# Patient Record
Sex: FEMALE | Race: WHITE | Employment: OTHER | URBAN - METROPOLITAN AREA
[De-identification: names, ages, dates, MRNs, and addresses within clinical notes are randomized per-mention and may not be internally consistent; named-entity substitution may affect disease eponyms.]

---

## 2017-06-06 PROBLEM — M81.0 OSTEOPOROSIS: Status: ACTIVE | Noted: 2017-06-06

## 2017-12-29 PROBLEM — J11.1 INFLUENZA WITH RESPIRATORY MANIFESTATION OTHER THAN PNEUMONIA: Status: ACTIVE | Noted: 2017-12-29

## 2017-12-29 PROBLEM — J18.9 PNEUMONIA DUE TO ORGANISM: Status: ACTIVE | Noted: 2017-12-29

## 2018-01-03 PROBLEM — R05.9 COUGH: Status: ACTIVE | Noted: 2018-01-03

## 2018-02-13 PROBLEM — Z95.810 S/P ICD (INTERNAL CARDIAC DEFIBRILLATOR) PROCEDURE: Status: ACTIVE | Noted: 2018-02-13

## 2018-04-11 PROBLEM — R05.9 COUGH: Status: RESOLVED | Noted: 2018-01-03 | Resolved: 2018-04-11

## 2018-05-24 ENCOUNTER — OFFICE VISIT (OUTPATIENT)
Dept: CARDIOLOGY CLINIC | Age: 83
End: 2018-05-24
Payer: MEDICARE

## 2018-05-24 VITALS
DIASTOLIC BLOOD PRESSURE: 60 MMHG | HEIGHT: 59 IN | HEART RATE: 64 BPM | WEIGHT: 120 LBS | BODY MASS INDEX: 24.19 KG/M2 | SYSTOLIC BLOOD PRESSURE: 124 MMHG | RESPIRATION RATE: 16 BRPM

## 2018-05-24 DIAGNOSIS — I10 ESSENTIAL HYPERTENSION: ICD-10-CM

## 2018-05-24 DIAGNOSIS — I50.42 CHRONIC COMBINED SYSTOLIC AND DIASTOLIC CONGESTIVE HEART FAILURE (HCC): ICD-10-CM

## 2018-05-24 DIAGNOSIS — I38 VALVULAR HEART DISEASE: ICD-10-CM

## 2018-05-24 DIAGNOSIS — I25.5 ISCHEMIC CARDIOMYOPATHY: ICD-10-CM

## 2018-05-24 DIAGNOSIS — I25.10 CORONARY ARTERY DISEASE INVOLVING NATIVE CORONARY ARTERY OF NATIVE HEART WITHOUT ANGINA PECTORIS: Primary | ICD-10-CM

## 2018-05-24 PROCEDURE — 1123F ACP DISCUSS/DSCN MKR DOCD: CPT | Performed by: INTERNAL MEDICINE

## 2018-05-24 PROCEDURE — 99214 OFFICE O/P EST MOD 30 MIN: CPT | Performed by: INTERNAL MEDICINE

## 2018-05-24 PROCEDURE — 1090F PRES/ABSN URINE INCON ASSESS: CPT | Performed by: INTERNAL MEDICINE

## 2018-05-24 PROCEDURE — 1036F TOBACCO NON-USER: CPT | Performed by: INTERNAL MEDICINE

## 2018-05-24 PROCEDURE — G8420 CALC BMI NORM PARAMETERS: HCPCS | Performed by: INTERNAL MEDICINE

## 2018-05-24 PROCEDURE — G8400 PT W/DXA NO RESULTS DOC: HCPCS | Performed by: INTERNAL MEDICINE

## 2018-05-24 PROCEDURE — G8598 ASA/ANTIPLAT THER USED: HCPCS | Performed by: INTERNAL MEDICINE

## 2018-05-24 PROCEDURE — 4040F PNEUMOC VAC/ADMIN/RCVD: CPT | Performed by: INTERNAL MEDICINE

## 2018-05-24 PROCEDURE — 93000 ELECTROCARDIOGRAM COMPLETE: CPT | Performed by: INTERNAL MEDICINE

## 2018-05-24 PROCEDURE — G8427 DOCREV CUR MEDS BY ELIG CLIN: HCPCS | Performed by: INTERNAL MEDICINE

## 2018-06-07 ENCOUNTER — HOSPITAL ENCOUNTER (OUTPATIENT)
Dept: INFUSION THERAPY | Age: 83
Setting detail: INFUSION SERIES
Discharge: HOME | End: 2018-06-07
Payer: MEDICARE

## 2018-06-07 VITALS
DIASTOLIC BLOOD PRESSURE: 57 MMHG | OXYGEN SATURATION: 94 % | SYSTOLIC BLOOD PRESSURE: 117 MMHG | WEIGHT: 120 LBS | TEMPERATURE: 97.9 F | HEART RATE: 59 BPM | BODY MASS INDEX: 24.19 KG/M2 | HEIGHT: 59 IN | RESPIRATION RATE: 16 BRPM

## 2018-06-07 DIAGNOSIS — M81.0 OSTEOPOROSIS, UNSPECIFIED OSTEOPOROSIS TYPE, UNSPECIFIED PATHOLOGICAL FRACTURE PRESENCE: ICD-10-CM

## 2018-06-07 PROCEDURE — 6360000002 HC RX W HCPCS: Performed by: OBSTETRICS & GYNECOLOGY

## 2018-06-07 PROCEDURE — 96372 THER/PROPH/DIAG INJ SC/IM: CPT

## 2018-06-07 RX ADMIN — DENOSUMAB 60 MG: 60 INJECTION SUBCUTANEOUS at 14:14

## 2018-06-07 NOTE — PROGRESS NOTES
Patient tolerated injection well. Therapy plan reviewed with patient. Verbalizes understanding. Reviewed AVS with patient, reviewed medication information, verbalizes good knowledge of current plan, and has no signs or symptoms to report at this time.  Declines copy of AVS.

## 2018-11-13 ENCOUNTER — HOSPITAL ENCOUNTER (OUTPATIENT)
Dept: ULTRASOUND IMAGING | Age: 83
Discharge: HOME | End: 2018-11-15
Payer: MEDICARE

## 2018-11-13 ENCOUNTER — HOSPITAL ENCOUNTER (OUTPATIENT)
Dept: GENERAL RADIOLOGY | Age: 83
Discharge: HOME | End: 2018-11-15
Payer: MEDICARE

## 2018-11-13 DIAGNOSIS — R10.84 ABDOMINAL PAIN, GENERALIZED: ICD-10-CM

## 2018-11-13 DIAGNOSIS — R10.84 GENERALIZED ABDOMINAL PAIN: ICD-10-CM

## 2018-11-13 PROCEDURE — 2500000003 HC RX 250 WO HCPCS: Performed by: INTERNAL MEDICINE

## 2018-11-13 PROCEDURE — 76705 ECHO EXAM OF ABDOMEN: CPT

## 2018-11-13 PROCEDURE — 6370000000 HC RX 637 (ALT 250 FOR IP): Performed by: INTERNAL MEDICINE

## 2018-11-13 PROCEDURE — 74241 FL UGI W KUB: CPT

## 2018-11-13 RX ADMIN — ANTACID/ANTIFLATULENT 1 EACH: 380; 550; 10; 10 GRANULE, EFFERVESCENT ORAL at 10:16

## 2018-11-13 RX ADMIN — BARIUM SULFATE 175 ML: 960 POWDER, FOR SUSPENSION ORAL at 10:14

## 2018-11-13 RX ADMIN — BARIUM SULFATE 140 ML: 980 POWDER, FOR SUSPENSION ORAL at 10:14

## 2018-11-19 ENCOUNTER — HOSPITAL ENCOUNTER (EMERGENCY)
Age: 83
Discharge: HOME | End: 2018-11-19
Attending: EMERGENCY MEDICINE
Payer: MEDICARE

## 2018-11-19 VITALS
BODY MASS INDEX: 22.58 KG/M2 | TEMPERATURE: 98.1 F | HEIGHT: 60 IN | OXYGEN SATURATION: 96 % | DIASTOLIC BLOOD PRESSURE: 86 MMHG | SYSTOLIC BLOOD PRESSURE: 148 MMHG | HEART RATE: 68 BPM | RESPIRATION RATE: 20 BRPM | WEIGHT: 115 LBS

## 2018-11-19 DIAGNOSIS — K52.9 GASTROENTERITIS: Primary | ICD-10-CM

## 2018-11-19 DIAGNOSIS — E86.0 DEHYDRATION: ICD-10-CM

## 2018-11-19 LAB
ALBUMIN SERPL-MCNC: 4.5 G/DL (ref 3.5–5.2)
ALP BLD-CCNC: 30 U/L (ref 35–104)
ALT SERPL-CCNC: 27 U/L (ref 0–32)
ANION GAP SERPL CALCULATED.3IONS-SCNC: 8 MMOL/L (ref 7–16)
AST SERPL-CCNC: 23 U/L (ref 0–31)
BASOPHILS ABSOLUTE: 0.04 E9/L (ref 0–0.2)
BASOPHILS RELATIVE PERCENT: 0.7 % (ref 0–2)
BILIRUB SERPL-MCNC: 0.3 MG/DL (ref 0–1.2)
BUN BLDV-MCNC: 22 MG/DL (ref 8–23)
CALCIUM SERPL-MCNC: 9.6 MG/DL (ref 8.6–10.2)
CHLORIDE BLD-SCNC: 102 MMOL/L (ref 98–107)
CO2: 31 MMOL/L (ref 22–29)
CREAT SERPL-MCNC: 0.9 MG/DL (ref 0.5–1)
EOSINOPHILS ABSOLUTE: 0.23 E9/L (ref 0.05–0.5)
EOSINOPHILS RELATIVE PERCENT: 3.9 % (ref 0–6)
GFR AFRICAN AMERICAN: >60
GFR NON-AFRICAN AMERICAN: 60 ML/MIN/1.73
GLUCOSE BLD-MCNC: 94 MG/DL (ref 74–99)
HCT VFR BLD CALC: 39.1 % (ref 34–48)
HEMOGLOBIN: 12.7 G/DL (ref 11.5–15.5)
IMMATURE GRANULOCYTES #: 0.02 E9/L
IMMATURE GRANULOCYTES %: 0.3 % (ref 0–5)
INR BLD: 1.1
LIPASE: 34 U/L (ref 13–60)
LYMPHOCYTES ABSOLUTE: 1.01 E9/L (ref 1.5–4)
LYMPHOCYTES RELATIVE PERCENT: 16.9 % (ref 20–42)
MAGNESIUM: 2.1 MG/DL (ref 1.6–2.6)
MCH RBC QN AUTO: 32.4 PG (ref 26–35)
MCHC RBC AUTO-ENTMCNC: 32.5 % (ref 32–34.5)
MCV RBC AUTO: 99.7 FL (ref 80–99.9)
MONOCYTES ABSOLUTE: 0.7 E9/L (ref 0.1–0.95)
MONOCYTES RELATIVE PERCENT: 11.7 % (ref 2–12)
NEUTROPHILS ABSOLUTE: 3.97 E9/L (ref 1.8–7.3)
NEUTROPHILS RELATIVE PERCENT: 66.5 % (ref 43–80)
PDW BLD-RTO: 12.5 FL (ref 11.5–15)
PLATELET # BLD: 254 E9/L (ref 130–450)
PMV BLD AUTO: 10.4 FL (ref 7–12)
POTASSIUM SERPL-SCNC: 4.9 MMOL/L (ref 3.5–5)
PROTHROMBIN TIME: 13.3 SEC (ref 9.3–12.4)
RBC # BLD: 3.92 E12/L (ref 3.5–5.5)
SODIUM BLD-SCNC: 141 MMOL/L (ref 132–146)
TOTAL PROTEIN: 6.5 G/DL (ref 6.4–8.3)
WBC # BLD: 6 E9/L (ref 4.5–11.5)

## 2018-11-19 PROCEDURE — 6370000000 HC RX 637 (ALT 250 FOR IP): Performed by: EMERGENCY MEDICINE

## 2018-11-19 PROCEDURE — 83735 ASSAY OF MAGNESIUM: CPT

## 2018-11-19 PROCEDURE — 96361 HYDRATE IV INFUSION ADD-ON: CPT

## 2018-11-19 PROCEDURE — 99284 EMERGENCY DEPT VISIT MOD MDM: CPT

## 2018-11-19 PROCEDURE — 85025 COMPLETE CBC W/AUTO DIFF WBC: CPT

## 2018-11-19 PROCEDURE — 83690 ASSAY OF LIPASE: CPT

## 2018-11-19 PROCEDURE — 2580000003 HC RX 258: Performed by: EMERGENCY MEDICINE

## 2018-11-19 PROCEDURE — 80053 COMPREHEN METABOLIC PANEL: CPT

## 2018-11-19 PROCEDURE — 85610 PROTHROMBIN TIME: CPT

## 2018-11-19 PROCEDURE — 36415 COLL VENOUS BLD VENIPUNCTURE: CPT

## 2018-11-19 PROCEDURE — 6360000002 HC RX W HCPCS: Performed by: EMERGENCY MEDICINE

## 2018-11-19 PROCEDURE — 96374 THER/PROPH/DIAG INJ IV PUSH: CPT

## 2018-11-19 RX ORDER — ONDANSETRON 2 MG/ML
4 INJECTION INTRAMUSCULAR; INTRAVENOUS ONCE
Status: COMPLETED | OUTPATIENT
Start: 2018-11-19 | End: 2018-11-19

## 2018-11-19 RX ORDER — 0.9 % SODIUM CHLORIDE 0.9 %
1000 INTRAVENOUS SOLUTION INTRAVENOUS ONCE
Status: COMPLETED | OUTPATIENT
Start: 2018-11-19 | End: 2018-11-19

## 2018-11-19 RX ORDER — ONDANSETRON 2 MG/ML
4 INJECTION INTRAMUSCULAR; INTRAVENOUS ONCE
Status: DISCONTINUED | OUTPATIENT
Start: 2018-11-19 | End: 2018-11-19 | Stop reason: HOSPADM

## 2018-11-19 RX ORDER — ONDANSETRON 4 MG/1
4 TABLET, ORALLY DISINTEGRATING ORAL EVERY 8 HOURS PRN
Qty: 15 TABLET | Refills: 0 | Status: ON HOLD | OUTPATIENT
Start: 2018-11-19 | End: 2020-06-12 | Stop reason: HOSPADM

## 2018-11-19 RX ORDER — DIPHENOXYLATE HYDROCHLORIDE AND ATROPINE SULFATE 2.5; .025 MG/1; MG/1
1 TABLET ORAL ONCE
Status: COMPLETED | OUTPATIENT
Start: 2018-11-19 | End: 2018-11-19

## 2018-11-19 RX ORDER — DIPHENOXYLATE HYDROCHLORIDE AND ATROPINE SULFATE 2.5; .025 MG/1; MG/1
1 TABLET ORAL 4 TIMES DAILY PRN
Qty: 12 TABLET | Refills: 0 | Status: SHIPPED | OUTPATIENT
Start: 2018-11-19 | End: 2018-11-29

## 2018-11-19 RX ADMIN — SODIUM CHLORIDE 1000 ML: 9 INJECTION, SOLUTION INTRAVENOUS at 11:13

## 2018-11-19 RX ADMIN — ONDANSETRON 4 MG: 2 INJECTION INTRAMUSCULAR; INTRAVENOUS at 11:28

## 2018-11-19 RX ADMIN — DIPHENOXYLATE HYDROCHLORIDE AND ATROPINE SULFATE 1 TABLET: 2.5; .025 TABLET ORAL at 11:28

## 2018-11-19 NOTE — ED PROVIDER NOTES
Lymphocytes # 1.01 (L) 1.50 - 4.00 E9/L    Monocytes # 0.70 0.10 - 0.95 E9/L    Eosinophils # 0.23 0.05 - 0.50 E9/L    Basophils # 0.04 0.00 - 0.20 E9/L   Comprehensive Metabolic Panel   Result Value Ref Range    Sodium 141 132 - 146 mmol/L    Potassium 4.9 3.5 - 5.0 mmol/L    Chloride 102 98 - 107 mmol/L    CO2 31 (H) 22 - 29 mmol/L    Anion Gap 8 7 - 16 mmol/L    Glucose 94 74 - 99 mg/dL    BUN 22 8 - 23 mg/dL    CREATININE 0.9 0.5 - 1.0 mg/dL    GFR Non-African American 60 >=60 mL/min/1.73    GFR African American >60     Calcium 9.6 8.6 - 10.2 mg/dL    Total Protein 6.5 6.4 - 8.3 g/dL    Alb 4.5 3.5 - 5.2 g/dL    Total Bilirubin 0.3 0.0 - 1.2 mg/dL    Alkaline Phosphatase 30 (L) 35 - 104 U/L    ALT 27 0 - 32 U/L    AST 23 0 - 31 U/L   Protime-INR   Result Value Ref Range    Protime 13.3 (H) 9.3 - 12.4 sec    INR 1.1    Magnesium   Result Value Ref Range    Magnesium 2.1 1.6 - 2.6 mg/dL   Lipase   Result Value Ref Range    Lipase 34 13 - 60 U/L     No orders to display           All above test results were reviewed in details. Medical Decision Making Rationale: This patient presented emergency room with Nausea, vomiting and diarrhea with sick contacts. Labs showed no acute abnormalities. Abdominal exam was benign. Symptoms improved after IV fluids. The patient will be discharged on symptomatic treatment of gastroenteritis.        ------------------------- NURSING NOTES AND VITALS REVIEWED ---------------------------   The nursing notes within the ED encounter and vital signs as below have been reviewed.    BP (!) 148/86   Pulse 68   Temp 98.1 °F (36.7 °C) (Oral)   Resp 20   Ht 5' (1.524 m)   Wt 115 lb (52.2 kg)   SpO2 96%   BMI 22.46 kg/m²   Oxygen Saturation Interpretation: Normal      ------------------------------------------ PROGRESS NOTES ------------------------------------------       I have spoken with the patient and discussed todays results, in addition to providing specific details

## 2018-11-26 ENCOUNTER — OFFICE VISIT (OUTPATIENT)
Dept: CARDIOLOGY CLINIC | Age: 83
End: 2018-11-26
Payer: MEDICARE

## 2018-11-26 VITALS
BODY MASS INDEX: 23.59 KG/M2 | HEART RATE: 63 BPM | HEIGHT: 59 IN | SYSTOLIC BLOOD PRESSURE: 110 MMHG | DIASTOLIC BLOOD PRESSURE: 68 MMHG | WEIGHT: 117 LBS | RESPIRATION RATE: 16 BRPM

## 2018-11-26 DIAGNOSIS — Z98.61 HISTORY OF PTCA: ICD-10-CM

## 2018-11-26 DIAGNOSIS — I25.2 H/O ACUTE MYOCARDIAL INFARCTION OF ANTEROLATERAL WALL: ICD-10-CM

## 2018-11-26 DIAGNOSIS — I25.10 CORONARY ARTERY DISEASE INVOLVING NATIVE CORONARY ARTERY OF NATIVE HEART WITHOUT ANGINA PECTORIS: Primary | ICD-10-CM

## 2018-11-26 DIAGNOSIS — I25.5 ISCHEMIC CARDIOMYOPATHY: ICD-10-CM

## 2018-11-26 DIAGNOSIS — I10 ESSENTIAL HYPERTENSION: ICD-10-CM

## 2018-11-26 PROCEDURE — 1036F TOBACCO NON-USER: CPT | Performed by: INTERNAL MEDICINE

## 2018-11-26 PROCEDURE — G8400 PT W/DXA NO RESULTS DOC: HCPCS | Performed by: INTERNAL MEDICINE

## 2018-11-26 PROCEDURE — 99214 OFFICE O/P EST MOD 30 MIN: CPT | Performed by: INTERNAL MEDICINE

## 2018-11-26 PROCEDURE — G8484 FLU IMMUNIZE NO ADMIN: HCPCS | Performed by: INTERNAL MEDICINE

## 2018-11-26 PROCEDURE — 1123F ACP DISCUSS/DSCN MKR DOCD: CPT | Performed by: INTERNAL MEDICINE

## 2018-11-26 PROCEDURE — G8420 CALC BMI NORM PARAMETERS: HCPCS | Performed by: INTERNAL MEDICINE

## 2018-11-26 PROCEDURE — 1101F PT FALLS ASSESS-DOCD LE1/YR: CPT | Performed by: INTERNAL MEDICINE

## 2018-11-26 PROCEDURE — G8427 DOCREV CUR MEDS BY ELIG CLIN: HCPCS | Performed by: INTERNAL MEDICINE

## 2018-11-26 PROCEDURE — 4040F PNEUMOC VAC/ADMIN/RCVD: CPT | Performed by: INTERNAL MEDICINE

## 2018-11-26 PROCEDURE — G8598 ASA/ANTIPLAT THER USED: HCPCS | Performed by: INTERNAL MEDICINE

## 2018-11-26 PROCEDURE — 93000 ELECTROCARDIOGRAM COMPLETE: CPT | Performed by: INTERNAL MEDICINE

## 2018-11-26 PROCEDURE — 1090F PRES/ABSN URINE INCON ASSESS: CPT | Performed by: INTERNAL MEDICINE

## 2018-11-26 NOTE — PROGRESS NOTES
OFFICE FOLLOW-UP        PRIMARY CARE PHYSICIAN:      Ana Rueda MD    Date of Service: 11/26/2018     ALLERGIES / SENSITIVITIES:        No Known Allergies     REVIEWED MEDICATIONS:        Current Outpatient Prescriptions:     carvedilol (COREG) 25 MG tablet, Take 25 mg by mouth 2 times daily (with meals), Disp: , Rfl:     Lactobacillus (PROBIOTIC ACIDOPHILUS) CAPS, Take 1 capsule by mouth 2 times daily (with meals), Disp: , Rfl:     Coenzyme Q10 (CO Q-10) 100 MG CAPS, Take by mouth, Disp: , Rfl:     Nutritional Supplements (JUICE PLUS FIBRE PO), Take by mouth, Disp: , Rfl:     losartan (COZAAR) 25 MG tablet, Take 1 tablet by mouth daily, Disp: 90 tablet, Rfl: 3    simvastatin (ZOCOR) 20 MG tablet, Take 1 tablet by mouth daily, Disp: 90 tablet, Rfl: 3    aspirin 81 MG tablet, Take 81 mg by mouth daily To check with Dr. Aminata Thorne, Disp: , Rfl:     diphenoxylate-atropine (LOMOTIL) 2.5-0.025 MG per tablet, Take 1 tablet by mouth 4 times daily as needed for Diarrhea for up to 10 days. ., Disp: 12 tablet, Rfl: 0    ondansetron (ZOFRAN ODT) 4 MG disintegrating tablet, Take 1 tablet by mouth every 8 hours as needed for Nausea or Vomiting, Disp: 15 tablet, Rfl: 0    Dextromethorphan-Guaifenesin (MUCINEX DM MAXIMUM STRENGTH)  MG TB12, Take 1 tablet by mouth every 12 hours as needed (cough and congestion) (Patient taking differently: Take 1 tablet by mouth every 12 hours as needed (cough and congestion) Patient states she only takes them as needed), Disp: 28 tablet, Rfl: 0    furosemide (LASIX) 20 MG tablet, Take 1 tablet by mouth 2 times daily (Patient taking differently: Take 20 mg by mouth 2 times daily Patient states only takes as needed), Disp: 20 tablet, Rfl: 0      S: REASON FOR VISIT:      Previously followed by Dr. Angie Dave -- she established with me in 4/2016. She denies recent chest pain, worsening SOB, palpitations, PND, orthopnea, or syncope.  She follows regularly with Dr. Umu Clemons for ICD interrogations (s/p generator change in 2/2018). +recent dysphagia -- GI work-up ongoing. She is currently with no active cardiac complaints at rest.        REVIEW OF SYSTEMS:    Cardiac: As per HPI  General: No fever, chills  Pulmonary: As per HPI  HEENT: No visual disturbances, difficult swallowing  GI: No nausea, vomiting, abdominal pain  Musculoskeletal: MACKEY x 4  Skin: Intact, no rashes  Neuro/Psych: No headache or seizures       CARDIOVASCULAR HISTORY:    1. Coronary artery disease/ischemic cardiomyopathy/congestive heart failure. a. 10/30/2009: Acute ST elevation anterolateral wall myocardial infarction. b. 10/30/2009: Cardiac catheterization/primary angioplasty: Left main short vessel with no significant disease. LAD occluded proximally. LCX: 90% stenosis of the 3rd obtuse marginal branch. RCA, a small nondominant vessel, which was non-selectively visualized. Successful balloon angioplasty and stenting to the proximal LAD with restoration of DORI 3 flow in the vessel. c. 06/18/2014 Lexiscan nuclear stress test was a markedly abnormal study showing a transmural myocardial infarction involving a large wrap around left anterior descending artery distribution with no evidence of residual stress-induced ischemia with the gated views showing akinesis of the left ventricular apex, the apical anterior wall and the apical septum with severe hypokinesis of the inferior wall and moderate hypokinesis of the rest of the interventricular septum with a calculated ejection fraction of 34 %. d. 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 Normal right ventricular size and function, with a pacemaker noted in the right ventricle. Pacemaker also noted in the right atrium.  Mild to moderate

## 2019-01-08 ENCOUNTER — HOSPITAL ENCOUNTER (OUTPATIENT)
Dept: CT IMAGING | Age: 84
Discharge: HOME | End: 2019-01-10
Payer: MEDICARE

## 2019-01-08 DIAGNOSIS — R10.84 ABDOMINAL PAIN, GENERALIZED: ICD-10-CM

## 2019-01-08 DIAGNOSIS — K83.8 BILE DUCT PROLIFERATION: ICD-10-CM

## 2019-01-08 PROCEDURE — 2580000003 HC RX 258: Performed by: RADIOLOGY

## 2019-01-08 PROCEDURE — 74177 CT ABD & PELVIS W/CONTRAST: CPT

## 2019-01-08 PROCEDURE — 6360000004 HC RX CONTRAST MEDICATION: Performed by: RADIOLOGY

## 2019-01-08 RX ORDER — SODIUM CHLORIDE 0.9 % (FLUSH) 0.9 %
10 SYRINGE (ML) INJECTION ONCE
Status: COMPLETED | OUTPATIENT
Start: 2019-01-08 | End: 2019-01-08

## 2019-01-08 RX ADMIN — IOPAMIDOL 110 ML: 755 INJECTION, SOLUTION INTRAVENOUS at 09:32

## 2019-01-08 RX ADMIN — IOHEXOL 50 ML: 240 INJECTION, SOLUTION INTRATHECAL; INTRAVASCULAR; INTRAVENOUS; ORAL at 10:19

## 2019-01-08 RX ADMIN — Medication 10 ML: at 09:32

## 2019-01-26 ENCOUNTER — HOSPITAL ENCOUNTER (EMERGENCY)
Age: 84
Discharge: HOME | End: 2019-01-26
Attending: FAMILY MEDICINE
Payer: MEDICARE

## 2019-01-26 ENCOUNTER — APPOINTMENT (OUTPATIENT)
Dept: GENERAL RADIOLOGY | Age: 84
End: 2019-01-26
Payer: MEDICARE

## 2019-01-26 VITALS
OXYGEN SATURATION: 96 % | DIASTOLIC BLOOD PRESSURE: 72 MMHG | HEART RATE: 96 BPM | WEIGHT: 110 LBS | SYSTOLIC BLOOD PRESSURE: 128 MMHG | BODY MASS INDEX: 22.18 KG/M2 | RESPIRATION RATE: 20 BRPM | TEMPERATURE: 97.7 F | HEIGHT: 59 IN

## 2019-01-26 DIAGNOSIS — J44.1 COPD EXACERBATION (HCC): Primary | ICD-10-CM

## 2019-01-26 PROCEDURE — 71046 X-RAY EXAM CHEST 2 VIEWS: CPT

## 2019-01-26 PROCEDURE — 6370000000 HC RX 637 (ALT 250 FOR IP): Performed by: FAMILY MEDICINE

## 2019-01-26 PROCEDURE — 99283 EMERGENCY DEPT VISIT LOW MDM: CPT

## 2019-01-26 RX ORDER — IPRATROPIUM BROMIDE AND ALBUTEROL SULFATE 2.5; .5 MG/3ML; MG/3ML
1 SOLUTION RESPIRATORY (INHALATION) ONCE
Status: COMPLETED | OUTPATIENT
Start: 2019-01-26 | End: 2019-01-26

## 2019-01-26 RX ORDER — AZITHROMYCIN 250 MG/1
TABLET, FILM COATED ORAL
Qty: 1 PACKET | Refills: 0 | Status: SHIPPED | OUTPATIENT
Start: 2019-01-26 | End: 2019-06-25 | Stop reason: ALTCHOICE

## 2019-01-26 RX ORDER — PREDNISONE 20 MG/1
40 TABLET ORAL ONCE
Status: COMPLETED | OUTPATIENT
Start: 2019-01-26 | End: 2019-01-26

## 2019-01-26 RX ORDER — CEFDINIR 300 MG/1
300 CAPSULE ORAL 2 TIMES DAILY
Qty: 20 CAPSULE | Refills: 0 | Status: SHIPPED | OUTPATIENT
Start: 2019-01-26 | End: 2019-02-05

## 2019-01-26 RX ADMIN — IPRATROPIUM BROMIDE AND ALBUTEROL SULFATE 1 AMPULE: .5; 3 SOLUTION RESPIRATORY (INHALATION) at 09:36

## 2019-01-26 RX ADMIN — PREDNISONE 40 MG: 20 TABLET ORAL at 09:36

## 2019-02-21 ENCOUNTER — HOSPITAL ENCOUNTER (OUTPATIENT)
Age: 84
Discharge: HOME | End: 2019-02-23
Payer: MEDICARE

## 2019-02-21 PROCEDURE — 87186 SC STD MICRODIL/AGAR DIL: CPT

## 2019-02-21 PROCEDURE — 87070 CULTURE OTHR SPECIMN AEROBIC: CPT

## 2019-02-24 LAB
CULTURE NOSE: ABNORMAL
CULTURE NOSE: ABNORMAL
ORGANISM: ABNORMAL

## 2019-02-26 ENCOUNTER — HOSPITAL ENCOUNTER (OUTPATIENT)
Dept: CT IMAGING | Age: 84
Discharge: HOME | End: 2019-02-28
Payer: MEDICARE

## 2019-02-26 DIAGNOSIS — J32.4 CHRONIC PANSINUSITIS: ICD-10-CM

## 2019-02-26 PROCEDURE — 70486 CT MAXILLOFACIAL W/O DYE: CPT

## 2019-03-01 ENCOUNTER — HOSPITAL ENCOUNTER (OUTPATIENT)
Age: 84
Discharge: HOME | End: 2019-03-01
Payer: MEDICARE

## 2019-03-01 PROCEDURE — 86335 IMMUNFIX E-PHORSIS/URINE/CSF: CPT

## 2019-03-05 LAB
Lab: NORMAL
REPORT: NORMAL
THIS TEST SENT TO: NORMAL

## 2019-04-12 ENCOUNTER — HOSPITAL ENCOUNTER (OUTPATIENT)
Dept: CT IMAGING | Age: 84
Discharge: HOME | End: 2019-04-14
Payer: MEDICARE

## 2019-04-12 DIAGNOSIS — K83.8 BILE DUCT PROLIFERATION: ICD-10-CM

## 2019-04-12 PROCEDURE — 2580000003 HC RX 258: Performed by: INTERNAL MEDICINE

## 2019-04-12 PROCEDURE — 74170 CT ABD WO CNTRST FLWD CNTRST: CPT

## 2019-04-12 PROCEDURE — 6360000004 HC RX CONTRAST MEDICATION: Performed by: RADIOLOGY

## 2019-04-12 RX ORDER — SODIUM CHLORIDE 0.9 % (FLUSH) 0.9 %
10 SYRINGE (ML) INJECTION PRN
Status: DISCONTINUED | OUTPATIENT
Start: 2019-04-12 | End: 2019-04-15 | Stop reason: HOSPADM

## 2019-04-12 RX ADMIN — IOPAMIDOL 100 ML: 755 INJECTION, SOLUTION INTRAVENOUS at 11:19

## 2019-04-12 RX ADMIN — Medication 10 ML: at 11:20

## 2019-05-24 ENCOUNTER — HOSPITAL ENCOUNTER (OUTPATIENT)
Age: 84
Discharge: HOME | End: 2019-05-24
Payer: MEDICARE

## 2019-05-24 ENCOUNTER — HOSPITAL ENCOUNTER (OUTPATIENT)
Dept: GENERAL RADIOLOGY | Age: 84
Discharge: HOME | End: 2019-05-26
Payer: MEDICARE

## 2019-05-24 ENCOUNTER — HOSPITAL ENCOUNTER (OUTPATIENT)
Dept: CT IMAGING | Age: 84
Discharge: HOME | End: 2019-05-26
Payer: MEDICARE

## 2019-05-24 ENCOUNTER — HOSPITAL ENCOUNTER (OUTPATIENT)
Age: 84
Discharge: HOME | End: 2019-05-26
Payer: MEDICARE

## 2019-05-24 DIAGNOSIS — J38.3 OTHER DISEASES OF VOCAL CORDS: ICD-10-CM

## 2019-05-24 DIAGNOSIS — R13.10 DYSPHAGIA, UNSPECIFIED TYPE: ICD-10-CM

## 2019-05-24 LAB
BUN BLDV-MCNC: 14 MG/DL (ref 8–23)
CREAT SERPL-MCNC: 0.9 MG/DL (ref 0.5–1)
GFR AFRICAN AMERICAN: >60
GFR NON-AFRICAN AMERICAN: 60 ML/MIN/1.73

## 2019-05-24 PROCEDURE — 2580000003 HC RX 258: Performed by: OTOLARYNGOLOGY

## 2019-05-24 PROCEDURE — 36415 COLL VENOUS BLD VENIPUNCTURE: CPT

## 2019-05-24 PROCEDURE — 6360000004 HC RX CONTRAST MEDICATION: Performed by: RADIOLOGY

## 2019-05-24 PROCEDURE — 74230 X-RAY XM SWLNG FUNCJ C+: CPT

## 2019-05-24 PROCEDURE — 84520 ASSAY OF UREA NITROGEN: CPT

## 2019-05-24 PROCEDURE — 2500000003 HC RX 250 WO HCPCS: Performed by: INTERNAL MEDICINE

## 2019-05-24 PROCEDURE — 71260 CT THORAX DX C+: CPT

## 2019-05-24 PROCEDURE — 82565 ASSAY OF CREATININE: CPT

## 2019-05-24 RX ORDER — SODIUM CHLORIDE 0.9 % (FLUSH) 0.9 %
10 SYRINGE (ML) INJECTION PRN
Status: DISCONTINUED | OUTPATIENT
Start: 2019-05-24 | End: 2019-05-27 | Stop reason: HOSPADM

## 2019-05-24 RX ADMIN — BARIUM SULFATE 58 ML: 960 POWDER, FOR SUSPENSION ORAL at 09:42

## 2019-05-24 RX ADMIN — Medication 10 ML: at 14:48

## 2019-05-24 RX ADMIN — IOPAMIDOL 90 ML: 755 INJECTION, SOLUTION INTRAVENOUS at 14:47

## 2019-05-24 RX ADMIN — BARIUM SULFATE 45 G: 0.6 CREAM ORAL at 09:42

## 2019-05-24 NOTE — PROGRESS NOTES
SPEECH/LANGUAGE PATHOLOGY  VIDEOFLUOROSCOPIC STUDY OF SWALLOWING (MBS)      PATIENT NAME:  Conor Lerner      :  1935      TODAY'S DATE:  2019    SUMMARY OF EVALUATION     DYSPHAGIA DIAGNOSIS:  Swallowing function was within normal limits      DIET RECOMMENDATIONS: Regular consistency solids with regular consistency liquids       FEEDING RECOMMENDATIONS:     Assistance level:  No assistance required      Compensatory strategies recommended: compensatory strategies were not needed    THERAPY RECOMMENDATIONS:      Dysphagia therapy is not recommended                 PROCEDURE     Consistencies Administered During the Evaluation   Liquids: Thin, honey   Solids:  Pureed, solids     Method of Intake:   Cup, spoon, straw  Standing, lateral position                  RESULTS     ORAL STAGE     The oral stage of swallowing was within functional limits       PHARYNGEAL STAGE           ONSET TIME     Onset time of the pharyngeal swallow was adequate       PHARYNGEAL RESIDUALS          No significant residuals were noted in the vallecula and pyriform sinuses    LARYNGEAL PENETRATION     Laryngeal penetration was absent during the evaluation                ASPIRATION    Aspiration was absent during the evaluation        STRUCTURAL/FUNCTIONAL ANOMALIES      No structural anomalies were noted      CERVICAL ESOPHAGEAL STAGE :        The cervical esophagus appeared normal                             [x]The admitting diagnosis and active problem list, as listed below have been reviewed prior to initiation of this evaluation.      ADMITTING DIAGNOSIS: Dysphagia, unspecified type [R13.10]     ACTIVE PROBLEM LIST:   Patient Active Problem List   Diagnosis    CAD (coronary artery disease)    H/O acute myocardial infarction of anterolateral wall    History of PTCA    Ischemic cardiomyopathy    Automatic implantable cardioverter-defibrillator in situ    Essential hypertension    COPD exacerbation ([facility] [address]    DM II

## 2019-05-29 ENCOUNTER — HOSPITAL ENCOUNTER (OUTPATIENT)
Age: 84
Discharge: HOME | End: 2019-05-31
Payer: MEDICARE

## 2019-05-29 ENCOUNTER — HOSPITAL ENCOUNTER (OUTPATIENT)
Dept: GENERAL RADIOLOGY | Age: 84
Discharge: HOME | End: 2019-05-31
Payer: MEDICARE

## 2019-05-29 ENCOUNTER — OFFICE VISIT (OUTPATIENT)
Dept: CARDIOLOGY CLINIC | Age: 84
End: 2019-05-29
Payer: MEDICARE

## 2019-05-29 VITALS
WEIGHT: 109 LBS | DIASTOLIC BLOOD PRESSURE: 70 MMHG | HEIGHT: 59 IN | SYSTOLIC BLOOD PRESSURE: 120 MMHG | RESPIRATION RATE: 16 BRPM | HEART RATE: 78 BPM | BODY MASS INDEX: 21.97 KG/M2

## 2019-05-29 DIAGNOSIS — I25.5 ISCHEMIC CARDIOMYOPATHY: ICD-10-CM

## 2019-05-29 DIAGNOSIS — Z95.810 AUTOMATIC IMPLANTABLE CARDIOVERTER-DEFIBRILLATOR IN SITU: ICD-10-CM

## 2019-05-29 DIAGNOSIS — I25.10 CORONARY ARTERY DISEASE INVOLVING NATIVE CORONARY ARTERY OF NATIVE HEART WITHOUT ANGINA PECTORIS: Primary | ICD-10-CM

## 2019-05-29 DIAGNOSIS — Z98.61 HISTORY OF PTCA: ICD-10-CM

## 2019-05-29 DIAGNOSIS — J38.3 OTHER DISEASES OF VOCAL CORDS: ICD-10-CM

## 2019-05-29 DIAGNOSIS — I50.31 ACUTE DIASTOLIC CONGESTIVE HEART FAILURE (HCC): ICD-10-CM

## 2019-05-29 PROCEDURE — 4040F PNEUMOC VAC/ADMIN/RCVD: CPT | Performed by: INTERNAL MEDICINE

## 2019-05-29 PROCEDURE — G8427 DOCREV CUR MEDS BY ELIG CLIN: HCPCS | Performed by: INTERNAL MEDICINE

## 2019-05-29 PROCEDURE — G8598 ASA/ANTIPLAT THER USED: HCPCS | Performed by: INTERNAL MEDICINE

## 2019-05-29 PROCEDURE — 70220 X-RAY EXAM OF SINUSES: CPT

## 2019-05-29 PROCEDURE — 93000 ELECTROCARDIOGRAM COMPLETE: CPT | Performed by: INTERNAL MEDICINE

## 2019-05-29 PROCEDURE — G8420 CALC BMI NORM PARAMETERS: HCPCS | Performed by: INTERNAL MEDICINE

## 2019-05-29 PROCEDURE — 1090F PRES/ABSN URINE INCON ASSESS: CPT | Performed by: INTERNAL MEDICINE

## 2019-05-29 PROCEDURE — 99214 OFFICE O/P EST MOD 30 MIN: CPT | Performed by: INTERNAL MEDICINE

## 2019-05-29 PROCEDURE — 1123F ACP DISCUSS/DSCN MKR DOCD: CPT | Performed by: INTERNAL MEDICINE

## 2019-05-29 PROCEDURE — G8400 PT W/DXA NO RESULTS DOC: HCPCS | Performed by: INTERNAL MEDICINE

## 2019-05-29 PROCEDURE — 1036F TOBACCO NON-USER: CPT | Performed by: INTERNAL MEDICINE

## 2019-05-29 RX ORDER — LOSARTAN POTASSIUM 25 MG/1
25 TABLET ORAL DAILY
Qty: 90 TABLET | Refills: 3 | Status: SHIPPED | OUTPATIENT
Start: 2019-05-29 | End: 2019-06-03 | Stop reason: SDUPTHER

## 2019-05-29 RX ORDER — ALBUTEROL SULFATE 90 UG/1
2 AEROSOL, METERED RESPIRATORY (INHALATION) EVERY 6 HOURS PRN
COMMUNITY
End: 2020-02-06 | Stop reason: SINTOL

## 2019-05-29 RX ORDER — CARVEDILOL 12.5 MG/1
12.5 TABLET ORAL 2 TIMES DAILY
Qty: 180 TABLET | Refills: 3 | Status: SHIPPED | OUTPATIENT
Start: 2019-05-29 | End: 2019-06-03 | Stop reason: SDUPTHER

## 2019-05-29 RX ORDER — SIMVASTATIN 20 MG
20 TABLET ORAL DAILY
Qty: 90 TABLET | Refills: 3 | Status: SHIPPED | OUTPATIENT
Start: 2019-05-29 | End: 2019-06-03 | Stop reason: SDUPTHER

## 2019-05-29 NOTE — PROGRESS NOTES
OFFICE FOLLOW-UP        PRIMARY CARE PHYSICIAN:      Terrance Robert MD    Date of Service: 5/29/2019     ALLERGIES / SENSITIVITIES:        No Known Allergies     REVIEWED MEDICATIONS:        Current Outpatient Medications:     albuterol sulfate  (90 Base) MCG/ACT inhaler, Inhale 2 puffs into the lungs every 6 hours as needed for Wheezing, Disp: , Rfl:     losartan (COZAAR) 25 MG tablet, Take 1 tablet by mouth daily, Disp: 90 tablet, Rfl: 3    carvedilol (COREG) 12.5 MG tablet, Take 1 tablet by mouth 2 times daily, Disp: 180 tablet, Rfl: 3    simvastatin (ZOCOR) 20 MG tablet, Take 1 tablet by mouth daily, Disp: 90 tablet, Rfl: 3    azithromycin (ZITHROMAX Z-RG) 250 MG tablet, TAKE 500MG PO DAY ONE. ..  250MG PO DAY TWO THROUGH FIVE  DISPENSE 6 TABS NO REFILLS, Disp: 1 packet, Rfl: 0    ondansetron (ZOFRAN ODT) 4 MG disintegrating tablet, Take 1 tablet by mouth every 8 hours as needed for Nausea or Vomiting, Disp: 15 tablet, Rfl: 0    Lactobacillus (PROBIOTIC ACIDOPHILUS) CAPS, Take 1 capsule by mouth 2 times daily (with meals), Disp: , Rfl:     Dextromethorphan-Guaifenesin (MUCINEX DM MAXIMUM STRENGTH)  MG TB12, Take 1 tablet by mouth every 12 hours as needed (cough and congestion) (Patient taking differently: Take 1 tablet by mouth every 12 hours as needed (cough and congestion) Patient states she only takes them as needed), Disp: 28 tablet, Rfl: 0    furosemide (LASIX) 20 MG tablet, Take 1 tablet by mouth 2 times daily (Patient taking differently: Take 20 mg by mouth 2 times daily Patient states only takes as needed), Disp: 20 tablet, Rfl: 0    Coenzyme Q10 (CO Q-10) 100 MG CAPS, Take by mouth, Disp: , Rfl:     Nutritional Supplements (JUICE PLUS FIBRE PO), Take by mouth, Disp: , Rfl:     aspirin 81 MG tablet, Take 81 mg by mouth daily To check with Dr. Toñito Milligan, Disp: , Rfl:     Tiotropium Bromide-Olodaterol 2.5-2.5 MCG/ACT AERS, Inhale 2 Inhalers into the lungs daily, Disp: 1 Inhaler, Rfl: 0      S: REASON FOR VISIT:      Previously followed by Dr. Ewelina Freire -- she established with me in 4/2016. She denies recent chest pain, worsening SOB, palpitations, PND, orthopnea, or syncope. She follows regularly with Dr. Kimo Troy for ICD interrogations (s/p generator change in 2/2018; recent interrogation normal per patient). +cough/sinus problems since 11/2018 -- work-up ongoing. She is currently with no active cardiac complaints at rest.        REVIEW OF SYSTEMS:    Cardiac: As per HPI  General: No fever, chills  Pulmonary: As per HPI  HEENT: No visual disturbances, difficult swallowing  GI: No nausea, vomiting, abdominal pain  Musculoskeletal: MACKEY x 4  Skin: Intact, no rashes  Neuro/Psych: No headache or seizures       CARDIOVASCULAR HISTORY:    1. Coronary artery disease/ischemic cardiomyopathy/congestive heart failure. a. 10/30/2009: Acute ST elevation anterolateral wall myocardial infarction. b. 10/30/2009: Cardiac catheterization/primary angioplasty: Left main short vessel with no significant disease. LAD occluded proximally. LCX: 90% stenosis of the 3rd obtuse marginal branch. RCA, a small nondominant vessel, which was non-selectively visualized. Successful balloon angioplasty and stenting to the proximal LAD with restoration of DORI 3 flow in the vessel. c. 06/18/2014 Lexiscan nuclear stress test was a markedly abnormal study showing a transmural myocardial infarction involving a large wrap around left anterior descending artery distribution with no evidence of residual stress-induced ischemia with the gated views showing akinesis of the left ventricular apex, the apical anterior wall and the apical septum with severe hypokinesis of the inferior wall and moderate hypokinesis of the rest of the interventricular septum with a calculated ejection fraction of 34 %.    d. 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 Normal right ventricular size and function, with a pacemaker noted in the right ventricle. Pacemaker also noted in the right atrium. Mild to moderate mitral regurgitation. Mild to moderate tricuspid regurgitation. Mild aortic sclerosis with trace aortic regurgitation. Aortic root sclerosis/calcification. Small pericardial effusion. e. Congestive heart failure acute decompensation, first diagnosed in 4/2014.   2. Insertion of dual chamber pacer ICD on 5/10/2010.   3. History of hypertension. 4. Diabetes mellitus, diagnosed in 4/2014.   5. Hyponatremia in 4/2014 and again on a blood test on 5/8/2014. Echocardiogram: 12/26/16 (Ronald Wilson)   Mildly dilated left ventricle.   Large apical aneurysm with dyskinetic apex.   Severe hypokinesis of the apical septum and lateral wall.   Overall LVEF is visually estimated at 20-25%   The left atrium is moderate-severely dilated.   Structurally normal mitral valve.   Increased E point septal separation noted,suggesting decreased LV cardiac output   Mild mitral regurgitation is present.   The aortic valve is trileaflet.   The aortic valve appears mildly sclerotic.   Mild aortic regurgitation is noted.   Normal tricuspid valve structure and function.   Mild tricuspid regurgitation.  RVSP is 25-30 mmHg.  Blanca Leventhal is a trivial localized near right ventricle pericardial effusion noted. PAST MEDICAL HISTORY:   1. As under cardiovascular history. 2. Asthma. 3. GERD. 4. Depression. 5. Osteoporosis. 6. Overactive bladder. 7. S/P Hysterectomy, 1972.  8. S/P Tonsillectomy. 9. S/P Appendectomy. 10. S/P Right elbow fracture repair. 11. UTI in 06/11 treated with oral antibiotics.   12. Status post right and left capsulectomy and removal of extravasated implant material on the right on 03/07/12, status post bilateral breast implants in the remote past.  13. Bilateral hearing loss: Wears bilateral hearing aids. 14. Right wrist fracture s/p fall, 2015. FAMILY HISTORY:   Mother , age 79, Alzheimers. Father , age 72, MI. One brother, history of colon surgery. One son, age 52, MI/ACB; two sons with bipolar disorder. SOCIAL HISTORY:   Denies smoking. O:  COMPLETE PHYSICAL EXAM:         /70   Pulse 78   Resp 16   Ht 4' 11\" (1.499 m)   Wt 109 lb (49.4 kg)   BMI 22.02 kg/m²      General:  Elderly lady in no acute distress. Head & Neck:  Supple. No carotid bruits. Lungs:  Clear to auscultation bilaterally. No wheezing. Heart:   Normal S1 and S2. Grade 2/6 systolic murmur heard at the apex. Grade II/VI systolic murmur heard at the right upper sternal border. Abdomen:  Soft. Bowel sounds present. Extremities:  No edema. Posterior tibialis pulses intact bilaterally. Skin:  Normal turgor. No ulcers or rashes noted. Neuro:  Oriented x3. No motor or sensory deficit detected. ASSESSMENT / DIAGNOSIS:   1. Ischemic cardiomyopathy with severe LV dysfunction / chronic systolic and diastolic congestive heart failure: Compensated currently. 2. Status post ICD implantation (s/p generator change in 2018)  3. Valvular heart disease. 4. History of hypertension. Controlled. BP today 120/70 (-126 at prior office visits). Prior history of intermittent hypotension. 5. Asthma. 6. GERD. 7. Depression. 8. Osteoporosis. 9. Overreactive bladder. 10. Bilateral hearing loss: Wears bilateral hearing aids. 11. History of bilateral breast implants with history of removal of extravasated implant material on the right. 12. Diabetes mellitus: Diet controlled. 13. Chronic kidney disease/prerenal azotemia. 14. S/p right thumb surgery on 5/10/16, Dr. Bill Morales  15. Dysphagia -- GI work-up ongoing    TREATMENT PLAN:  1. Continue current medications (including ASA, statin, coreg, ARB).  Aldactone and entresto previously stopped in the setting of electrolyte abnormalities and hypotension (ARB eventually resumed). Coreg dose recently increased to 12.5 mg BID. BB and ARB refilled today. 2. Monitor renal function and electrolytes closely (K 4.9, Cr 0.9 on 11/19/18; Cr 0.9 on 5/24/19)  3. Follow-up with EP re: ICD interrogation (follows with Dr. Jean Carlos Rose)  4. Results of 12/2016 echocardiogram outlined above  5. Questions answered today re: GDMT options  6.  The above was discussed with the patient and her  today    Marylen Lawman, MD  [facility]) Cardiology

## 2019-06-03 ENCOUNTER — OFFICE VISIT (OUTPATIENT)
Dept: PULMONOLOGY | Age: 84
End: 2019-06-03
Payer: MEDICARE

## 2019-06-03 ENCOUNTER — HOSPITAL ENCOUNTER (OUTPATIENT)
Age: 84
Discharge: HOME | End: 2019-06-03
Payer: MEDICARE

## 2019-06-03 VITALS
SYSTOLIC BLOOD PRESSURE: 122 MMHG | WEIGHT: 109 LBS | HEART RATE: 68 BPM | OXYGEN SATURATION: 94 % | HEIGHT: 59 IN | DIASTOLIC BLOOD PRESSURE: 58 MMHG | RESPIRATION RATE: 18 BRPM | BODY MASS INDEX: 21.97 KG/M2

## 2019-06-03 DIAGNOSIS — I50.31 ACUTE DIASTOLIC CONGESTIVE HEART FAILURE (HCC): ICD-10-CM

## 2019-06-03 DIAGNOSIS — R91.1 PULMONARY NODULE: Primary | ICD-10-CM

## 2019-06-03 DIAGNOSIS — I25.10 CORONARY ARTERY DISEASE INVOLVING NATIVE CORONARY ARTERY OF NATIVE HEART WITHOUT ANGINA PECTORIS: ICD-10-CM

## 2019-06-03 DIAGNOSIS — I25.5 ISCHEMIC CARDIOMYOPATHY: ICD-10-CM

## 2019-06-03 DIAGNOSIS — R91.1 PULMONARY NODULE: ICD-10-CM

## 2019-06-03 DIAGNOSIS — J44.1 COPD EXACERBATION (HCC): ICD-10-CM

## 2019-06-03 DIAGNOSIS — Z98.61 HISTORY OF PTCA: ICD-10-CM

## 2019-06-03 DIAGNOSIS — Z95.810 AUTOMATIC IMPLANTABLE CARDIOVERTER-DEFIBRILLATOR IN SITU: ICD-10-CM

## 2019-06-03 LAB
EOSINOPHILS ABSOLUTE COUNT: 280 /UL (ref 50–250)
EXPIRATORY TIME-POST: NORMAL SEC
EXPIRATORY TIME: NORMAL SEC
FEF 25-75% %CHNG: NORMAL
FEF 25-75% %PRED-POST: NORMAL %
FEF 25-75% %PRED-PRE: NORMAL L/SEC
FEF 25-75% PRED: NORMAL L/SEC
FEF 25-75%-POST: NORMAL L/SEC
FEF 25-75%-PRE: NORMAL L/SEC
FEV1 %PRED-POST: 45 %
FEV1 %PRED-PRE: 43 %
FEV1 PRED: 1.56 L
FEV1-POST: 0.71 L
FEV1-PRE: 0.67 L
FEV1/FVC %PRED-POST: 107 %
FEV1/FVC %PRED-PRE: 97 %
FEV1/FVC PRED: 77 %
FEV1/FVC-POST: 82 %
FEV1/FVC-PRE: 75 %
FVC %PRED-POST: 42 L
FVC %PRED-PRE: 43 %
FVC PRED: 2.04 L
FVC-POST: 0.86 L
FVC-PRE: 0.9 L
PEF %PRED-POST: NORMAL %
PEF %PRED-PRE: NORMAL L/SEC
PEF PRED: NORMAL L/SEC
PEF%CHNG: NORMAL
PEF-POST: NORMAL L/SEC
PEF-PRE: NORMAL L/SEC
SEDIMENTATION RATE, ERYTHROCYTE: 15 MM/HR (ref 0–20)

## 2019-06-03 PROCEDURE — G8925 SPIR FEV1/FVC>=60% & NO COPD: HCPCS | Performed by: INTERNAL MEDICINE

## 2019-06-03 PROCEDURE — 4040F PNEUMOC VAC/ADMIN/RCVD: CPT | Performed by: INTERNAL MEDICINE

## 2019-06-03 PROCEDURE — 1090F PRES/ABSN URINE INCON ASSESS: CPT | Performed by: INTERNAL MEDICINE

## 2019-06-03 PROCEDURE — 85048 AUTOMATED LEUKOCYTE COUNT: CPT

## 2019-06-03 PROCEDURE — 1123F ACP DISCUSS/DSCN MKR DOCD: CPT | Performed by: INTERNAL MEDICINE

## 2019-06-03 PROCEDURE — 94060 EVALUATION OF WHEEZING: CPT | Performed by: INTERNAL MEDICINE

## 2019-06-03 PROCEDURE — 82785 ASSAY OF IGE: CPT

## 2019-06-03 PROCEDURE — 1036F TOBACCO NON-USER: CPT | Performed by: INTERNAL MEDICINE

## 2019-06-03 PROCEDURE — 85651 RBC SED RATE NONAUTOMATED: CPT

## 2019-06-03 PROCEDURE — G8420 CALC BMI NORM PARAMETERS: HCPCS | Performed by: INTERNAL MEDICINE

## 2019-06-03 PROCEDURE — 99203 OFFICE O/P NEW LOW 30 MIN: CPT | Performed by: INTERNAL MEDICINE

## 2019-06-03 PROCEDURE — G8598 ASA/ANTIPLAT THER USED: HCPCS | Performed by: INTERNAL MEDICINE

## 2019-06-03 PROCEDURE — 99204 OFFICE O/P NEW MOD 45 MIN: CPT | Performed by: INTERNAL MEDICINE

## 2019-06-03 PROCEDURE — 86003 ALLG SPEC IGE CRUDE XTRC EA: CPT

## 2019-06-03 PROCEDURE — 3023F SPIROM DOC REV: CPT | Performed by: INTERNAL MEDICINE

## 2019-06-03 PROCEDURE — G8427 DOCREV CUR MEDS BY ELIG CLIN: HCPCS | Performed by: INTERNAL MEDICINE

## 2019-06-03 PROCEDURE — G8400 PT W/DXA NO RESULTS DOC: HCPCS | Performed by: INTERNAL MEDICINE

## 2019-06-03 PROCEDURE — 36415 COLL VENOUS BLD VENIPUNCTURE: CPT

## 2019-06-03 RX ORDER — SIMVASTATIN 20 MG
20 TABLET ORAL DAILY
Qty: 90 TABLET | Refills: 3 | Status: SHIPPED | OUTPATIENT
Start: 2019-06-03

## 2019-06-03 RX ORDER — LOSARTAN POTASSIUM 25 MG/1
25 TABLET ORAL DAILY
Qty: 90 TABLET | Refills: 3 | Status: ON HOLD
Start: 2019-06-03 | End: 2020-06-11 | Stop reason: HOSPADM

## 2019-06-03 RX ORDER — BUDESONIDE AND FORMOTEROL FUMARATE DIHYDRATE 160; 4.5 UG/1; UG/1
2 AEROSOL RESPIRATORY (INHALATION) 2 TIMES DAILY
Qty: 1 INHALER | Refills: 5 | Status: SHIPPED
Start: 2019-06-03 | End: 2020-02-06

## 2019-06-03 RX ORDER — AMOXICILLIN 500 MG/1
500 CAPSULE ORAL 2 TIMES DAILY
Qty: 12 CAPSULE | Refills: 0 | Status: SHIPPED | OUTPATIENT
Start: 2019-06-03 | End: 2019-06-09

## 2019-06-03 RX ORDER — CARVEDILOL 12.5 MG/1
12.5 TABLET ORAL 2 TIMES DAILY
Qty: 180 TABLET | Refills: 3 | Status: SHIPPED
Start: 2019-06-03 | End: 2020-07-16

## 2019-06-03 RX ORDER — PREDNISONE 10 MG/1
TABLET ORAL
Qty: 20 TABLET | Refills: 0 | Status: SHIPPED | OUTPATIENT
Start: 2019-06-03 | End: 2019-06-13

## 2019-06-03 ASSESSMENT — PULMONARY FUNCTION TESTS
FVC_PERCENT_PREDICTED_POST: 42
FVC_POST: 0.86
FEV1/FVC_PREDICTED: 77
FEV1_PERCENT_PREDICTED_POST: 45
FEV1_POST: 0.71
FVC_PERCENT_PREDICTED_PRE: 43
FVC_PRE: 0.90
FEV1/FVC_POST: 82
FVC_PREDICTED: 2.04
FEV1_PRE: 0.67
FEV1/FVC_PRE: 75
FEV1_PERCENT_PREDICTED_PRE: 43
FEV1/FVC_PERCENT_PREDICTED_PRE: 97
FEV1_PREDICTED: 1.56
FEV1/FVC_PERCENT_PREDICTED_POST: 107

## 2019-06-03 NOTE — PROGRESS NOTES
Department of Internal Medicine  Division of Pulmonary, Critical Care & Sleep Medicine  Pulmonary [address] [facility]                                             Pulmonary Clinic Consult     I had the pleasure of seeing  Alexandra Meza in the [facility] regarding their cough     Chief Complaint   Patient presents with    Establish Care    Cough       HISTORY OF PRESENT ILLNESS:    Alexandra Meza is a 81y.o. year old  Never smoke but she has second hand smokers from her Son     The Patient comes in with SOB that has been going on for the last 7 months  Associated with Cough ,She  states that it get worse with exercise or walking long distance 1 block ,very slowly and he can walk And go 1 flight of stairs before get short winded    She  has cough ,productive for yellow Sputum and it is more in the morning and thick     Has history of lung disease include  No     She has 14 pounds weight loss and she is not eating as much and no appetite    No hemoptysis ,she has some chocking and had sallow study and was fine     She has no wheezing         ALLERGIES:  No Known Allergies    PAST MEDICAL HISTORY:       Diagnosis Date    Arthritis     CAD (coronary artery disease)     Cardiomyopathy ([address]     CHF (congestive heart failure) ([address]     Chronic bronchitis ([address]     COPD (chronic obstructive pulmonary disease) ([address]     Depression     GERD (gastroesophageal reflux disease)     HFrEF (heart failure with reduced ejection fraction) ([address] 12/29/2016 12/26/16- LVEF 20-25%, large apical aneurysm, LA moderate-severely dilated, mildly enlarged RA, mild MR, mild TR, mild AR    Hyperlipidemia     Hypertension     MI (myocardial infarction) ([address] 10/30/2009    Osteopenia     Osteoporosis     Pre-operative clearance for surgery 5/10/16    cardiac clearance in [facility] from        MEDICATIONS:   Current Outpatient Medications   Medication Sig Dispense Refill    albuterol sulfate  (90 Base) MCG/ACT inhaler Inhale 2 puffs into the lungs every 6 hours as needed for Wheezing      losartan (COZAAR) 25 MG tablet Take 1 tablet by mouth daily 90 tablet 3    carvedilol (COREG) 12.5 MG tablet Take 1 tablet by mouth 2 times daily 180 tablet 3    simvastatin (ZOCOR) 20 MG tablet Take 1 tablet by mouth daily 90 tablet 3    Tiotropium Bromide-Olodaterol 2.5-2.5 MCG/ACT AERS Inhale 2 Inhalers into the lungs daily 1 Inhaler 0    ondansetron (ZOFRAN ODT) 4 MG disintegrating tablet Take 1 tablet by mouth every 8 hours as needed for Nausea or Vomiting 15 tablet 0    Lactobacillus (PROBIOTIC ACIDOPHILUS) CAPS Take 1 capsule by mouth 2 times daily (with meals)      furosemide (LASIX) 20 MG tablet Take 1 tablet by mouth 2 times daily (Patient taking differently: Take 20 mg by mouth 2 times daily Patient states only takes as needed) 20 tablet 0    Coenzyme Q10 (CO Q-10) 100 MG CAPS Take by mouth      Nutritional Supplements (JUICE PLUS FIBRE PO) Take by mouth      aspirin 81 MG tablet Take 81 mg by mouth daily To check with Dr. Jeremias Santana azithromycin (ZITHROMAX Z-RG) 250 MG tablet TAKE 500MG PO DAY ONE. .. 250MG PO DAY TWO THROUGH FIVE   DISPENSE 6 TABS  NO REFILLS 1 packet 0    Dextromethorphan-Guaifenesin (MUCINEX DM MAXIMUM STRENGTH)  MG TB12 Take 1 tablet by mouth every 12 hours as needed (cough and congestion) (Patient taking differently: Take 1 tablet by mouth every 12 hours as needed (cough and congestion) Patient states she only takes them as needed) 28 tablet 0     No current facility-administered medications for this visit.         SOCIAL AND OCCUPATIONAL HEALTH:  Social History     Tobacco Use   Smoking Status Never Smoker   Smokeless Tobacco Never Used     TB :no   Pets :2 Cats   Industrial exposure:[employer] [facility] ,no dust exposure     SURGICAL HISTORY:   Past Surgical History:   Procedure Laterality Date    APPENDECTOMY      BREAST CAPSULECTOMY  03/07/12 cooperative and in no apparent distress. Head was normocephalic and atraumatic. EENT: Mallampati class :  Extraocular muscles intact. External canals are patent and no discharge was appreciated. Septum was midline,   mucosa was without erythema, exudates or cobblestoning. No thrush was noted. Neck: Supple without thyromegaly. No elevated JVP. Trachea was midline. No carotid bruits were auscultated. Respiratory: rhonchi scattered     Cardiovascular: Regular without murmur, clicks, gallops or rubs. There is no left or right ventricular heave. Pulses:  Carotid, radial and femoral pulses were equally bilaterally. Abdomen: Slightly rounded and soft without organomegaly. No rebound, rigidity or guarding was appreciated. Lymphatic: No lymphadenopathy. Musculoskeletal: no edema  ,no swelling    Extremities:no edema   Skin:  Warm and dry. Good color, turgor and pigmentation. No lesions or scars. Neurological/Psychiatric: The patient's general behavior, level of consciousness, thought content and emotional status is normal.  Cranial nerves II-XII are intact. DATA: Spirometry done in the office today demonstrates an FVC of 0.90  liters which is 43 % of predicted with an FEV1 of  0.67  liters which is 43 % of predicted. FEV1/FVC ratio is75 %. IMPRESSION:    1-Chronic cough   2-Non specific spirometry   3-?  Chronic bronchitis   4-weight loss            5- back lump   6- Lung nodule     PLAN:      -Eosinophilic count   IgE   Full PFT  Respiratory allergy profile   ESR    CT scan chest without contrast   Will Symbicort 160/4.5 ,  Amoxil 500 mg PO bid X 7 days   Prednisone 30 mg Po daily 3 days and 20 mg X 3 days and 10 mg X 3days   -Ct scan reviewed and has stable nodule   -IF above work up not showing etiology and she is not better then will do bronch  Screen for malignancy with weight loss as oer primary   -for her Lump ,she does now  Ant to see surgery and she understand it still could be type of cancer ,though low chance     Flu and Pneumovax     Thank you for allowing me to participate in Ninfa Bernheim care. I will keep following with you ,should you have any concerns ,please contact me at [phone]     Sincerely,        Natalee Cordero MD  Pulmonary & Critical Care Medicine     NOTE: This report was transcribed using voice recognition software. Every effort was made to ensure accuracy; however, inadvertent computerized transcription errors may be present.

## 2019-06-03 NOTE — PROGRESS NOTES
New pt to see Dr. Lor Tavarez today in office. Previously seen by Dr. Bri Ayala. meds ordered for pt to have antibiotic/steroid. Sample of Symbicort 160 inhaler given per Dr. Irma Kern's orders, pt states she is familiar with it's use. Pt to have lab work completed and script given to take to lab draw site. Pt to see Dr. Lor Tavarez in 3 mos; appt given.

## 2019-06-10 LAB
Lab: NORMAL
REPORT: NORMAL
THIS TEST SENT TO: NORMAL

## 2019-06-25 ENCOUNTER — HOSPITAL ENCOUNTER (EMERGENCY)
Age: 84
Discharge: HOME | End: 2019-06-25
Attending: EMERGENCY MEDICINE
Payer: MEDICARE

## 2019-06-25 ENCOUNTER — APPOINTMENT (OUTPATIENT)
Dept: GENERAL RADIOLOGY | Age: 84
End: 2019-06-25
Payer: MEDICARE

## 2019-06-25 VITALS
RESPIRATION RATE: 16 BRPM | SYSTOLIC BLOOD PRESSURE: 132 MMHG | TEMPERATURE: 98 F | DIASTOLIC BLOOD PRESSURE: 76 MMHG | HEART RATE: 70 BPM | OXYGEN SATURATION: 98 % | BODY MASS INDEX: 22.19 KG/M2 | WEIGHT: 113 LBS | HEIGHT: 60 IN

## 2019-06-25 DIAGNOSIS — I50.9 CONGESTIVE HEART FAILURE, UNSPECIFIED HF CHRONICITY, UNSPECIFIED HEART FAILURE TYPE (HCC): Primary | ICD-10-CM

## 2019-06-25 DIAGNOSIS — R06.00 DYSPNEA, UNSPECIFIED TYPE: ICD-10-CM

## 2019-06-25 LAB
ALBUMIN SERPL-MCNC: 4 G/DL (ref 3.5–5.2)
ALP BLD-CCNC: 57 U/L (ref 35–104)
ALT SERPL-CCNC: 18 U/L (ref 0–32)
ANION GAP SERPL CALCULATED.3IONS-SCNC: 13 MMOL/L (ref 7–16)
AST SERPL-CCNC: 26 U/L (ref 0–31)
BASOPHILS ABSOLUTE: 0.04 E9/L (ref 0–0.2)
BASOPHILS RELATIVE PERCENT: 0.7 % (ref 0–2)
BILIRUB SERPL-MCNC: 0.5 MG/DL (ref 0–1.2)
BUN BLDV-MCNC: 17 MG/DL (ref 8–23)
CALCIUM SERPL-MCNC: 9 MG/DL (ref 8.6–10.2)
CHLORIDE BLD-SCNC: 98 MMOL/L (ref 98–107)
CO2: 27 MMOL/L (ref 22–29)
CREAT SERPL-MCNC: 1 MG/DL (ref 0.5–1)
EOSINOPHILS ABSOLUTE: 0.2 E9/L (ref 0.05–0.5)
EOSINOPHILS RELATIVE PERCENT: 3.4 % (ref 0–6)
GFR AFRICAN AMERICAN: >60
GFR NON-AFRICAN AMERICAN: 53 ML/MIN/1.73
GLUCOSE BLD-MCNC: 122 MG/DL (ref 74–99)
HCT VFR BLD CALC: 34.4 % (ref 34–48)
HEMOGLOBIN: 11.3 G/DL (ref 11.5–15.5)
IMMATURE GRANULOCYTES #: 0.01 E9/L
IMMATURE GRANULOCYTES %: 0.2 % (ref 0–5)
LYMPHOCYTES ABSOLUTE: 0.99 E9/L (ref 1.5–4)
LYMPHOCYTES RELATIVE PERCENT: 16.8 % (ref 20–42)
MCH RBC QN AUTO: 32.6 PG (ref 26–35)
MCHC RBC AUTO-ENTMCNC: 32.8 % (ref 32–34.5)
MCV RBC AUTO: 99.1 FL (ref 80–99.9)
MONOCYTES ABSOLUTE: 0.68 E9/L (ref 0.1–0.95)
MONOCYTES RELATIVE PERCENT: 11.5 % (ref 2–12)
NEUTROPHILS ABSOLUTE: 3.99 E9/L (ref 1.8–7.3)
NEUTROPHILS RELATIVE PERCENT: 67.4 % (ref 43–80)
PDW BLD-RTO: 13.2 FL (ref 11.5–15)
PLATELET # BLD: 200 E9/L (ref 130–450)
PMV BLD AUTO: 10.3 FL (ref 7–12)
POTASSIUM SERPL-SCNC: 4.9 MMOL/L (ref 3.5–5)
PRO-BNP: 5274 PG/ML (ref 0–450)
RBC # BLD: 3.47 E12/L (ref 3.5–5.5)
REASON FOR REJECTION: NORMAL
REJECTED TEST: NORMAL
SODIUM BLD-SCNC: 138 MMOL/L (ref 132–146)
TOTAL PROTEIN: 6.3 G/DL (ref 6.4–8.3)
TROPONIN: <0.01 NG/ML (ref 0–0.03)
WBC # BLD: 5.9 E9/L (ref 4.5–11.5)

## 2019-06-25 PROCEDURE — 36415 COLL VENOUS BLD VENIPUNCTURE: CPT

## 2019-06-25 PROCEDURE — 99285 EMERGENCY DEPT VISIT HI MDM: CPT

## 2019-06-25 PROCEDURE — 96374 THER/PROPH/DIAG INJ IV PUSH: CPT

## 2019-06-25 PROCEDURE — 80053 COMPREHEN METABOLIC PANEL: CPT

## 2019-06-25 PROCEDURE — 93005 ELECTROCARDIOGRAM TRACING: CPT | Performed by: EMERGENCY MEDICINE

## 2019-06-25 PROCEDURE — 71046 X-RAY EXAM CHEST 2 VIEWS: CPT

## 2019-06-25 PROCEDURE — 6360000002 HC RX W HCPCS: Performed by: EMERGENCY MEDICINE

## 2019-06-25 PROCEDURE — 83880 ASSAY OF NATRIURETIC PEPTIDE: CPT

## 2019-06-25 PROCEDURE — 85025 COMPLETE CBC W/AUTO DIFF WBC: CPT

## 2019-06-25 PROCEDURE — 84484 ASSAY OF TROPONIN QUANT: CPT

## 2019-06-25 RX ORDER — FUROSEMIDE 10 MG/ML
60 INJECTION INTRAMUSCULAR; INTRAVENOUS ONCE
Status: COMPLETED | OUTPATIENT
Start: 2019-06-25 | End: 2019-06-25

## 2019-06-25 RX ADMIN — FUROSEMIDE 60 MG: 10 INJECTION, SOLUTION INTRAMUSCULAR; INTRAVENOUS at 20:05

## 2019-06-26 LAB
EKG ATRIAL RATE: 67 BPM
EKG P AXIS: -94 DEGREES
EKG P-R INTERVAL: 160 MS
EKG Q-T INTERVAL: 426 MS
EKG QRS DURATION: 136 MS
EKG QTC CALCULATION (BAZETT): 485 MS
EKG R AXIS: -9 DEGREES
EKG T AXIS: -159 DEGREES
EKG VENTRICULAR RATE: 78 BPM

## 2019-06-26 PROCEDURE — 93010 ELECTROCARDIOGRAM REPORT: CPT | Performed by: INTERNAL MEDICINE

## 2019-06-26 NOTE — ED PROVIDER NOTES
HPI:   Rito Chapman is a 81 y.o. female presenting to the ED for 2 days of slowly increasing shortness of breath associated with 7 pound weight gain. Patient has a history of Heart Failure She's Had No Chest Pain. ROS:   Unless otherwise stated in this report or unable to obtain because of the patient's clinical or mental status as evidenced by the medical record, this patients's positive and negative responses for Review of Systems, constitutional, psych, eyes, ENT, cardiovascular, respiratory, gastrointestinal, neurological, genitourinary, musculoskeletal, integument systems and systems related to the presenting problem are either stated in the preceding or were not pertinent or were negative for the symptoms and/or complaints related to the medical problem. --------------------------------------------- PAST HISTORY ---------------------------------------------  Past Medical History:  has a past medical history of Arthritis, CAD (coronary artery disease), Cardiomyopathy ([address] CHF (congestive heart failure) ([address] Chronic bronchitis ([address] COPD (chronic obstructive pulmonary disease) ([address] Depression, GERD (gastroesophageal reflux disease), HFrEF (heart failure with reduced ejection fraction) ([address] Hyperlipidemia, Hypertension, MI (myocardial infarction) ([address] Osteopenia, Osteoporosis, and Pre-operative clearance. Past Surgical History:  has a past surgical history that includes Hysterectomy; Appendectomy; Tonsillectomy; Breast surgery (1962); Cosmetic surgery; Breast Capsulectomy (03/07/12); Diagnostic Cardiac Cath Lab Procedure (10/30/2009); cardiovascular stress test (3/4/2010); transthoracic echocardiogram (3/30/11ize and function, ); Cardiac surgery (2009); Cardiac pacemaker placement (5/10/2010); Cardiac defibrillator placement; Colonoscopy; other surgical history (Right, 12/17/15);  Cardiac defibrillator placement (5/2010); other surgical history (Right, 4/10/2016); and Cardiac defibrillator placement (Left, 02/13/2018). Social History:  reports that she has never smoked. She has never used smokeless tobacco. She reports that she drinks alcohol. She reports that she does not use drugs. Family History: family history includes Bipolar Disorder in her son and son; Heart Disease in her brother. The patients home medications have been reviewed. Allergies: Patient has no known allergies.     -------------------------------------------------- RESULTS -------------------------------------------------  All laboratory and radiology results have been personally reviewed by myself   LABS:  Results for orders placed or performed during the hospital encounter of 06/25/19   SPECIMEN REJECTION   Result Value Ref Range    Rejected Test cmp trop bnp     Reason for Rejection see below    CBC auto differential   Result Value Ref Range    WBC 5.9 4.5 - 11.5 E9/L    RBC 3.47 (L) 3.50 - 5.50 E12/L    Hemoglobin 11.3 (L) 11.5 - 15.5 g/dL    Hematocrit 34.4 34.0 - 48.0 %    MCV 99.1 80.0 - 99.9 fL    MCH 32.6 26.0 - 35.0 pg    MCHC 32.8 32.0 - 34.5 %    RDW 13.2 11.5 - 15.0 fL    Platelets 692 944 - 176 E9/L    MPV 10.3 7.0 - 12.0 fL    Neutrophils % 67.4 43.0 - 80.0 %    Immature Granulocytes % 0.2 0.0 - 5.0 %    Lymphocytes % 16.8 (L) 20.0 - 42.0 %    Monocytes % 11.5 2.0 - 12.0 %    Eosinophils % 3.4 0.0 - 6.0 %    Basophils % 0.7 0.0 - 2.0 %    Neutrophils # 3.99 1.80 - 7.30 E9/L    Immature Granulocytes # 0.01 E9/L    Lymphocytes # 0.99 (L) 1.50 - 4.00 E9/L    Monocytes # 0.68 0.10 - 0.95 E9/L    Eosinophils # 0.20 0.05 - 0.50 E9/L    Basophils # 0.04 0.00 - 0.20 E9/L   Comprehensive metabolic panel   Result Value Ref Range    Sodium 138 132 - 146 mmol/L    Potassium 4.9 3.5 - 5.0 mmol/L    Chloride 98 98 - 107 mmol/L    CO2 27 22 - 29 mmol/L    Anion Gap 13 7 - 16 mmol/L    Glucose 122 (H) 74 - 99 mg/dL    BUN 17 8 - 23 mg/dL    CREATININE 1.0 0.5 - 1.0 mg/dL    GFR Non-African American 53 >=60 mL/min/1.73    GFR African American >60     Calcium 9.0 8.6 - 10.2 mg/dL    Total Protein 6.3 (L) 6.4 - 8.3 g/dL    Alb 4.0 3.5 - 5.2 g/dL    Total Bilirubin 0.5 0.0 - 1.2 mg/dL    Alkaline Phosphatase 57 35 - 104 U/L    ALT 18 0 - 32 U/L    AST 26 0 - 31 U/L   Brain Natriuretic Peptide   Result Value Ref Range    Pro-BNP 5,274 (H) 0 - 450 pg/mL   Troponin   Result Value Ref Range    Troponin <0.01 0.00 - 0.03 ng/mL       RADIOLOGY:  Interpreted by Radiologist.  XR CHEST STANDARD (2 VW)   Final Result   Cardiomegaly. No signs for volume overload presently.          ------------------------- NURSING NOTES AND VITALS REVIEWED ---------------------------   The nursing notes within the ED encounter and vital signs as below have been reviewed. BP (!) 148/76   Pulse 74   Temp 98.1 °F (36.7 °C) (Oral)   Resp 18   Ht 4' 11.5\" (1.511 m)   Wt 113 lb (51.3 kg)   SpO2 98%   BMI 22.44 kg/m²   Oxygen Saturation Interpretation: Normal      ---------------------------------------------------PHYSICAL EXAM--------------------------------------      Constitutional/General: Alert and oriented x3, well appearing, non toxic in NAD  Head: NC/AT  Eyes: PERRL, EOMI  Mouth: Oropharynx clear, handling secretions, no trismus  Neck: Supple, full ROM,   Pulmonary: Lungs clear to auscultation bilaterally, no wheezes, rales, or rhonchi. Not in respiratory distress  Cardiovascular:  Regular rate and rhythm, no murmurs, gallops, or rubs. 2+ distal pulses  Abdomen: Soft, non tender, non distended,   Extremities: Moves all extremities x 4.  Warm and well perfused  Skin: warm and dry without rash  Neurologic: GCS 15,  Psych: Normal Affect      ------------------------------ ED COURSE/MEDICAL DECISION MAKING----------------------  Medications   furosemide (LASIX) injection 60 mg (60 mg Intravenous Given 6/25/19 2005)         Medical Decision Making:    History with unremarkable chest x-ray in terms of pulmonary edema, no rales on exam.  I suspect she is in the fluid retention stay in very early CHF without pulmonary edema. We have diuresed her in the emergency department she feels much better and wishes to go home. Counseling: The emergency provider has spoken with the patient and spouse/SO and discussed todays results, in addition to providing specific details for the plan of care and counseling regarding the diagnosis and prognosis. Questions are answered at this time and they are agreeable with the plan.      --------------------------------- IMPRESSION AND DISPOSITION ---------------------------------    IMPRESSION  1. Congestive heart failure, unspecified HF chronicity, unspecified heart failure type ([address]    2.  Dyspnea, unspecified type        DISPOSITION  Disposition: Discharge to home  Patient condition is stable              Brynn Richards MD  [phone]

## 2019-07-18 ENCOUNTER — HOSPITAL ENCOUNTER (OUTPATIENT)
Dept: INFUSION THERAPY | Age: 84
Setting detail: INFUSION SERIES
Discharge: HOME | End: 2019-07-18
Payer: MEDICARE

## 2019-07-18 VITALS
HEIGHT: 59 IN | RESPIRATION RATE: 16 BRPM | BODY MASS INDEX: 21.77 KG/M2 | OXYGEN SATURATION: 98 % | TEMPERATURE: 97.4 F | DIASTOLIC BLOOD PRESSURE: 77 MMHG | HEART RATE: 52 BPM | WEIGHT: 108 LBS | SYSTOLIC BLOOD PRESSURE: 116 MMHG

## 2019-07-18 DIAGNOSIS — M81.0 OSTEOPOROSIS, UNSPECIFIED OSTEOPOROSIS TYPE, UNSPECIFIED PATHOLOGICAL FRACTURE PRESENCE: Primary | ICD-10-CM

## 2019-07-18 PROCEDURE — 96372 THER/PROPH/DIAG INJ SC/IM: CPT

## 2019-07-18 PROCEDURE — 6360000002 HC RX W HCPCS: Performed by: OBSTETRICS & GYNECOLOGY

## 2019-07-18 RX ORDER — PANTOPRAZOLE SODIUM 40 MG/1
40 TABLET, DELAYED RELEASE ORAL DAILY
COMMUNITY
End: 2021-06-16

## 2019-07-18 RX ADMIN — DENOSUMAB 60 MG: 60 INJECTION SUBCUTANEOUS at 09:25

## 2019-08-06 ENCOUNTER — HOSPITAL ENCOUNTER (OUTPATIENT)
Age: 84
Discharge: HOME | End: 2019-08-06
Payer: MEDICARE

## 2019-08-06 LAB
ALBUMIN SERPL-MCNC: 4.5 G/DL (ref 3.5–5.2)
ALP BLD-CCNC: 61 U/L (ref 35–104)
ALT SERPL-CCNC: 18 U/L (ref 0–32)
ANION GAP SERPL CALCULATED.3IONS-SCNC: 10 MMOL/L (ref 7–16)
AST SERPL-CCNC: 27 U/L (ref 0–31)
BILIRUB SERPL-MCNC: 0.7 MG/DL (ref 0–1.2)
BUN BLDV-MCNC: 18 MG/DL (ref 8–23)
CALCIUM SERPL-MCNC: 9.1 MG/DL (ref 8.6–10.2)
CHLORIDE BLD-SCNC: 100 MMOL/L (ref 98–107)
CO2: 28 MMOL/L (ref 22–29)
CREAT SERPL-MCNC: 1 MG/DL (ref 0.5–1)
GFR AFRICAN AMERICAN: >60
GFR NON-AFRICAN AMERICAN: 53 ML/MIN/1.73
GLUCOSE BLD-MCNC: 119 MG/DL (ref 74–99)
POTASSIUM SERPL-SCNC: 5 MMOL/L (ref 3.5–5)
SODIUM BLD-SCNC: 138 MMOL/L (ref 132–146)
TOTAL PROTEIN: 6.8 G/DL (ref 6.4–8.3)

## 2019-08-06 PROCEDURE — 80053 COMPREHEN METABOLIC PANEL: CPT

## 2019-08-06 PROCEDURE — 36415 COLL VENOUS BLD VENIPUNCTURE: CPT

## 2019-08-07 ENCOUNTER — HOSPITAL ENCOUNTER (OUTPATIENT)
Dept: CT IMAGING | Age: 84
Discharge: HOME | End: 2019-08-09
Payer: MEDICARE

## 2019-08-07 DIAGNOSIS — K76.9 LIVER LESION: ICD-10-CM

## 2019-08-07 PROCEDURE — 74178 CT ABD&PLV WO CNTR FLWD CNTR: CPT

## 2019-08-07 PROCEDURE — 2580000003 HC RX 258: Performed by: RADIOLOGY

## 2019-08-07 PROCEDURE — 6360000004 HC RX CONTRAST MEDICATION: Performed by: RADIOLOGY

## 2019-08-07 RX ORDER — SODIUM CHLORIDE 0.9 % (FLUSH) 0.9 %
10 SYRINGE (ML) INJECTION
Status: COMPLETED | OUTPATIENT
Start: 2019-08-07 | End: 2019-08-07

## 2019-08-07 RX ADMIN — IOPAMIDOL 90 ML: 755 INJECTION, SOLUTION INTRAVENOUS at 09:37

## 2019-08-07 RX ADMIN — Medication 10 ML: at 09:37

## 2019-08-19 ENCOUNTER — HOSPITAL ENCOUNTER (OUTPATIENT)
Age: 84
Discharge: HOME | End: 2019-08-19
Payer: MEDICARE

## 2019-08-19 LAB
ALBUMIN SERPL-MCNC: 4.4 G/DL (ref 3.5–5.2)
ALP BLD-CCNC: 55 U/L (ref 35–104)
ALT SERPL-CCNC: 18 U/L (ref 0–32)
ANION GAP SERPL CALCULATED.3IONS-SCNC: 11 MMOL/L (ref 7–16)
AST SERPL-CCNC: 26 U/L (ref 0–31)
BASOPHILS ABSOLUTE: 0.05 E9/L (ref 0–0.2)
BASOPHILS RELATIVE PERCENT: 0.7 % (ref 0–2)
BILIRUB SERPL-MCNC: 0.6 MG/DL (ref 0–1.2)
BUN BLDV-MCNC: 23 MG/DL (ref 8–23)
CALCIUM SERPL-MCNC: 9.5 MG/DL (ref 8.6–10.2)
CEA: 1.7 NG/ML (ref 0–5.2)
CHLORIDE BLD-SCNC: 101 MMOL/L (ref 98–107)
CO2: 29 MMOL/L (ref 22–29)
CREAT SERPL-MCNC: 1 MG/DL (ref 0.5–1)
EOSINOPHILS ABSOLUTE: 0.2 E9/L (ref 0.05–0.5)
EOSINOPHILS RELATIVE PERCENT: 2.8 % (ref 0–6)
GFR AFRICAN AMERICAN: >60
GFR NON-AFRICAN AMERICAN: 53 ML/MIN/1.73
GLUCOSE BLD-MCNC: 118 MG/DL (ref 74–99)
HCT VFR BLD CALC: 36.7 % (ref 34–48)
HEMOGLOBIN: 11.7 G/DL (ref 11.5–15.5)
IMMATURE GRANULOCYTES #: 0.04 E9/L
IMMATURE GRANULOCYTES %: 0.6 % (ref 0–5)
LYMPHOCYTES ABSOLUTE: 0.71 E9/L (ref 1.5–4)
LYMPHOCYTES RELATIVE PERCENT: 9.9 % (ref 20–42)
MCH RBC QN AUTO: 32 PG (ref 26–35)
MCHC RBC AUTO-ENTMCNC: 31.9 % (ref 32–34.5)
MCV RBC AUTO: 100.3 FL (ref 80–99.9)
MONOCYTES ABSOLUTE: 0.81 E9/L (ref 0.1–0.95)
MONOCYTES RELATIVE PERCENT: 11.3 % (ref 2–12)
NEUTROPHILS ABSOLUTE: 5.36 E9/L (ref 1.8–7.3)
NEUTROPHILS RELATIVE PERCENT: 74.7 % (ref 43–80)
PDW BLD-RTO: 13.5 FL (ref 11.5–15)
PLATELET # BLD: 240 E9/L (ref 130–450)
PMV BLD AUTO: 9.7 FL (ref 7–12)
POTASSIUM SERPL-SCNC: 4.5 MMOL/L (ref 3.5–5)
RBC # BLD: 3.66 E12/L (ref 3.5–5.5)
SODIUM BLD-SCNC: 141 MMOL/L (ref 132–146)
TOTAL PROTEIN: 6.7 G/DL (ref 6.4–8.3)
WBC # BLD: 7.2 E9/L (ref 4.5–11.5)

## 2019-08-19 PROCEDURE — 86301 IMMUNOASSAY TUMOR CA 19-9: CPT

## 2019-08-19 PROCEDURE — 86682 HELMINTH ANTIBODY: CPT

## 2019-08-19 PROCEDURE — 82378 CARCINOEMBRYONIC ANTIGEN: CPT

## 2019-08-19 PROCEDURE — 80053 COMPREHEN METABOLIC PANEL: CPT

## 2019-08-19 PROCEDURE — 36415 COLL VENOUS BLD VENIPUNCTURE: CPT

## 2019-08-19 PROCEDURE — 85025 COMPLETE CBC W/AUTO DIFF WBC: CPT

## 2019-08-21 LAB — CA 19-9: 18 U/ML (ref 0–37)

## 2019-08-26 LAB
Lab: NORMAL
REPORT: NORMAL
THIS TEST SENT TO: NORMAL

## 2019-09-23 ENCOUNTER — OFFICE VISIT (OUTPATIENT)
Dept: PULMONOLOGY | Age: 84
End: 2019-09-23
Payer: MEDICARE

## 2019-09-23 VITALS
WEIGHT: 112 LBS | DIASTOLIC BLOOD PRESSURE: 66 MMHG | OXYGEN SATURATION: 98 % | HEIGHT: 59 IN | HEART RATE: 58 BPM | RESPIRATION RATE: 18 BRPM | BODY MASS INDEX: 22.58 KG/M2 | SYSTOLIC BLOOD PRESSURE: 122 MMHG

## 2019-09-23 DIAGNOSIS — R91.1 PULMONARY NODULE: ICD-10-CM

## 2019-09-23 DIAGNOSIS — J44.1 COPD EXACERBATION (HCC): ICD-10-CM

## 2019-09-23 PROCEDURE — 99213 OFFICE O/P EST LOW 20 MIN: CPT | Performed by: INTERNAL MEDICINE

## 2019-09-23 PROCEDURE — G8598 ASA/ANTIPLAT THER USED: HCPCS | Performed by: INTERNAL MEDICINE

## 2019-09-23 PROCEDURE — 1036F TOBACCO NON-USER: CPT | Performed by: INTERNAL MEDICINE

## 2019-09-23 PROCEDURE — G8427 DOCREV CUR MEDS BY ELIG CLIN: HCPCS | Performed by: INTERNAL MEDICINE

## 2019-09-23 PROCEDURE — G8420 CALC BMI NORM PARAMETERS: HCPCS | Performed by: INTERNAL MEDICINE

## 2019-09-23 PROCEDURE — 1090F PRES/ABSN URINE INCON ASSESS: CPT | Performed by: INTERNAL MEDICINE

## 2019-09-23 PROCEDURE — G8926 SPIRO NO PERF OR DOC: HCPCS | Performed by: INTERNAL MEDICINE

## 2019-09-23 PROCEDURE — 3023F SPIROM DOC REV: CPT | Performed by: INTERNAL MEDICINE

## 2019-09-23 PROCEDURE — G8400 PT W/DXA NO RESULTS DOC: HCPCS | Performed by: INTERNAL MEDICINE

## 2019-09-23 PROCEDURE — 4040F PNEUMOC VAC/ADMIN/RCVD: CPT | Performed by: INTERNAL MEDICINE

## 2019-09-23 PROCEDURE — 1123F ACP DISCUSS/DSCN MKR DOCD: CPT | Performed by: INTERNAL MEDICINE

## 2019-09-23 RX ORDER — MONTELUKAST SODIUM 10 MG/1
10 TABLET ORAL DAILY
Qty: 30 TABLET | Refills: 3 | Status: SHIPPED | OUTPATIENT
Start: 2019-09-23 | End: 2020-01-20

## 2019-09-23 RX ORDER — CETIRIZINE HYDROCHLORIDE 10 MG/1
10 TABLET ORAL DAILY
Qty: 30 TABLET | Refills: 3 | Status: SHIPPED | OUTPATIENT
Start: 2019-09-23 | End: 2019-10-23

## 2019-09-23 RX ORDER — FAMOTIDINE 40 MG/1
TABLET, FILM COATED ORAL
COMMUNITY
Start: 2019-09-19 | End: 2020-02-12

## 2019-09-23 NOTE — PROGRESS NOTES
MEDICATIONS:   Current Outpatient Medications   Medication Sig Dispense Refill    famotidine (PEPCID) 40 MG tablet       pantoprazole (PROTONIX) 40 MG tablet Take 40 mg by mouth daily      losartan (COZAAR) 25 MG tablet Take 1 tablet by mouth daily 90 tablet 3    carvedilol (COREG) 12.5 MG tablet Take 1 tablet by mouth 2 times daily 180 tablet 3    simvastatin (ZOCOR) 20 MG tablet Take 1 tablet by mouth daily 90 tablet 3    budesonide-formoterol (SYMBICORT) 160-4.5 MCG/ACT AERO Inhale 2 puffs into the lungs 2 times daily Rinse mouth after use. 1 Inhaler 5    ondansetron (ZOFRAN ODT) 4 MG disintegrating tablet Take 1 tablet by mouth every 8 hours as needed for Nausea or Vomiting 15 tablet 0    Lactobacillus (PROBIOTIC ACIDOPHILUS) CAPS Take 1 capsule by mouth 2 times daily (with meals)      furosemide (LASIX) 20 MG tablet Take 1 tablet by mouth 2 times daily (Patient taking differently: Take 20 mg by mouth 2 times daily Patient states only takes as needed) 20 tablet 0    Coenzyme Q10 (CO Q-10) 100 MG CAPS Take by mouth      Nutritional Supplements (JUICE PLUS FIBRE PO) Take by mouth      aspirin 81 MG tablet Take 81 mg by mouth daily To check with Dr. Shon Smart albuterol sulfate  (90 Base) MCG/ACT inhaler Inhale 2 puffs into the lungs every 6 hours as needed for Wheezing      Dextromethorphan-Guaifenesin (MUCINEX DM MAXIMUM STRENGTH)  MG TB12 Take 1 tablet by mouth every 12 hours as needed (cough and congestion) (Patient not taking: Reported on 9/23/2019) 28 tablet 0     No current facility-administered medications for this visit.         SOCIAL AND OCCUPATIONAL HEALTH:  Social History     Tobacco Use   Smoking Status Never Smoker   Smokeless Tobacco Never Used     TB :no   Pets :2 Cats   Industrial exposure:[facility] ,no dust exposure     SURGICAL HISTORY:   Past Surgical History:   Procedure Laterality Date    APPENDECTOMY      BREAST CAPSULECTOMY  03/07/12 BILATERAL BREAST CAPSULECTOMIES    BREAST SURGERY  1962     cosmetic implants    CARDIAC DEFIBRILLATOR PLACEMENT      [facility]    CARDIAC DEFIBRILLATOR PLACEMENT  5/2010    CARDIAC DEFIBRILLATOR PLACEMENT Left 02/13/2018    [employer] ICD change out,     CARDIAC PACEMAKER PLACEMENT  5/10/2010    Dual chamber pacer ICD. [address]  2009    stent    CARDIOVASCULAR STRESS TEST  3/4/2010    COLONOSCOPY      COSMETIC SURGERY      eyelids breast    DIAGNOSTIC CARDIAC CATH LAB PROCEDURE  10/30/2009    HYSTERECTOMY      OTHER SURGICAL HISTORY Right 12/17/15    ORIF RIGHT DISTAL RADIUS    OTHER SURGICAL HISTORY Right 4/10/2016    IP joint release of thumb    TONSILLECTOMY      TRANSTHORACIC ECHOCARDIOGRAM  3/30/11ize and function,        FAMILY HISTORY:   Lung cancer:no   DVT or PE No    REVIEW OF SYSTEMS:  Constitutional: General health is good . There has been no weight changes. No fevers, fatigue or weakness. Head: Patient denies any history of trauma, convulsive disorder or syncope. Skin:  Patient denies history of changes in pigmentation, eruptions or pruritus. No easy bruising or bleeding. EENT: no nasal congestion   Cardiovascular ,No chest pain ,No edema ,  Respiration:SOB: + ,LYON :+ cough   Gastrointestinal:No GI bleed ,no melena  ,no hematemesis    Musculoskeletal: no joint pain ,no swelling  Neurological:no , syncope. Denies twitching, convulsions, loss of consciousness or memory. Endocrine:  . No history of goiter, exophthalmos or dryness of skin. The patient has no history of diabetes. Hematopoietic:  No history of bleeding disorders or easy bruising. Rheumatic:  No connective tissue disease history or polyarthritis/inflammatory joint disease.       PHYSICAL EXAMINATION:  Vitals:    09/23/19 1346   BP: 122/66   Pulse: 58   Resp: 18   SpO2: 98%     Constitutional: This is a well developed, well nourished 81y.o. year old female who is alert, oriented, cooperative and in no apparent distress. Head was normocephalic and atraumatic. EENT: Mallampati class :  Extraocular muscles intact. External canals are patent and no discharge was appreciated. Septum was midline,   mucosa was without erythema, exudates or cobblestoning. No thrush was noted. Neck: Supple without thyromegaly. No elevated JVP. Trachea was midline. No carotid bruits were auscultated. Respiratory: rhonchi scattered     Cardiovascular: Regular without murmur, clicks, gallops or rubs. There is no left or right ventricular heave. Pulses:  Carotid, radial and femoral pulses were equally bilaterally. Abdomen: Slightly rounded and soft without organomegaly. No rebound, rigidity or guarding was appreciated. Lymphatic: No lymphadenopathy. Musculoskeletal: no edema  ,no swelling    Extremities:no edema   Skin:  Warm and dry. Good color, turgor and pigmentation. No lesions or scars. Neurological/Psychiatric: The patient's general behavior, level of consciousness, thought content and emotional status is normal.  Cranial nerves II-XII are intact. DATA: Spirometry done in the office today demonstrates an FVC of 0.90  liters which is 43 % of predicted with an FEV1 of  0.67  liters which is 43 % of predicted. FEV1/FVC ratio is75 %. IMPRESSION:    1-Chronic cough   2-Non specific spirometry   3-? Chronic bronchitis   4-weight loss            5- back lump   6- Lung nodule   7- sinusitis   PLAN:      -Eosinophilic count 609   IgE 6   Full PFT no obstruction   Respiratory allergy profile normal   ESR  15  CT scan chest without contrast no lesions     -Ct scan reviewed and has stable nodule   ,will start Singulair and Zyertc andnasal saline   She is to see ENT  I will see how she how does about Esophageal dilation and treating her sinusitis,if not better will do broncscopy   Thank you for allowing me to participate in [facility].  I will keep following with you ,should you have any concerns ,please contact me at [phone]     Sincerely,        Maryuri Templeton MD  Pulmonary & Critical Care Medicine     NOTE: This report was transcribed using voice recognition software. Every effort was made to ensure accuracy; however, inadvertent computerized transcription errors may be present.

## 2019-09-23 NOTE — PROGRESS NOTES
3 mos follow up in office; no complaints. To follow up in office in 4 mos; appt given. Scripts sent to pharmacy for pt per Dr. Neeta Kern's orders.

## 2019-10-31 ENCOUNTER — HOSPITAL ENCOUNTER (OUTPATIENT)
Age: 84
Discharge: HOME | End: 2019-10-31
Payer: MEDICARE

## 2019-10-31 ENCOUNTER — HOSPITAL ENCOUNTER (OUTPATIENT)
Dept: CT IMAGING | Age: 84
Discharge: HOME | End: 2019-11-02
Payer: MEDICARE

## 2019-10-31 DIAGNOSIS — K76.9 LIVER LESION: ICD-10-CM

## 2019-10-31 LAB
BUN BLDV-MCNC: 15 MG/DL (ref 8–23)
CREAT SERPL-MCNC: 1 MG/DL (ref 0.5–1)
GFR AFRICAN AMERICAN: >60
GFR NON-AFRICAN AMERICAN: 53 ML/MIN/1.73

## 2019-10-31 PROCEDURE — 74177 CT ABD & PELVIS W/CONTRAST: CPT

## 2019-10-31 PROCEDURE — 82565 ASSAY OF CREATININE: CPT

## 2019-10-31 PROCEDURE — 6360000004 HC RX CONTRAST MEDICATION: Performed by: RADIOLOGY

## 2019-10-31 PROCEDURE — 84520 ASSAY OF UREA NITROGEN: CPT

## 2019-10-31 PROCEDURE — 36415 COLL VENOUS BLD VENIPUNCTURE: CPT

## 2019-10-31 RX ORDER — SODIUM CHLORIDE 0.9 % (FLUSH) 0.9 %
10 SYRINGE (ML) INJECTION PRN
Status: DISCONTINUED | OUTPATIENT
Start: 2019-10-31 | End: 2019-11-03 | Stop reason: HOSPADM

## 2019-10-31 RX ADMIN — IOPAMIDOL 100 ML: 755 INJECTION, SOLUTION INTRAVENOUS at 12:55

## 2019-12-12 ENCOUNTER — OFFICE VISIT (OUTPATIENT)
Dept: CARDIOLOGY CLINIC | Age: 84
End: 2019-12-12
Payer: MEDICARE

## 2019-12-12 VITALS
SYSTOLIC BLOOD PRESSURE: 108 MMHG | BODY MASS INDEX: 21.79 KG/M2 | RESPIRATION RATE: 16 BRPM | DIASTOLIC BLOOD PRESSURE: 64 MMHG | HEIGHT: 60 IN | WEIGHT: 111 LBS | HEART RATE: 64 BPM

## 2019-12-12 DIAGNOSIS — I10 ESSENTIAL HYPERTENSION: ICD-10-CM

## 2019-12-12 DIAGNOSIS — I25.10 CORONARY ARTERY DISEASE INVOLVING NATIVE CORONARY ARTERY OF NATIVE HEART WITHOUT ANGINA PECTORIS: Primary | ICD-10-CM

## 2019-12-12 DIAGNOSIS — I25.5 ISCHEMIC CARDIOMYOPATHY: ICD-10-CM

## 2019-12-12 DIAGNOSIS — I50.22 CHRONIC SYSTOLIC (CONGESTIVE) HEART FAILURE (HCC): ICD-10-CM

## 2019-12-12 PROCEDURE — 1036F TOBACCO NON-USER: CPT | Performed by: INTERNAL MEDICINE

## 2019-12-12 PROCEDURE — 1090F PRES/ABSN URINE INCON ASSESS: CPT | Performed by: INTERNAL MEDICINE

## 2019-12-12 PROCEDURE — G8420 CALC BMI NORM PARAMETERS: HCPCS | Performed by: INTERNAL MEDICINE

## 2019-12-12 PROCEDURE — G8484 FLU IMMUNIZE NO ADMIN: HCPCS | Performed by: INTERNAL MEDICINE

## 2019-12-12 PROCEDURE — G8598 ASA/ANTIPLAT THER USED: HCPCS | Performed by: INTERNAL MEDICINE

## 2019-12-12 PROCEDURE — G8427 DOCREV CUR MEDS BY ELIG CLIN: HCPCS | Performed by: INTERNAL MEDICINE

## 2019-12-12 PROCEDURE — 93000 ELECTROCARDIOGRAM COMPLETE: CPT | Performed by: INTERNAL MEDICINE

## 2019-12-12 PROCEDURE — 1123F ACP DISCUSS/DSCN MKR DOCD: CPT | Performed by: INTERNAL MEDICINE

## 2019-12-12 PROCEDURE — G8400 PT W/DXA NO RESULTS DOC: HCPCS | Performed by: INTERNAL MEDICINE

## 2019-12-12 PROCEDURE — 4040F PNEUMOC VAC/ADMIN/RCVD: CPT | Performed by: INTERNAL MEDICINE

## 2019-12-12 PROCEDURE — 99214 OFFICE O/P EST MOD 30 MIN: CPT | Performed by: INTERNAL MEDICINE

## 2019-12-12 RX ORDER — MOMETASONE FUROATE 50 UG/1
2 SPRAY, METERED NASAL DAILY
Refills: 3 | COMMUNITY
Start: 2019-11-20

## 2019-12-12 RX ORDER — AMLODIPINE BESYLATE 5 MG/1
TABLET ORAL
Refills: 0 | COMMUNITY
Start: 2019-11-15 | End: 2020-02-12

## 2019-12-23 ENCOUNTER — TELEPHONE (OUTPATIENT)
Dept: ENDOSCOPY | Age: 84
End: 2019-12-23

## 2020-01-02 ENCOUNTER — HOSPITAL ENCOUNTER (OUTPATIENT)
Age: 85
Setting detail: OUTPATIENT SURGERY
Discharge: HOME | End: 2020-01-02
Attending: INTERNAL MEDICINE | Admitting: INTERNAL MEDICINE
Payer: MEDICARE

## 2020-01-02 PROCEDURE — 6370000000 HC RX 637 (ALT 250 FOR IP): Performed by: INTERNAL MEDICINE

## 2020-01-02 PROCEDURE — 3609015500 HC GASTRIC/DUODENAL MOTILITY &/OR MANOMETRY STUDY: Performed by: INTERNAL MEDICINE

## 2020-01-02 RX ORDER — LIDOCAINE HYDROCHLORIDE 20 MG/ML
JELLY TOPICAL PRN
Status: DISCONTINUED | OUTPATIENT
Start: 2020-01-02 | End: 2020-01-02 | Stop reason: ALTCHOICE

## 2020-01-16 ENCOUNTER — HOSPITAL ENCOUNTER (OUTPATIENT)
Dept: INFUSION THERAPY | Age: 85
Setting detail: INFUSION SERIES
Discharge: HOME | End: 2020-01-16
Payer: MEDICARE

## 2020-01-16 VITALS
RESPIRATION RATE: 16 BRPM | DIASTOLIC BLOOD PRESSURE: 55 MMHG | BODY MASS INDEX: 21.77 KG/M2 | HEART RATE: 72 BPM | OXYGEN SATURATION: 96 % | SYSTOLIC BLOOD PRESSURE: 116 MMHG | HEIGHT: 59 IN | WEIGHT: 108 LBS | TEMPERATURE: 97.7 F

## 2020-01-16 DIAGNOSIS — M81.0 OSTEOPOROSIS, UNSPECIFIED OSTEOPOROSIS TYPE, UNSPECIFIED PATHOLOGICAL FRACTURE PRESENCE: Primary | ICD-10-CM

## 2020-01-16 PROCEDURE — 96372 THER/PROPH/DIAG INJ SC/IM: CPT

## 2020-01-16 PROCEDURE — 6360000002 HC RX W HCPCS: Performed by: OBSTETRICS & GYNECOLOGY

## 2020-01-16 RX ADMIN — DENOSUMAB 60 MG: 60 INJECTION SUBCUTANEOUS at 09:11

## 2020-01-20 RX ORDER — MONTELUKAST SODIUM 10 MG/1
TABLET ORAL
Qty: 30 TABLET | Refills: 3 | Status: SHIPPED
Start: 2020-01-20 | End: 2020-07-16

## 2020-02-06 ENCOUNTER — OFFICE VISIT (OUTPATIENT)
Dept: PULMONOLOGY | Age: 85
End: 2020-02-06
Payer: MEDICARE

## 2020-02-06 PROCEDURE — 3023F SPIROM DOC REV: CPT | Performed by: INTERNAL MEDICINE

## 2020-02-06 PROCEDURE — G8926 SPIRO NO PERF OR DOC: HCPCS | Performed by: INTERNAL MEDICINE

## 2020-02-06 PROCEDURE — 1036F TOBACCO NON-USER: CPT | Performed by: INTERNAL MEDICINE

## 2020-02-06 PROCEDURE — G8420 CALC BMI NORM PARAMETERS: HCPCS | Performed by: INTERNAL MEDICINE

## 2020-02-06 PROCEDURE — G8427 DOCREV CUR MEDS BY ELIG CLIN: HCPCS | Performed by: INTERNAL MEDICINE

## 2020-02-06 PROCEDURE — 99213 OFFICE O/P EST LOW 20 MIN: CPT | Performed by: INTERNAL MEDICINE

## 2020-02-06 PROCEDURE — G8400 PT W/DXA NO RESULTS DOC: HCPCS | Performed by: INTERNAL MEDICINE

## 2020-02-06 PROCEDURE — 1090F PRES/ABSN URINE INCON ASSESS: CPT | Performed by: INTERNAL MEDICINE

## 2020-02-06 PROCEDURE — 4040F PNEUMOC VAC/ADMIN/RCVD: CPT | Performed by: INTERNAL MEDICINE

## 2020-02-06 PROCEDURE — G8484 FLU IMMUNIZE NO ADMIN: HCPCS | Performed by: INTERNAL MEDICINE

## 2020-02-06 PROCEDURE — 1123F ACP DISCUSS/DSCN MKR DOCD: CPT | Performed by: INTERNAL MEDICINE

## 2020-02-06 RX ORDER — SUCRALFATE 1 G/1
TABLET ORAL
COMMUNITY
Start: 2020-01-23 | End: 2021-03-16

## 2020-02-06 RX ORDER — PANTOPRAZOLE SODIUM 40 MG/1
40 GRANULE, DELAYED RELEASE ORAL
COMMUNITY
End: 2020-02-12

## 2020-02-06 RX ORDER — SENNOSIDES 8.6 MG
TABLET ORAL
COMMUNITY
Start: 2020-01-23 | End: 2021-07-03

## 2020-02-06 NOTE — PROGRESS NOTES
Status Never Smoker   Smokeless Tobacco Never Used     TB :no   Pets :2 Cats   Industrial exposure:[employer] [facility] ,no dust exposure     SURGICAL HISTORY:   Past Surgical History:   Procedure Laterality Date    APPENDECTOMY      BREAST CAPSULECTOMY  03/07/12    BILATERAL BREAST CAPSULECTOMIES    BREAST SURGERY  1962     cosmetic implants    CARDIAC DEFIBRILLATOR PLACEMENT      [facility]    CARDIAC DEFIBRILLATOR PLACEMENT  5/2010    CARDIAC DEFIBRILLATOR PLACEMENT Left 02/13/2018    [employer] ICD change out,     CARDIAC PACEMAKER PLACEMENT  5/10/2010    Dual chamber pacer ICD. [address] 43  2009    stent    CARDIOVASCULAR STRESS TEST  3/4/2010    COLONOSCOPY      COSMETIC SURGERY      eyelids breast    DIAGNOSTIC CARDIAC CATH LAB PROCEDURE  10/30/2009    ESOPHAGEAL MOTILITY STUDY N/A 1/2/2020    ESOPHAGEAL MOTILITY/MANOMETRY STUDY performed by Efraín Valdez DO at [facility] Right 12/17/15    ORIF RIGHT DISTAL RADIUS    OTHER SURGICAL HISTORY Right 4/10/2016    IP joint release of thumb    TONSILLECTOMY      TRANSTHORACIC ECHOCARDIOGRAM  3/30/11ize and function,        FAMILY HISTORY:   Lung cancer:no   DVT or PE No    REVIEW OF SYSTEMS:  Constitutional: General health is good . There has been no weight changes. No fevers, fatigue or weakness. Head: Patient denies any history of trauma, convulsive disorder or syncope. Skin:  Patient denies history of changes in pigmentation, eruptions or pruritus. No easy bruising or bleeding. EENT: no nasal congestion   Cardiovascular ,No chest pain ,No edema ,  Respiration:SOB: + ,LYON :+ cough   Gastrointestinal:No GI bleed ,no melena  ,no hematemesis    Musculoskeletal: no joint pain ,no swelling  Neurological:no , syncope. Denies twitching, convulsions, loss of consciousness or memory. Endocrine:  . No history of goiter, exophthalmos or dryness of skin.   The patient has no history of diabetes. Hematopoietic:  No history of bleeding disorders or easy bruising. Rheumatic:  No connective tissue disease history or polyarthritis/inflammatory joint disease. PHYSICAL EXAMINATION:  There were no vitals filed for this visit. Constitutional: This is a well developed, well nourished 81y.o. year old female who is alert, oriented, cooperative and in no apparent distress. Head was normocephalic and atraumatic. EENT: Mallampati class :  Extraocular muscles intact. External canals are patent and no discharge was appreciated. Septum was midline,   mucosa was without erythema, exudates or cobblestoning. No thrush was noted. Neck: Supple without thyromegaly. No elevated JVP. Trachea was midline. No carotid bruits were auscultated. Respiratory: rhonchi scattered     Cardiovascular: Regular without murmur, clicks, gallops or rubs. There is no left or right ventricular heave. Pulses:  Carotid, radial and femoral pulses were equally bilaterally. Abdomen: Slightly rounded and soft without organomegaly. No rebound, rigidity or guarding was appreciated. Lymphatic: No lymphadenopathy. Musculoskeletal: no edema  ,no swelling    Extremities:no edema   Skin:  Warm and dry. Good color, turgor and pigmentation. No lesions or scars. Neurological/Psychiatric: The patient's general behavior, level of consciousness, thought content and emotional status is normal.  Cranial nerves II-XII are intact. DATA: Spirometry done in the office today demonstrates an FVC of 0.90  liters which is 43 % of predicted with an FEV1 of  0.67  liters which is 43 % of predicted. FEV1/FVC ratio is75 %. IMPRESSION:    1-Chronic cough   2-Non specific spirometry   3-?  Chronic bronchitis   4-weight loss            5- back lump   6- Lung nodule   7- sinusitis       PLAN:      -Eosinophilic count 555   IgE 6   Full PFT no obstruction   Respiratory allergy profile normal   ESR  15  CT scan chest without contrast no lesions   -Ct scan reviewed and has stable nodule   Continue  Singulair and Zyertc andnasal saline   Follow  ENT,got Flonase  Will try saline nasal  spray with Bactroban local apply for week and see if helps drainge     I will see how she how does about Esophageal dilation and treating her sinusitis,if not better will do broncscopy   Thank you for allowing me to participate in [facility]. I will keep following with you ,should you have any concerns ,please contact me at [phone]     Sincerely,        Dara Iniguez MD  Pulmonary & Critical Care Medicine     NOTE: This report was transcribed using voice recognition software. Every effort was made to ensure accuracy; however, inadvertent computerized transcription errors may be present.

## 2020-02-12 ENCOUNTER — ANESTHESIA EVENT (OUTPATIENT)
Dept: ENDOSCOPY | Age: 85
End: 2020-02-12
Payer: MEDICARE

## 2020-02-13 ENCOUNTER — HOSPITAL ENCOUNTER (OUTPATIENT)
Age: 85
Setting detail: OUTPATIENT SURGERY
Discharge: HOME | End: 2020-02-13
Attending: INTERNAL MEDICINE | Admitting: INTERNAL MEDICINE
Payer: MEDICARE

## 2020-02-13 ENCOUNTER — ANESTHESIA (OUTPATIENT)
Dept: ENDOSCOPY | Age: 85
End: 2020-02-13
Payer: MEDICARE

## 2020-02-13 VITALS — DIASTOLIC BLOOD PRESSURE: 59 MMHG | OXYGEN SATURATION: 97 % | SYSTOLIC BLOOD PRESSURE: 108 MMHG

## 2020-02-13 VITALS
SYSTOLIC BLOOD PRESSURE: 108 MMHG | OXYGEN SATURATION: 98 % | TEMPERATURE: 98.3 F | DIASTOLIC BLOOD PRESSURE: 60 MMHG | HEIGHT: 59 IN | WEIGHT: 109 LBS | BODY MASS INDEX: 21.97 KG/M2 | RESPIRATION RATE: 20 BRPM | HEART RATE: 68 BPM

## 2020-02-13 PROCEDURE — 2709999900 HC NON-CHARGEABLE SUPPLY: Performed by: INTERNAL MEDICINE

## 2020-02-13 PROCEDURE — 7100000010 HC PHASE II RECOVERY - FIRST 15 MIN: Performed by: INTERNAL MEDICINE

## 2020-02-13 PROCEDURE — 3700000001 HC ADD 15 MINUTES (ANESTHESIA): Performed by: INTERNAL MEDICINE

## 2020-02-13 PROCEDURE — 3700000000 HC ANESTHESIA ATTENDED CARE: Performed by: INTERNAL MEDICINE

## 2020-02-13 PROCEDURE — 3609013800 HC EGD SUBMUCOSAL/BOTOX INJECTION: Performed by: INTERNAL MEDICINE

## 2020-02-13 PROCEDURE — 7100000011 HC PHASE II RECOVERY - ADDTL 15 MIN: Performed by: INTERNAL MEDICINE

## 2020-02-13 PROCEDURE — 6360000002 HC RX W HCPCS: Performed by: NURSE ANESTHETIST, CERTIFIED REGISTERED

## 2020-02-13 PROCEDURE — 2580000003 HC RX 258: Performed by: NURSE ANESTHETIST, CERTIFIED REGISTERED

## 2020-02-13 RX ORDER — PROPOFOL 10 MG/ML
INJECTION, EMULSION INTRAVENOUS PRN
Status: DISCONTINUED | OUTPATIENT
Start: 2020-02-13 | End: 2020-02-13 | Stop reason: SDUPTHER

## 2020-02-13 RX ORDER — SODIUM CHLORIDE 0.9 % (FLUSH) 0.9 %
10 SYRINGE (ML) INJECTION EVERY 12 HOURS SCHEDULED
Status: DISCONTINUED | OUTPATIENT
Start: 2020-02-13 | End: 2020-02-13 | Stop reason: HOSPADM

## 2020-02-13 RX ORDER — SODIUM CHLORIDE 9 MG/ML
INJECTION, SOLUTION INTRAVENOUS CONTINUOUS PRN
Status: DISCONTINUED | OUTPATIENT
Start: 2020-02-13 | End: 2020-02-13 | Stop reason: SDUPTHER

## 2020-02-13 RX ORDER — SODIUM CHLORIDE 0.9 % (FLUSH) 0.9 %
10 SYRINGE (ML) INJECTION PRN
Status: DISCONTINUED | OUTPATIENT
Start: 2020-02-13 | End: 2020-02-13 | Stop reason: HOSPADM

## 2020-02-13 RX ADMIN — PROPOFOL 100 MG: 10 INJECTION, EMULSION INTRAVENOUS at 15:10

## 2020-02-13 RX ADMIN — SODIUM CHLORIDE: 9 INJECTION, SOLUTION INTRAVENOUS at 14:30

## 2020-02-13 ASSESSMENT — PAIN - FUNCTIONAL ASSESSMENT: PAIN_FUNCTIONAL_ASSESSMENT: 0-10

## 2020-02-13 ASSESSMENT — PAIN SCALES - GENERAL
PAINLEVEL_OUTOF10: 0
PAINLEVEL_OUTOF10: 0

## 2020-02-13 NOTE — ANESTHESIA PRE PROCEDURE
Department of Anesthesiology  Preprocedure Note       Name:  Kym Lay   Age:  81 y.o.  :  1935                                          MRN:  [id number]         Date:  2020      Surgeon: Jenifer Sommer):  Tere Shanks DO    Procedure: EGD ESOPHAGOGASTRODUODENOSCOPY (N/A )    Medications prior to admission:   Prior to Admission medications    Medication Sig Start Date End Date Taking?  Authorizing Provider   [employer] SENNA 8.6 MG tablet TAKE 1 TO 2 TABLETS BY MOUTH NIGHTLY AS NEEDED FOR CONSTIPATION 20  Yes Historical Provider, MD   sucralfate (CARAFATE) 1 GM tablet  20  Yes Historical Provider, MD   montelukast (SINGULAIR) 10 MG tablet TAKE 1 TABLET BY MOUTH EVERY DAY 20  Yes Emperatriz Funes MD   mometasone (NASONEX) 50 MCG/ACT nasal spray USE 2 SPRAYS IN EACH NOSTIL ONCE A DAY 19  Yes Historical Provider, MD   pantoprazole (PROTONIX) 40 MG tablet Take 40 mg by mouth daily Instructed to take morning of surgery with a sip of water   Yes Historical Provider, MD   losartan (COZAAR) 25 MG tablet Take 1 tablet by mouth daily 6/3/19  Yes Isha Santamaria MD   carvedilol (COREG) 12.5 MG tablet Take 1 tablet by mouth 2 times daily 6/3/19  Yes Isha Santamaria MD   simvastatin (ZOCOR) 20 MG tablet Take 1 tablet by mouth daily 6/3/19  Yes Isha Santamaria MD   Lactobacillus (PROBIOTIC ACIDOPHILUS) CAPS Take 1 capsule by mouth 2 times daily (with meals) Last dose 20   Yes Historical Provider, MD   furosemide (LASIX) 20 MG tablet Take 1 tablet by mouth 2 times daily  Patient taking differently: Take 20 mg by mouth daily  17  Yes Amando Sal MD   Coenzyme Q10 (CO Q-10) 100 MG CAPS Take by mouth Last dose 20   Yes Historical Provider, MD   Nutritional Supplements (JUICE PLUS FIBRE PO) Take by mouth Last dose 20   Yes Historical Provider, MD   aspirin 81 MG tablet Take 81 mg by mouth daily To check hold  with Dr. Isai Leung Provider, MD   mupirocin (BACTROBAN) 2 % ointment

## 2020-02-13 NOTE — H&P
tonsils without erythema or exudates  NECK:  Supple with no carotid bruits, JVD or thyromegaly. No cervical adenopathy  LUNGS:  Clear to auscultation bilaterally with no wheezes, rales or rhonchi. No increased work of breathing, good air exchange. CARDIOVASCULAR: Regular rate and rhythm, no murmur  ABDOMEN:  normal bowel sounds in all 4 quadrants, soft, non-distended, non-tender, no masses palpated, no hepatosplenomegally  MUSCULOSKELETAL:  There is no redness, warmth, or swelling of the joints. Full range of motion noted. Motor strength is 5 out of 5 all extremities bilaterally. Tone is normal.  EXTREMITIES: No edema, 2+ pulses bilaterally (radial and dorsalis pedis)  NEUROLOGIC:  Awake, alert, oriented to name, place and time. Cranial nerves II-XII are grossly intact. Motor is 5 out of 5 bilaterally. SKIN: Normal skin color, texture, and turgor. There is no redness, warmth, or swelling. No bruising or bleeding, no mottling. PSYCH: Affect, behavior and insight are all within normal limits. DATA:  No results found for this visit on 02/13/20. IMAGING:  No results found. ASSESSMENT/PLAN    1. 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 Please see orders for further plan of care. Reviewed above with Dr. Magdalena Gardner D.O.   GI fellow  2/13/2020 at 11:13 AM    I was present at bedside and performed my own exam.  I agree with the above findings and plan. Pt had EGD last year with esophageal candidiasis. Motility study showed achalasia. She is not a good surgical candidate and presents today for EGD with botox.     DO Tomy  2/13/2020  3:06 PM

## 2020-02-13 NOTE — PROCEDURES
outpatient in office, call [phone] to schedule for appointment. Pt was seen and procedure was performed with Dr. Pavel Be present for the entire procedure    Yu Nava DO   GI Fellow   2/13/2020    I was present at bedside and performed my own exam.  I agree with the above findings and plan. Botox injected into lower esophageal sphincter per above. No recurrent fungal infection seen. No ulcers or other significant pathology noted. Pt ok to D/C home today per hospital protocol and no driving x 24 hours. Pt to F/U in office in 3-4 weeks. Continue PPI daily.     DO Tomy  2/13/2020  3:29 PM

## 2020-05-09 ENCOUNTER — HOSPITAL ENCOUNTER (OUTPATIENT)
Dept: ULTRASOUND IMAGING | Age: 85
Discharge: HOME | End: 2020-05-11
Payer: MEDICARE

## 2020-05-09 PROCEDURE — 76536 US EXAM OF HEAD AND NECK: CPT

## 2020-05-27 ENCOUNTER — TELEPHONE (OUTPATIENT)
Dept: ADMINISTRATIVE | Age: 85
End: 2020-05-27

## 2020-06-02 ENCOUNTER — HOSPITAL ENCOUNTER (OUTPATIENT)
Dept: NUCLEAR MEDICINE | Age: 85
Discharge: HOME | End: 2020-06-04
Payer: MEDICARE

## 2020-06-02 PROCEDURE — A9552 F18 FDG: HCPCS | Performed by: RADIOLOGY

## 2020-06-02 PROCEDURE — 3430000000 HC RX DIAGNOSTIC RADIOPHARMACEUTICAL: Performed by: RADIOLOGY

## 2020-06-02 PROCEDURE — 78815 PET IMAGE W/CT SKULL-THIGH: CPT

## 2020-06-02 RX ORDER — FLUDEOXYGLUCOSE F 18 200 MCI/ML
14 INJECTION, SOLUTION INTRAVENOUS
Status: COMPLETED | OUTPATIENT
Start: 2020-06-02 | End: 2020-06-02

## 2020-06-02 RX ADMIN — FLUDEOXYGLUCOSE F 18 15.1 MILLICURIE: 200 INJECTION, SOLUTION INTRAVENOUS at 09:28

## 2020-06-03 ENCOUNTER — PRE-PROCEDURE TELEPHONE (OUTPATIENT)
Dept: INTERVENTIONAL RADIOLOGY/VASCULAR | Age: 85
End: 2020-06-03

## 2020-06-03 NOTE — PROGRESS NOTES
Called and left message for patient. Instructed patient needs to get Covid test prior to procedure. Instructed they can go to either of the 2 hospitals ([address] [place] [address] and [facility]) Monday thru Friday between 6:30am - 1 pm or go to the clinic at [address] [place] [address] ([phone]) between 7:30am - 4:30pm. Test must be at least 4 days prior to procedure and suggest they remain quarantined to their household. Instructed if they do not get test done then we must reschedule procedure. Left call back number with any questions. [phone] pt called back . Will go to [facility] location for covid test this Thursday or Friday.

## 2020-06-07 ENCOUNTER — APPOINTMENT (OUTPATIENT)
Dept: GENERAL RADIOLOGY | Age: 85
DRG: 308 | End: 2020-06-07
Payer: MEDICARE

## 2020-06-07 ENCOUNTER — HOSPITAL ENCOUNTER (INPATIENT)
Age: 85
LOS: 5 days | Discharge: HOME | DRG: 308 | End: 2020-06-12
Attending: EMERGENCY MEDICINE | Admitting: INTERNAL MEDICINE
Payer: MEDICARE

## 2020-06-07 PROBLEM — I48.91 ATRIAL FIBRILLATION (HCC): Status: ACTIVE | Noted: 2020-06-07

## 2020-06-07 LAB
ALBUMIN SERPL-MCNC: 4.3 G/DL (ref 3.5–5.2)
ALP BLD-CCNC: 58 U/L (ref 35–104)
ALT SERPL-CCNC: 15 U/L (ref 0–32)
ANION GAP SERPL CALCULATED.3IONS-SCNC: 13 MMOL/L (ref 7–16)
ANION GAP SERPL CALCULATED.3IONS-SCNC: 13 MMOL/L (ref 7–16)
AST SERPL-CCNC: 25 U/L (ref 0–31)
BACTERIA: ABNORMAL /HPF
BASOPHILS ABSOLUTE: 0.02 E9/L (ref 0–0.2)
BASOPHILS RELATIVE PERCENT: 0.3 % (ref 0–2)
BILIRUB SERPL-MCNC: 0.6 MG/DL (ref 0–1.2)
BILIRUBIN URINE: NEGATIVE
BLOOD, URINE: ABNORMAL
BUN BLDV-MCNC: 14 MG/DL (ref 8–23)
BUN BLDV-MCNC: 16 MG/DL (ref 8–23)
CALCIUM SERPL-MCNC: 9.2 MG/DL (ref 8.6–10.2)
CALCIUM SERPL-MCNC: 9.4 MG/DL (ref 8.6–10.2)
CHLORIDE BLD-SCNC: 94 MMOL/L (ref 98–107)
CHLORIDE BLD-SCNC: 94 MMOL/L (ref 98–107)
CLARITY: ABNORMAL
CO2: 23 MMOL/L (ref 22–29)
CO2: 27 MMOL/L (ref 22–29)
COLOR: YELLOW
CREAT SERPL-MCNC: 0.8 MG/DL (ref 0.5–1)
CREAT SERPL-MCNC: 0.8 MG/DL (ref 0.5–1)
EOSINOPHILS ABSOLUTE: 0.09 E9/L (ref 0.05–0.5)
EOSINOPHILS RELATIVE PERCENT: 1.3 % (ref 0–6)
GFR AFRICAN AMERICAN: >60
GFR AFRICAN AMERICAN: >60
GFR NON-AFRICAN AMERICAN: >60 ML/MIN/1.73
GFR NON-AFRICAN AMERICAN: >60 ML/MIN/1.73
GLUCOSE BLD-MCNC: 119 MG/DL (ref 74–99)
GLUCOSE BLD-MCNC: 127 MG/DL (ref 74–99)
GLUCOSE URINE: NEGATIVE MG/DL
HCT VFR BLD CALC: 37.2 % (ref 34–48)
HEMOGLOBIN: 12.3 G/DL (ref 11.5–15.5)
IMMATURE GRANULOCYTES #: 0.02 E9/L
IMMATURE GRANULOCYTES %: 0.3 % (ref 0–5)
KETONES, URINE: NEGATIVE MG/DL
LEUKOCYTE ESTERASE, URINE: ABNORMAL
LYMPHOCYTES ABSOLUTE: 0.67 E9/L (ref 1.5–4)
LYMPHOCYTES RELATIVE PERCENT: 9.7 % (ref 20–42)
MAGNESIUM: 2.1 MG/DL (ref 1.6–2.6)
MAGNESIUM: 2.3 MG/DL (ref 1.6–2.6)
MCH RBC QN AUTO: 32.2 PG (ref 26–35)
MCHC RBC AUTO-ENTMCNC: 33.1 % (ref 32–34.5)
MCV RBC AUTO: 97.4 FL (ref 80–99.9)
MONOCYTES ABSOLUTE: 1.04 E9/L (ref 0.1–0.95)
MONOCYTES RELATIVE PERCENT: 15 % (ref 2–12)
NEUTROPHILS ABSOLUTE: 5.09 E9/L (ref 1.8–7.3)
NEUTROPHILS RELATIVE PERCENT: 73.4 % (ref 43–80)
NITRITE, URINE: NEGATIVE
PDW BLD-RTO: 13.3 FL (ref 11.5–15)
PH UA: 6 (ref 5–9)
PLATELET # BLD: 258 E9/L (ref 130–450)
PMV BLD AUTO: 10.1 FL (ref 7–12)
POTASSIUM REFLEX MAGNESIUM: 5.4 MMOL/L (ref 3.5–5)
POTASSIUM SERPL-SCNC: 4.3 MMOL/L (ref 3.5–5)
POTASSIUM SERPL-SCNC: 4.6 MMOL/L (ref 3.5–5)
PRO-BNP: 8319 PG/ML (ref 0–450)
PROTEIN UA: ABNORMAL MG/DL
RBC # BLD: 3.82 E12/L (ref 3.5–5.5)
RBC UA: ABNORMAL /HPF (ref 0–2)
SODIUM BLD-SCNC: 130 MMOL/L (ref 132–146)
SODIUM BLD-SCNC: 134 MMOL/L (ref 132–146)
SPECIFIC GRAVITY UA: 1.01 (ref 1–1.03)
TOTAL PROTEIN: 6.8 G/DL (ref 6.4–8.3)
TROPONIN: <0.01 NG/ML (ref 0–0.03)
TSH SERPL DL<=0.05 MIU/L-ACNC: 1.27 UIU/ML (ref 0.27–4.2)
UROBILINOGEN, URINE: 0.2 E.U./DL
WBC # BLD: 6.9 E9/L (ref 4.5–11.5)
WBC UA: >20 /HPF (ref 0–5)

## 2020-06-07 PROCEDURE — 6370000000 HC RX 637 (ALT 250 FOR IP): Performed by: INTERNAL MEDICINE

## 2020-06-07 PROCEDURE — 2140000000 HC CCU INTERMEDIATE R&B

## 2020-06-07 PROCEDURE — 2580000003 HC RX 258: Performed by: INTERNAL MEDICINE

## 2020-06-07 PROCEDURE — 84443 ASSAY THYROID STIM HORMONE: CPT

## 2020-06-07 PROCEDURE — 80048 BASIC METABOLIC PNL TOTAL CA: CPT

## 2020-06-07 PROCEDURE — 93005 ELECTROCARDIOGRAM TRACING: CPT | Performed by: GENERAL PRACTICE

## 2020-06-07 PROCEDURE — 83880 ASSAY OF NATRIURETIC PEPTIDE: CPT

## 2020-06-07 PROCEDURE — 81001 URINALYSIS AUTO W/SCOPE: CPT

## 2020-06-07 PROCEDURE — 6360000002 HC RX W HCPCS: Performed by: GENERAL PRACTICE

## 2020-06-07 PROCEDURE — 80053 COMPREHEN METABOLIC PANEL: CPT

## 2020-06-07 PROCEDURE — 71045 X-RAY EXAM CHEST 1 VIEW: CPT

## 2020-06-07 PROCEDURE — 2580000003 HC RX 258: Performed by: GENERAL PRACTICE

## 2020-06-07 PROCEDURE — 94760 N-INVAS EAR/PLS OXIMETRY 1: CPT

## 2020-06-07 PROCEDURE — 36415 COLL VENOUS BLD VENIPUNCTURE: CPT

## 2020-06-07 PROCEDURE — 99285 EMERGENCY DEPT VISIT HI MDM: CPT

## 2020-06-07 PROCEDURE — 87088 URINE BACTERIA CULTURE: CPT

## 2020-06-07 PROCEDURE — 83735 ASSAY OF MAGNESIUM: CPT

## 2020-06-07 PROCEDURE — 84132 ASSAY OF SERUM POTASSIUM: CPT

## 2020-06-07 PROCEDURE — 85025 COMPLETE CBC W/AUTO DIFF WBC: CPT

## 2020-06-07 PROCEDURE — 84484 ASSAY OF TROPONIN QUANT: CPT

## 2020-06-07 RX ORDER — SUCRALFATE 1 G/1
1 TABLET ORAL EVERY 6 HOURS SCHEDULED
Status: DISCONTINUED | OUTPATIENT
Start: 2020-06-07 | End: 2020-06-12 | Stop reason: HOSPADM

## 2020-06-07 RX ORDER — FLUTICASONE PROPIONATE 50 MCG
2 SPRAY, SUSPENSION (ML) NASAL DAILY
Status: DISCONTINUED | OUTPATIENT
Start: 2020-06-07 | End: 2020-06-12 | Stop reason: HOSPADM

## 2020-06-07 RX ORDER — SODIUM CHLORIDE 0.9 % (FLUSH) 0.9 %
10 SYRINGE (ML) INJECTION PRN
Status: DISCONTINUED | OUTPATIENT
Start: 2020-06-07 | End: 2020-06-12 | Stop reason: HOSPADM

## 2020-06-07 RX ORDER — FUROSEMIDE 20 MG/1
20 TABLET ORAL 2 TIMES DAILY
Status: DISCONTINUED | OUTPATIENT
Start: 2020-06-07 | End: 2020-06-08

## 2020-06-07 RX ORDER — ATORVASTATIN CALCIUM 10 MG/1
10 TABLET, FILM COATED ORAL DAILY
Status: DISCONTINUED | OUTPATIENT
Start: 2020-06-07 | End: 2020-06-12 | Stop reason: HOSPADM

## 2020-06-07 RX ORDER — LOSARTAN POTASSIUM 25 MG/1
25 TABLET ORAL DAILY
Status: DISCONTINUED | OUTPATIENT
Start: 2020-06-07 | End: 2020-06-08

## 2020-06-07 RX ORDER — CARVEDILOL 6.25 MG/1
12.5 TABLET ORAL 2 TIMES DAILY
Status: DISCONTINUED | OUTPATIENT
Start: 2020-06-07 | End: 2020-06-12 | Stop reason: HOSPADM

## 2020-06-07 RX ORDER — SENNA PLUS 8.6 MG/1
1 TABLET ORAL NIGHTLY
Status: DISCONTINUED | OUTPATIENT
Start: 2020-06-07 | End: 2020-06-12 | Stop reason: HOSPADM

## 2020-06-07 RX ORDER — PANTOPRAZOLE SODIUM 40 MG/1
40 TABLET, DELAYED RELEASE ORAL DAILY
Status: DISCONTINUED | OUTPATIENT
Start: 2020-06-07 | End: 2020-06-12 | Stop reason: HOSPADM

## 2020-06-07 RX ORDER — ASPIRIN 81 MG/1
81 TABLET ORAL DAILY
Status: DISCONTINUED | OUTPATIENT
Start: 2020-06-07 | End: 2020-06-12 | Stop reason: HOSPADM

## 2020-06-07 RX ORDER — SODIUM CHLORIDE 0.9 % (FLUSH) 0.9 %
10 SYRINGE (ML) INJECTION 2 TIMES DAILY
Status: DISCONTINUED | OUTPATIENT
Start: 2020-06-07 | End: 2020-06-12 | Stop reason: HOSPADM

## 2020-06-07 RX ADMIN — FLUTICASONE PROPIONATE 2 SPRAY: 50 SPRAY, METERED NASAL at 20:59

## 2020-06-07 RX ADMIN — CARVEDILOL 12.5 MG: 6.25 TABLET, FILM COATED ORAL at 20:56

## 2020-06-07 RX ADMIN — APIXABAN 2.5 MG: 2.5 TABLET, FILM COATED ORAL at 20:56

## 2020-06-07 RX ADMIN — SODIUM CHLORIDE, PRESERVATIVE FREE 10 ML: 5 INJECTION INTRAVENOUS at 21:00

## 2020-06-07 RX ADMIN — CEFTRIAXONE SODIUM 1 G: 1 INJECTION, POWDER, FOR SOLUTION INTRAMUSCULAR; INTRAVENOUS at 16:17

## 2020-06-07 RX ADMIN — FUROSEMIDE 20 MG: 20 TABLET ORAL at 20:56

## 2020-06-07 RX ADMIN — SENNOSIDES 8.6 MG: 8.6 TABLET, FILM COATED ORAL at 20:57

## 2020-06-07 ASSESSMENT — ENCOUNTER SYMPTOMS
NAUSEA: 0
SHORTNESS OF BREATH: 0
SINUS PAIN: 0
SORE THROAT: 0
DIARRHEA: 0
EYE PAIN: 0
COUGH: 0
WHEEZING: 0
VOMITING: 0
CHOKING: 0
ABDOMINAL PAIN: 0
CHEST TIGHTNESS: 0

## 2020-06-07 ASSESSMENT — PAIN SCALES - GENERAL
PAINLEVEL_OUTOF10: 0
PAINLEVEL_OUTOF10: 0

## 2020-06-07 NOTE — LETTER
Beneficiary Notification Letter  BPCI Advanced     Your Doctor or Omar,    We wanted to let you know that your health care provider, [address] [place] [address] Bk, has volunteered to take part in our [facility] for [facility] & Medicaid Services (CMS) Bundled Payments for [facility] (BPCI Advanced). This doesnt change your Medicare rights or benefits and you dont need to do anything. What are bundled payments? A bundled payment combines, or bundles together, payments that Medicare makes to your health care providers for the many different kinds of medical services you might get in a specific time period. In BPCI Advanced, this time period could include a hospital inpatient stay or outpatient procedure, plus 90 days. Why would Medicare bundle payments? Bundled payments are thought of as a value-based way to pay because health care providers are responsible for both the quality and cost of medical care they give. This is a relatively new way of paying health care providers compared to thefee-for-service way Medicare has traditionally paid, where providers are paid separately for each service they provide. Bundled payments encourage these providers to work together to provide better, more coordinated care during your hospital stay, or outpatient procedure, and through your recovery. What does BPCI Advance mean for me? Youre more likely to get even better care when hospitals, doctors, and other health care providers work together. In BPCI Advanced, hospitals, doctors, and other health care providers may be rewarded for providing better, more coordinated health care. Medicare will watch BPCI Advanced participants closely to make sure that you and other patients keep getting efficient, high quality care. What do I need to know about BPCI Advanced?

## 2020-06-07 NOTE — ED PROVIDER NOTES
Normal    ------------------------------------------ PROGRESS NOTES ------------------------------------------  Re-evaluation(s):  Time: 4:03 PM EDT  Patients symptoms show no change  Repeat physical examination is not changed    Counseling:  I have spoken with the patient and discussed todays results, in addition to providing specific details for the plan of care and counseling regarding the diagnosis and prognosis. Their questions are answered at this time and they are agreeable with the plan of admission.    --------------------------------- ADDITIONAL PROVIDER NOTES ---------------------------------  Consultations:  Time: 4:03 PM EDT. Spoke with Dr. Rafael Milton.  Discussed case. They will admit the patient. This patient's ED course included: a personal history and physicial examination, re-evaluation prior to disposition, multiple bedside re-evaluations and cardiac monitoring    This patient has remained hemodynamically stable during their ED course. Diagnosis:  1. Atrial fibrillation, unspecified type ([facility]    2. Exertional dyspnea    3. Acute cystitis without hematuria        Disposition:  Patient's disposition: Admit to telemetry  Patient's condition is fair. Norm Patel  PGY-1  4:04 PM EDT         Lupe  43., DO  Resident  06/07/20 [address] [place] [address], DO  06/12/20 [address] [place] [address], DO  06/16/20 9960

## 2020-06-08 LAB
ANION GAP SERPL CALCULATED.3IONS-SCNC: 14 MMOL/L (ref 7–16)
BUN BLDV-MCNC: 15 MG/DL (ref 8–23)
CALCIUM SERPL-MCNC: 9.5 MG/DL (ref 8.6–10.2)
CHLORIDE BLD-SCNC: 91 MMOL/L (ref 98–107)
CO2: 27 MMOL/L (ref 22–29)
CREAT SERPL-MCNC: 0.9 MG/DL (ref 0.5–1)
EKG ATRIAL RATE: 67 BPM
EKG Q-T INTERVAL: 426 MS
EKG QRS DURATION: 120 MS
EKG QTC CALCULATION (BAZETT): 469 MS
EKG R AXIS: -62 DEGREES
EKG T AXIS: 91 DEGREES
EKG VENTRICULAR RATE: 73 BPM
GFR AFRICAN AMERICAN: >60
GFR NON-AFRICAN AMERICAN: 60 ML/MIN/1.73
GLUCOSE BLD-MCNC: 106 MG/DL (ref 74–99)
POTASSIUM REFLEX MAGNESIUM: 4 MMOL/L (ref 3.5–5)
SODIUM BLD-SCNC: 132 MMOL/L (ref 132–146)

## 2020-06-08 PROCEDURE — 6370000000 HC RX 637 (ALT 250 FOR IP): Performed by: INTERNAL MEDICINE

## 2020-06-08 PROCEDURE — 6360000002 HC RX W HCPCS: Performed by: INTERNAL MEDICINE

## 2020-06-08 PROCEDURE — 80048 BASIC METABOLIC PNL TOTAL CA: CPT

## 2020-06-08 PROCEDURE — 2580000003 HC RX 258: Performed by: INTERNAL MEDICINE

## 2020-06-08 PROCEDURE — 36415 COLL VENOUS BLD VENIPUNCTURE: CPT

## 2020-06-08 PROCEDURE — 2140000000 HC CCU INTERMEDIATE R&B

## 2020-06-08 RX ORDER — MAGNESIUM SULFATE IN WATER 40 MG/ML
2 INJECTION, SOLUTION INTRAVENOUS ONCE
Status: COMPLETED | OUTPATIENT
Start: 2020-06-08 | End: 2020-06-08

## 2020-06-08 RX ORDER — FUROSEMIDE 10 MG/ML
20 INJECTION INTRAMUSCULAR; INTRAVENOUS DAILY
Status: DISCONTINUED | OUTPATIENT
Start: 2020-06-09 | End: 2020-06-09

## 2020-06-08 RX ORDER — FUROSEMIDE 10 MG/ML
20 INJECTION INTRAMUSCULAR; INTRAVENOUS 2 TIMES DAILY
Status: DISCONTINUED | OUTPATIENT
Start: 2020-06-08 | End: 2020-06-08

## 2020-06-08 RX ORDER — ONDANSETRON 2 MG/ML
4 INJECTION INTRAMUSCULAR; INTRAVENOUS EVERY 6 HOURS PRN
Status: DISCONTINUED | OUTPATIENT
Start: 2020-06-08 | End: 2020-06-09

## 2020-06-08 RX ORDER — POTASSIUM CHLORIDE 20 MEQ/1
20 TABLET, EXTENDED RELEASE ORAL 2 TIMES DAILY WITH MEALS
Status: DISCONTINUED | OUTPATIENT
Start: 2020-06-08 | End: 2020-06-09

## 2020-06-08 RX ORDER — LOSARTAN POTASSIUM 50 MG/1
50 TABLET ORAL NIGHTLY
Status: DISCONTINUED | OUTPATIENT
Start: 2020-06-09 | End: 2020-06-12 | Stop reason: HOSPADM

## 2020-06-08 RX ADMIN — APIXABAN 2.5 MG: 2.5 TABLET, FILM COATED ORAL at 21:28

## 2020-06-08 RX ADMIN — CARVEDILOL 12.5 MG: 6.25 TABLET, FILM COATED ORAL at 21:28

## 2020-06-08 RX ADMIN — SUCRALFATE 1 G: 1 TABLET ORAL at 18:26

## 2020-06-08 RX ADMIN — PANTOPRAZOLE SODIUM 40 MG: 40 TABLET, DELAYED RELEASE ORAL at 08:23

## 2020-06-08 RX ADMIN — LOSARTAN POTASSIUM 25 MG: 25 TABLET, FILM COATED ORAL at 08:22

## 2020-06-08 RX ADMIN — MAGNESIUM SULFATE HEPTAHYDRATE 2 G: 40 INJECTION, SOLUTION INTRAVENOUS at 10:50

## 2020-06-08 RX ADMIN — SODIUM CHLORIDE, PRESERVATIVE FREE 10 ML: 5 INJECTION INTRAVENOUS at 10:50

## 2020-06-08 RX ADMIN — SUCRALFATE 1 G: 1 TABLET ORAL at 11:02

## 2020-06-08 RX ADMIN — CARVEDILOL 12.5 MG: 6.25 TABLET, FILM COATED ORAL at 08:23

## 2020-06-08 RX ADMIN — SODIUM CHLORIDE, PRESERVATIVE FREE 10 ML: 5 INJECTION INTRAVENOUS at 08:23

## 2020-06-08 RX ADMIN — SENNOSIDES 8.6 MG: 8.6 TABLET, FILM COATED ORAL at 21:28

## 2020-06-08 RX ADMIN — SODIUM CHLORIDE, PRESERVATIVE FREE 10 ML: 5 INJECTION INTRAVENOUS at 21:29

## 2020-06-08 RX ADMIN — MAGNESIUM GLUCONATE 500 MG ORAL TABLET 400 MG: 500 TABLET ORAL at 11:02

## 2020-06-08 RX ADMIN — POTASSIUM CHLORIDE 20 MEQ: 1500 TABLET, EXTENDED RELEASE ORAL at 18:27

## 2020-06-08 RX ADMIN — ATORVASTATIN CALCIUM 10 MG: 10 TABLET, FILM COATED ORAL at 08:22

## 2020-06-08 RX ADMIN — APIXABAN 2.5 MG: 2.5 TABLET, FILM COATED ORAL at 08:23

## 2020-06-08 RX ADMIN — ONDANSETRON 4 MG: 2 INJECTION INTRAMUSCULAR; INTRAVENOUS at 10:54

## 2020-06-08 RX ADMIN — POTASSIUM CHLORIDE 20 MEQ: 1500 TABLET, EXTENDED RELEASE ORAL at 10:49

## 2020-06-08 RX ADMIN — FUROSEMIDE 20 MG: 10 INJECTION, SOLUTION INTRAMUSCULAR; INTRAVENOUS at 10:49

## 2020-06-08 RX ADMIN — ASPIRIN 81 MG: 81 TABLET, COATED ORAL at 08:23

## 2020-06-08 RX ADMIN — FUROSEMIDE 20 MG: 20 TABLET ORAL at 08:22

## 2020-06-08 ASSESSMENT — PAIN SCALES - GENERAL
PAINLEVEL_OUTOF10: 0

## 2020-06-08 NOTE — H&P
 CARDIAC DEFIBRILLATOR PLACEMENT Left 02/13/2018    [employer] ICD change out,     CARDIAC PACEMAKER PLACEMENT  5/10/2010    Dual chamber pacer ICD. Aasa 43  2009    stent    CARDIOVASCULAR STRESS TEST  3/4/2010    COLONOSCOPY      COSMETIC SURGERY      eyelids breast    DIAGNOSTIC CARDIAC CATH LAB PROCEDURE  10/30/2009    ESOPHAGEAL MOTILITY STUDY N/A 1/2/2020    ESOPHAGEAL MOTILITY/MANOMETRY STUDY performed by Jerald Saucedo DO at [address] [place] [address] Right 12/17/15    ORIF RIGHT DISTAL RADIUS    OTHER SURGICAL HISTORY Right 4/10/2016    IP joint release of thumb    TONSILLECTOMY      TRANSTHORACIC ECHOCARDIOGRAM  3/30/11ize and function,     UPPER GASTROINTESTINAL ENDOSCOPY N/A 2/13/2020    EGD SUBMUCOSAL/BOTOX INJECTION performed by Aj Alvarez DO at [facility] ENDOSCOPY       Family History   Problem Relation Age of Onset    Bipolar Disorder Son     Bipolar Disorder Son     Heart Disease Brother         cabg       Prior to Admission medications    Medication Sig Start Date End Date Taking?  Authorizing Provider   apixaban (ELIQUIS) 2.5 MG TABS tablet Take 2.5 mg by mouth 2 times daily   Yes Historical Provider, MD   [facility] SENNA 8.6 MG tablet TAKE 1 TO 2 TABLETS BY MOUTH NIGHTLY AS NEEDED FOR CONSTIPATION 1/23/20  Yes Historical Provider, MD   sucralfate (CARAFATE) 1 GM tablet  1/23/20  Yes Historical Provider, MD   montelukast (SINGULAIR) 10 MG tablet TAKE 1 TABLET BY MOUTH EVERY DAY 1/20/20  Yes Román Elias MD   mometasone (NASONEX) 50 MCG/ACT nasal spray USE 2 SPRAYS IN EACH NOSTIL ONCE A DAY 11/20/19  Yes Historical Provider, MD   pantoprazole (PROTONIX) 40 MG tablet Take 40 mg by mouth daily Instructed to take morning of surgery with a sip of water   Yes Historical Provider, MD   losartan (COZAAR) 25 MG tablet Take 1 tablet by mouth daily 6/3/19  Yes Gissel Santana MD   carvedilol (COREG) 12.5 MG tablet Take 1 tablet by mouth 2 times daily 6/3/19  Yes Herbert Bustamante MD   simvastatin (ZOCOR) 20 MG tablet Take 1 tablet by mouth daily 6/3/19  Yes Herbert Bustamante MD   furosemide (LASIX) 20 MG tablet Take 1 tablet by mouth 2 times daily  Patient taking differently: Take 20 mg by mouth daily  12/4/17  Yes Jessenia Phipps MD   aspirin 81 MG tablet Take 81 mg by mouth daily To check hold  with Dr. Wanda Edgar Provider, MD   ondansetron (ZOFRAN ODT) 4 MG disintegrating tablet Take 1 tablet by mouth every 8 hours as needed for Nausea or Vomiting  Patient not taking: Reported on 12/12/2019 11/19/18   Linnea Jalloh MD   Lactobacillus (PROBIOTIC ACIDOPHILUS) CAPS Take 1 capsule by mouth 2 times daily (with meals) Last dose 2-11-20    Historical Provider, MD   Dextromethorphan-Guaifenesin ([place] [address] DM MAXIMUM STRENGTH)  MG TB12 Take 1 tablet by mouth every 12 hours as needed (cough and congestion)  Patient not taking: Reported on 9/23/2019 12/27/17   Gorge Velasco MD   Coenzyme Q10 (CO Q-10) 100 MG CAPS Take by mouth Last dose 2-11-20    Historical Provider, MD   Nutritional Supplements (JUICE PLUS FIBRE PO) Take by mouth Last dose 2-11-20    Historical Provider, MD        Allergies: Patient has no known allergies. Social History     Tobacco Use    Smoking status: Never Smoker    Smokeless tobacco: Never Used   Substance Use Topics    Alcohol use: Yes     Comment: ocasional wine        Review of Systems:  Respiratory: pos. For SOB  Cardiovascular: negative for chest pain pos. For palpitation   Gastrointestinal: negative for abdominal pain, diarrhea, nausea and vomiting  Genitourinary:negative for dysuria and hematuria  Derm: negative for rash and skin lesion(s)  Neurological: negative for seizures and tremors  Endocrine: negative for diabetic symptoms including polydipsia and polyuria    Physical Exam:  Vitals:    06/08/20 0435   BP: (!) 102/58   Pulse: 61   Resp: 18   Temp: 97 °F (36.1 °C)   SpO2: 95%      Skin:  Warm and dry.   No

## 2020-06-08 NOTE — CONSULTS
venous pulsation Normal  · The carotid upstroke is normal in amplitude and contour without delay or bruit  · Peripheral pulses are symmetrical and full  Abdomen:  · No masses or tenderness  · Bowel sounds present  Extremities:  ·  No Cyanosis or Clubbing  ·  Lower extremity edema: No  · Skin: Warm and dry  Neurological:  · Alert and oriented. · Moves all extremities well  · No abnormalities of mood, affect, memory, mentation, or behavior are noted    DATA:    Diagnostics:    EKG: atrial fibrillation, rate of 70-80/min. ECHO: reviewed. 2016  Summary   Mildly dilated left ventricle. Large apical aneurysm with dyskinetic apex. Severe hypokinesis of the apical septum and lateral wall. Overall LVEF is visually estimated at 20-25%   The left atrium is moderate-severely dilated. Structurally normal mitral valve. Increased E point septal separation noted,suggesting decreased LV cardiac   output   Mild mitral regurgitation is present. The aortic valve is trileaflet. The aortic valve appears mildly sclerotic. Mild aortic regurgitation is noted. Normal tricuspid valve structure and function. Mild tricuspid regurgitation. RVSP is 25-30 mmHg. There is a trivial localized near right ventricle pericardial effusion   noted. Ejection fraction: 25%  Stress Test: not obtained. Cardiac Angiography: not obtained. Labs:   CBC:   Recent Labs     06/07/20  1311   WBC 6.9   HGB 12.3   HCT 37.2        BMP:   Recent Labs     06/07/20  1826 06/08/20  0929    132   K 4.6 4.0   CO2 27 27   BUN 14 15   CREATININE 0.8 0.9   LABGLOM >60 60   GLUCOSE 127* 106*     BNP: No results for input(s): BNP in the last 72 hours. PT/INR: No results for input(s): PROTIME, INR in the last 72 hours. APTT:No results for input(s): APTT in the last 72 hours.   CARDIAC ENZYMES:  Recent Labs     06/07/20  1312   TROPONINI <0.01     FASTING LIPID PANEL:  Lab Results   Component Value Date    HDL 66 07/12/2017    [address] [place], [place] [address]za [facility] 50 07/12/2017    TRIG 49 07/12/2017     LIVER PROFILE:  Recent Labs     06/07/20  1312   AST 25   ALT 15   LABALBU 4.3       IMPRESSION:    Patient Active Problem List   Diagnosis    H/O acute myocardial infarction of anterolateral wall    Ischemic cardiomyopathy--LVEF of 25%    Automatic implantable cardioverter-defibrillator in situ    Essential hypertension    COPD exacerbation ([facility]    DM II (diabetes mellitus, type II), controlled ([facility]    Acute on chronic /diastolic/ congestive heart failure ([facility]    Pulmonary nodule    Atrial fibrillation --approximately 7 to 10 days in duration  Patient on Eliquis since June 1, 2020. Rate well controlled with the patient is symptomatic with symptoms of lightheadedness and frequent falls       RECOMMENDATIONS:    1. ALMA guided cardioversion tomorrow. Discussed with patient. She wishes to proceed. Cardiology consultation for ALMA before cardioversion  2. Antiarrhythmic drug therapy thereafter. All of this discussed with the patient at length. She is agreeable to proceed. 3. Treatment of heart failure with IV diuretics and increasing the dose of losartan    Further recommendations will depend on her progress in the hospital    Discussed with patient and Nurse.     Shara Whitfield MD,FACC

## 2020-06-09 ENCOUNTER — ANESTHESIA (OUTPATIENT)
Dept: CARDIAC CATH/INVASIVE PROCEDURES | Age: 85
DRG: 308 | End: 2020-06-09
Payer: MEDICARE

## 2020-06-09 ENCOUNTER — ANESTHESIA EVENT (OUTPATIENT)
Dept: CARDIAC CATH/INVASIVE PROCEDURES | Age: 85
DRG: 308 | End: 2020-06-09
Payer: MEDICARE

## 2020-06-09 VITALS
SYSTOLIC BLOOD PRESSURE: 96 MMHG | DIASTOLIC BLOOD PRESSURE: 56 MMHG | OXYGEN SATURATION: 98 % | RESPIRATION RATE: 25 BRPM

## 2020-06-09 LAB
ANION GAP SERPL CALCULATED.3IONS-SCNC: 13 MMOL/L (ref 7–16)
BUN BLDV-MCNC: 24 MG/DL (ref 8–23)
CALCIUM SERPL-MCNC: 9.1 MG/DL (ref 8.6–10.2)
CHLORIDE BLD-SCNC: 91 MMOL/L (ref 98–107)
CO2: 26 MMOL/L (ref 22–29)
CREAT SERPL-MCNC: 1.1 MG/DL (ref 0.5–1)
EKG ATRIAL RATE: 60 BPM
EKG Q-T INTERVAL: 256 MS
EKG QRS DURATION: 140 MS
EKG QTC CALCULATION (BAZETT): 360 MS
EKG R AXIS: -49 DEGREES
EKG T AXIS: 32 DEGREES
EKG VENTRICULAR RATE: 119 BPM
GFR AFRICAN AMERICAN: 57
GFR NON-AFRICAN AMERICAN: 47 ML/MIN/1.73
GLUCOSE BLD-MCNC: 104 MG/DL (ref 74–99)
LV EF: 28 %
LVEF MODALITY: NORMAL
POTASSIUM SERPL-SCNC: 5.2 MMOL/L (ref 3.5–5)
PRO-BNP: 4819 PG/ML (ref 0–450)
SODIUM BLD-SCNC: 130 MMOL/L (ref 132–146)
URINE CULTURE, ROUTINE: NORMAL

## 2020-06-09 PROCEDURE — 93325 DOPPLER ECHO COLOR FLOW MAPG: CPT

## 2020-06-09 PROCEDURE — 2140000000 HC CCU INTERMEDIATE R&B

## 2020-06-09 PROCEDURE — 2709999900 HC NON-CHARGEABLE SUPPLY

## 2020-06-09 PROCEDURE — 93312 ECHO TRANSESOPHAGEAL: CPT

## 2020-06-09 PROCEDURE — 93005 ELECTROCARDIOGRAM TRACING: CPT | Performed by: INTERNAL MEDICINE

## 2020-06-09 PROCEDURE — B24BZZ4 ULTRASONOGRAPHY OF HEART WITH AORTA, TRANSESOPHAGEAL: ICD-10-PCS | Performed by: INTERNAL MEDICINE

## 2020-06-09 PROCEDURE — 92960 CARDIOVERSION ELECTRIC EXT: CPT | Performed by: INTERNAL MEDICINE

## 2020-06-09 PROCEDURE — 6370000000 HC RX 637 (ALT 250 FOR IP): Performed by: INTERNAL MEDICINE

## 2020-06-09 PROCEDURE — 2580000003 HC RX 258: Performed by: INTERNAL MEDICINE

## 2020-06-09 PROCEDURE — 6360000002 HC RX W HCPCS: Performed by: NURSE ANESTHETIST, CERTIFIED REGISTERED

## 2020-06-09 PROCEDURE — 93325 DOPPLER ECHO COLOR FLOW MAPG: CPT | Performed by: INTERNAL MEDICINE

## 2020-06-09 PROCEDURE — 93321 DOPPLER ECHO F-UP/LMTD STD: CPT

## 2020-06-09 PROCEDURE — 5A2204Z RESTORATION OF CARDIAC RHYTHM, SINGLE: ICD-10-PCS | Performed by: INTERNAL MEDICINE

## 2020-06-09 PROCEDURE — 93320 DOPPLER ECHO COMPLETE: CPT | Performed by: INTERNAL MEDICINE

## 2020-06-09 PROCEDURE — 83880 ASSAY OF NATRIURETIC PEPTIDE: CPT

## 2020-06-09 PROCEDURE — 93312 ECHO TRANSESOPHAGEAL: CPT | Performed by: INTERNAL MEDICINE

## 2020-06-09 PROCEDURE — 36415 COLL VENOUS BLD VENIPUNCTURE: CPT

## 2020-06-09 PROCEDURE — 2580000003 HC RX 258: Performed by: NURSE ANESTHETIST, CERTIFIED REGISTERED

## 2020-06-09 PROCEDURE — 80048 BASIC METABOLIC PNL TOTAL CA: CPT

## 2020-06-09 RX ORDER — AMIODARONE HYDROCHLORIDE 200 MG/1
200 TABLET ORAL 3 TIMES DAILY
Status: DISCONTINUED | OUTPATIENT
Start: 2020-06-09 | End: 2020-06-11

## 2020-06-09 RX ORDER — FUROSEMIDE 20 MG/1
20 TABLET ORAL DAILY
Status: DISCONTINUED | OUTPATIENT
Start: 2020-06-09 | End: 2020-06-12 | Stop reason: HOSPADM

## 2020-06-09 RX ORDER — SODIUM CHLORIDE 9 MG/ML
INJECTION, SOLUTION INTRAVENOUS CONTINUOUS PRN
Status: DISCONTINUED | OUTPATIENT
Start: 2020-06-09 | End: 2020-06-09 | Stop reason: SDUPTHER

## 2020-06-09 RX ORDER — PROPOFOL 10 MG/ML
INJECTION, EMULSION INTRAVENOUS PRN
Status: DISCONTINUED | OUTPATIENT
Start: 2020-06-09 | End: 2020-06-09 | Stop reason: SDUPTHER

## 2020-06-09 RX ADMIN — APIXABAN 2.5 MG: 2.5 TABLET, FILM COATED ORAL at 22:15

## 2020-06-09 RX ADMIN — SODIUM CHLORIDE: 9 INJECTION, SOLUTION INTRAVENOUS at 12:54

## 2020-06-09 RX ADMIN — PROPOFOL 150 MG: 10 INJECTION, EMULSION INTRAVENOUS at 13:11

## 2020-06-09 RX ADMIN — PANTOPRAZOLE SODIUM 40 MG: 40 TABLET, DELAYED RELEASE ORAL at 08:32

## 2020-06-09 RX ADMIN — MAGNESIUM GLUCONATE 500 MG ORAL TABLET 400 MG: 500 TABLET ORAL at 08:32

## 2020-06-09 RX ADMIN — ASPIRIN 81 MG: 81 TABLET, COATED ORAL at 08:32

## 2020-06-09 RX ADMIN — FUROSEMIDE 20 MG: 20 TABLET ORAL at 08:32

## 2020-06-09 RX ADMIN — AMIODARONE HYDROCHLORIDE 200 MG: 200 TABLET ORAL at 15:21

## 2020-06-09 RX ADMIN — CARVEDILOL 12.5 MG: 6.25 TABLET, FILM COATED ORAL at 22:14

## 2020-06-09 RX ADMIN — SUCRALFATE 1 G: 1 TABLET ORAL at 17:01

## 2020-06-09 RX ADMIN — CARVEDILOL 12.5 MG: 6.25 TABLET, FILM COATED ORAL at 08:32

## 2020-06-09 RX ADMIN — SODIUM CHLORIDE, PRESERVATIVE FREE 10 ML: 5 INJECTION INTRAVENOUS at 22:16

## 2020-06-09 RX ADMIN — ATORVASTATIN CALCIUM 10 MG: 10 TABLET, FILM COATED ORAL at 08:32

## 2020-06-09 RX ADMIN — SENNOSIDES 8.6 MG: 8.6 TABLET, FILM COATED ORAL at 22:14

## 2020-06-09 RX ADMIN — SODIUM CHLORIDE, PRESERVATIVE FREE 10 ML: 5 INJECTION INTRAVENOUS at 08:33

## 2020-06-09 RX ADMIN — APIXABAN 2.5 MG: 2.5 TABLET, FILM COATED ORAL at 08:32

## 2020-06-09 RX ADMIN — AMIODARONE HYDROCHLORIDE 200 MG: 200 TABLET ORAL at 22:15

## 2020-06-09 ASSESSMENT — PAIN SCALES - GENERAL
PAINLEVEL_OUTOF10: 0

## 2020-06-09 ASSESSMENT — COPD QUESTIONNAIRES: CAT_SEVERITY: MODERATE

## 2020-06-09 NOTE — PROGRESS NOTES
Dr. Ceci Goss ([address]) to open at 0900. [phone]. Will call back to have Dr. Ceci Goss call Dr. Lola Sidhu on his cell phone to discuss patient's history. [id number]: Spoke to Nadja Olivera from Dr. Oshea  office in [place] and gave her Dr. Manjula Annos phone number to have doctor call him regarding patient updates prior to procedure to be done today.

## 2020-06-09 NOTE — PROGRESS NOTES
Admit Date: 6/7/2020    Subjective:     Feels fine today still with dizziness no more nausea     Objective:     Patient Vitals for the past 8 hrs:   BP Temp Temp src Pulse Resp SpO2 Weight   06/09/20 0645 -- -- -- -- -- -- 108 lb 12.8 oz (49.4 kg)   06/09/20 0407 135/65 97.3 °F (36.3 °C) Temporal 66 19 99 % --   06/08/20 2345 117/76 97.8 °F (36.6 °C) Temporal 60 20 97 % --     I/O last 3 completed shifts: In: 80 [P.O.:660; IV Piggyback:50]  Out: 3200 [Urine:3200]  No intake/output data recorded. HEENT: Normal  NECK: Thyroid normal. No carotid bruit. No lymphphadenopathy. CVS: IRR  RS: Clear. No wheeze. No rhonchi. Good airflow bilaterally. ABD: Soft. Non tender. No mass. Normal BS. EXT: No edema. Non tender. Pulses present.    NEURO: Intact      Scheduled Meds:   losartan  50 mg Oral Nightly    potassium chloride  20 mEq Oral BID WC    magnesium oxide  400 mg Oral Daily    furosemide  20 mg Intravenous Daily    apixaban  2.5 mg Oral BID    aspirin  81 mg Oral Daily    carvedilol  12.5 mg Oral BID    senna  1 tablet Oral Nightly    fluticasone  2 spray Each Nostril Daily    pantoprazole  40 mg Oral Daily    atorvastatin  10 mg Oral Daily    sucralfate  1 g Oral 4 times per day    sodium chloride flush  10 mL Intravenous BID     Continuous Infusions:    CBC with Differential:    Lab Results   Component Value Date    WBC 6.9 06/07/2020    RBC 3.82 06/07/2020    HGB 12.3 06/07/2020    HCT 37.2 06/07/2020     06/07/2020    MCV 97.4 06/07/2020    MCH 32.2 06/07/2020    MCHC 33.1 06/07/2020    RDW 13.3 06/07/2020    LYMPHOPCT 9.7 06/07/2020    MONOPCT 15.0 06/07/2020    BASOPCT 0.3 06/07/2020    MONOSABS 1.04 06/07/2020    LYMPHSABS 0.67 06/07/2020    EOSABS 0.09 06/07/2020    BASOSABS 0.02 06/07/2020     CMP:    Lab Results   Component Value Date     06/08/2020    K 4.0 06/08/2020    CL 91 06/08/2020    CO2 27 06/08/2020    BUN 15 06/08/2020    CREATININE 0.9 06/08/2020    GFRAA >60 06/08/2020    LABGLOM 60 06/08/2020    PROT 6.8 06/07/2020    LABALBU 4.3 06/07/2020    CALCIUM 9.5 06/08/2020    BILITOT 0.6 06/07/2020    ALKPHOS 58 06/07/2020    AST 25 06/07/2020    ALT 15 06/07/2020     PT/INR:    Lab Results   Component Value Date    PROTIME 13.3 11/19/2018    INR 1.1 11/19/2018       Assessment:     Active Problems:    Atrial fibrillation (HCC) symptomatic   Cardiomyopathy   COPD   DM      Plan:    For ALMA cardioversion

## 2020-06-09 NOTE — ANESTHESIA PRE PROCEDURE
Department of Anesthesiology  Preprocedure Note       Name:  Rain Gallardo   Age:  81 y.o.  :  1935                                          MRN:  [id number]         Date:  2020      Surgeon: * No surgeons listed *    Procedure: SEY ALMA CARDIOVERSION W/ ANES    Medications prior to admission:   Prior to Admission medications    Medication Sig Start Date End Date Taking?  Authorizing Provider   apixaban (ELIQUIS) 2.5 MG TABS tablet Take 2.5 mg by mouth 2 times daily   Yes Historical Provider, MD   [facility] SENNA 8.6 MG tablet TAKE 1 TO 2 TABLETS BY MOUTH NIGHTLY AS NEEDED FOR CONSTIPATION 20  Yes Historical Provider, MD   sucralfate (CARAFATE) 1 GM tablet  20  Yes Historical Provider, MD   montelukast (SINGULAIR) 10 MG tablet TAKE 1 TABLET BY MOUTH EVERY DAY 20  Yes Kanchan Mosqueda MD   mometasone (NASONEX) 50 MCG/ACT nasal spray USE 2 SPRAYS IN EACH NOSTIL ONCE A DAY 19  Yes Historical Provider, MD   pantoprazole (PROTONIX) 40 MG tablet Take 40 mg by mouth daily Instructed to take morning of surgery with a sip of water   Yes Historical Provider, MD   losartan (COZAAR) 25 MG tablet Take 1 tablet by mouth daily 6/3/19  Yes Jacquie Hanson MD   carvedilol (COREG) 12.5 MG tablet Take 1 tablet by mouth 2 times daily 6/3/19  Yes Jacquie Hanson MD   simvastatin (ZOCOR) 20 MG tablet Take 1 tablet by mouth daily 6/3/19  Yes Jacquie Hanson MD   furosemide (LASIX) 20 MG tablet Take 1 tablet by mouth 2 times daily  Patient taking differently: Take 20 mg by mouth daily  17  Yes Julian Salter MD   aspirin 81 MG tablet Take 81 mg by mouth daily To check hold  with Dr. Rex Salazar MD   ondansetron (ZOFRAN ODT) 4 MG disintegrating tablet Take 1 tablet by mouth every 8 hours as needed for Nausea or Vomiting  Patient not taking: Reported on 2019   Kat Roque MD   Lactobacillus (PROBIOTIC ACIDOPHILUS) CAPS Take 1 capsule by mouth 2 times daily (with meals) Last

## 2020-06-09 NOTE — PROGRESS NOTES
Nutrition Assessment    Type and Reason for Visit: Initial, Positive Nutrition Screen    Nutrition Recommendations: Recommend and start Boost Pudding supplement BID for additional calories, protein, and nutrients. Nutrition Assessment: Patient not in room at this time x 3 attempts  ; hx of swallowing difficulty ; COPD also noted ; s/p cardioversion/echo ; will start ONS    Malnutrition Assessment:  · Malnutrition Status: Insufficient data  · Context: Acute illness or injury  · Findings of the 6 clinical characteristics of malnutrition (Minimum of 2 out of 6 clinical characteristics is required to make the diagnosis of moderate or severe Protein Calorie Malnutrition based on AND/ASPEN Guidelines):  1. Energy Intake-Greater than 75% of estimated energy requirement, (x 3 days since admission )    2. Weight Loss-No significant weight loss,    3. Fat Loss-Unable to assess(not available to assess at this time),    4. Muscle Loss-Unable to assess(not available to assess at this time),    5. Fluid Accumulation-No significant fluid accumulation,    6.   Strength-Not measured    Nutrition Risk Level: Low    Nutrient Needs:  · Estimated Daily Total Kcal: 0984-5656 ( x 1.2 SF)  · Estimated Daily Protein (g): 60-70 (1.2-1.4g/kg CBW)  · Estimated Daily Total Fluid (ml/day): 8192-9089    Nutrition Diagnosis:   · Problem: Swallowing difficulty  · Etiology: related to Difficulty swallowing     Signs and symptoms:  as evidenced by GI abnormality    Objective Information:  · Nutrition-Focused Physical Findings: -I&Os (-3.1 L), no edema, active BS, hx of swallowing difficulty, A&O x 4, [place]     · Wound Type: None     · Current Nutrition Therapies:  · Oral Diet Orders: 2gm Sodium   · Oral Diet intake: Unable to assess, 51-75%, %(per flowsheets )  · Oral Nutrition Supplement (ONS) Orders: None     · Anthropometric Measures:  · Ht: 4' 11\" (149.9 cm)   · Current Body Wt: 108 lb (49 kg)(6/9/20, standing

## 2020-06-10 LAB
ALBUMIN SERPL-MCNC: 4 G/DL (ref 3.5–5.2)
ALP BLD-CCNC: 49 U/L (ref 35–104)
ALT SERPL-CCNC: 14 U/L (ref 0–32)
ANION GAP SERPL CALCULATED.3IONS-SCNC: 13 MMOL/L (ref 7–16)
AST SERPL-CCNC: 19 U/L (ref 0–31)
BILIRUB SERPL-MCNC: 0.4 MG/DL (ref 0–1.2)
BUN BLDV-MCNC: 21 MG/DL (ref 8–23)
CALCIUM SERPL-MCNC: 9.5 MG/DL (ref 8.6–10.2)
CHLORIDE BLD-SCNC: 92 MMOL/L (ref 98–107)
CO2: 26 MMOL/L (ref 22–29)
CREAT SERPL-MCNC: 0.9 MG/DL (ref 0.5–1)
GFR AFRICAN AMERICAN: >60
GFR NON-AFRICAN AMERICAN: 60 ML/MIN/1.73
GLUCOSE BLD-MCNC: 98 MG/DL (ref 74–99)
POTASSIUM SERPL-SCNC: 5.1 MMOL/L (ref 3.5–5)
SODIUM BLD-SCNC: 131 MMOL/L (ref 132–146)
TOTAL PROTEIN: 6.4 G/DL (ref 6.4–8.3)
TSH SERPL DL<=0.05 MIU/L-ACNC: 0.82 UIU/ML (ref 0.27–4.2)

## 2020-06-10 PROCEDURE — 84443 ASSAY THYROID STIM HORMONE: CPT

## 2020-06-10 PROCEDURE — 6370000000 HC RX 637 (ALT 250 FOR IP): Performed by: INTERNAL MEDICINE

## 2020-06-10 PROCEDURE — 80053 COMPREHEN METABOLIC PANEL: CPT

## 2020-06-10 PROCEDURE — 2140000000 HC CCU INTERMEDIATE R&B

## 2020-06-10 PROCEDURE — 2580000003 HC RX 258: Performed by: INTERNAL MEDICINE

## 2020-06-10 PROCEDURE — 36415 COLL VENOUS BLD VENIPUNCTURE: CPT

## 2020-06-10 PROCEDURE — 6360000002 HC RX W HCPCS: Performed by: INTERNAL MEDICINE

## 2020-06-10 RX ORDER — METOCLOPRAMIDE HYDROCHLORIDE 5 MG/ML
10 INJECTION INTRAMUSCULAR; INTRAVENOUS ONCE
Status: COMPLETED | OUTPATIENT
Start: 2020-06-10 | End: 2020-06-10

## 2020-06-10 RX ORDER — MECLIZINE HCL 12.5 MG/1
12.5 TABLET ORAL 3 TIMES DAILY PRN
Status: DISCONTINUED | OUTPATIENT
Start: 2020-06-10 | End: 2020-06-11

## 2020-06-10 RX ORDER — ONDANSETRON 2 MG/ML
4 INJECTION INTRAMUSCULAR; INTRAVENOUS EVERY 6 HOURS PRN
Status: DISCONTINUED | OUTPATIENT
Start: 2020-06-10 | End: 2020-06-12 | Stop reason: HOSPADM

## 2020-06-10 RX ADMIN — FUROSEMIDE 20 MG: 20 TABLET ORAL at 08:08

## 2020-06-10 RX ADMIN — AMIODARONE HYDROCHLORIDE 200 MG: 200 TABLET ORAL at 14:18

## 2020-06-10 RX ADMIN — SENNOSIDES 8.6 MG: 8.6 TABLET, FILM COATED ORAL at 21:50

## 2020-06-10 RX ADMIN — ATORVASTATIN CALCIUM 10 MG: 10 TABLET, FILM COATED ORAL at 08:09

## 2020-06-10 RX ADMIN — CARVEDILOL 12.5 MG: 6.25 TABLET, FILM COATED ORAL at 08:08

## 2020-06-10 RX ADMIN — CARVEDILOL 12.5 MG: 6.25 TABLET, FILM COATED ORAL at 21:41

## 2020-06-10 RX ADMIN — MAGNESIUM GLUCONATE 500 MG ORAL TABLET 400 MG: 500 TABLET ORAL at 08:08

## 2020-06-10 RX ADMIN — ASPIRIN 81 MG: 81 TABLET, COATED ORAL at 08:08

## 2020-06-10 RX ADMIN — APIXABAN 2.5 MG: 2.5 TABLET, FILM COATED ORAL at 21:41

## 2020-06-10 RX ADMIN — MECLIZINE 12.5 MG: 12.5 TABLET ORAL at 10:13

## 2020-06-10 RX ADMIN — SODIUM CHLORIDE, PRESERVATIVE FREE 10 ML: 5 INJECTION INTRAVENOUS at 08:07

## 2020-06-10 RX ADMIN — SUCRALFATE 1 G: 1 TABLET ORAL at 06:29

## 2020-06-10 RX ADMIN — SUCRALFATE 1 G: 1 TABLET ORAL at 17:30

## 2020-06-10 RX ADMIN — APIXABAN 2.5 MG: 2.5 TABLET, FILM COATED ORAL at 08:08

## 2020-06-10 RX ADMIN — LOSARTAN POTASSIUM 50 MG: 50 TABLET, FILM COATED ORAL at 21:41

## 2020-06-10 RX ADMIN — SUCRALFATE 1 G: 1 TABLET ORAL at 11:26

## 2020-06-10 RX ADMIN — METOCLOPRAMIDE HYDROCHLORIDE 10 MG: 5 INJECTION INTRAMUSCULAR; INTRAVENOUS at 16:38

## 2020-06-10 RX ADMIN — SODIUM CHLORIDE, PRESERVATIVE FREE 10 ML: 5 INJECTION INTRAVENOUS at 21:42

## 2020-06-10 RX ADMIN — PANTOPRAZOLE SODIUM 40 MG: 40 TABLET, DELAYED RELEASE ORAL at 08:08

## 2020-06-10 RX ADMIN — AMIODARONE HYDROCHLORIDE 200 MG: 200 TABLET ORAL at 21:41

## 2020-06-10 RX ADMIN — ONDANSETRON 4 MG: 2 INJECTION INTRAMUSCULAR; INTRAVENOUS at 12:18

## 2020-06-10 RX ADMIN — AMIODARONE HYDROCHLORIDE 200 MG: 200 TABLET ORAL at 08:07

## 2020-06-10 RX ADMIN — FLUTICASONE PROPIONATE 2 SPRAY: 50 SPRAY, METERED NASAL at 08:09

## 2020-06-10 ASSESSMENT — PAIN SCALES - GENERAL
PAINLEVEL_OUTOF10: 0
PAINLEVEL_OUTOF10: 0
PAINLEVEL_OUTOF10: 5

## 2020-06-10 ASSESSMENT — PAIN DESCRIPTION - LOCATION: LOCATION: NECK

## 2020-06-10 ASSESSMENT — PAIN DESCRIPTION - FREQUENCY: FREQUENCY: INTERMITTENT

## 2020-06-10 ASSESSMENT — PAIN DESCRIPTION - ONSET: ONSET: ON-GOING

## 2020-06-10 ASSESSMENT — PAIN DESCRIPTION - PAIN TYPE: TYPE: ACUTE PAIN

## 2020-06-10 ASSESSMENT — PAIN DESCRIPTION - DESCRIPTORS: DESCRIPTORS: ACHING

## 2020-06-10 NOTE — PLAN OF CARE
Problem: Falls - Risk of:  Goal: Will remain free from falls  Description: Will remain free from falls  Outcome: Ongoing  Goal: Absence of physical injury  Description: Absence of physical injury  Outcome: Ongoing     Problem: Pain:  Goal: Pain level will decrease  Description: Pain level will decrease  Outcome: Ongoing  Goal: Control of acute pain  Description: Control of acute pain  Outcome: Ongoing  Goal: Control of chronic pain  Description: Control of chronic pain  Outcome: Ongoing     Problem: Cardiac:  Goal: Ability to maintain an adequate cardiac output will improve  Description: Ability to maintain an adequate cardiac output will improve  Outcome: Ongoing  Goal: Hemodynamic stability will improve  Description: Hemodynamic stability will improve  Outcome: Ongoing     Problem: Fluid Volume:  Goal: Ability to achieve and maintain adequate urine output will improve  Description: Ability to achieve and maintain adequate urine output will improve  Outcome: Ongoing

## 2020-06-10 NOTE — CARE COORDINATION
SOCIAL WORK/CASEMANAGEMENT TRANSITION OF CARE [id number] Oneyda Luevano, [address], [facility], [phone]): met with pt this a.m. she is throwing up today. Pt started on antivert. The plan remains home on discharge. Pt said she is not allowed to get up on her own , but , has a rn to help her to and from the bathroom due to dizziness.  Emre Gonzales  6/10/2020

## 2020-06-11 LAB
ANION GAP SERPL CALCULATED.3IONS-SCNC: 14 MMOL/L (ref 7–16)
BUN BLDV-MCNC: 16 MG/DL (ref 8–23)
CALCIUM SERPL-MCNC: 9.2 MG/DL (ref 8.6–10.2)
CHLORIDE BLD-SCNC: 91 MMOL/L (ref 98–107)
CO2: 25 MMOL/L (ref 22–29)
CREAT SERPL-MCNC: 0.9 MG/DL (ref 0.5–1)
GFR AFRICAN AMERICAN: >60
GFR NON-AFRICAN AMERICAN: 60 ML/MIN/1.73
GLUCOSE BLD-MCNC: 120 MG/DL (ref 74–99)
POTASSIUM SERPL-SCNC: 4.4 MMOL/L (ref 3.5–5)
SODIUM BLD-SCNC: 130 MMOL/L (ref 132–146)

## 2020-06-11 PROCEDURE — 2580000003 HC RX 258: Performed by: INTERNAL MEDICINE

## 2020-06-11 PROCEDURE — 6370000000 HC RX 637 (ALT 250 FOR IP): Performed by: INTERNAL MEDICINE

## 2020-06-11 PROCEDURE — 80048 BASIC METABOLIC PNL TOTAL CA: CPT

## 2020-06-11 PROCEDURE — 2140000000 HC CCU INTERMEDIATE R&B

## 2020-06-11 PROCEDURE — 6360000002 HC RX W HCPCS: Performed by: INTERNAL MEDICINE

## 2020-06-11 PROCEDURE — 36415 COLL VENOUS BLD VENIPUNCTURE: CPT

## 2020-06-11 RX ORDER — AMIODARONE HYDROCHLORIDE 200 MG/1
200 TABLET ORAL DAILY
Status: DISCONTINUED | OUTPATIENT
Start: 2020-06-12 | End: 2020-06-12 | Stop reason: HOSPADM

## 2020-06-11 RX ORDER — LOSARTAN POTASSIUM 50 MG/1
50 TABLET ORAL NIGHTLY
Qty: 30 TABLET | Refills: 3 | Status: SHIPPED | OUTPATIENT
Start: 2020-06-11 | End: 2021-08-24

## 2020-06-11 RX ORDER — AMIODARONE HYDROCHLORIDE 200 MG/1
200 TABLET ORAL DAILY
Qty: 90 TABLET | Refills: 3 | Status: ON HOLD | OUTPATIENT
Start: 2020-06-11 | End: 2022-08-19

## 2020-06-11 RX ORDER — FUROSEMIDE 20 MG/1
20 TABLET ORAL DAILY
Qty: 90 TABLET | Refills: 3 | Status: SHIPPED | OUTPATIENT
Start: 2020-06-11

## 2020-06-11 RX ORDER — MECLIZINE HCL 12.5 MG/1
25 TABLET ORAL 3 TIMES DAILY
Status: DISCONTINUED | OUTPATIENT
Start: 2020-06-11 | End: 2020-06-12 | Stop reason: HOSPADM

## 2020-06-11 RX ADMIN — CARVEDILOL 12.5 MG: 6.25 TABLET, FILM COATED ORAL at 20:27

## 2020-06-11 RX ADMIN — APIXABAN 2.5 MG: 2.5 TABLET, FILM COATED ORAL at 20:28

## 2020-06-11 RX ADMIN — MECLIZINE 25 MG: 12.5 TABLET ORAL at 20:28

## 2020-06-11 RX ADMIN — CARVEDILOL 12.5 MG: 6.25 TABLET, FILM COATED ORAL at 09:28

## 2020-06-11 RX ADMIN — SODIUM CHLORIDE, PRESERVATIVE FREE 10 ML: 5 INJECTION INTRAVENOUS at 08:25

## 2020-06-11 RX ADMIN — AMIODARONE HYDROCHLORIDE 200 MG: 200 TABLET ORAL at 09:29

## 2020-06-11 RX ADMIN — AMIODARONE HYDROCHLORIDE 200 MG: 200 TABLET ORAL at 14:58

## 2020-06-11 RX ADMIN — ONDANSETRON 4 MG: 2 INJECTION INTRAMUSCULAR; INTRAVENOUS at 21:24

## 2020-06-11 RX ADMIN — MECLIZINE 25 MG: 12.5 TABLET ORAL at 14:58

## 2020-06-11 RX ADMIN — SUCRALFATE 1 G: 1 TABLET ORAL at 06:30

## 2020-06-11 RX ADMIN — SUCRALFATE 1 G: 1 TABLET ORAL at 00:51

## 2020-06-11 RX ADMIN — ONDANSETRON 4 MG: 2 INJECTION INTRAMUSCULAR; INTRAVENOUS at 08:25

## 2020-06-11 RX ADMIN — SENNOSIDES 8.6 MG: 8.6 TABLET, FILM COATED ORAL at 20:28

## 2020-06-11 RX ADMIN — SUCRALFATE 1 G: 1 TABLET ORAL at 17:23

## 2020-06-11 RX ADMIN — FUROSEMIDE 20 MG: 20 TABLET ORAL at 09:28

## 2020-06-11 RX ADMIN — LOSARTAN POTASSIUM 50 MG: 50 TABLET, FILM COATED ORAL at 20:27

## 2020-06-11 RX ADMIN — APIXABAN 2.5 MG: 2.5 TABLET, FILM COATED ORAL at 09:29

## 2020-06-11 RX ADMIN — SODIUM CHLORIDE, PRESERVATIVE FREE 10 ML: 5 INJECTION INTRAVENOUS at 20:28

## 2020-06-11 ASSESSMENT — PAIN SCALES - GENERAL
PAINLEVEL_OUTOF10: 0

## 2020-06-11 NOTE — PROGRESS NOTES
06/10/2020    ALKPHOS 49 06/10/2020    AST 19 06/10/2020    ALT 14 06/10/2020     PT/INR:    Lab Results   Component Value Date    PROTIME 13.3 11/19/2018    INR 1.1 11/19/2018       Assessment:     Active Problems:    Atrial fibrillation (HCC)    Vertigo    Cardiomyopathy    DM    COPD       Plan:   Continue same

## 2020-06-12 VITALS
OXYGEN SATURATION: 96 % | WEIGHT: 109 LBS | HEIGHT: 59 IN | BODY MASS INDEX: 21.97 KG/M2 | HEART RATE: 60 BPM | RESPIRATION RATE: 16 BRPM | TEMPERATURE: 97.8 F | DIASTOLIC BLOOD PRESSURE: 67 MMHG | SYSTOLIC BLOOD PRESSURE: 149 MMHG

## 2020-06-12 LAB
ANION GAP SERPL CALCULATED.3IONS-SCNC: 12 MMOL/L (ref 7–16)
BUN BLDV-MCNC: 13 MG/DL (ref 8–23)
CALCIUM SERPL-MCNC: 9.2 MG/DL (ref 8.6–10.2)
CHLORIDE BLD-SCNC: 90 MMOL/L (ref 98–107)
CO2: 29 MMOL/L (ref 22–29)
CREAT SERPL-MCNC: 0.8 MG/DL (ref 0.5–1)
GFR AFRICAN AMERICAN: >60
GFR NON-AFRICAN AMERICAN: >60 ML/MIN/1.73
GLUCOSE BLD-MCNC: 103 MG/DL (ref 74–99)
POTASSIUM SERPL-SCNC: 4.3 MMOL/L (ref 3.5–5)
SODIUM BLD-SCNC: 131 MMOL/L (ref 132–146)

## 2020-06-12 PROCEDURE — 36415 COLL VENOUS BLD VENIPUNCTURE: CPT

## 2020-06-12 PROCEDURE — 6370000000 HC RX 637 (ALT 250 FOR IP): Performed by: INTERNAL MEDICINE

## 2020-06-12 PROCEDURE — 6360000002 HC RX W HCPCS: Performed by: INTERNAL MEDICINE

## 2020-06-12 PROCEDURE — 80048 BASIC METABOLIC PNL TOTAL CA: CPT

## 2020-06-12 PROCEDURE — 2580000003 HC RX 258: Performed by: INTERNAL MEDICINE

## 2020-06-12 RX ORDER — MECLIZINE HYDROCHLORIDE 25 MG/1
25 TABLET ORAL 3 TIMES DAILY
Qty: 30 TABLET | Refills: 0 | Status: SHIPPED | OUTPATIENT
Start: 2020-06-12 | End: 2020-06-22

## 2020-06-12 RX ADMIN — ATORVASTATIN CALCIUM 10 MG: 10 TABLET, FILM COATED ORAL at 08:21

## 2020-06-12 RX ADMIN — MECLIZINE 25 MG: 12.5 TABLET ORAL at 08:20

## 2020-06-12 RX ADMIN — SODIUM CHLORIDE, PRESERVATIVE FREE 10 ML: 5 INJECTION INTRAVENOUS at 08:20

## 2020-06-12 RX ADMIN — ASPIRIN 81 MG: 81 TABLET, COATED ORAL at 08:21

## 2020-06-12 RX ADMIN — APIXABAN 2.5 MG: 2.5 TABLET, FILM COATED ORAL at 08:21

## 2020-06-12 RX ADMIN — ONDANSETRON 4 MG: 2 INJECTION INTRAMUSCULAR; INTRAVENOUS at 08:23

## 2020-06-12 RX ADMIN — SUCRALFATE 1 G: 1 TABLET ORAL at 06:24

## 2020-06-12 RX ADMIN — CARVEDILOL 12.5 MG: 6.25 TABLET, FILM COATED ORAL at 08:21

## 2020-06-12 RX ADMIN — MAGNESIUM GLUCONATE 500 MG ORAL TABLET 400 MG: 500 TABLET ORAL at 08:21

## 2020-06-12 RX ADMIN — PANTOPRAZOLE SODIUM 40 MG: 40 TABLET, DELAYED RELEASE ORAL at 08:21

## 2020-06-12 RX ADMIN — SUCRALFATE 1 G: 1 TABLET ORAL at 00:14

## 2020-06-12 RX ADMIN — FUROSEMIDE 20 MG: 20 TABLET ORAL at 08:21

## 2020-06-12 RX ADMIN — AMIODARONE HYDROCHLORIDE 200 MG: 200 TABLET ORAL at 08:20

## 2020-06-12 ASSESSMENT — PAIN SCALES - GENERAL
PAINLEVEL_OUTOF10: 0

## 2020-06-12 NOTE — PROGRESS NOTES
LABALBU 4.0 06/10/2020    CALCIUM 9.2 06/11/2020    BILITOT 0.4 06/10/2020    ALKPHOS 49 06/10/2020    AST 19 06/10/2020    ALT 14 06/10/2020     PT/INR:    Lab Results   Component Value Date    PROTIME 13.3 11/19/2018    INR 1.1 11/19/2018       Assessment:     Active Problems:    Atrial fibrillation (HCC)    Cardiomyopathy    DM    COPD    Vertigo       Plan:   Stable for discharge

## 2020-06-12 NOTE — PLAN OF CARE
Problem: Falls - Risk of:  Goal: Will remain free from falls  Description: Will remain free from falls  Outcome: Met This Shift  Goal: Absence of physical injury  Description: Absence of physical injury  Outcome: Met This Shift     Problem: Pain:  Goal: Pain level will decrease  Description: Pain level will decrease  Outcome: Met This Shift  Goal: Control of acute pain  Description: Control of acute pain  Outcome: Met This Shift  Goal: Control of chronic pain  Description: Control of chronic pain  Outcome: Met This Shift     Problem: Cardiac:  Goal: Ability to maintain an adequate cardiac output will improve  Description: Ability to maintain an adequate cardiac output will improve  Outcome: Met This Shift  Goal: Hemodynamic stability will improve  Description: Hemodynamic stability will improve  Outcome: Met This Shift     Problem: Fluid Volume:  Goal: Ability to achieve and maintain adequate urine output will improve  Description: Ability to achieve and maintain adequate urine output will improve  Outcome: Met This Shift

## 2020-06-13 ENCOUNTER — CARE COORDINATION (OUTPATIENT)
Dept: CASE MANAGEMENT | Age: 85
End: 2020-06-13

## 2020-06-19 ENCOUNTER — CARE COORDINATION (OUTPATIENT)
Dept: CASE MANAGEMENT | Age: 85
End: 2020-06-19

## 2020-06-23 ENCOUNTER — TELEPHONE (OUTPATIENT)
Dept: CARDIOLOGY CLINIC | Age: 85
End: 2020-06-23

## 2020-06-29 ENCOUNTER — CARE COORDINATION (OUTPATIENT)
Dept: CASE MANAGEMENT | Age: 85
End: 2020-06-29

## 2020-07-08 ENCOUNTER — CARE COORDINATION (OUTPATIENT)
Dept: CASE MANAGEMENT | Age: 85
End: 2020-07-08

## 2020-07-08 NOTE — CARE COORDINATION
[facility]in [facility] Transitions Follow Up Call    2020    Patient: Mariola Luu  Patient : 1935   MRN: [id number]  Reason for Admission:   Discharge Date: 20 RARS: Readmission Risk Score: 12         Spoke with: Sudeep Nieves, patient    Care Transitions Subsequent and Final Call    Subsequent and Final Calls  Have your medications changed?:  No  Do you have any questions related to your medications?:  No  Do you currently have any active services?:  Yes  Are you currently active with any services?:  Home Health  Do you have any needs or concerns that I can assist you with?:  No  Identified Barriers:  None  Care Transitions Interventions  Other Interventions: Follow Up:  Contacted patient for BPCI-A follow up. Salena Almonte stated she is doing fine. Report PT is going well. She stated that she is getting more confident w/o using her walker when walking. Breathing is at baseline. She continue to become short of breath when over exerting herself. She denies having any chest pain/discomfort, palpitations. She stated she continues to become dizzy with certain movements like bending or twisting. She will continue to monitor and if dizziness worsens she will contact her doctor. She does not have any questions or concerns at this time. Will continue to follow.       Future Appointments   Date Time Provider Mohit Latham   2020  8:30 AM [facility] RM 1 SE[facility]   2020  9:00 AM [facility]   2021  1:00 PM [facility]       Ava Brooke RN

## 2020-07-13 ENCOUNTER — HOSPITAL ENCOUNTER (OUTPATIENT)
Dept: ULTRASOUND IMAGING | Age: 85
Discharge: HOME | End: 2020-07-15
Payer: MEDICARE

## 2020-07-13 PROCEDURE — 76705 ECHO EXAM OF ABDOMEN: CPT

## 2020-07-13 NOTE — PROGRESS NOTES
Spoke with Dr Malou Hickey office and they will fax script and stated ok to use labs in Epic from June.

## 2020-07-16 ENCOUNTER — HOSPITAL ENCOUNTER (OUTPATIENT)
Dept: INFUSION THERAPY | Age: 85
Setting detail: INFUSION SERIES
Discharge: HOME | End: 2020-07-16
Payer: MEDICARE

## 2020-07-16 VITALS
WEIGHT: 109 LBS | HEART RATE: 65 BPM | RESPIRATION RATE: 16 BRPM | BODY MASS INDEX: 21.4 KG/M2 | TEMPERATURE: 98.7 F | SYSTOLIC BLOOD PRESSURE: 118 MMHG | HEIGHT: 60 IN | DIASTOLIC BLOOD PRESSURE: 66 MMHG | OXYGEN SATURATION: 97 %

## 2020-07-16 DIAGNOSIS — M81.0 OSTEOPOROSIS, UNSPECIFIED OSTEOPOROSIS TYPE, UNSPECIFIED PATHOLOGICAL FRACTURE PRESENCE: Primary | ICD-10-CM

## 2020-07-16 PROCEDURE — 96372 THER/PROPH/DIAG INJ SC/IM: CPT

## 2020-07-16 PROCEDURE — 6360000002 HC RX W HCPCS: Performed by: OBSTETRICS & GYNECOLOGY

## 2020-07-16 RX ADMIN — DENOSUMAB 60 MG: 60 INJECTION SUBCUTANEOUS at 09:21

## 2020-07-22 ENCOUNTER — CARE COORDINATION (OUTPATIENT)
Dept: CASE MANAGEMENT | Age: 85
End: 2020-07-22

## 2020-07-22 NOTE — CARE COORDINATION
[facility] Transitions Follow Up Call    2020    Patient: Hoa Garcia  Patient : 1935   MRN: [id number]  Reason for Admission:   Discharge Date: 20 RARS: Readmission Risk Score: 12         Spoke with: Brandi Harrison, patient    Care Transitions Subsequent and Final Call    Subsequent and Final Calls  Have your medications changed?:  No  Do you have any questions related to your medications?:  No  Do you currently have any active services?:  Yes  Are you currently active with any services?:  Home Health  Do you have any needs or concerns that I can assist you with?:  No  Identified Barriers:  None  Care Transitions Interventions  Other Interventions: Follow Up:  Contacted patient for BPCI-A follow up. Scott Barba stated she is doing much better. She stated it took 6 weeks and she is now walking w/o a walker. She reports she still becomes short of breath with exertion. She takes rest periods as needed and stated she recovers pretty quickly once she sits down. She denies having any chest pain/discomfort or palpitations. She reports she has motion sickness and becomes dizzy when she is in the car. She denies becoming dizzy when bending or twisting. She is aware of when to contact MD with any new or worsening symptoms. She has all of her medications and taking them as prescribed. She does not have any questions or concerns at this time. Will continue to follow.       Future Appointments   Date Time Provider Mohit Latham   2021  9:00 AM [facility] INF CLINIC RM 8 [facility]   2021  1:00 PM Román Harrison MD [address] [facility]       Chano Daniel RN

## 2020-08-04 ENCOUNTER — CARE COORDINATION (OUTPATIENT)
Dept: CASE MANAGEMENT | Age: 85
End: 2020-08-04

## 2020-08-04 NOTE — CARE COORDINATION
[facility] Transitions Follow Up Call    2020    Patient: Aurora Zuluaga  Patient : 1935   MRN: <[id number]>  Reason for Admission:   Discharge Date: 20 RARS: Readmission Risk Score: 12         Spoke with: Attempted to contact patient for follow up BPCI-A transition call. Left message on Voice mail to call office (number given) with any questions and an update on your condition since discharge. Will Follow up at later time. Nicolasa Wolf LPN     [facility] / [address] [place] [address] Transitions Subsequent and Final Call    Subsequent and Final Calls  Care Transitions Interventions  Other Interventions:             Follow Up  Future Appointments   Date Time Provider Mohit Latham   2021  9:00 AM SE[facility] INF CLINIC  8 [facility] Inf [facility]   2021  1:00 PM Macrina Ramirez MD [facility] Pulm [facility]       Nicolasa Wolf LPN

## 2020-08-19 ENCOUNTER — CARE COORDINATION (OUTPATIENT)
Dept: CASE MANAGEMENT | Age: 85
End: 2020-08-19

## 2020-08-19 NOTE — CARE COORDINATION
[facility]in [facility] Transitions Follow Up Call    2020    Patient: Rosio Cunningham  Patient : 1935   MRN: <[id number]>  Reason for Admission: A-fib  Discharge Date: 20 RARS: Readmission Risk Score: 12         Spoke with: Patient    Patient states she is still having dizziness since discharge. It got a little better but now same as before. She states that the Amiodarone 200 mg 1 Daily is the medication that is causing this. She saw PCP yesterday and they want cardiology to deal with the dizziness. Next appointment 2020. Instructed patient to call cardiology office to tell them she is dizzy. I will sent message to cardiologist via [employer]. No episodes of rapid heart rate or palpitations, fatigue, SOB or chest pain at this time. Patient is taking medications as directed. Patient states she has all her medications and is taking them as directed. Explained the BPCI-A program to patient and that they are followed for 90 days after discharge. Patient expresses understanding. Patient knows when to contact physician or report to ED with worsening or severe symptoms, changes, or concerns. Will Follow up at later time. Goran Schwartz LPN     [facility] / [address] [place] [address] Transitions Subsequent and Final Call    Subsequent and Final Calls  Do you have any ongoing symptoms?:  Yes  Onset of Patient-reported symptoms:  Other  Patient-reported symptoms:  Other  Interventions for patient-reported symptoms:  Notified PCP/Physician  Have your medications changed?:  No  Do you have any questions related to your medications?:  No  Do you currently have any active services?:  No  Are you currently active with any services?:  Home Health  Do you have any needs or concerns that I can assist you with?:  No  Identified Barriers:  None  Care Transitions Interventions  Other Interventions:             Follow Up  Future Appointments   Date Time Provider Mohit Latham 1/14/2021  9:00 AM [facility] INF CLINIC RM 8 [facility]   4/13/2021  1:00 PM Johan Castano MD [facility] Pulm [facility]       Lyndon Jimenez LPN

## 2020-08-20 LAB — MAMMOGRAPHY, EXTERNAL: NORMAL

## 2020-08-21 ENCOUNTER — CARE COORDINATION (OUTPATIENT)
Dept: CASE MANAGEMENT | Age: 85
End: 2020-08-21

## 2020-08-21 NOTE — CARE COORDINATION
[facility] Transitions Follow Up Call    2020    Patient: Hemal Rouse  Patient : 1935   MRN: [id number]  Reason for Admission:   Discharge Date: 20 RARS: Readmission Risk Score: 12    Follow Up: Attempted to contact patient for BPCI-A follow up. Unable to reach patient. Left message with contact information and request for call back.       Future Appointments   Date Time Provider Mohit Latham   2021  9:00 AM SE[facility] INF CLINIC  8 [facility] Inf [facility]   2021  1:00 PM Román Harrington Chi, MD [address] [facility]       Virginia Molina RN

## 2020-09-01 ENCOUNTER — CARE COORDINATION (OUTPATIENT)
Dept: CASE MANAGEMENT | Age: 85
End: 2020-09-01

## 2020-09-10 ENCOUNTER — CARE COORDINATION (OUTPATIENT)
Dept: CASE MANAGEMENT | Age: 85
End: 2020-09-10

## 2020-09-10 NOTE — CARE COORDINATION
[facility] Transitions Follow Up Call    9/10/2020    Patient: Aida Abebe  Patient : 1935   MRN: [id number]  Reason for Admission: Afib  Discharge Date: 20 RARS: Readmission Risk Score: 12         Spoke with: Irby Bloch called Reny Tapia for final BPCI-A call. Pt stated she has been dizzy and no longer drives because of it. Pt thinks dizziness is r/t one of her new medications. Stated she has appt @  with Cardiologist Dr Jordon Jeronimo and will discuss dizziness. Stated she did decrease amiodarone to 100 MG daily as directed and no longer takes meclizine. Will route message to Dr Jordon Jeronimo. No other concerns. Infoemd her of final BPCI-A call, advised to sit down as soon as dizziness starts to avoid falls. Care Transitions Subsequent and Final Call    Subsequent and Final Calls  Do you have any ongoing symptoms?:  Yes  Onset of Patient-reported symptoms:  Other  Patient-reported symptoms:  Other  Interventions for patient-reported symptoms:  Notified PCP/Physician  Have your medications changed?:  No  Do you have any questions related to your medications?:  No  Do you currently have any active services?:  No  Are you currently active with any services?:  Home Health  Do you have any needs or concerns that I can assist you with?:  No  Identified Barriers:  None  Care Transitions Interventions  Other Interventions:             Follow Up  Future Appointments   Date Time Provider Mohit Latham   2021  9:00 AM SEB INF CLINIC RM 8 [facility] Inf [facility]   2021  1:00 PM Román Charles MD [facility]       Antonella Harrison RN

## 2020-10-01 ENCOUNTER — HOSPITAL ENCOUNTER (OUTPATIENT)
Dept: CT IMAGING | Age: 85
Discharge: HOME | End: 2020-10-03
Payer: MEDICARE

## 2020-10-01 ENCOUNTER — HOSPITAL ENCOUNTER (OUTPATIENT)
Age: 85
Discharge: HOME | End: 2020-10-01
Payer: MEDICARE

## 2020-10-01 LAB
BUN BLDV-MCNC: 18 MG/DL (ref 8–23)
CREAT SERPL-MCNC: 1.1 MG/DL (ref 0.5–1)
GFR AFRICAN AMERICAN: 57
GFR NON-AFRICAN AMERICAN: 47 ML/MIN/1.73

## 2020-10-01 PROCEDURE — 74175 CTA ABDOMEN W/CONTRAST: CPT

## 2020-10-01 PROCEDURE — 2580000003 HC RX 258: Performed by: STUDENT IN AN ORGANIZED HEALTH CARE EDUCATION/TRAINING PROGRAM

## 2020-10-01 PROCEDURE — 6360000004 HC RX CONTRAST MEDICATION: Performed by: STUDENT IN AN ORGANIZED HEALTH CARE EDUCATION/TRAINING PROGRAM

## 2020-10-01 PROCEDURE — 84520 ASSAY OF UREA NITROGEN: CPT

## 2020-10-01 PROCEDURE — 36415 COLL VENOUS BLD VENIPUNCTURE: CPT

## 2020-10-01 PROCEDURE — 82565 ASSAY OF CREATININE: CPT

## 2020-10-01 RX ORDER — SODIUM CHLORIDE 0.9 % (FLUSH) 0.9 %
10 SYRINGE (ML) INJECTION ONCE
Status: COMPLETED | OUTPATIENT
Start: 2020-10-01 | End: 2020-10-01

## 2020-10-01 RX ADMIN — IOPAMIDOL 90 ML: 755 INJECTION, SOLUTION INTRAVENOUS at 08:43

## 2020-10-01 RX ADMIN — Medication 10 ML: at 08:43

## 2020-10-08 ENCOUNTER — HOSPITAL ENCOUNTER (OUTPATIENT)
Dept: ULTRASOUND IMAGING | Age: 85
Discharge: HOME | End: 2020-10-10
Payer: MEDICARE

## 2020-10-08 PROCEDURE — 76536 US EXAM OF HEAD AND NECK: CPT

## 2020-10-30 ENCOUNTER — OFFICE VISIT (OUTPATIENT)
Dept: CARDIOLOGY CLINIC | Age: 85
End: 2020-10-30
Payer: MEDICARE

## 2020-10-30 VITALS
HEART RATE: 67 BPM | BODY MASS INDEX: 23.97 KG/M2 | SYSTOLIC BLOOD PRESSURE: 108 MMHG | RESPIRATION RATE: 16 BRPM | WEIGHT: 118.9 LBS | HEIGHT: 59 IN | DIASTOLIC BLOOD PRESSURE: 78 MMHG

## 2020-10-30 PROCEDURE — G8427 DOCREV CUR MEDS BY ELIG CLIN: HCPCS | Performed by: INTERNAL MEDICINE

## 2020-10-30 PROCEDURE — 1090F PRES/ABSN URINE INCON ASSESS: CPT | Performed by: INTERNAL MEDICINE

## 2020-10-30 PROCEDURE — G8420 CALC BMI NORM PARAMETERS: HCPCS | Performed by: INTERNAL MEDICINE

## 2020-10-30 PROCEDURE — 1123F ACP DISCUSS/DSCN MKR DOCD: CPT | Performed by: INTERNAL MEDICINE

## 2020-10-30 PROCEDURE — 99214 OFFICE O/P EST MOD 30 MIN: CPT | Performed by: INTERNAL MEDICINE

## 2020-10-30 PROCEDURE — 1036F TOBACCO NON-USER: CPT | Performed by: INTERNAL MEDICINE

## 2020-10-30 PROCEDURE — G8484 FLU IMMUNIZE NO ADMIN: HCPCS | Performed by: INTERNAL MEDICINE

## 2020-10-30 PROCEDURE — 4040F PNEUMOC VAC/ADMIN/RCVD: CPT | Performed by: INTERNAL MEDICINE

## 2020-10-30 PROCEDURE — G8400 PT W/DXA NO RESULTS DOC: HCPCS | Performed by: INTERNAL MEDICINE

## 2020-10-30 PROCEDURE — 93000 ELECTROCARDIOGRAM COMPLETE: CPT | Performed by: INTERNAL MEDICINE

## 2020-10-30 RX ORDER — METOPROLOL SUCCINATE 25 MG/1
25 TABLET, EXTENDED RELEASE ORAL
COMMUNITY
End: 2021-06-16 | Stop reason: SDUPTHER

## 2020-10-30 NOTE — PROGRESS NOTES
OFFICE FOLLOW-UP        PRIMARY CARE PHYSICIAN:      Ramiro Forrester MD    Date of Service: 10/30/2020     ALLERGIES / SENSITIVITIES:        No Known Allergies     REVIEWED MEDICATIONS:        Current Outpatient Medications:     metoprolol succinate (TOPROL XL) 25 MG extended release tablet, Take 25 mg by mouth daily, Disp: , Rfl:     furosemide (LASIX) 20 MG tablet, Take 1 tablet by mouth daily (Patient taking differently: Take 20 mg by mouth as needed ), Disp: 90 tablet, Rfl: 3    losartan (COZAAR) 50 MG tablet, Take 1 tablet by mouth nightly (Patient taking differently: Take 25 mg by mouth nightly ), Disp: 30 tablet, Rfl: 3    amiodarone (CORDARONE) 200 MG tablet, Take 1 tablet by mouth daily (Patient taking differently: Take 100 mg by mouth daily ), Disp: 90 tablet, Rfl: 3    apixaban (ELIQUIS) 2.5 MG TABS tablet, Take 2.5 mg by mouth 2 times daily, Disp: , Rfl:     [employer] SENNA 8.6 MG tablet, TAKE 1 TO 2 TABLETS BY MOUTH NIGHTLY AS NEEDED FOR CONSTIPATION, Disp: , Rfl:     mometasone (NASONEX) 50 MCG/ACT nasal spray, USE 2 SPRAYS IN EACH NOSTIL ONCE A DAY, Disp: , Rfl: 3    pantoprazole (PROTONIX) 40 MG tablet, Take 40 mg by mouth daily Instructed to take morning of surgery with a sip of water, Disp: , Rfl:     simvastatin (ZOCOR) 20 MG tablet, Take 1 tablet by mouth daily, Disp: 90 tablet, Rfl: 3    Coenzyme Q10 (CO Q-10) 100 MG CAPS, Take by mouth Last dose 2-11-20, Disp: , Rfl:     aspirin 81 MG tablet, Take 81 mg by mouth daily To check hold  with , Disp: , Rfl:     sucralfate (CARAFATE) 1 GM tablet, , Disp: , Rfl:     Nutritional Supplements (JUICE PLUS FIBRE PO), Take by mouth Last dose 2-11-20, Disp: , Rfl:       S: REASON FOR VISIT:      Previously followed by Dr. Miroslava Aparicio -- she established with me in 4/2016. She denies recent chest pain, palpitations, PND, orthopnea, or syncope (she recently stopped bowling and going to water aerobics).  She follows regularly with Dr. Laura Goldberg for ICD interrogations and PAF (s/p generator change in 2/2018). +cough/sinus problems since 11/2018 --> she follows with ENT (Dr. Anita Lakhani) --> clinically improved on nasonex. She is currently with no active cardiac complaints at rest.     REVIEW OF SYSTEMS:    Cardiac: As per HPI  General: No fever, chills  Pulmonary: As per HPI  HEENT: No visual disturbances, difficult swallowing  GI: No nausea, vomiting, abdominal pain  Musculoskeletal: MACKEY x 4  Skin: Intact, no rashes  Neuro/Psych: No headache or seizures       CARDIOVASCULAR HISTORY:    1. Coronary artery disease/ischemic cardiomyopathy/congestive heart failure. a. 10/30/2009: Acute ST elevation anterolateral wall myocardial infarction. b. 10/30/2009: Cardiac catheterization/primary angioplasty: Left main short vessel with no significant disease. LAD occluded proximally. LCX: 90% stenosis of the 3rd obtuse marginal branch. RCA, a small nondominant vessel, which was non-selectively visualized. Successful balloon angioplasty and stenting to the proximal LAD with restoration of DORI 3 flow in the vessel. c. 06/18/2014 Lexiscan nuclear stress test was a markedly abnormal study showing a transmural myocardial infarction involving a large wrap around left anterior descending artery distribution with no evidence of residual stress-induced ischemia with the gated views showing akinesis of the left ventricular apex, the apical anterior wall and the apical septum with severe hypokinesis of the inferior wall and moderate hypokinesis of the rest of the interventricular septum with a calculated ejection fraction of 34 %. d. 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  Normal right ventricular size and function, with a pacemaker noted in the right cardiovascular history. 2. Asthma. 3. GERD. 4. Depression. 5. Osteoporosis. 6. Overactive bladder. 7. S/P Hysterectomy, 1972.  8. S/P Tonsillectomy. 9. S/P Appendectomy. 10. S/P Right elbow fracture repair. 11. UTI in  treated with oral antibiotics. 12. Status post right and left capsulectomy and removal of extravasated implant material on the right on 12, status post bilateral breast implants in the remote past.  13. Bilateral hearing loss: Wears bilateral hearing aids. 14. Right wrist fracture s/p fall, 2015. FAMILY HISTORY:   Mother , age 79, Alzheimers. Father , age 72, MI. One brother, history of colon surgery. One son, age 52, MI/ACB; two sons with bipolar disorder. SOCIAL HISTORY:   Denies smoking. O:  COMPLETE PHYSICAL EXAM:         /78   Pulse 67   Resp 16   Ht 4' 11\" (1.499 m)   Wt 118 lb 14.4 oz (53.9 kg)   BMI 24.01 kg/m²      General:  Elderly lady in no acute distress. Head & Neck:  Supple. No carotid bruits. Lungs:  Clear to auscultation bilaterally. No wheezing. Heart:   Normal S1 and S2. Grade 2/6 systolic murmur heard at the apex. Grade II/VI systolic murmur heard at the right upper sternal border. Abdomen:  Soft. Bowel sounds present. Extremities:  No edema. Posterior tibialis pulses intact bilaterally. Skin:  Normal turgor. No ulcers or rashes noted. Neuro:  Oriented x3. No motor or sensory deficit detected. Lab Results   Component Value Date    CREATININE 1.1 (H) 10/01/2020    BUN 18 10/01/2020     (L) 2020    K 4.3 2020    CL 90 (L) 2020    CO2 29 2020        ASSESSMENT / DIAGNOSIS:   1. Ischemic cardiomyopathy with severe LV dysfunction / chronic systolic and diastolic congestive heart failure: Compensated currently. 2. Status post ICD implantation (s/p generator change in 2018)  3. Valvular heart disease. 4. History of hypertension. Controlled.  BP today 108/78 (SBP 108-126 at prior office visits). Prior history of intermittent hypotension. 5. Asthma. 6. GERD. 7. Depression. 8. Osteoporosis. 9. Overreactive bladder. 10. Bilateral hearing loss: Wears bilateral hearing aids. 11. History of bilateral breast implants with history of removal of extravasated implant material on the right. 12. Diabetes mellitus: Diet controlled. 13. Chronic kidney disease/prerenal azotemia. 14. S/p right thumb surgery on 5/10/16, Dr. Dinh Sox  15. Dysphagia -- s/p esophageal dilatation in 11/2019 per patient  16. New onset atrial fibrillation s/p ALMA/CVN on 6/9/2020 (started on amiodarone at that time)    TREATMENT PLAN:  1. Continue current medications (including Toprol XL, ARB, and eliquis). Aldactone and entresto previously stopped in the setting of electrolyte abnormalities and hypotension. 2. Monitor renal function and electrolytes closely  3. Follow-up with EP re: ICD interrogation (follows with Dr. Adalberto Escobar)  4. Results of 6/2020 ALMA reviewed with the patient today  5. Questions answered today re: GDMT options  6.  The above was discussed with the patient and her  today    Sandee Peguero MD  [facility]) Cardiology

## 2020-11-18 LAB
ALBUMIN SERPL-MCNC: NORMAL G/DL
ALP BLD-CCNC: NORMAL U/L
ALT SERPL-CCNC: NORMAL U/L
ANION GAP SERPL CALCULATED.3IONS-SCNC: NORMAL MMOL/L
AST SERPL-CCNC: NORMAL U/L
BILIRUB SERPL-MCNC: NORMAL MG/DL
BUN BLDV-MCNC: NORMAL MG/DL
CALCIUM SERPL-MCNC: NORMAL MG/DL
CHLORIDE BLD-SCNC: NORMAL MMOL/L
CHOLESTEROL, TOTAL: NORMAL
CHOLESTEROL/HDL RATIO: NORMAL
CO2: NORMAL
CREAT SERPL-MCNC: NORMAL MG/DL
GFR CALCULATED: NORMAL
GLUCOSE BLD-MCNC: NORMAL MG/DL
HDLC SERPL-MCNC: NORMAL MG/DL
LDL CHOLESTEROL CALCULATED: NORMAL
NONHDLC SERPL-MCNC: NORMAL MG/DL
POTASSIUM SERPL-SCNC: NORMAL MMOL/L
SODIUM BLD-SCNC: NORMAL MMOL/L
TOTAL PROTEIN: NORMAL
TRIGL SERPL-MCNC: NORMAL MG/DL
TSH SERPL DL<=0.05 MIU/L-ACNC: NORMAL M[IU]/L
VITAMIN D 25-HYDROXY: NORMAL
VITAMIN D2, 25 HYDROXY: NORMAL
VITAMIN D3,25 HYDROXY: NORMAL
VLDLC SERPL CALC-MCNC: NORMAL MG/DL

## 2021-01-05 DIAGNOSIS — Z01.818 PRE-PROCEDURAL EXAMINATION: Primary | ICD-10-CM

## 2021-01-06 ENCOUNTER — HOSPITAL ENCOUNTER (OUTPATIENT)
Age: 86
Discharge: HOME | End: 2021-01-08
Payer: MEDICARE

## 2021-01-06 DIAGNOSIS — Z01.818 PRE-PROCEDURAL EXAMINATION: ICD-10-CM

## 2021-01-06 PROCEDURE — U0003 INFECTIOUS AGENT DETECTION BY NUCLEIC ACID (DNA OR RNA); SEVERE ACUTE RESPIRATORY SYNDROME CORONAVIRUS 2 (SARS-COV-2) (CORONAVIRUS DISEASE [COVID-19]), AMPLIFIED PROBE TECHNIQUE, MAKING USE OF HIGH THROUGHPUT TECHNOLOGIES AS DESCRIBED BY CMS-2020-01-R: HCPCS

## 2021-01-07 LAB
SARS-COV-2: NOT DETECTED
SOURCE: NORMAL

## 2021-01-13 ENCOUNTER — HOSPITAL ENCOUNTER (OUTPATIENT)
Dept: ULTRASOUND IMAGING | Age: 86
Discharge: HOME | End: 2021-01-15
Payer: MEDICARE

## 2021-01-13 DIAGNOSIS — E04.1 THYROID NODULE: ICD-10-CM

## 2021-01-13 PROCEDURE — 88173 CYTOPATH EVAL FNA REPORT: CPT

## 2021-01-13 PROCEDURE — 10005 FNA BX W/US GDN 1ST LES: CPT | Performed by: RADIOLOGY

## 2021-01-13 PROCEDURE — 76942 ECHO GUIDE FOR BIOPSY: CPT

## 2021-01-13 PROCEDURE — 88305 TISSUE EXAM BY PATHOLOGIST: CPT

## 2021-01-13 NOTE — BRIEF OP NOTE
Brief Postoperative Note    Anabella Merrill  YOB: 1935  [id number]    Pre-operative Diagnosis: mass, right thyroid  Post-operative Diagnosis: Same    Procedure: biopsy    Estimated Blood Loss: < 10 cc    Surgeon: Wild HAIDER     Complications: none    Specimens obtained: Yes, biopsy of mass    Findings: biopsy of a mass      Wild Barrera II   1/13/2021 1:45 PM

## 2021-01-13 NOTE — H&P
Interventional Radiology  Attending Pre-operative History and Physical    DIAGNOSIS:    Patient Active Problem List   Diagnosis    CAD (coronary artery disease)    H/O acute myocardial infarction of anterolateral wall    History of PTCA    Ischemic cardiomyopathy    Automatic implantable cardioverter-defibrillator in situ    Essential hypertension    COPD exacerbation ([facility]    DM II (diabetes mellitus, type II), controlled ([facility]    Acute on chronic /diastolic/ congestive heart failure ([facility]    Pulmonary nodule    Osteoporosis    Influenza with respiratory manifestation other than pneumonia    Pneumonia due to organism    S/P ICD (internal cardiac defibrillator) procedure    Atrial fibrillation ([facility]       CHIEF COMPLAINT: <principal problem not specified>          Current Outpatient Medications:     metoprolol succinate (TOPROL XL) 25 MG extended release tablet, Take 25 mg by mouth daily, Disp: , Rfl:     furosemide (LASIX) 20 MG tablet, Take 1 tablet by mouth daily (Patient taking differently: Take 20 mg by mouth as needed ), Disp: 90 tablet, Rfl: 3    losartan (COZAAR) 50 MG tablet, Take 1 tablet by mouth nightly (Patient taking differently: Take 25 mg by mouth nightly ), Disp: 30 tablet, Rfl: 3    amiodarone (CORDARONE) 200 MG tablet, Take 1 tablet by mouth daily (Patient taking differently: Take 100 mg by mouth daily ), Disp: 90 tablet, Rfl: 3    apixaban (ELIQUIS) 2.5 MG TABS tablet, Take 2.5 mg by mouth 2 times daily, Disp: , Rfl:     [employer]S SENNA 8.6 MG tablet, TAKE 1 TO 2 TABLETS BY MOUTH NIGHTLY AS NEEDED FOR CONSTIPATION, Disp: , Rfl:     sucralfate (CARAFATE) 1 GM tablet, , Disp: , Rfl:     mometasone (NASONEX) 50 MCG/ACT nasal spray, USE 2 SPRAYS IN EACH NOSTIL ONCE A DAY, Disp: , Rfl: 3    pantoprazole (PROTONIX) 40 MG tablet, Take 40 mg by mouth daily Instructed to take morning of surgery with a sip of water, Disp: , Rfl:     simvastatin (ZOCOR) 20 MG tablet, Take 1 tablet by mouth daily, Disp: 90 tablet, Rfl: 3    Coenzyme Q10 (CO Q-10) 100 MG CAPS, Take by mouth Last dose 2-11-20, Disp: , Rfl:     Nutritional Supplements (JUICE PLUS FIBRE PO), Take by mouth Last dose 2-11-20, Disp: , Rfl:     aspirin 81 MG tablet, Take 81 mg by mouth daily To check hold  with , Disp: , Rfl:     No Known Allergies    Past Medical History:   Diagnosis Date    Arthritis     CAD (coronary artery disease)     Cardiomyopathy ([facility]     CHF (congestive heart failure) ([facility] 2010    history of    Chronic bronchitis ([facility]     none recent    COPD (chronic obstructive pulmonary disease) ([facility]     Depression     GERD (gastroesophageal reflux disease)     HFrEF (heart failure with reduced ejection fraction) ([facility] 12/29/2016 12/26/16- LVEF 20-25%, large apical aneurysm, LA moderate-severely dilated, mildly enlarged RA, mild MR, mild TR, mild AR    Hyperlipidemia     Hypertension     MI (myocardial infarction) ([facility] 10/30/2009    Osteopenia     Osteoporosis     Swallowing difficulty        Past Surgical History:   Procedure Laterality Date    APPENDECTOMY      BREAST CAPSULECTOMY  03/07/12    BILATERAL BREAST CAPSULECTOMIES    BREAST SURGERY  1962     cosmetic implants    CARDIAC DEFIBRILLATOR PLACEMENT      [facility]    CARDIAC DEFIBRILLATOR PLACEMENT  5/2010    CARDIAC DEFIBRILLATOR PLACEMENT Left 02/13/2018    [employer] ICD change out,     CARDIAC PACEMAKER PLACEMENT  5/10/2010    Dual chamber pacer ICD.    [address]  2009    stent    CARDIOVASCULAR STRESS TEST  3/4/2010    COLONOSCOPY      COSMETIC SURGERY      eyelids breast    DIAGNOSTIC CARDIAC CATH LAB PROCEDURE  10/30/2009    ESOPHAGEAL MOTILITY STUDY N/A 1/2/2020    ESOPHAGEAL MOTILITY/MANOMETRY STUDY performed by Meseret Larkin DO at [facility] HISTORY Right 12/17/15    ORIF RIGHT DISTAL RADIUS    OTHER SURGICAL HISTORY Right 4/10/2016    IP joint release of thumb  TONSILLECTOMY      TRANSTHORACIC ECHOCARDIOGRAM  3/30/11ize and function,     UPPER GASTROINTESTINAL ENDOSCOPY N/A 2/13/2020    EGD SUBMUCOSAL/BOTOX INJECTION performed by Amarilis Otto DO at [facility] ENDOSCOPY       Family History   Problem Relation Age of Onset    Bipolar Disorder Son     Bipolar Disorder Son     Heart Disease Brother         cabg       Social History     Socioeconomic History    Marital status:      Spouse name: Not on file    Number of children: Not on file    Years of education: Not on file    Highest education level: Not on file   Occupational History    Not on file   Social Needs    Financial resource strain: Not on file    Food insecurity     Worry: Not on file     Inability: Not on file    Transportation needs     Medical: Not on file     Non-medical: Not on file   Tobacco Use    Smoking status: Never Smoker    Smokeless tobacco: Never Used   Substance and Sexual Activity    Alcohol use: Yes     Comment: ocasional wine    Drug use: No    Sexual activity: Not on file   Lifestyle    Physical activity     Days per week: Not on file     Minutes per session: Not on file    Stress: Not on file   Relationships    Social connections     Talks on phone: Not on file     Gets together: Not on file     Attends Congregation service: Not on file     Active member of club or organization: Not on file     Attends meetings of clubs or organizations: Not on file     Relationship status: Not on file    Intimate partner violence     Fear of current or ex partner: Not on file     Emotionally abused: Not on file     Physically abused: Not on file     Forced sexual activity: Not on file   Other Topics Concern    Not on file   Social History Narrative    Not on file       ROS: Non-contributory other than as noted above    PHYSICAL EXAM:      Heart and Lungs:  demonstrate no contraindications to proceed    DATA:  CBC:   Lab Results   Component Value Date    WBC 6.9 06/07/2020    RBC 3.82 06/07/2020    HGB 12.3 06/07/2020    HCT 37.2 06/07/2020    MCV 97.4 06/07/2020    MCH 32.2 06/07/2020    MCHC 33.1 06/07/2020    RDW 13.3 06/07/2020     06/07/2020    MPV 10.1 06/07/2020     CBC with Differential:    Lab Results   Component Value Date    WBC 6.9 06/07/2020    RBC 3.82 06/07/2020    HGB 12.3 06/07/2020    HCT 37.2 06/07/2020     06/07/2020    MCV 97.4 06/07/2020    MCH 32.2 06/07/2020    MCHC 33.1 06/07/2020    RDW 13.3 06/07/2020    LYMPHOPCT 9.7 06/07/2020    MONOPCT 15.0 06/07/2020    BASOPCT 0.3 06/07/2020    MONOSABS 1.04 06/07/2020    LYMPHSABS 0.67 06/07/2020    EOSABS 0.09 06/07/2020    BASOSABS 0.02 06/07/2020     Platelets:    Lab Results   Component Value Date     06/07/2020     BUN/Creatinine:    Lab Results   Component Value Date    BUN 18 10/01/2020    CREATININE 1.1 10/01/2020       ASSESSMENT AND PLAN:  1. Right thyroid mass bx  2. Procedure options, risks and benefits reviewed with patient. Patient expresses understanding.     Electronically signed by Christen Nixon II, MD on 1/13/2021 at 1:44 PM

## 2021-01-14 ENCOUNTER — HOSPITAL ENCOUNTER (OUTPATIENT)
Dept: INFUSION THERAPY | Age: 86
Setting detail: INFUSION SERIES
Discharge: HOME | End: 2021-01-14
Payer: MEDICARE

## 2021-01-14 VITALS
OXYGEN SATURATION: 97 % | SYSTOLIC BLOOD PRESSURE: 132 MMHG | DIASTOLIC BLOOD PRESSURE: 63 MMHG | BODY MASS INDEX: 23.16 KG/M2 | RESPIRATION RATE: 16 BRPM | HEART RATE: 60 BPM | TEMPERATURE: 97.8 F | HEIGHT: 60 IN | WEIGHT: 118 LBS

## 2021-01-14 DIAGNOSIS — M81.0 OSTEOPOROSIS, UNSPECIFIED OSTEOPOROSIS TYPE, UNSPECIFIED PATHOLOGICAL FRACTURE PRESENCE: Primary | ICD-10-CM

## 2021-01-14 PROCEDURE — 6360000002 HC RX W HCPCS: Performed by: OBSTETRICS & GYNECOLOGY

## 2021-01-14 PROCEDURE — 96372 THER/PROPH/DIAG INJ SC/IM: CPT

## 2021-01-14 RX ADMIN — DENOSUMAB 60 MG: 60 INJECTION SUBCUTANEOUS at 09:07

## 2021-01-23 ENCOUNTER — IMMUNIZATION (OUTPATIENT)
Dept: PRIMARY CARE CLINIC | Age: 86
End: 2021-01-23
Payer: MEDICARE

## 2021-01-23 DIAGNOSIS — Z23 NEED FOR VACCINATION: Primary | ICD-10-CM

## 2021-01-23 PROCEDURE — 91300 COVID-19, PFIZER VACCINE 30MCG/0.3ML DOSE: CPT | Performed by: INTERNAL MEDICINE

## 2021-01-23 PROCEDURE — 0001A COVID-19, PFIZER VACCINE 30MCG/0.3ML DOSE: CPT | Performed by: INTERNAL MEDICINE

## 2021-02-13 ENCOUNTER — IMMUNIZATION (OUTPATIENT)
Dept: PRIMARY CARE CLINIC | Age: 86
End: 2021-02-13
Payer: MEDICARE

## 2021-02-13 PROCEDURE — 91300 COVID-19, PFIZER VACCINE 30MCG/0.3ML DOSE: CPT | Performed by: NURSE PRACTITIONER

## 2021-02-13 PROCEDURE — 0001A PR IMM ADMN SARSCOV2 30MCG/0.3ML DIL RECON 1ST DOSE: CPT | Performed by: NURSE PRACTITIONER

## 2021-03-03 ENCOUNTER — TELEPHONE (OUTPATIENT)
Dept: ADMINISTRATIVE | Age: 86
End: 2021-03-03

## 2021-03-03 NOTE — TELEPHONE ENCOUNTER
Pt states she has called several times to schedule FU w/ Dr Nat Hernandez, his schedule is full, please advise pt at [phone]

## 2021-03-16 NOTE — PROGRESS NOTES
[facility] PRE-ADMISSION TESTING INSTRUCTIONS    The Preadmission Testing patient is instructed accordingly using the following criteria (check applicable):    ARRIVAL INSTRUCTIONS:  [x] Parking the day of Surgery is located in the Main Entrance lot. Upon entering the door, make an immediate right to the surgery reception desk    [x] Bring photo ID and insurance card    [] Bring in a copy of Living will or Durable Power of  papers. [x] Please be sure to arrange transportation to and from the hospital    [x] Please arrange for someone to be with you the remainder of the day due to having anesthesia      GENERAL INSTRUCTIONS:    [x] Nothing by mouth after midnight, including gum, candy, mints or water    [x] You may brush your teeth, but do not swallow any water    [x] Take medications as instructed with 1-2 oz of water    [x] Stop herbal supplements and vitamins 5 days prior to procedure    [x] Follow preop dosing of blood thinners per physician instructions    [] Do not take insulin or oral diabetic medications    [] If diabetic and have low blood sugar or feel symptomatic, take 1-2oz apple juice or glucose tablets    [] Bring inhalers day of surgery    [] Bring C-PAP/ Bi-Pap day of surgery    [] Bring urine specimen day of surgery    [x] Antibacterial Soap shower or bath AM of Surgery, no lotion, powders or creams to surgical site    [] Follow bowel prep as instructed per surgeon    [x] No tobacco products within 24 hours of surgery     [x] No alcohol or illegal drug use within 24 hours of surgery.     [x] Jewelry, body piercing's, eyeglasses, contact lenses and dentures are not permitted into surgery (bring cases)      [x] Please do not wear any nail polish or make up on the day of surgery    [x] If not already done, you can expect a call from registration    [x] If surgeon requests a time change you will be notified the day prior to surgery    [] If you receive a survey after surgery we would greatly appreciate your comments    [] Parent/guardian of a minor must accompany their child and remain on the premises  the entire time they are under our care     [] Pediatric patients may bring favorite toy, blanket or comfort item with them    [] A caregiver or family member must remain with the patient during their stay if they are mentally handicapped, have dementia, disoriented or unable to use a call light or would be a safety concern if left unattended    [x] Please notify surgeon if you develop any illness between now and time of surgery (cold, cough, sore throat, fever, nausea, vomiting) or any signs of infections  including skin, wounds, and dental.    [] Other instructions    EDUCATIONAL MATERIALS PROVIDED:    [] PAT Preoperative Education Packet/Booklet     [] Medication List    [] Fluoroscopy Information Pamphlet    [] Transfusion bracelet applied with instructions    [] Joint replacement video reviewed    [] Shower with antibacterial soap and use CHG wipes provided the evening before surgery as instructed

## 2021-03-19 ENCOUNTER — HOSPITAL ENCOUNTER (OUTPATIENT)
Age: 86
Discharge: HOME | End: 2021-03-21
Payer: MEDICARE

## 2021-03-19 DIAGNOSIS — Z01.818 PREOP TESTING: ICD-10-CM

## 2021-03-19 PROCEDURE — U0003 INFECTIOUS AGENT DETECTION BY NUCLEIC ACID (DNA OR RNA); SEVERE ACUTE RESPIRATORY SYNDROME CORONAVIRUS 2 (SARS-COV-2) (CORONAVIRUS DISEASE [COVID-19]), AMPLIFIED PROBE TECHNIQUE, MAKING USE OF HIGH THROUGHPUT TECHNOLOGIES AS DESCRIBED BY CMS-2020-01-R: HCPCS

## 2021-03-20 LAB
SARS-COV-2: NOT DETECTED
SOURCE: NORMAL

## 2021-03-25 ENCOUNTER — ANESTHESIA EVENT (OUTPATIENT)
Dept: ENDOSCOPY | Age: 86
End: 2021-03-25
Payer: MEDICARE

## 2021-03-25 ENCOUNTER — ANESTHESIA (OUTPATIENT)
Dept: ENDOSCOPY | Age: 86
End: 2021-03-25
Payer: MEDICARE

## 2021-03-25 ENCOUNTER — HOSPITAL ENCOUNTER (OUTPATIENT)
Age: 86
Setting detail: OUTPATIENT SURGERY
Discharge: HOME | End: 2021-03-25
Attending: INTERNAL MEDICINE | Admitting: INTERNAL MEDICINE
Payer: MEDICARE

## 2021-03-25 VITALS — OXYGEN SATURATION: 100 % | SYSTOLIC BLOOD PRESSURE: 127 MMHG | DIASTOLIC BLOOD PRESSURE: 60 MMHG

## 2021-03-25 VITALS
OXYGEN SATURATION: 97 % | WEIGHT: 122 LBS | BODY MASS INDEX: 24.6 KG/M2 | DIASTOLIC BLOOD PRESSURE: 58 MMHG | RESPIRATION RATE: 18 BRPM | HEIGHT: 59 IN | TEMPERATURE: 97.5 F | SYSTOLIC BLOOD PRESSURE: 119 MMHG | HEART RATE: 65 BPM

## 2021-03-25 DIAGNOSIS — Z01.818 PREOP TESTING: Primary | ICD-10-CM

## 2021-03-25 PROCEDURE — 3700000000 HC ANESTHESIA ATTENDED CARE: Performed by: INTERNAL MEDICINE

## 2021-03-25 PROCEDURE — 3609013800 HC EGD SUBMUCOSAL/BOTOX INJECTION: Performed by: INTERNAL MEDICINE

## 2021-03-25 PROCEDURE — 6360000002 HC RX W HCPCS: Performed by: NURSE ANESTHETIST, CERTIFIED REGISTERED

## 2021-03-25 PROCEDURE — 7100000011 HC PHASE II RECOVERY - ADDTL 15 MIN: Performed by: INTERNAL MEDICINE

## 2021-03-25 PROCEDURE — 6360000002 HC RX W HCPCS: Performed by: INTERNAL MEDICINE

## 2021-03-25 PROCEDURE — 2580000003 HC RX 258: Performed by: NURSE ANESTHETIST, CERTIFIED REGISTERED

## 2021-03-25 PROCEDURE — 7100000010 HC PHASE II RECOVERY - FIRST 15 MIN: Performed by: INTERNAL MEDICINE

## 2021-03-25 PROCEDURE — 3700000001 HC ADD 15 MINUTES (ANESTHESIA): Performed by: INTERNAL MEDICINE

## 2021-03-25 PROCEDURE — 2709999900 HC NON-CHARGEABLE SUPPLY: Performed by: INTERNAL MEDICINE

## 2021-03-25 PROCEDURE — 2500000003 HC RX 250 WO HCPCS: Performed by: NURSE ANESTHETIST, CERTIFIED REGISTERED

## 2021-03-25 RX ORDER — PROPOFOL 10 MG/ML
INJECTION, EMULSION INTRAVENOUS PRN
Status: DISCONTINUED | OUTPATIENT
Start: 2021-03-25 | End: 2021-03-25 | Stop reason: SDUPTHER

## 2021-03-25 RX ORDER — SODIUM CHLORIDE 9 MG/ML
INJECTION, SOLUTION INTRAVENOUS CONTINUOUS PRN
Status: DISCONTINUED | OUTPATIENT
Start: 2021-03-25 | End: 2021-03-25 | Stop reason: SDUPTHER

## 2021-03-25 RX ORDER — SODIUM CHLORIDE 0.9 % (FLUSH) 0.9 %
10 SYRINGE (ML) INJECTION PRN
Status: DISCONTINUED | OUTPATIENT
Start: 2021-03-25 | End: 2021-03-25 | Stop reason: HOSPADM

## 2021-03-25 RX ORDER — LIDOCAINE HYDROCHLORIDE 10 MG/ML
INJECTION, SOLUTION EPIDURAL; INFILTRATION; INTRACAUDAL; PERINEURAL PRN
Status: DISCONTINUED | OUTPATIENT
Start: 2021-03-25 | End: 2021-03-25 | Stop reason: SDUPTHER

## 2021-03-25 RX ORDER — SODIUM CHLORIDE 0.9 % (FLUSH) 0.9 %
10 SYRINGE (ML) INJECTION EVERY 12 HOURS SCHEDULED
Status: DISCONTINUED | OUTPATIENT
Start: 2021-03-25 | End: 2021-03-25 | Stop reason: HOSPADM

## 2021-03-25 RX ADMIN — LIDOCAINE HYDROCHLORIDE 20 MG: 10 INJECTION, SOLUTION EPIDURAL; INFILTRATION; INTRACAUDAL; PERINEURAL at 14:47

## 2021-03-25 RX ADMIN — PROPOFOL 100 MG: 10 INJECTION, EMULSION INTRAVENOUS at 14:47

## 2021-03-25 RX ADMIN — SODIUM CHLORIDE: 9 INJECTION, SOLUTION INTRAVENOUS at 14:30

## 2021-03-25 ASSESSMENT — PAIN - FUNCTIONAL ASSESSMENT: PAIN_FUNCTIONAL_ASSESSMENT: 0-10

## 2021-03-25 ASSESSMENT — COPD QUESTIONNAIRES: CAT_SEVERITY: MODERATE

## 2021-03-25 NOTE — ANESTHESIA PRE PROCEDURE
SURGERY  2009    stent    CARDIOVASCULAR STRESS TEST  3/4/2010    COLONOSCOPY      COSMETIC SURGERY      eyelids breast    DIAGNOSTIC CARDIAC CATH LAB PROCEDURE  10/30/2009    ESOPHAGEAL MOTILITY STUDY N/A 1/2/2020    ESOPHAGEAL MOTILITY/MANOMETRY STUDY performed by Hong Gabriel DO at [address] THYROID BIOPSY      OTHER SURGICAL HISTORY Right 12/17/15    ORIF RIGHT DISTAL RADIUS    OTHER SURGICAL HISTORY Right 4/10/2016    IP joint release of thumb    TONSILLECTOMY      TRANSTHORACIC ECHOCARDIOGRAM  3/30/11ize and function,     UPPER GASTROINTESTINAL ENDOSCOPY N/A 2/13/2020    EGD SUBMUCOSAL/BOTOX INJECTION performed by Bernett Cowden, DO at [facility] ENDOSCOPY       Social History:    Social History     Tobacco Use    Smoking status: Never Smoker    Smokeless tobacco: Never Used   Substance Use Topics    Alcohol use: Yes     Comment: ocasional wine                                Counseling given: Not Answered      Vital Signs (Current): There were no vitals filed for this visit.                                            BP Readings from Last 3 Encounters:   03/25/21 (!) 192/81   01/14/21 132/63   10/30/20 108/78       NPO Status:  npo today                                                                               BMI:   Wt Readings from Last 3 Encounters:   03/25/21 122 lb (55.3 kg)   01/14/21 118 lb (53.5 kg)   10/30/20 118 lb 14.4 oz (53.9 kg)     There is no height or weight on file to calculate BMI.    CBC:   Lab Results   Component Value Date    WBC 6.9 06/07/2020    RBC 3.82 06/07/2020    HGB 12.3 06/07/2020    HCT 37.2 06/07/2020    MCV 97.4 06/07/2020    RDW 13.3 06/07/2020     06/07/2020       CMP:   Lab Results   Component Value Date     06/12/2020    K 4.3 06/12/2020    K 4.0 06/08/2020    CL 90 06/12/2020    CO2 29 06/12/2020    BUN 18 10/01/2020    CREATININE 1.1 10/01/2020    GFRAA 57 10/01/2020    LABGLOM 47 10/01/2020    GLUCOSE 103 06/12/2020    PROT 6.4 06/10/2020    CALCIUM 9.2 06/12/2020    BILITOT 0.4 06/10/2020    ALKPHOS 49 06/10/2020    AST 19 06/10/2020    ALT 14 06/10/2020       POC Tests: No results for input(s): POCGLU, POCNA, POCK, POCCL, POCBUN, POCHEMO, POCHCT in the last 72 hours. Coags:   Lab Results   Component Value Date    PROTIME 13.3 11/19/2018    INR 1.1 11/19/2018    APTT 26.6 08/01/2017       HCG (If Applicable): No results found for: PREGTESTUR, PREGSERUM, HCG, HCGQUANT     ABGs: No results found for: PHART, PO2ART, JYK4LDW, FMB1UVE, BEART, L1UJQYUW     Type & Screen (If Applicable):  No results found for: LABABO, [address]    Anesthesia Evaluation  Patient summary reviewed no history of anesthetic complications:   Airway: Mallampati: III  TM distance: >3 FB     Mouth opening: < 3 FB Dental:          Pulmonary: breath sounds clear to auscultation  (+) pneumonia: resolved,  COPD: moderate,                            ROS comment: Seen by Pulmonary service on 2/6/20 --> plan to bronch if EGD does not find etiology for her chronic cough (~7 months duration)   Cardiovascular:  Exercise tolerance: poor (<4 METS),   (+) hypertension: moderate, pacemaker (placed 10 yrs ago): AICD and pacemaker, past MI: > 6 months, CAD:, dysrhythmias: atrial fibrillation, CHF: systolic,         Rhythm: regular  Rate: normal           Beta Blocker:  Dose within 24 Hrs      ROS comment: Per Cardiology note in Dec 2019 -->  1. Ischemic cardiomyopathy with severe LV dysfunction / chronic systolic and diastolic congestive heart failure: Compensated currently. 2. Status post ICD implantation (s/p generator change in 2/2018)  3. Valvular heart disease. 4. History of hypertension. Controlled. Neuro/Psych:   (+) psychiatric history:depression/anxiety              ROS comment: Bilateral hearing aids  Poor historian GI/Hepatic/Renal:   (+) GERD:,          ROS comment: Dysphagia     Overactive bladder.    Endo/Other:    (+) Diabetes (\"diet controlled\";  not on meds)Type II DM, , : arthritis: OA., .                 Abdominal:         (-) obese     Vascular: negative vascular ROS. Anesthesia Plan      MAC     ASA 4     (Backup GA if needed)  Induction: intravenous. MIPS: Prophylactic antiemetics administered. Anesthetic plan and risks discussed with patient. Plan discussed with surgical team and CRNA.                   Piper Garcia MD   3/25/2021

## 2021-03-25 NOTE — PROCEDURES
Franklyn Carey is a 81 y.o. female patient. 1. Preop testing      Past Medical History:   Diagnosis Date    Arthritis     CAD (coronary artery disease)     Cardiomy[facility] ([facility]     CHF (congestive heart failure) ([facility] 2010    history of    Chronic bronchitis (HCC)     none recent    [facility]ch[facility] obstructive [facility] disease) ([facility])     Depression     GERD (gastroesophageal reflux disease)     HFrEF (heart failure with reduced ejection fraction) ([facility] 12/29/2016 12/26/16- LVEF 20-25%, large apical aneurysm, LA moderate-severely dilated, mildly enlarged RA, mild MR, mild TR, mild AR    Hyperlipidemia     Hypertension     MI (myocardial infarction) ([facility] 10/30/2009    Osteopenia     Osteoporosis     Swallowing difficulty      Blood pressure (!) 192/81, pulse 61, temperature 97.5 °F (36.4 °C), temperature source Temporal, resp. rate 20, height 4' 11\" (1.499 m), weight 122 lb (55.3 kg), SpO2 98 %, not currently breastfeeding. Procedures    Procedure:  Esophagogastroduodenoscopy     Indication:  Dysphagia, Achalasia       Sedation:  MAC     Estimated Blood Loss: 0cc      Endoscope was advanced easily through mouth to second portion of duodenum                             Oropharynx views are limited but grossly normal.     Esophagus:     Mucosa is normal. GEJ at 36 cm. 200 units of Botox mixed into 4cc was injected in a 4 quadrant fashion in the lower esophageal sphincter with no complications or bleeding issues. There was no signs of recurrent candidiasis in the proximal esophagus.    Mild LA A Esophagitis     Stomach:         Antrum is normal                          Gastric body is normal.                          Retroflexed views show normal fundus and cardia.     Duodenum:     Bulb is normal.                          Second portion of duodenum is normal.     IMPRESSION AND PLAN:      1. Achalasia Type II - 200 units of Botox mixed into 4cc was injected in a 4 quadrant fashion in the lower esophageal sphincter with no complications or bleeding issues. Follow up in office to evaluate the results of injection.       Pt ok to D/C home today per hospital protocol and no driving x 24 hours. Pt to F/U in office in 3-4 weeks. Continue PPI daily. Ok to restart Eliquis on Saturday 3/27/21 in the evening. Ok to take a baby 81mg aspirin tomorrow, 3/26/21. Follow up as outpatient in office, call [phone] to schedule for appointment.     DO Tomy  3/15/2021  [id number]

## 2021-03-25 NOTE — PROGRESS NOTES
Immediately prior to the procedure the patient's History and Physical was reviewed- there are no changes with the current vitals. BP (!) 192/81   Pulse 61   Temp 97.5 °F (36.4 °C) (Temporal)   Resp 20   Ht 4' 11\" (1.499 m)   Wt 122 lb (55.3 kg)   SpO2 98%   BMI 24.64 kg/m²     No CP/SOB. Risks/benefits d/w pt. All questions answered. Proceed with EGD with Botox injection.       DO Tomy  3/25/2021  2:44 PM

## 2021-03-25 NOTE — PROGRESS NOTES
DR Armida Moncada CALLED AND LEFT MESSAGE REGARDING NEED OF ORDERS AND H &P ON PT AT THIS TIME  [phone] CALLED DR Marry Pavon PHONE NO ANSWER AND MAILBOX FULL COULD NOT LEAVE MESSAGE  AT [phone] [facility]

## 2021-03-25 NOTE — ANESTHESIA POSTPROCEDURE EVALUATION
Department of Anesthesiology  Postprocedure Note    Patient: Marlene Raymond  MRN: [id number]  YOB: 1935  Date of evaluation: 3/25/2021  Time:  5:36 PM     Procedure Summary     Date: 03/25/21 Room / Location: [place] [address] / [address] [place] [address]    Anesthesia Start: 4786 Anesthesia Stop: 0628    Procedure: EGD SUBMUCOSAL/BOTOX INJECTION (N/A ) Diagnosis: (DYSPHAGIA)    Surgeons: Manish Smart DO Responsible Provider: Eddie Quintero MD    Anesthesia Type: MAC ASA Status: 4          Anesthesia Type: MAC    Olga Phase I: Olga Score: 10    Olga Phase II: Olga Score: 10    Last vitals: Reviewed and per EMR flowsheets.        Anesthesia Post Evaluation    Patient location during evaluation: PACU  Patient participation: complete - patient participated  Level of consciousness: awake and alert  Airway patency: patent  Nausea & Vomiting: no nausea and no vomiting  Complications: no  Cardiovascular status: hemodynamically stable  Respiratory status: acceptable  Hydration status: stable

## 2021-03-25 NOTE — H&P
[facility]  Dr. Kristen Ybarra M.D., Dr. Debby Trejo M.D., AMENA Samuels.O., Dr. Ana De Los Santos M.D., VITALY Reed.            HISTORY OF PRESENT ILLNESS:    The patient is a 81 y.o. female with a past medical history significant for ischemic cardiomyopathy, LV dysfunction s/p ICD placement and dysphagia. Pt underwent outpatient  manometry and was found to have Achalasia subtype II.      Pt continues to have dysphagia to solids and liquids. Pt wished to proceed with EGD with Botox injection for treatment of Achalasia. Had good response on EGD with Botox 2/13/2020.           REVIEW OF SYSTEMS:   Constitutional: Denies fever, chills, or unintentional weight loss. HEENT: Denies double or blurry vision, headaches, ear pain or ringing in the ears. No drainage from the ears, nose or throat. Cardiovascular: Denies any chest pain, irregular heartbeats, or palpitations. Respiratory: Denies shortness of breath, coughing, sputum production, hemoptysis, or wheezing. Gastrointestinal: Denies nausea, vomiting, diarrhea, or constipation. Denies any abdominal pain, black or bloody stools. Genitourinary: Denies any urinary urgency, frequency, hematuria. Voiding without difficulty. Endocrine: Denies sensitivity to heat or cold. Denies changes in hair, skin or nails. Denies excessive thirst, hunger or going to the bathroom more frequently than usual.  Extremities: Denies swelling or calf pain. Musculoskeletal: Denies muscle or joint pain, stiffness, arthritis, gout, or instability. Dermatology / Skin: Denies any rashes, ulcers, or excoriations. Denies bruising. Neurology: Denies any bowel or bladder incontinence, headache or focal neurological deficits. No weakness or paresthesia. Denies numbness or tingling in the hands or feet.   Psychiatric: Denies nervousness/anxiety, depression or memory loss.        Past Medical History:  Past Medical History[]Expand by Default        Past Medical History:   Diagnosis Date    Arthritis      CAD (coronary artery disease)      Cardiomyopathy ([facility]      CHF (congestive heart failure) ([facility] 2010     history of    Chronic bronchitis (HCC)       none recent    [facility]) ([facility])      Depression      GERD (gastroesophageal reflux disease)      HFrEF (heart failure with reduced ejection fraction) ([facility] 12/29/2016 12/26/16- LVEF 20-25%, large apical aneurysm, LA moderate-severely dilated, mildly enlarged RA, mild MR, mild TR, mild AR    Hyperlipidemia      Hypertension      MI (myocardial infarction) ([facility] 10/30/2009    Osteopenia      Osteoporosis      Swallowing difficulty              Past Surgical History:  Past Surgical History[]Expand by Default         Past Surgical History:   Procedure Laterality Date    APPENDECTOMY        BREAST CAPSULECTOMY   03/07/12     BILATERAL BREAST CAPSULECTOMIES    BREAST SURGERY   1962      cosmetic implants    CARDIAC DEFIBRILLATOR PLACEMENT         [facility]    CARDIAC DEFIBRILLATOR PLACEMENT   5/2010    CARDIAC DEFIBRILLATOR PLACEMENT Left 02/13/2018     [employer] ICD change out,     CARDIAC PACEMAKER PLACEMENT   5/10/2010     Dual chamber pacer ICD.    [address]   2009     stent    CARDIOVASCULAR STRESS TEST   3/4/2010    COLONOSCOPY        COSMETIC SURGERY         eyelids breast    DIAGNOSTIC CARDIAC CATH LAB PROCEDURE   10/30/2009    ESOPHAGEAL MOTILITY STUDY N/A 1/2/2020     ESOPHAGEAL MOTILITY/MANOMETRY STUDY performed by Andressa Rosales DO at [address] Right 12/17/15     ORIF RIGHT DISTAL RADIUS    OTHER SURGICAL HISTORY Right 4/10/2016     IP joint release of thumb    TONSILLECTOMY        TRANSTHORACIC ECHOCARDIOGRAM   3/30/11ize and function,             Home Medications:  Home Medications[]Expand by Default           Prior to Admission medications    Medication Sig Start Date End Date Taking? Authorizing Provider   [facility] SENNA 8.6 MG tablet TAKE 1 TO 2 TABLETS BY MOUTH NIGHTLY AS NEEDED FOR CONSTIPATION 1/23/20   Yes Historical Provider, MD   sucralfate (CARAFATE) 1 GM tablet   1/23/20   Yes Historical Provider, MD   mupirocin (BACTROBAN) 2 % ointment Apply 3 times daily.  2/6/20 2/13/20 Yes Román Elias MD   montelukast (SINGULAIR) 10 MG tablet TAKE 1 TABLET BY MOUTH EVERY DAY 1/20/20   Yes Román Elias MD   mometasone (NASONEX) 50 MCG/ACT nasal spray USE 2 SPRAYS IN EACH NOSTIL ONCE A DAY 11/20/19   Yes Historical Provider, MD   pantoprazole (PROTONIX) 40 MG tablet Take 40 mg by mouth daily Instructed to take morning of surgery with a sip of water     Yes Historical Provider, MD   losartan (COZAAR) 25 MG tablet Take 1 tablet by mouth daily 6/3/19   Yes Madhuri Benavidez MD   carvedilol (COREG) 12.5 MG tablet Take 1 tablet by mouth 2 times daily 6/3/19   Yes Madhuri Benavidez MD   simvastatin (ZOCOR) 20 MG tablet Take 1 tablet by mouth daily 6/3/19   Yes Madhuri Benavidez MD   Lactobacillus (PROBIOTIC ACIDOPHILUS) CAPS Take 1 capsule by mouth 2 times daily (with meals) Last dose 2-11-20     Yes Historical Provider, MD   furosemide (LASIX) 20 MG tablet Take 1 tablet by mouth 2 times daily  Patient taking differently: Take 20 mg by mouth daily  12/4/17   Yes Deepthi Tolentino MD   Coenzyme Q10 (CO Q-10) 100 MG CAPS Take by mouth Last dose 2-11-20     Yes Historical Provider, MD   Nutritional Supplements (JUICE PLUS FIBRE PO) Take by mouth Last dose 2-11-20     Yes Historical Provider, MD   aspirin 81 MG tablet Take 81 mg by mouth daily To check hold  with Dr. Bogdan Phelps Provider, MD   ondansetron (ZOFRAN ODT) 4 MG disintegrating tablet Take 1 tablet by mouth every 8 hours as needed for Nausea or Vomiting  Patient not taking: Reported on 12/12/2019 11/19/18     Tin Moran MD   Dextromethorphan-Guaifenesin (MUCINEX DM MAXIMUM STRENGTH)  MG TB12 Take 1 tablet by mouth every 12 hours as needed (cough and congestion)  Patient not taking: Reported on 9/23/2019 12/27/17     Ximena Neville MD            Allergies: Patient has no known allergies.     Social History:  Social History   []Expand by Default            Socioeconomic History    Marital status:        Spouse name: Not on file    Number of children: Not on file    Years of education: Not on file    Highest education level: Not on file   Occupational History    Not on file   Social Needs    Financial resource strain: Not on file    Food insecurity:       Worry: Not on file       Inability: Not on file    Transportation needs:       Medical: Not on file       Non-medical: Not on file   Tobacco Use    Smoking status: Never Smoker    Smokeless tobacco: Never Used   Substance and Sexual Activity    Alcohol use:  Yes       Comment: ocasional wine    Drug use: No    Sexual activity: Not on file   Lifestyle    Physical activity:       Days per week: Not on file       Minutes per session: Not on file    Stress: Not on file   Relationships    Social connections:       Talks on phone: Not on file       Gets together: Not on file       Attends [employer] service: Not on file       Active member of club or organization: Not on file       Attends meetings of clubs or organizations: Not on file       Relationship status: Not on file    Intimate partner violence:       Fear of current or ex partner: Not on file       Emotionally abused: Not on file       Physically abused: Not on file       Forced sexual activity: Not on file   Other Topics Concern    Not on file   Social History Narrative    Not on file            Family History:  Family History[]Expand by Default         Family History   Problem Relation Age of Onset    Bipolar Disorder Son      Bipolar Disorder Son      Heart Disease Brother           cabg               PHYSICAL EXAM:  Vital Signs: Ht 4' 11\" (1.499 m)   Wt 109 lb (49.4 kg)   BMI 22.02 kg/m²   GENERAL

## 2021-06-16 ENCOUNTER — OFFICE VISIT (OUTPATIENT)
Dept: CARDIOLOGY CLINIC | Age: 86
End: 2021-06-16
Payer: MEDICARE

## 2021-06-16 VITALS
RESPIRATION RATE: 14 BRPM | OXYGEN SATURATION: 96 % | DIASTOLIC BLOOD PRESSURE: 70 MMHG | HEART RATE: 81 BPM | SYSTOLIC BLOOD PRESSURE: 116 MMHG | WEIGHT: 119.8 LBS | HEIGHT: 60 IN | BODY MASS INDEX: 23.52 KG/M2

## 2021-06-16 DIAGNOSIS — I34.0 NONRHEUMATIC MITRAL VALVE REGURGITATION: ICD-10-CM

## 2021-06-16 DIAGNOSIS — I10 ESSENTIAL HYPERTENSION: ICD-10-CM

## 2021-06-16 DIAGNOSIS — I25.10 CORONARY ARTERY DISEASE INVOLVING NATIVE CORONARY ARTERY OF NATIVE HEART WITHOUT ANGINA PECTORIS: Primary | ICD-10-CM

## 2021-06-16 DIAGNOSIS — I48.0 PAROXYSMAL ATRIAL FIBRILLATION (HCC): ICD-10-CM

## 2021-06-16 DIAGNOSIS — I25.5 ISCHEMIC CARDIOMYOPATHY: ICD-10-CM

## 2021-06-16 PROCEDURE — G8427 DOCREV CUR MEDS BY ELIG CLIN: HCPCS | Performed by: INTERNAL MEDICINE

## 2021-06-16 PROCEDURE — G8400 PT W/DXA NO RESULTS DOC: HCPCS | Performed by: INTERNAL MEDICINE

## 2021-06-16 PROCEDURE — 99214 OFFICE O/P EST MOD 30 MIN: CPT | Performed by: INTERNAL MEDICINE

## 2021-06-16 PROCEDURE — 93000 ELECTROCARDIOGRAM COMPLETE: CPT | Performed by: INTERNAL MEDICINE

## 2021-06-16 PROCEDURE — 4040F PNEUMOC VAC/ADMIN/RCVD: CPT | Performed by: INTERNAL MEDICINE

## 2021-06-16 PROCEDURE — 1090F PRES/ABSN URINE INCON ASSESS: CPT | Performed by: INTERNAL MEDICINE

## 2021-06-16 PROCEDURE — 1036F TOBACCO NON-USER: CPT | Performed by: INTERNAL MEDICINE

## 2021-06-16 PROCEDURE — G8420 CALC BMI NORM PARAMETERS: HCPCS | Performed by: INTERNAL MEDICINE

## 2021-06-16 PROCEDURE — 1123F ACP DISCUSS/DSCN MKR DOCD: CPT | Performed by: INTERNAL MEDICINE

## 2021-06-16 RX ORDER — METOPROLOL SUCCINATE 25 MG/1
25 TABLET, EXTENDED RELEASE ORAL DAILY
Qty: 90 TABLET | Refills: 3 | Status: SHIPPED | OUTPATIENT
Start: 2021-06-16

## 2021-06-16 RX ORDER — OMEPRAZOLE 40 MG/1
40 CAPSULE, DELAYED RELEASE ORAL DAILY
COMMUNITY
Start: 2021-05-22

## 2021-06-16 NOTE — PROGRESS NOTES
OFFICE FOLLOW-UP        PRIMARY CARE PHYSICIAN:      Belia Morillo MD    Date of Service: 6/16/2021     ALLERGIES / SENSITIVITIES:        No Known Allergies     REVIEWED MEDICATIONS:        Current Outpatient Medications:     omeprazole (PRILOSEC) 40 MG delayed release capsule, , Disp: , Rfl:     Ausjjxttu-Pozwidzcz-Hiitkgwykn (CEREFOLIN NAC PO), Take by mouth, Disp: , Rfl:     metoprolol succinate (TOPROL XL) 25 MG extended release tablet, Take 25 mg by mouth Daily with supper , Disp: , Rfl:     furosemide (LASIX) 20 MG tablet, Take 1 tablet by mouth daily, Disp: 90 tablet, Rfl: 3    losartan (COZAAR) 50 MG tablet, Take 1 tablet by mouth nightly (Patient taking differently: Take 25 mg by mouth nightly ), Disp: 30 tablet, Rfl: 3    amiodarone (CORDARONE) 200 MG tablet, Take 1 tablet by mouth daily (Patient taking differently: Take 100 mg by mouth Daily with supper ), Disp: 90 tablet, Rfl: 3    apixaban (ELIQUIS) 2.5 MG TABS tablet, Take 2.5 mg by mouth 2 times daily, Disp: , Rfl:     [employer] SENNA 8.6 MG tablet, TAKE 1 TO 2 TABLETS BY MOUTH NIGHTLY AS NEEDED FOR CONSTIPATION, Disp: , Rfl:     mometasone (NASONEX) 50 MCG/ACT nasal spray, USE 2 SPRAYS IN EACH NOSTIL ONCE A DAY, Disp: , Rfl: 3    simvastatin (ZOCOR) 20 MG tablet, Take 1 tablet by mouth daily (Patient taking differently: Take 20 mg by mouth nightly ), Disp: 90 tablet, Rfl: 3    Coenzyme Q10 (CO Q-10) 100 MG CAPS, Take by mouth Last dose 2-11-20, Disp: , Rfl:     aspirin 81 MG tablet, Take 81 mg by mouth nightly To check hold  with , Disp: , Rfl:       S: REASON FOR VISIT:      Previously followed by Dr. Renee Greer -- she established with me in 4/2016. She denies recent chest pain, palpitations, PND, orthopnea, or syncope (she recently stopped bowling and going to water aerobics). +recent worsening LYON. She follows regularly with Dr. Clover Hassan for ICD interrogations and PAF (s/p generator change in 2/2018).  +cough/sinus problems since 11/2018 --> she follows with ENT (Dr. Nancy Alvarado) --> clinically improved on nasonex. She is currently with no active cardiac complaints at rest.     REVIEW OF SYSTEMS:    Cardiac: As per HPI  General: No fever, chills  Pulmonary: As per HPI  HEENT: No visual disturbances, difficult swallowing  GI: No nausea, vomiting  : No dysuria, hematuria  Endocrine: No thyroid disease or DM  Musculoskeletal: MACKEY x 4, no focal motor deficits  Skin: Intact, no rashes  Neuro: No headache, seizures  Psych: Currently with no depression, anxiety     CARDIOVASCULAR HISTORY:    1. Coronary artery disease/ischemic cardiomyopathy/congestive heart failure. a. 10/30/2009: Acute ST elevation anterolateral wall myocardial infarction. b. 10/30/2009: Cardiac catheterization/primary angioplasty: Left main short vessel with no significant disease. LAD occluded proximally. LCX: 90% stenosis of the 3rd obtuse marginal branch. RCA, a small nondominant vessel, which was non-selectively visualized. Successful balloon angioplasty and stenting to the proximal LAD with restoration of DORI 3 flow in the vessel. c. 06/18/2014 Lexiscan nuclear stress test was a markedly abnormal study showing a transmural myocardial infarction involving a large wrap around left anterior descending artery distribution with no evidence of residual stress-induced ischemia with the gated views showing akinesis of the left ventricular apex, the apical anterior wall and the apical septum with severe hypokinesis of the inferior wall and moderate hypokinesis of the rest of the interventricular septum with a calculated ejection fraction of 34 %. d. 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  Normal right ventricular size and function, with a pacemaker noted in the right ventricle. Pacemaker also noted in the right atrium. Mild to moderate mitral regurgitation. Mild to moderate tricuspid regurgitation. Mild aortic sclerosis with trace aortic regurgitation. Aortic root sclerosis/calcification. Small pericardial effusion. e. Congestive heart failure acute decompensation, first diagnosed in 4/2014.   2. Insertion of dual chamber pacer ICD on 5/10/2010.   3. History of hypertension. 4. Diabetes mellitus, diagnosed in 4/2014.   5. Hyponatremia in 4/2014 and again on a blood test on 5/8/2014. Echocardiogram: 12/26/16 (Niesha Deras)   Mildly dilated left ventricle.   Large apical aneurysm with dyskinetic apex.   Severe hypokinesis of the apical septum and lateral wall.   Overall LVEF is visually estimated at 20-25%   The left atrium is moderate-severely dilated.   Structurally normal mitral valve.   Increased E point septal separation noted,suggesting decreased LV cardiac output   Mild mitral regurgitation is present.   The aortic valve is trileaflet.   The aortic valve appears mildly sclerotic.   Mild aortic regurgitation is noted.   Normal tricuspid valve structure and function.   Mild tricuspid regurgitation.  RVSP is 25-30 mmHg.  Louise Callander is a trivial localized near right ventricle pericardial effusion noted. ALMA: 6/9/2020 (Dr. Ashley Granado)   Gastric images not performed due to history of achalasia and recent   botulinum toxin injection - case discussed with Dr. Tiffanie Sommer before   procedure and felt no contraindication to ALMA with upper esophageal images   only. Ejection fraction is visually estimated at 25-30%. There was evidence of spontaneous echo contrast in the left atrium. No evidence of thrombus within left atrium or appendage. Small mobile echodensity likely fibrin stranding noted on RA lead. Mild-moderate mitral regurgitation directed centrally. Mild aortic valve regurgitation. Moderate to severe tricuspid regurgitation.       PAST MEDICAL HISTORY: 1. As under cardiovascular history. 2. Asthma. 3. GERD. 4. Depression. 5. Osteoporosis. 6. Overactive bladder. 7. S/P Hysterectomy, 1972.  8. S/P Tonsillectomy. 9. S/P Appendectomy. 10. S/P Right elbow fracture repair. 11. UTI in  treated with oral antibiotics. 12. Status post right and left capsulectomy and removal of extravasated implant material on the right on 12, status post bilateral breast implants in the remote past.  13. Bilateral hearing loss: Wears bilateral hearing aids. 14. Right wrist fracture s/p fall, 2015. FAMILY HISTORY:   Mother , age 79, Alzheimers. Father , age 72, MI. One brother, history of colon surgery. One son, age 52, MI/ACB; two sons with bipolar disorder. SOCIAL HISTORY:   Denies smoking. O:  COMPLETE PHYSICAL EXAM:         /70   Pulse 81   Resp 14   Ht 4' 11.5\" (1.511 m)   Wt 119 lb 12.8 oz (54.3 kg)   SpO2 96%   BMI 23.79 kg/m²      Appearance: Awake, alert and oriented x 3, no acute respiratory distress  Skin: Intact, no rash  Head: Normocephalic, atraumatic  Eyes: EOMI, no conjunctival erythema  ENMT: No pharyngeal erythema, MMM, no rhinorrhea, no dentures  Neck: Supple, no elevated JVP, no carotid bruits  Lungs: Clear to auscultation bilaterally. No wheezes, rales, or rhonchi. Cardiac: Grade 2/6 systolic murmur heard at the apex. Grade II/VI systolic murmur heard at the right upper sternal border. Abdomen: Soft, nontender, +bowel sounds  Extremities: Moves all extremities x 4, no lower extremity edema  Neurologic: No focal motor deficits apparent, normal mood and affect, alert and oriented x 3    Lab Results   Component Value Date    CREATININE 1.1 (H) 10/01/2020    BUN 18 10/01/2020     (L) 2020    K 4.3 2020    CL 90 (L) 2020    CO2 29 2020        ASSESSMENT / DIAGNOSIS:   1.  Ischemic cardiomyopathy with severe LV dysfunction / chronic systolic and diastolic congestive heart failure: Compensated currently. 2. Status post ICD implantation (s/p generator change in 2/2018)  3. Valvular heart disease. 4. History of hypertension. Controlled. BP today 116/70 (-126 at prior office visits). Prior history of intermittent hypotension. 5. Asthma. 6. GERD. 7. Depression. 8. Osteoporosis. 9. Overreactive bladder. 10. Bilateral hearing loss: Wears bilateral hearing aids. 11. History of bilateral breast implants with history of removal of extravasated implant material on the right. 12. Diabetes mellitus: Diet controlled. 13. Chronic kidney disease/prerenal azotemia. 14. S/p right thumb surgery on 5/10/16, Dr. Levi Headings  15. Dysphagia -- s/p esophageal dilatation in 11/2019 per patient  16. New onset atrial fibrillation s/p ALMA/CVN on 6/9/2020 (started on amiodarone at that time)    TREATMENT PLAN:  1. Repeat echocardiogram ordered today  2. Continue current medications (including Toprol XL, ARB, and eliquis). Aldactone and entresto previously stopped in the setting of electrolyte abnormalities and hypotension. 3. Monitor renal function and electrolytes closely  4. Follow-up with EP re: ICD interrogation (follows with Dr. Brooklynn Talley)  5. Questions answered today re: GDMT options  6. The above was discussed with the patient and her  today    Greater than 30 minutes was spent counseling the patient, reviewing the rationale for the above recommendations and reviewing the patient's current medication list, problem list and results of all previously ordered testing.     Elaina Plaza MD, MD  [facility]) Cardiology

## 2021-07-03 ENCOUNTER — HOSPITAL ENCOUNTER (EMERGENCY)
Age: 86
Discharge: HOME | End: 2021-07-03
Attending: FAMILY MEDICINE
Payer: MEDICARE

## 2021-07-03 ENCOUNTER — APPOINTMENT (OUTPATIENT)
Dept: GENERAL RADIOLOGY | Age: 86
End: 2021-07-03
Payer: MEDICARE

## 2021-07-03 VITALS
HEIGHT: 59 IN | DIASTOLIC BLOOD PRESSURE: 74 MMHG | TEMPERATURE: 97.9 F | SYSTOLIC BLOOD PRESSURE: 142 MMHG | RESPIRATION RATE: 16 BRPM | HEART RATE: 62 BPM | BODY MASS INDEX: 23.59 KG/M2 | OXYGEN SATURATION: 92 % | WEIGHT: 117 LBS

## 2021-07-03 DIAGNOSIS — T14.8XXA HEMATOMA: ICD-10-CM

## 2021-07-03 DIAGNOSIS — S80.12XA CONTUSION OF LEFT LOWER LEG, INITIAL ENCOUNTER: Primary | ICD-10-CM

## 2021-07-03 PROCEDURE — 90715 TDAP VACCINE 7 YRS/> IM: CPT | Performed by: FAMILY MEDICINE

## 2021-07-03 PROCEDURE — 90471 IMMUNIZATION ADMIN: CPT | Performed by: FAMILY MEDICINE

## 2021-07-03 PROCEDURE — 6360000002 HC RX W HCPCS: Performed by: FAMILY MEDICINE

## 2021-07-03 PROCEDURE — 73590 X-RAY EXAM OF LOWER LEG: CPT

## 2021-07-03 PROCEDURE — 6370000000 HC RX 637 (ALT 250 FOR IP): Performed by: FAMILY MEDICINE

## 2021-07-03 PROCEDURE — 99282 EMERGENCY DEPT VISIT SF MDM: CPT

## 2021-07-03 RX ORDER — ACETAMINOPHEN 500 MG
1000 TABLET ORAL EVERY 8 HOURS PRN
Qty: 50 TABLET | Refills: 0 | Status: ON HOLD | OUTPATIENT
Start: 2021-07-03 | End: 2022-08-19

## 2021-07-03 RX ORDER — FAMOTIDINE 20 MG/1
20 TABLET, FILM COATED ORAL NIGHTLY
Status: ON HOLD | COMMUNITY
End: 2022-08-19

## 2021-07-03 RX ORDER — CEPHALEXIN 500 MG/1
500 CAPSULE ORAL 2 TIMES DAILY
COMMUNITY
End: 2021-08-24 | Stop reason: ALTCHOICE

## 2021-07-03 RX ORDER — ACETAMINOPHEN 500 MG
1000 TABLET ORAL ONCE
Status: COMPLETED | OUTPATIENT
Start: 2021-07-03 | End: 2021-07-03

## 2021-07-03 RX ADMIN — TETANUS TOXOID, REDUCED DIPHTHERIA TOXOID AND ACELLULAR PERTUSSIS VACCINE, ADSORBED 0.5 ML: 5; 2.5; 8; 8; 2.5 SUSPENSION INTRAMUSCULAR at 08:12

## 2021-07-03 RX ADMIN — ACETAMINOPHEN 1000 MG: 500 TABLET ORAL at 08:11

## 2021-07-03 ASSESSMENT — PAIN DESCRIPTION - DESCRIPTORS: DESCRIPTORS: ACHING

## 2021-07-03 ASSESSMENT — PAIN DESCRIPTION - ORIENTATION: ORIENTATION: LEFT;LOWER

## 2021-07-03 ASSESSMENT — PAIN DESCRIPTION - ONSET: ONSET: SUDDEN

## 2021-07-03 ASSESSMENT — PAIN DESCRIPTION - PAIN TYPE: TYPE: ACUTE PAIN

## 2021-07-03 ASSESSMENT — PAIN DESCRIPTION - PROGRESSION: CLINICAL_PROGRESSION: GRADUALLY WORSENING

## 2021-07-03 ASSESSMENT — PAIN SCALES - GENERAL
PAINLEVEL_OUTOF10: 8
PAINLEVEL_OUTOF10: 8

## 2021-07-03 ASSESSMENT — PAIN DESCRIPTION - LOCATION: LOCATION: LEG

## 2021-07-03 ASSESSMENT — PAIN DESCRIPTION - FREQUENCY: FREQUENCY: CONTINUOUS

## 2021-07-03 NOTE — ED PROVIDER NOTES
[address] [facility]  Department of Emergency Medicine   ED  Encounter Note  Admit Date/RoomTime: 7/3/2021  7:42 AM  ED Room:     NAME: Marija Ware  : 1935  MRN: [id number]     Chief Complaint:  Leg Pain (fell last Saturday injuring the left lower leg. Left lower leg and foot ecchymotic with pain only in the leg.)    History of Present Illness       Marija Ware is a 81 y.o. old female who presents to the emergency department by private vehicle, for traumatic Left lower leg pain which occured 1 week(s) prior to arrival.   The complaint is due to a fall from a standing height while at home. Her weight bearing status is normal.  Patient has no prior history of pain/injury with regards to today's visit. Since onset the symptoms have been persistent. Her pain is aggraveated by pressure on or palpation of painful area and relieved by nothing. She denies any head injury, neck pain, back pain, numbness or weakness. The patients tetanus status is more than 10 years ago. ROS   Pertinent positives and negatives are stated within HPI, all other systems reviewed and are negative. Past Medical History:  has a past medical history of Arthritis, CAD (coronary artery disease), Cardiomyopathy ([address] CHF (congestive heart failure) ([address] Chronic bronchitis ([address] COPD (chronic obstructive pulmonary disease) ([address] Depression, GERD (gastroesophageal reflux disease), HFrEF (heart failure with reduced ejection fraction) ([address] Hyperlipidemia, Hypertension, MI (myocardial infarction) ([address] Osteopenia, Osteoporosis, and Swallowing difficulty. Surgical History:  has a past surgical history that includes Hysterectomy; Appendectomy; Tonsillectomy; Breast surgery (); Cosmetic surgery; Breast Capsulectomy (12);  Diagnostic Cardiac Cath Lab Procedure (10/30/2009); cardiovascular stress test (3/4/2010); transthoracic echocardiogram (3/30/11ize and function, ); Cardiac surgery (); Cardiac pacemaker placement (5/10/2010); Cardiac defibrillator placement; Colonoscopy; other surgical history (Right, 12/17/15); Cardiac defibrillator placement (5/2010); other surgical history (Right, 4/10/2016); Cardiac defibrillator placement (Left, 02/13/2018); esophageal motility study (N/A, 1/2/2020); Upper gastrointestinal endoscopy (N/A, 2/13/2020); IR US THYROID BIOPSY; and Upper gastrointestinal endoscopy (N/A, 3/25/2021). Social History:  reports that she has never smoked. She has never used smokeless tobacco. She reports current alcohol use. She reports that she does not use drugs. Family History: family history includes Bipolar Disorder in her son and son; Heart Disease in her brother. Allergies: Patient has no known allergies. Physical Exam   Oxygen Saturation Interpretation: Normal.        ED Triage Vitals   BP Temp Temp Source Pulse Resp SpO2 Height Weight   07/03/21 0748 07/03/21 0748 07/03/21 0748 07/03/21 0748 07/03/21 0748 07/03/21 0748 07/03/21 0749 07/03/21 0749   (!) 142/74 97.9 °F (36.6 °C) Infrared 62 16 92 % 4' 11\" (1.499 m) 117 lb (53.1 kg)         Constitutional:  Alert, development consistent with age. HEENT:  NC/NT. Airway patent. Neck:  Normal ROM. Supple. Left Lower Extremity(s): lower medial              Tenderness:  moderate. Swelling: Moderate. Calf:  No evidence of DVT seen on physical exam.. Deformity: no deformity observed/palpated. ROM: full range with pain. Skin: There is ecchymosis and swelling area 10 x 4 cm with dependent nonswollen nontender ecchymosis down to the medial aspect foot laterally. Neurovascular: Motor deficit: none. Sensory deficit: none. Pulse deficit: none. Capillary refill: normal.  Gait:  normal.  Lymphatics: No lymphangitis or adenopathy noted. Neurological:  Oriented x3. Motor functions intact.     Joe Bhakta / Sunitha Curtis Results   (All laboratory and radiology results have been personally reviewed by myself)  Labs:  No results found for this visit on 07/03/21. Imaging: All Radiology results interpreted by Radiologist unless otherwise noted. XR TIBIA FIBULA LEFT (2 VIEWS)    (Results Pending)     ED Course / Medical Decision Making     Medications   acetaminophen (TYLENOL) tablet 1,000 mg (1,000 mg Oral Given 7/3/21 0811)   Tetanus-Diphth-Acell Pertussis (BOOSTRIX) injection 0.5 mL (0.5 mLs Intramuscular Given 7/3/21 1906)        Consults:   None    Procedure(s):  None    MDM:      Imaging was obtained based on low suspicion for fracture / bony abnormality as per history/physical findings. Plan is subsequently for symptom control, limited use and  immobilization with appropriate outpatient follow-up. Plan of Care/Counseling:  Buzz Alcantara MD reviewed today's visit with the patient and spouse / life partner in addition to providing specific details for the plan of care and counseling regarding the diagnosis and prognosis. Questions are answered at this time and are agreeable with the plan. Assessment      1. Contusion of left lower leg, initial encounter    2. Hematoma      Plan   Discharged home. Patient condition is good    New Medications     New Prescriptions    ACETAMINOPHEN (TYLENOL) 500 MG TABLET    Take 2 tablets by mouth every 8 hours as needed for Pain     Electronically signed by Buzz Alcantara MD   DD: 7/3/21  **This report was transcribed using voice recognition software. Every effort was made to ensure accuracy; however, inadvertent computerized transcription errors may be present.   END OF ED PROVIDER NOTE        Buzz Alcantara MD  07/03/21 5960

## 2021-07-14 ENCOUNTER — HOSPITAL ENCOUNTER (OUTPATIENT)
Dept: INFUSION THERAPY | Age: 86
Setting detail: INFUSION SERIES
Discharge: HOME | End: 2021-07-14
Payer: MEDICARE

## 2021-07-14 VITALS
SYSTOLIC BLOOD PRESSURE: 120 MMHG | DIASTOLIC BLOOD PRESSURE: 56 MMHG | RESPIRATION RATE: 16 BRPM | TEMPERATURE: 97.5 F | HEIGHT: 59 IN | HEART RATE: 60 BPM | OXYGEN SATURATION: 96 % | BODY MASS INDEX: 23.59 KG/M2 | WEIGHT: 117 LBS

## 2021-07-14 DIAGNOSIS — M81.0 OSTEOPOROSIS, UNSPECIFIED OSTEOPOROSIS TYPE, UNSPECIFIED PATHOLOGICAL FRACTURE PRESENCE: Primary | ICD-10-CM

## 2021-07-14 PROCEDURE — 96372 THER/PROPH/DIAG INJ SC/IM: CPT

## 2021-07-14 PROCEDURE — 6360000002 HC RX W HCPCS: Performed by: OBSTETRICS & GYNECOLOGY

## 2021-07-14 RX ADMIN — DENOSUMAB 60 MG: 60 INJECTION SUBCUTANEOUS at 09:12

## 2021-07-14 ASSESSMENT — PAIN DESCRIPTION - ORIENTATION: ORIENTATION: LEFT;LOWER

## 2021-07-14 ASSESSMENT — PAIN DESCRIPTION - DESCRIPTORS: DESCRIPTORS: ACHING

## 2021-07-14 ASSESSMENT — PAIN SCALES - GENERAL: PAINLEVEL_OUTOF10: 5

## 2021-07-14 ASSESSMENT — PAIN DESCRIPTION - LOCATION: LOCATION: LEG

## 2021-08-24 ENCOUNTER — OFFICE VISIT (OUTPATIENT)
Dept: PULMONOLOGY | Age: 86
End: 2021-08-24
Payer: MEDICARE

## 2021-08-24 VITALS
TEMPERATURE: 95 F | OXYGEN SATURATION: 96 % | RESPIRATION RATE: 16 BRPM | SYSTOLIC BLOOD PRESSURE: 137 MMHG | HEIGHT: 59 IN | HEART RATE: 60 BPM | BODY MASS INDEX: 23.59 KG/M2 | DIASTOLIC BLOOD PRESSURE: 63 MMHG | WEIGHT: 117 LBS

## 2021-08-24 DIAGNOSIS — R05.9 COUGH: Primary | ICD-10-CM

## 2021-08-24 DIAGNOSIS — J44.1 COPD EXACERBATION (HCC): ICD-10-CM

## 2021-08-24 PROCEDURE — G8926 SPIRO NO PERF OR DOC: HCPCS | Performed by: INTERNAL MEDICINE

## 2021-08-24 PROCEDURE — 99213 OFFICE O/P EST LOW 20 MIN: CPT | Performed by: INTERNAL MEDICINE

## 2021-08-24 PROCEDURE — 4040F PNEUMOC VAC/ADMIN/RCVD: CPT | Performed by: INTERNAL MEDICINE

## 2021-08-24 PROCEDURE — G8400 PT W/DXA NO RESULTS DOC: HCPCS | Performed by: INTERNAL MEDICINE

## 2021-08-24 PROCEDURE — G8420 CALC BMI NORM PARAMETERS: HCPCS | Performed by: INTERNAL MEDICINE

## 2021-08-24 PROCEDURE — 1123F ACP DISCUSS/DSCN MKR DOCD: CPT | Performed by: INTERNAL MEDICINE

## 2021-08-24 PROCEDURE — 3023F SPIROM DOC REV: CPT | Performed by: INTERNAL MEDICINE

## 2021-08-24 PROCEDURE — G8428 CUR MEDS NOT DOCUMENT: HCPCS | Performed by: INTERNAL MEDICINE

## 2021-08-24 PROCEDURE — 1036F TOBACCO NON-USER: CPT | Performed by: INTERNAL MEDICINE

## 2021-08-24 PROCEDURE — 1090F PRES/ABSN URINE INCON ASSESS: CPT | Performed by: INTERNAL MEDICINE

## 2021-08-24 RX ORDER — LOSARTAN POTASSIUM 25 MG/1
25 TABLET ORAL DAILY
COMMUNITY
Start: 2021-08-03

## 2021-08-24 RX ORDER — ERGOCALCIFEROL 1.25 MG/1
50000 CAPSULE ORAL WEEKLY
COMMUNITY
Start: 2021-08-10

## 2021-08-24 NOTE — PROGRESS NOTES
Department of Internal Medicine  Division of Pulmonary, Critical Care & Sleep Medicine  Pulmonary [address] [facility]                                             Pulmonary Clinic Consult     I had the pleasure of seeing  Devon Sicard in the [facility] regarding their cough     Chief Complaint   Patient presents with    Follow-up     lung nodule       HISTORY OF PRESENT ILLNESS:    Devon Sicard is a 81y.o. year old  Never smoke but she has second hand smokers from her Son     The Patient comes in with SOB that has been going on for the last 7 months  Associated with Cough ,She  states that it get worse with exercise or walking long distance 1 block ,very slowly and he can walk And go 1 flight of stairs before get short winded    She  has cough ,productive for yellow Sputum and it is more in the morning and thick     Has history of lung disease include  No     She has 14 pounds weight loss and she is not eating as much and no appetite    No hemoptysis ,she has some chocking and had sallow study and was fine     She has no wheezing     Today visit  Still with some cough ,  Still with nasal drainge   No fever  No hemoptysis   Still with sinus issues       ALLERGIES:  No Known Allergies    PAST MEDICAL HISTORY:       Diagnosis Date    Arthritis     CAD (coronary artery disease)     Cardiomyopathy ([address]     CHF (congestive heart failure) ([address] 2010    history of    Chronic bronchitis ([address]     none recent    COPD (chronic obstructive pulmonary disease) ([address]     Depression     GERD (gastroesophageal reflux disease)     HFrEF (heart failure with reduced ejection fraction) ([address] 12/29/2016 12/26/16- LVEF 20-25%, large apical aneurysm, LA moderate-severely dilated, mildly enlarged RA, mild MR, mild TR, mild AR    Hyperlipidemia     Hypertension     MI (myocardial infarction) ([address] 10/30/2009    Osteopenia     Osteoporosis     Swallowing difficulty MEDICATIONS:   Current Outpatient Medications   Medication Sig Dispense Refill    famotidine (PEPCID) 20 MG tablet Take 20 mg by mouth nightly      acetaminophen (TYLENOL) 500 MG tablet Take 2 tablets by mouth every 8 hours as needed for Pain 50 tablet 0    omeprazole (PRILOSEC) 40 MG delayed release capsule       Djzgzgrme-Aurnkdqdq-Omyiijebfk (CEREFOLIN NAC PO) Take by mouth      metoprolol succinate (TOPROL XL) 25 MG extended release tablet Take 1 tablet by mouth daily 90 tablet 3    furosemide (LASIX) 20 MG tablet Take 1 tablet by mouth daily 90 tablet 3    amiodarone (CORDARONE) 200 MG tablet Take 1 tablet by mouth daily (Patient taking differently: Take 100 mg by mouth Daily with supper ) 90 tablet 3    apixaban (ELIQUIS) 2.5 MG TABS tablet Take 2.5 mg by mouth 2 times daily      mometasone (NASONEX) 50 MCG/ACT nasal spray USE 2 SPRAYS IN EACH NOSTIL ONCE A DAY  3    simvastatin (ZOCOR) 20 MG tablet Take 1 tablet by mouth daily (Patient taking differently: Take 20 mg by mouth nightly ) 90 tablet 3    Coenzyme Q10 (CO Q-10) 100 MG CAPS Take by mouth Last dose 2-11-20      aspirin 81 MG tablet Take 81 mg by mouth nightly To check hold  with Dr. Fidel Lomax vitamin D (ERGOCALCIFEROL) 1.25 MG (75167 UT) CAPS capsule TAKE 1 CAPSULE BY MOUTH ONCE WEEKLY      losartan (COZAAR) 25 MG tablet TAKE 1 TABLET BY MOUTH EVERY DAY       No current facility-administered medications for this visit.        SOCIAL AND OCCUPATIONAL HEALTH:  Social History     Tobacco Use   Smoking Status Never Smoker   Smokeless Tobacco Never Used     TB :no   Pets :2 Cats   Industrial exposure:[facility] ,no dust exposure     SURGICAL HISTORY:   Past Surgical History:   Procedure Laterality Date    APPENDECTOMY      BREAST CAPSULECTOMY  03/07/12    BILATERAL BREAST CAPSULECTOMIES    BREAST SURGERY  1962     cosmetic implants    CARDIAC DEFIBRILLATOR PLACEMENT      [facility]    CARDIAC DEFIBRILLATOR PLACEMENT  5/2010   Fidel Lomax CARDIAC DEFIBRILLATOR PLACEMENT Left 02/13/2018    [employer] ICD change out,     CARDIAC PACEMAKER PLACEMENT  5/10/2010    Dual chamber pacer ICD. [address]  2009    stent    CARDIOVASCULAR STRESS TEST  3/4/2010    COLONOSCOPY      COSMETIC SURGERY      eyelids breast    DIAGNOSTIC CARDIAC CATH LAB PROCEDURE  10/30/2009    ESOPHAGEAL MOTILITY STUDY N/A 1/2/2020    ESOPHAGEAL MOTILITY/MANOMETRY STUDY performed by Ashley Peres DO at [address] IR US THYROID BIOPSY      OTHER SURGICAL HISTORY Right 12/17/15    ORIF RIGHT DISTAL RADIUS    OTHER SURGICAL HISTORY Right 4/10/2016    IP joint release of thumb    TONSILLECTOMY      TRANSTHORACIC ECHOCARDIOGRAM  3/30/11ize and function,     UPPER GASTROINTESTINAL ENDOSCOPY N/A 2/13/2020    EGD SUBMUCOSAL/BOTOX INJECTION performed by Zach Valerio DO at [address] N/A 3/25/2021    EGD SUBMUCOSAL/BOTOX INJECTION performed by Zach Valerio DO at [address]:   Lung cancer:no   DVT or PE No    REVIEW OF SYSTEMS:  Constitutional: General health is good . There has been no weight changes. No fevers, fatigue or weakness. Head: Patient denies any history of trauma, convulsive disorder or syncope. Skin:  Patient denies history of changes in pigmentation, eruptions or pruritus. No easy bruising or bleeding. EENT: no nasal congestion   Cardiovascular ,No chest pain ,No edema ,  Respiration:SOB: + ,LYON :+ cough   Gastrointestinal:No GI bleed ,no melena  ,no hematemesis    Musculoskeletal: no joint pain ,no swelling  Neurological:no , syncope. Denies twitching, convulsions, loss of consciousness or memory. Endocrine:  . No history of goiter, exophthalmos or dryness of skin. The patient has no history of diabetes. Hematopoietic:  No history of bleeding disorders or easy bruising.   Rheumatic:  No connective tissue disease history or polyarthritis/inflammatory joint disease. PHYSICAL EXAMINATION:  Vitals:    08/24/21 1124   BP: 137/63   Pulse: 60   Resp: 16   Temp: 95 °F (35 °C)   SpO2: 96%     Constitutional: This is a well developed, well nourished 81y.o. year old female who is alert, oriented, cooperative and in no apparent distress. Head was normocephalic and atraumatic. EENT: Mallampati class :  Extraocular muscles intact. External canals are patent and no discharge was appreciated. Septum was midline,   mucosa was without erythema, exudates or cobblestoning. No thrush was noted. Neck: Supple without thyromegaly. No elevated JVP. Trachea was midline. No carotid bruits were auscultated. Respiratory: rhonchi scattered     Cardiovascular: Regular without murmur, clicks, gallops or rubs. There is no left or right ventricular heave. Pulses:  Carotid, radial and femoral pulses were equally bilaterally. Abdomen: Slightly rounded and soft without organomegaly. No rebound, rigidity or guarding was appreciated. Lymphatic: No lymphadenopathy. Musculoskeletal: no edema  ,no swelling    Extremities:no edema   Skin:  Warm and dry. Good color, turgor and pigmentation. No lesions or scars. Neurological/Psychiatric: The patient's general behavior, level of consciousness, thought content and emotional status is normal.  Cranial nerves II-XII are intact. DATA: Spirometry done in the office today demonstrates an FVC of 0.90  liters which is 43 % of predicted with an FEV1 of  0.67  liters which is 43 % of predicted. FEV1/FVC ratio is75 %. IMPRESSION:    1-Chronic cough   2-Non specific spirometry   3-?  Chronic bronchitis   4-weight loss            5- back lump   6- Lung nodule   7- sinusitis       PLAN:     Still with cough and post nasal drainage   Will try antihistamine before bed ,if any issues to stop   -Eosinophilic count 492   IgE 6   Full PFT no obstruction   Respiratory allergy profile normal   ESR  15  CT scan chest without contrast no lesions   -Ct scan reviewed and has stable nodule   Continue  Singulair and Zyertc and  nasal saline   Follow  ENT,got Flonase       Sincerely,        Román Gaytan MD  Pulmonary & Critical Care Medicine     NOTE: This report was transcribed using voice recognition software. Every effort was made to ensure accuracy; however, inadvertent computerized transcription errors may be present.

## 2021-10-11 ENCOUNTER — TELEPHONE (OUTPATIENT)
Dept: CARDIOLOGY | Age: 86
End: 2021-10-11

## 2021-10-18 ENCOUNTER — HOSPITAL ENCOUNTER (OUTPATIENT)
Dept: ULTRASOUND IMAGING | Age: 86
Discharge: HOME | End: 2021-10-20
Payer: MEDICARE

## 2021-10-18 DIAGNOSIS — E04.1 NONTOXIC UNINODULAR GOITER: ICD-10-CM

## 2021-10-18 PROCEDURE — 76536 US EXAM OF HEAD AND NECK: CPT

## 2022-01-20 ENCOUNTER — HOSPITAL ENCOUNTER (OUTPATIENT)
Dept: INFUSION THERAPY | Age: 87
Setting detail: INFUSION SERIES
Discharge: HOME | End: 2022-01-20
Payer: MEDICARE

## 2022-01-20 VITALS
RESPIRATION RATE: 18 BRPM | SYSTOLIC BLOOD PRESSURE: 123 MMHG | HEART RATE: 57 BPM | TEMPERATURE: 97.8 F | DIASTOLIC BLOOD PRESSURE: 58 MMHG | OXYGEN SATURATION: 96 %

## 2022-01-20 DIAGNOSIS — M81.0 OSTEOPOROSIS, UNSPECIFIED OSTEOPOROSIS TYPE, UNSPECIFIED PATHOLOGICAL FRACTURE PRESENCE: Primary | ICD-10-CM

## 2022-01-20 PROCEDURE — 6360000002 HC RX W HCPCS: Performed by: OBSTETRICS & GYNECOLOGY

## 2022-01-20 PROCEDURE — 96372 THER/PROPH/DIAG INJ SC/IM: CPT

## 2022-01-20 RX ADMIN — DENOSUMAB 60 MG: 60 INJECTION SUBCUTANEOUS at 11:36

## 2022-01-20 NOTE — PROGRESS NOTES
Patient tolerated  PROLIA  injection well. Therapy plan reviewed with patient. Verbalizes understanding. Reviewed AVS with patient, reviewed medication information, verbalizes good knowledge of current plan, and has no signs or symptoms to report at this time. Declines copy of AVS.  Next appointment made and patient instructed on lab draw and procedure for next injection.

## 2022-02-16 ENCOUNTER — OFFICE VISIT (OUTPATIENT)
Dept: PULMONOLOGY | Age: 87
End: 2022-02-16
Payer: MEDICARE

## 2022-02-16 VITALS — BODY MASS INDEX: 24.99 KG/M2 | TEMPERATURE: 97.4 F | HEIGHT: 55 IN | RESPIRATION RATE: 20 BRPM | WEIGHT: 108 LBS

## 2022-02-16 DIAGNOSIS — R91.1 PULMONARY NODULE: ICD-10-CM

## 2022-02-16 DIAGNOSIS — J44.1 COPD EXACERBATION (HCC): ICD-10-CM

## 2022-02-16 PROCEDURE — G8417 CALC BMI ABV UP PARAM F/U: HCPCS | Performed by: INTERNAL MEDICINE

## 2022-02-16 PROCEDURE — 1090F PRES/ABSN URINE INCON ASSESS: CPT | Performed by: INTERNAL MEDICINE

## 2022-02-16 PROCEDURE — 1036F TOBACCO NON-USER: CPT | Performed by: INTERNAL MEDICINE

## 2022-02-16 PROCEDURE — 3023F SPIROM DOC REV: CPT | Performed by: INTERNAL MEDICINE

## 2022-02-16 PROCEDURE — 99213 OFFICE O/P EST LOW 20 MIN: CPT | Performed by: INTERNAL MEDICINE

## 2022-02-16 PROCEDURE — 99214 OFFICE O/P EST MOD 30 MIN: CPT | Performed by: INTERNAL MEDICINE

## 2022-02-16 PROCEDURE — 1123F ACP DISCUSS/DSCN MKR DOCD: CPT | Performed by: INTERNAL MEDICINE

## 2022-02-16 PROCEDURE — G8400 PT W/DXA NO RESULTS DOC: HCPCS | Performed by: INTERNAL MEDICINE

## 2022-02-16 PROCEDURE — G8484 FLU IMMUNIZE NO ADMIN: HCPCS | Performed by: INTERNAL MEDICINE

## 2022-02-16 PROCEDURE — G8427 DOCREV CUR MEDS BY ELIG CLIN: HCPCS | Performed by: INTERNAL MEDICINE

## 2022-02-16 PROCEDURE — 4040F PNEUMOC VAC/ADMIN/RCVD: CPT | Performed by: INTERNAL MEDICINE

## 2022-02-16 RX ORDER — ECHINACEA PURPUREA EXTRACT 125 MG
TABLET ORAL
Qty: 1 EACH | Refills: 2 | Status: SHIPPED | OUTPATIENT
Start: 2022-02-16

## 2022-02-16 NOTE — PROGRESS NOTES
6 mos follow up visit today in office; pt to be ordered new inhaler Q Nury and Bactroban/Saline nasal spray to be used as discussed with Dr. Sol Lindsey. Follow up in further with PCP for any issues. No further appts.

## 2022-02-16 NOTE — PROGRESS NOTES
Department of Internal Medicine  Division of Pulmonary, Critical Care & Sleep Medicine  Pulmonary [address]                                             Pulmonary Clinic Consult     I had the pleasure of seeing  Shasta Bliss in the [facility] regarding their cough     No chief complaint on file.       HISTORY OF PRESENT ILLNESS:    Shasta Bliss is a 81y.o. year old  Never smoke but she has second hand smokers from her Son     The Patient comes in with SOB that has been going on for the last 7 months  Associated with Cough ,She  states that it get worse with exercise or walking long distance 1 block ,very slowly and he can walk And go 1 flight of stairs before get short winded    She  has cough ,productive for yellow Sputum and it is more in the morning and thick     Has history of lung disease include  No     She has 14 pounds weight loss and she is not eating as much and no appetite    No hemoptysis ,she has some chocking and had sallow study and was fine     She has no wheezing     Today visit  Still with some cough ,dry cough  Still with nasal drainge   No fever  No hemoptysis   Still with sinus issues       ALLERGIES:  No Known Allergies    PAST MEDICAL HISTORY:       Diagnosis Date    Arthritis     CAD (coronary artery disease)     Cardiomyopathy ([address]     CHF (congestive heart failure) ([address] 2010    history of    Chronic bronchitis ([address]     none recent    COPD (chronic obstructive pulmonary disease) ([address]     Depression     GERD (gastroesophageal reflux disease)     HFrEF (heart failure with reduced ejection fraction) ([address] 12/29/2016 12/26/16- LVEF 20-25%, large apical aneurysm, LA moderate-severely dilated, mildly enlarged RA, mild MR, mild TR, mild AR    Hyperlipidemia     Hypertension     MI (myocardial infarction) ([address] 10/30/2009    Osteopenia     Osteoporosis     Swallowing difficulty        MEDICATIONS:   Current Outpatient [employer] ICD change out,     CARDIAC PACEMAKER PLACEMENT  5/10/2010    Dual chamber pacer ICD. [address] 43  2009    stent    CARDIOVASCULAR STRESS TEST  3/4/2010    COLONOSCOPY      COSMETIC SURGERY      eyelids breast    DIAGNOSTIC CARDIAC CATH LAB PROCEDURE  10/30/2009    ESOPHAGEAL MOTILITY STUDY N/A 1/2/2020    ESOPHAGEAL MOTILITY/MANOMETRY STUDY performed by Maritza Maloney DO at [address] IR US THYROID BIOPSY      OTHER SURGICAL HISTORY Right 12/17/15    ORIF RIGHT DISTAL RADIUS    OTHER SURGICAL HISTORY Right 4/10/2016    IP joint release of thumb    TONSILLECTOMY      TRANSTHORACIC ECHOCARDIOGRAM  3/30/11ize and function,     UPPER GASTROINTESTINAL ENDOSCOPY N/A 2/13/2020    EGD SUBMUCOSAL/BOTOX INJECTION performed by Kanwal Bolaños DO at [facility] N/A 3/25/2021    EGD SUBMUCOSAL/BOTOX INJECTION performed by Kanwal Bolaños DO at [address]:   Lung cancer:no   DVT or PE No    REVIEW OF SYSTEMS:  Constitutional: General health is good . There has been no weight changes. No fevers, fatigue or weakness. Head: Patient denies any history of trauma, convulsive disorder or syncope. Skin:  Patient denies history of changes in pigmentation, eruptions or pruritus. No easy bruising or bleeding. EENT: no nasal congestion   Cardiovascular ,No chest pain ,No edema ,  Respiration:SOB: + ,LYON :+ cough   Gastrointestinal:No GI bleed ,no melena  ,no hematemesis    Musculoskeletal: no joint pain ,no swelling  Neurological:no , syncope. Denies twitching, convulsions, loss of consciousness or memory. Endocrine:  . No history of goiter, exophthalmos or dryness of skin. The patient has no history of diabetes. Hematopoietic:  No history of bleeding disorders or easy bruising. Rheumatic:  No connective tissue disease history or polyarthritis/inflammatory joint disease.       PHYSICAL EXAMINATION:  There were no vitals filed for this visit. Constitutional: This is a well developed, well nourished 81y.o. year old female who is alert, oriented, cooperative and in no apparent distress. Head was normocephalic and atraumatic. EENT: Mallampati class :  Extraocular muscles intact. External canals are patent and no discharge was appreciated. Septum was midline,   mucosa was without erythema, exudates or cobblestoning. No thrush was noted. Neck: Supple without thyromegaly. No elevated JVP. Trachea was midline. No carotid bruits were auscultated. Respiratory: rhonchi scattered     Cardiovascular: Regular without murmur, clicks, gallops or rubs. There is no left or right ventricular heave. Pulses:  Carotid, radial and femoral pulses were equally bilaterally. Abdomen: Slightly rounded and soft without organomegaly. No rebound, rigidity or guarding was appreciated. Lymphatic: No lymphadenopathy. Musculoskeletal: no edema  ,no swelling    Extremities:no edema   Skin:  Warm and dry. Good color, turgor and pigmentation. No lesions or scars. Neurological/Psychiatric: The patient's general behavior, level of consciousness, thought content and emotional status is normal.  Cranial nerves II-XII are intact. DATA: Spirometry done in the office today demonstrates an FVC of 0.90  liters which is 43 % of predicted with an FEV1 of  0.67  liters which is 43 % of predicted. FEV1/FVC ratio is75 %. IMPRESSION:    1-Chronic cough   2-Non specific spirometry   3-?  Chronic bronchitis   4-weight loss            5- back lump   6- Lung nodule   7- sinusitis       PLAN:     Still with cough and post nasal drainage  And most likely etiology ,seen by ENT   Will try antihistamine before bed ,if any issues to stop   -Eosinophilic count 947   IgE 6   Lung nodules all calcified   Full PFT no obstruction   Respiratory allergy profile normal   ESR  15  CT scan chest without contrast no lesions   -Ct scan reviewed and has stable nodule   Continue  Singulair and Zyertc and  nasal saline   Will add Bactroban and saline    Seen by ENT    she does not Symbicort ,will QVAR   Will try Qvar since she can not be in [place] for A fib   Sincerely,        Román Moreau MD  Pulmonary & Critical Care Medicine     NOTE: This report was transcribed using voice recognition software. Every effort was made to ensure accuracy; however, inadvertent computerized transcription errors may be present.

## 2022-03-02 ENCOUNTER — OFFICE VISIT (OUTPATIENT)
Dept: CARDIOLOGY CLINIC | Age: 87
End: 2022-03-02
Payer: MEDICARE

## 2022-03-02 VITALS
SYSTOLIC BLOOD PRESSURE: 138 MMHG | HEIGHT: 59 IN | WEIGHT: 109 LBS | BODY MASS INDEX: 21.97 KG/M2 | DIASTOLIC BLOOD PRESSURE: 80 MMHG | RESPIRATION RATE: 14 BRPM | HEART RATE: 55 BPM

## 2022-03-02 DIAGNOSIS — I48.19 PERSISTENT ATRIAL FIBRILLATION (HCC): Primary | ICD-10-CM

## 2022-03-02 DIAGNOSIS — I34.0 NONRHEUMATIC MITRAL VALVE REGURGITATION: ICD-10-CM

## 2022-03-02 DIAGNOSIS — I10 ESSENTIAL HYPERTENSION: ICD-10-CM

## 2022-03-02 DIAGNOSIS — I35.1 NONRHEUMATIC AORTIC VALVE INSUFFICIENCY: ICD-10-CM

## 2022-03-02 DIAGNOSIS — I25.5 ISCHEMIC CARDIOMYOPATHY: ICD-10-CM

## 2022-03-02 PROCEDURE — 93000 ELECTROCARDIOGRAM COMPLETE: CPT | Performed by: INTERNAL MEDICINE

## 2022-03-02 PROCEDURE — 4040F PNEUMOC VAC/ADMIN/RCVD: CPT | Performed by: INTERNAL MEDICINE

## 2022-03-02 PROCEDURE — 1123F ACP DISCUSS/DSCN MKR DOCD: CPT | Performed by: INTERNAL MEDICINE

## 2022-03-02 PROCEDURE — G8420 CALC BMI NORM PARAMETERS: HCPCS | Performed by: INTERNAL MEDICINE

## 2022-03-02 PROCEDURE — 99214 OFFICE O/P EST MOD 30 MIN: CPT | Performed by: INTERNAL MEDICINE

## 2022-03-02 PROCEDURE — G8427 DOCREV CUR MEDS BY ELIG CLIN: HCPCS | Performed by: INTERNAL MEDICINE

## 2022-03-02 PROCEDURE — G8484 FLU IMMUNIZE NO ADMIN: HCPCS | Performed by: INTERNAL MEDICINE

## 2022-03-02 PROCEDURE — 1036F TOBACCO NON-USER: CPT | Performed by: INTERNAL MEDICINE

## 2022-03-02 PROCEDURE — 1090F PRES/ABSN URINE INCON ASSESS: CPT | Performed by: INTERNAL MEDICINE

## 2022-03-02 PROCEDURE — G8400 PT W/DXA NO RESULTS DOC: HCPCS | Performed by: INTERNAL MEDICINE

## 2022-03-02 NOTE — PROGRESS NOTES
OFFICE FOLLOW-UP        PRIMARY CARE PHYSICIAN:      Fara Dominguez MD    Date of Service: 3/2/2022     ALLERGIES / SENSITIVITIES:        No Known Allergies     REVIEWED MEDICATIONS:        Current Outpatient Medications:     mupirocin (BACTROBAN NASAL) 2 % nasal ointment, Take by Nasal route 2 times daily. , Disp: 1 each, Rfl: 3    sodium chloride (OCEAN) 0.65 % nasal spray, Take as directed, Disp: 1 each, Rfl: 2    vitamin D (ERGOCALCIFEROL) 1.25 MG (01877 UT) CAPS capsule, TAKE 1 CAPSULE BY MOUTH ONCE WEEKLY, Disp: , Rfl:     losartan (COZAAR) 25 MG tablet, TAKE 1 TABLET BY MOUTH EVERY DAY, Disp: , Rfl:     acetaminophen (TYLENOL) 500 MG tablet, Take 2 tablets by mouth every 8 hours as needed for Pain, Disp: 50 tablet, Rfl: 0    omeprazole (PRILOSEC) 40 MG delayed release capsule, , Disp: , Rfl:     Pbgfhqsyi-Umzcdpxec-Opucqlskso (CEREFOLIN NAC PO), Take by mouth, Disp: , Rfl:     metoprolol succinate (TOPROL XL) 25 MG extended release tablet, Take 1 tablet by mouth daily, Disp: 90 tablet, Rfl: 3    furosemide (LASIX) 20 MG tablet, Take 1 tablet by mouth daily (Patient taking differently: Take 10 mg by mouth daily ), Disp: 90 tablet, Rfl: 3    amiodarone (CORDARONE) 200 MG tablet, Take 1 tablet by mouth daily (Patient taking differently: Take 100 mg by mouth Daily with supper Take only 5 days per week), Disp: 90 tablet, Rfl: 3    apixaban (ELIQUIS) 2.5 MG TABS tablet, Take 2.5 mg by mouth 2 times daily, Disp: , Rfl:     mometasone (NASONEX) 50 MCG/ACT nasal spray, USE 2 SPRAYS IN EACH NOSTIL ONCE A DAY, Disp: , Rfl: 3    simvastatin (ZOCOR) 20 MG tablet, Take 1 tablet by mouth daily (Patient taking differently: Take 20 mg by mouth nightly ), Disp: 90 tablet, Rfl: 3    Coenzyme Q10 (CO Q-10) 100 MG CAPS, Take by mouth Last dose 2-11-20, Disp: , Rfl:     aspirin 81 MG tablet, Take 81 mg by mouth nightly To check hold  with , Disp: , Rfl:     beclomethasone (QVAR) 40 MCG/ACT inhaler, Inhale 2 puffs into the lungs 2 times daily (Patient not taking: Reported on 3/2/2022), Disp: 1 each, Rfl: 0    famotidine (PEPCID) 20 MG tablet, Take 20 mg by mouth nightly (Patient not taking: Reported on 3/2/2022), Disp: , Rfl:       S: REASON FOR VISIT:      Previously followed by Dr. Johnny Resendez -- she established with me in 4/2016. She denies recent chest pain, palpitations, PND, orthopnea, or syncope (she recently stopped bowling and going to water aerobics). +worsening LYON over the past 1 year. She follows regularly with Dr. Olena De Guzman for ICD interrogations and PAF (s/p generator change in 2/2018). +cough/sinus problems since 11/2018 --> she follows with ENT (Dr. Adam Gonzales) --> clinically improved on nasonex. She is currently with no active cardiac complaints at rest.     REVIEW OF SYSTEMS:    Cardiac: As per HPI  General: No fever, chills  Pulmonary: As per HPI  HEENT: No visual disturbances, difficult swallowing  GI: No nausea, vomiting  : No dysuria, hematuria  Endocrine: No thyroid disease or DM  Musculoskeletal: MACKEY x 4, no focal motor deficits  Skin: Intact, no rashes  Neuro: No headache, seizures  Psych: Currently with no depression, anxiety     CARDIOVASCULAR HISTORY:    1. Coronary artery disease/ischemic cardiomyopathy/congestive heart failure. a. 10/30/2009: Acute ST elevation anterolateral wall myocardial infarction. b. 10/30/2009: Cardiac catheterization/primary angioplasty: Left main short vessel with no significant disease. LAD occluded proximally. LCX: 90% stenosis of the 3rd obtuse marginal branch. RCA, a small nondominant vessel, which was non-selectively visualized. Successful balloon angioplasty and stenting to the proximal LAD with restoration of DORI 3 flow in the vessel.    c. 06/18/2014 Lexiscan nuclear stress test was a markedly abnormal study showing a transmural myocardial infarction involving a large wrap around left anterior descending artery distribution with no evidence of residual stress-induced ischemia with the gated views showing akinesis of the left ventricular apex, the apical anterior wall and the apical septum with severe hypokinesis of the inferior wall and moderate hypokinesis of the rest of the interventricular septum with a calculated ejection fraction of 34 %. d. 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 Normal right ventricular size and function, with a pacemaker noted in the right ventricle. Pacemaker also noted in the right atrium. Mild to moderate mitral regurgitation. Mild to moderate tricuspid regurgitation. Mild aortic sclerosis with trace aortic regurgitation. Aortic root sclerosis/calcification. Small pericardial effusion. e. Congestive heart failure acute decompensation, first diagnosed in 4/2014.   2. Insertion of dual chamber pacer ICD on 5/10/2010.   3. History of hypertension. 4. Diabetes mellitus, diagnosed in 4/2014.   5. Hyponatremia in 4/2014 and again on a blood test on 5/8/2014. Echocardiogram: 12/26/16 (Price Figueroa)   Mildly dilated left ventricle.   Large apical aneurysm with dyskinetic apex.   Severe hypokinesis of the apical septum and lateral wall.   Overall LVEF is visually estimated at 20-25%   The left atrium is moderate-severely dilated.   Structurally normal mitral valve.   Increased E point septal separation noted,suggesting decreased LV cardiac output   Mild mitral regurgitation is present.   The aortic valve is trileaflet.   The aortic valve appears mildly sclerotic.   Mild aortic regurgitation is noted.   Normal tricuspid valve structure and function.   Mild tricuspid regurgitation.  RVSP is 25-30 mmHg.  Chaya Correia is a trivial localized near right ventricle pericardial effusion noted.     ALMA: 6/9/2020 (Dr. Esther Ferro) systolic murmur heard at the apex. Grade II/VI systolic murmur heard at the right upper sternal border. Abdomen: Soft, nontender, +bowel sounds  Extremities: Moves all extremities x 4, no lower extremity edema  Neurologic: No focal motor deficits apparent, normal mood and affect, alert and oriented x 3    Lab Results   Component Value Date    CREATININE 1.1 (H) 10/01/2020    BUN 18 10/01/2020     (L) 06/12/2020    K 4.3 06/12/2020    CL 90 (L) 06/12/2020    CO2 29 06/12/2020     EKG: AP/VS, rate 55, QTc 450 msec       ASSESSMENT / DIAGNOSIS:   1. Ischemic cardiomyopathy with severe LV dysfunction / chronic systolic and diastolic congestive heart failure: Compensated currently. 2. Status post ICD implantation (s/p generator change in 2/2018)  3. Valvular heart disease. 4. History of hypertension. Controlled. BP today 138/80 (-126 at prior office visits). Prior history of intermittent hypotension. 5. Asthma. 6. GERD. 7. Depression. 8. Osteoporosis. 9. Overreactive bladder. 10. Bilateral hearing loss: Wears bilateral hearing aids. 11. History of bilateral breast implants with history of removal of extravasated implant material on the right. 12. Diabetes mellitus: Diet controlled. 13. Chronic kidney disease/prerenal azotemia. 14. S/p right thumb surgery on 5/10/16, Dr. Nellene Kayser  15. Dysphagia -- s/p esophageal dilatation in 11/2019 per patient  16. New onset atrial fibrillation s/p ALMA/CVN on 6/9/2020 (started on amiodarone at that time)    TREATMENT PLAN:  1. Repeat echocardiogram ordered at 6/2021 office visit (not performed) --> repeat echocardiogram ordered today  2. Continue current medications (including Toprol XL, ARB, and eliquis). Aldactone and entresto previously stopped in the setting of electrolyte abnormalities and hypotension. 3. Monitor renal function and electrolytes closely  4. Follow-up with EP re: ICD interrogation (follows with Dr. Saint Cote)  5.  Questions answered today re: GDMT options  6. The above was discussed with the patient and her  today (3/2/22)    Greater than 30 minutes was spent counseling the patient, reviewing the rationale for the above recommendations and reviewing the patient's current medication list, problem list and results of all previously ordered testing.     Ginger Moreau MD, MD  [facility]

## 2022-03-31 ENCOUNTER — HOSPITAL ENCOUNTER (OUTPATIENT)
Dept: CARDIOLOGY | Age: 87
Discharge: HOME | End: 2022-03-31
Payer: MEDICARE

## 2022-03-31 ENCOUNTER — TELEPHONE (OUTPATIENT)
Dept: CARDIOLOGY CLINIC | Age: 87
End: 2022-03-31

## 2022-03-31 DIAGNOSIS — I34.0 NONRHEUMATIC MITRAL VALVE REGURGITATION: ICD-10-CM

## 2022-03-31 DIAGNOSIS — I48.19 PERSISTENT ATRIAL FIBRILLATION (HCC): ICD-10-CM

## 2022-03-31 DIAGNOSIS — I35.1 NONRHEUMATIC AORTIC VALVE INSUFFICIENCY: ICD-10-CM

## 2022-03-31 LAB
LV EF: 28 %
LVEF MODALITY: NORMAL

## 2022-03-31 PROCEDURE — 93306 TTE W/DOPPLER COMPLETE: CPT | Performed by: PSYCHIATRY & NEUROLOGY

## 2022-03-31 NOTE — TELEPHONE ENCOUNTER
----- Message from Grecia Sy MD sent at 3/31/2022  4:06 PM EDT -----  EF 25-30% (known history of severely reduced LF function)  Moderate valve disease

## 2022-07-21 ENCOUNTER — HOSPITAL ENCOUNTER (OUTPATIENT)
Dept: INFUSION THERAPY | Age: 87
Setting detail: INFUSION SERIES
Discharge: HOME | End: 2022-07-21
Payer: MEDICARE

## 2022-07-21 VITALS
HEIGHT: 59 IN | BODY MASS INDEX: 21.17 KG/M2 | DIASTOLIC BLOOD PRESSURE: 54 MMHG | SYSTOLIC BLOOD PRESSURE: 109 MMHG | HEART RATE: 55 BPM | RESPIRATION RATE: 16 BRPM | OXYGEN SATURATION: 97 % | TEMPERATURE: 97.1 F | WEIGHT: 105 LBS

## 2022-07-21 DIAGNOSIS — M81.0 OSTEOPOROSIS, UNSPECIFIED OSTEOPOROSIS TYPE, UNSPECIFIED PATHOLOGICAL FRACTURE PRESENCE: Primary | ICD-10-CM

## 2022-07-21 PROCEDURE — 96372 THER/PROPH/DIAG INJ SC/IM: CPT

## 2022-07-21 PROCEDURE — 6360000002 HC RX W HCPCS: Performed by: OBSTETRICS & GYNECOLOGY

## 2022-07-21 RX ADMIN — DENOSUMAB 60 MG: 60 INJECTION SUBCUTANEOUS at 09:31

## 2022-07-21 ASSESSMENT — PAIN SCALES - GENERAL: PAINLEVEL_OUTOF10: 0

## 2022-07-21 NOTE — PROGRESS NOTES
Tolerated injection well. Reviewed therapy plan, offered education material and/or discharge material, reviewed medication information and signs and symptoms  and educated on possible side effects, verbalizes good knowledge of current plan patient verbalizes understanding, and has no signs or symptoms to report at this time. Patient discharged. Patient alert and oriented x3. No distress noted. Vital signs stable. Patient denies any new or worsening pain. Patient denies any needs. All questions answered. Next appointment scheduled. .Instructed on lab draw for next injection.

## 2022-08-18 ENCOUNTER — HOSPITAL ENCOUNTER (INPATIENT)
Age: 87
LOS: 5 days | Discharge: HOME | DRG: 177 | End: 2022-08-23
Attending: STUDENT IN AN ORGANIZED HEALTH CARE EDUCATION/TRAINING PROGRAM | Admitting: INTERNAL MEDICINE
Payer: MEDICARE

## 2022-08-18 ENCOUNTER — APPOINTMENT (OUTPATIENT)
Dept: GENERAL RADIOLOGY | Age: 87
DRG: 177 | End: 2022-08-18
Payer: MEDICARE

## 2022-08-18 DIAGNOSIS — I50.9 ACUTE ON CHRONIC CONGESTIVE HEART FAILURE, UNSPECIFIED HEART FAILURE TYPE (HCC): Primary | ICD-10-CM

## 2022-08-18 DIAGNOSIS — U07.1 COVID: ICD-10-CM

## 2022-08-18 PROBLEM — J96.90 RESPIRATORY FAILURE (HCC): Status: ACTIVE | Noted: 2022-08-18

## 2022-08-18 LAB
ANION GAP SERPL CALCULATED.3IONS-SCNC: 17 MMOL/L (ref 7–16)
B.E.: -1.6 MMOL/L (ref -3–3)
BASOPHILS ABSOLUTE: 0.03 E9/L (ref 0–0.2)
BASOPHILS RELATIVE PERCENT: 0.4 % (ref 0–2)
BUN BLDV-MCNC: 19 MG/DL (ref 6–23)
CALCIUM SERPL-MCNC: 9 MG/DL (ref 8.6–10.2)
CHLORIDE BLD-SCNC: 93 MMOL/L (ref 98–107)
CO2: 22 MMOL/L (ref 22–29)
COHB: 1.4 % (ref 0–1.5)
CREAT SERPL-MCNC: 0.8 MG/DL (ref 0.5–1)
CRITICAL: ABNORMAL
D DIMER: <200 NG/ML DDU
DATE ANALYZED: ABNORMAL
DATE OF COLLECTION: ABNORMAL
EKG ATRIAL RATE: 75 BPM
EKG P AXIS: 52 DEGREES
EKG P-R INTERVAL: 212 MS
EKG Q-T INTERVAL: 390 MS
EKG QRS DURATION: 130 MS
EKG QTC CALCULATION (BAZETT): 435 MS
EKG R AXIS: -43 DEGREES
EKG T AXIS: 63 DEGREES
EKG VENTRICULAR RATE: 75 BPM
EOSINOPHILS ABSOLUTE: 0 E9/L (ref 0.05–0.5)
EOSINOPHILS RELATIVE PERCENT: 0 % (ref 0–6)
GFR AFRICAN AMERICAN: >60
GFR NON-AFRICAN AMERICAN: >60 ML/MIN/1.73
GLUCOSE BLD-MCNC: 111 MG/DL (ref 74–99)
HCO3: 22.2 MMOL/L (ref 22–26)
HCT VFR BLD CALC: 38 % (ref 34–48)
HEMOGLOBIN: 12.4 G/DL (ref 11.5–15.5)
HHB: 8.6 % (ref 0–5)
IMMATURE GRANULOCYTES #: 0.03 E9/L
IMMATURE GRANULOCYTES %: 0.4 % (ref 0–5)
LAB: ABNORMAL
LYMPHOCYTES ABSOLUTE: 0.66 E9/L (ref 1.5–4)
LYMPHOCYTES RELATIVE PERCENT: 8.9 % (ref 20–42)
Lab: ABNORMAL
MCH RBC QN AUTO: 33 PG (ref 26–35)
MCHC RBC AUTO-ENTMCNC: 32.6 % (ref 32–34.5)
MCV RBC AUTO: 101.1 FL (ref 80–99.9)
METHB: 0.1 % (ref 0–1.5)
MODE: ABNORMAL
MONOCYTES ABSOLUTE: 0.96 E9/L (ref 0.1–0.95)
MONOCYTES RELATIVE PERCENT: 12.9 % (ref 2–12)
NEUTROPHILS ABSOLUTE: 5.77 E9/L (ref 1.8–7.3)
NEUTROPHILS RELATIVE PERCENT: 77.4 % (ref 43–80)
O2 CONTENT: 15.6 ML/DL
O2 SATURATION: 91.3 % (ref 92–98.5)
O2HB: 89.9 % (ref 94–97)
OPERATOR ID: 7292
PATIENT TEMP: 37 C
PCO2: 34.4 MMHG (ref 35–45)
PDW BLD-RTO: 13.2 FL (ref 11.5–15)
PH BLOOD GAS: 7.43 (ref 7.35–7.45)
PLATELET # BLD: 259 E9/L (ref 130–450)
PMV BLD AUTO: 9.7 FL (ref 7–12)
PO2: 60.3 MMHG (ref 75–100)
POTASSIUM REFLEX MAGNESIUM: 4.6 MMOL/L (ref 3.5–5)
PRO-BNP: 9250 PG/ML (ref 0–450)
RBC # BLD: 3.76 E12/L (ref 3.5–5.5)
REASON FOR REJECTION: NORMAL
REJECTED TEST: NORMAL
SODIUM BLD-SCNC: 132 MMOL/L (ref 132–146)
SOURCE, BLOOD GAS: ABNORMAL
THB: 12.3 G/DL (ref 11.5–16.5)
TIME ANALYZED: 1244
TROPONIN, HIGH SENSITIVITY: 18 NG/L (ref 0–9)
WBC # BLD: 7.5 E9/L (ref 4.5–11.5)

## 2022-08-18 PROCEDURE — 84484 ASSAY OF TROPONIN QUANT: CPT

## 2022-08-18 PROCEDURE — 82805 BLOOD GASES W/O2 SATURATION: CPT

## 2022-08-18 PROCEDURE — 6360000002 HC RX W HCPCS: Performed by: STUDENT IN AN ORGANIZED HEALTH CARE EDUCATION/TRAINING PROGRAM

## 2022-08-18 PROCEDURE — 93005 ELECTROCARDIOGRAM TRACING: CPT | Performed by: STUDENT IN AN ORGANIZED HEALTH CARE EDUCATION/TRAINING PROGRAM

## 2022-08-18 PROCEDURE — 71045 X-RAY EXAM CHEST 1 VIEW: CPT

## 2022-08-18 PROCEDURE — 80048 BASIC METABOLIC PNL TOTAL CA: CPT

## 2022-08-18 PROCEDURE — 96374 THER/PROPH/DIAG INJ IV PUSH: CPT

## 2022-08-18 PROCEDURE — 83880 ASSAY OF NATRIURETIC PEPTIDE: CPT

## 2022-08-18 PROCEDURE — 1200000000 HC SEMI PRIVATE

## 2022-08-18 PROCEDURE — 99285 EMERGENCY DEPT VISIT HI MDM: CPT

## 2022-08-18 PROCEDURE — 85378 FIBRIN DEGRADE SEMIQUANT: CPT

## 2022-08-18 PROCEDURE — 6370000000 HC RX 637 (ALT 250 FOR IP): Performed by: STUDENT IN AN ORGANIZED HEALTH CARE EDUCATION/TRAINING PROGRAM

## 2022-08-18 PROCEDURE — 85025 COMPLETE CBC W/AUTO DIFF WBC: CPT

## 2022-08-18 PROCEDURE — 94640 AIRWAY INHALATION TREATMENT: CPT

## 2022-08-18 RX ORDER — IPRATROPIUM BROMIDE AND ALBUTEROL SULFATE 2.5; .5 MG/3ML; MG/3ML
1 SOLUTION RESPIRATORY (INHALATION)
Status: DISCONTINUED | OUTPATIENT
Start: 2022-08-18 | End: 2022-08-18

## 2022-08-18 RX ORDER — METHYLPREDNISOLONE SODIUM SUCCINATE 125 MG/2ML
60 INJECTION, POWDER, LYOPHILIZED, FOR SOLUTION INTRAMUSCULAR; INTRAVENOUS ONCE
Status: COMPLETED | OUTPATIENT
Start: 2022-08-18 | End: 2022-08-18

## 2022-08-18 RX ORDER — IPRATROPIUM BROMIDE AND ALBUTEROL SULFATE 2.5; .5 MG/3ML; MG/3ML
1 SOLUTION RESPIRATORY (INHALATION) ONCE
Status: COMPLETED | OUTPATIENT
Start: 2022-08-18 | End: 2022-08-18

## 2022-08-18 RX ORDER — FUROSEMIDE 10 MG/ML
40 INJECTION INTRAMUSCULAR; INTRAVENOUS ONCE
Status: COMPLETED | OUTPATIENT
Start: 2022-08-18 | End: 2022-08-18

## 2022-08-18 RX ADMIN — IPRATROPIUM BROMIDE AND ALBUTEROL SULFATE 1 AMPULE: .5; 3 SOLUTION RESPIRATORY (INHALATION) at 12:47

## 2022-08-18 RX ADMIN — FUROSEMIDE 40 MG: 10 INJECTION, SOLUTION INTRAMUSCULAR; INTRAVENOUS at 16:03

## 2022-08-18 RX ADMIN — METHYLPREDNISOLONE SODIUM SUCCINATE 60 MG: 125 INJECTION, POWDER, FOR SOLUTION INTRAMUSCULAR; INTRAVENOUS at 12:54

## 2022-08-18 ASSESSMENT — PAIN - FUNCTIONAL ASSESSMENT: PAIN_FUNCTIONAL_ASSESSMENT: NONE - DENIES PAIN

## 2022-08-18 ASSESSMENT — PAIN SCALES - GENERAL: PAINLEVEL_OUTOF10: 0

## 2022-08-18 NOTE — ED PROVIDER NOTES
ED  Provider Note  Admit Date/RoomTime: 8/18/2022 12:05 PM  ED Room: 8416/8416-A      History of Present Illness:  8/18/22, Time: 12:14 PM EDT  Chief Complaint   Patient presents with    Positive For Covid-19     Tested + for Covid today at urgent care. Having SOB, sat 97% room air       Dong Riley is a 81 y.o. female presenting to the ED for shortness of breath. Patient tested COVID-positive today at urgent care. Day 2 of symptoms, had some sharp chest pain lasting seconds yesterday, resolving spontaneously, denies chest arm jaw neck or back pain today. Shortness of breath progressively worsening over the past few days, has COPD per her report but does not take any inhalers because \"my doctor said it was bad for my heart\". She is saturating 97% on room air, but having some mild respiratory distress with tachypnea. However, she is not conversationally dyspneic. She denies any lower extremity edema, has had fevers and chills, has had cough without hemoptysis, positive for production of sputum. Does not wish to go heavily to inhalers per her doctors instrution so will do one ampule. 8/15/22  REMOTE  ICD INTERROGATION:  DDDR   1. Battery longevity: 3.6-4.1 years  2. Charge time: 8.6 seconds  3. Impedance: A= 340 ohms   V= 310 ohms     HVI= 38 ohms  4. Amplitude: P-wave= 0.9 mV      R-wave= 9.3 mV  5. Threshold:  A= 0.5 V @ 0.5  ms     V= 1.25 V @ 0.5 ms (both 5-5)   6. AP  95 %                 = <1 %  7. PAF episodes detected lasting seconds to 1 1/2 hours. 8.  Hemodynamic Monitor: No indication of fluid retention. ASSESSMENT: ICD function is appropriate.       Review of Systems:   A complete review of systems was performed and pertinent positives and negatives are stated within HPI, all other systems reviewed and are negative.        --------------------------------------------- PATIENT HISTORY--------------------------------------------  Past Medical History:  has a past medical history of Arthritis, CAD (coronary artery disease), Cardiomyopathy ([facility] CHF (congestive heart failure) ([facility] [address] [facility] COPD (chronic obstructive pulmonary disease) ([facility] Depression, GERD (gastroesophageal reflux disease), HFrEF (heart failure with reduced ejection fraction) ([facility] Hyperlipidemia, Hypertension, MI (myocardial infarction) ([facility] Osteopenia, Osteoporosis, and Swallowing difficulty. Past Surgical History:  has a past surgical history that includes Hysterectomy; Appendectomy; Tonsillectomy; Breast surgery (1962); Cosmetic surgery; Breast Capsulectomy (03/07/12); Diagnostic Cardiac Cath Lab Procedure (10/30/2009); cardiovascular stress test (3/4/2010); transthoracic echocardiogram (3/30/11ize and function, ); Cardiac surgery (2009); Cardiac pacemaker placement (5/10/2010); Cardiac defibrillator placement; Colonoscopy; other surgical history (Right, 12/17/15); Cardiac defibrillator placement (5/2010); other surgical history (Right, 4/10/2016); Cardiac defibrillator placement (Left, 02/13/2018); esophageal motility study (N/A, 1/2/2020); Upper gastrointestinal endoscopy (N/A, 2/13/2020); IR US THYROID BIOPSY; and Upper gastrointestinal endoscopy (N/A, 3/25/2021). Family History: family history includes Bipolar Disorder in her son and son; Heart Disease in her brother. . Unless otherwise noted, family history is non contributory    Social History:  reports that she has never smoked. She has never used smokeless tobacco. She reports current alcohol use. She reports that she does not use drugs. The patients home medications have been reviewed. Allergies: Patient has no known allergies.     I have reviewed the past medical history, past surgical history, social history, and family history    ---------------------------------------------------PHYSICAL EXAM--------------------------------------    Constitutional/General: Alert and oriented x3  Head: Normocephalic and atraumatic  Eyes: PERRL, EOMI, sclera non icteric  ENT: Oropharynx clear, handling secretions, no trismus  Neck: Supple, full ROM, no stridor, no meningismus  Respiratory: Diffuse end expiratory wheeze, diminished breath sounds, no rales or rhonchi  Cardiovascular: RRR, no R/G/M, 2+ peripheral pulses  Chest: No chest wall tenderness, equal chest rise  Gastrointestinal: Soft nontender nondistended  Musculoskeletal: Extremities warm and well perfused, moving all extremities  Skin: skin warm and dry. No rashes. Neurologic: No focal deficits, strength and sensation grossly intact   Psychiatric: Normal Affect, behavior normal       -------------------------------------------------- RESULTS -------------------------------------------------  I have personally reviewed all laboratory and imaging results for this patient. Results are listed below.      LABS: (Lab results interpreted by me)  Results for orders placed or performed during the hospital encounter of 08/18/22   CBC with Auto Differential   Result Value Ref Range    WBC 7.5 4.5 - 11.5 E9/L    RBC 3.76 3.50 - 5.50 E12/L    Hemoglobin 12.4 11.5 - 15.5 g/dL    Hematocrit 38.0 34.0 - 48.0 %    .1 (H) 80.0 - 99.9 fL    MCH 33.0 26.0 - 35.0 pg    MCHC 32.6 32.0 - 34.5 %    RDW 13.2 11.5 - 15.0 fL    Platelets 824 843 - 916 E9/L    MPV 9.7 7.0 - 12.0 fL    Neutrophils % 77.4 43.0 - 80.0 %    Immature Granulocytes % 0.4 0.0 - 5.0 %    Lymphocytes % 8.9 (L) 20.0 - 42.0 %    Monocytes % 12.9 (H) 2.0 - 12.0 %    Eosinophils % 0.0 0.0 - 6.0 %    Basophils % 0.4 0.0 - 2.0 %    Neutrophils Absolute 5.77 1.80 - 7.30 E9/L    Immature Granulocytes # 0.03 E9/L    Lymphocytes Absolute 0.66 (L) 1.50 - 4.00 E9/L    Monocytes Absolute 0.96 (H) 0.10 - 0.95 E9/L    Eosinophils Absolute 0.00 (L) 0.05 - 0.50 E9/L    Basophils Absolute 0.03 0.00 - 0.20 W4/H   Basic Metabolic Panel w/ Reflex to MG   Result Value Ref Range    Sodium 132 132 - 146 mmol/L    Potassium reflex Magnesium 4.6 3.5 - 5.0 mmol/L    Chloride 93 (L) 98 - 107 mmol/L    CO2 22 22 - 29 mmol/L    Anion Gap 17 (H) 7 - 16 mmol/L    Glucose 111 (H) 74 - 99 mg/dL    BUN 19 6 - 23 mg/dL    Creatinine 0.8 0.5 - 1.0 mg/dL    GFR Non-African American >60 >=60 mL/min/1.73    GFR African American >60     Calcium 9.0 8.6 - 10.2 mg/dL   Brain Natriuretic Peptide   Result Value Ref Range    Pro-BNP 9,250 (H) 0 - 450 pg/mL   Blood Gas, Arterial   Result Value Ref Range    Date Analyzed [id number]     Time Analyzed 1244     Source: Blood Arterial     pH, Blood Gas 7.427 7.350 - 7.450    PCO2 34.4 (L) 35.0 - 45.0 mmHg    PO2 60.3 (L) 75.0 - 100.0 mmHg    HCO3 22.2 22.0 - 26.0 mmol/L    B.E. -1.6 -3.0 - 3.0 mmol/L    O2 Sat 91.3 (L) 92.0 - 98.5 %    O2Hb 89.9 (L) 94.0 - 97.0 %    COHb 1.4 0.0 - 1.5 %    MetHb 0.1 0.0 - 1.5 %    O2 Content 15.6 mL/dL    HHb 8.6 (H) 0.0 - 5.0 %    tHb (est) 12.3 11.5 - 16.5 g/dL    Mode RA     Date Of Collection      Time Collected      Pt Temp 37.0 C     ID [id number]     Lab [id number]     Critical(s) Notified . No Critical Values    SPECIMEN REJECTION   Result Value Ref Range    Rejected Test dimer     Reason for Rejection see below    Troponin   Result Value Ref Range    Troponin, High Sensitivity 18 (H) 0 - 9 ng/L   D-Dimer, Quantitative   Result Value Ref Range    D-Dimer, Quant <200 ng/mL DDU   EKG 12 Lead   Result Value Ref Range    Ventricular Rate 75 BPM    Atrial Rate 75 BPM    P-R Interval 212 ms    QRS Duration 130 ms    Q-T Interval 390 ms    QTc Calculation (Bazett) 435 ms    P Axis 52 degrees    R Axis -43 degrees    T Axis 63 degrees         RADIOLOGY:  Imaging interpreted by Radiologist unless otherwise specified  XR CHEST PORTABLE   Final Result   Suspect early infiltrates at the lung bases. Cardiomegaly.              ------------------------- NURSING NOTES AND VITALS REVIEWED ---------------------------  The nursing notes within the ED encounter and vital signs as below have been reviewed by myself  BP (!) 123/58   Pulse 61   Temp 97.5 °F (36.4 °C) (Temporal)   Resp 18   Ht 4' 11\" (1.499 m)   Wt 104 lb (47.2 kg)   SpO2 98%   BMI 21.01 kg/m²      The patients available past medical records and past encounters were reviewed. ------------------------------ ED COURSE/MEDICAL DECISION MAKING----------------------  Medications   methylPREDNISolone sodium (SOLU-MEDROL) injection 60 mg (60 mg IntraVENous Given 8/18/22 1254)   ipratropium-albuterol (DUONEB) nebulizer solution 1 ampule (1 ampule Inhalation Given 8/18/22 1247)   furosemide (LASIX) injection 40 mg (40 mg IntraVENous Given 8/18/22 1603)       IDr. Anne-Marie, am the primary provider of record    Medical Decision Making:   Respiratory distress, COVID positive with possible covid PNA. Likely CHF exacerbation with COPD exacerbation overlying. Steroids, DuoNeb, Lasix. Admission. ED Counseling: This emergency provider has spoken with the patient and any family present to discuss clinical status, results, plan of care, diagnosis and prognosis as able to be determined at this time. Any questions were answered and patient and/or family/POA are agreeable with the plan.       --------------------------------- IMPRESSION AND DISPOSITION ---------------------------------    IMPRESSION  1. Acute on chronic congestive heart failure, unspecified heart failure type ([facility] [address]    2. COVID        DISPOSITION  Disposition: Admit to med/surg floor  Patient condition is stable      This report was transcribed using voice recognition software. Every effort was made to ensure accuracy; however, transcription errors may be present.         Deepthi Robert MD  08/19/22 [id number]

## 2022-08-19 PROCEDURE — 6370000000 HC RX 637 (ALT 250 FOR IP): Performed by: INTERNAL MEDICINE

## 2022-08-19 PROCEDURE — 99222 1ST HOSP IP/OBS MODERATE 55: CPT | Performed by: INTERNAL MEDICINE

## 2022-08-19 PROCEDURE — 6360000002 HC RX W HCPCS: Performed by: INTERNAL MEDICINE

## 2022-08-19 PROCEDURE — 94640 AIRWAY INHALATION TREATMENT: CPT

## 2022-08-19 PROCEDURE — 1200000000 HC SEMI PRIVATE

## 2022-08-19 RX ORDER — ACETAMINOPHEN 500 MG
1000 TABLET ORAL EVERY 8 HOURS PRN
Status: DISCONTINUED | OUTPATIENT
Start: 2022-08-19 | End: 2022-08-23 | Stop reason: HOSPADM

## 2022-08-19 RX ORDER — ASPIRIN 81 MG/1
81 TABLET ORAL NIGHTLY
Status: DISCONTINUED | OUTPATIENT
Start: 2022-08-19 | End: 2022-08-23 | Stop reason: HOSPADM

## 2022-08-19 RX ORDER — METOPROLOL SUCCINATE 25 MG/1
25 TABLET, EXTENDED RELEASE ORAL DAILY
Status: DISCONTINUED | OUTPATIENT
Start: 2022-08-19 | End: 2022-08-23 | Stop reason: HOSPADM

## 2022-08-19 RX ORDER — FAMOTIDINE 20 MG/1
20 TABLET, FILM COATED ORAL NIGHTLY
Status: DISCONTINUED | OUTPATIENT
Start: 2022-08-19 | End: 2022-08-23 | Stop reason: HOSPADM

## 2022-08-19 RX ORDER — BUDESONIDE 0.25 MG/2ML
0.25 INHALANT ORAL 2 TIMES DAILY
Status: DISCONTINUED | OUTPATIENT
Start: 2022-08-19 | End: 2022-08-23 | Stop reason: HOSPADM

## 2022-08-19 RX ORDER — ATORVASTATIN CALCIUM 20 MG/1
20 TABLET, FILM COATED ORAL DAILY
Status: DISCONTINUED | OUTPATIENT
Start: 2022-08-19 | End: 2022-08-23 | Stop reason: HOSPADM

## 2022-08-19 RX ORDER — ASPIRIN 81 MG/1
81 TABLET, COATED ORAL DAILY
COMMUNITY
Start: 2022-08-09

## 2022-08-19 RX ORDER — FUROSEMIDE 10 MG/ML
40 INJECTION INTRAMUSCULAR; INTRAVENOUS DAILY
Status: DISCONTINUED | OUTPATIENT
Start: 2022-08-19 | End: 2022-08-21

## 2022-08-19 RX ORDER — AMIODARONE HYDROCHLORIDE 200 MG/1
100 TABLET ORAL DAILY
Status: DISCONTINUED | OUTPATIENT
Start: 2022-08-19 | End: 2022-08-23 | Stop reason: HOSPADM

## 2022-08-19 RX ORDER — AMIODARONE HYDROCHLORIDE 200 MG/1
200 TABLET ORAL DAILY
Status: DISCONTINUED | OUTPATIENT
Start: 2022-08-19 | End: 2022-08-19 | Stop reason: DRUGHIGH

## 2022-08-19 RX ORDER — ERGOCALCIFEROL 1.25 MG/1
50000 CAPSULE ORAL WEEKLY
Status: DISCONTINUED | OUTPATIENT
Start: 2022-08-24 | End: 2022-08-23 | Stop reason: HOSPADM

## 2022-08-19 RX ORDER — AMIODARONE HYDROCHLORIDE 200 MG/1
100 TABLET ORAL DAILY
COMMUNITY

## 2022-08-19 RX ORDER — LOSARTAN POTASSIUM 25 MG/1
25 TABLET ORAL DAILY
Status: DISCONTINUED | OUTPATIENT
Start: 2022-08-19 | End: 2022-08-23 | Stop reason: HOSPADM

## 2022-08-19 RX ADMIN — AMIODARONE HYDROCHLORIDE 100 MG: 200 TABLET ORAL at 11:02

## 2022-08-19 RX ADMIN — FAMOTIDINE 20 MG: 20 TABLET ORAL at 20:14

## 2022-08-19 RX ADMIN — LOSARTAN POTASSIUM 25 MG: 25 TABLET, FILM COATED ORAL at 11:02

## 2022-08-19 RX ADMIN — ATORVASTATIN CALCIUM 20 MG: 20 TABLET, FILM COATED ORAL at 11:02

## 2022-08-19 RX ADMIN — BUDESONIDE 250 MCG: 0.25 SUSPENSION RESPIRATORY (INHALATION) at 19:35

## 2022-08-19 RX ADMIN — FUROSEMIDE 40 MG: 10 INJECTION, SOLUTION INTRAMUSCULAR; INTRAVENOUS at 11:01

## 2022-08-19 RX ADMIN — APIXABAN 2.5 MG: 2.5 TABLET, FILM COATED ORAL at 20:13

## 2022-08-19 RX ADMIN — ASPIRIN 81 MG: 81 TABLET, COATED ORAL at 20:14

## 2022-08-19 RX ADMIN — APIXABAN 2.5 MG: 2.5 TABLET, FILM COATED ORAL at 11:02

## 2022-08-19 RX ADMIN — METOPROLOL SUCCINATE 25 MG: 25 TABLET, EXTENDED RELEASE ORAL at 11:02

## 2022-08-19 ASSESSMENT — PAIN SCALES - GENERAL: PAINLEVEL_OUTOF10: 0

## 2022-08-19 NOTE — PROGRESS NOTES
Comprehensive Nutrition Assessment    Type and Reason for Visit:  Initial, Positive Nutrition Screen    Nutrition Recommendations/Plan:   Recommend to Modify Current Diet to Carb Controlled. Will Start ONS and monitor. Malnutrition Assessment:  Malnutrition Status:  Insufficient data (08/19/22 1516)    Context:  Acute Illness     Findings of the 6 clinical characteristics of malnutrition:  Energy Intake:  75% or less of estimated energy requirements for 7 or more days  Weight Loss:  Unable to assess (2/2 poor EMR wt hx pta)     Body Fat Loss:  Unable to assess (2/2 CV19+ Iso)     Muscle Mass Loss:  Unable to assess    Fluid Accumulation:  No significant fluid accumulation     Strength:  Not Performed    Nutrition Assessment:    Pt adm w/ SOB x ~2d pta. PMHx CAD/MI, CHF, DM, COPD;  Adm w/ ARF and COVID19+. At nutritional risk d/t poor raj/intake w/ possible wt loss d/t SOB/poor raj w/ COPD/COVID19+. Will Start ONS and monitor. Nutrition Related Findings:    A&O, dentition WNL, Abd/BS WDL, no edema, I/O's WNL Wound Type: None       Current Nutrition Intake & Therapies:    Average Meal Intake: 26-50%  Average Supplements Intake: None Ordered  ADULT DIET; Regular; No Added Salt (3-4 gm)    Anthropometric Measures:  Height: 4' 11\" (149.9 cm)  Ideal Body Weight (IBW): 95 lbs (43 kg)    Admission Body Weight: 104 lb (47.2 kg) (stated 8/18)  Current Body Weight: 104 lb (47.2 kg) (stated 8/18),   IBW.  Weight Source: Stated  Current BMI (kg/m2): 21  Usual Body Weight: 119 lb (54 kg) (actual 6/16/21 per EMR; possible ~15# loss x ~1yr however unable to confirm at this time d/t poor EMR wt hx both pta as well as since adm currently)  % Weight Change (Calculated): -12.6                    BMI Categories: Underweight (BMI less than 22) age over 72    Estimated Daily Nutrient Needs:  Energy Requirements Based On: Formula  Weight Used for Energy Requirements: Current  Energy (kcal/day):   Weight Used for Protein Requirements: Current  Protein (g/day): 1.2-1.4gm/kg CBW= 55-65  Method Used for Fluid Requirements: 1 ml/kcal  Fluid (ml/day):     Nutrition Diagnosis:   Inadequate oral intake related to impaired respiratory function (2/2 COPD/CV19+) as evidenced by intake 26-50%, poor intake prior to admission, weight loss    Nutrition Interventions:   Food and/or Nutrient Delivery: Start Oral Nutrition Supplement, Modify Current Diet (Recommend to Modify Current Diet to Carb Controlled. Will Start ONS and monitor.)  Nutrition Education/Counseling: Education not indicated  Coordination of Nutrition Care: Continue to monitor while inpatient       Goals:     Goals: PO intake 50% or greater, by next RD assessment       Nutrition Monitoring and Evaluation:   Behavioral-Environmental Outcomes: None Identified  Food/Nutrient Intake Outcomes: Food and Nutrient Intake, Supplement Intake  Physical Signs/Symptoms Outcomes: Biochemical Data, GI Status, Fluid Status or Edema, Nutrition Focused Physical Findings, Skin, Weight    Discharge Planning:     Too soon to determine     Rebekah Smart RD, LD  Contact: ext [phone]

## 2022-08-19 NOTE — CARE COORDINATION
8/19, SW spoke with patient on phone to discuss discharge plan/transition of care and SW role. Patient lives at home with  in a 1 story home with 2 steps to enter the home. DME owned is [employer] and [address] [place] [address]. PCP is Dr. Roberta Camp and pharamcy is [facility]. [facility] Hx has been through Dr. Carmel Marcelo office-company unknown. HIEU Hx none. Patient says she has been having some trouble at home with walking and has not been up ourt of bed since she was admitted to hospital.  Patient was admitted for Respiratory Failure. Patient tested positive a few days ago when at Urgent Care. Patient says she would like to go home upon discharge. Discussed patient needing to be able to move about for a safe discharge home. Patient will need to be seen by PT/OT for further recommendations. for discharge planning. Patient is on 2L 02 and does not wear 02 at home. Should patient need 02 no preference on a company only one that takes patient's insurance. SW to follow for further discharge planning needs.       Lucía Manzanares, [facility] Case Management  [phone]

## 2022-08-19 NOTE — PLAN OF CARE
Patient's chart updated to reflect:      . - HF care plan, HF education points and HF discharge instructions.  -Orders: 2 gram sodium diet, daily weights, I/O.  -PCP and/or Cardiologist appointment to be scheduled within 7 days of hospital discharge.  -History of HF, not primary admission Dx. Patient admitted for treatment of   Patient Active Problem List   Diagnosis     Covid 19 infection   Of note, person to person interview was not performed in order to limit exposure and preserve PPE due to isolation status.      Miladis Hand RN RN, BSN  Heart Failure Navigator

## 2022-08-19 NOTE — H&P
Avram Curling is a 81 y.o. female presenting to the ED for shortness of breath. Patient tested COVID-positive today at urgent care. Day 2 of symptoms, had some sharp chest pain lasting seconds yesterday, resolving spontaneously, denies chest arm jaw neck or back pain today. Shortness of breath progressively worsening over the past few days, has COPD per her report but does not take any inhalers because \"my doctor said it was bad for my heart\". She is saturating 97% on room air, but having some mild respiratory distress with tachypnea. She denies any lower extremity edema, has had fevers and chills, has had cough without hemoptysis, positive for production of sputum. CXR suggestive of infiltrate BNP elevated ,admitted for treatment     Past Medical History:   Diagnosis Date    Arthritis     CAD (coronary artery disease)     Cardiomyopathy ([facility] [address]     CHF (congestive heart failure) ([facility] [address] 2010    history of    Chronic bronchitis (H[facility])     none recent    COPD (chronic obstructive pulmonary disease) ([facility])     Depression     GERD (gastroesophageal reflux disease)     HFrEF (heart failure with reduced ejection fraction) ([facility] [address] 12/29/2016 12/26/16- LVEF 20-25%, large apical aneurysm, LA moderate-severely dilated, mildly enlarged RA, mild MR, mild TR, mild AR    Hyperlipidemia     Hypertension     MI (myocardial infarction) ([address] 10/30/2009    Osteopenia     Osteoporosis     Swallowing difficulty        Past Surgical History:   Procedure Laterality Date    APPENDECTOMY      BREAST CAPSULECTOMY  03/07/12    BILATERAL BREAST CAPSULECTOMIES    BREAST SURGERY  1962     cosmetic implants    CARDIAC DEFIBRILLATOR PLACEMENT      [facility]    CARDIAC DEFIBRILLATOR PLACEMENT  5/2010    CARDIAC DEFIBRILLATOR PLACEMENT Left 02/13/2018    [employer] ICD change out,     CARDIAC PACEMAKER PLACEMENT  5/10/2010    Dual chamber pacer ICD.     CARDIAC SURGERY  2009    stent    CARDIOVASCULAR STRESS TEST  3/4/2010    COLONOSCOPY COSMETIC SURGERY      eyelids breast    DIAGNOSTIC CARDIAC CATH LAB PROCEDURE  10/30/2009    ESOPHAGEAL MOTILITY STUDY N/A 1/2/2020    ESOPHAGEAL MOTILITY/MANOMETRY STUDY performed by Matthew Guerrier DO at [facility] (CERVIX STATUS UNKNOWN)      IR US THYROID BIOPSY      OTHER SURGICAL HISTORY Right 12/17/15    ORIF RIGHT DISTAL RADIUS    OTHER SURGICAL HISTORY Right 4/10/2016    IP joint release of thumb    TONSILLECTOMY      TRANSTHORACIC ECHOCARDIOGRAM  3/30/11ize and function,     UPPER GASTROINTESTINAL ENDOSCOPY N/A 2/13/2020    EGD SUBMUCOSAL/BOTOX INJECTION performed by Wanda Saldana DO at [address] N/A 3/25/2021    EGD SUBMUCOSAL/BOTOX INJECTION performed by Wanda Saldana DO at [facility] ENDOSCOPY       Family History   Problem Relation Age of Onset    Bipolar Disorder Son     Bipolar Disorder Son     Heart Disease Brother         cabg       Prior to Admission medications    Medication Sig Start Date End Date Taking? Authorizing Provider   Multiple Vitamins-Minerals (OCUVITE PO) Take by mouth   Yes Historical Provider, MD   beclomethasone (QVAR) 40 MCG/ACT inhaler Inhale 2 puffs into the lungs 2 times daily  Patient not taking: Reported on 3/2/2022 2/16/22 3/18/22  Rajani Alvarez MD   mupirocin (BACTROBAN NASAL) 2 % nasal ointment Take by Nasal route 2 times daily.  2/16/22   Román Tamayo MD   sodium chloride (OCEAN) 0.65 % nasal spray Take as directed 2/16/22   Román Tamayo MD   vitamin D (ERGOCALCIFEROL) 1.25 MG (61439 UT) CAPS capsule TAKE 1 CAPSULE BY MOUTH ONCE WEEKLY 8/10/21   Historical Provider, MD   losartan (COZAAR) 25 MG tablet TAKE 1 TABLET BY MOUTH EVERY DAY 8/3/21   Historical Provider, MD   famotidine (PEPCID) 20 MG tablet Take 20 mg by mouth nightly  Patient not taking: No sig reported    Historical Provider, MD   acetaminophen (TYLENOL) 500 MG tablet Take 2 tablets by mouth every 8 hours as needed for Pain 7/3/21   [facility] MD Henry   omeprazole (PRILOSEC) 40 MG delayed release capsule  5/22/21   Historical Provider, MD   Kfbvwsdjz-Rfgioewbp-Qcdocqxdkz (CEREFOLIN NAC PO) Take by mouth    Historical Provider, MD   metoprolol succinate (TOPROL XL) 25 MG extended release tablet Take 1 tablet by mouth daily 6/16/21   Scott Gonzalez MD   furosemide (LASIX) 20 MG tablet Take 1 tablet by mouth daily  Patient taking differently: Take 10 mg by mouth daily 6/11/20   Candelario Sinclair MD   amiodarone (CORDARONE) 200 MG tablet Take 1 tablet by mouth daily  Patient taking differently: Take 100 mg by mouth 2 times daily Take only 5 days per week 6/11/20   Candelario Sinclair MD   apixaban (ELIQUIS) 2.5 MG TABS tablet Take 2.5 mg by mouth 2 times daily    Historical Provider, MD   mometasone (NASONEX) 50 MCG/ACT nasal spray USE 2 SPRAYS IN EACH NOSTIL ONCE A DAY 11/20/19   Historical Provider, MD   simvastatin (ZOCOR) 20 MG tablet Take 1 tablet by mouth daily  Patient taking differently: Take 20 mg by mouth nightly 6/3/19   Scott Gonzalez MD   Coenzyme Q10 (CO Q-10) 100 MG CAPS Take by mouth Last dose 2-11-20    Historical Provider, MD   aspirin 81 MG tablet Take 81 mg by mouth nightly To check hold  with Dr. Encarnacion Class Provider, MD        Allergies: Patient has no known allergies.     Social History     Tobacco Use    Smoking status: Never    Smokeless tobacco: Never   Substance Use Topics    Alcohol use: Yes     Comment: ocasional wine        Review of Systems:  Respiratory: as per present illness   Cardiovascular: negative for chest pain   Gastrointestinal: negative for abdominal pain, diarrhea, nausea and vomiting  Genitourinary:negative for dysuria and hematuria  Derm: negative for rash and skin lesion(s)  Neurological: negative for seizures and tremors  Endocrine: negative for diabetic symptoms including polydipsia and polyuria    Physical Exam:  Vitals:    08/18/22 2026   BP: (!) 123/58   Pulse: 61   Resp: 18   Temp: 97.5 °F (36.4 °C) SpO2: 98%      Skin:  Warm and dry. No rash or bruises  HEENT:  PERRLA, EOMI  Neck:  No JVD, No thyromegaly, No carotid bruit  Cardiac:  RRR, No gallop or murmur  Lungs:  CTA, Normal percussion  Abdomen: Normal bowel sounds, no HSM, non-tender  Extremities:  No clubbing, edema or cyanosis  Neurological:  Moves all extremities. Labs:    CBC with Differential:    Lab Results   Component Value Date/Time    WBC 7.5 08/18/2022 12:31 PM    RBC 3.76 08/18/2022 12:31 PM    HGB 12.4 08/18/2022 12:31 PM    HCT 38.0 08/18/2022 12:31 PM     08/18/2022 12:31 PM    .1 08/18/2022 12:31 PM    MCH 33.0 08/18/2022 12:31 PM    MCHC 32.6 08/18/2022 12:31 PM    RDW 13.2 08/18/2022 12:31 PM    LYMPHOPCT 8.9 08/18/2022 12:31 PM    MONOPCT 12.9 08/18/2022 12:31 PM    BASOPCT 0.4 08/18/2022 12:31 PM    MONOSABS 0.96 08/18/2022 12:31 PM    LYMPHSABS 0.66 08/18/2022 12:31 PM    EOSABS 0.00 08/18/2022 12:31 PM    BASOSABS 0.03 08/18/2022 12:31 PM     CMP:    Lab Results   Component Value Date/Time     08/18/2022 12:31 PM    K 4.6 08/18/2022 12:31 PM    CL 93 08/18/2022 12:31 PM    CO2 22 08/18/2022 12:31 PM    BUN 19 08/18/2022 12:31 PM    CREATININE 0.8 08/18/2022 12:31 PM    GFRAA >60 08/18/2022 12:31 PM    LABGLOM >60 08/18/2022 12:31 PM    GLUCOSE 111 08/18/2022 12:31 PM    PROT 6.8 05/11/2022 10:33 AM    LABALBU 4.5 05/11/2022 10:33 AM    CALCIUM 9.0 08/18/2022 12:31 PM    BILITOT 0.6 05/11/2022 10:33 AM    ALKPHOS 42 05/11/2022 10:33 AM    AST 23 05/11/2022 10:33 AM    ALT 13 05/11/2022 10:33 AM      Imaging:CXR :  Suspect early infiltrates at the lung bases. Cardiomegaly.          Assessment and Plan:    Patient Active Problem List   Diagnosis    Covid 19 infection   Acute on chronic diastolic CHF   Ischemic cardiomyopathy   COPD  DM  A.Fib.

## 2022-08-20 ENCOUNTER — APPOINTMENT (OUTPATIENT)
Dept: GENERAL RADIOLOGY | Age: 87
DRG: 177 | End: 2022-08-20
Payer: MEDICARE

## 2022-08-20 PROCEDURE — 6370000000 HC RX 637 (ALT 250 FOR IP): Performed by: INTERNAL MEDICINE

## 2022-08-20 PROCEDURE — 6360000002 HC RX W HCPCS: Performed by: INTERNAL MEDICINE

## 2022-08-20 PROCEDURE — 1200000000 HC SEMI PRIVATE

## 2022-08-20 PROCEDURE — 94640 AIRWAY INHALATION TREATMENT: CPT

## 2022-08-20 PROCEDURE — 2700000000 HC OXYGEN THERAPY PER DAY

## 2022-08-20 PROCEDURE — 71045 X-RAY EXAM CHEST 1 VIEW: CPT

## 2022-08-20 RX ADMIN — FAMOTIDINE 20 MG: 20 TABLET ORAL at 21:37

## 2022-08-20 RX ADMIN — FUROSEMIDE 40 MG: 10 INJECTION, SOLUTION INTRAMUSCULAR; INTRAVENOUS at 09:14

## 2022-08-20 RX ADMIN — METOPROLOL SUCCINATE 25 MG: 25 TABLET, EXTENDED RELEASE ORAL at 09:14

## 2022-08-20 RX ADMIN — ASPIRIN 81 MG: 81 TABLET, COATED ORAL at 21:37

## 2022-08-20 RX ADMIN — AMIODARONE HYDROCHLORIDE 100 MG: 200 TABLET ORAL at 09:13

## 2022-08-20 RX ADMIN — BUDESONIDE 250 MCG: 0.25 SUSPENSION RESPIRATORY (INHALATION) at 21:15

## 2022-08-20 RX ADMIN — APIXABAN 2.5 MG: 2.5 TABLET, FILM COATED ORAL at 21:37

## 2022-08-20 RX ADMIN — APIXABAN 2.5 MG: 2.5 TABLET, FILM COATED ORAL at 09:14

## 2022-08-20 RX ADMIN — ATORVASTATIN CALCIUM 20 MG: 20 TABLET, FILM COATED ORAL at 09:13

## 2022-08-20 RX ADMIN — BUDESONIDE 250 MCG: 0.25 SUSPENSION RESPIRATORY (INHALATION) at 08:16

## 2022-08-20 RX ADMIN — LOSARTAN POTASSIUM 25 MG: 25 TABLET, FILM COATED ORAL at 09:13

## 2022-08-20 ASSESSMENT — PAIN SCALES - GENERAL
PAINLEVEL_OUTOF10: 0
PAINLEVEL_OUTOF10: 0

## 2022-08-20 NOTE — PLAN OF CARE
Problem: Safety - Adult  Goal: Free from fall injury  8/20/2022 1037 by Wellington Madrid RN  Outcome: Progressing  Flowsheets (Taken 8/20/2022 1036)  Free From Fall Injury: Instruct family/caregiver on patient safety     Problem: ABCDS Injury Assessment  Goal: Absence of physical injury  8/20/2022 1037 by Wellington Madrid RN  Outcome: Progressing  Flowsheets (Taken 8/20/2022 1036)  Absence of Physical Injury: Implement safety measures based on patient assessment     Problem: Skin/Tissue Integrity  Goal: Absence of new skin breakdown  Description: 1. Monitor for areas of redness and/or skin breakdown  2. Assess vascular access sites hourly  3. Every 4-6 hours minimum:  Change oxygen saturation probe site  4. Every 4-6 hours:  If on nasal continuous positive airway pressure, respiratory therapy assess nares and determine need for appliance change or resting period.   8/20/2022 1037 by Wellington Madrid RN  Outcome: Progressing     Problem: Cardiovascular - Adult  Goal: Maintains optimal cardiac output and hemodynamic stability  8/20/2022 1037 by Wellington Madrid RN  Outcome: Progressing     Problem: Metabolic/Fluid and Electrolytes - Adult  Goal: Hemodynamic stability and optimal renal function maintained  8/20/2022 1037 by Wellington Madrid RN  Outcome: Progressing     Problem: Nutrition Deficit:  Goal: Optimize nutritional status  8/20/2022 1037 by Wellington Madrid RN  Outcome: Progressing

## 2022-08-20 NOTE — PROGRESS NOTES
Subjective:  8/20/2022  Covering for PCP  Patient was seen on the floor earlier this morning  Admission details noted  She states that she feels much better  On supplemental oxygen of 3 L per nasal cannula  Tolerating medications  Data reviewed in detail    Objective:    /61   Pulse 55   Temp 97.4 °F (36.3 °C) (Oral)   Resp 18   Ht 4' 11\" (1.499 m)   Wt 108 lb 0.4 oz (49 kg)   SpO2 99%   BMI 21.82 kg/m²   Thin built and chronically ill-appearing  No acute distress noted  Oral mucosa moist  Neck supple no adenopathy  Heart:  RRR, no murmurs, gallops, or rubs.   Lungs:  CTA bilaterally, no wheeze, rales or rhonchi  Abd: bowel sounds present, nontender, nondistended, no masses  Extrem:  No clubbing, cyanosis, or edema    Data reviewed    Assessment:  COVID-19 infection  Acute diastolic CHF  Acute hypoxic respiratory failure  Patient Active Problem List   Diagnosis    CAD (coronary artery disease)    H/O acute myocardial infarction of anterolateral wall    History of PTCA    Ischemic cardiomyopathy    Automatic implantable cardioverter-defibrillator in situ    Essential hypertension    COPD exacerbation (HCC)    DM II (diabetes mellitus, type II), controlled ([address]    Acute on chronic /diastolic/ congestive heart failure (HCC)    Pulmonary nodule    Osteoporosis    Influenza with respiratory manifestation other than pneumonia    Pneumonia due to organism    S/P ICD (internal cardiac defibrillator) procedure    Atrial fibrillation (HCC)    Respiratory failure ([address]       Plan:  IV diuretics  Oxygen supplements/wean as tolerated  Check CRP and procalcitonin to decide whether or not she needs any antibiotics or antivirals  Monitor electrolytes  Questions answered to her satisfaction        German Fox MD  12:28 PM  8/20/2022

## 2022-08-21 LAB
ALBUMIN SERPL-MCNC: 3.7 G/DL (ref 3.5–5.2)
ALP BLD-CCNC: 38 U/L (ref 35–104)
ALT SERPL-CCNC: 30 U/L (ref 0–32)
ANION GAP SERPL CALCULATED.3IONS-SCNC: 11 MMOL/L (ref 7–16)
AST SERPL-CCNC: 36 U/L (ref 0–31)
BILIRUB SERPL-MCNC: 0.3 MG/DL (ref 0–1.2)
BUN BLDV-MCNC: 42 MG/DL (ref 6–23)
C-REACTIVE PROTEIN: 0.7 MG/DL (ref 0–0.4)
CALCIUM SERPL-MCNC: 9.3 MG/DL (ref 8.6–10.2)
CHLORIDE BLD-SCNC: 90 MMOL/L (ref 98–107)
CO2: 33 MMOL/L (ref 22–29)
CREAT SERPL-MCNC: 1.1 MG/DL (ref 0.5–1)
GFR AFRICAN AMERICAN: 57
GFR NON-AFRICAN AMERICAN: 47 ML/MIN/1.73
GLUCOSE BLD-MCNC: 98 MG/DL (ref 74–99)
POTASSIUM SERPL-SCNC: 4 MMOL/L (ref 3.5–5)
PROCALCITONIN: 0.09 NG/ML (ref 0–0.08)
SODIUM BLD-SCNC: 134 MMOL/L (ref 132–146)
TOTAL PROTEIN: 5.9 G/DL (ref 6.4–8.3)

## 2022-08-21 PROCEDURE — 1200000000 HC SEMI PRIVATE

## 2022-08-21 PROCEDURE — 2700000000 HC OXYGEN THERAPY PER DAY

## 2022-08-21 PROCEDURE — 6360000002 HC RX W HCPCS: Performed by: INTERNAL MEDICINE

## 2022-08-21 PROCEDURE — 80053 COMPREHEN METABOLIC PANEL: CPT

## 2022-08-21 PROCEDURE — 86140 C-REACTIVE PROTEIN: CPT

## 2022-08-21 PROCEDURE — 36415 COLL VENOUS BLD VENIPUNCTURE: CPT

## 2022-08-21 PROCEDURE — 84145 PROCALCITONIN (PCT): CPT

## 2022-08-21 PROCEDURE — 94640 AIRWAY INHALATION TREATMENT: CPT

## 2022-08-21 PROCEDURE — 6370000000 HC RX 637 (ALT 250 FOR IP): Performed by: INTERNAL MEDICINE

## 2022-08-21 RX ORDER — METHYLPREDNISOLONE SODIUM SUCCINATE 40 MG/ML
40 INJECTION, POWDER, LYOPHILIZED, FOR SOLUTION INTRAMUSCULAR; INTRAVENOUS ONCE
Status: COMPLETED | OUTPATIENT
Start: 2022-08-21 | End: 2022-08-21

## 2022-08-21 RX ORDER — FUROSEMIDE 20 MG/1
20 TABLET ORAL DAILY
Status: DISCONTINUED | OUTPATIENT
Start: 2022-08-22 | End: 2022-08-23 | Stop reason: HOSPADM

## 2022-08-21 RX ADMIN — LOSARTAN POTASSIUM 25 MG: 25 TABLET, FILM COATED ORAL at 09:11

## 2022-08-21 RX ADMIN — APIXABAN 2.5 MG: 2.5 TABLET, FILM COATED ORAL at 09:10

## 2022-08-21 RX ADMIN — BUDESONIDE 250 MCG: 0.25 SUSPENSION RESPIRATORY (INHALATION) at 09:50

## 2022-08-21 RX ADMIN — FUROSEMIDE 40 MG: 10 INJECTION, SOLUTION INTRAMUSCULAR; INTRAVENOUS at 09:11

## 2022-08-21 RX ADMIN — BUDESONIDE 250 MCG: 0.25 SUSPENSION RESPIRATORY (INHALATION) at 20:36

## 2022-08-21 RX ADMIN — APIXABAN 2.5 MG: 2.5 TABLET, FILM COATED ORAL at 21:12

## 2022-08-21 RX ADMIN — METHYLPREDNISOLONE SODIUM SUCCINATE 40 MG: 40 INJECTION, POWDER, FOR SOLUTION INTRAMUSCULAR; INTRAVENOUS at 11:58

## 2022-08-21 RX ADMIN — FAMOTIDINE 20 MG: 20 TABLET ORAL at 21:11

## 2022-08-21 RX ADMIN — METOPROLOL SUCCINATE 25 MG: 25 TABLET, EXTENDED RELEASE ORAL at 09:11

## 2022-08-21 RX ADMIN — ASPIRIN 81 MG: 81 TABLET, COATED ORAL at 21:12

## 2022-08-21 RX ADMIN — ATORVASTATIN CALCIUM 20 MG: 20 TABLET, FILM COATED ORAL at 09:10

## 2022-08-21 NOTE — PLAN OF CARE
Problem: Safety - Adult  Goal: Free from fall injury  Outcome: Progressing     Problem: ABCDS Injury Assessment  Goal: Absence of physical injury  Outcome: Progressing     Problem: Skin/Tissue Integrity  Goal: Absence of new skin breakdown  Description: 1. Monitor for areas of redness and/or skin breakdown  2. Assess vascular access sites hourly  3. Every 4-6 hours minimum:  Change oxygen saturation probe site  4. Every 4-6 hours:  If on nasal continuous positive airway pressure, respiratory therapy assess nares and determine need for appliance change or resting period.   Outcome: Progressing     Problem: Cardiovascular - Adult  Goal: Maintains optimal cardiac output and hemodynamic stability  Outcome: Progressing     Problem: Metabolic/Fluid and Electrolytes - Adult  Goal: Hemodynamic stability and optimal renal function maintained  Outcome: Progressing     Problem: Nutrition Deficit:  Goal: Optimize nutritional status  Outcome: Progressing

## 2022-08-21 NOTE — PROGRESS NOTES
Subjective:  8/20/2022  Covering for PCP  Patient was seen on the floor earlier this morning  Admission details noted  She states that she feels much better  On supplemental oxygen of 3 L per nasal cannula  Tolerating medications  Data reviewed in detail  8/21/2022  Feels more short of breath today  Data reviewed in detail  Not on any supplemental oxygen this morning    Objective:    BP (!) 127/96   Pulse 57   Temp 97 °F (36.1 °C)   Resp 18   Ht 4' 11\" (1.499 m)   Wt 108 lb 14.5 oz (49.4 kg)   SpO2 94%   BMI 22.00 kg/m²   Thin built and chronically ill-appearing  No acute distress noted  Oral mucosa moist  Neck supple no adenopathy  Heart:  RRR, no murmurs, gallops, or rubs.   Lungs: Few scattered wheezes noted  Abd: bowel sounds present, nontender, nondistended, no masses  Extrem:  No clubbing, cyanosis, or edema    Data reviewed    Assessment:  COVID-19 infection/CRP procalcitonin low  Acute diastolic CHF  Acute hypoxic respiratory failure  Patient Active Problem List   Diagnosis    CAD (coronary artery disease)    H/O acute myocardial infarction of anterolateral wall    History of PTCA    Ischemic cardiomyopathy    Automatic implantable cardioverter-defibrillator in situ    Essential hypertension    COPD exacerbation (HCC)    DM II (diabetes mellitus, type II), controlled ([address]    Acute on chronic /diastolic/ congestive heart failure (HCC)    Pulmonary nodule    Osteoporosis    Influenza with respiratory manifestation other than pneumonia    Pneumonia due to organism    S/P ICD (internal cardiac defibrillator) procedure    Atrial fibrillation (HCC)    Respiratory failure ([address]       Plan:  IV diuretics/switch to oral tomorrow  Dose of Solu-Medrol IV  Monitor electrolytes  Questions answered to her satisfaction        Jazmyne Starr MD  2:09 PM  8/21/2022

## 2022-08-21 NOTE — PLAN OF CARE
Problem: Safety - Adult  Goal: Free from fall injury  8/21/2022 0943 by Danica Batista RN  Outcome: Progressing  Flowsheets (Taken 8/21/2022 0941)  Free From Fall Injury: Instruct family/caregiver on patient safety     Problem: ABCDS Injury Assessment  Goal: Absence of physical injury  8/21/2022 0943 by Danica Batista RN  Outcome: Progressing  Flowsheets (Taken 8/21/2022 0941)  Absence of Physical Injury: Implement safety measures based on patient assessment     Problem: Skin/Tissue Integrity  Goal: Absence of new skin breakdown  Description: 1. Monitor for areas of redness and/or skin breakdown  2. Assess vascular access sites hourly  3. Every 4-6 hours minimum:  Change oxygen saturation probe site  4. Every 4-6 hours:  If on nasal continuous positive airway pressure, respiratory therapy assess nares and determine need for appliance change or resting period.   8/21/2022 5946 by Danica Batista RN  Outcome: Progressing     Problem: Cardiovascular - Adult  Goal: Maintains optimal cardiac output and hemodynamic stability  8/21/2022 0943 by Danica Batista RN  Outcome: Progressing     Problem: Metabolic/Fluid and Electrolytes - Adult  Goal: Hemodynamic stability and optimal renal function maintained  8/21/2022 0943 by Danica Batista RN  Outcome: Progressing     Problem: Nutrition Deficit:  Goal: Optimize nutritional status  8/21/2022 0943 by Danica Batista RN  Outcome: Progressing

## 2022-08-22 LAB
ALBUMIN SERPL-MCNC: 3.8 G/DL (ref 3.5–5.2)
ALP BLD-CCNC: 40 U/L (ref 35–104)
ALT SERPL-CCNC: 26 U/L (ref 0–32)
ANION GAP SERPL CALCULATED.3IONS-SCNC: 10 MMOL/L (ref 7–16)
AST SERPL-CCNC: 27 U/L (ref 0–31)
BILIRUB SERPL-MCNC: 0.4 MG/DL (ref 0–1.2)
BUN BLDV-MCNC: 32 MG/DL (ref 6–23)
CALCIUM SERPL-MCNC: 9.4 MG/DL (ref 8.6–10.2)
CHLORIDE BLD-SCNC: 91 MMOL/L (ref 98–107)
CO2: 34 MMOL/L (ref 22–29)
CREAT SERPL-MCNC: 0.8 MG/DL (ref 0.5–1)
GFR AFRICAN AMERICAN: >60
GFR NON-AFRICAN AMERICAN: >60 ML/MIN/1.73
GLUCOSE BLD-MCNC: 119 MG/DL (ref 74–99)
POTASSIUM SERPL-SCNC: 4.6 MMOL/L (ref 3.5–5)
SODIUM BLD-SCNC: 135 MMOL/L (ref 132–146)
TOTAL PROTEIN: 6.3 G/DL (ref 6.4–8.3)

## 2022-08-22 PROCEDURE — 97161 PT EVAL LOW COMPLEX 20 MIN: CPT

## 2022-08-22 PROCEDURE — 80053 COMPREHEN METABOLIC PANEL: CPT

## 2022-08-22 PROCEDURE — 97165 OT EVAL LOW COMPLEX 30 MIN: CPT

## 2022-08-22 PROCEDURE — 97535 SELF CARE MNGMENT TRAINING: CPT

## 2022-08-22 PROCEDURE — 99232 SBSQ HOSP IP/OBS MODERATE 35: CPT | Performed by: INTERNAL MEDICINE

## 2022-08-22 PROCEDURE — 94640 AIRWAY INHALATION TREATMENT: CPT

## 2022-08-22 PROCEDURE — 6360000002 HC RX W HCPCS: Performed by: INTERNAL MEDICINE

## 2022-08-22 PROCEDURE — 6370000000 HC RX 637 (ALT 250 FOR IP): Performed by: INTERNAL MEDICINE

## 2022-08-22 PROCEDURE — 1200000000 HC SEMI PRIVATE

## 2022-08-22 PROCEDURE — 36415 COLL VENOUS BLD VENIPUNCTURE: CPT

## 2022-08-22 PROCEDURE — 97530 THERAPEUTIC ACTIVITIES: CPT

## 2022-08-22 RX ADMIN — ATORVASTATIN CALCIUM 20 MG: 20 TABLET, FILM COATED ORAL at 08:32

## 2022-08-22 RX ADMIN — APIXABAN 2.5 MG: 2.5 TABLET, FILM COATED ORAL at 20:21

## 2022-08-22 RX ADMIN — FUROSEMIDE 20 MG: 20 TABLET ORAL at 08:32

## 2022-08-22 RX ADMIN — FAMOTIDINE 20 MG: 20 TABLET ORAL at 20:21

## 2022-08-22 RX ADMIN — METOPROLOL SUCCINATE 25 MG: 25 TABLET, EXTENDED RELEASE ORAL at 08:32

## 2022-08-22 RX ADMIN — ASPIRIN 81 MG: 81 TABLET, COATED ORAL at 20:21

## 2022-08-22 RX ADMIN — BUDESONIDE 250 MCG: 0.25 SUSPENSION RESPIRATORY (INHALATION) at 20:56

## 2022-08-22 RX ADMIN — APIXABAN 2.5 MG: 2.5 TABLET, FILM COATED ORAL at 08:32

## 2022-08-22 RX ADMIN — AMIODARONE HYDROCHLORIDE 100 MG: 200 TABLET ORAL at 08:32

## 2022-08-22 RX ADMIN — LOSARTAN POTASSIUM 25 MG: 25 TABLET, FILM COATED ORAL at 08:32

## 2022-08-22 NOTE — CARE COORDINATION
Social Work/Case Management Transition of Care Planning (Leda Carcamo [place] [phone]): Met with patient at bedside to discuss transition of care plan. Discussed the fact patient does not meet criteria for HIEU as she is doing very well and does not need that level of care. Patient expressed understanding. Discussed her concerns about getting groceries as her  just tested positive for Covid. Suggested they order groceries and have them delivered. Patient stated she knows her grocery store does do that. Patient  does not want home care and does not have any skilled needs for home care. Phone call to patient's  to update him. He is in agreement. Plan is for discharge to home with no needs.  will provide transport home.   RALPH Buck  8/22/2022

## 2022-08-22 NOTE — PROGRESS NOTES
OCCUPATIONAL THERAPY INITIAL EVALUATION    [place] [address] [address] [employer] [address] [place] [address]                                                  Patient Name: Dwaine Neal    MRN: [id number]    : 1935    Room: [address] [place] [address]      Evaluating OT: Rogelio Menendez, [address] Myranda Bejarano OTR/L; [id number]      Referring Provider: Diana Cohn MD    Specific Provider Orders/Date: OT Eval and Treat 22      Diagnosis: Respiratory failure     Surgery: none this admission     Pertinent Medical History:  has a past medical history of Arthritis, CAD (coronary artery disease), Cardiomyopathy ([address] CHF (congestive heart failure) ([address] Chronic bronchitis ([address] COPD (chronic obstructive pulmonary disease) ([address] Depression, GERD (gastroesophageal reflux disease), HFrEF (heart failure with reduced ejection fraction) ([address] Hyperlipidemia, Hypertension, MI (myocardial infarction) ([address] Osteopenia, Osteoporosis, and Swallowing difficulty.       Reason for Admission:SOB on RA 97%, increased difficulty ambulating     Recommended Adaptive Equipment:  TBD pending progression/discharge plan     Precautions:  Fall Risk COVID + (droplet plus isolation), [place]     Assessment of current deficits:    [x] Functional mobility  [x]ADLs  [x] Strength               []Cognition    [x] Functional transfers   [x] IADLs         [x] Safety Awareness   [x]Endurance    [x] Fine Coordination              [x] Balance      [] Vision/perception   []Sensation     []Gross Motor Coordination  [] ROM  [] Delirium                   [] Motor Control     OT PLAN OF CARE   OT POC based on physician orders, patient diagnosis and results of clinical assessment    Frequency/Duration: 1-3 days/wk for 2 weeks PRN   Specific OT Treatment Interventions to include:   * Instruction/training on adapted ADL techniques and AE recommendations to increase functional independence within precautions       * Training on energy conservation strategies, correct breathing pattern and techniques to improve independence/tolerance for self-care routine  * Functional transfer/mobility training/DME recommendations for increased independence, safety, and fall prevention  * Patient/Family education to increase follow through with safety techniques and functional independence  * Recommendation of environmental modifications for increased safety with functional transfers/mobility and ADLs  * Therapeutic exercise to improve motor endurance, ROM, and functional strength for ADLs/functional transfers  * Therapeutic activities to facilitate/challenge dynamic balance, stand tolerance for increased safety and independence with ADLs      Home Living: Pt lives with  in 1 story home with 2 steps and 1HR. Laundry located in basement with full flight of steps to access.    Bathroom setup: walk in shower with shower chair   Equipment owned: cane, ww    Prior Level of Function: ind with ADLs    ind with IADLs  ambulated with ww vs SPC    Pain Level: 0/10  Cognition: A&O: 4/4  Follows multi step directions   Memory:  good   Sequencing:  fair   Problem solving:  fair   Judgement/safety:  fair     Functional Assessment:  AM-PAC Daily Activity Raw Score: 18/24   Initial Eval Status  Date: 8/22/22 Treatment Status  Date: STGs = LTGs  Time frame: 10-14 days   Feeding Stand by Assist   Tray set up  Independent    Grooming Minimal Assist   Oral hygiene standing sink side   Combing hair standing sink side  Independent    UB Dressing Minimal Assist   Ronald gown over shoulders seated EOB  Independent    LB Dressing Minimal Assist   Ronald socks using figure 4 technique  Independent    Bathing Minimal Assist  Simulated functional reach for LB and UB bathing seated EOB  Independent    Toileting Minimal Assist   Educated on use of grab bars for lower surface transfer  Independent    Bed Mobility  Log Roll: NT  Supine to sit: Stand by Assist   Sit to supine: Stand by Assist   Supine to sit: Independent   Sit to supine: Independent    Functional Transfers Sit to stand: SBA with ww   Stand to sit:SBA with ww  Stand pivot: SBA with ww  Commode: SBA with ww  Sit to stand: Mod Ind with ww   Stand to sit:Mod Ind with ww   Stand pivot: Mod Ind with ww   Commode: Mod Ind with ww     Functional Mobility SBA with ww   within household distance  (Room/bathroom)  Mod Ind with ww     Balance Sitting:     Static - SBA     Dynamic - SBA  Standing: SBA with ww  Sitting:  Static: IND  Dynamic: IND  Standing: Mod Ind with ww    Activity Tolerance FAIR  Limited d/t fatigue with prolonged ADLs  GOOD   Visual/  Perceptual Glasses: yes          Vitals   SpO2 during session: 99%   HR: 70bpm           BUE  ROM/Strength/  Fine motor Coordination Hand dominance:Right     RUE: ROM WFL     Strength: 4/5      Strength: FAIR     Coordination:  FAIR     LUE:  ROM WFL     Strength: 4/5      Strength: FAIR     Coordination:  FAIR  increase BUE muscle strength for improved indep with functional transfers       Hearing: [place] - hearing aides   Sensation:  No c/o numbness or tingling   Tone: WFL   Edema: unremarkable    Patient/Family Goal: pt open to HIEU at discharge     Comment: Cleared by RN to see pt. Upon arrival patient lying supine in bed and agreeable to OT session. At end of session, patient seated in bedside chair with call light and phone within reach, all lines and tubes intact. Overall patient demonstrated  decreased independence and safety during completion of ADL/functional transfer/mobility tasks. Pt would benefit from continued skilled OT to increase safety and independence with completion of ADL/IADL tasks for functional independence and quality of life. Treatment: Pt required vc's for proper technique/safety with hand placement/body mechanics/posture for bed mobility/ADLs/functional tranfers/mobility/ww management.  Pt required vc's for sequencing/initiation of ADLs/functional transfers. Pt able to  sit EOB ~5 minutes  mins and at sink ~5 mins to increase core strength/balance/activity tolerance for ease with ADLs. Pt completed oral hygiene and grooming tasks standing sink side with no noted LOB, ambulated in room and bathroom (demonstrating lower surface transfer). Pt seated in bedside chair at end of sesison. Pt required increased time to complete ADLs/functional transfers due to management of multiple lines. Educated on AE for ease with LE dressing. Pt required skilled monitoring of SpO2 during session and education on energy conservation techniques. Pt required rest breaks during session and educated on pursed lip breathing. Pt appeared to have tolerated session well and appears motivated/cooperative/pleasant . Pt instructed on use of call light for assistance and fall prevention. Pt demo'ing fair understanding of education provided. Continue to educate. Rehab Potential: Good  for established goals       LTG: maximize independence with ADLs to return to PLOF    Patient and/or family were instructed on functional diagnosis, prognosis/goals and OT plan of care. Demonstrated FAIR understanding. [] Malnutrition indicators have been identified and nursing has been notified to ensure a dietitian consult is ordered. Eval Complexity: Low     Evaluation time includes thorough review of current medical information, gathering information on past medical & social history & PLOF, completion of standardized testing, informal observation of tasks, consultation with other medical professions/disciplines, assessment of data & development of POC/goals.      Time In: 0845  Time Out: 0910  Total Treatment Time: 10    Min Units   OT Eval Low 07037  x     OT Eval Medium 53546      OT Eval High 35552      OT Re-Eval [id number]       Therapeutic Ex [id number]       Therapeutic Activities 58237       ADL/Self Care 74677  10  1   Orthotic Management 47709       Manual 08586     Neuro Re-Ed 23387 Non-Billable Time          Evaluation Time additionally includes thorough review of current medical information, gathering information on past medical history/social history and prior level of function, interpretation of standardized testing/informal observation of tasks, assessment of data and development of plan of care and goals.             RAGHU Paulson OTR/L; [id number]

## 2022-08-22 NOTE — PROGRESS NOTES
08/22/2022 04:57 AM    LABALBU 3.8 08/22/2022 04:57 AM    CALCIUM 9.4 08/22/2022 04:57 AM    BILITOT 0.4 08/22/2022 04:57 AM    ALKPHOS 40 08/22/2022 04:57 AM    AST 27 08/22/2022 04:57 AM    ALT 26 08/22/2022 04:57 AM     PT/INR:    Lab Results   Component Value Date/Time    PROTIME 13.3 11/19/2018 11:10 AM    INR 1.1 11/19/2018 11:10 AM       Assessment:     Principal Problem:    Covid 19 infection   Acute on chronic diastolic CHF   Ischemic cardiomyopathy   COPD  DM  A.Fib.       Plan:   Improved stable will get  for placement

## 2022-08-22 NOTE — PROGRESS NOTES
Physical Therapy  Physical Therapy Initial Assessment       Name: Dwaine Neal  : 1935  MRN: [id number]      Date of Service: 2022    Evaluating PT:  Ro Rosales PT, DPT  [id number]    Room #:  4145/1490-C  Diagnosis:  Respiratory failure ([facility] [J96.90]  PMHx/PSHx:   has a past medical history of Arthritis, CAD (coronary artery disease), Cardiomyopathy ([facility] CHF (congestive heart failure) ([facility] Chronic bronchitis ([facility] COPD (chronic obstructive pulmonary disease) ([facility] Depression, GERD (gastroesophageal reflux disease), HFrEF (heart failure with reduced ejection fraction) ([place] [id number] Hyperlipidemia, Hypertension, MI (myocardial infarction) ([facility] Osteopenia, Osteoporosis, and Swallowing difficulty. Procedure/Surgery:    Precautions:  Covid, Falls, Droplet Plus  Equipment Needs:      SUBJECTIVE:    Pt lives with spouse in a 1 story home with 2 stairs to enter and single rail. Bed is on first floor and bath is on first floor. Pt ambulated with SPC vs WW independently PTA. Equipment Owned:     OBJECTIVE:   Initial Evaluation  Date: 22 Treatment Short Term/ Long Term   Goals   AM-PAC 6 Clicks      Was pt agreeable to Eval/treatment? Yes     Does pt have pain? No c/o pain     Bed Mobility  Rolling: SBA  Supine to sit: SBA  Sit to supine: SBA  Scooting: SBA  Rolling: Independent    Supine to sit:  Independent    Sit to supine: Independent    Scooting: Independent     Transfers Sit to stand: SBA  Stand to sit: SBA  Stand pivot: SBA [employer]  Sit to stand: Modified Independent    Stand to sit: Modified Independent    Stand pivot: Modified Independent     Ambulation    50 feet with SBA WW   >150 feet with Modified Independent   AAD   Stair negotiation: ascended and descended  NT-isolation  >4 steps with single rail Modified Independent     ROM BUE:  Defer to OT  BLE:  WFL     Strength BUE:  Defer to OT  BLE:  4/5  Improve 1 MMT   Balance Sitting EOB:  SBA  Dynamic Standing:  SBA [employer]  Sitting EOB: Independent    Dynamic Standing:  Modified Independent       Pt is A & O x 4  Sensation:  WNl  Edema: WNL    Vitals:    HR 70  Spo2 99% RA   ACTIVITY    Therapeutic Exercises:  functional mobility    Patient education  Pt educated on role of PT    Patient response to education:   Pt verbalized understanding Pt demonstrated skill Pt requires further education in this area   x x x     ASSESSMENT:    Conditions Requiring Skilled Therapeutic Intervention:    [x]Decreased strength     [x]Decreased ROM  [x]Decreased functional mobility  [x]Decreased balance   [x]Decreased endurance   []Decreased posture  []Decreased sensation  []Decreased coordination   []Decreased vision  [x]Decreased safety awareness   [x]Increased pain       Comments:  Pt agreeable to PT evaluation. Pt performed all mobility without assist. Pt with no gross LOB. Spo2 >99% with activity. Patient would benefit from continued skilled PT to maximize functional mobility independence. Treatment:  Patient practiced and was instructed in the following treatment:    Functional transfers-Verbal cues for proper positioning and sequencing to perform transfers safely with maximum independence. Gait training-Verbal cues for proper positioning and sequencing using assistive device to maximize functional mobility independence. Pt's/ family goals   1. Get better    Prognosis is good for reaching above PT goals. Patient and or family understand(s) diagnosis, prognosis, and plan of care.   yes    PHYSICAL THERAPY PLAN OF CARE:    PT POC is established based on physician order and patient diagnosis     Referring provider/PT Order:    08/19/22 1100  PT eval and treat  Start:  08/19/22 1100,   End:  08/19/22 1100,   ONE TIME,   Standing Count:  1 Occurrences,   Nathalie Ratliff MD     Diagnosis:  Respiratory failure ([address] [J96.90]  Specific instructions for next treatment:  Gait training    Current Treatment Recommendations:     [x] Strengthening to improve independence with functional mobility   [x] ROM to improve independence with functional mobility   [x] Balance Training to improve static/dynamic balance and to reduce fall risk  [x] Endurance Training to improve activity tolerance during functional mobility   [x] Transfer Training to improve safety and independence with all functional transfers   [x] Gait Training to improve gait mechanics, endurance and assess need for appropriate assistive device  [x] Stair Training in preparation for safe discharge home and/or into the community   [x] Positioning to prevent skin breakdown and contractures  [x] Safety and Education Training   [x] Patient/Caregiver Education   [x] HEP  [] Other     PT long term treatment goals are located in above grid    Frequency of treatments: 2-5x/week x 1-2 weeks. Time in  0850  Time out  0913    Total Treatment Time  8 minutes     Evaluation Time includes thorough review of current medical information, gathering information on past medical history/social history and prior level of function, completion of standardized testing/informal observation of tasks, assessment of data and education on plan of care and goals.     CPT codes:  [x] Low Complexity PT evaluation 83743  [] Moderate Complexity PT evaluation 54913  [] High Complexity PT evaluation 39945  [] PT Re-evaluation 70116  [] Gait training 52323 0 minutes  [] Manual therapy 01204 0 minutes  [x] Therapeutic activities 14552 8 minutes  [] Therapeutic exercises 60021 0 minutes  [] Neuromuscular reeducation 73962 0 minutes       Angeline Shah PT, DPT   [id number]

## 2022-08-23 VITALS
TEMPERATURE: 97.8 F | HEART RATE: 57 BPM | SYSTOLIC BLOOD PRESSURE: 126 MMHG | HEIGHT: 59 IN | OXYGEN SATURATION: 91 % | DIASTOLIC BLOOD PRESSURE: 69 MMHG | RESPIRATION RATE: 18 BRPM | WEIGHT: 109.57 LBS | BODY MASS INDEX: 22.09 KG/M2

## 2022-08-23 LAB
ALBUMIN SERPL-MCNC: 3.7 G/DL (ref 3.5–5.2)
ALP BLD-CCNC: 39 U/L (ref 35–104)
ALT SERPL-CCNC: 23 U/L (ref 0–32)
ANION GAP SERPL CALCULATED.3IONS-SCNC: 9 MMOL/L (ref 7–16)
AST SERPL-CCNC: 24 U/L (ref 0–31)
BILIRUB SERPL-MCNC: 0.4 MG/DL (ref 0–1.2)
BUN BLDV-MCNC: 34 MG/DL (ref 6–23)
CALCIUM SERPL-MCNC: 9.5 MG/DL (ref 8.6–10.2)
CHLORIDE BLD-SCNC: 91 MMOL/L (ref 98–107)
CO2: 35 MMOL/L (ref 22–29)
CREAT SERPL-MCNC: 1.1 MG/DL (ref 0.5–1)
GFR AFRICAN AMERICAN: 57
GFR NON-AFRICAN AMERICAN: 47 ML/MIN/1.73
GLUCOSE BLD-MCNC: 93 MG/DL (ref 74–99)
POTASSIUM SERPL-SCNC: 4.7 MMOL/L (ref 3.5–5)
SODIUM BLD-SCNC: 135 MMOL/L (ref 132–146)
TOTAL PROTEIN: 5.8 G/DL (ref 6.4–8.3)

## 2022-08-23 PROCEDURE — 6370000000 HC RX 637 (ALT 250 FOR IP): Performed by: INTERNAL MEDICINE

## 2022-08-23 PROCEDURE — 6360000002 HC RX W HCPCS: Performed by: INTERNAL MEDICINE

## 2022-08-23 PROCEDURE — 99239 HOSP IP/OBS DSCHRG MGMT >30: CPT | Performed by: INTERNAL MEDICINE

## 2022-08-23 PROCEDURE — 80053 COMPREHEN METABOLIC PANEL: CPT

## 2022-08-23 PROCEDURE — 36415 COLL VENOUS BLD VENIPUNCTURE: CPT

## 2022-08-23 PROCEDURE — 94640 AIRWAY INHALATION TREATMENT: CPT

## 2022-08-23 RX ORDER — ACETAMINOPHEN 500 MG
1000 TABLET ORAL EVERY 8 HOURS PRN
Qty: 120 TABLET | Refills: 3 | Status: SHIPPED | OUTPATIENT
Start: 2022-08-23

## 2022-08-23 RX ORDER — BUDESONIDE 0.25 MG/2ML
250 INHALANT ORAL 2 TIMES DAILY
Qty: 60 EACH | Refills: 3 | Status: SHIPPED | OUTPATIENT
Start: 2022-08-23 | End: 2022-09-15 | Stop reason: SDUPTHER

## 2022-08-23 RX ADMIN — METOPROLOL SUCCINATE 25 MG: 25 TABLET, EXTENDED RELEASE ORAL at 09:45

## 2022-08-23 RX ADMIN — APIXABAN 2.5 MG: 2.5 TABLET, FILM COATED ORAL at 09:45

## 2022-08-23 RX ADMIN — FUROSEMIDE 20 MG: 20 TABLET ORAL at 09:45

## 2022-08-23 RX ADMIN — ATORVASTATIN CALCIUM 20 MG: 20 TABLET, FILM COATED ORAL at 09:45

## 2022-08-23 RX ADMIN — AMIODARONE HYDROCHLORIDE 100 MG: 200 TABLET ORAL at 09:45

## 2022-08-23 RX ADMIN — BUDESONIDE 250 MCG: 0.25 SUSPENSION RESPIRATORY (INHALATION) at 08:58

## 2022-08-23 RX ADMIN — LOSARTAN POTASSIUM 25 MG: 25 TABLET, FILM COATED ORAL at 09:45

## 2022-08-23 NOTE — PROGRESS NOTES
CLINICAL PHARMACY NOTE: MEDS TO BEDS    Total # of Prescriptions Filled: 2   The following medications were delivered to the patient:  Budesonide 0.25mg/2ml  Acetaminophen 500mg    Additional Documentation:  Delivered to [address] 8/23

## 2022-08-23 NOTE — PLAN OF CARE
Problem: Safety - Adult  Goal: Free from fall injury  8/23/2022 1118 by Shawna Dodd RN  Outcome: Completed     Problem: ABCDS Injury Assessment  Goal: Absence of physical injury  8/23/2022 1118 by Shawna Dodd RN  Outcome: Completed     Problem: Skin/Tissue Integrity  Goal: Absence of new skin breakdown  Description: 1. Monitor for areas of redness and/or skin breakdown  2. Assess vascular access sites hourly  3. Every 4-6 hours minimum:  Change oxygen saturation probe site  4. Every 4-6 hours:  If on nasal continuous positive airway pressure, respiratory therapy assess nares and determine need for appliance change or resting period.   8/23/2022 1118 by Shawna Dodd RN  Outcome: Completed     Problem: Cardiovascular - Adult  Goal: Maintains optimal cardiac output and hemodynamic stability  8/23/2022 1118 by Shawna Dodd RN  Outcome: Completed     Problem: Metabolic/Fluid and Electrolytes - Adult  Goal: Hemodynamic stability and optimal renal function maintained  8/23/2022 1118 by Shawna Dodd RN  Outcome: Completed     Problem: Nutrition Deficit:  Goal: Optimize nutritional status  8/23/2022 1118 by Shawna Dodd RN  Outcome: Completed     Problem: Chronic Conditions and Co-morbidities  Goal: Patient's chronic conditions and co-morbidity symptoms are monitored and maintained or improved  8/23/2022 1118 by Shawna Dodd RN  Outcome: Completed

## 2022-08-23 NOTE — PROGRESS NOTES
Admit Date: 8/18/2022    Subjective:     Doing fine comfortable now want to go home     Objective:     Patient Vitals for the past 8 hrs:   Weight   08/23/22 0610 109 lb 9.1 oz (49.7 kg)     I/O last 3 completed shifts: In: 360 [P.O.:360]  Out: 1000 [Urine:1000]  No intake/output data recorded. HEENT: Normal  NECK: Thyroid normal. No carotid bruit. No lymphphadenopathy. CVS: RRR  RS: Clear. No wheeze. No rhonchi. EXT: No edema. Non tender. Pulses present.    NEURO: no focal deficit       Scheduled Meds:   furosemide  20 mg Oral Daily    apixaban  2.5 mg Oral BID    aspirin  81 mg Oral Nightly    budesonide  0.25 mg Nebulization BID    famotidine  20 mg Oral Nightly    losartan  25 mg Oral Daily    metoprolol succinate  25 mg Oral Daily    atorvastatin  20 mg Oral Daily    [START ON 8/24/2022] vitamin D  50,000 Units Oral Weekly    amiodarone  100 mg Oral Daily     Continuous Infusions:    CBC with Differential:    Lab Results   Component Value Date/Time    WBC 7.5 08/18/2022 12:31 PM    RBC 3.76 08/18/2022 12:31 PM    HGB 12.4 08/18/2022 12:31 PM    HCT 38.0 08/18/2022 12:31 PM     08/18/2022 12:31 PM    .1 08/18/2022 12:31 PM    MCH 33.0 08/18/2022 12:31 PM    MCHC 32.6 08/18/2022 12:31 PM    RDW 13.2 08/18/2022 12:31 PM    LYMPHOPCT 8.9 08/18/2022 12:31 PM    MONOPCT 12.9 08/18/2022 12:31 PM    BASOPCT 0.4 08/18/2022 12:31 PM    MONOSABS 0.96 08/18/2022 12:31 PM    LYMPHSABS 0.66 08/18/2022 12:31 PM    EOSABS 0.00 08/18/2022 12:31 PM    BASOSABS 0.03 08/18/2022 12:31 PM     CMP:    Lab Results   Component Value Date/Time     08/23/2022 04:54 AM    K 4.7 08/23/2022 04:54 AM    K 4.6 08/18/2022 12:31 PM    CL 91 08/23/2022 04:54 AM    CO2 35 08/23/2022 04:54 AM    BUN 34 08/23/2022 04:54 AM    CREATININE 1.1 08/23/2022 04:54 AM    GFRAA 57 08/23/2022 04:54 AM    LABGLOM 47 08/23/2022 04:54 AM    PROT 5.8 08/23/2022 04:54 AM    LABALBU 3.7 08/23/2022 04:54 AM    CALCIUM 9.5 08/23/2022 04:54

## 2022-08-23 NOTE — CARE COORDINATION
Social Work/Case Management Transition of Care Planning (Melody Gotti [place] [phone]): Order for nebulizer noted. Met with patient at bedside to discuss. She indicated she does not have any preference for supplier. [facility] to [facility] to [facility] today.   Family to transport with a  time of 4:00pm.  RALPH Alfaro  8/23/2022

## 2022-08-24 ENCOUNTER — CARE COORDINATION (OUTPATIENT)
Dept: CARE COORDINATION | Age: 87
End: 2022-08-24

## 2022-08-24 NOTE — DISCHARGE SUMMARY
Discharge Summary    Date: 8/24/2022  Patient Name: Jimi Ruiz    YOB: 1935     Age: 81 y.o. Admit Date: 8/18/2022  Discharge Date: 8/23/2022  Discharge Condition: Stable    Admission Diagnosis  Respiratory failure [facility]) [J96.90]      Discharge Diagnosis  Principal Problem:    Respiratory failure ([address]  Resolved Problems:    * No resolved hospital problems. [facility] Stay  Narrative of Hospital Course:  Admitted from ER with cough SOB was Covid pos. And in CHF treated with diuretic support improved stabilized discharged home     Consultants:  Kendal Wayne 21.    Surgeries/procedures Performed:      Treatments:            Discharge Plan/Disposition:  Home    Hospital/Incidental Findings Requiring Follow Up:    Patient Instructions:    Diet: Regular Diet    Activity:Activity as Tolerated  For number of days (if applicable): Other Instructions:    Provider Follow-Up:   No follow-ups on file.      Significant Diagnostic Studies:    Recent Labs:  Admission on 08/18/2022, Discharged on 08/23/2022  Ventricular Rate                              Date: 08/18/2022  Value: 75          Ref range: BPM                Status: Final  Atrial Rate                                   Date: 08/18/2022  Value: 75          Ref range: BPM                Status: Final  P-R Interval                                  Date: 08/18/2022  Value: 212         Ref range: ms                 Status: Final  QRS Duration                                  Date: 08/18/2022  Value: 130         Ref range: ms                 Status: Final  Q-T Interval                                  Date: 08/18/2022  Value: 390         Ref range: ms                 Status: Final  QTc Calculation (Bazett)                      Date: 08/18/2022  Value: 435         Ref range: ms                 Status: Final  P Axis                                        Date: 08/18/2022  Value: 52          Ref range: degrees            Status: Final  R Axis                                        Date: 08/18/2022  Value: -43         Ref range: degrees            Status: Final  T Axis                                        Date: 08/18/2022  Value: 63          Ref range: degrees            Status: Final  WBC                                           Date: 08/18/2022  Value: 7.5         Ref range: 4.5 - 11.5 E9/L    Status: Final  RBC                                           Date: 08/18/2022  Value: 3.76        Ref range: 3.50 - 5.50 E12/L  Status: Final  Hemoglobin                                    Date: 08/18/2022  Value: 12.4        Ref range: 11.5 - 15.5 g/dL   Status: Final  Hematocrit                                    Date: 08/18/2022  Value: 38.0        Ref range: 34.0 - 48.0 %      Status: Final  MCV                                           Date: 08/18/2022  Value: 101.1 (A)   Ref range: 80.0 - 99.9 fL     Status: Final  MCH                                           Date: 08/18/2022  Value: 33.0        Ref range: 26.0 - 35.0 pg     Status: Final  MCHC                                          Date: 08/18/2022  Value: 32.6        Ref range: 32.0 - 34.5 %      Status: Final  RDW                                           Date: 08/18/2022  Value: 13.2        Ref range: 11.5 - 15.0 fL     Status: Final  Platelets                                     Date: 08/18/2022  Value: 259         Ref range: 130 - 450 E9/L     Status: Final  MPV                                           Date: 08/18/2022  Value: 9.7         Ref range: 7.0 - 12.0 fL      Status: Final  Neutrophils %                                 Date: 08/18/2022  Value: 77.4        Ref range: 43.0 - 80.0 %      Status: Final  Immature Granulocytes %                       Date: 08/18/2022  Value: 0.4         Ref range: 0.0 - 5.0 %        Status: Final  Lymphocytes %                                 Date: 08/18/2022  Value: 8.9 (A)     Ref range: 20.0 - 42.0 %      Status: Final  Monocytes % Date: 08/18/2022  Value: 12.9 (A)    Ref range: 2.0 - 12.0 %       Status: Final  Eosinophils %                                 Date: 08/18/2022  Value: 0.0         Ref range: 0.0 - 6.0 %        Status: Final  Basophils %                                   Date: 08/18/2022  Value: 0.4         Ref range: 0.0 - 2.0 %        Status: Final  Neutrophils Absolute                          Date: 08/18/2022  Value: 5.77        Ref range: 1.80 - 7.30 E9/L   Status: Final  Immature Granulocytes #                       Date: 08/18/2022  Value: 0.03        Ref range: E9/L               Status: Final  Lymphocytes Absolute                          Date: 08/18/2022  Value: 0.66 (A)    Ref range: 1.50 - 4.00 E9/L   Status: Final  Monocytes Absolute                            Date: 08/18/2022  Value: 0.96 (A)    Ref range: 0.10 - 0.95 E9/L   Status: Final  Eosinophils Absolute                          Date: 08/18/2022  Value: 0.00 (A)    Ref range: 0.05 - 0.50 E9/L   Status: Final  Basophils Absolute                            Date: 08/18/2022  Value: 0.03        Ref range: 0.00 - 0.20 E9/L   Status: Final  Sodium                                        Date: 08/18/2022  Value: 132         Ref range: 132 - 146 mmol/L   Status: Final  Potassium reflex Magnesium                    Date: 08/18/2022  Value: 4.6         Ref range: 3.5 - 5.0 mmol/L   Status: Final                Comment: Specimen is moderately Hemolyzed. Result may be artificially increased.   Chloride                                      Date: 08/18/2022  Value: 93 (A)      Ref range: 98 - 107 mmol/L    Status: Final  CO2                                           Date: 08/18/2022  Value: 22          Ref range: 22 - 29 mmol/L     Status: Final  Anion Gap                                     Date: 08/18/2022  Value: 17 (A)      Ref range: 7 - 16 mmol/L      Status: Final  Glucose                                       Date: 08/18/2022  Value: 111 (A)     Ref range: 74 - 99 mg/dL      Status: Final  BUN                                           Date: 08/18/2022  Value: 19          Ref range: 6 - 23 mg/dL       Status: Final  Creatinine                                    Date: 08/18/2022  Value: 0.8         Ref range: 0.5 - 1.0 mg/dL    Status: Final  GFR Non-                      Date: 08/18/2022  Value: >60         Ref range: >=60 mL/min/1.73   Status: Final                Comment: Chronic Kidney Disease: less than 60 ml/min/1.73 sq.m. Kidney Failure: less than 15 ml/min/1.73 sq.m. Results valid for patients 18 years and older.     GFR                           Date: 08/18/2022  Value: >60           Status: Final  Calcium                                       Date: 08/18/2022  Value: 9.0         Ref range: 8.6 - 10.2 mg/dL   Status: Final  Pro-BNP                                       Date: 08/18/2022  Value: 9,250 (A)   Ref range: 0 - 450 pg/mL      Status: Final  Date Analyzed                                 Date: 08/18/2022  Value: [id number]      Status: Final  Time Analyzed                                 Date: 08/18/2022  Value: 1244          Status: Final  Source:                                       Date: 08/18/2022  Value: Blood Arterial                       Status: Final  pH, Blood Gas                                 Date: 08/18/2022  Value: 7.427       Ref range: 7.350 - 7.450      Status: Final  PCO2                                          Date: 08/18/2022  Value: 34.4 (A)    Ref range: 35.0 - 45.0 mmHg   Status: Final  PO2                                           Date: 08/18/2022  Value: 60.3 (A)    Ref range: 75.0 - 100.0 mmHg  Status: Final  HCO3                                          Date: 08/18/2022  Value: 22.2        Ref range: 22.0 - 26.0 mmol*  Status: Final  B.E.                                          Date: 08/18/2022  Value: -1.6        Ref range: -3.0 - 3.0 mmol/L  Status: Final  O2 Sat Date: 08/18/2022  Value: 91.3 (A)    Ref range: 92.0 - 98.5 %      Status: Final  O2Hb                                          Date: 08/18/2022  Value: 89.9 (A)    Ref range: 94.0 - 97.0 %      Status: Final  COHb                                          Date: 08/18/2022  Value: 1.4         Ref range: 0.0 - 1.5 %        Status: Final  MetHb                                         Date: 08/18/2022  Value: 0.1         Ref range: 0.0 - 1.5 %        Status: Final  O2 Content                                    Date: 08/18/2022  Value: 15.6        Ref range: mL/dL              Status: Final  HHb                                           Date: 08/18/2022  Value: 8.6 (A)     Ref range: 0.0 - 5.0 %        Status: Final  tHb (est)                                     Date: 08/18/2022  Value: 12.3        Ref range: 11.5 - 16.5 g/dL   Status: Final  Mode                                          Date: 08/18/2022  Value: RA            Status: Final  Pt Temp                                       Date: 08/18/2022  Value: 37.0        Ref range: C                  Status: Final   ID                                   Date: 08/18/2022  Value: 7292          Status: Final  Lab                                           Date: 08/18/2022  Value: 23219         Status: Final  Critical(s) Notified                          Date: 08/18/2022  Value: . No Critical Values                       Status: Final  Rejected Test                                 Date: 08/18/2022  Value: dimer         Status: Final                Comment: Specimen is grossly hemolyzed. Reason for Rejection                          Date: 08/18/2022  Value: see below     Status: Final                Comment: Unable to perform testing; specimen grossly hemolyzed. To perform testing the specimen will need to be recollected.  Hemolyz    Troponin, High Sensitivity                    Date: 08/18/2022  Value: 18 (A)      Ref range: 0 - 9 ng/L Status: Final                Comment: High Sensitivity Troponin values cannot be compared with  other Troponin methodologies. Patients with high levels of Biotin oral intake (i.e. >5 mg/day)  may have falsely decreased Troponin levels. Samples collected  within 8 hours of biotin intake may require additional information  for diagnosis. D-Dimer, Quant                                Date: 08/18/2022  Value: <200        Ref range: ng/mL DDU          Status: Final                Comment: D-DIMER Interpretation:  <  230  ng/mL (D-DU)  Indicates low probability for PE/DVT   = 232  ng/mL (D-DU)  Upper Limit of Normal  >= 4000 ng/mL (D-DU)  This level could suggest the presence                        of DIC. Clinical correlation may be                        helpful.     Sodium                                        Date: 08/21/2022  Value: 134         Ref range: 132 - 146 mmol/L   Status: Final  Potassium                                     Date: 08/21/2022  Value: 4.0         Ref range: 3.5 - 5.0 mmol/L   Status: Final  Chloride                                      Date: 08/21/2022  Value: 90 (A)      Ref range: 98 - 107 mmol/L    Status: Final  CO2                                           Date: 08/21/2022  Value: 33 (A)      Ref range: 22 - 29 mmol/L     Status: Final  Anion Gap                                     Date: 08/21/2022  Value: 11          Ref range: 7 - 16 mmol/L      Status: Final  Glucose                                       Date: 08/21/2022  Value: 98          Ref range: 74 - 99 mg/dL      Status: Final  BUN                                           Date: 08/21/2022  Value: 42 (A)      Ref range: 6 - 23 mg/dL       Status: Final  Creatinine                                    Date: 08/21/2022  Value: 1.1 (A)     Ref range: 0.5 - 1.0 mg/dL    Status: Final  GFR Non-                      Date: 08/21/2022  Value: 47          Ref range: >=60 mL/min/1.73   Status: Final Comment: Chronic Kidney Disease: less than 60 ml/min/1.73 sq.m. Kidney Failure: less than 15 ml/min/1.73 sq.m. Results valid for patients 18 years and older. GFR                           Date: 08/21/2022  Value: 57            Status: Final  Calcium                                       Date: 08/21/2022  Value: 9.3         Ref range: 8.6 - 10.2 mg/dL   Status: Final  Total Protein                                 Date: 08/21/2022  Value: 5.9 (A)     Ref range: 6.4 - 8.3 g/dL     Status: Final  Albumin                                       Date: 08/21/2022  Value: 3.7         Ref range: 3.5 - 5.2 g/dL     Status: Final  Total Bilirubin                               Date: 08/21/2022  Value: 0.3         Ref range: 0.0 - 1.2 mg/dL    Status: Final  Alkaline Phosphatase                          Date: 08/21/2022  Value: 38          Ref range: 35 - 104 U/L       Status: Final  ALT                                           Date: 08/21/2022  Value: 30          Ref range: 0 - 32 U/L         Status: Final  AST                                           Date: 08/21/2022  Value: 36 (A)      Ref range: 0 - 31 U/L         Status: Final  Procalcitonin                                 Date: 08/21/2022  Value: 0.09 (A)    Ref range: 0.00 - 0.08 ng/mL  Status: Final                Comment: Suspected Sepsis:  Low likelihood of sepsis  <.50 ng/mL    Increased likelihood of sepsis 0.50-2.00 ng/mL  Antibiotics encouraged    High risk of sepsis/shock   >2.00 ng/mL  Antibiotics strongly encouraged    Suspected Lower Respiratory Tract Infections:  Low likelihood of bacterial infection  <0.24 ng/mL    Increased likelihood of bacterial infection >0.24 ng/mL  Antibiotics encouraged    With successful antibiotic therapy, PCT levels should decrease  rapidly. (Half-life of 24 to 36 hours.)    Procalcitonin values from samples collected within the first  6 hours of systemic infection may still be low.   Retesting may be indicated. Values from day 1 and day 4 can be entered into the Change in  Procalcitonin Calculator to determine the patient's  Mortality Risk Prognosis  ([email or web address] [employer])    In healthy neonates, plasma Procalcitonin (PCT) concentrations  increase gradually after birth, reaching peak values at about  24 hours of age then decrease to normal values below 0.5                          ng/mL  by 48-72 hours of age. CRP                                           Date: 08/21/2022  Value: 0.7 (A)     Ref range: 0.0 - 0.4 mg/dL    Status: Final  Sodium                                        Date: 08/22/2022  Value: 135         Ref range: 132 - 146 mmol/L   Status: Final  Potassium                                     Date: 08/22/2022  Value: 4.6         Ref range: 3.5 - 5.0 mmol/L   Status: Final  Chloride                                      Date: 08/22/2022  Value: 91 (A)      Ref range: 98 - 107 mmol/L    Status: Final  CO2                                           Date: 08/22/2022  Value: 34 (A)      Ref range: 22 - 29 mmol/L     Status: Final  Anion Gap                                     Date: 08/22/2022  Value: 10          Ref range: 7 - 16 mmol/L      Status: Final  Glucose                                       Date: 08/22/2022  Value: 119 (A)     Ref range: 74 - 99 mg/dL      Status: Final  BUN                                           Date: 08/22/2022  Value: 32 (A)      Ref range: 6 - 23 mg/dL       Status: Final  Creatinine                                    Date: 08/22/2022  Value: 0.8         Ref range: 0.5 - 1.0 mg/dL    Status: Final  GFR Non-                      Date: 08/22/2022  Value: >60         Ref range: >=60 mL/min/1.73   Status: Final                Comment: Chronic Kidney Disease: less than 60 ml/min/1.73 sq.m. Kidney Failure: less than 15 ml/min/1.73 sq.m. Results valid for patients 18 years and older.     GFR                           Date: 08/22/2022  Value: >60           Status: Final  Calcium                                       Date: 08/22/2022  Value: 9.4         Ref range: 8.6 - 10.2 mg/dL   Status: Final  Total Protein                                 Date: 08/22/2022  Value: 6.3 (A)     Ref range: 6.4 - 8.3 g/dL     Status: Final  Albumin                                       Date: 08/22/2022  Value: 3.8         Ref range: 3.5 - 5.2 g/dL     Status: Final  Total Bilirubin                               Date: 08/22/2022  Value: 0.4         Ref range: 0.0 - 1.2 mg/dL    Status: Final  Alkaline Phosphatase                          Date: 08/22/2022  Value: 40          Ref range: 35 - 104 U/L       Status: Final  ALT                                           Date: 08/22/2022  Value: 26          Ref range: 0 - 32 U/L         Status: Final  AST                                           Date: 08/22/2022  Value: 27          Ref range: 0 - 31 U/L         Status: Final  Sodium                                        Date: 08/23/2022  Value: 135         Ref range: 132 - 146 mmol/L   Status: Final  Potassium                                     Date: 08/23/2022  Value: 4.7         Ref range: 3.5 - 5.0 mmol/L   Status: Final  Chloride                                      Date: 08/23/2022  Value: 91 (A)      Ref range: 98 - 107 mmol/L    Status: Final  CO2                                           Date: 08/23/2022  Value: 35 (A)      Ref range: 22 - 29 mmol/L     Status: Final  Anion Gap                                     Date: 08/23/2022  Value: 9           Ref range: 7 - 16 mmol/L      Status: Final  Glucose                                       Date: 08/23/2022  Value: 93          Ref range: 74 - 99 mg/dL      Status: Final  BUN                                           Date: 08/23/2022  Value: 34 (A)      Ref range: 6 - 23 mg/dL       Status: Final  Creatinine                                    Date: 08/23/2022  Value: 1.1 (A)     Ref range: 0.5 - 1.0 mg/dL Status: Final  GFR Non-                      Date: 08/23/2022  Value: 47          Ref range: >=60 mL/min/1.73   Status: Final                Comment: Chronic Kidney Disease: less than 60 ml/min/1.73 sq.m. Kidney Failure: less than 15 ml/min/1.73 sq.m. Results valid for patients 18 years and older. GFR                           Date: 08/23/2022  Value: 57            Status: Final  Calcium                                       Date: 08/23/2022  Value: 9.5         Ref range: 8.6 - 10.2 mg/dL   Status: Final  Total Protein                                 Date: 08/23/2022  Value: 5.8 (A)     Ref range: 6.4 - 8.3 g/dL     Status: Final  Albumin                                       Date: 08/23/2022  Value: 3.7         Ref range: 3.5 - 5.2 g/dL     Status: Final  Total Bilirubin                               Date: 08/23/2022  Value: 0.4         Ref range: 0.0 - 1.2 mg/dL    Status: Final  Alkaline Phosphatase                          Date: 08/23/2022  Value: 39          Ref range: 35 - 104 U/L       Status: Final  ALT                                           Date: 08/23/2022  Value: 23          Ref range: 0 - 32 U/L         Status: Final  AST                                           Date: 08/23/2022  Value: 24          Ref range: 0 - 31 U/L         Status: Final  ------------    Radiology last 7 days:  XR CHEST PORTABLE    Result Date: 8/20/2022  1. Improvement in right lower lobe opacity compared 08/18/2022. 2.  Cardiomegaly     XR CHEST PORTABLE    Result Date: 8/18/2022  Suspect early infiltrates at the lung bases. Cardiomegaly.         [unfilled]    Discharge Medications    Discharge Medication List as of 8/23/2022  2:21 PM    START taking these medications    budesonide (PULMICORT) 0.25 MG/2ML nebulizer suspension  Take 2 mLs by nebulization 2 times daily, Disp-60 each, R-3  Normal    Respiratory Therapy Supplies (FULL KIT NEBULIZER SET) MISC  Disp-1 each, R-0, Print  Use as directed with nebulized medication. Discharge Medication List as of 8/23/2022  2:21 PM    CONTINUE these medications which have CHANGED    acetaminophen (TYLENOL) 500 MG tablet  Take 2 tablets by mouth every 8 hours as needed for Pain, Disp-120 tablet, R-3  Normal          Discharge Medication List as of 8/23/2022  2:21 PM    CONTINUE these medications which have NOT CHANGED    amiodarone (CORDARONE) 200 MG tablet  Take 100 mg by mouth daily ##### Per Dr. Ivy Weber:  Take 100 mg (half tablet) daily #####  Historical Med    Multiple Vitamins-Minerals (OCUVITE PO)  Take 1 tablet by mouth daily  Historical Med    ASPIRIN LOW DOSE 81 MG EC tablet  Take 81 mg by mouth daily, ERIC  Historical Med    sodium chloride (OCEAN) 0.65 % nasal spray  Take as directed, Disp-1 each, R-2  Normal    vitamin D (ERGOCALCIFEROL) 1.25 MG (89692 UT) CAPS capsule  Take 50,000 Units by mouth once a week  Historical Med    losartan (COZAAR) 25 MG tablet  Take 25 mg by mouth daily  Historical Med    omeprazole (PRILOSEC) 40 MG delayed release capsule  Take 40 mg by mouth daily  Historical Med    Aspfexspu-Fililvjkl-Bpgivullbe (CEREFOLIN NAC PO)  Take by mouth  Historical Med    metoprolol succinate (TOPROL XL) 25 MG extended release tablet  Take 1 tablet by mouth daily, Disp-90 tablet, R-3  Normal    furosemide (LASIX) 20 MG tablet  Take 1 tablet by mouth daily, Disp-90 tablet, R-3  Normal    apixaban (ELIQUIS) 2.5 MG TABS tablet  Take 2.5 mg by mouth 2 times daily  Historical Med    mometasone (NASONEX) 50 MCG/ACT nasal spray  2 sprays by Each Nostril route daily, Each Nostril, DAILY Starting Wed 11/20/2019, R-3, Historical Med    simvastatin (ZOCOR) 20 MG tablet  Take 1 tablet by mouth daily, Disp-90 tablet, R-3  Normal    Coenzyme Q10 (CO Q-10) 100 MG CAPS  Take 100 mg by mouth daily  Historical Med          Discharge Medication List as of 8/23/2022  2:21 PM    STOP taking these medications    beclomethasone (QVAR) 40 MCG/ACT inhaler  Comments:  Reason for Stopping:    mupirocin (BACTROBAN NASAL) 2 % nasal ointment  Comments:  Reason for Stopping:    famotidine (PEPCID) 20 MG tablet  Comments:  Reason for Stopping:    aspirin 81 MG tablet  Comments:  Reason for Stopping:          Time Spent on Discharge:  30 minutes were spent in patient examination, evaluation, counseling as well as medication reconciliation, prescriptions for required medications, discharge plan, and follow up.     Electronically signed by Fina Lane MD on 8/24/22 at 7:17 AM EDT

## 2022-08-24 NOTE — CARE COORDINATION
[facility] Transitions Initial Follow Up Call    Call within 2 business days of discharge: Yes    Patient: Fozia Joyce Patient : 1935   MRN: [id number]  Reason for Admission: SEYZ 8/ Acute on Chronic Congestive Heart Failure  Discharge Date: 22 RARS: Readmission Risk Score: 14.7      Last Discharge [facility]       Date Complaint Diagnosis Description Type Department Provider    22 Positive For Covid-19 Acute on chronic congestive heart failure, unspecified heart failure type ([address] . .. ED to Hosp-Admission (Discharged) (ADMITTED) SEYZ 8WE Jacqueline Velazquez MD; Angelica Lundberg. ..              Spoke with: Unable to contact pt and no option to leave a voice message will attempt again at later time and date     Facility: [address] [place] [address] 24 Hour Call    Do you have all of your prescriptions and are they filled?: Yes  Post Acute Services: Home Health (Comment: She is receiving PT)  Care Transitions Interventions         Follow Up  Future Appointments   Date Time Provider Mohit Latham   10/24/2022  2:45 PM Iona eFrris MD [id number]   2022 10:00 AM SCHEDULE, [facility]   2023 10:30 AM [facility]  [address] [facility]       Patrick Daniel LPN

## 2022-08-25 ENCOUNTER — CARE COORDINATION (OUTPATIENT)
Dept: CARE COORDINATION | Age: 87
End: 2022-08-25

## 2022-08-25 NOTE — CARE COORDINATION
[facility]in [facility] Transitions Initial Follow Up Call    Call within 2 business days of discharge: Yes    Patient: Delma Orozco Patient : [id number]   MRN: [id number]  Reason for Admission: SEYZ 8/ Acute on Chronic Congestive Heart Failure  Discharge Date: 22 RARS: Readmission Risk Score: 14.7      Last Discharge [facility]       Date Complaint Diagnosis Description Type Department Provider    22 Positive For Covid-19 Acute on chronic congestive heart failure, unspecified heart failure type ([facility] . .. ED to Hosp-Admission (Discharged) (ADMITTED) YZ 8WE Watson Castleman, MD; Blaze Tan. .. Spoke with: unable to contact pt.  Unable to leave vm, no option will sign off     Facility: [facility]    Care Transitions 24 Hour Call    Do you have all of your prescriptions and are they filled?: Yes  Post Acute Services: Home Health (Comment: She is receiving PT)  Care Transitions Interventions         Follow Up  Future Appointments   Date Time Provider Mohit Latham   10/24/2022  2:45 PM Lisa Ruffin MD [address] [place] [address]   2022 10:00 AM SCHEDULE, [facility] DEVICE [facility] ELECTR [facility]   2023 10:30 AM SE[facility] INF CLINIC  [facility]       Romayne Pester, LPN

## 2022-09-14 ENCOUNTER — TELEPHONE (OUTPATIENT)
Dept: PRIMARY CARE CLINIC | Age: 87
End: 2022-09-14

## 2022-09-15 RX ORDER — BUDESONIDE 0.25 MG/2ML
250 INHALANT ORAL 2 TIMES DAILY
Qty: 60 EACH | Refills: 3 | Status: SHIPPED
Start: 2022-09-15 | End: 2022-09-21 | Stop reason: SDUPTHER

## 2022-09-21 RX ORDER — BUDESONIDE 0.25 MG/2ML
250 INHALANT ORAL 2 TIMES DAILY
Qty: 60 EACH | Refills: 3 | Status: SHIPPED
Start: 2022-09-21 | End: 2022-09-22 | Stop reason: SDUPTHER

## 2022-09-21 NOTE — TELEPHONE ENCOUNTER
Received a call from Betsy Weaver requesting a refill of her budesonide 0.25 mg/2 ml to be sent into [facility] on [address]. [place].

## 2022-09-22 DIAGNOSIS — J44.1 COPD EXACERBATION (HCC): Primary | ICD-10-CM

## 2022-09-22 RX ORDER — BUDESONIDE 0.25 MG/2ML
250 INHALANT ORAL 2 TIMES DAILY
Qty: 60 EACH | Refills: 3 | Status: SHIPPED | OUTPATIENT
Start: 2022-09-22

## 2022-10-18 ENCOUNTER — HOSPITAL ENCOUNTER (OUTPATIENT)
Dept: ULTRASOUND IMAGING | Age: 87
Discharge: HOME | End: 2022-10-20
Payer: MEDICARE

## 2022-10-18 DIAGNOSIS — E04.1 NONTOXIC UNINODULAR GOITER: ICD-10-CM

## 2022-10-18 PROCEDURE — 76536 US EXAM OF HEAD AND NECK: CPT

## 2022-10-24 ENCOUNTER — OFFICE VISIT (OUTPATIENT)
Dept: CARDIOLOGY CLINIC | Age: 87
End: 2022-10-24
Payer: MEDICARE

## 2022-10-24 VITALS
DIASTOLIC BLOOD PRESSURE: 70 MMHG | BODY MASS INDEX: 21.67 KG/M2 | RESPIRATION RATE: 16 BRPM | HEIGHT: 59 IN | HEART RATE: 55 BPM | WEIGHT: 107.5 LBS | SYSTOLIC BLOOD PRESSURE: 100 MMHG

## 2022-10-24 DIAGNOSIS — I25.5 ISCHEMIC CARDIOMYOPATHY: ICD-10-CM

## 2022-10-24 DIAGNOSIS — I34.0 NONRHEUMATIC MITRAL VALVE REGURGITATION: ICD-10-CM

## 2022-10-24 DIAGNOSIS — I10 ESSENTIAL HYPERTENSION: ICD-10-CM

## 2022-10-24 DIAGNOSIS — I48.0 PAROXYSMAL ATRIAL FIBRILLATION (HCC): Primary | ICD-10-CM

## 2022-10-24 DIAGNOSIS — I50.20 HFREF (HEART FAILURE WITH REDUCED EJECTION FRACTION) (HCC): ICD-10-CM

## 2022-10-24 PROCEDURE — G8427 DOCREV CUR MEDS BY ELIG CLIN: HCPCS | Performed by: INTERNAL MEDICINE

## 2022-10-24 PROCEDURE — 1090F PRES/ABSN URINE INCON ASSESS: CPT | Performed by: INTERNAL MEDICINE

## 2022-10-24 PROCEDURE — 1123F ACP DISCUSS/DSCN MKR DOCD: CPT | Performed by: INTERNAL MEDICINE

## 2022-10-24 PROCEDURE — 99214 OFFICE O/P EST MOD 30 MIN: CPT | Performed by: INTERNAL MEDICINE

## 2022-10-24 PROCEDURE — G8484 FLU IMMUNIZE NO ADMIN: HCPCS | Performed by: INTERNAL MEDICINE

## 2022-10-24 PROCEDURE — G8420 CALC BMI NORM PARAMETERS: HCPCS | Performed by: INTERNAL MEDICINE

## 2022-10-24 PROCEDURE — 1036F TOBACCO NON-USER: CPT | Performed by: INTERNAL MEDICINE

## 2022-10-24 PROCEDURE — 93000 ELECTROCARDIOGRAM COMPLETE: CPT | Performed by: INTERNAL MEDICINE

## 2022-10-24 NOTE — PROGRESS NOTES
Reported on 10/24/2022), Disp: , Rfl:     sodium chloride (OCEAN) 0.65 % nasal spray, Take as directed (Patient not taking: Reported on 10/24/2022), Disp: 1 each, Rfl: 2      S: REASON FOR VISIT:      Previously followed by Dr. Chang Jaffe -- she established with me in 4/2016. She denies recent chest pain, palpitations, PND, orthopnea, or syncope (she recently stopped bowling and going to water aerobics). +worsening LYON over the past 1 year, particular following COVID-19 infection in 8/2022. She follows regularly with Dr. Jacobo Del Castillo for ICD interrogations and PAF (s/p generator change in 2/2018). +cough/sinus problems since 11/2018 --> she follows with ENT (Dr. Eddie Sarmiento) --> clinically improved on nasonex. She is currently with no active cardiac complaints at rest.     REVIEW OF SYSTEMS:    Cardiac: As per HPI  General: No fever, chills  Pulmonary: As per HPI  HEENT: No visual disturbances, difficult swallowing  GI: No nausea, vomiting  : No dysuria, hematuria  Endocrine: No thyroid disease or DM  Musculoskeletal: MACKEY x 4, no focal motor deficits  Skin: Intact, no rashes  Neuro: No headache, seizures  Psych: Currently with no depression, anxiety     CARDIOVASCULAR HISTORY:    1. Coronary artery disease/ischemic cardiomyopathy/congestive heart failure. a. 10/30/2009: Acute ST elevation anterolateral wall myocardial infarction. b. 10/30/2009: Cardiac catheterization/primary angioplasty: Left main short vessel with no significant disease. LAD occluded proximally. LCX: 90% stenosis of the 3rd obtuse marginal branch. RCA, a small nondominant vessel, which was non-selectively visualized. Successful balloon angioplasty and stenting to the proximal LAD with restoration of DORI 3 flow in the vessel.    c. 06/18/2014 Lexiscan nuclear stress test was a markedly abnormal study showing a transmural myocardial infarction involving a large wrap around left anterior descending artery distribution with no evidence of residual stress-induced ischemia with the gated views showing akinesis of the left ventricular apex, the apical anterior wall and the apical septum with severe hypokinesis of the inferior wall and moderate hypokinesis of the rest of the interventricular septum with a calculated ejection fraction of 34 %. d. 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 Normal right ventricular size and function, with a pacemaker noted in the right ventricle. Pacemaker also noted in the right atrium. Mild to moderate mitral regurgitation. Mild to moderate tricuspid regurgitation. Mild aortic sclerosis with trace aortic regurgitation. Aortic root sclerosis/calcification. Small pericardial effusion. e. Congestive heart failure acute decompensation, first diagnosed in 2014.   2. Insertion of dual chamber pacer ICD on 5/10/2010.   3. History of hypertension. 4. Diabetes mellitus, diagnosed in 2014.   5. Hyponatremia in 2014 and again on a blood test on 2014. PAST MEDICAL HISTORY:   As under cardiovascular history. Asthma. GERD. Depression. Osteoporosis. Overactive bladder. S/P Hysterectomy, . S/P Tonsillectomy. S/P Appendectomy. S/P Right elbow fracture repair. UTI in  treated with oral antibiotics. Status post right and left capsulectomy and removal of extravasated implant material on the right on 12, status post bilateral breast implants in the remote past.  Bilateral hearing loss: Wears bilateral hearing aids. Right wrist fracture s/p fall, 2015. FAMILY HISTORY:   Mother , age 79, Alzheimers. Father , age 72, MI. One brother, history of colon surgery. One son, age 52, MI/ACB; two sons with bipolar disorder.      SOCIAL HISTORY:   Denies smoking. O:  COMPLETE PHYSICAL EXAM:         /70   Pulse 55   Resp 16   Ht 4' 11\" (1.499 m)   Wt 107 lb 8 oz (48.8 kg)   BMI 21.71 kg/m²      Appearance: Awake, alert and oriented x 3, no acute respiratory distress  Skin: Intact, no rash  Head: Normocephalic, atraumatic  Eyes: EOMI, no conjunctival erythema  ENMT: No pharyngeal erythema, MMM, no rhinorrhea, no dentures  Neck: Supple, no elevated JVP, no carotid bruits  Lungs: Clear to auscultation bilaterally. No wheezes, rales, or rhonchi. Cardiac: Grade 2/6 systolic murmur heard at the apex. Grade II/VI systolic murmur heard at the right upper sternal border. Abdomen: Soft, nontender, +bowel sounds  Extremities: Moves all extremities x 4, no lower extremity edema  Neurologic: No focal motor deficits apparent, normal mood and affect, alert and oriented x 3    Lab Results   Component Value Date    CREATININE 1.1 (H) 08/23/2022    BUN 34 (H) 08/23/2022     08/23/2022    K 4.7 08/23/2022    CL 91 (L) 08/23/2022    CO2 35 (H) 08/23/2022     EKG: AP/VS, rate 55, QTc 462 msec    Echocardiogram: 12/26/16 (Abby Canavan)   Mildly dilated left ventricle. Large apical aneurysm with dyskinetic apex. Severe hypokinesis of the apical septum and lateral wall. Overall LVEF is visually estimated at 20-25%   The left atrium is moderate-severely dilated. Structurally normal mitral valve. Increased E point septal separation noted,suggesting decreased LV cardiac output   Mild mitral regurgitation is present. The aortic valve is trileaflet. The aortic valve appears mildly sclerotic. Mild aortic regurgitation is noted. Normal tricuspid valve structure and function. Mild tricuspid regurgitation. RVSP is 25-30 mmHg. There is a trivial localized near right ventricle pericardial effusion noted.     ALMA: 6/9/2020 (Dr. Kellie Lim)   Gastric images not performed due to history of achalasia and recent   botulinum toxin injection - case discussed with  Vj Castellanos before   procedure and felt no contraindication to ALMA with upper esophageal images   only. Ejection fraction is visually estimated at 25-30%. There was evidence of spontaneous echo contrast in the left atrium. No evidence of thrombus within left atrium or appendage. Small mobile echodensity likely fibrin stranding noted on RA lead. Mild-moderate mitral regurgitation directed centrally. Mild aortic valve regurgitation. Moderate to severe tricuspid regurgitation. Echocardiogram: 3/31/22 (Dr. Claudio Warren)   Dilated left ventricle. Severely reduced left ventricular systolic function. Ejection fraction is visually estimated at 25-30%. Apical akinesis. Normal right ventricular size and function. Indeterminate diastolic function. Severely dilated left atrium by volume index. Moderate mitral regurgitation. Moderate aortic regurgitation. Moderate tricuspid regurgitation. PASP is estimated at 42 mmHg. ASSESSMENT / DIAGNOSIS:   Ischemic cardiomyopathy with severe LV dysfunction / chronic systolic and diastolic congestive heart failure  Status post ICD implantation (s/p generator change in 2/2018)  Valvular heart disease. History of hypertension. Controlled. BP today 138/80 (-126 at prior office visits). Prior history of intermittent hypotension. Asthma. GERD. Depression. Osteoporosis. Overreactive bladder. Bilateral hearing loss: Wears bilateral hearing aids. History of bilateral breast implants with history of removal of extravasated implant material on the right. Diabetes mellitus: Diet controlled. Chronic kidney disease/prerenal azotemia.    S/p right thumb surgery on 5/10/16, Dr. Anette Rahman  Dysphagia -- s/p esophageal dilatation in 11/2019 per patient  New onset atrial fibrillation s/p ALMA/CVN on 6/9/2020 (started on amiodarone at that time)    TREATMENT PLAN:  Results of 3/2022 echocardiogram reviewed with the patient today --> repeat echocardiogram ordered today (10/24/22)  Continue current medications (including Toprol XL, ARB, and [address]). Aldactone and entresto previously stopped in the setting of electrolyte abnormalities and hypotension. Will switch from eliquis to dose adjusted xarelto per patient request for insurance purposes. Monitor renal function and electrolytes closely  Follow-up with EP re: ICD interrogation (follows with Dr. Ingris Todd)  Questions answered today re: GDMT options  The above was discussed with the patient and her  today (10/24/22)    Greater than 30 minutes was spent counseling the patient, reviewing the rationale for the above recommendations and reviewing the patient's current medication list, problem list and results of all previously ordered testing.     Rajwinder Jeffrey MD, MD  [facility]) Cardiology

## 2022-10-31 RX ORDER — ALBUTEROL SULFATE 2.5 MG/3ML
2.5 SOLUTION RESPIRATORY (INHALATION) EVERY 4 HOURS PRN
COMMUNITY
End: 2022-11-08 | Stop reason: SDUPTHER

## 2022-10-31 RX ORDER — DIPHENOXYLATE HYDROCHLORIDE AND ATROPINE SULFATE 2.5; .025 MG/1; MG/1
1 TABLET ORAL 4 TIMES DAILY PRN
COMMUNITY

## 2022-10-31 RX ORDER — L-MEFOL/A-CYST/MEB12/ALGAL OIL 6-600-2 MG
1 TABLET ORAL DAILY
COMMUNITY

## 2022-11-05 DIAGNOSIS — I25.5 ISCHEMIC CARDIOMYOPATHY: ICD-10-CM

## 2022-11-05 DIAGNOSIS — I50.31 ACUTE DIASTOLIC CONGESTIVE HEART FAILURE (HCC): ICD-10-CM

## 2022-11-05 DIAGNOSIS — Z98.61 HISTORY OF PTCA: ICD-10-CM

## 2022-11-05 DIAGNOSIS — Z95.810 AUTOMATIC IMPLANTABLE CARDIOVERTER-DEFIBRILLATOR IN SITU: ICD-10-CM

## 2022-11-05 DIAGNOSIS — I25.10 CORONARY ARTERY DISEASE INVOLVING NATIVE CORONARY ARTERY OF NATIVE HEART WITHOUT ANGINA PECTORIS: ICD-10-CM

## 2022-11-07 RX ORDER — METOPROLOL SUCCINATE 25 MG/1
TABLET, EXTENDED RELEASE ORAL
Qty: 90 TABLET | Refills: 0 | Status: SHIPPED
Start: 2022-11-07 | End: 2022-11-08 | Stop reason: SDUPTHER

## 2022-11-07 RX ORDER — SIMVASTATIN 20 MG
TABLET ORAL
Qty: 90 TABLET | Refills: 3 | OUTPATIENT
Start: 2022-11-07

## 2022-11-07 RX ORDER — OMEPRAZOLE 40 MG/1
CAPSULE, DELAYED RELEASE ORAL
Qty: 90 CAPSULE | Refills: 0 | Status: SHIPPED
Start: 2022-11-07 | End: 2022-11-08 | Stop reason: SDUPTHER

## 2022-11-08 ENCOUNTER — OFFICE VISIT (OUTPATIENT)
Dept: PRIMARY CARE CLINIC | Age: 87
End: 2022-11-08
Payer: MEDICARE

## 2022-11-08 VITALS
WEIGHT: 107 LBS | SYSTOLIC BLOOD PRESSURE: 102 MMHG | HEIGHT: 59 IN | DIASTOLIC BLOOD PRESSURE: 68 MMHG | BODY MASS INDEX: 21.57 KG/M2 | TEMPERATURE: 96.9 F

## 2022-11-08 DIAGNOSIS — Z79.4 CONTROLLED TYPE 2 DIABETES MELLITUS WITHOUT COMPLICATION, WITH LONG-TERM CURRENT USE OF INSULIN (HCC): Chronic | ICD-10-CM

## 2022-11-08 DIAGNOSIS — I50.31 ACUTE DIASTOLIC CONGESTIVE HEART FAILURE (HCC): ICD-10-CM

## 2022-11-08 DIAGNOSIS — I48.91 ATRIAL FIBRILLATION, UNSPECIFIED TYPE (HCC): Primary | ICD-10-CM

## 2022-11-08 DIAGNOSIS — Z98.61 HISTORY OF PTCA: ICD-10-CM

## 2022-11-08 DIAGNOSIS — E11.9 CONTROLLED TYPE 2 DIABETES MELLITUS WITHOUT COMPLICATION, WITH LONG-TERM CURRENT USE OF INSULIN (HCC): Chronic | ICD-10-CM

## 2022-11-08 DIAGNOSIS — I25.5 ISCHEMIC CARDIOMYOPATHY: ICD-10-CM

## 2022-11-08 DIAGNOSIS — Z95.810 AUTOMATIC IMPLANTABLE CARDIOVERTER-DEFIBRILLATOR IN SITU: ICD-10-CM

## 2022-11-08 DIAGNOSIS — J44.1 COPD EXACERBATION (HCC): ICD-10-CM

## 2022-11-08 DIAGNOSIS — I25.10 CORONARY ARTERY DISEASE INVOLVING NATIVE CORONARY ARTERY OF NATIVE HEART WITHOUT ANGINA PECTORIS: ICD-10-CM

## 2022-11-08 DIAGNOSIS — I10 ESSENTIAL HYPERTENSION: ICD-10-CM

## 2022-11-08 PROCEDURE — 1123F ACP DISCUSS/DSCN MKR DOCD: CPT | Performed by: INTERNAL MEDICINE

## 2022-11-08 PROCEDURE — G8427 DOCREV CUR MEDS BY ELIG CLIN: HCPCS | Performed by: INTERNAL MEDICINE

## 2022-11-08 PROCEDURE — 1090F PRES/ABSN URINE INCON ASSESS: CPT | Performed by: INTERNAL MEDICINE

## 2022-11-08 PROCEDURE — 1036F TOBACCO NON-USER: CPT | Performed by: INTERNAL MEDICINE

## 2022-11-08 PROCEDURE — 99214 OFFICE O/P EST MOD 30 MIN: CPT | Performed by: INTERNAL MEDICINE

## 2022-11-08 PROCEDURE — G8420 CALC BMI NORM PARAMETERS: HCPCS | Performed by: INTERNAL MEDICINE

## 2022-11-08 PROCEDURE — 3023F SPIROM DOC REV: CPT | Performed by: INTERNAL MEDICINE

## 2022-11-08 PROCEDURE — G8484 FLU IMMUNIZE NO ADMIN: HCPCS | Performed by: INTERNAL MEDICINE

## 2022-11-08 RX ORDER — MOMETASONE FUROATE 50 UG/1
2 SPRAY, METERED NASAL DAILY
Qty: 1 EACH | Refills: 3 | Status: SHIPPED | OUTPATIENT
Start: 2022-11-08

## 2022-11-08 RX ORDER — METOPROLOL SUCCINATE 25 MG/1
TABLET, EXTENDED RELEASE ORAL
Qty: 90 TABLET | Refills: 0 | Status: SHIPPED | OUTPATIENT
Start: 2022-11-08

## 2022-11-08 RX ORDER — OMEPRAZOLE 40 MG/1
CAPSULE, DELAYED RELEASE ORAL
Qty: 90 CAPSULE | Refills: 0 | Status: SHIPPED | OUTPATIENT
Start: 2022-11-08

## 2022-11-08 RX ORDER — ASPIRIN 81 MG/1
81 TABLET, COATED ORAL DAILY
Qty: 90 TABLET | Refills: 0 | Status: SHIPPED | OUTPATIENT
Start: 2022-11-08

## 2022-11-08 RX ORDER — SIMVASTATIN 20 MG
20 TABLET ORAL DAILY
Qty: 90 TABLET | Refills: 0 | Status: SHIPPED | OUTPATIENT
Start: 2022-11-08

## 2022-11-08 RX ORDER — LOSARTAN POTASSIUM 25 MG/1
25 TABLET ORAL DAILY
Qty: 90 TABLET | Refills: 0 | Status: SHIPPED | OUTPATIENT
Start: 2022-11-08

## 2022-11-08 RX ORDER — ERGOCALCIFEROL 1.25 MG/1
50000 CAPSULE ORAL WEEKLY
Qty: 12 CAPSULE | Refills: 0 | Status: SHIPPED | OUTPATIENT
Start: 2022-11-08

## 2022-11-08 RX ORDER — FUROSEMIDE 20 MG/1
10 TABLET ORAL DAILY
Qty: 90 TABLET | Refills: 0 | Status: SHIPPED | OUTPATIENT
Start: 2022-11-08

## 2022-11-08 RX ORDER — AMIODARONE HYDROCHLORIDE 200 MG/1
100 TABLET ORAL DAILY
Qty: 90 TABLET | Refills: 0 | Status: SHIPPED | OUTPATIENT
Start: 2022-11-08

## 2022-11-08 RX ORDER — ALBUTEROL SULFATE 2.5 MG/3ML
2.5 SOLUTION RESPIRATORY (INHALATION) EVERY 4 HOURS PRN
Qty: 120 EACH | Refills: 2 | Status: SHIPPED | OUTPATIENT
Start: 2022-11-08

## 2022-11-08 SDOH — ECONOMIC STABILITY: FOOD INSECURITY: WITHIN THE PAST 12 MONTHS, YOU WORRIED THAT YOUR FOOD WOULD RUN OUT BEFORE YOU GOT MONEY TO BUY MORE.: NEVER TRUE

## 2022-11-08 SDOH — ECONOMIC STABILITY: FOOD INSECURITY: WITHIN THE PAST 12 MONTHS, THE FOOD YOU BOUGHT JUST DIDN'T LAST AND YOU DIDN'T HAVE MONEY TO GET MORE.: NEVER TRUE

## 2022-11-08 ASSESSMENT — PATIENT HEALTH QUESTIONNAIRE - PHQ9
SUM OF ALL RESPONSES TO PHQ QUESTIONS 1-9: 2
SUM OF ALL RESPONSES TO PHQ9 QUESTIONS 1 & 2: 2
2. FEELING DOWN, DEPRESSED OR HOPELESS: 1
SUM OF ALL RESPONSES TO PHQ QUESTIONS 1-9: 2
1. LITTLE INTEREST OR PLEASURE IN DOING THINGS: 1

## 2022-11-08 ASSESSMENT — ENCOUNTER SYMPTOMS
VOMITING: 0
CONSTIPATION: 0
ABDOMINAL DISTENTION: 0
DIARRHEA: 0
ABDOMINAL PAIN: 0
BACK PAIN: 0
ALLERGIC/IMMUNOLOGIC NEGATIVE: 1
RHINORRHEA: 0
SINUS PRESSURE: 0
COLOR CHANGE: 0
TROUBLE SWALLOWING: 0
SHORTNESS OF BREATH: 1
SORE THROAT: 0
NAUSEA: 0
BLOOD IN STOOL: 0

## 2022-11-08 ASSESSMENT — SOCIAL DETERMINANTS OF HEALTH (SDOH): HOW HARD IS IT FOR YOU TO PAY FOR THE VERY BASICS LIKE FOOD, HOUSING, MEDICAL CARE, AND HEATING?: NOT VERY HARD

## 2022-11-08 NOTE — PROGRESS NOTES
Here for follow up of COPD, Chronic [facility] presents today for follow up of HTN, LVD, COPD    Current Outpatient Medications   Medication Sig Dispense Refill    albuterol (PROVENTIL) (2.5 MG/3ML) 0.083% nebulizer solution Take 3 mLs by nebulization every 4 hours as needed for Wheezing or Shortness of Breath 120 each 2    amiodarone (CORDARONE) 200 MG tablet Take 0.5 tablets by mouth daily ##### Per Dr. John Medina: Take 100 mg (half tablet) daily ##### 90 tablet 0    ASPIRIN LOW DOSE 81 MG EC tablet Take 1 tablet by mouth daily 90 tablet 0    vitamin D (ERGOCALCIFEROL) 1.25 MG (51523 UT) CAPS capsule Take 1 capsule by mouth once a week 12 capsule 0    losartan (COZAAR) 25 MG tablet Take 1 tablet by mouth daily 90 tablet 0    metoprolol succinate (TOPROL XL) 25 MG extended release tablet TAKE 1 TABLET BY MOUTH EVERY DAY 90 tablet 0    omeprazole (PRILOSEC) 40 MG delayed release capsule TAKE 1 CAPSULE BY MOUTH EVERY DAY 90 capsule 0    simvastatin (ZOCOR) 20 MG tablet Take 1 tablet by mouth daily 90 tablet 0    rivaroxaban (XARELTO) 15 MG TABS tablet Take 15 mg by mouth daily (with breakfast)      furosemide (LASIX) 20 MG tablet Take 0.5 tablets by mouth daily 90 tablet 0    mometasone (NASONEX) 50 MCG/ACT nasal spray 2 sprays by Each Nostril route daily 1 each 3    Methylfol-Algae-Q28-Szhhxvqtfn (CEREFOLIN NAC) 6-90.314-2-600 MG TABS Take 1 tablet by mouth daily      Respiratory Therapy Supplies (FULL KIT NEBULIZER SET) MISC Use as directed with nebulized medication.  1 each 0    Coenzyme Q10 (CO Q-10) 100 MG CAPS Take 100 mg by mouth daily      apixaban (ELIQUIS) 2.5 MG TABS tablet Take by mouth 2 times daily (Patient not taking: Reported on 11/8/2022)      diphenoxylate-atropine (LOMOTIL) 2.5-0.025 MG per tablet Take 1 tablet by mouth 4 times daily as needed for Diarrhea.      acetaminophen (TYLENOL) 500 MG tablet Take 2 tablets by mouth every 8 hours as needed for Pain (Patient not taking: Reported on 11/8/2022) 120 tablet 3    Multiple Vitamins-Minerals (OCUVITE PO) Take 1 tablet by mouth daily (Patient not taking: No sig reported)      Xgcryswbk-Jorrjsocb-Wbuwfamnsm (CEREFOLIN NAC PO) Take by mouth (Patient not taking: Reported on 11/8/2022)       No current facility-administered medications for this visit. Past Medical History:   Diagnosis Date    Arthritis     Atrial fibrillation ([facility]     CAD (coronary artery disease)     Cardiomyopathy ([facility]     CHF (congestive heart failure) ([facility] 2010    history of    Chronic anticoagulation     Chronic bronchitis (HCC)     none recent    COPD (chronic obstructive pulmonary disease) ([facility]     COVID 08/2022    in hospital x5 days    Depression     GERD (gastroesophageal reflux disease)     HFrEF (heart failure with reduced ejection fraction) ([facility] 12/29/2016 12/26/16- LVEF 20-25%, large apical aneurysm, LA moderate-severely dilated, mildly enlarged RA, mild MR, mild TR, mild AR    Hyperlipidemia     Hypertension     Left ventricular dysfunction     Low vitamin D level     MI (myocardial infarction) ([facility] 10/30/2009    Osteopenia     Osteoporosis     Swallowing difficulty           Subjective:  AS above. Overall feels better. Has been able to cut down using her nebulizer to only once  a day. No cough or increase in sob. Stays active. Unable to go the the pool anymore since it was closed but has been using the stationary bike for half hour every day. Already had the Flu shot and her 4th booster. Diabetes  Pertinent negatives for hypoglycemia include no dizziness, headaches, speech difficulty or tremors. Pertinent negatives for diabetes include no chest pain, no polydipsia, no polyuria and no weakness. Review of Systems   Constitutional:  Negative for activity change, appetite change and chills. HENT:  Negative for congestion, ear pain, mouth sores, postnasal drip, rhinorrhea, sinus pressure, sneezing, sore throat and trouble swallowing.     Eyes:  Negative for visual disturbance. Respiratory:  Positive for shortness of breath (on exertion). Cardiovascular:  Negative for chest pain, palpitations and leg swelling. Gastrointestinal:  Negative for abdominal distention, abdominal pain, blood in stool, constipation, diarrhea, nausea and vomiting. Endocrine: Negative for cold intolerance, heat intolerance, polydipsia and polyuria. Genitourinary:  Negative for difficulty urinating, dysuria, flank pain, frequency and urgency. Musculoskeletal:  Negative for arthralgias, back pain, gait problem, neck pain and neck stiffness. Skin: Negative. Negative for color change. Allergic/Immunologic: Negative. Neurological:  Negative for dizziness, tremors, speech difficulty, weakness, light-headedness and headaches. Hematological: Negative. Psychiatric/Behavioral: Negative. Objective:  /68 (Site: Left Upper Arm, Position: Sitting, Cuff Size: Medium Adult)   Temp 96.9 °F (36.1 °C)   Ht 4' 11\" (1.499 m)   Wt 107 lb (48.5 kg)   BMI 21.61 kg/m²      Physical Exam  HENT:      Head: Normocephalic. Right Ear: Tympanic membrane and external ear normal. There is no impacted cerumen. Left Ear: Tympanic membrane and external ear normal. There is no impacted cerumen. Nose: Nose normal.      Mouth/Throat:      Pharynx: Oropharynx is clear. No oropharyngeal exudate. Eyes:      Extraocular Movements: Extraocular movements intact. Conjunctiva/sclera: Conjunctivae normal.      Pupils: Pupils are equal, round, and reactive to light. Cardiovascular:      Rate and Rhythm: Normal rate and regular rhythm. Heart sounds: No murmur (claritza lsb and rt 2nd ics) heard. No friction rub. No gallop. Pulmonary:      Effort: Pulmonary effort is normal.      Breath sounds: Normal breath sounds. Abdominal:      General: Bowel sounds are normal. There is no distension. Palpations: Abdomen is soft. There is no mass. Tenderness:  There is no abdominal tenderness. There is no guarding or rebound. Musculoskeletal:         General: No swelling, tenderness or deformity. Normal range of motion. Cervical back: Normal range of motion and neck supple. No tenderness. Lymphadenopathy:      Cervical: No cervical adenopathy. Skin:     General: Skin is warm. Coloration: Skin is not pale. Findings: No rash. Neurological:      General: No focal deficit present. Mental Status: She is alert and oriented to person, place, and time. Medications reconciled. Assessment:  Demi Andersen was seen today for diabetes. Diagnoses and all orders for this visit:    Atrial fibrillation, unspecified type ([facility]  Comments: On Chronic anticoagulation    Essential hypertension  Comments:  Coontrolled    Acute on chronic /diastolic/ congestive heart failure (HCC)  Comments:  Compensated  Orders:  -     simvastatin (ZOCOR) 20 MG tablet; Take 1 tablet by mouth daily    Ischemic cardiomyopathy  -     simvastatin (ZOCOR) 20 MG tablet; Take 1 tablet by mouth daily    COPD exacerbation ([facility]  Comments:  Doing much better, back to baseline. Mils sxs on exertion only. Controlled type 2 diabetes mellitus without complication, with long-term current use of insulin (HCC)    Coronary artery disease involving native coronary artery of native heart without angina pectoris  Comments:  No Angina  Orders:  -     simvastatin (ZOCOR) 20 MG tablet; Take 1 tablet by mouth daily    History of PTCA  -     simvastatin (ZOCOR) 20 MG tablet; Take 1 tablet by mouth daily    Automatic implantable cardioverter-defibrillator in situ  -     simvastatin (ZOCOR) 20 MG tablet; Take 1 tablet by mouth daily    Other orders  -     albuterol (PROVENTIL) (2.5 MG/3ML) 0.083% nebulizer solution; Take 3 mLs by nebulization every 4 hours as needed for Wheezing or Shortness of Breath  -     amiodarone (CORDARONE) 200 MG tablet; Take 0.5 tablets by mouth daily ##### Per Dr. Bunny Obando:  Take 100 mg (half tablet) daily #####  -     ASPIRIN LOW DOSE 81 MG EC tablet; Take 1 tablet by mouth daily  -     vitamin D (ERGOCALCIFEROL) 1.25 MG (97101 UT) CAPS capsule; Take 1 capsule by mouth once a week  -     losartan (COZAAR) 25 MG tablet; Take 1 tablet by mouth daily  -     metoprolol succinate (TOPROL XL) 25 MG extended release tablet; TAKE 1 TABLET BY MOUTH EVERY DAY  -     omeprazole (PRILOSEC) 40 MG delayed release capsule; TAKE 1 CAPSULE BY MOUTH EVERY DAY  -     furosemide (LASIX) 20 MG tablet;  Take 0.5 tablets by mouth daily  -     mometasone (NASONEX) 50 MCG/ACT nasal spray; 2 sprays by Each Nostril route daily     , LVD, HTN

## 2022-11-17 ENCOUNTER — NURSE ONLY (OUTPATIENT)
Dept: NON INVASIVE DIAGNOSTICS | Age: 87
End: 2022-11-17

## 2022-12-13 LAB — MAMMOGRAPHY, EXTERNAL: NORMAL

## 2022-12-28 ENCOUNTER — APPOINTMENT (OUTPATIENT)
Dept: GENERAL RADIOLOGY | Age: 87
End: 2022-12-28
Payer: MEDICARE

## 2022-12-28 ENCOUNTER — HOSPITAL ENCOUNTER (INPATIENT)
Age: 87
LOS: 2 days | Discharge: HOME | End: 2022-12-30
Attending: STUDENT IN AN ORGANIZED HEALTH CARE EDUCATION/TRAINING PROGRAM | Admitting: INTERNAL MEDICINE
Payer: MEDICARE

## 2022-12-28 DIAGNOSIS — I50.9 ACUTE ON CHRONIC CONGESTIVE HEART FAILURE, UNSPECIFIED HEART FAILURE TYPE (HCC): Primary | ICD-10-CM

## 2022-12-28 DIAGNOSIS — D64.9 ANEMIA, UNSPECIFIED TYPE: ICD-10-CM

## 2022-12-28 DIAGNOSIS — J96.01 ACUTE RESPIRATORY FAILURE WITH HYPOXIA (HCC): ICD-10-CM

## 2022-12-28 LAB
ALBUMIN SERPL-MCNC: 4.2 G/DL (ref 3.5–5.2)
ALP BLD-CCNC: 64 U/L (ref 35–104)
ALT SERPL-CCNC: 20 U/L (ref 0–32)
ANION GAP SERPL CALCULATED.3IONS-SCNC: 13 MMOL/L (ref 7–16)
AST SERPL-CCNC: 35 U/L (ref 0–31)
BASOPHILS ABSOLUTE: 0.02 E9/L (ref 0–0.2)
BASOPHILS RELATIVE PERCENT: 0.2 % (ref 0–2)
BILIRUB SERPL-MCNC: 0.6 MG/DL (ref 0–1.2)
BUN BLDV-MCNC: 32 MG/DL (ref 6–23)
CALCIUM SERPL-MCNC: 9.4 MG/DL (ref 8.6–10.2)
CHLORIDE BLD-SCNC: 103 MMOL/L (ref 98–107)
CO2: 24 MMOL/L (ref 22–29)
CREAT SERPL-MCNC: 1.2 MG/DL (ref 0.5–1)
EKG ATRIAL RATE: 56 BPM
EKG P AXIS: 70 DEGREES
EKG P-R INTERVAL: 226 MS
EKG Q-T INTERVAL: 504 MS
EKG QRS DURATION: 150 MS
EKG QTC CALCULATION (BAZETT): 486 MS
EKG R AXIS: -54 DEGREES
EKG T AXIS: 109 DEGREES
EKG VENTRICULAR RATE: 56 BPM
EOSINOPHILS ABSOLUTE: 0.02 E9/L (ref 0.05–0.5)
EOSINOPHILS RELATIVE PERCENT: 0.2 % (ref 0–6)
GFR SERPL CREATININE-BSD FRML MDRD: 44 ML/MIN/1.73
GLUCOSE BLD-MCNC: 150 MG/DL (ref 74–99)
HCT VFR BLD CALC: 30.5 % (ref 34–48)
HEMOGLOBIN: 9.8 G/DL (ref 11.5–15.5)
IMMATURE GRANULOCYTES #: 0.04 E9/L
IMMATURE GRANULOCYTES %: 0.5 % (ref 0–5)
INFLUENZA A: NOT DETECTED
INFLUENZA B: NOT DETECTED
LYMPHOCYTES ABSOLUTE: 0.38 E9/L (ref 1.5–4)
LYMPHOCYTES RELATIVE PERCENT: 4.4 % (ref 20–42)
MCH RBC QN AUTO: 31.8 PG (ref 26–35)
MCHC RBC AUTO-ENTMCNC: 32.1 % (ref 32–34.5)
MCV RBC AUTO: 99 FL (ref 80–99.9)
MONOCYTES ABSOLUTE: 0.67 E9/L (ref 0.1–0.95)
MONOCYTES RELATIVE PERCENT: 7.7 % (ref 2–12)
NEUTROPHILS ABSOLUTE: 7.6 E9/L (ref 1.8–7.3)
NEUTROPHILS RELATIVE PERCENT: 87 % (ref 43–80)
PDW BLD-RTO: 13.2 FL (ref 11.5–15)
PLATELET # BLD: 342 E9/L (ref 130–450)
PMV BLD AUTO: 9.6 FL (ref 7–12)
POTASSIUM REFLEX MAGNESIUM: 4.2 MMOL/L (ref 3.5–5)
PRO-BNP: ABNORMAL PG/ML (ref 0–450)
RBC # BLD: 3.08 E12/L (ref 3.5–5.5)
SARS-COV-2 RNA, RT PCR: NOT DETECTED
SODIUM BLD-SCNC: 140 MMOL/L (ref 132–146)
TOTAL PROTEIN: 7 G/DL (ref 6.4–8.3)
TROPONIN, HIGH SENSITIVITY: 47 NG/L (ref 0–9)
TROPONIN, HIGH SENSITIVITY: 48 NG/L (ref 0–9)
WBC # BLD: 8.7 E9/L (ref 4.5–11.5)

## 2022-12-28 PROCEDURE — 84484 ASSAY OF TROPONIN QUANT: CPT

## 2022-12-28 PROCEDURE — 36415 COLL VENOUS BLD VENIPUNCTURE: CPT

## 2022-12-28 PROCEDURE — 6370000000 HC RX 637 (ALT 250 FOR IP): Performed by: INTERNAL MEDICINE

## 2022-12-28 PROCEDURE — 83880 ASSAY OF NATRIURETIC PEPTIDE: CPT

## 2022-12-28 PROCEDURE — 85025 COMPLETE CBC W/AUTO DIFF WBC: CPT

## 2022-12-28 PROCEDURE — 6360000002 HC RX W HCPCS: Performed by: STUDENT IN AN ORGANIZED HEALTH CARE EDUCATION/TRAINING PROGRAM

## 2022-12-28 PROCEDURE — 1200000000 HC SEMI PRIVATE

## 2022-12-28 PROCEDURE — 99285 EMERGENCY DEPT VISIT HI MDM: CPT

## 2022-12-28 PROCEDURE — 93010 ELECTROCARDIOGRAM REPORT: CPT | Performed by: INTERNAL MEDICINE

## 2022-12-28 PROCEDURE — 94640 AIRWAY INHALATION TREATMENT: CPT

## 2022-12-28 PROCEDURE — 96374 THER/PROPH/DIAG INJ IV PUSH: CPT

## 2022-12-28 PROCEDURE — 99223 1ST HOSP IP/OBS HIGH 75: CPT | Performed by: INTERNAL MEDICINE

## 2022-12-28 PROCEDURE — 93005 ELECTROCARDIOGRAM TRACING: CPT | Performed by: STUDENT IN AN ORGANIZED HEALTH CARE EDUCATION/TRAINING PROGRAM

## 2022-12-28 PROCEDURE — 71045 X-RAY EXAM CHEST 1 VIEW: CPT

## 2022-12-28 PROCEDURE — 2700000000 HC OXYGEN THERAPY PER DAY

## 2022-12-28 PROCEDURE — 87636 SARSCOV2 & INF A&B AMP PRB: CPT

## 2022-12-28 PROCEDURE — 80053 COMPREHEN METABOLIC PANEL: CPT

## 2022-12-28 PROCEDURE — 6360000002 HC RX W HCPCS: Performed by: INTERNAL MEDICINE

## 2022-12-28 PROCEDURE — APPSS60 APP SPLIT SHARED TIME 46-60 MINUTES: Performed by: PHYSICIAN ASSISTANT

## 2022-12-28 RX ORDER — LOSARTAN POTASSIUM 25 MG/1
25 TABLET ORAL DAILY
Status: DISCONTINUED | OUTPATIENT
Start: 2022-12-28 | End: 2022-12-30 | Stop reason: HOSPADM

## 2022-12-28 RX ORDER — ATORVASTATIN CALCIUM 20 MG/1
20 TABLET, FILM COATED ORAL NIGHTLY
Status: DISCONTINUED | OUTPATIENT
Start: 2022-12-28 | End: 2022-12-30 | Stop reason: HOSPADM

## 2022-12-28 RX ORDER — AMIODARONE HYDROCHLORIDE 200 MG/1
100 TABLET ORAL DAILY
Status: DISCONTINUED | OUTPATIENT
Start: 2022-12-28 | End: 2022-12-30 | Stop reason: HOSPADM

## 2022-12-28 RX ORDER — PANTOPRAZOLE SODIUM 40 MG/1
40 TABLET, DELAYED RELEASE ORAL
Status: DISCONTINUED | OUTPATIENT
Start: 2022-12-29 | End: 2022-12-30 | Stop reason: HOSPADM

## 2022-12-28 RX ORDER — FUROSEMIDE 10 MG/ML
20 INJECTION INTRAMUSCULAR; INTRAVENOUS DAILY
Status: DISCONTINUED | OUTPATIENT
Start: 2022-12-29 | End: 2022-12-28

## 2022-12-28 RX ORDER — METOPROLOL SUCCINATE 25 MG/1
25 TABLET, EXTENDED RELEASE ORAL DAILY
Status: DISCONTINUED | OUTPATIENT
Start: 2022-12-28 | End: 2022-12-30 | Stop reason: HOSPADM

## 2022-12-28 RX ORDER — ASPIRIN 81 MG/1
81 TABLET ORAL DAILY
Status: DISCONTINUED | OUTPATIENT
Start: 2022-12-28 | End: 2022-12-30 | Stop reason: HOSPADM

## 2022-12-28 RX ORDER — FUROSEMIDE 10 MG/ML
40 INJECTION INTRAMUSCULAR; INTRAVENOUS 2 TIMES DAILY
Status: DISCONTINUED | OUTPATIENT
Start: 2022-12-28 | End: 2022-12-30 | Stop reason: HOSPADM

## 2022-12-28 RX ORDER — ALBUTEROL SULFATE 2.5 MG/3ML
2.5 SOLUTION RESPIRATORY (INHALATION) 4 TIMES DAILY
Status: DISCONTINUED | OUTPATIENT
Start: 2022-12-28 | End: 2022-12-30 | Stop reason: HOSPADM

## 2022-12-28 RX ORDER — FLUTICASONE PROPIONATE 50 MCG
2 SPRAY, SUSPENSION (ML) NASAL DAILY
Status: DISCONTINUED | OUTPATIENT
Start: 2022-12-28 | End: 2022-12-30 | Stop reason: HOSPADM

## 2022-12-28 RX ORDER — SPIRONOLACTONE 25 MG/1
12.5 TABLET ORAL DAILY
Status: DISCONTINUED | OUTPATIENT
Start: 2022-12-28 | End: 2022-12-30 | Stop reason: HOSPADM

## 2022-12-28 RX ORDER — ERGOCALCIFEROL 1.25 MG/1
50000 CAPSULE ORAL WEEKLY
Status: DISCONTINUED | OUTPATIENT
Start: 2023-01-02 | End: 2022-12-30 | Stop reason: HOSPADM

## 2022-12-28 RX ORDER — LANOLIN ALCOHOL/MO/W.PET/CERES
3 CREAM (GRAM) TOPICAL NIGHTLY PRN
Status: DISCONTINUED | OUTPATIENT
Start: 2022-12-28 | End: 2022-12-30 | Stop reason: HOSPADM

## 2022-12-28 RX ORDER — ACETAMINOPHEN 500 MG
1000 TABLET ORAL EVERY 8 HOURS PRN
Status: DISCONTINUED | OUTPATIENT
Start: 2022-12-28 | End: 2022-12-30 | Stop reason: HOSPADM

## 2022-12-28 RX ORDER — FUROSEMIDE 10 MG/ML
20 INJECTION INTRAMUSCULAR; INTRAVENOUS ONCE
Status: COMPLETED | OUTPATIENT
Start: 2022-12-28 | End: 2022-12-28

## 2022-12-28 RX ADMIN — ALBUTEROL SULFATE 2.5 MG: 2.5 SOLUTION RESPIRATORY (INHALATION) at 17:08

## 2022-12-28 RX ADMIN — FUROSEMIDE 40 MG: 10 INJECTION, SOLUTION INTRAMUSCULAR; INTRAVENOUS at 17:48

## 2022-12-28 RX ADMIN — LOSARTAN POTASSIUM 25 MG: 25 TABLET, FILM COATED ORAL at 14:05

## 2022-12-28 RX ADMIN — ALBUTEROL SULFATE 2.5 MG: 2.5 SOLUTION RESPIRATORY (INHALATION) at 21:52

## 2022-12-28 RX ADMIN — ATORVASTATIN CALCIUM 20 MG: 20 TABLET, FILM COATED ORAL at 20:42

## 2022-12-28 RX ADMIN — SPIRONOLACTONE 12.5 MG: 25 TABLET ORAL at 14:13

## 2022-12-28 RX ADMIN — METOPROLOL SUCCINATE 25 MG: 25 TABLET, EXTENDED RELEASE ORAL at 14:06

## 2022-12-28 RX ADMIN — FUROSEMIDE 20 MG: 10 INJECTION, SOLUTION INTRAMUSCULAR; INTRAVENOUS at 11:05

## 2022-12-28 RX ADMIN — ASPIRIN 81 MG: 81 TABLET, COATED ORAL at 14:05

## 2022-12-28 RX ADMIN — RIVAROXABAN 15 MG: 15 TABLET, FILM COATED ORAL at 21:27

## 2022-12-28 RX ADMIN — AMIODARONE HYDROCHLORIDE 100 MG: 200 TABLET ORAL at 14:05

## 2022-12-28 ASSESSMENT — ENCOUNTER SYMPTOMS
SINUS PRESSURE: 0
SORE THROAT: 0
EYE REDNESS: 0
COUGH: 0
WHEEZING: 0
SHORTNESS OF BREATH: 1
EYE PAIN: 0
NAUSEA: 0
BACK PAIN: 0
EYE DISCHARGE: 0
VOMITING: 0
ABDOMINAL DISTENTION: 0
DIARRHEA: 0

## 2022-12-28 ASSESSMENT — PAIN - FUNCTIONAL ASSESSMENT: PAIN_FUNCTIONAL_ASSESSMENT: NONE - DENIES PAIN

## 2022-12-28 NOTE — CONSULTS
I independently performed an evaluation on the patient. I have reviewed the above documentation completed by the advance practitioner. Please see my additional contributions to the HPI, physical exam, assessment and medical decision making. Physical Exam   BP (!) 122/96   Pulse 58   Temp 97.9 °F (36.6 °C) (Oral)   Resp 18   Ht 4' 11\" (1.499 m)   Wt 101 lb (45.8 kg)   SpO2 97%   BMI 20.40 kg/m²   Constitutional: Oriented to person, place, and time. Well-developed and well-nourished. No distress. Head: Normocephalic and atraumatic. Eyes: EOM are normal. Pupils are equal, round, and reactive to light. Neck: Normal range of motion. Neck supple. No hepatojugular reflux and no JVD present. Carotid bruit is not present. No tracheal deviation present. No thyromegaly present. Cardiovascular: Normal rate, regular rhythm, normal heart sounds and intact distal pulses. Exam reveals no gallop and no friction rub. No murmur heard. Pulmonary/Chest: Effort normal and breath sounds normal. No respiratory distress. No wheezes. No rales. No tenderness. Abdominal: Soft. Bowel sounds are normal. No distension and no mass. No tenderness. No rebound and no guarding. Musculoskeletal: Normal range of motion. No edema and no tenderness. Lymphadenopathy:   No cervical adenopathy. Neurological: Alert and oriented to person, place, and time. Skin: Skin is warm and dry. No rash noted. Not diaphoretic. No erythema. Psychiatric: Normal mood and affect. Behavior is normal.     Echocardiogram: 12/26/16 (Cinthya Ramirez)   Mildly dilated left ventricle. Large apical aneurysm with dyskinetic apex. Severe hypokinesis of the apical septum and lateral wall. Overall LVEF is visually estimated at 20-25%   The left atrium is moderate-severely dilated. Structurally normal mitral valve. Increased E point septal separation noted,suggesting decreased LV cardiac output   Mild mitral regurgitation is present.    The aortic valve is trileaflet. The aortic valve appears mildly sclerotic. Mild aortic regurgitation is noted. Normal tricuspid valve structure and function. Mild tricuspid regurgitation. RVSP is 25-30 mmHg. There is a trivial localized near right ventricle pericardial effusion noted. ALMA: 6/9/2020 (Dr. Shankar Cottrell)   Gastric images not performed due to history of achalasia and recent   botulinum toxin injection - case discussed with Dr. David Calvo before   procedure and felt no contraindication to ALMA with upper esophageal images only. Ejection fraction is visually estimated at 25-30%. There was evidence of spontaneous echo contrast in the left atrium. No evidence of thrombus within left atrium or appendage. Small mobile echodensity likely fibrin stranding noted on RA lead. Mild-moderate mitral regurgitation directed centrally. Mild aortic valve regurgitation. Moderate to severe tricuspid regurgitation. Echocardiogram: 3/31/22 (Dr. Osmin Beltre)   Dilated left ventricle. Severely reduced left ventricular systolic function. Ejection fraction is visually estimated at 25-30%. Apical akinesis. Normal right ventricular size and function. Indeterminate diastolic function. Severely dilated left atrium by volume index. Moderate mitral regurgitation. Moderate aortic regurgitation. Moderate tricuspid regurgitation. PASP is estimated at 42 mmHg. See accompanying documentation for full consult. ASSESSMENT AND PLAN:  1. Acute on chronic systolic CHF/ICMP:    Chart/labs/EKG reviewed. Echo. Diurese. BB/ARB/IV lasix. Aldactone and entresto previously stopped in the setting of electrolyte abnormalities and hypotension. Retrial low dose aldactone. 2. CAD/Elevated troponin:    ASA/BB/statin. 3. PAF: In sinus. BB/amio/Xarelto. 4. VHD: Mod AI/mod MR/mod TR by 3/31/2022 echo. Echo.     5. ICD: Per EP. 6. HTN: Observe. 7. Lipids: Statin. 8. DM: Per primary service. 9. Asthma: Per primary service. 10. CKD: Follow labs. 11. Anemia: Follow labs. Mami Nichols D.O.   Cardiologist  Cardiology, [facility]

## 2022-12-28 NOTE — ED NOTES
Jeremy Mckeon from [address] [place] [address] called, states defibrillator interrogation results are perfect. No issues noted. Dr. Raiza Martinez notified.       Maxcine Cowden, RN  12/28/22 [id number] Joshua King, RN  12/28/22 [id number]

## 2022-12-28 NOTE — ED NOTES
Call placed to [facility] looking for integration results.       Sheryle Simon, RN  12/28/22 [id number]

## 2022-12-28 NOTE — CONSULTS
INPATIENT CARDIOLOGY CONSULT     Reason for Consult: CHF    Cardiologist: Dr. Marielena Gasca    Requesting Physician: Dr. Marybeth Hurtado    Date of Consultation: 12/28/2022    HISTORY OF PRESENT ILLNESS:   Patient is an 81year old WF known to Dr. Osmin Beltre.  She also follows with Guadalupe Eisenmenger EP. She is being seen in consultation this hospital admission by Dr. Marielena Gasac for evaluation and recommendations regarding CHF. PMH: CAD s/p PCI in 2009, VHD, pulmonary hypertension, chronic HFrEF, ischemic cardiomyopathy, s/p dual-chamber ICD 5/2010 with generator change 2018 (both [employer]), PAF on Amiodarone and Xarelto therapy, cRBBB, HTN, prior history of intermittent hypotension limiting GDMT, HLD, T2DM, dysphagia s/p esophageal dilation 2019, history of COVID-19 infection August 2022 requiring hospitalization, asthma, GERD, depression, osteoporosis, overactive bladder, hearing loss, history of hyponatremia    OV Dr. Osmin Beltre 10/24/2022. Patient admitted to worsening of dyspnea on exertion over the past year, particularly following COVID-19 infection 8/2022. Follows with ENT for chronic cough and sinus issues. Repeat echocardiogram ordered, not completed. Follow-up EP. Medications kept the same. Weight 107 pounds. Patient presented to [facility] on December 28, 2022 with complaints of shortness of breath. This complaint has been worsening over the past couple of months, particularly yesterday while having friends/family over her home. She additionally admits to orthopnea starting overnight yesterday. She notes of feeling off balance at times, but denies actual dizziness or lightheadedness. Denies chest pain, N/V, diaphoresis, palpitations, near-syncope or syncope. Denies significant peripheral edema or weight gain. Notes of overeating salty foods over the holiday weekend. Admits to medication compliance. Noted to be hypoxic in the ED 84% RA, placed on low-flow nasal cannula. Elevated BP on admission. T 99.1F.   HR in the 50s currently. On lab work thus far, patient was noted to have acute anemia with hemoglobin 9.8 g.  FOBT in ED reported as negative. CHF noted to be significantly elevated compared to prior, 24,000. Mildly elevated troponin, flat pattern. Creatinine 1.2. Influenza and COVID-19 negative. CXR with findings of CHF +/- possible viral pneumonia. Please note: past medical records were reviewed per electronic medical record (EMR) - see detailed reports under Past Medical/ Surgical History. CARDIOVASCULAR HISTORY:    CAD s/p PCI  10/30/2009: Acute ST elevation anterolateral wall myocardial infarction. 10/30/2009: Cardiac catheterization/primary angioplasty: Left main short vessel with no significant disease. LAD occluded proximally. LCX: 90% stenosis of the 3rd obtuse marginal branch. RCA, a small nondominant vessel, which was non-selectively visualized. Successful balloon angioplasty and stenting to the proximal LAD with restoration of DORI 3 flow in the vessel.   06/18/2014 [facility] nuclear stress test was a markedly abnormal study showing a transmural myocardial infarction involving a large wrap around left anterior descending artery distribution with no evidence of residual stress-induced ischemia with the gated views showing akinesis of the left ventricular apex, the apical anterior wall and the apical septum with severe hypokinesis of the inferior wall and moderate hypokinesis of the rest of the interventricular septum with a calculated ejection fraction of 34 %. VHD. 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 Normal right ventricular size and function, with a pacemaker noted in the right ventricle.  Pacemaker also noted in the right atrium. Mild to moderate mitral regurgitation. Mild to moderate tricuspid regurgitation. Mild aortic sclerosis with trace aortic regurgitation. Aortic root sclerosis/calcification. Small pericardial effusion. 12/2016 TTE: Mildly dilated LV. Large apical aneurysm with dyskinetic apex. Severe hypokinesis of the apical septum and lateral wall. LVEF 20 to 25%. Moderate to severely dilated LA. Mild MR. Mild AI. Mild TR.  RVSP 25 to 30 mmHg. 6/2020 ALMA: LVEF 25 to 30%. Evidence of spontaneous echo contrast in the left atrium, no evidence of thrombus within the left atrium or appendage. Small mobile echodensity likely fibrin stranding noted on RA lead. Mild to moderate MR. Mild AI. Moderate to severe TR.  3/2022 TTE Dr. Chau Renee: Dilated LV, EF 25 to 30%. Apical akinesis. Normal RV size and function. Indeterminate diastolic function. Severely dilated LA. Moderate MR. Moderate AI. Moderate TR. Pulmonary hypertension, PASP 42 mmHg. GDMT limited by electrolyte abnormalities including hyponatremia and intermittent hypotension. Aldactone and Entresto discontinued in the past for this reason  S/p Insertion of dual chamber [employer] pacer ICD on 5/10/2010. S/p [employer] Dev generator change in 2/2018  Last interrogation 11/2022: Normal device function. Atrial lead noise noted. No new clinically significant arrhythmia noted. PAF, on [facility] with Xarelto   S/p successful ALMA/CVN on 6/9/2020 (started on Amiodarone at that time)  Remains on Amiodarone therapy  Cost issues with Eliquis  cRBBB  HTN  Prior history of intermittent hypotension limiting GDMT   HLD, on statin therapy  Non-insulin requiring T2DM   ? CKD.   Baseline Cr 0.8 to 1.1  Dysphagia -- s/p esophageal dilatation in 11/2019 per patient  COVID-19 infection August 2022 requiring hospitalization for acute hypoxic respiratory failure-- now with residual dyspnea on exertion        PAST MEDICAL HISTORY:   As under cardiovascular history  Asthma  GERD  Depression  Osteoporosis  Overactive bladder  S/p Hysterectomy, 1972  S/p Tonsillectomy  S/p Appendectomy  S/p Right elbow fracture repair  UTI in 06/11 treated with oral antibiotics  Status post right and left capsulectomy and removal of extravasated implant material on the right on 03/07/12, status post bilateral breast implants in the remote past  Bilateral hearing loss: Wears bilateral hearing aids  Right wrist fracture s/p fall, 11/29/2015  History of hyponatremia    PAST SURGICAL HISTORY:    Past Surgical History:   Procedure Laterality Date    APPENDECTOMY      BREAST CAPSULECTOMY  03/07/2012    BILATERAL BREAST CAPSULECTOMIES    BREAST SURGERY  1962     cosmetic implants    CARDIAC DEFIBRILLATOR PLACEMENT      [facility]    CARDIAC DEFIBRILLATOR PLACEMENT  05/2010    CARDIAC DEFIBRILLATOR PLACEMENT Left 02/13/2018    [employer] ICD change out,     CARDIAC PACEMAKER PLACEMENT  05/10/2010    Dual chamber pacer ICD.     CARDIAC SURGERY  2009    stent    CARDIOVASCULAR STRESS TEST  03/04/2010    COLONOSCOPY  05/26/2021    [facility], repeat as needed    COSMETIC SURGERY      eyelids breast    DIAGNOSTIC CARDIAC CATH LAB PROCEDURE  10/30/2009    ESOPHAGEAL MOTILITY STUDY N/A 01/02/2020    ESOPHAGEAL MOTILITY/MANOMETRY STUDY performed by Sancho Serra DO at [facility] ([facility])      IR US THYROID BIOPSY      OTHER SURGICAL HISTORY Right 12/17/2015    ORIF RIGHT DISTAL RADIUS    OTHER SURGICAL HISTORY Right 04/10/2016    IP joint release of thumb    TONSILLECTOMY      TRANSTHORACIC ECHOCARDIOGRAM  3/30/11ize and function,     UPPER GASTROINTESTINAL ENDOSCOPY N/A 02/13/2020    EGD SUBMUCOSAL/BOTOX INJECTION performed by Luisa Sidhu DO at [address] [facility] N/A 03/25/2021    EGD SUBMUCOSAL/BOTOX INJECTION performed by Luisa Sidhu DO at [facility] ENDOSCOPY  10/16/2019       HOME MEDICATIONS:  Prior to Admission medications    Medication Sig Start Date End Date Taking? Authorizing Provider   BUDESONIDE IN Inhale into the lungs   Yes Historical Provider, MD   albuterol (PROVENTIL) (2.5 MG/3ML) 0.083% nebulizer solution Take 3 mLs by nebulization every 4 hours as needed for Wheezing or Shortness of Breath 11/8/22   Frankey Rase, MD   amiodarone (CORDARONE) 200 MG tablet Take 0.5 tablets by mouth daily ##### Per Dr. Nakia Chandra: Take 100 mg (half tablet) daily ##### 11/8/22   Frankey Rase, MD   ASPIRIN LOW DOSE 81 MG EC tablet Take 1 tablet by mouth daily 11/8/22   Frankey Rase, MD   vitamin D (ERGOCALCIFEROL) 1.25 MG (08021 UT) CAPS capsule Take 1 capsule by mouth once a week 11/8/22   Frankey Rase, MD   losartan (COZAAR) 25 MG tablet Take 1 tablet by mouth daily 11/8/22   Frankey Rase, MD   metoprolol succinate (TOPROL XL) 25 MG extended release tablet TAKE 1 TABLET BY MOUTH EVERY DAY 11/8/22   Frankey Rase, MD   omeprazole (PRILOSEC) 40 MG delayed release capsule TAKE 1 CAPSULE BY MOUTH EVERY DAY 11/8/22   Frankey Rase, MD   simvastatin (ZOCOR) 20 MG tablet Take 1 tablet by mouth daily 11/8/22   Frankey Rase, MD   rivaroxaban (XARELTO) 15 MG TABS tablet Take 15 mg by mouth daily (with breakfast)    Historical Provider, MD   furosemide (LASIX) 20 MG tablet Take 0.5 tablets by mouth daily 11/8/22   Frankey Rase, MD   mometasone (NASONEX) 50 MCG/ACT nasal spray 2 sprays by Each Nostril route daily 11/8/22   Frankey Rase, MD   diphenoxylate-atropine (LOMOTIL) 2.5-0.025 MG per tablet Take 1 tablet by mouth 4 times daily as needed for Diarrhea.     Historical Provider, MD   Methylfol-Algae-T67-Wqxqcqzumf (CEREFOLIN NAC) 4-54.899-3-247 MG TABS Take 1 tablet by mouth daily    Historical Provider, MD   acetaminophen (TYLENOL) 500 MG tablet Take 2 tablets by mouth every 8 hours as needed for Pain 8/23/22   Son Rebolledo MD   Respiratory Therapy Supplies (FULL KIT NEBULIZER SET) MISC Use as directed with nebulized medication. 8/23/22   Margaret Flowers MD   Multiple Vitamins-Minerals (OCUVITE PO) Take 1 tablet by mouth daily    Historical Provider, MD   mupirocin (BACTROBAN NASAL) 2 % nasal ointment Take by Nasal route 2 times daily. Patient not taking: No sig reported 2/16/22 8/23/22  Melany Schmitt MD   Drmpvrbqz-Cjljynrfv-Cxtwyauwor (CEREFOLIN NAC PO) Take by mouth  Patient not taking: Reported on 11/8/2022    Historical Provider, MD   Coenzyme Q10 (CO Q-10) 100 MG CAPS Take 100 mg by mouth daily    Historical Provider, MD       CURRENT MEDICATIONS:      Current Facility-Administered Medications:     acetaminophen (TYLENOL) tablet 1,000 mg, 1,000 mg, Oral, Q8H PRN, Tommy Bejarano MD    albuterol (PROVENTIL) nebulizer solution 2.5 mg, 2.5 mg, Nebulization, 4x daily, Tommy Bejarano MD    amiodarone (CORDARONE) tablet 100 mg, 100 mg, Oral, Daily, Tommy Bejarano MD    aspirin EC tablet 81 mg, 81 mg, Oral, Daily, Tommy Bejarano MD    losartan (COZAAR) tablet 25 mg, 25 mg, Oral, Daily, Tommy Bejarano MD    metoprolol succinate (TOPROL XL) extended release tablet 25 mg, 25 mg, Oral, Daily, Tommy Bejarano MD    fluticasone (FLONASE) 50 MCG/ACT nasal spray 2 spray, 2 spray, Each Nostril, Daily, Tommy Bejarano MD    [START ON 12/29/2022] pantoprazole (PROTONIX) tablet 40 mg, 40 mg, Oral, QAM AC, Tommy Bejarano MD    [START ON 12/29/2022] rivaroxaban (XARELTO) tablet 15 mg, 15 mg, Oral, Daily with breakfast, Tommy Bejarano MD    atorvastatin (LIPITOR) tablet 20 mg, 20 mg, Oral, Nightly, Tommy Bejarano MD    [START ON 1/2/2023] vitamin D (ERGOCALCIFEROL) capsule 50,000 Units, 50,000 Units, Oral, Weekly, MD Wilbert Wayne ON 12/29/2022] furosemide (LASIX) injection 20 mg, 20 mg, IntraVENous, Daily, Tommy Bejarano MD    melatonin tablet 3 mg, 3 mg, Oral, Nightly PRN, Tommy Bejarano MD    ALLERGIES:  Patient has no known allergies.     SOCIAL HISTORY:    Lives at home with her . Cane PRN for ambulation. Denies former/current tobacco, alcohol or illicit drug use. FAMILY HISTORY:   Mother , age 79, Alzheimers. Father , age 72, MI. One brother, history of colon surgery. One son, age 52, MI/ACB; two sons with bipolar disorder. REVIEW OF SYSTEMS:     Negative except as noted above in HPI      PHYSICAL EXAM:   BP (!) 122/96   Pulse 58   Temp 97.9 °F (36.6 °C) (Oral)   Resp 18   Ht 4' 11\" (1.499 m)   Wt 101 lb (45.8 kg)   SpO2 97%   BMI 20.40 kg/m²   CONST:  Well developed, well nourished WF who appears stated age. Awake, alert, cooperative, no apparent distress. [place]  HEENT:   Head- Normocephalic, atraumatic. Eyes- Conjunctivae pink, anicteric. Neck-  No stridor, trachea midline, no apparent jugular venous distention. CHEST: Chest symmetrical and non-tender to palpation. No accessory muscle use or intercostal retractions. RESPIRATORY: Lung sounds - diminished with rare bilateral rales  CARDIOVASCULAR:     No noted carotid bruit. Heart Ausculation- Regular rate and rhythm, 2/6 systolic murmur LLSB  PV: Trace lower extremity edema. Pedal pulses palpable, no clubbing or cyanosis. ABDOMEN: Soft, non-tender to light palpation. Bowel sounds present. MS: Good muscle strength and tone. No atrophy or abnormal movements. SKIN: Warm and dry. NEURO / PSYCH: Oriented to person, place and time. Speech clear and appropriate. Follows all commands. Pleasant affect. DATA:    Telemetry: Not on tele during my time on the floor     Diagnostic:  All diagnostic testing and lab work thus far this admission reviewed in detail. CXR 2022  Impression  1. Cardiomegaly with hazy bilateral airspace disease likely representative of  CHF. Viral pneumonia cannot be excluded in the proper clinical setting.     Labs:   CBC:   Recent Labs     22   WBC 8.7   HGB 9.8*   HCT 30.5*        BMP:   Recent Labs     22  0744    K 4.2   CO2 24   BUN 32*   CREATININE 1.2*   LABGLOM 44   CALCIUM 9.4       HgA1c:   Lab Results   Component Value Date    LABA1C 5.7 10/16/2015     FASTING LIPID PANEL:  Lab Results   Component Value Date/Time    CHOL 172 05/11/2022 10:33 AM    HDL 79 05/11/2022 10:33 AM    LDLCALC 79 05/11/2022 10:33 AM    TRIG 68 05/11/2022 10:33 AM     LIVER PROFILE:  Recent Labs     12/28/22  0744   AST 35*   ALT 20   LABALBU 4.2     Component Ref Range & Units 12/28/22 0858 12/28/22 0744 8/18/22 1335   Troponin, High Sensitivity 0 - 9 ng/L 47 High   48 High  CM  18 High      Component Ref Range & Units 12/28/22 0744 8/18/22 1231 6/9/20 0629 6/7/20 1312 6/25/19 1845 8/1/17 0729 12/26/16 0845   Pro-BNP 0 - 450 pg/mL 23,957 High   9,250 High   4,819 High   8,319 High   5,274 High   3,284 High   3,841 High       Component Ref Range & Units 12/28/22 0743    SARS-CoV-2 RNA, RT PCR NOT DETECTED NOT DETECTED      INFLUENZA A NOT DETECTED NOT DETECTED    INFLUENZA B NOT DETECTED NOT DETECTED          Assessment and plan to follow as per Dr. Nino Gómez. NOTE: This report was transcribed using voice recognition software. Every effort was made to ensure accuracy; however, inadvertent computerized transcription errors may be present. Reny Murphy, [address] [place] [address], [place] [address] Cardiology    Electronically signed by Desi Barrios PA-C on 12/28/2022 at 1:18 PM          I independently performed an evaluation on the patient. I have reviewed the above documentation completed by the advance practitioner. Please see my additional contributions to the HPI, physical exam, assessment and medical decision making. Physical Exam   BP (!) 122/96   Pulse 58   Temp 97.9 °F (36.6 °C) (Oral)   Resp 18   Ht 4' 11\" (1.499 m)   Wt 101 lb (45.8 kg)   SpO2 97%   BMI 20.40 kg/m²   Constitutional: Oriented to person, place, and time. Well-developed and well-nourished. No distress. Head: Normocephalic and atraumatic.    Eyes: EOM are normal. Pupils are equal, round, and reactive to light. Neck: Normal range of motion. Neck supple. No hepatojugular reflux and no JVD present. Carotid bruit is not present. No tracheal deviation present. No thyromegaly present. Cardiovascular: Normal rate, regular rhythm, normal heart sounds and intact distal pulses. Exam reveals no gallop and no friction rub. No murmur heard. Pulmonary/Chest: Effort normal and breath sounds normal. No respiratory distress. No wheezes. No rales. No tenderness. Abdominal: Soft. Bowel sounds are normal. No distension and no mass. No tenderness. No rebound and no guarding. Musculoskeletal: Normal range of motion. No edema and no tenderness. Lymphadenopathy:   No cervical adenopathy. Neurological: Alert and oriented to person, place, and time. Skin: Skin is warm and dry. No rash noted. Not diaphoretic. No erythema. Psychiatric: Normal mood and affect. Behavior is normal.      Echocardiogram: 12/26/16 ([facility])   Mildly dilated left ventricle. Large apical aneurysm with dyskinetic apex. Severe hypokinesis of the apical septum and lateral wall. Overall LVEF is visually estimated at 20-25%   The left atrium is moderate-severely dilated. Structurally normal mitral valve. Increased E point septal separation noted,suggesting decreased LV cardiac output   Mild mitral regurgitation is present. The aortic valve is trileaflet. The aortic valve appears mildly sclerotic. Mild aortic regurgitation is noted. Normal tricuspid valve structure and function. Mild tricuspid regurgitation. RVSP is 25-30 mmHg. There is a trivial localized near right ventricle pericardial effusion noted. ALMA: 6/9/2020 (Dr. Gaby Alonzo)   Gastric images not performed due to history of achalasia and recent   botulinum toxin injection - case discussed with Dr. Esvin Madrigal before   procedure and felt no contraindication to ALMA with upper esophageal images only.    Ejection fraction is visually estimated at 25-30%. There was evidence of spontaneous echo contrast in the left atrium. No evidence of thrombus within left atrium or appendage. Small mobile echodensity likely fibrin stranding noted on RA lead. Mild-moderate mitral regurgitation directed centrally. Mild aortic valve regurgitation. Moderate to severe tricuspid regurgitation. Echocardiogram: 3/31/22 (Dr. Laurita Galdamez)   Dilated left ventricle. Severely reduced left ventricular systolic function. Ejection fraction is visually estimated at 25-30%. Apical akinesis. Normal right ventricular size and function. Indeterminate diastolic function. Severely dilated left atrium by volume index. Moderate mitral regurgitation. Moderate aortic regurgitation. Moderate tricuspid regurgitation. PASP is estimated at 42 mmHg. See accompanying documentation for full consult. ASSESSMENT AND PLAN:  1. Acute on chronic systolic CHF/ICMP:     Chart/labs/EKG reviewed. Echo. Diurese. BB/ARB/IV lasix. Aldactone and entresto previously stopped in the setting of electrolyte abnormalities and hypotension. Retrial low dose aldactone. 2. CAD/Elevated troponin:     ASA/BB/statin. 3. PAF: In sinus. BB/amio/Xarelto. 4. VHD: Mod AI/mod MR/mod TR by 3/31/2022 echo. Echo.      5. ICD: Per EP. 6. HTN: Observe. 7. Lipids: Statin. 8. DM: Per primary service. 9. Asthma: Per primary service. 10. CKD: Follow labs. 11. Anemia: Follow labs. Valdemar Barillas D.O.   Cardiologist  Cardiology, [address]

## 2022-12-28 NOTE — ED NOTES
Pt on 3 liters NC and spo2 now 94%.  SPO2 was 84% on RA      [employer] [facility] [employer]  12/28/22 2751

## 2022-12-28 NOTE — ED PROVIDER NOTES
Department of Emergency Medicine   ED  Provider Note  Admit Date/RoomTime: 12/28/2022  6:56 AM  ED Room: 05/05          History of Present Illness:  12/28/22, Time: 7:10 AM EST  Chief Complaint   Patient presents with    Shortness of Breath     When walking; since yesterday     Dizziness            Avram Curling is a 81 y.o. female presenting to the ED for shortness of breath. Patient states that this been ongoing for the past couple months but worsened significantly over the last night. Patient states that it is worse when she lies flat and exerts herself and better with sitting up. Patient states that she has had similar symptoms previously but was diagnosed with COVID. Patient otherwise denies any fevers, cough, chest pain, nausea, vomiting, or lower extremity swelling. She denies any history of smoking. Review of Systems   Constitutional:  Negative for chills and fever. HENT:  Negative for ear pain, sinus pressure and sore throat. Eyes:  Negative for pain, discharge and redness. Respiratory:  Positive for shortness of breath. Negative for cough and wheezing. Cardiovascular:  Negative for chest pain and leg swelling. Gastrointestinal:  Negative for abdominal distention, diarrhea, nausea and vomiting. Genitourinary:  Negative for dysuria and frequency. Musculoskeletal:  Negative for arthralgias and back pain. Skin:  Negative for rash and wound. Neurological:  Negative for weakness and headaches. Hematological:  Negative for adenopathy.    All other systems reviewed and are negative.       --------------------------------------------- PAST HISTORY ---------------------------------------------  Past Medical History:  has a past medical history of Arthritis, Atrial fibrillation ([facility] [address] CAD (coronary artery disease), Cardiomyopathy ([facility] [address] CHF (congestive heart failure) ([facility] [address] Chronic anticoagulation, Chronic bronchitis ([facility] [address] COPD (chronic obstructive pulmonary disease) ([facility] [address] COVID, Depression, GERD (gastroesophageal reflux disease), HFrEF (heart failure with reduced ejection fraction) ([facility] [address] Hyperlipidemia, Hypertension, Left ventricular dysfunction, Low vitamin D level, MI (myocardial infarction) ([facility] [address] Osteopenia, Osteoporosis, and Swallowing difficulty. Past Surgical History:  has a past surgical history that includes Hysterectomy; Appendectomy; Tonsillectomy; Breast surgery (1962); Cosmetic surgery; Breast Capsulectomy (03/07/2012); Diagnostic Cardiac Cath Lab Procedure (10/30/2009); cardiovascular stress test (03/04/2010); transthoracic echocardiogram (3/30/11ize and function, ); Cardiac surgery (2009); Cardiac pacemaker placement (05/10/2010); Cardiac defibrillator placement; Colonoscopy (05/26/2021); other surgical history (Right, 12/17/2015); Cardiac defibrillator placement (05/2010); other surgical history (Right, 04/10/2016); Cardiac defibrillator placement (Left, 02/13/2018); esophageal motility study (N/A, 01/02/2020); Upper gastrointestinal endoscopy (N/A, 02/13/2020); IR US THYROID BIOPSY; Upper gastrointestinal endoscopy (N/A, 03/25/2021); and Upper gastrointestinal endoscopy (10/16/2019). Social History:  reports that she has never smoked. She has never used smokeless tobacco. She reports current alcohol use. She reports that she does not use drugs. Family History: family history includes Bipolar Disorder in her son and son; Heart Disease in her brother. . Unless otherwise noted, family history is non contributory    The patients home medications have been reviewed. Allergies: Patient has no known allergies. ---------------------------------------------------PHYSICAL EXAM--------------------------------------    Physical Exam  Vitals and nursing note reviewed. Constitutional:       Appearance: She is well-developed. HENT:      Head: Normocephalic and atraumatic.    Eyes:      Conjunctiva/sclera: Conjunctivae normal.   Cardiovascular:      Rate and Rhythm: Normal rate and regular rhythm. Pulses: Normal pulses. Heart sounds: Normal heart sounds. No murmur heard. Pulmonary:      Effort: Pulmonary effort is normal. Tachypnea present. No respiratory distress. Breath sounds: Examination of the left-lower field reveals rales. Rales present. No wheezing. Abdominal:      General: Bowel sounds are normal.      Palpations: Abdomen is soft. Tenderness: There is no abdominal tenderness. There is no guarding or rebound. Genitourinary:     Rectum: Guaiac result negative. Musculoskeletal:      Cervical back: Normal range of motion and neck supple. Right lower leg: No edema. Left lower leg: No edema. Skin:     General: Skin is warm and dry. Neurological:      Mental Status: She is alert and oriented to person, place, and time. Cranial Nerves: No cranial nerve deficit. Coordination: Coordination normal.          -------------------------------------------------- RESULTS -------------------------------------------------  I have personally reviewed all laboratory and imaging results for this patient. Results are listed below.      LABS: (Lab results interpreted by me)  Results for orders placed or performed during the hospital encounter of 12/28/22   COVID-19 & Influenza Combo    Specimen: Nasopharyngeal Swab   Result Value Ref Range    SARS-CoV-2 RNA, RT PCR NOT DETECTED NOT DETECTED    INFLUENZA A NOT DETECTED NOT DETECTED    INFLUENZA B NOT DETECTED NOT DETECTED   CBC with Auto Differential   Result Value Ref Range    WBC 8.7 4.5 - 11.5 E9/L    RBC 3.08 (L) 3.50 - 5.50 E12/L    Hemoglobin 9.8 (L) 11.5 - 15.5 g/dL    Hematocrit 30.5 (L) 34.0 - 48.0 %    MCV 99.0 80.0 - 99.9 fL    MCH 31.8 26.0 - 35.0 pg    MCHC 32.1 32.0 - 34.5 %    RDW 13.2 11.5 - 15.0 fL    Platelets 406 261 - 106 E9/L    MPV 9.6 7.0 - 12.0 fL   Comprehensive Metabolic Panel w/ Reflex to MG   Result Value Ref Range    Sodium 140 132 - 146 mmol/L    Potassium reflex Magnesium 4.2 3.5 - 5.0 mmol/L    Chloride 103 98 - 107 mmol/L    CO2 24 22 - 29 mmol/L    Anion Gap 13 7 - 16 mmol/L    Glucose 150 (H) 74 - 99 mg/dL    BUN 32 (H) 6 - 23 mg/dL    Creatinine 1.2 (H) 0.5 - 1.0 mg/dL    Est, Glom Filt Rate 44 >=60 mL/min/1.73    Calcium 9.4 8.6 - 10.2 mg/dL    Total Protein 7.0 6.4 - 8.3 g/dL    Albumin 4.2 3.5 - 5.2 g/dL    Total Bilirubin 0.6 0.0 - 1.2 mg/dL    Alkaline Phosphatase 64 35 - 104 U/L    ALT 20 0 - 32 U/L    AST 35 (H) 0 - 31 U/L   Troponin   Result Value Ref Range    Troponin, High Sensitivity 48 (H) 0 - 9 ng/L   Brain Natriuretic Peptide   Result Value Ref Range    Pro-BNP 23,957 (H) 0 - 450 pg/mL   Troponin   Result Value Ref Range    Troponin, High Sensitivity 47 (H) 0 - 9 ng/L   EKG 12 Lead   Result Value Ref Range    Ventricular Rate 56 BPM    Atrial Rate 56 BPM    P-R Interval 226 ms    QRS Duration 150 ms    Q-T Interval 504 ms    QTc Calculation (Bazett) 486 ms    P Axis 70 degrees    R Axis -54 degrees    T Axis 109 degrees   ,       RADIOLOGY:  Interpreted by Radiologist unless otherwise specified  XR CHEST PORTABLE   Final Result   1. Cardiomegaly with hazy bilateral airspace disease likely representative of   CHF. Viral pneumonia cannot be excluded in the proper clinical setting.                          ------------------------- NURSING NOTES AND VITALS REVIEWED ---------------------------   The nursing notes within the ED encounter and vital signs as below have been reviewed by myself  BP (!) 160/84   Pulse 56   Temp 99.1 °F (37.3 °C) (Oral)   Resp 22   Ht 4' 11\" (1.499 m)   Wt 101 lb (45.8 kg)   SpO2 100%   BMI 20.40 kg/m²     Oxygen Saturation Interpretation: Abnormal    The patients available past medical records and past encounters were reviewed.         ------------------------------ ED COURSE/MEDICAL DECISION MAKING----------------------  Medications   furosemide (LASIX) injection 20 mg (has no administration in time range) Medical Decision Making:     ED Course as of 12/28/22 1016   Wed Dec 28, 2022   7627 Patient presents with shortness of breath. She was hypoxic on room air at 84%. Patient placed on 3 L with improvement to the mid 90s. EKG did not meet STEMI criteria. Chest x-ray was consistent with congestive heart failure. BNP elevated to just under 24,000 and up from 9000 previously. Lasix was ordered. CBC did show new anemia 9.8. Hemoccult was negative. CMP was largely unremarkable and consistent with patient's baseline. Troponins with nonsignificant delta. Low concern for ACS. Patient was interrogated and with unremarkable feedback. Given patient's hypoxia, patient will be admitted for further evaluation and care. Congestive heart failure exacerbation. Patient admitted to telemetry at [facility]. [BB]   5573 EKG shows sinus bradycardia with a first-degree AV block that is intermittently atrially paced. There is a right bundle branch block present. Left axis deviation. Does not meet STEMI criteria. Largely comparable with some morphological changes compared to previous EKG on 10/24/2022. As interpreted by myself ED physician. [BB]   [phone] with Dr. Gerardo Pope who will admit the patient. [BB]      ED Course User Index  [BB] Pawan Luna DO        Re-Evaluations:  Patient was reevaluted and continued to be stable. This patient's ED course included: a personal history and physicial examination, multiple bedside re-evaluations, IV medications, cardiac monitoring, and continuous pulse oximetry    This patient has remained hemodynamically stable during their ED course. Consultations:  None      Critical Care: none       Counseling: The emergency provider has spoken with the patient and discussed todays results, in addition to providing specific details for the plan of care and counseling regarding the diagnosis and prognosis.   Questions are answered at this time and they are agreeable with the plan.       --------------------------------- IMPRESSION AND DISPOSITION ---------------------------------    IMPRESSION  1. Acute on chronic congestive heart failure, unspecified heart failure type ([address]    2. Acute respiratory failure with hypoxia (HCC)    3. Anemia, unspecified type        DISPOSITION  Disposition: Admit to telemetry  Patient condition is stable        NOTE: This report was transcribed using voice recognition software.  Every effort was made to ensure accuracy; however, inadvertent computerized transcription errors may be present           DO Grace Wyatt  12/28/22 1016

## 2022-12-29 LAB
ALBUMIN SERPL-MCNC: 3.6 G/DL (ref 3.5–5.2)
ALP BLD-CCNC: 51 U/L (ref 35–104)
ALT SERPL-CCNC: 15 U/L (ref 0–32)
ANION GAP SERPL CALCULATED.3IONS-SCNC: 11 MMOL/L (ref 7–16)
AST SERPL-CCNC: 21 U/L (ref 0–31)
BILIRUB SERPL-MCNC: 0.4 MG/DL (ref 0–1.2)
BUN BLDV-MCNC: 27 MG/DL (ref 6–23)
CALCIUM SERPL-MCNC: 9.3 MG/DL (ref 8.6–10.2)
CHLORIDE BLD-SCNC: 104 MMOL/L (ref 98–107)
CO2: 29 MMOL/L (ref 22–29)
CREAT SERPL-MCNC: 1.2 MG/DL (ref 0.5–1)
GFR SERPL CREATININE-BSD FRML MDRD: 44 ML/MIN/1.73
GLUCOSE BLD-MCNC: 103 MG/DL (ref 74–99)
POTASSIUM SERPL-SCNC: 3 MMOL/L (ref 3.5–5)
SODIUM BLD-SCNC: 144 MMOL/L (ref 132–146)
TOTAL PROTEIN: 6.2 G/DL (ref 6.4–8.3)

## 2022-12-29 PROCEDURE — 6370000000 HC RX 637 (ALT 250 FOR IP): Performed by: INTERNAL MEDICINE

## 2022-12-29 PROCEDURE — 6360000002 HC RX W HCPCS: Performed by: INTERNAL MEDICINE

## 2022-12-29 PROCEDURE — 80053 COMPREHEN METABOLIC PANEL: CPT

## 2022-12-29 PROCEDURE — 36415 COLL VENOUS BLD VENIPUNCTURE: CPT

## 2022-12-29 PROCEDURE — 93306 TTE W/DOPPLER COMPLETE: CPT

## 2022-12-29 PROCEDURE — 94640 AIRWAY INHALATION TREATMENT: CPT

## 2022-12-29 PROCEDURE — 1200000000 HC SEMI PRIVATE

## 2022-12-29 PROCEDURE — 2700000000 HC OXYGEN THERAPY PER DAY

## 2022-12-29 PROCEDURE — 99233 SBSQ HOSP IP/OBS HIGH 50: CPT | Performed by: INTERNAL MEDICINE

## 2022-12-29 RX ADMIN — FUROSEMIDE 40 MG: 10 INJECTION, SOLUTION INTRAMUSCULAR; INTRAVENOUS at 17:33

## 2022-12-29 RX ADMIN — LOSARTAN POTASSIUM 25 MG: 25 TABLET, FILM COATED ORAL at 08:36

## 2022-12-29 RX ADMIN — AMIODARONE HYDROCHLORIDE 100 MG: 200 TABLET ORAL at 08:36

## 2022-12-29 RX ADMIN — METOPROLOL SUCCINATE 25 MG: 25 TABLET, EXTENDED RELEASE ORAL at 08:36

## 2022-12-29 RX ADMIN — FUROSEMIDE 40 MG: 10 INJECTION, SOLUTION INTRAMUSCULAR; INTRAVENOUS at 08:37

## 2022-12-29 RX ADMIN — POTASSIUM BICARBONATE 40 MEQ: 782 TABLET, EFFERVESCENT ORAL at 15:41

## 2022-12-29 RX ADMIN — FLUTICASONE PROPIONATE 2 SPRAY: 50 SPRAY, METERED NASAL at 08:35

## 2022-12-29 RX ADMIN — SPIRONOLACTONE 12.5 MG: 25 TABLET ORAL at 08:36

## 2022-12-29 RX ADMIN — RIVAROXABAN 15 MG: 15 TABLET, FILM COATED ORAL at 20:39

## 2022-12-29 RX ADMIN — POTASSIUM BICARBONATE 40 MEQ: 782 TABLET, EFFERVESCENT ORAL at 10:48

## 2022-12-29 RX ADMIN — ALBUTEROL SULFATE 2.5 MG: 2.5 SOLUTION RESPIRATORY (INHALATION) at 20:22

## 2022-12-29 RX ADMIN — ATORVASTATIN CALCIUM 20 MG: 20 TABLET, FILM COATED ORAL at 20:39

## 2022-12-29 RX ADMIN — PANTOPRAZOLE SODIUM 40 MG: 40 TABLET, DELAYED RELEASE ORAL at 05:27

## 2022-12-29 RX ADMIN — ALBUTEROL SULFATE 2.5 MG: 2.5 SOLUTION RESPIRATORY (INHALATION) at 16:49

## 2022-12-29 RX ADMIN — ASPIRIN 81 MG: 81 TABLET, COATED ORAL at 08:36

## 2022-12-29 NOTE — H&P
Pee Singh MD FACP  Internal medicine   History and Physical      CHIEF COMPLAINT: Shortness of breath      HISTORY OF PRESENT ILLNESS:    80-year-old woman seen on the floor earlier this morning at the main campus of [facility] with the ER physician at the time of admission  Patient with underlying cardiomyopathy  Presented with 2-day history of exertional dyspnea  No chest pain cough or sputum production no leg swelling  Found to have markedly elevated BNP  Admitted for further management  Patient does not use oxygen supplementation at home  Currently on 3 l of oxygen through nasal cannula  She feels much better this morning    Past Medical History:    Past Medical History:   Diagnosis Date    Arthritis     Atrial fibrillation ([address]     CAD (coronary artery disease)     Cardiomyopathy ([address]     CHF (congestive heart failure) ([address] 2010    history of    Chronic anticoagulation     Chronic bronchitis ([address]     none recent    COPD (chronic obstructive pulmonary disease) ([address]     COVID 08/2022    in hospital x5 days    Depression     GERD (gastroesophageal reflux disease)     HFrEF (heart failure with reduced ejection fraction) ([address] 12/29/2016 12/26/16- LVEF 20-25%, large apical aneurysm, LA moderate-severely dilated, mildly enlarged RA, mild MR, mild TR, mild AR    Hyperlipidemia     Hypertension     Left ventricular dysfunction     Low vitamin D level     MI (myocardial infarction) ([address] 10/30/2009    Osteopenia     Osteoporosis     Swallowing difficulty        Past Surgical History:    Past Surgical History:   Procedure Laterality Date    APPENDECTOMY      BREAST CAPSULECTOMY  03/07/2012    BILATERAL BREAST CAPSULECTOMIES    BREAST SURGERY  1962     cosmetic implants    CARDIAC DEFIBRILLATOR PLACEMENT      [employer] [facility]    CARDIAC DEFIBRILLATOR PLACEMENT  05/2010    CARDIAC DEFIBRILLATOR PLACEMENT Left 02/13/2018    [employer] ICD change out,     CARDIAC PACEMAKER PLACEMENT  05/10/2010 Dual chamber pacer ICD. CARDIAC SURGERY  2009    stent    CARDIOVASCULAR STRESS TEST  03/04/2010    COLONOSCOPY  05/26/2021    [facility], repeat as needed    COSMETIC SURGERY      eyelids breast    DIAGNOSTIC CARDIAC CATH LAB PROCEDURE  10/30/2009    ESOPHAGEAL MOTILITY STUDY N/A 01/02/2020    ESOPHAGEAL MOTILITY/MANOMETRY STUDY performed by Eron Croft DO at [address] [place] [address] ([facility])      IR US THYROID BIOPSY      OTHER SURGICAL HISTORY Right 12/17/2015    ORIF RIGHT DISTAL RADIUS    OTHER SURGICAL HISTORY Right 04/10/2016    IP joint release of thumb    TONSILLECTOMY      TRANSTHORACIC ECHOCARDIOGRAM  3/30/11ize and function,     UPPER GASTROINTESTINAL ENDOSCOPY N/A 02/13/2020    EGD SUBMUCOSAL/BOTOX INJECTION performed by Charlotte Bustamante DO at [address] [place] [address] N/A 03/25/2021    EGD SUBMUCOSAL/BOTOX INJECTION performed by Charlotte Bustamante DO at [address] [place] [address]  10/16/2019       Medications Prior to Admission:    Medications Prior to Admission: BUDESONIDE IN, Inhale into the lungs  albuterol (PROVENTIL) (2.5 MG/3ML) 0.083% nebulizer solution, Take 3 mLs by nebulization every 4 hours as needed for Wheezing or Shortness of Breath  amiodarone (CORDARONE) 200 MG tablet, Take 0.5 tablets by mouth daily ##### Per Dr. Herminio Frank:  Take 100 mg (half tablet) daily #####  ASPIRIN LOW DOSE 81 MG EC tablet, Take 1 tablet by mouth daily  vitamin D (ERGOCALCIFEROL) 1.25 MG (86117 UT) CAPS capsule, Take 1 capsule by mouth once a week  losartan (COZAAR) 25 MG tablet, Take 1 tablet by mouth daily  metoprolol succinate (TOPROL XL) 25 MG extended release tablet, TAKE 1 TABLET BY MOUTH EVERY DAY  omeprazole (PRILOSEC) 40 MG delayed release capsule, TAKE 1 CAPSULE BY MOUTH EVERY DAY  simvastatin (ZOCOR) 20 MG tablet, Take 1 tablet by mouth daily  rivaroxaban (XARELTO) 15 MG TABS tablet, Take 15 mg by mouth daily (with breakfast)  furosemide (LASIX) 20 MG tablet, Take 0.5 tablets by mouth daily  mometasone (NASONEX) 50 MCG/ACT nasal spray, 2 sprays by Each Nostril route daily  diphenoxylate-atropine (LOMOTIL) 2.5-0.025 MG per tablet, Take 1 tablet by mouth 4 times daily as needed for Diarrhea. Methylfol-Algae-J98-Dxlnmcurbg (CEREFOLIN NAC) 6-90.314-2-600 MG TABS, Take 1 tablet by mouth daily  acetaminophen (TYLENOL) 500 MG tablet, Take 2 tablets by mouth every 8 hours as needed for Pain  Respiratory Therapy Supplies (FULL KIT NEBULIZER SET) MISC, Use as directed with nebulized medication. Multiple Vitamins-Minerals (OCUVITE PO), Take 1 tablet by mouth daily  Mfelmkzag-Fjlxrgzci-Pgehhlznuh (CEREFOLIN NAC PO), Take by mouth (Patient not taking: Reported on 11/8/2022)  Coenzyme Q10 (CO Q-10) 100 MG CAPS, Take 100 mg by mouth daily    Allergies:    Patient has no known allergies. Social History:    reports that she has never smoked. She has never used smokeless tobacco. She reports current alcohol use. She reports that she does not use drugs. Family History:   family history includes Bipolar Disorder in her son and son; Heart Disease in her brother. REVIEW OF SYSTEMS:  As above in the HPI, otherwise negative    PHYSICAL EXAM:    Vitals:  /62   Pulse 57   Temp 97.5 °F (36.4 °C) (Oral)   Resp 18   Ht 4' 11\" (1.499 m)   Wt 101 lb (45.8 kg)   SpO2 100%   BMI 20.40 kg/m²     General:  Awake, alert, oriented X 3. Thin built and chronically ill-appearing  HEENT:  Normocephalic, atraumatic. Pupils equal, round, reactive to light. No scleral icterus. No conjunctival injection. .  No nasal discharge. Neck:  Supple  Heart:  RRR, no murmurs, gallops, rubs  Lungs:  CTA bilaterally, bilat symmetrical expansion, no wheeze, rales, or rhonchi  Abdomen:   Bowel sounds present, soft, nontender, no masses, no organomegaly, no peritoneal signs  Extremities:  No clubbing, cyanosis, or edema  Skin:  Warm and dry, no open lesions or rash  Neuro:  Cranial nerves 2-12 intact, no focal deficits  Generalized muscle atrophy noted  Breast: deferred  Rectal: deferred  Genitalia:  deferred    LABS:  Hypokalemic this morning      ASSESSMENT:      Principal Problem:    CHF (congestive heart failure), NYHA class I, unspecified failure chronicity, unspecified type (HCC)  Acute on chronic systolic heart failure  Hypokalemia  Dilated cardiomyopathy  Multiple comorbidities present and past as listed below  Patient Active Problem List   Diagnosis    CAD (coronary artery disease)    H/O acute myocardial infarction of anterolateral wall    History of PTCA    Ischemic cardiomyopathy    Automatic implantable cardioverter-defibrillator in situ    Essential hypertension    COPD exacerbation (HCC)    DM II (diabetes mellitus, type II), controlled ([facility]    Acute on chronic /diastolic/ congestive heart failure (HCC)    Pulmonary nodule    Osteoporosis    Influenza with respiratory manifestation other than pneumonia    Pneumonia due to organism    S/P ICD (internal cardiac defibrillator) procedure    Atrial fibrillation (HCC)    Respiratory failure (HCC)    Acute on chronic congestive heart failure (HCC)    CHF (congestive heart failure), NYHA class I, unspecified failure chronicity, unspecified type ([address]           PLAN:  Admit to telemetry  Gentle IV diuresis  Replace potassium  Cardiology consult  Resume other home medications  Questions answered to her satisfaction    Jourdan Ardon MD  10:26 AM  12/29/2022

## 2022-12-29 NOTE — CARE COORDINATION
Social Work/Case Management Transition of Care Planning ([facility] [phone]): Patient presented to the hospital for increased shortness of breath. She was diagnosed with acute on chronic CHF, acute respiratory failure with hypoxia, and anemia. BNP was noted to be 23,957 and troponin was 47. She is on 2L NC. Room air is her baseline. IV Lasix was started. Echo is pending. Cardiology is pending. Met with patient and her , Rubio Kelly, at bedside. Patient is very [address]. She resides in a 1 story home with her . There are 3 JUSTIN. Patient has 6 children and 2 step children. Only 2 live locally. Patient is independent with personal care and light meal preparation. Her  does housekeeping, laundry, and provides transportation. She uses a single point cane and has a FWW. PCP is Dr. Anastasia Pizarro. Pharmacy is [facility] on [facility] [address]. If unable to wean from oxygen, patient choices [facility]. She reports HHC history but cannot remember the ageny. No HIEU history. Discharge plan is to home with no needs.  to transport home.     RALPH Quinones  12/29/2022

## 2022-12-29 NOTE — PLAN OF CARE
Problem: Pain  Goal: Verbalizes/displays adequate comfort level or baseline comfort level  12/29/2022 1153 by Luis Bella RN  Outcome: Progressing  12/29/2022 0119 by Melisa Felix RN  Outcome: Progressing     Problem: Safety - Adult  Goal: Free from fall injury  12/29/2022 1153 by Luis Bella RN  Outcome: Progressing  12/29/2022 0119 by Melisa Felix RN  Outcome: Progressing

## 2022-12-29 NOTE — PROGRESS NOTES
[facility] Cardiology Inpatient Progress Note    Patient is a 81 y.o. female of Kraig Trinidad MD seen in hospital follow up. Chief complaint: CHF    HPI: Some SOB. No CP.      Patient Active Problem List   Diagnosis    CAD (coronary artery disease)    H/O acute myocardial infarction of anterolateral wall    History of PTCA    Ischemic cardiomyopathy    Automatic implantable cardioverter-defibrillator in situ    Essential hypertension    COPD exacerbation ([facility]    DM II (diabetes mellitus, type II), controlled ([facility] [facility]    Acute on chronic /diastolic/ congestive heart failure ([facility] [facility]    Pulmonary nodule    Osteoporosis    Influenza with respiratory manifestation other than pneumonia    Pneumonia due to organism    S/P ICD (internal cardiac defibrillator) procedure    Atrial fibrillation (HCC)    Respiratory failure (HCC)    Acute on chronic congestive heart failure (HCC)    CHF (congestive heart failure), NYHA class I, unspecified failure chronicity, unspecified type (HCC)       No Known Allergies    Current Facility-Administered Medications   Medication Dose Route Frequency Provider Last Rate Last Admin    acetaminophen (TYLENOL) tablet 1,000 mg  1,000 mg Oral Q8H PRN Lamin German MD        albuterol (PROVENTIL) nebulizer solution 2.5 mg  2.5 mg Nebulization 4x daily Lamin German MD   2.5 mg at 12/28/22 2152    amiodarone (CORDARONE) tablet 100 mg  100 mg Oral Daily Lamin German MD   100 mg at 12/28/22 1405    aspirin EC tablet 81 mg  81 mg Oral Daily Lamin German MD   81 mg at 12/28/22 1405    losartan (COZAAR) tablet 25 mg  25 mg Oral Daily Lamin German MD   25 mg at 12/28/22 1405    metoprolol succinate (TOPROL XL) extended release tablet 25 mg  25 mg Oral Daily Lamin German MD   25 mg at 12/28/22 1406    fluticasone (FLONASE) 50 MCG/ACT nasal spray 2 spray  2 spray Each Nostril Daily Lamin German MD        pantoprazole (PROTONIX) tablet 40 mg  40 mg Oral QAM AC Lamin German MD   40 mg at 12/29/22 0527    rivaroxaban (XARELTO) tablet 15 mg  15 mg Oral Daily with breakfast Bailee Ambrosio MD   15 mg at 12/28/22 2127    atorvastatin (LIPITOR) tablet 20 mg  20 mg Oral Nightly Bailee Ambrosio MD   20 mg at 12/28/22 2042    [START ON 1/2/2023] vitamin D (ERGOCALCIFEROL) capsule 50,000 Units  50,000 Units Oral Weekly Bailee Ambrosio MD        melatonin tablet 3 mg  3 mg Oral Nightly PRN Bailee Ambrosio MD        perflutren lipid microspheres (DEFINITY) injection 1.5 mL  1.5 mL IntraVENous ONCE PRN Earlene Harrison DO        furosemide (LASIX) injection 40 mg  40 mg IntraVENous BID Earlene Harrison DO   40 mg at 12/28/22 1748    spironolactone (ALDACTONE) tablet 12.5 mg  12.5 mg Oral Daily Earlene Harrison, DO   12.5 mg at 12/28/22 1413       Review of systems:   Heart: as above   Lungs: as above   Eyes: denies changes in vision or discharge. Ears: denies changes in hearing or pain. Nose: denies epistaxis or masses   Throat: denies sore throat or trouble swallowing. Neuro: denies numbness, tingling, tremors. Skin: denies rashes or itching. : denies hematuria, dysuria   GI: denies vomiting, diarrhea   Psych: denies mood changed, anxiety, depression. Physical Exam   /62   Pulse 57   Temp 97.5 °F (36.4 °C)   Resp 18   Ht 4' 11\" (1.499 m)   Wt 101 lb (45.8 kg)   SpO2 100%   BMI 20.40 kg/m²   Constitutional: Oriented to person, place, and time. No distress. Well developed. Head: Normocephalic and atraumatic. Neck: Neck supple. No hepatojugular reflux. No JVD present. Carotid bruit is not present. No tracheal deviation present. No thyromegaly present. Cardiovascular: Normal rate, regular rhythm, normal heart sounds. intact distal pulses. No gallop and no friction rub. No murmur heard. Pulmonary: Breath sounds normal. No respiratory distress. No wheezes. No rales. Chest: Effort normal. No tenderness. Abdominal: Soft. Bowel sounds are normal. No distension or mass.  No tenderness, rebound or guarding. Musculoskeletal: . No tenderness. No clubbing or cyanosis. Extremitites: Intact distal pulses. No edema  Neurological: Alert and oriented to person, place, and time. Skin: Skin is warm and dry. No rash noted. Not diaphoretic. No erythema. Psychiatric: Normal mood and affect. Behavior is normal.   Lymphadenopathy: No cervical adenopathy. No groin adenopathy. CBC:   Lab Results   Component Value Date/Time    WBC 8.7 12/28/2022 07:44 AM    RBC 3.08 12/28/2022 07:44 AM    HGB 9.8 12/28/2022 07:44 AM    HCT 30.5 12/28/2022 07:44 AM    MCV 99.0 12/28/2022 07:44 AM    MCH 31.8 12/28/2022 07:44 AM    MCHC 32.1 12/28/2022 07:44 AM    RDW 13.2 12/28/2022 07:44 AM     12/28/2022 07:44 AM    MPV 9.6 12/28/2022 07:44 AM     BMP:   Lab Results   Component Value Date/Time     12/29/2022 05:04 AM    K 3.0 12/29/2022 05:04 AM    K 4.2 12/28/2022 07:44 AM     12/29/2022 05:04 AM    CO2 29 12/29/2022 05:04 AM    BUN 27 12/29/2022 05:04 AM    LABALBU 3.6 12/29/2022 05:04 AM    CREATININE 1.2 12/29/2022 05:04 AM    CALCIUM 9.3 12/29/2022 05:04 AM    GFRAA 57 08/23/2022 04:54 AM    LABGLOM 44 12/29/2022 05:04 AM     Magnesium:    Lab Results   Component Value Date/Time    MG 2.1 06/07/2020 06:26 PM     Cardiac Enzymes:   Lab Results   Component Value Date    CKTOTAL 118 04/17/2014    CKTOTAL 130 04/17/2014    CKTOTAL 266 (H) 04/16/2014    CKMB 3.5 04/17/2014    CKMB 3.7 04/17/2014    CKMB 6.0 (H) 04/16/2014    TROPHS 47 (H) 12/28/2022    TROPHS 48 (H) 12/28/2022    TROPHS 18 (H) 08/18/2022      PT/INR:    Lab Results   Component Value Date/Time    PROTIME 13.3 11/19/2018 11:10 AM    INR 1.1 11/19/2018 11:10 AM     TSH:    Lab Results   Component Value Date/Time    TSH 0.825 06/10/2020 06:24 AM     Echocardiogram: 12/26/16 (Hua Law)   Mildly dilated left ventricle. Large apical aneurysm with dyskinetic apex. Severe hypokinesis of the apical septum and lateral wall.    Overall LVEF is visually estimated at 20-25%   The left atrium is moderate-severely dilated. Structurally normal mitral valve. Increased E point septal separation noted,suggesting decreased LV cardiac output   Mild mitral regurgitation is present. The aortic valve is trileaflet. The aortic valve appears mildly sclerotic. Mild aortic regurgitation is noted. Normal tricuspid valve structure and function. Mild tricuspid regurgitation. RVSP is 25-30 mmHg. There is a trivial localized near right ventricle pericardial effusion noted. ALMA: 6/9/2020 (Dr. Gaby Alonzo)   Gastric images not performed due to history of achalasia and recent   botulinum toxin injection - case discussed with Dr. Esvin Madrigal before   procedure and felt no contraindication to ALMA with upper esophageal images only. Ejection fraction is visually estimated at 25-30%. There was evidence of spontaneous echo contrast in the left atrium. No evidence of thrombus within left atrium or appendage. Small mobile echodensity likely fibrin stranding noted on RA lead. Mild-moderate mitral regurgitation directed centrally. Mild aortic valve regurgitation. Moderate to severe tricuspid regurgitation. Echocardiogram: 3/31/22 (Dr. Nico Espinosa)   Dilated left ventricle. Severely reduced left ventricular systolic function. Ejection fraction is visually estimated at 25-30%. Apical akinesis. Normal right ventricular size and function. Indeterminate diastolic function. Severely dilated left atrium by volume index. Moderate mitral regurgitation. Moderate aortic regurgitation. Moderate tricuspid regurgitation. PASP is estimated at 42 mmHg. Echo Summary 12/29/2022:  Ejection fraction is visually estimated at 25%. Overall ejection fraction severely decreased. The anteroseptal and entire apex are akinetic. Left ventricle is mildly enlarged. Mild left ventricular concentric hypertrophy noted. Normal right ventricle structure and function.  Pacer wire visualized in right ventricle. Left atrial volume index of 88 ml per meters squared BSA. The aortic valve is trileaflet. Moderate aortic stenosis is present. The aortic valve area is 1 cm2 with a maximum gradient of 35 mmHg and a mean gradient of 18 mmHg. Moderate aortic regurgitation is noted. Aortic valve pressure half-time 595 ms. Mild mitral regurgitation is present. Moderate tricuspid regurgitation. Pulmonary hypertension is mild to moderate. RVSP is 49 mmHg. No evidence for hemodynamically significant pericardial effusion. ASSESSMENT & PLAN:    Patient Active Problem List   Diagnosis    CAD (coronary artery disease)    H/O acute myocardial infarction of anterolateral wall    History of PTCA    Ischemic cardiomyopathy    Automatic implantable cardioverter-defibrillator in situ    Essential hypertension    COPD exacerbation (HCC)    DM II (diabetes mellitus, type II), controlled ([address]    Acute on chronic /diastolic/ congestive heart failure ([address]    Pulmonary nodule    Osteoporosis    Influenza with respiratory manifestation other than pneumonia    Pneumonia due to organism    S/P ICD (internal cardiac defibrillator) procedure    Atrial fibrillation (HCC)    Respiratory failure (HCC)    Acute on chronic congestive heart failure (HCC)    CHF (congestive heart failure), NYHA class I, unspecified failure chronicity, unspecified type ([address]     1. Acute on chronic systolic CHF/ICMP:    Chart/labs/EKG reviewed. Echo as above. Diurese. BB/ARB/aldactone/IV lasix. Entresto previously stopped in the setting of electrolyte abnormalities and hypotension. 2. CAD/Elevated troponin:    ASA/BB/statin. 3. PAF: In sinus. BB/amio/Xarelto. 4. VHD: Mod AI/mod MR/mod TR by 3/31/2022 echo. Echo 12/29/2022 with mod AS/mod AI/mild MR/mild-mod PH. Medically manage. 5. ICD: Per EP. 6. HTN: Observe. 7. Lipids: Statin. 8. DM: Per primary service.      9. Asthma: Per primary service. 10. CKD: Follow labs. 11. Anemia: Follow labs. Ana Quiroga D.O.   Cardiologist  Cardiology, [address] [facility]

## 2022-12-29 NOTE — ACP (ADVANCE CARE PLANNING)
Advance Care Planning   The patient has the following advanced directives on file:  Advance Directives       Power of  Living Will ACP-Advance Directive ACP-Power of     Not on File Filed on 12/27/17 Filed Not on File            The patient has appointed the following active healthcare agents:    Primary Decision Maker: See Jack - Spouse - [phone]    Secondary Decision Maker: Claudine Jackson - [phone]    The Patient has the following current code status:    Code Status: Prior      Parvin Montes De Oca  12/29/2022

## 2022-12-30 VITALS
SYSTOLIC BLOOD PRESSURE: 106 MMHG | WEIGHT: 101 LBS | BODY MASS INDEX: 20.36 KG/M2 | RESPIRATION RATE: 18 BRPM | OXYGEN SATURATION: 100 % | HEIGHT: 59 IN | DIASTOLIC BLOOD PRESSURE: 52 MMHG | TEMPERATURE: 97.3 F | HEART RATE: 56 BPM

## 2022-12-30 LAB
ALBUMIN SERPL-MCNC: 3.9 G/DL (ref 3.5–5.2)
ALP BLD-CCNC: 53 U/L (ref 35–104)
ALT SERPL-CCNC: 14 U/L (ref 0–32)
ANION GAP SERPL CALCULATED.3IONS-SCNC: 12 MMOL/L (ref 7–16)
AST SERPL-CCNC: 19 U/L (ref 0–31)
BILIRUB SERPL-MCNC: 0.4 MG/DL (ref 0–1.2)
BUN BLDV-MCNC: 29 MG/DL (ref 6–23)
CALCIUM SERPL-MCNC: 9.7 MG/DL (ref 8.6–10.2)
CHLORIDE BLD-SCNC: 99 MMOL/L (ref 98–107)
CO2: 34 MMOL/L (ref 22–29)
CREAT SERPL-MCNC: 1.3 MG/DL (ref 0.5–1)
GFR SERPL CREATININE-BSD FRML MDRD: 40 ML/MIN/1.73
GLUCOSE BLD-MCNC: 99 MG/DL (ref 74–99)
POTASSIUM SERPL-SCNC: 3.9 MMOL/L (ref 3.5–5)
SODIUM BLD-SCNC: 145 MMOL/L (ref 132–146)
TOTAL PROTEIN: 6.4 G/DL (ref 6.4–8.3)

## 2022-12-30 PROCEDURE — 6360000002 HC RX W HCPCS: Performed by: INTERNAL MEDICINE

## 2022-12-30 PROCEDURE — 99233 SBSQ HOSP IP/OBS HIGH 50: CPT | Performed by: INTERNAL MEDICINE

## 2022-12-30 PROCEDURE — 80053 COMPREHEN METABOLIC PANEL: CPT

## 2022-12-30 PROCEDURE — 94640 AIRWAY INHALATION TREATMENT: CPT

## 2022-12-30 PROCEDURE — 36415 COLL VENOUS BLD VENIPUNCTURE: CPT

## 2022-12-30 PROCEDURE — 6370000000 HC RX 637 (ALT 250 FOR IP): Performed by: INTERNAL MEDICINE

## 2022-12-30 PROCEDURE — 2700000000 HC OXYGEN THERAPY PER DAY

## 2022-12-30 RX ORDER — SPIRONOLACTONE 25 MG/1
12.5 TABLET ORAL DAILY
Qty: 30 TABLET | Refills: 3 | Status: SHIPPED | OUTPATIENT
Start: 2022-12-31

## 2022-12-30 RX ADMIN — FUROSEMIDE 40 MG: 10 INJECTION, SOLUTION INTRAMUSCULAR; INTRAVENOUS at 18:28

## 2022-12-30 RX ADMIN — LOSARTAN POTASSIUM 25 MG: 25 TABLET, FILM COATED ORAL at 08:51

## 2022-12-30 RX ADMIN — SPIRONOLACTONE 12.5 MG: 25 TABLET ORAL at 08:51

## 2022-12-30 RX ADMIN — METOPROLOL SUCCINATE 25 MG: 25 TABLET, EXTENDED RELEASE ORAL at 08:51

## 2022-12-30 RX ADMIN — PANTOPRAZOLE SODIUM 40 MG: 40 TABLET, DELAYED RELEASE ORAL at 06:09

## 2022-12-30 RX ADMIN — ALBUTEROL SULFATE 2.5 MG: 2.5 SOLUTION RESPIRATORY (INHALATION) at 16:03

## 2022-12-30 RX ADMIN — FUROSEMIDE 40 MG: 10 INJECTION, SOLUTION INTRAMUSCULAR; INTRAVENOUS at 08:51

## 2022-12-30 RX ADMIN — AMIODARONE HYDROCHLORIDE 100 MG: 200 TABLET ORAL at 08:51

## 2022-12-30 RX ADMIN — ASPIRIN 81 MG: 81 TABLET, COATED ORAL at 08:51

## 2022-12-30 RX ADMIN — ALBUTEROL SULFATE 2.5 MG: 2.5 SOLUTION RESPIRATORY (INHALATION) at 12:49

## 2022-12-30 RX ADMIN — ALBUTEROL SULFATE 2.5 MG: 2.5 SOLUTION RESPIRATORY (INHALATION) at 09:12

## 2022-12-30 RX ADMIN — FLUTICASONE PROPIONATE 2 SPRAY: 50 SPRAY, METERED NASAL at 08:51

## 2022-12-30 NOTE — PROGRESS NOTES
[facility] Cardiology Inpatient Progress Note    Patient is a 81 y.o. female of Caridad Casas MD seen in hospital follow up. Chief complaint: CHF    HPI: Some SOB. No CP.      Patient Active Problem List   Diagnosis    CAD (coronary artery disease)    H/O acute myocardial infarction of anterolateral wall    History of PTCA    Ischemic cardiomyopathy    Automatic implantable cardioverter-defibrillator in situ    Essential hypertension    COPD exacerbation ([facility] [address]    DM II (diabetes mellitus, type II), controlled ([facility] [address]    Acute on chronic /diastolic/ congestive heart failure ([facility] [address]    Pulmonary nodule    Osteoporosis    Influenza with respiratory manifestation other than pneumonia    Pneumonia due to organism    S/P ICD (internal cardiac defibrillator) procedure    Atrial fibrillation (HCC)    Respiratory failure (HCC)    Acute on chronic congestive heart failure (HCC)    CHF (congestive heart failure), NYHA class I, unspecified failure chronicity, unspecified type (HCC)       No Known Allergies    Current Facility-Administered Medications   Medication Dose Route Frequency Provider Last Rate Last Admin    acetaminophen (TYLENOL) tablet 1,000 mg  1,000 mg Oral Q8H PRN Maria Esther Jefferson MD        albuterol (PROVENTIL) nebulizer solution 2.5 mg  2.5 mg Nebulization 4x daily Maria Esther Jefferson MD   2.5 mg at 12/30/22 0912    amiodarone (CORDARONE) tablet 100 mg  100 mg Oral Daily Maria Esther Jefferson MD   100 mg at 12/30/22 0851    aspirin EC tablet 81 mg  81 mg Oral Daily Maria Esther Jefferson MD   81 mg at 12/30/22 0851    losartan (COZAAR) tablet 25 mg  25 mg Oral Daily Maria Esther Jefferson MD   25 mg at 12/30/22 0851    metoprolol succinate (TOPROL XL) extended release tablet 25 mg  25 mg Oral Daily Maria Esther Jefferson MD   25 mg at 12/30/22 0851    fluticasone (FLONASE) 50 MCG/ACT nasal spray 2 spray  2 spray Each Nostril Daily Maria Esther Jefferson MD   2 spray at 12/30/22 0851    pantoprazole (PROTONIX) tablet 40 mg  40 mg Oral JULIANE Ennis Myrtle Castillo MD   40 mg at 12/30/22 0009    rivaroxaban (XARELTO) tablet 15 mg  15 mg Oral Daily with breakfast Tommy Bejarano MD   15 mg at 12/29/22 2039    atorvastatin (LIPITOR) tablet 20 mg  20 mg Oral Nightly Tommy Bejarano MD   20 mg at 12/29/22 2039    [START ON 1/2/2023] vitamin D (ERGOCALCIFEROL) capsule 50,000 Units  50,000 Units Oral Weekly Tommy Bejarano MD        melatonin tablet 3 mg  3 mg Oral Nightly PRN Tommy Bejarano MD        perflutren lipid microspheres (DEFINITY) injection 1.5 mL  1.5 mL IntraVENous ONCE PRN Lynette Kwok DO        furosemide (LASIX) injection 40 mg  40 mg IntraVENous BID Ojeda DO Rissa   40 mg at 12/30/22 1806    spironolactone (ALDACTONE) tablet 12.5 mg  12.5 mg Oral Daily Ojeda Rissa DO   12.5 mg at 12/30/22 3782       Review of systems:   Heart: as above   Lungs: as above   Eyes: denies changes in vision or discharge. Ears: denies changes in hearing or pain. Nose: denies epistaxis or masses   Throat: denies sore throat or trouble swallowing. Neuro: denies numbness, tingling, tremors. Skin: denies rashes or itching. : denies hematuria, dysuria   GI: denies vomiting, diarrhea   Psych: denies mood changed, anxiety, depression. Physical Exam   /68   Pulse 56   Temp 97.3 °F (36.3 °C) (Oral)   Resp 18   Ht 4' 11\" (1.499 m)   Wt 101 lb (45.8 kg)   SpO2 96%   BMI 20.40 kg/m²   Constitutional: Oriented to person, place, and time. No distress. Well developed. Head: Normocephalic and atraumatic. Neck: Neck supple. No hepatojugular reflux. No JVD present. Carotid bruit is not present. No tracheal deviation present. No thyromegaly present. Cardiovascular: Normal rate, regular rhythm, normal heart sounds. intact distal pulses. No gallop and no friction rub. No murmur heard. Pulmonary: Breath sounds normal. No respiratory distress. No wheezes. No rales. Chest: Effort normal. No tenderness. Abdominal: Soft.  Bowel sounds are normal. No distension or mass. No tenderness, rebound or guarding. Musculoskeletal: . No tenderness. No clubbing or cyanosis. Extremitites: Intact distal pulses. No edema  Neurological: Alert and oriented to person, place, and time. Skin: Skin is warm and dry. No rash noted. Not diaphoretic. No erythema. Psychiatric: Normal mood and affect. Behavior is normal.   Lymphadenopathy: No cervical adenopathy. No groin adenopathy. CBC:   Lab Results   Component Value Date/Time    WBC 8.7 12/28/2022 07:44 AM    RBC 3.08 12/28/2022 07:44 AM    HGB 9.8 12/28/2022 07:44 AM    HCT 30.5 12/28/2022 07:44 AM    MCV 99.0 12/28/2022 07:44 AM    MCH 31.8 12/28/2022 07:44 AM    MCHC 32.1 12/28/2022 07:44 AM    RDW 13.2 12/28/2022 07:44 AM     12/28/2022 07:44 AM    MPV 9.6 12/28/2022 07:44 AM     BMP:   Lab Results   Component Value Date/Time     12/30/2022 06:07 AM    K 3.9 12/30/2022 06:07 AM    K 4.2 12/28/2022 07:44 AM    CL 99 12/30/2022 06:07 AM    CO2 34 12/30/2022 06:07 AM    BUN 29 12/30/2022 06:07 AM    LABALBU 3.9 12/30/2022 06:07 AM    CREATININE 1.3 12/30/2022 06:07 AM    CALCIUM 9.7 12/30/2022 06:07 AM    GFRAA 57 08/23/2022 04:54 AM    LABGLOM 40 12/30/2022 06:07 AM     Magnesium:    Lab Results   Component Value Date/Time    MG 2.1 06/07/2020 06:26 PM     Cardiac Enzymes:   Lab Results   Component Value Date    CKTOTAL 118 04/17/2014    CKTOTAL 130 04/17/2014    CKTOTAL 266 (H) 04/16/2014    CKMB 3.5 04/17/2014    CKMB 3.7 04/17/2014    CKMB 6.0 (H) 04/16/2014    TROPHS 47 (H) 12/28/2022    TROPHS 48 (H) 12/28/2022    TROPHS 18 (H) 08/18/2022      PT/INR:    Lab Results   Component Value Date/Time    PROTIME 13.3 11/19/2018 11:10 AM    INR 1.1 11/19/2018 11:10 AM     TSH:    Lab Results   Component Value Date/Time    TSH 0.825 06/10/2020 06:24 AM     Echocardiogram: 12/26/16 (Negro Canales)   Mildly dilated left ventricle. Large apical aneurysm with dyskinetic apex. Severe hypokinesis of the apical septum and lateral wall. Overall LVEF is visually estimated at 20-25%   The left atrium is moderate-severely dilated. Structurally normal mitral valve. Increased E point septal separation noted,suggesting decreased LV cardiac output   Mild mitral regurgitation is present. The aortic valve is trileaflet. The aortic valve appears mildly sclerotic. Mild aortic regurgitation is noted. Normal tricuspid valve structure and function. Mild tricuspid regurgitation. RVSP is 25-30 mmHg. There is a trivial localized near right ventricle pericardial effusion noted. ALMA: 6/9/2020 (Dr. Ralph Nathan)   Gastric images not performed due to history of achalasia and recent   botulinum toxin injection - case discussed with Dr. Stacy Winston before   procedure and felt no contraindication to ALMA with upper esophageal images only. Ejection fraction is visually estimated at 25-30%. There was evidence of spontaneous echo contrast in the left atrium. No evidence of thrombus within left atrium or appendage. Small mobile echodensity likely fibrin stranding noted on RA lead. Mild-moderate mitral regurgitation directed centrally. Mild aortic valve regurgitation. Moderate to severe tricuspid regurgitation. Echocardiogram: 3/31/22 (Dr. Michael Kern)   Dilated left ventricle. Severely reduced left ventricular systolic function. Ejection fraction is visually estimated at 25-30%. Apical akinesis. Normal right ventricular size and function. Indeterminate diastolic function. Severely dilated left atrium by volume index. Moderate mitral regurgitation. Moderate aortic regurgitation. Moderate tricuspid regurgitation. PASP is estimated at 42 mmHg. Echo Summary 12/29/2022:  Ejection fraction is visually estimated at 25%. Overall ejection fraction severely decreased. The anteroseptal and entire apex are akinetic. Left ventricle is mildly enlarged. Mild left ventricular concentric hypertrophy noted.   Normal right ventricle structure and function. Pacer wire visualized in right ventricle. Left atrial volume index of 88 ml per meters squared BSA. The aortic valve is trileaflet. Moderate aortic stenosis is present. The aortic valve area is 1 cm2 with a maximum gradient of 35 mmHg and a mean gradient of 18 mmHg. Moderate aortic regurgitation is noted. Aortic valve pressure half-time 595 ms. Mild mitral regurgitation is present. Moderate tricuspid regurgitation. Pulmonary hypertension is mild to moderate. RVSP is 49 mmHg. No evidence for hemodynamically significant pericardial effusion. ASSESSMENT & PLAN:    Patient Active Problem List   Diagnosis    CAD (coronary artery disease)    H/O acute myocardial infarction of anterolateral wall    History of PTCA    Ischemic cardiomyopathy    Automatic implantable cardioverter-defibrillator in situ    Essential hypertension    COPD exacerbation (HCC)    DM II (diabetes mellitus, type II), controlled ([address]    Acute on chronic /diastolic/ congestive heart failure ([address]    Pulmonary nodule    Osteoporosis    Influenza with respiratory manifestation other than pneumonia    Pneumonia due to organism    S/P ICD (internal cardiac defibrillator) procedure    Atrial fibrillation (HCC)    Respiratory failure (HCC)    Acute on chronic congestive heart failure (HCC)    CHF (congestive heart failure), NYHA class I, unspecified failure chronicity, unspecified type ([facility] [address]     1. Acute on chronic systolic CHF/ICMP:    Chart/labs/EKG reviewed. Echo as above. Diuresed. BB/ARB/aldactone/lasix. Entresto previously stopped in the setting of electrolyte abnormalities and hypotension. 2. CAD/Elevated troponin:    ASA/BB/statin. 3. PAF: In sinus. BB/amio/Xarelto. 4. VHD: Mod AI/mod MR/mod TR by 3/31/2022 echo. Echo 12/29/2022 with mod AS/mod AI/mild MR/mild-mod PH. Medically manage. 5. ICD: Per EP. 6. HTN: Observe. 7. Lipids: Statin. 8. DM: Per primary service.      9. Asthma: Per primary service. 10. CKD: Follow labs. 11. Anemia: Follow labs. Move to discharge. Patient stable from CV standpoint. Please call if needed. TY. Virgilio Hector D.O.   Cardiologist  Cardiology, [id number] [facility]

## 2022-12-30 NOTE — CARE COORDINATION
Social Work/Case Management Transition of Care Planning ([facility] [phone]): Per report, patient remains on IV Lasix BID. BNP noted to be 23,957, troponin 47. Echo completed with EF 25%. Cardiology is following. She remains on 2L NC at 96%. Staff to wean oxygen as able. Room air is baseline. If unable to be weaned, she has choiced [facility]. Discharge plan is to return to home. She does not feel she will need HHC.  to transport. CM/SW will follow. RALPH Sheehan  12/30/2022    Update:  Discharge order noted. Per nursing, patient has been weaned from the oxygen and is at her baseline of room air.   RALPH Sheehan  12/30/2022

## 2022-12-30 NOTE — PROGRESS NOTES
Pulse ox was 95% on room air at rest.  Ambulated patient on room air. Oxygen saturation was 93% on room air while ambulating. No oxygen needed to be applied.

## 2022-12-30 NOTE — PROGRESS NOTES
Zaida Valverde MD FACP  Internal medicine  Progress Notes      CHIEF COMPLAINT: Shortness of breath      HISTORY OF PRESENT ILLNESS:    12/29/2022  81year-old woman seen on the floor earlier this morning at the main campus of [facility] with the ER physician at the time of admission  Patient with underlying cardiomyopathy  Presented with 2-day history of exertional dyspnea  No chest pain cough or sputum production no leg swelling  Found to have markedly elevated BNP  Admitted for further management  Patient does not use oxygen supplementation at home  Currently on 3 l of oxygen through nasal cannula  She feels much better this morning  12/30/2022  Patient was seen on the floor earlier this morning  She feels much better  On supplemental oxygen  Echo findings reviewed in detail with the patient  Past Medical History:    Past Medical History:   Diagnosis Date    Arthritis     Atrial fibrillation (HCC)     CAD (coronary artery disease)     Cardiomyopathy ([address]     CHF (congestive heart failure) ([address] 2010    history of    Chronic anticoagulation     Chronic bronchitis (HCC)     none recent    COPD (chronic obstructive pulmonary disease) ([address]     COVID 08/2022    in hospital x5 days    Depression     GERD (gastroesophageal reflux disease)     HFrEF (heart failure with reduced ejection fraction) ([address] 12/29/2016 12/26/16- LVEF 20-25%, large apical aneurysm, LA moderate-severely dilated, mildly enlarged RA, mild MR, mild TR, mild AR    Hyperlipidemia     Hypertension     Left ventricular dysfunction     Low vitamin D level     MI (myocardial infarction) ([address] 10/30/2009    Osteopenia     Osteoporosis     Swallowing difficulty        Past Surgical History:    Past Surgical History:   Procedure Laterality Date    APPENDECTOMY      BREAST CAPSULECTOMY  03/07/2012    BILATERAL BREAST CAPSULECTOMIES    BREAST SURGERY  1962     cosmetic implants    [address] DEFIBRILLATOR PLACEMENT  05/2010    CARDIAC DEFIBRILLATOR PLACEMENT Left 02/13/2018    [employer] ICD change out,     CARDIAC PACEMAKER PLACEMENT  05/10/2010    Dual chamber pacer ICD. CARDIAC SURGERY  2009    stent    CARDIOVASCULAR STRESS TEST  03/04/2010    COLONOSCOPY  05/26/2021    THE [facility], repeat as needed    COSMETIC SURGERY      eyelids breast    DIAGNOSTIC CARDIAC CATH LAB PROCEDURE  10/30/2009    ESOPHAGEAL MOTILITY STUDY N/A 01/02/2020    ESOPHAGEAL MOTILITY/MANOMETRY STUDY performed by Jamshid Martinez DO at [facility] ([facility])      IR US THYROID BIOPSY      OTHER SURGICAL HISTORY Right 12/17/2015    ORIF RIGHT DISTAL RADIUS    OTHER SURGICAL HISTORY Right 04/10/2016    IP joint release of thumb    TONSILLECTOMY      TRANSTHORACIC ECHOCARDIOGRAM  3/30/11ize and function,     UPPER GASTROINTESTINAL ENDOSCOPY N/A 02/13/2020    EGD SUBMUCOSAL/BOTOX INJECTION performed by Ashleigh Salcedo DO at [place] [id number] N/A 03/25/2021    EGD SUBMUCOSAL/BOTOX INJECTION performed by Ashleigh Salcedo DO at [place] [id number]  10/16/2019       Medications Prior to Admission:    Medications Prior to Admission: BUDESONIDE IN, Inhale into the lungs  albuterol (PROVENTIL) (2.5 MG/3ML) 0.083% nebulizer solution, Take 3 mLs by nebulization every 4 hours as needed for Wheezing or Shortness of Breath  amiodarone (CORDARONE) 200 MG tablet, Take 0.5 tablets by mouth daily ##### Per Dr. Edmund Vilchis:  Take 100 mg (half tablet) daily #####  ASPIRIN LOW DOSE 81 MG EC tablet, Take 1 tablet by mouth daily  vitamin D (ERGOCALCIFEROL) 1.25 MG (89552 UT) CAPS capsule, Take 1 capsule by mouth once a week  losartan (COZAAR) 25 MG tablet, Take 1 tablet by mouth daily  metoprolol succinate (TOPROL XL) 25 MG extended release tablet, TAKE 1 TABLET BY MOUTH EVERY DAY  omeprazole (PRILOSEC) 40 MG delayed release capsule, TAKE 1 CAPSULE BY MOUTH EVERY DAY  simvastatin (ZOCOR) 20 MG tablet, Take 1 tablet by mouth daily  rivaroxaban (XARELTO) 15 MG TABS tablet, Take 15 mg by mouth daily (with breakfast)  furosemide (LASIX) 20 MG tablet, Take 0.5 tablets by mouth daily  mometasone (NASONEX) 50 MCG/ACT nasal spray, 2 sprays by Each Nostril route daily  diphenoxylate-atropine (LOMOTIL) 2.5-0.025 MG per tablet, Take 1 tablet by mouth 4 times daily as needed for Diarrhea. Methylfol-Algae-B69-Hezwomkygh (CEREFOLIN NAC) 6-90.314-2-600 MG TABS, Take 1 tablet by mouth daily  acetaminophen (TYLENOL) 500 MG tablet, Take 2 tablets by mouth every 8 hours as needed for Pain  Respiratory Therapy Supplies (FULL KIT NEBULIZER SET) MISC, Use as directed with nebulized medication. Multiple Vitamins-Minerals (OCUVITE PO), Take 1 tablet by mouth daily  Vaqtzcqdr-Nwbuguohp-Ckhjbaakqy (CEREFOLIN NAC PO), Take by mouth (Patient not taking: Reported on 11/8/2022)  Coenzyme Q10 (CO Q-10) 100 MG CAPS, Take 100 mg by mouth daily    Allergies:    Patient has no known allergies. Social History:    reports that she has never smoked. She has never used smokeless tobacco. She reports current alcohol use. She reports that she does not use drugs. Family History:   family history includes Bipolar Disorder in her son and son; Heart Disease in her brother. REVIEW OF SYSTEMS:  As above in the HPI, otherwise negative    PHYSICAL EXAM:    Vitals:  /68   Pulse 56   Temp 97.3 °F (36.3 °C) (Oral)   Resp 18   Ht 4' 11\" (1.499 m)   Wt 101 lb (45.8 kg)   SpO2 96%   BMI 20.40 kg/m²     General:  Awake, alert, oriented X 3. Thin built and chronically ill-appearing  HEENT:  Normocephalic, atraumatic. Pupils equal, round, reactive to light. No scleral icterus. No conjunctival injection. .  No nasal discharge. Neck:  Supple  Heart:  RRR, no murmurs, gallops, rubs  Lungs:  CTA bilaterally, bilat symmetrical expansion, no wheeze, rales, or rhonchi  Abdomen:   Bowel sounds present, soft, nontender, no masses, no organomegaly, no peritoneal signs  Extremities:  No clubbing, cyanosis, or edema  Skin:  Warm and dry, no open lesions or rash  Neuro:  Cranial nerves 2-12 intact, no focal deficits  Generalized muscle atrophy noted  Breast: deferred  Rectal: deferred  Genitalia:  deferred    LABS:  Hypokalemic this morning      ASSESSMENT:      Principal Problem:    CHF (congestive heart failure), NYHA class I, unspecified failure chronicity, unspecified type (HCC)  Acute on chronic systolic heart failure/currently well compensated  Hypokalemia resolved  Dilated cardiomyopathy  Multiple comorbidities present and past as listed below  Patient Active Problem List   Diagnosis    CAD (coronary artery disease)    H/O acute myocardial infarction of anterolateral wall    History of PTCA    Ischemic cardiomyopathy    Automatic implantable cardioverter-defibrillator in situ    Essential hypertension    COPD exacerbation (HCC)    DM II (diabetes mellitus, type II), controlled ([address]    Acute on chronic /diastolic/ congestive heart failure (HCC)    Pulmonary nodule    Osteoporosis    Influenza with respiratory manifestation other than pneumonia    Pneumonia due to organism    S/P ICD (internal cardiac defibrillator) procedure    Atrial fibrillation (HCC)    Respiratory failure (HCC)    Acute on chronic congestive heart failure (HCC)    CHF (congestive heart failure), NYHA class I, unspecified failure chronicity, unspecified type ([address]           PLAN:  Check pulse oximetry on ambulation  Likely home today  Questions answered to her satisfaction    Adamaris Zamora MD  10:52 AM  12/30/2022

## 2022-12-30 NOTE — PLAN OF CARE
Problem: Pain  Goal: Verbalizes/displays adequate comfort level or baseline comfort level  12/30/2022 0030 by Lilliam Ferraor RN  Outcome: Progressing  12/29/2022 1153 by Luis Bella RN  Outcome: Progressing     Problem: Safety - Adult  Goal: Free from fall injury  12/30/2022 0030 by Lilliam Ferraro RN  Outcome: Progressing  Flowsheets (Taken 12/29/2022 1154 by Luis Bella RN)  Free From Fall Injury: Instruct family/caregiver on patient safety  12/29/2022 1153 by Luis Bella RN  Outcome: Progressing     Problem: ABCDS Injury Assessment  Goal: Absence of physical injury  Outcome: Progressing

## 2022-12-30 NOTE — PROGRESS NOTES
Physician Progress Note      Camacho Mathur  CSN #:                  [id number]  :                       1935  ADMIT DATE:       2022 6:56 AM  DISCH DATE:  RESPONDING  PROVIDER #:        Clau Brownlee MD          QUERY TEXT:    Pt admitted with Acute on Chronic CHF. Pt noted to also have HTN, CMP, VHD,   CAD. If possible, please document in progress notes and discharge summary the   etiology of CHF, if able to be determined. The medical record reflects the following:  Risk Factors: hx HTN, CAD, VHD, CMP  Clinical Indicators: Per notes, admitted with Acute on Chronic CHF. Cardiology   notes \"Acute on chronic systolic CHF/ICMP: chart/labs/EKG reviewed. ECHO. Diurese. BB/ARB/IV Lasix. Retrial low dose Aldactone\", \"CAD/Elevated troponin:   ASA/BB/Statin\", \"HTN: Observe\", \"VHD: Mod AI/mod MR/mod TR by 3/31/2022 echo. ECHO\",  Treatment: Lasix, ECHO, continuation of home meds    Thank You,  Jerrod Tavera, RN, BSN, CCDS, Clinical Documentation Improvement  Options provided:  -- CHF due to Hypertensive Heart Disease  -- CHF due to Hypertensive Heart Disease and CAD  -- CHF not due to Hypertension but due to CAD  -- CHF due to Hypertensive Heart Disease and CMP  -- CHF not due to Hypertension but due to CMP  -- CHF due to Hypertensive Heart Disease and Valvular Heart Disease  -- CHF not due to Hypertension but due to valvular heart disease  -- CHF due to HTN, CAD, CMP and valvular disease  -- Other - I will add my own diagnosis  -- Disagree - Not applicable / Not valid  -- Disagree - Clinically unable to determine / Unknown  -- Refer to Clinical Documentation Reviewer    PROVIDER RESPONSE TEXT:    This patient has CHF due to hypertensive heart disease and CAD.     Query created by: Jose Archibald on 2022 6:41 AM      Electronically signed by:  Clau Brownlee MD 2022 7:22 AM

## 2023-01-03 ENCOUNTER — TELEPHONE (OUTPATIENT)
Dept: ADMINISTRATIVE | Age: 88
End: 2023-01-03

## 2023-01-05 NOTE — TELEPHONE ENCOUNTER
Patient notified of Dr. Lajune Lefort recommendation. Patient put on list to call when February 2023 schedule opens. The patient is a 45y year old Male complaining of foot pain/injury.

## 2023-01-12 ENCOUNTER — HOSPITAL ENCOUNTER (OUTPATIENT)
Dept: OTHER | Age: 88
Setting detail: THERAPIES SERIES
Discharge: HOME | End: 2023-01-12
Payer: MEDICARE

## 2023-01-12 VITALS
SYSTOLIC BLOOD PRESSURE: 114 MMHG | HEART RATE: 54 BPM | WEIGHT: 100.9 LBS | RESPIRATION RATE: 16 BRPM | HEIGHT: 59 IN | BODY MASS INDEX: 20.34 KG/M2 | DIASTOLIC BLOOD PRESSURE: 58 MMHG

## 2023-01-12 DIAGNOSIS — I50.20 HFREF (HEART FAILURE WITH REDUCED EJECTION FRACTION) (HCC): Primary | ICD-10-CM

## 2023-01-12 LAB
ANION GAP SERPL CALCULATED.3IONS-SCNC: 16 MMOL/L (ref 7–16)
BUN BLDV-MCNC: 29 MG/DL (ref 6–23)
CALCIUM SERPL-MCNC: 9.4 MG/DL (ref 8.6–10.2)
CHLORIDE BLD-SCNC: 107 MMOL/L (ref 98–107)
CO2: 22 MMOL/L (ref 22–29)
CREAT SERPL-MCNC: 1.3 MG/DL (ref 0.5–1)
GFR SERPL CREATININE-BSD FRML MDRD: 40 ML/MIN/1.73
GLUCOSE BLD-MCNC: 93 MG/DL (ref 74–99)
POTASSIUM SERPL-SCNC: 4.5 MMOL/L (ref 3.5–5)
PRO-BNP: 6911 PG/ML (ref 0–450)
SODIUM BLD-SCNC: 145 MMOL/L (ref 132–146)

## 2023-01-12 PROCEDURE — 80048 BASIC METABOLIC PNL TOTAL CA: CPT

## 2023-01-12 PROCEDURE — 99215 OFFICE O/P EST HI 40 MIN: CPT | Performed by: NURSE PRACTITIONER

## 2023-01-12 PROCEDURE — 36415 COLL VENOUS BLD VENIPUNCTURE: CPT

## 2023-01-12 PROCEDURE — 83880 ASSAY OF NATRIURETIC PEPTIDE: CPT

## 2023-01-12 RX ORDER — METOPROLOL SUCCINATE 25 MG/1
25 TABLET, EXTENDED RELEASE ORAL NIGHTLY
Qty: 90 TABLET | Refills: 3 | Status: SHIPPED | OUTPATIENT
Start: 2023-01-12

## 2023-01-12 NOTE — PROGRESS NOTES
PHYSICIANS [facility]  [facility] Clinic Visit         Reason for Visit: Heart failure    Primary Cardiologist: Dr. Oretha Goldberg and [facility]        History of Present Illness:     Ms. Jimena Valdez is a 81year old female with a PMHx of CAD s/p PCI in 2009, VHD, pulmonary hypertension, chronic HFrEF, ischemic cardiomyopathy, s/p dual-chamber ICD 5/2010 with generator change 2018 (both [employer]), PAF on Amiodarone and Xarelto therapy, cRBBB, HTN, prior history of intermittent hypotension limiting GDMT, HLD, T2DM, dysphagia s/p esophageal dilation 2019, history of COVID-19 infection August 2022 requiring hospitalization, asthma, GERD, depression, osteoporosis, overactive bladder, hearing loss, history of hyponatremia. She presents today in follow up since discharge from the hospital she was taking her lasix incorrectly (20 mg daily instead of 10 mg daily). She became dizzy and lightheaded resulting 2 falls without LOC or injury to her head. She does have a hematoma to her LLE. When this occurred she self discontinued spironolactone however continued full dose of lasix and was only in taking approximately 12 ox of fluid daily (\"she was told to cut back on her fluid intake\"). She denies chest pain, pressure, heaviness, palpitations, abdominal bloating, early satiety or lower extremity edema. She is monitoring her diet for sodium content and does not have a robust appetite however eating small meals.      Patient Active Problem List    Diagnosis Date Noted    Acute on chronic congestive heart failure ([address] 12/28/2022     Priority: Medium    CHF (congestive heart failure), NYHA class I, unspecified failure chronicity, unspecified type ([address] 12/28/2022     Priority: Medium    Respiratory failure ([address] 08/18/2022     Priority: Medium    Pulmonary nodule 12/30/2016     Priority: Medium    Atrial fibrillation ([address] 06/07/2020    S/P ICD (internal cardiac defibrillator) procedure 02/13/2018    Influenza with respiratory manifestation other than pneumonia 12/29/2017    Pneumonia due to organism 12/29/2017    Osteoporosis 06/06/2017     Diagnosis added to problem list by Jory Savage based on transcribed order from Dr. Alyssa Landon. Acute on chronic /diastolic/ congestive heart failure ([facility] 05/07/2014    DM II (diabetes mellitus, type II), controlled ([facility] 04/18/2014    COPD exacerbation ([facility] 04/17/2014    CAD (coronary artery disease) 07/10/2013    H/O acute myocardial infarction of anterolateral wall 07/10/2013    History of PTCA 07/10/2013    Ischemic cardiomyopathy 07/10/2013    Automatic implantable cardioverter-defibrillator in situ 07/10/2013     replace inactive diagnosis      Essential hypertension 07/10/2013     CARDIOVASCULAR HISTORY:    CAD s/p PCI  10/30/2009: Acute ST elevation anterolateral wall myocardial infarction. 10/30/2009: Cardiac catheterization/primary angioplasty: Left main short vessel with no significant disease. LAD occluded proximally. LCX: 90% stenosis of the 3rd obtuse marginal branch. RCA, a small nondominant vessel, which was non-selectively visualized. Successful balloon angioplasty and stenting to the proximal LAD with restoration of DORI 3 flow in the vessel.   06/18/2014 Jennifer Deem nuclear stress test was a markedly abnormal study showing a transmural myocardial infarction involving a large wrap around left anterior descending artery distribution with no evidence of residual stress-induced ischemia with the gated views showing akinesis of the left ventricular apex, the apical anterior wall and the apical septum with severe hypokinesis of the inferior wall and moderate hypokinesis of the rest of the interventricular septum with a calculated ejection fraction of 34 %. VHD.   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 Normal right ventricular size and function, with a pacemaker noted in the right ventricle. Pacemaker also noted in the right atrium. Mild to moderate mitral regurgitation. Mild to moderate tricuspid regurgitation. Mild aortic sclerosis with trace aortic regurgitation. Aortic root sclerosis/calcification. Small pericardial effusion. 12/2016 TTE: Mildly dilated LV. Large apical aneurysm with dyskinetic apex. Severe hypokinesis of the apical septum and lateral wall. LVEF 20 to 25%. Moderate to severely dilated LA. Mild MR. Mild AI. Mild TR.  RVSP 25 to 30 mmHg. 6/2020 ALMA: LVEF 25 to 30%. Evidence of spontaneous echo contrast in the left atrium, no evidence of thrombus within the left atrium or appendage. Small mobile echodensity likely fibrin stranding noted on RA lead. Mild to moderate MR. Mild AI. Moderate to severe TR.  3/2022 TTE Dr. Jacquie Gonzalez: Dilated LV, EF 25 to 30%. Apical akinesis. Normal RV size and function. Indeterminate diastolic function. Severely dilated LA. Moderate MR. Moderate AI. Moderate TR. Pulmonary hypertension, PASP 42 mmHg. GDMT limited by electrolyte abnormalities including hyponatremia and intermittent hypotension. Aldactone and Entresto discontinued in the past for this reason  S/p Insertion of dual chamber [employer] pacer ICD on 5/10/2010. S/p [employer] generator change in 2/2018  Last interrogation 11/2022: Normal device function. Atrial lead noise noted. No new clinically significant arrhythmia noted. PAF, on [address] [place] [address] with Xarelto   S/p successful ALMA/CVN on 6/9/2020 (started on Amiodarone at that time)  Remains on Amiodarone therapy  Cost issues with Eliquis  cRBBB  HTN  Prior history of intermittent hypotension limiting GDMT   HLD, on statin therapy  Non-insulin requiring T2DM   ? CKD.   Baseline Cr 0.8 to 1.1  Dysphagia -- s/p esophageal dilatation in 11/2019 per patient  COVID-19 infection August 2022 requiring hospitalization for acute hypoxic respiratory failure-- now with residual dyspnea on exertion        PAST MEDICAL HISTORY:   As under cardiovascular history  Asthma  GERD  Depression  Osteoporosis  Overactive bladder  S/p Hysterectomy, 1972  S/p Tonsillectomy  S/p Appendectomy  S/p Right elbow fracture repair  UTI in 06/11 treated with oral antibiotics  Status post right and left capsulectomy and removal of extravasated implant material on the right on 03/07/12, status post bilateral breast implants in the remote past  Bilateral hearing loss: Wears bilateral hearing aids  Right wrist fracture s/p fall, 11/29/2015  History of hyponatremia      Past Medical History:   Diagnosis Date    Arthritis     Atrial fibrillation (HCC)     CAD (coronary artery disease)     Cardiomyopathy ([address]     CHF (congestive heart failure) ([address] 2010    history of    Chronic anticoagulation     Chronic bronchitis (HCC)     none recent    COPD (chronic obstructive pulmonary disease) ([address]     COVID 08/2022    in hospital x5 days    Depression     GERD (gastroesophageal reflux disease)     HFrEF (heart failure with reduced ejection fraction) ([facility] 12/29/2016 12/26/16- LVEF 20-25%, large apical aneurysm, LA moderate-severely dilated, mildly enlarged RA, mild MR, mild TR, mild AR    Hyperlipidemia     Hypertension     Left ventricular dysfunction     Low vitamin D level     MI (myocardial infarction) ([address] 10/30/2009    Osteopenia     Osteoporosis     Swallowing difficulty            Past Surgical History:   Procedure Laterality Date    APPENDECTOMY      BREAST CAPSULECTOMY  03/07/2012    BILATERAL BREAST CAPSULECTOMIES    BREAST SURGERY  1962     cosmetic implants    CARDIAC DEFIBRILLATOR PLACEMENT      [facility]    CARDIAC DEFIBRILLATOR PLACEMENT  05/2010    CARDIAC DEFIBRILLATOR PLACEMENT Left 02/13/2018    [employer] ICD change out,     CARDIAC PACEMAKER PLACEMENT  05/10/2010    Dual chamber pacer ICD.    CARDIAC SURGERY  2009    stent    CARDIOVASCULAR STRESS TEST  03/04/2010    COLONOSCOPY  05/26/2021    THE [facility], repeat as needed    COSMETIC SURGERY      eyelids breast    DIAGNOSTIC CARDIAC CATH LAB PROCEDURE  10/30/2009    ESOPHAGEAL MOTILITY STUDY N/A 01/02/2020    ESOPHAGEAL MOTILITY/MANOMETRY STUDY performed by Brad Kothari DO at [facility] ([address])      IR US THYROID BIOPSY      OTHER SURGICAL HISTORY Right 12/17/2015    ORIF RIGHT DISTAL RADIUS    OTHER SURGICAL HISTORY Right 04/10/2016    IP joint release of thumb    TONSILLECTOMY      TRANSTHORACIC ECHOCARDIOGRAM  3/30/11ize and function,     UPPER GASTROINTESTINAL ENDOSCOPY N/A 02/13/2020    EGD SUBMUCOSAL/BOTOX INJECTION performed by Yossi Cap DO at [address] N/A 03/25/2021    EGD SUBMUCOSAL/BOTOX INJECTION performed by Yossi Cap, DO at [address]  10/16/2019         No Known Allergies        Current Outpatient Medications:     spironolactone (ALDACTONE) 25 MG tablet, Take 0.5 tablets by mouth daily, Disp: 30 tablet, Rfl: 3    BUDESONIDE IN, Inhale into the lungs, Disp: , Rfl:     albuterol (PROVENTIL) (2.5 MG/3ML) 0.083% nebulizer solution, Take 3 mLs by nebulization every 4 hours as needed for Wheezing or Shortness of Breath, Disp: 120 each, Rfl: 2    amiodarone (CORDARONE) 200 MG tablet, Take 0.5 tablets by mouth daily ##### Per Dr. Dominique Patches:  Take 100 mg (half tablet) daily #####, Disp: 90 tablet, Rfl: 0    ASPIRIN LOW DOSE 81 MG EC tablet, Take 1 tablet by mouth daily, Disp: 90 tablet, Rfl: 0    vitamin D (ERGOCALCIFEROL) 1.25 MG (12699 UT) CAPS capsule, Take 1 capsule by mouth once a week, Disp: 12 capsule, Rfl: 0    losartan (COZAAR) 25 MG tablet, Take 1 tablet by mouth daily, Disp: 90 tablet, Rfl: 0    metoprolol succinate (TOPROL XL) 25 MG extended release tablet, TAKE 1 TABLET BY MOUTH EVERY DAY, Disp: 90 tablet, Rfl: 0    omeprazole (PRILOSEC) 40 MG delayed release capsule, TAKE 1 CAPSULE BY MOUTH EVERY DAY, Disp: 90 capsule, Rfl: 0    simvastatin (ZOCOR) 20 MG tablet, Take 1 tablet by mouth daily, Disp: 90 tablet, Rfl: 0    rivaroxaban (XARELTO) 15 MG TABS tablet, Take 15 mg by mouth daily (with breakfast), Disp: , Rfl:     furosemide (LASIX) 20 MG tablet, Take 0.5 tablets by mouth daily, Disp: 90 tablet, Rfl: 0    mometasone (NASONEX) 50 MCG/ACT nasal spray, 2 sprays by Each Nostril route daily, Disp: 1 each, Rfl: 3    diphenoxylate-atropine (LOMOTIL) 2.5-0.025 MG per tablet, Take 1 tablet by mouth 4 times daily as needed for Diarrhea., Disp: , Rfl:     Methylfol-Algae-U79-Ubdpquvdfd (CEREFOLIN NAC) 6-90.314-2-600 MG TABS, Take 1 tablet by mouth daily, Disp: , Rfl:     acetaminophen (TYLENOL) 500 MG tablet, Take 2 tablets by mouth every 8 hours as needed for Pain, Disp: 120 tablet, Rfl: 3    Respiratory Therapy Supplies (FULL KIT NEBULIZER SET) MISC, Use as directed with nebulized medication., Disp: 1 each, Rfl: 0    Multiple Vitamins-Minerals (OCUVITE PO), Take 1 tablet by mouth daily, Disp: , Rfl:     Rrunsjrrq-Skoxjlqgj-Bewakzlqxl (CEREFOLIN NAC PO), Take by mouth (Patient not taking: Reported on 11/8/2022), Disp: , Rfl:     Coenzyme Q10 (CO Q-10) 100 MG CAPS, Take 100 mg by mouth daily, Disp: , Rfl:          Guideline directed medical/device therapy:  ARNI/ACE I/ARB: Yes  Beta blocker:  Yes  Aldosterone antagonist:  Yes  SGLT2i: No  ICD/CRT-P/-D:  ICD  QRS interval on recent ECG (personally reviewed/interpreted): >150 ms  Percentage RV pacing (personally reviewed/interpreted): %/NA        Review of Systems:   Cardiac: As per HPI  General: No fever, chills, rigors  Pulmonary: As per HPI  HEENT: No visual disturbances, difficult swallowing  GI: No nausea, vomiting, abdominal pain  : No dysuria or hematuria  Endocrine: No thyroid disease or diabetes  Musculoskeletal: MACKEY x 4, no focal motor deficits  Skin:  Intact, no rashes  Neuro/Psych: No headache or seizures          Weights: Wt Readings from Last 3 Encounters:   01/12/23 100 lb 14.4 oz (45.8 kg)   12/28/22 101 lb (45.8 kg)   11/08/22 107 lb (48.5 kg)             Physical Examination:     BP (!) 114/58 Comment: took meds today. standing bp, little dizzy for 1 min when stands  Pulse 54   Resp 16   Ht 4' 11\" (1.499 m)   Wt 100 lb 14.4 oz (45.8 kg)   BMI 20.38 kg/m²     CONSTITUTIONAL: Alert and oriented times 3, no acute distress and cooperative to examination with proper mood and affect. SKIN: Skin color, texture, turgor normal. No rashes or lesions. LYMPH: no cervical nodes, no inguinal nodes  HEENT: Head is normocephalic, atraumatic. EOMI, PERRLA. NECK: Supple, symmetrical, trachea midline, no adenopathy, thyroid symmetric, not enlarged and no tenderness, skin normal.  CHEST/LUNGS: chest symmetric with normal A/P diameter, normal respiratory rate and rhythm, lungs clear to auscultation without wheezes, rales or rhonchi. No accessory muscle use. Scars None   CARDIOVASCULAR: Heart sounds are normal.  Regular rate and rhythm without murmur, gallop or rub. Normal S1 and S2. . Carotid and femoral pulses 2+/4 and equal bilaterally. ABDOMEN: Normal shape. No and Laparoscopic scar(s) present. Normal bowel sounds. No bruits. soft, nondistended, no masses or organomegaly. no evidence of hernia. Percussion: Normal without hepatosplenomegally. Tenderness: absent. RECTAL: deferred, not clinically indicated  NEUROLOGIC: There are no focalizing motor or sensory deficits. CN II-XII are grossly intact. Wojciech Ogren EXTREMITIES: no cyanosis, no clubbing, and no edema. Ecchymosis of LLE          All the following diagnostics were personally reviewed and interpreted by me.        LAB DATA:     12/28/22 07:44 12/29/22 05:04 12/30/22 06:07   Sodium 140 144 145   Potassium 4.2 3.0 (L) 3.9   Chloride 103 104 99   CO2 24 29 34 (H)   BUN,BUNPL 32 (H) 27 (H) 29 (H)   Creatinine 1.2 (H) 1.2 (H) 1.3 (H)   Anion Gap 13 11 12   Est, Glom Filt Rate 44 44 40   Glucose, Random 150 (H) 103 (H) 99   CALCIUM, SERUM, 403964 9.4 9.3 9.7   Total Protein 7.0 6.2 (L) 6.4   Pro-BNP 23,957 (H)     Troponin, High Sensitivity 48 (H)     Albumin 4.2 3.6 3.9   Alk Phos 64 51 53   ALT 20 15 14   AST 35 (H) 21 19   Bilirubin 0.6 0.4 0.4   WBC 8.7     RBC 3.08 (L)     Hemoglobin Quant 9.8 (L)     Hematocrit 30.5 (L)     MCV 99.0     MCH 31.8     MCHC 32.1     MPV 9.6     RDW 13.2     Platelet Count 174         IMAGING:    CXR (12/28/2022)  FINDINGS:  The heart is enlarged. There is a dual lead cardiac pacer on the left  Hazy bilateral airspace disease is noted. These findings are favored to  represent CHF. Viral pneumonia cannot be completely excluded in the proper  clinical setting. There is no gross right or left pleural effusion. Impression  1. Cardiomegaly with hazy bilateral airspace disease likely representative of  CHF. Viral pneumonia cannot be excluded in the proper clinical setting. CARDIAC TESTING:    Echocardiogram: 12/26/16 (Annetta Castillo)   Mildly dilated left ventricle. Large apical aneurysm with dyskinetic apex. Severe hypokinesis of the apical septum and lateral wall. Overall LVEF is visually estimated at 20-25%   The left atrium is moderate-severely dilated. Structurally normal mitral valve. Increased E point septal separation noted,suggesting decreased LV cardiac output   Mild mitral regurgitation is present. The aortic valve is trileaflet. The aortic valve appears mildly sclerotic. Mild aortic regurgitation is noted. Normal tricuspid valve structure and function. Mild tricuspid regurgitation. RVSP is 25-30 mmHg. There is a trivial localized near right ventricle pericardial effusion noted.      ALMA: 6/9/2020 (Dr. Carmel Freire)   Gastric images not performed due to history of achalasia and recent   botulinum toxin injection - case discussed with Dr. Teresa Sotelo before   procedure and felt no contraindication to ALMA with upper esophageal images only. Ejection fraction is visually estimated at 25-30%. There was evidence of spontaneous echo contrast in the left atrium. No evidence of thrombus within left atrium or appendage. Small mobile echodensity likely fibrin stranding noted on RA lead. Mild-moderate mitral regurgitation directed centrally. Mild aortic valve regurgitation. Moderate to severe tricuspid regurgitation. Echocardiogram: 3/31/22 (Dr. Chanelle Chan)   Dilated left ventricle. Severely reduced left ventricular systolic function. Ejection fraction is visually estimated at 25-30%. Apical akinesis. Normal right ventricular size and function. Indeterminate diastolic function. Severely dilated left atrium by volume index. Moderate mitral regurgitation. Moderate aortic regurgitation. Moderate tricuspid regurgitation. PASP is estimated at 42 mmHg.         ASSESSMENT:  Chronic HFrEF  ACCs tage C / NYHA class II  Euvolemic   Ischemic cardiomyopathy  LVEF 25%, LVEDD 5.7, LVMI 186  History of intermittent hypotension limiting GDMT  History of hyponatremia  CAD s/p PCI in 2009  S/p dual-chamber ICD 5/2010 with generator change 2018 (both [facility])  VHD  Pulmonary hypertension  PAF on Amiodarone and Xarelto therapy  cRBBB  HTN  HLD  T2DM  Dysphagia s/p esophageal dilation 2019  History of COVID-19 infection August 2022 requiring hospitalization  Asthma  GERD  Depression  Osteoporosis  Overactive bladder  Hearing loss      PLAN:  Decrease Lasix to 10 mg daily - take this in the morning    Restart spironolactone 12.5 mg in afternoon/lunch    Continue to take losartan in the morning    Start taking metoprolol succinate at night    Increase fluid intake (was only drinking 12 oz daily) increase to 24 oz daily     Get blood work today     Follow up with CHF clinic in 1 week     Follow up with Dr. Chanelle Chan 1 month     Weigh yourself daily    -Stay Hydrated, 64 oz     -Diet should sodium restricted to 2 grams    -Again watch your daily weight trends and if you gain water weight please follow below instructions.    -If you gain 3-5 pounds in 2-3 days OR notice that you are retaining fluid in anyway just like you did before then take an extra dose of your water pill (furosemide/Lasix) every day until you lose the weight or feel better.     -If you notice that you have taken more than 2 extra doses in 1 week then please call and let us know. -If at any time you feel that you are retaining fluid, your medications are not working, or you feel ill in anyway, then please call us for either same day appointment or the next day, and for instructions. Our goal is to keep you out of the emergency room and the hospital and we have ways to do it. You just need to call us in a timely manner.     -If you become sick for other reasons, and notice that you are not urinating as much, the urine is very dark, you have significant diarrhea or vomiting, then please DO NOT take your water pill and CALL US immediately.          Mary Farmer APRN-CNP  [facility]

## 2023-01-12 NOTE — DISCHARGE INSTRUCTIONS
Decrease Lasix to 10 mg daily - take this in the morning  Restart spironolactone 12.5 mg in afternoon/lunch  Continue to take losartan in the morning  Start taking metoprolol succinate at night  Increase fluid intake (was only drinking 12 oz daily) increase to 24 oz daily   Get blood work today   Follow up with CHF clinic in 1 week   Follow up with Dr. Be Batista 1 month   Weigh yourself daily    -Stay Hydrated, 64 oz     -Diet should sodium restricted to 2 grams    -Again watch your daily weight trends and if you gain water weight please follow below instructions.    -If you gain 3-5 pounds in 2-3 days OR notice that you are retaining fluid in anyway just like you did before then take an extra dose of your water pill (furosemide/Lasix) every day until you lose the weight or feel better.     -If you notice that you have taken more than 2 extra doses in 1 week then please call and let us know. -If at any time you feel that you are retaining fluid, your medications are not working, or you feel ill in anyway, then please call us for either same day appointment or the next day, and for instructions. Our goal is to keep you out of the emergency room and the hospital and we have ways to do it. You just need to call us in a timely manner.     -If you become sick for other reasons, and notice that you are not urinating as much, the urine is very dark, you have significant diarrhea or vomiting, then please DO NOT take your water pill and CALL US immediately. Preventing Falls: Care Instructions  Overview     Getting around your home safely can be a challenge if you have injuries or health problems that make it easy for you to fall. Loose rugs and furniture in walkways are among the dangers for many older people who have problems walking or who have poor eyesight. People who have conditions such as arthritis, osteoporosis, or dementia also have to be careful not to fall.   You can make your home safer with a few simple measures. Follow-up care is a key part of your treatment and safety. Be sure to make and go to all appointments, and call your doctor if you are having problems. It's also a good idea to know your test results and keep a list of the medicines you take. How can you care for yourself at home? Taking care of yourself  Exercise regularly to improve your strength, muscle tone, and balance. Walk if you can. Swimming may be a good choice if you cannot walk easily. Have your vision and hearing checked each year or any time you notice a change. If you have trouble seeing and hearing, you might not be able to avoid objects and could lose your balance. Know the side effects of the medicines you take. Ask your doctor or pharmacist whether the medicines you take can affect your balance. Sleeping pills or sedatives can affect your balance. Limit the amount of alcohol you drink. Alcohol can impair your balance and other senses. Ask your doctor whether calluses or corns on your feet need to be removed. If you wear loose-fitting shoes because of calluses or corns, you can lose your balance and fall. Talk to your doctor if you have numbness in your feet. You may get dizzy if you do not drink enough water. To prevent dehydration, drink plenty of fluids. Choose water and other clear liquids. If you have kidney, heart, or liver disease and have to limit fluids, talk with your doctor before you increase the amount of fluids you drink. Preventing falls at home  Remove raised doorway thresholds, throw rugs, and clutter. Repair loose carpet or raised areas in the floor. Move furniture and electrical cords to keep them out of walking paths. Use nonskid floor wax, and wipe up spills right away, especially on ceramic tile floors. If you use a walker or cane, put rubber tips on it. If you use crutches, clean the bottoms of them regularly with an abrasive pad, such as steel wool.   Keep your house well lit, especially stairways, porches, and outside walkways. Use night-lights in areas such as hallways and bathrooms. Add extra light switches or use remote switches (such as switches that go on or off when you clap your hands) to make it easier to turn lights on if you have to get up during the night. Install sturdy handrails on stairways. Move items in your cabinets so that the things you use a lot are on the lower shelves (about waist level). Keep a cordless phone and a flashlight with new batteries by your bed. If possible, put a phone in each of the main rooms of your house, or carry a cell phone in case you fall and cannot reach a phone. Or, you can wear a device around your neck or wrist. You push a button that sends a signal for help. Wear low-heeled shoes that fit well and give your feet good support. Use footwear with nonskid soles. Check the heels and soles of your shoes for wear. Repair or replace worn heels or soles. Do not wear socks without shoes on smooth floors, such as wood. Walk on the grass when the sidewalks are slippery. If you live in an area that gets snow and ice in the winter, sprinkle salt on slippery steps and sidewalks. Or ask a family member or friend to do this for you. Preventing falls in the bath  Install grab bars and nonskid mats inside and outside your shower or tub and near the toilet and sinks. Use shower chairs and bath benches. Use a hand-held shower head that will allow you to sit while showering. Get into a tub or shower by putting the weaker leg in first. Get out of a tub or shower with your strong side first.  Repair loose toilet seats and consider installing a raised toilet seat to make getting on and off the toilet easier. Keep your bathroom door unlocked while you are in the shower. Where can you learn more? Go to http://www.degroot.com/ and enter G117 to learn more about \"Preventing Falls: Care Instructions. \"  Current as of:  May 4, 2022               Content Version: 13.5  © 9834-3683 [employer]. Care instructions adapted under license by [address] If you have questions about a medical condition or this instruction, always ask your healthcare professional. [address] any warranty or liability for your use of this information.

## 2023-01-18 ENCOUNTER — OFFICE VISIT (OUTPATIENT)
Dept: PRIMARY CARE CLINIC | Age: 88
End: 2023-01-18

## 2023-01-18 VITALS
DIASTOLIC BLOOD PRESSURE: 60 MMHG | WEIGHT: 100 LBS | SYSTOLIC BLOOD PRESSURE: 104 MMHG | OXYGEN SATURATION: 96 % | HEART RATE: 56 BPM | HEIGHT: 59 IN | BODY MASS INDEX: 20.16 KG/M2 | TEMPERATURE: 97.5 F

## 2023-01-18 DIAGNOSIS — N18.31 STAGE 3A CHRONIC KIDNEY DISEASE (HCC): ICD-10-CM

## 2023-01-18 DIAGNOSIS — I25.10 CORONARY ARTERY DISEASE INVOLVING NATIVE CORONARY ARTERY OF NATIVE HEART WITHOUT ANGINA PECTORIS: ICD-10-CM

## 2023-01-18 DIAGNOSIS — R42 LIGHTHEADEDNESS: Primary | ICD-10-CM

## 2023-01-18 DIAGNOSIS — Z79.4 CONTROLLED TYPE 2 DIABETES MELLITUS WITHOUT COMPLICATION, WITH LONG-TERM CURRENT USE OF INSULIN (HCC): ICD-10-CM

## 2023-01-18 DIAGNOSIS — Z98.61 HISTORY OF PTCA: ICD-10-CM
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
## 2023-01-18 DIAGNOSIS — J44.1 COPD EXACERBATION (HCC): ICD-10-CM

## 2023-01-18 DIAGNOSIS — I50.31 ACUTE DIASTOLIC CONGESTIVE HEART FAILURE (HCC): ICD-10-CM

## 2023-01-18 DIAGNOSIS — I25.5 ISCHEMIC CARDIOMYOPATHY: ICD-10-CM

## 2023-01-18 DIAGNOSIS — I48.91 ATRIAL FIBRILLATION, UNSPECIFIED TYPE (HCC): ICD-10-CM

## 2023-01-18 DIAGNOSIS — Z95.810 AUTOMATIC IMPLANTABLE CARDIOVERTER-DEFIBRILLATOR IN SITU: ICD-10-CM

## 2023-01-18 DIAGNOSIS — E11.9 CONTROLLED TYPE 2 DIABETES MELLITUS WITHOUT COMPLICATION, WITH LONG-TERM CURRENT USE OF INSULIN (HCC): ICD-10-CM

## 2023-01-18 ASSESSMENT — PATIENT HEALTH QUESTIONNAIRE - PHQ9
SUM OF ALL RESPONSES TO PHQ9 QUESTIONS 1 & 2: 0
SUM OF ALL RESPONSES TO PHQ QUESTIONS 1-9: 0
2. FEELING DOWN, DEPRESSED OR HOPELESS: 0
SUM OF ALL RESPONSES TO PHQ QUESTIONS 1-9: 0
1. LITTLE INTEREST OR PLEASURE IN DOING THINGS: 0

## 2023-01-18 NOTE — PROGRESS NOTES
Sammie Dawson presents today for follow up of HTN, High chol, ASHD, Cardiomyopathy, COPD    Current Outpatient Medications   Medication Sig Dispense Refill    losartan (COZAAR) 25 MG tablet Take 1 tablet by mouth daily 90 tablet 0    mometasone (NASONEX) 50 MCG/ACT nasal spray 2 sprays by Each Nostril route daily 1 each 3    omeprazole (PRILOSEC) 40 MG delayed release capsule TAKE 1 CAPSULE BY MOUTH EVERY DAY 90 capsule 0    simvastatin (ZOCOR) 20 MG tablet Take 1 tablet by mouth daily 90 tablet 0    vitamin D (ERGOCALCIFEROL) 1.25 MG (16525 UT) CAPS capsule Take 1 capsule by mouth once a week 12 capsule 0    Coenzyme Q10 (CO Q-10) 100 MG CAPS Take 1 capsule by mouth daily 90 capsule 0    Methylfol-Algae-M34-Sshtpuwnic (CEREFOLIN NAC) 6-90.314-2-600 MG TABS Take 1 tablet by mouth daily 90 tablet 0    rivaroxaban (XARELTO) 15 MG TABS tablet Take 1 tablet by mouth daily (with breakfast) 90 tablet 0    budesonide (PULMICORT) 0.25 MG/2ML nebulizer suspension Take 2 mLs by nebulization in the morning and 2 mLs in the evening. 180 each 0    metoprolol succinate (TOPROL XL) 25 MG extended release tablet Take 1 tablet by mouth nightly TAKE 1 TABLET BY MOUTH EVERY DAY 90 tablet 3    spironolactone (ALDACTONE) 25 MG tablet Take 0.5 tablets by mouth daily 30 tablet 3    amiodarone (CORDARONE) 200 MG tablet Take 0.5 tablets by mouth daily ##### Per Dr. Glover Flatten: Take 100 mg (half tablet) daily ##### 90 tablet 0    ASPIRIN LOW DOSE 81 MG EC tablet Take 1 tablet by mouth daily 90 tablet 0    furosemide (LASIX) 20 MG tablet Take 0.5 tablets by mouth daily 90 tablet 0    acetaminophen (TYLENOL) 500 MG tablet Take 2 tablets by mouth every 8 hours as needed for Pain 120 tablet 3    Respiratory Therapy Supplies (FULL KIT NEBULIZER SET) MISC Use as directed with nebulized medication.  1 each 0    albuterol (PROVENTIL) (2.5 MG/3ML) 0.083% nebulizer solution Take 3 mLs by nebulization every 4 hours as needed for Wheezing or Shortness of Breath (Patient not taking: Reported on 1/18/2023) 120 each 2    diphenoxylate-atropine (LOMOTIL) 2.5-0.025 MG per tablet Take 1 tablet by mouth 4 times daily as needed for Diarrhea. (Patient not taking: Reported on 1/18/2023)      Multiple Vitamins-Minerals (OCUVITE PO) Take 1 tablet by mouth daily (Patient not taking: Reported on 1/18/2023)      Szpuvfmpi-Yyxcxdirj-Vqselxgxmn (CEREFOLIN NAC PO) Take by mouth (Patient not taking: No sig reported)       No current facility-administered medications for this visit. Past Medical History:   Diagnosis Date    Arthritis     Atrial fibrillation ([address]     CAD (coronary artery disease)     Cardiomyopathy ([address]     CHF (congestive heart failure) ([facility] 2010    history of    Chronic anticoagulation     Chronic bronchitis (HCC)     none recent    COPD (chronic obstructive pulmonary disease) ([facility]     COVID 08/2022    in hospital x5 days    Depression     GERD (gastroesophageal reflux disease)     HFrEF (heart failure with reduced ejection fraction) ([facility] 12/29/2016 12/26/16- LVEF 20-25%, large apical aneurysm, LA moderate-severely dilated, mildly enlarged RA, mild MR, mild TR, mild AR    Hyperlipidemia     Hypertension     Left ventricular dysfunction     Low vitamin D level     MI (myocardial infarction) ([address] 10/30/2009    Osteopenia     Osteoporosis     Swallowing difficulty           Subjective:  AS above. Was hospitalized back in November due to sob. Was started on Spironolactone, half a pill every day. Says by mistake did not realize she was supposed to be cutting the Lasix in half and she wasn't. This made her very dizzy, but still feels dizzy, even after she cut them both in half, is falling a lot since on new med. Injured rt leg, has swelling and big black and blue eda. Has appt at the CHF clinic coming up on Friday. Fall  Pertinent negatives include no abdominal pain, headaches, nausea or vomiting.     Review of Systems   Constitutional:  Negative for activity change, appetite change and chills. HENT:  Negative for congestion, ear pain, mouth sores, postnasal drip, rhinorrhea, sinus pressure, sneezing, sore throat and trouble swallowing. Eyes:  Negative for visual disturbance. Respiratory:  Positive for shortness of breath. Cardiovascular:  Negative for chest pain, palpitations and leg swelling. Gastrointestinal:  Negative for abdominal distention, abdominal pain, blood in stool, constipation, diarrhea, nausea and vomiting. Endocrine: Negative for cold intolerance, heat intolerance, polydipsia and polyuria. Genitourinary:  Negative for difficulty urinating, dysuria, flank pain, frequency and urgency. Musculoskeletal:  Negative for arthralgias, back pain, gait problem, neck pain and neck stiffness. Skin: Negative. Negative for color change. Allergic/Immunologic: Negative. Neurological:  Positive for weakness and light-headedness. Negative for dizziness, tremors, speech difficulty and headaches. Hematological: Negative. Psychiatric/Behavioral: Negative. Objective:  /60 (Site: Left Upper Arm, Position: Sitting, Cuff Size: Medium Adult)   Pulse 56   Temp 97.5 °F (36.4 °C)   Ht 4' 11\" (1.499 m)   Wt 100 lb (45.4 kg)   SpO2 96%   BMI 20.20 kg/m²      Physical Exam  Vitals reviewed. Constitutional:       Comments: Ambulates without devices but moves slowly   HENT:      Head: Normocephalic. Right Ear: Tympanic membrane and external ear normal. There is no impacted cerumen. Left Ear: Tympanic membrane and external ear normal. There is no impacted cerumen. Nose: Nose normal.      Mouth/Throat:      Pharynx: Oropharynx is clear. No oropharyngeal exudate. Eyes:      Extraocular Movements: Extraocular movements intact. Conjunctiva/sclera: Conjunctivae normal.      Pupils: Pupils are equal, round, and reactive to light. Cardiovascular:      Rate and Rhythm: Normal rate and regular rhythm.       Heart sounds: No murmur heard. No friction rub. No gallop. Comments: RRR, no gallops  Pulmonary:      Effort: Pulmonary effort is normal.      Breath sounds: Normal breath sounds. Abdominal:      General: Bowel sounds are normal. There is no distension. Palpations: Abdomen is soft. There is no mass. Tenderness: There is no abdominal tenderness. There is no guarding or rebound. Musculoskeletal:         General: No swelling, tenderness or deformity. Normal range of motion. Cervical back: Normal range of motion and neck supple. No tenderness. Comments: Rt leg large anterior echymosis and swelling. Lymphadenopathy:      Cervical: No cervical adenopathy. Skin:     General: Skin is warm. Coloration: Skin is not pale. Findings: No rash. Neurological:      General: No focal deficit present. Mental Status: She is alert and oriented to person, place, and time. Assessment:  Allegra Montemayor was seen today for follow-up from hospital and fall. Diagnoses and all orders for this visit:    Lightheadedness  Comments:  Hypotensive and having falls, stop Aldactone until she is rechecked at the CHF Clinic on Friday    Acute on chronic /diastolic/ congestive heart failure ([facility]  Comments:  Recent admission due to exacerbation, added Aldactone, causing weakness and dizziness  Orders:  -     simvastatin (ZOCOR) 20 MG tablet; Take 1 tablet by mouth daily    Ischemic cardiomyopathy  -     simvastatin (ZOCOR) 20 MG tablet; Take 1 tablet by mouth daily    Coronary artery disease involving native coronary artery of native heart without angina pectoris  Comments:  No Angina  Orders:  -     simvastatin (ZOCOR) 20 MG tablet; Take 1 tablet by mouth daily    History of PTCA  -     simvastatin (ZOCOR) 20 MG tablet; Take 1 tablet by mouth daily    Automatic implantable cardioverter-defibrillator in situ  -     simvastatin (ZOCOR) 20 MG tablet;  Take 1 tablet by mouth daily    Stage 3a chronic kidney disease ([facility]    COPD exacerbation (HCC)    Atrial fibrillation, unspecified type ([facility]    Controlled type 2 diabetes mellitus without complication, with long-term current use of insulin ([facility]    Fall, initial encounter  Comments:  Due to generalized weakness and  a result of extra water pill, contussion to rt leg. Stop Aldactone    Other orders  -     losartan (COZAAR) 25 MG tablet; Take 1 tablet by mouth daily  -     mometasone (NASONEX) 50 MCG/ACT nasal spray; 2 sprays by Each Nostril route daily  -     omeprazole (PRILOSEC) 40 MG delayed release capsule; TAKE 1 CAPSULE BY MOUTH EVERY DAY  -     vitamin D (ERGOCALCIFEROL) 1.25 MG (93042 UT) CAPS capsule; Take 1 capsule by mouth once a week  -     Coenzyme Q10 (CO Q-10) 100 MG CAPS; Take 1 capsule by mouth daily  -     Methylfol-Algae-V99-Whqkxkkwhj (CEREFOLIN NAC) 6-90.314-2-600 MG TABS; Take 1 tablet by mouth daily  -     rivaroxaban (XARELTO) 15 MG TABS tablet; Take 1 tablet by mouth daily (with breakfast)  -     budesonide (PULMICORT) 0.25 MG/2ML nebulizer suspension; Take 2 mLs by nebulization in the morning and 2 mLs in the evening.

## 2023-01-20 ENCOUNTER — HOSPITAL ENCOUNTER (OUTPATIENT)
Dept: OTHER | Age: 88
Setting detail: THERAPIES SERIES
Discharge: HOME | End: 2023-01-20
Payer: MEDICARE

## 2023-01-20 VITALS
OXYGEN SATURATION: 100 % | BODY MASS INDEX: 20 KG/M2 | DIASTOLIC BLOOD PRESSURE: 60 MMHG | HEART RATE: 55 BPM | WEIGHT: 99 LBS | RESPIRATION RATE: 18 BRPM | SYSTOLIC BLOOD PRESSURE: 130 MMHG

## 2023-01-20 PROBLEM — N18.30 CHRONIC RENAL DISEASE, STAGE III (HCC): Status: ACTIVE | Noted: 2023-01-20

## 2023-01-20 LAB
ANION GAP SERPL CALCULATED.3IONS-SCNC: 10 MMOL/L (ref 7–16)
BUN BLDV-MCNC: 28 MG/DL (ref 6–23)
CALCIUM SERPL-MCNC: 10.2 MG/DL (ref 8.6–10.2)
CHLORIDE BLD-SCNC: 99 MMOL/L (ref 98–107)
CO2: 28 MMOL/L (ref 22–29)
CREAT SERPL-MCNC: 1.3 MG/DL (ref 0.5–1)
GFR SERPL CREATININE-BSD FRML MDRD: 40 ML/MIN/1.73
GLUCOSE BLD-MCNC: 94 MG/DL (ref 74–99)
POTASSIUM SERPL-SCNC: 4.6 MMOL/L (ref 3.5–5)
PRO-BNP: 4231 PG/ML (ref 0–450)
SODIUM BLD-SCNC: 137 MMOL/L (ref 132–146)

## 2023-01-20 PROCEDURE — 83880 ASSAY OF NATRIURETIC PEPTIDE: CPT

## 2023-01-20 PROCEDURE — 80048 BASIC METABOLIC PNL TOTAL CA: CPT

## 2023-01-20 PROCEDURE — 36415 COLL VENOUS BLD VENIPUNCTURE: CPT

## 2023-01-20 PROCEDURE — 99204 OFFICE O/P NEW MOD 45 MIN: CPT

## 2023-01-20 RX ORDER — BUDESONIDE 0.25 MG/2ML
1 INHALANT ORAL 2 TIMES DAILY
Qty: 180 EACH | Refills: 0 | Status: SHIPPED | OUTPATIENT
Start: 2023-01-20

## 2023-01-20 RX ORDER — ACETAMINOPHEN 500 MG
500 TABLET ORAL EVERY 8 HOURS PRN
COMMUNITY

## 2023-01-20 RX ORDER — L-MEFOL/A-CYST/MEB12/ALGAL OIL 6-600-2 MG
1 TABLET ORAL DAILY
Qty: 90 TABLET | Refills: 0 | Status: SHIPPED | OUTPATIENT
Start: 2023-01-20

## 2023-01-20 RX ORDER — ERGOCALCIFEROL 1.25 MG/1
50000 CAPSULE ORAL WEEKLY
Qty: 12 CAPSULE | Refills: 0 | Status: SHIPPED | OUTPATIENT
Start: 2023-01-20

## 2023-01-20 RX ORDER — DIMENHYDRINATE 50 MG
100 TABLET ORAL DAILY
Qty: 90 CAPSULE | Refills: 0 | Status: SHIPPED | OUTPATIENT
Start: 2023-01-20

## 2023-01-20 RX ORDER — OMEPRAZOLE 40 MG/1
CAPSULE, DELAYED RELEASE ORAL
Qty: 90 CAPSULE | Refills: 0 | Status: SHIPPED | OUTPATIENT
Start: 2023-01-20

## 2023-01-20 RX ORDER — MOMETASONE FUROATE 50 UG/1
2 SPRAY, METERED NASAL DAILY
Qty: 1 EACH | Refills: 3 | Status: SHIPPED | OUTPATIENT
Start: 2023-01-20

## 2023-01-20 RX ORDER — SIMVASTATIN 20 MG
20 TABLET ORAL DAILY
Qty: 90 TABLET | Refills: 0 | Status: SHIPPED | OUTPATIENT
Start: 2023-01-20

## 2023-01-20 RX ORDER — LOSARTAN POTASSIUM 25 MG/1
25 TABLET ORAL DAILY
Qty: 90 TABLET | Refills: 0 | Status: SHIPPED | OUTPATIENT
Start: 2023-01-20

## 2023-01-20 ASSESSMENT — ENCOUNTER SYMPTOMS
SINUS PRESSURE: 0
ALLERGIC/IMMUNOLOGIC NEGATIVE: 1
BACK PAIN: 0
VOMITING: 0
SHORTNESS OF BREATH: 1
RHINORRHEA: 0
SORE THROAT: 0
NAUSEA: 0
ABDOMINAL PAIN: 0
ABDOMINAL DISTENTION: 0
DIARRHEA: 0
CONSTIPATION: 0
BLOOD IN STOOL: 0
COLOR CHANGE: 0
TROUBLE SWALLOWING: 0

## 2023-01-20 NOTE — PLAN OF CARE
Problem: Chronic Conditions and Co-morbidities  Goal: Patient's chronic conditions and co-morbidity symptoms are monitored and maintained or improved  Flowsheets (Taken 1/20/2023 6138)  Care Plan - Patient's Chronic Conditions and Co-Morbidity Symptoms are Monitored and Maintained or Improved: Monitor and assess patient's chronic conditions and comorbid symptoms for stability, deterioration, or improvement

## 2023-01-20 NOTE — PROGRESS NOTES
Congestive Heart Failure [address] [facility]   1935          Referring Provider: [facility] Stiles  Primary Care Physician: Dr Dat Carrillo  Cardiologist: Dr Aly Schwartz  Nephrologist: N/A        History of Present Illness:     Melvin Carson is a 81 y.o. female with a history of HFrEF, most recent EF 25% on 12-29-22. Patient Story:    She does  have dyspnea with extreme exertion, shortness of breath, or decline in overall functional capacity. She does not have orthopnea, PND, nocturnal cough or hemoptysis. She does not have abdominal distention or bloating, early satiety, anorexia/change in appetite. She does has a good urinary response to  oral diuretic. She does not have  lower extremity edema. She admits to  lightheadedness,  denies dizziness. She denies palpitations, syncope or near syncope. She does not complain of chest pain, pressure, discomfort. No Known Allergies        Current Outpatient Medications on File Prior to Encounter   Medication Sig Dispense Refill    acetaminophen (TYLENOL) 500 MG tablet Take 500 mg by mouth every 8 hours as needed for Pain      losartan (COZAAR) 25 MG tablet Take 1 tablet by mouth daily 90 tablet 0    mometasone (NASONEX) 50 MCG/ACT nasal spray 2 sprays by Each Nostril route daily 1 each 3    omeprazole (PRILOSEC) 40 MG delayed release capsule TAKE 1 CAPSULE BY MOUTH EVERY DAY 90 capsule 0    simvastatin (ZOCOR) 20 MG tablet Take 1 tablet by mouth daily 90 tablet 0    vitamin D (ERGOCALCIFEROL) 1.25 MG (27151 UT) CAPS capsule Take 1 capsule by mouth once a week 12 capsule 0    Coenzyme Q10 (CO Q-10) 100 MG CAPS Take 1 capsule by mouth daily 90 capsule 0    Methylfol-Algae-J19-Fnerqwqeuv (CEREFOLIN NAC) 6-90.314-2-600 MG TABS Take 1 tablet by mouth daily 90 tablet 0    budesonide (PULMICORT) 0.25 MG/2ML nebulizer suspension Take 2 mLs by nebulization in the morning and 2 mLs in the evening.  180 each 0 metoprolol succinate (TOPROL XL) 25 MG extended release tablet Take 1 tablet by mouth nightly TAKE 1 TABLET BY MOUTH EVERY DAY 90 tablet 3    spironolactone (ALDACTONE) 25 MG tablet Take 0.5 tablets by mouth daily (Patient not taking: Reported on 1/20/2023) 30 tablet 3    albuterol (PROVENTIL) (2.5 MG/3ML) 0.083% nebulizer solution Take 3 mLs by nebulization every 4 hours as needed for Wheezing or Shortness of Breath (Patient not taking: Reported on 1/20/2023) 120 each 2    amiodarone (CORDARONE) 200 MG tablet Take 0.5 tablets by mouth daily ##### Per Dr. Lyndsey Ray: Take 100 mg (half tablet) daily ##### 90 tablet 0    ASPIRIN LOW DOSE 81 MG EC tablet Take 1 tablet by mouth daily 90 tablet 0    furosemide (LASIX) 20 MG tablet Take 0.5 tablets by mouth daily 90 tablet 0    diphenoxylate-atropine (LOMOTIL) 2.5-0.025 MG per tablet Take 1 tablet by mouth 4 times daily as needed for Diarrhea. (Patient not taking: Reported on 1/18/2023)      Respiratory Therapy Supplies (FULL KIT NEBULIZER SET) MISC Use as directed with nebulized medication. 1 each 0    Multiple Vitamins-Minerals (OCUVITE PO) Take 1 tablet by mouth daily (Patient not taking: Reported on 1/18/2023)      [DISCONTINUED] mupirocin (BACTROBAN NASAL) 2 % nasal ointment Take by Nasal route 2 times daily. (Patient not taking: No sig reported) 1 each 3     No current facility-administered medications on file prior to encounter. Guideline directed medical:  ARNI/ACE I/ARB: Yes  Beta blocker:   Yes  Aldosterone antagonist:  No(stopped per PCP d/t dizziness)  SGLT2i:  No        Physical Examination:     /60   Pulse 55   Resp 18   Wt 99 lb (44.9 kg)   SpO2 100%   BMI 20.00 kg/m²     Assessment  Charting Type: Shift assessment    Neurological  Level of Consciousness: Alert (0)  Orientation Level: Oriented X4  Cognition: Appropriate judgement, Appropriate safety awareness, Appropriate attention/concentration, Appropriate for developmental age, Follows commands  Speech: Clear (hard of hearing. )         HEENT (Head, Ears, Eyes, Nose, & Throat)  HEENT (WDL): Exceptions to WDL  Right Ear: Hearing aid  Left Ear: Hearing aid  Teeth: Missing teeth    Respiratory  Respiratory Pattern: Regular  Respiratory Depth: Normal  Respiratory Quality/Effort: Dyspnea with exertion, Dyspnea at rest  L Breath Sounds: Diminished, Inspiratory Wheezes  R Breath Sounds: Diminished, Inspiratory Wheezes              Cardiac  Cardiac Regularity: Regular  Cardiac Rhythm: Sinus maine, 1° AV Block    Rhythm Interpretation  Heart Rate: 55         Gastrointestinal  Abdominal (WDL): Within Defined Limits               Peripheral Vascular  Peripheral Vascular (WDL): Within Defined Limits                   Genitourinary  Genitourinary (WDL): Within Defined Limits    Psychosocial  Psychosocial (WDL): Within Defined Limits                        Heart Rate: 55                     LAB DATA:    Last 3 BMP      Sodium (mmol/L)   Date Value   01/20/2023 137   01/12/2023 145   12/30/2022 145     Potassium (mmol/L)   Date Value   01/20/2023 4.6   01/12/2023 4.5   12/30/2022 3.9     Potassium reflex Magnesium (mmol/L)   Date Value   12/28/2022 4.2   08/18/2022 4.6   06/08/2020 4.0     Chloride (mmol/L)   Date Value   01/20/2023 99   01/12/2023 107   12/30/2022 99     CO2 (mmol/L)   Date Value   01/20/2023 28   01/12/2023 22   12/30/2022 34 (H)     BUN (mg/dL)   Date Value   01/20/2023 28 (H)   01/12/2023 29 (H)   12/30/2022 29 (H)     Glucose (mg/dL)   Date Value   01/20/2023 94   01/12/2023 93   12/30/2022 99     Calcium (mg/dL)   Date Value   01/20/2023 10.2   01/12/2023 9.4   12/30/2022 9.7       Last 3 BNP       Pro-BNP (pg/mL)   Date Value   01/20/2023 4,231 (H)   01/12/2023 6,911 (H)   12/28/2022 23,957 (H)          CBC: No results for input(s): WBC, HGB, PLT in the last 72 hours.   BMP:    Recent Labs     01/20/23  1050      K 4.6   CL 99   CO2 28   BUN 28*   CREATININE 1.3*   GLUCOSE 94     Hepatic: No results for input(s): AST, ALT, ALB, BILITOT, ALKPHOS in the last 72 hours.  Troponin: No results for input(s): TROPONINI in the last 72 hours.  BNP: No results for input(s): BNP in the last 72 hours.  Lipids: No results for input(s): CHOL, HDL in the last 72 hours.    Invalid input(s): LDLCALCU  INR: No results for input(s): INR in the last 72 hours.        WEIGHTS:    Wt Readings from Last 3 Encounters:   01/20/23 99 lb (44.9 kg)   01/18/23 100 lb (45.4 kg)   01/12/23 100 lb 14.4 oz (45.8 kg)         TELEMETRY:  Cardiac Regularity: Regular  Cardiac Rhythm/Interpretation: SB with 1st degree        ASSESSMENT:  Barbara Jack is evolemic with stable weights     Interventions completed this visit:  IV diuretics given no  Lab work obtained yes, BMP/BNP   Reviewed currently prescribed medications with patient, educated on importance of compliance and answered any questions regarding their medication  Educated on signs and symptoms of HF  Educated on low sodium diet    PLAN:  Scheduled to follow up in CHF clinic on   Future Appointments   Date Time Provider Department Center   1/24/2023 10:30 AM [facility]  8 [facility]   2/2/2023  9:00 AM [facility] ROOM 1 [facility]   2/8/2023  1:30 PM Julita Dyer MD [facility]   2/22/2023 10:30 AM David Craft MD [place]     Given clinic phone number and aware of signs and symptoms to call with any HF change in symptoms.    States Dr Dyer stopped Aldactone due to dizziness since starting. States Dizziness is better since stopping aldactone, but continues to feel lightheadedness all the time. States if her head is down too much it increases. States \"she has to learn how to not move her head too much\".  Admits that she had a consult about a year ago with Dr Albarran, and dizziness had stopped, then came back at starting aldactone.   Admits that she is not drinking 64 oz daily. She states she'll try to increase fluids  to 64 oz daily. States she probably drinks about 56 oz daily. First visit with CHF clinic today. Patient presented with . Discussed with patient and  the purpose of CHF clinic and importance of daily weights and doing a self check every day to monitor for changes. Went over the three heart failure zones and to call cardiologist if in yellow zone immediately to prevent any further decline. Patient has a working scale. Patient is agreeable to future CHF clinic visits.

## 2023-01-20 NOTE — RESULT ENCOUNTER NOTE
Labs and CHF clinic note reviewed  Did not tolerate reintroduction of spironolactone, recently stopped per PCP    Vitals in CHF clinic improved today:   /58  Pulse 54 (last week)  >> /60   Pulse 55      Continue current management    Thank you

## 2023-01-24 ENCOUNTER — HOSPITAL ENCOUNTER (OUTPATIENT)
Dept: INFUSION THERAPY | Age: 88
Setting detail: INFUSION SERIES
Discharge: HOME | End: 2023-01-24
Payer: MEDICARE

## 2023-01-24 VITALS
DIASTOLIC BLOOD PRESSURE: 58 MMHG | SYSTOLIC BLOOD PRESSURE: 128 MMHG | RESPIRATION RATE: 18 BRPM | TEMPERATURE: 97.9 F | HEART RATE: 55 BPM | HEIGHT: 59 IN | WEIGHT: 99 LBS | OXYGEN SATURATION: 95 % | BODY MASS INDEX: 19.96 KG/M2

## 2023-01-24 DIAGNOSIS — M81.0 OSTEOPOROSIS, UNSPECIFIED OSTEOPOROSIS TYPE, UNSPECIFIED PATHOLOGICAL FRACTURE PRESENCE: Primary | ICD-10-CM

## 2023-01-24 PROCEDURE — 6360000002 HC RX W HCPCS: Performed by: OBSTETRICS & GYNECOLOGY

## 2023-01-24 PROCEDURE — 96372 THER/PROPH/DIAG INJ SC/IM: CPT

## 2023-01-24 RX ADMIN — DENOSUMAB 60 MG: 60 INJECTION SUBCUTANEOUS at 10:52

## 2023-02-02 ENCOUNTER — HOSPITAL ENCOUNTER (OUTPATIENT)
Dept: OTHER | Age: 88
Setting detail: THERAPIES SERIES
Discharge: HOME | End: 2023-02-02
Payer: MEDICARE

## 2023-02-02 VITALS
DIASTOLIC BLOOD PRESSURE: 62 MMHG | RESPIRATION RATE: 18 BRPM | HEART RATE: 55 BPM | OXYGEN SATURATION: 98 % | SYSTOLIC BLOOD PRESSURE: 127 MMHG | BODY MASS INDEX: 20.8 KG/M2 | WEIGHT: 103 LBS

## 2023-02-02 LAB
ANION GAP SERPL CALCULATED.3IONS-SCNC: 14 MMOL/L (ref 7–16)
BUN BLDV-MCNC: 29 MG/DL (ref 6–23)
CALCIUM SERPL-MCNC: 9.9 MG/DL (ref 8.6–10.2)
CHLORIDE BLD-SCNC: 104 MMOL/L (ref 98–107)
CO2: 22 MMOL/L (ref 22–29)
CREAT SERPL-MCNC: 1.2 MG/DL (ref 0.5–1)
GFR SERPL CREATININE-BSD FRML MDRD: 44 ML/MIN/1.73
GLUCOSE BLD-MCNC: 96 MG/DL (ref 74–99)
POTASSIUM SERPL-SCNC: 4.8 MMOL/L (ref 3.5–5)
PRO-BNP: 6805 PG/ML (ref 0–450)
SODIUM BLD-SCNC: 140 MMOL/L (ref 132–146)

## 2023-02-02 PROCEDURE — 80048 BASIC METABOLIC PNL TOTAL CA: CPT

## 2023-02-02 PROCEDURE — 36415 COLL VENOUS BLD VENIPUNCTURE: CPT

## 2023-02-02 PROCEDURE — 83880 ASSAY OF NATRIURETIC PEPTIDE: CPT

## 2023-02-02 PROCEDURE — 99214 OFFICE O/P EST MOD 30 MIN: CPT

## 2023-02-02 RX ORDER — ACETYLCYST/METHYLB12/LEVOMEFOL 600-2-6 MG
TABLET ORAL
COMMUNITY

## 2023-02-02 NOTE — RESULT ENCOUNTER NOTE
Improved symptoms despite presently elevated pro-bnp  GDMT limited by hypotension and adverse symptoms of dizziness  Therefore continue current GDMT  Return to CHF clinic as scheduled - sooner if needed    Thank you

## 2023-02-02 NOTE — PROGRESS NOTES
Congestive Heart Failure [address] [place] [address]   1935          Referring Provider: DUKE Quiroz  Primary Care Physician: Dr Raman Méndez  Cardiologist: Dr Tammy Iglesias  Nephrologist: N/A        History of Present Illness:     Alexandre Villafuerte is a 81 y.o. female with a history of HFrEF, most recent EF 25% on 12-29-22. Patient Story:    She does  have dyspnea with extreme exertion, shortness of breath, or decline in overall functional capacity. She does not have orthopnea, PND, nocturnal cough or hemoptysis. She does not have abdominal distention or bloating, early satiety, anorexia/change in appetite. She does has a good - Fair urinary response to  oral diuretic. She does not have  lower extremity edema. She admits to  lightheadedness,  denies dizziness. She denies palpitations, syncope or near syncope. She does not complain of chest pain, pressure, discomfort.          No Known Allergies        Current Outpatient Medications on File Prior to Encounter   Medication Sig Dispense Refill    Strnyfopt-Uytwhksfy-Udotdibfxp (METAFOLBIC PLUS) 6-2-600 MG TABS Take by mouth daily (with breakfast)      losartan (COZAAR) 25 MG tablet Take 1 tablet by mouth daily 90 tablet 0    mometasone (NASONEX) 50 MCG/ACT nasal spray 2 sprays by Each Nostril route daily 1 each 3    omeprazole (PRILOSEC) 40 MG delayed release capsule TAKE 1 CAPSULE BY MOUTH EVERY DAY 90 capsule 0    simvastatin (ZOCOR) 20 MG tablet Take 1 tablet by mouth daily 90 tablet 0    vitamin D (ERGOCALCIFEROL) 1.25 MG (40555 UT) CAPS capsule Take 1 capsule by mouth once a week (Patient taking differently: Take 50,000 Units by mouth once a week Friday's) 12 capsule 0    Coenzyme Q10 (CO Q-10) 100 MG CAPS Take 1 capsule by mouth daily 90 capsule 0    Methylfol-Algae-Z78-Ivrqadvpjb (CEREFOLIN NAC) 6-90.314-2-600 MG TABS Take 1 tablet by mouth daily 90 tablet 0    budesonide (PULMICORT) 0.25 MG/2ML nebulizer suspension Take 2 mLs by nebulization in the morning and 2 mLs in the evening. 180 each 0    acetaminophen (TYLENOL) 500 MG tablet Take 500 mg by mouth every 8 hours as needed for Pain      rivaroxaban (XARELTO) 15 MG TABS tablet Take 1 tablet by mouth daily (with breakfast) 90 tablet 3    metoprolol succinate (TOPROL XL) 25 MG extended release tablet Take 1 tablet by mouth nightly TAKE 1 TABLET BY MOUTH EVERY DAY 90 tablet 3    spironolactone (ALDACTONE) 25 MG tablet Take 0.5 tablets by mouth daily (Patient not taking: Reported on 1/20/2023) 30 tablet 3    albuterol (PROVENTIL) (2.5 MG/3ML) 0.083% nebulizer solution Take 3 mLs by nebulization every 4 hours as needed for Wheezing or Shortness of Breath (Patient not taking: Reported on 1/20/2023) 120 each 2    amiodarone (CORDARONE) 200 MG tablet Take 0.5 tablets by mouth daily ##### Per Dr. Keller: Take 100 mg (half tablet) daily ##### 90 tablet 0    ASPIRIN LOW DOSE 81 MG EC tablet Take 1 tablet by mouth daily 90 tablet 0    furosemide (LASIX) 20 MG tablet Take 0.5 tablets by mouth daily 90 tablet 0    diphenoxylate-atropine (LOMOTIL) 2.5-0.025 MG per tablet Take 1 tablet by mouth 4 times daily as needed for Diarrhea. (Patient not taking: Reported on 1/18/2023)      Respiratory Therapy Supplies (FULL KIT NEBULIZER SET) MISC Use as directed with nebulized medication. 1 each 0    Multiple Vitamins-Minerals (OCUVITE PO) Take 1 tablet by mouth daily (Patient not taking: Reported on 1/18/2023)      [DISCONTINUED] mupirocin (BACTROBAN NASAL) 2 % nasal ointment Take by Nasal route 2 times daily. (Patient not taking: No sig reported) 1 each 3     No current facility-administered medications on file prior to encounter.             Guideline directed medical:  ARNI/ACE I/ARB: Yes  Beta blocker:  Yes  Aldosterone antagonist:  No(stopped per PCP d/t dizziness)  SGLT2i:  No        Physical Examination:     /62   Pulse 55   Resp 18   Wt 103 lb (46.7 kg)   SpO2 98%    BMI 20.80 kg/m²     Assessment  Charting Type: Shift assessment    Neurological  Level of Consciousness: Alert (0)  Orientation Level: Oriented X4  Cognition: Appropriate judgement, Appropriate safety awareness, Appropriate attention/concentration, Appropriate for developmental age, Follows commands  Speech: Clear (hard of hearing. )         HEENT (Head, Ears, Eyes, Nose, & Throat)  HEENT (WDL): Exceptions to WDL  Right Ear: Hearing aid  Left Ear: Hearing aid  Teeth: Missing teeth    Respiratory  Respiratory Pattern: Regular  Respiratory Depth: Normal  Respiratory Quality/Effort: Dyspnea with exertion, Dyspnea at rest  L Breath Sounds: Diminished, Clear  R Breath Sounds: Diminished, Clear              Cardiac  Cardiac Regularity: Regular  Cardiac Rhythm: Sinus maine, 1° AV Block, Atrial paced    Rhythm Interpretation  Heart Rate: 55         Gastrointestinal  Abdominal (WDL): Within Defined Limits               Peripheral Vascular  Peripheral Vascular (WDL): Within Defined Limits                   Genitourinary  Genitourinary (WDL): Within Defined Limits    Psychosocial  Psychosocial (WDL): Within Defined Limits                        Heart Rate: 55                     LAB DATA:    Last 3 BMP      Sodium (mmol/L)   Date Value   01/20/2023 137   01/12/2023 145   12/30/2022 145     Potassium (mmol/L)   Date Value   01/20/2023 4.6   01/12/2023 4.5   12/30/2022 3.9     Potassium reflex Magnesium (mmol/L)   Date Value   12/28/2022 4.2   08/18/2022 4.6   06/08/2020 4.0     Chloride (mmol/L)   Date Value   01/20/2023 99   01/12/2023 107   12/30/2022 99     CO2 (mmol/L)   Date Value   01/20/2023 28   01/12/2023 22   12/30/2022 34 (H)     BUN (mg/dL)   Date Value   01/20/2023 28 (H)   01/12/2023 29 (H)   12/30/2022 29 (H)     Glucose (mg/dL)   Date Value   01/20/2023 94   01/12/2023 93   12/30/2022 99     Calcium (mg/dL)   Date Value   01/20/2023 10.2   01/12/2023 9.4   12/30/2022 9.7       Last 3 BNP       Pro-BNP (pg/mL) Date Value   01/20/2023 4,231 (H)   01/12/2023 6,911 (H)   12/28/2022 23,957 (H)          CBC: No results for input(s): WBC, HGB, PLT in the last 72 hours. BMP:    No results for input(s): NA, K, CL, CO2, BUN, CREATININE, GLUCOSE in the last 72 hours. Hepatic: No results for input(s): AST, ALT, ALB, BILITOT, ALKPHOS in the last 72 hours. Troponin: No results for input(s): TROPONINI in the last 72 hours. BNP: No results for input(s): BNP in the last 72 hours. Lipids: No results for input(s): CHOL, HDL in the last 72 hours. Invalid input(s): LDLCALCU  INR: No results for input(s): INR in the last 72 hours. WEIGHTS:    Wt Readings from Last 3 Encounters:   02/02/23 103 lb (46.7 kg)   01/24/23 99 lb (44.9 kg)   01/20/23 99 lb (44.9 kg)         TELEMETRY:  Cardiac Regularity: Regular  Cardiac Rhythm/Interpretation: A Paced/SB with 1st degree        ASSESSMENT:  Misa Yates is evolemic with stable weights. Patient states she's feeling better. Denies any lightheadedness. States she's back to driving. Interventions completed this visit:  IV diuretics given no  Lab work obtained yes, BMP/BNP   Reviewed currently prescribed medications with patient, educated on importance of compliance and answered any questions regarding their medication  Educated on signs and symptoms of HF  Educated on low sodium diet    PLAN:  Scheduled to follow up in CHF clinic on   Future Appointments   Date Time Provider Mohit Latham   2/7/2023  9:00 AM [facility] ECHO RM 3 [place]   2/8/2023  1:30 PM Stefanie Paredes MD [facility]   2/22/2023 10:30 AM Leesa Kanner, MD [address] [facility]   2/23/2023  9:30 AM [facility] CHF ROOM 1 [facility]   7/24/2023 10:30 AM [facility] 8 [facility]     Given clinic phone number and aware of signs and symptoms to call with any HF change in symptoms. Quirino Vernon

## 2023-02-02 NOTE — PLAN OF CARE
Problem: Chronic Conditions and Co-morbidities  Goal: Patient's chronic conditions and co-morbidity symptoms are monitored and maintained or improved  Flowsheets (Taken 2/2/2023 0317)  Care Plan - Patient's Chronic Conditions and Co-Morbidity Symptoms are Monitored and Maintained or Improved: Monitor and assess patient's chronic conditions and comorbid symptoms for stability, deterioration, or improvement

## 2023-02-03 ENCOUNTER — TELEPHONE (OUTPATIENT)
Dept: CARDIOLOGY | Age: 88
End: 2023-02-03

## 2023-02-07 ENCOUNTER — HOSPITAL ENCOUNTER (OUTPATIENT)
Dept: CARDIOLOGY | Age: 88
Discharge: HOME | End: 2023-02-07
Payer: MEDICARE

## 2023-02-07 DIAGNOSIS — I48.0 PAROXYSMAL ATRIAL FIBRILLATION (HCC): ICD-10-CM

## 2023-02-07 DIAGNOSIS — I34.0 NONRHEUMATIC MITRAL VALVE REGURGITATION: ICD-10-CM

## 2023-02-07 DIAGNOSIS — I50.20 HFREF (HEART FAILURE WITH REDUCED EJECTION FRACTION) (HCC): ICD-10-CM

## 2023-02-07 LAB
LV EF: 23 %
LVEF MODALITY: NORMAL

## 2023-02-07 PROCEDURE — 93306 TTE W/DOPPLER COMPLETE: CPT

## 2023-02-08 ENCOUNTER — TELEPHONE (OUTPATIENT)
Dept: CARDIOLOGY CLINIC | Age: 88
End: 2023-02-08

## 2023-02-08 ENCOUNTER — OFFICE VISIT (OUTPATIENT)
Dept: PRIMARY CARE CLINIC | Age: 88
End: 2023-02-08

## 2023-02-08 VITALS
TEMPERATURE: 97.5 F | BODY MASS INDEX: 20.56 KG/M2 | DIASTOLIC BLOOD PRESSURE: 52 MMHG | WEIGHT: 102 LBS | SYSTOLIC BLOOD PRESSURE: 102 MMHG | HEIGHT: 59 IN

## 2023-02-08 DIAGNOSIS — Z79.01 CHRONIC ANTICOAGULATION: ICD-10-CM

## 2023-02-08 DIAGNOSIS — E11.22 TYPE 2 DIABETES MELLITUS WITH STAGE 3B CHRONIC KIDNEY DISEASE, WITHOUT LONG-TERM CURRENT USE OF INSULIN (HCC): ICD-10-CM

## 2023-02-08 DIAGNOSIS — N18.32 TYPE 2 DIABETES MELLITUS WITH STAGE 3B CHRONIC KIDNEY DISEASE, WITHOUT LONG-TERM CURRENT USE OF INSULIN (HCC): ICD-10-CM

## 2023-02-08 DIAGNOSIS — I42.0 DILATED CARDIOMYOPATHY (HCC): ICD-10-CM

## 2023-02-08 DIAGNOSIS — I35.0 MODERATE AORTIC STENOSIS: ICD-10-CM

## 2023-02-08 DIAGNOSIS — I35.1 MODERATE AORTIC REGURGITATION: ICD-10-CM

## 2023-02-08 DIAGNOSIS — R42 VERTIGO: ICD-10-CM

## 2023-02-08 DIAGNOSIS — I50.20 HFREF (HEART FAILURE WITH REDUCED EJECTION FRACTION) (HCC): ICD-10-CM

## 2023-02-08 DIAGNOSIS — I25.10 CORONARY ARTERY DISEASE INVOLVING NATIVE CORONARY ARTERY OF NATIVE HEART WITHOUT ANGINA PECTORIS: Primary | ICD-10-CM

## 2023-02-08 RX ORDER — ERGOCALCIFEROL 1.25 MG/1
50000 CAPSULE ORAL WEEKLY
Qty: 12 CAPSULE | Refills: 0 | Status: SHIPPED | OUTPATIENT
Start: 2023-02-08

## 2023-02-08 RX ORDER — FUROSEMIDE 20 MG/1
10 TABLET ORAL DAILY
Qty: 90 TABLET | Refills: 0 | Status: SHIPPED | OUTPATIENT
Start: 2023-02-08

## 2023-02-08 RX ORDER — MECLIZINE HCL 12.5 MG/1
12.5 TABLET ORAL 3 TIMES DAILY
Qty: 45 TABLET | Refills: 1 | Status: SHIPPED | OUTPATIENT
Start: 2023-02-08 | End: 2023-02-18

## 2023-02-08 SDOH — ECONOMIC STABILITY: FOOD INSECURITY: WITHIN THE PAST 12 MONTHS, YOU WORRIED THAT YOUR FOOD WOULD RUN OUT BEFORE YOU GOT MONEY TO BUY MORE.: NEVER TRUE

## 2023-02-08 SDOH — ECONOMIC STABILITY: INCOME INSECURITY: HOW HARD IS IT FOR YOU TO PAY FOR THE VERY BASICS LIKE FOOD, HOUSING, MEDICAL CARE, AND HEATING?: NOT HARD AT ALL

## 2023-02-08 SDOH — ECONOMIC STABILITY: FOOD INSECURITY: WITHIN THE PAST 12 MONTHS, THE FOOD YOU BOUGHT JUST DIDN'T LAST AND YOU DIDN'T HAVE MONEY TO GET MORE.: NEVER TRUE

## 2023-02-08 SDOH — ECONOMIC STABILITY: HOUSING INSECURITY
IN THE LAST 12 MONTHS, WAS THERE A TIME WHEN YOU DID NOT HAVE A STEADY PLACE TO SLEEP OR SLEPT IN A SHELTER (INCLUDING NOW)?: NO

## 2023-02-08 ASSESSMENT — ENCOUNTER SYMPTOMS
RHINORRHEA: 0
COLOR CHANGE: 0
ABDOMINAL PAIN: 0
NAUSEA: 0
SORE THROAT: 0
BACK PAIN: 0
DIARRHEA: 0
VOMITING: 0
CONSTIPATION: 0
TROUBLE SWALLOWING: 0
ABDOMINAL DISTENTION: 0
ALLERGIC/IMMUNOLOGIC NEGATIVE: 1
SINUS PRESSURE: 0
BLOOD IN STOOL: 0

## 2023-02-08 NOTE — PROGRESS NOTES
Gonzalez Armstrong presents today for   Follow up of LVD, COPD, High chol, complaining of still feeling dizzy even after stopping new water pill. Current Outpatient Medications   Medication Sig Dispense Refill    vitamin D (ERGOCALCIFEROL) 1.25 MG (68990 UT) CAPS capsule Take 1 capsule by mouth once a week Friday's 12 capsule 0    furosemide (LASIX) 20 MG tablet Take 0.5 tablets by mouth daily 90 tablet 0    meclizine (ANTIVERT) 12.5 MG tablet Take 1 tablet by mouth in the morning, at noon, and at bedtime for 10 days 45 tablet 1    Dwsbieruw-Gcgxbimyo-Irtelegobt (METAFOLBIC PLUS) 6-2-600 MG TABS Take by mouth daily (with breakfast)      losartan (COZAAR) 25 MG tablet Take 1 tablet by mouth daily 90 tablet 0    mometasone (NASONEX) 50 MCG/ACT nasal spray 2 sprays by Each Nostril route daily 1 each 3    omeprazole (PRILOSEC) 40 MG delayed release capsule TAKE 1 CAPSULE BY MOUTH EVERY DAY 90 capsule 0    simvastatin (ZOCOR) 20 MG tablet Take 1 tablet by mouth daily 90 tablet 0    Coenzyme Q10 (CO Q-10) 100 MG CAPS Take 1 capsule by mouth daily 90 capsule 0    budesonide (PULMICORT) 0.25 MG/2ML nebulizer suspension Take 2 mLs by nebulization in the morning and 2 mLs in the evening. 180 each 0    rivaroxaban (XARELTO) 15 MG TABS tablet Take 1 tablet by mouth daily (with breakfast) 90 tablet 3    metoprolol succinate (TOPROL XL) 25 MG extended release tablet Take 1 tablet by mouth nightly TAKE 1 TABLET BY MOUTH EVERY DAY 90 tablet 3    amiodarone (CORDARONE) 200 MG tablet Take 0.5 tablets by mouth daily ##### Per Dr. Zaragoza English: Take 100 mg (half tablet) daily ##### 90 tablet 0    ASPIRIN LOW DOSE 81 MG EC tablet Take 1 tablet by mouth daily 90 tablet 0    Respiratory Therapy Supplies (FULL KIT NEBULIZER SET) MISC Use as directed with nebulized medication.  1 each 0    Methylfol-Algae-P42-Wblhhzuccc (CEREFOLIN NAC) 6-90.314-2-600 MG TABS Take 1 tablet by mouth daily (Patient not taking: Reported on 2/8/2023) 90 tablet 0 acetaminophen (TYLENOL) 500 MG tablet Take 500 mg by mouth every 8 hours as needed for Pain (Patient not taking: Reported on 2/8/2023)      spironolactone (ALDACTONE) 25 MG tablet Take 0.5 tablets by mouth daily (Patient not taking: No sig reported) 30 tablet 3    albuterol (PROVENTIL) (2.5 MG/3ML) 0.083% nebulizer solution Take 3 mLs by nebulization every 4 hours as needed for Wheezing or Shortness of Breath (Patient not taking: No sig reported) 120 each 2    diphenoxylate-atropine (LOMOTIL) 2.5-0.025 MG per tablet Take 1 tablet by mouth 4 times daily as needed for Diarrhea. (Patient not taking: No sig reported)      Multiple Vitamins-Minerals (OCUVITE PO) Take 1 tablet by mouth daily (Patient not taking: No sig reported)       No current facility-administered medications for this visit. Past Medical History:   Diagnosis Date    Arthritis     Atrial fibrillation ([facility] [address]     CAD (coronary artery disease)     Cardiomyopathy ([facility] [address]     CHF (congestive heart failure) ([facility] [address] 2010    history of    Chronic anticoagulation     Chronic bronchitis (HCC)     none recent    COPD (chronic obstructive pulmonary disease) ([facility] [address]     COVID 08/2022    in hospital x5 days    Depression     GERD (gastroesophageal reflux disease)     HFrEF (heart failure with reduced ejection fraction) ([facility] [address] 12/29/2016 12/26/16- LVEF 20-25%, large apical aneurysm, LA moderate-severely dilated, mildly enlarged RA, mild MR, mild TR, mild AR    Hyperlipidemia     Hypertension     Left ventricular dysfunction     Low vitamin D level     MI (myocardial infarction) ([facility] [address] 10/30/2009    Osteopenia     Osteoporosis     Swallowing difficulty           Subjective:  Still feels dizzy, mostly if she turns the head a certain way. Feels like she is spinning. Has had vertigo before but never this bad. No increase in sob. She did stop the second water pill and is cutting the Lasix in half. NO chest pain. No swelling. Still goes to the CHF clinic regularly. Diabetes  Pertinent negatives for hypoglycemia include no dizziness, headaches, speech difficulty or tremors. Pertinent negatives for diabetes include no chest pain, no polydipsia, no polyuria and no weakness. Review of Systems   Constitutional:  Negative for activity change, appetite change and chills. HENT:  Negative for congestion, ear pain, mouth sores, postnasal drip, rhinorrhea, sinus pressure, sneezing, sore throat and trouble swallowing. Eyes:  Negative for visual disturbance. Cardiovascular:  Negative for chest pain, palpitations and leg swelling. Gastrointestinal:  Negative for abdominal distention, abdominal pain, blood in stool, constipation, diarrhea, nausea and vomiting. Endocrine: Negative for cold intolerance, heat intolerance, polydipsia and polyuria. Genitourinary:  Negative for difficulty urinating, dysuria, flank pain, frequency and urgency. Musculoskeletal:  Negative for arthralgias, back pain, gait problem, neck pain and neck stiffness. Skin: Negative. Negative for color change. Allergic/Immunologic: Negative. Neurological:  Negative for dizziness, tremors, speech difficulty, weakness, light-headedness and headaches. Vertigo   Hematological: Negative. Psychiatric/Behavioral: Negative. Objective:  BP (!) 102/52 (Site: Left Upper Arm, Position: Sitting, Cuff Size: Medium Adult)   Temp 97.5 °F (36.4 °C)   Ht 4' 11\" (1.499 m)   Wt 102 lb (46.3 kg)   BMI 20.60 kg/m²      Physical Exam  Vitals reviewed. Constitutional:       Appearance: Normal appearance. Comments: Ambulates without devices. No distress. Cardiovascular:      Heart sounds: Murmur: claritza lsb and rt 2nd ics. .   Neurological:      Comments: Gets dizzy when changes position, no orthostatic changes. Positive Rhombergh. Assessment:  Andrzej Cunningham was seen today for diabetes.     Diagnoses and all orders for this visit:    Coronary artery disease involving native coronary artery of native heart without angina pectoris  Comments:  Stable, follows up with cardiology    HFrEF (heart failure with reduced ejection fraction) ([facility] [address]  Comments: Follows up at the [address] [facility], clinically euvolemic even after reducing diuretic    Type 2 diabetes mellitus with stage 3b chronic kidney disease, without long-term current use of insulin (HCC)  Comments: Well controlled on dietary restricitons and meds    Vertigo  Comments:  Added Antivert 12.5 tid since did not respond to the reduction in diuretics. Moderate aortic stenosis    Moderate aortic regurgitation    Dilated cardiomyopathy (HCC)    Chronic anticoagulation  Comments:  Due to hx of A fib, tolerating well. Other orders  -     vitamin D (ERGOCALCIFEROL) 1.25 MG (67424 UT) CAPS capsule; Take 1 capsule by mouth once a week Friday's  -     furosemide (LASIX) 20 MG tablet; Take 0.5 tablets by mouth daily  -     meclizine (ANTIVERT) 12.5 MG tablet;  Take 1 tablet by mouth in the morning, at noon, and at bedtime for 10 days

## 2023-02-08 NOTE — TELEPHONE ENCOUNTER
----- Message from Jim Whitehead MD sent at 2/8/2023  8:34 AM EST -----  EF 20-25% and moderate valve disease (similar to prior echo)

## 2023-02-10 PROBLEM — I35.1 MODERATE AORTIC REGURGITATION: Status: ACTIVE | Noted: 2023-02-10

## 2023-02-10 PROBLEM — I35.0 MODERATE AORTIC STENOSIS: Status: ACTIVE | Noted: 2023-02-10

## 2023-02-10 PROBLEM — I42.0 DILATED CARDIOMYOPATHY (HCC): Status: ACTIVE | Noted: 2023-02-10

## 2023-02-10 PROBLEM — Z79.01 CHRONIC ANTICOAGULATION: Status: ACTIVE | Noted: 2023-02-10

## 2023-02-22 ENCOUNTER — OFFICE VISIT (OUTPATIENT)
Dept: CARDIOLOGY CLINIC | Age: 88
End: 2023-02-22
Payer: MEDICARE

## 2023-02-22 VITALS
RESPIRATION RATE: 18 BRPM | WEIGHT: 103.3 LBS | DIASTOLIC BLOOD PRESSURE: 72 MMHG | HEIGHT: 59 IN | BODY MASS INDEX: 20.83 KG/M2 | HEART RATE: 55 BPM | SYSTOLIC BLOOD PRESSURE: 132 MMHG

## 2023-02-22 DIAGNOSIS — I50.22 CHRONIC HFREF (HEART FAILURE WITH REDUCED EJECTION FRACTION) (HCC): ICD-10-CM

## 2023-02-22 DIAGNOSIS — I48.19 PERSISTENT ATRIAL FIBRILLATION (HCC): Primary | ICD-10-CM

## 2023-02-22 DIAGNOSIS — I25.5 ISCHEMIC CARDIOMYOPATHY: ICD-10-CM

## 2023-02-22 DIAGNOSIS — I34.0 NONRHEUMATIC MITRAL VALVE REGURGITATION: ICD-10-CM

## 2023-02-22 DIAGNOSIS — I10 ESSENTIAL HYPERTENSION: ICD-10-CM

## 2023-02-22 PROCEDURE — 1090F PRES/ABSN URINE INCON ASSESS: CPT | Performed by: INTERNAL MEDICINE

## 2023-02-22 PROCEDURE — 1036F TOBACCO NON-USER: CPT | Performed by: INTERNAL MEDICINE

## 2023-02-22 PROCEDURE — G8420 CALC BMI NORM PARAMETERS: HCPCS | Performed by: INTERNAL MEDICINE

## 2023-02-22 PROCEDURE — 93000 ELECTROCARDIOGRAM COMPLETE: CPT | Performed by: INTERNAL MEDICINE

## 2023-02-22 PROCEDURE — G8427 DOCREV CUR MEDS BY ELIG CLIN: HCPCS | Performed by: INTERNAL MEDICINE

## 2023-02-22 PROCEDURE — G8484 FLU IMMUNIZE NO ADMIN: HCPCS | Performed by: INTERNAL MEDICINE

## 2023-02-22 PROCEDURE — 1123F ACP DISCUSS/DSCN MKR DOCD: CPT | Performed by: INTERNAL MEDICINE

## 2023-02-22 PROCEDURE — 99214 OFFICE O/P EST MOD 30 MIN: CPT | Performed by: INTERNAL MEDICINE

## 2023-02-22 RX ORDER — MECLIZINE HCL 12.5 MG/1
12.5 TABLET ORAL 3 TIMES DAILY PRN
COMMUNITY

## 2023-02-22 NOTE — PROGRESS NOTES
OFFICE FOLLOW-UP        PRIMARY CARE PHYSICIAN:      Bernardine Lefort, MD    Date of Service: 2/22/2023     ALLERGIES / SENSITIVITIES:        No Known Allergies     REVIEWED MEDICATIONS:        Current Outpatient Medications:     vitamin D (ERGOCALCIFEROL) 1.25 MG (36717 UT) CAPS capsule, Take 1 capsule by mouth once a week Friday's, Disp: 12 capsule, Rfl: 0    furosemide (LASIX) 20 MG tablet, Take 0.5 tablets by mouth daily, Disp: 90 tablet, Rfl: 0    Pwjxkoxet-Rngkjwbyg-Tsaomqynhl (METAFOLBIC PLUS) 6-2-600 MG TABS, Take by mouth daily (with breakfast), Disp: , Rfl:     losartan (COZAAR) 25 MG tablet, Take 1 tablet by mouth daily, Disp: 90 tablet, Rfl: 0    mometasone (NASONEX) 50 MCG/ACT nasal spray, 2 sprays by Each Nostril route daily, Disp: 1 each, Rfl: 3    omeprazole (PRILOSEC) 40 MG delayed release capsule, TAKE 1 CAPSULE BY MOUTH EVERY DAY, Disp: 90 capsule, Rfl: 0    simvastatin (ZOCOR) 20 MG tablet, Take 1 tablet by mouth daily, Disp: 90 tablet, Rfl: 0    Coenzyme Q10 (CO Q-10) 100 MG CAPS, Take 1 capsule by mouth daily, Disp: 90 capsule, Rfl: 0    Methylfol-Algae-G65-Obyawexpma (CEREFOLIN NAC) 6-90.314-2-600 MG TABS, Take 1 tablet by mouth daily (Patient not taking: Reported on 2/8/2023), Disp: 90 tablet, Rfl: 0    budesonide (PULMICORT) 0.25 MG/2ML nebulizer suspension, Take 2 mLs by nebulization in the morning and 2 mLs in the evening., Disp: 180 each, Rfl: 0    acetaminophen (TYLENOL) 500 MG tablet, Take 500 mg by mouth every 8 hours as needed for Pain (Patient not taking: Reported on 2/8/2023), Disp: , Rfl:     rivaroxaban (XARELTO) 15 MG TABS tablet, Take 1 tablet by mouth daily (with breakfast), Disp: 90 tablet, Rfl: 3    metoprolol succinate (TOPROL XL) 25 MG extended release tablet, Take 1 tablet by mouth nightly TAKE 1 TABLET BY MOUTH EVERY DAY, Disp: 90 tablet, Rfl: 3    spironolactone (ALDACTONE) 25 MG tablet, Take 0.5 tablets by mouth daily (Patient not taking: No sig reported), Disp: 30 tablet, Rfl: 3    albuterol (PROVENTIL) (2.5 MG/3ML) 0.083% nebulizer solution, Take 3 mLs by nebulization every 4 hours as needed for Wheezing or Shortness of Breath (Patient not taking: No sig reported), Disp: 120 each, Rfl: 2    amiodarone (CORDARONE) 200 MG tablet, Take 0.5 tablets by mouth daily ##### Per Dr. Mohsen Bunch: Take 100 mg (half tablet) daily #####, Disp: 90 tablet, Rfl: 0    ASPIRIN LOW DOSE 81 MG EC tablet, Take 1 tablet by mouth daily, Disp: 90 tablet, Rfl: 0    diphenoxylate-atropine (LOMOTIL) 2.5-0.025 MG per tablet, Take 1 tablet by mouth 4 times daily as needed for Diarrhea. (Patient not taking: No sig reported), Disp: , Rfl:     Respiratory Therapy Supplies (FULL KIT NEBULIZER SET) MISC, Use as directed with nebulized medication. , Disp: 1 each, Rfl: 0    Multiple Vitamins-Minerals (OCUVITE PO), Take 1 tablet by mouth daily (Patient not taking: No sig reported), Disp: , Rfl:       S: REASON FOR VISIT:      Previously followed by Dr. Pascale Coronel -- she established with me in 4/2016. She denies recent chest pain, palpitations, PND, orthopnea, or syncope (she recently stopped bowling and going to water aerobics). +worsening LYON over the past 1 year, particular following COVID-19 infection in 8/2022. She follows regularly with Dr. Mohsen Bunch for ICD interrogations and PAF (s/p generator change in 2/2018). +cough/sinus problems since 11/2018 --> she follows with ENT (Dr. Mat Gaviria) --> clinically improved on nasonex. +intermittent dizziness.  She is currently with no active cardiac complaints at rest.     REVIEW OF SYSTEMS:    Cardiac: As per HPI  General: No fever, chills  Pulmonary: As per HPI  HEENT: No visual disturbances, difficult swallowing  GI: No nausea, vomiting  : No dysuria, hematuria  Endocrine: No thyroid disease or DM  Musculoskeletal: MACKEY x 4, no focal motor deficits  Skin: Intact, no rashes  Neuro: No headache, seizures  Psych: Currently with no depression, anxiety     CARDIOVASCULAR HISTORY:    1. Coronary artery disease/ischemic cardiomyopathy/congestive heart failure. a. 10/30/2009: Acute ST elevation anterolateral wall myocardial infarction. b. 10/30/2009: Cardiac catheterization/primary angioplasty: Left main short vessel with no significant disease. LAD occluded proximally. LCX: 90% stenosis of the 3rd obtuse marginal branch. RCA, a small nondominant vessel, which was non-selectively visualized. Successful balloon angioplasty and stenting to the proximal LAD with restoration of DORI 3 flow in the vessel. c. 06/18/2014 Lexiscan nuclear stress test was a markedly abnormal study showing a transmural myocardial infarction involving a large wrap around left anterior descending artery distribution with no evidence of residual stress-induced ischemia with the gated views showing akinesis of the left ventricular apex, the apical anterior wall and the apical septum with severe hypokinesis of the inferior wall and moderate hypokinesis of the rest of the interventricular septum with a calculated ejection fraction of 34 %. d. 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 Normal right ventricular size and function, with a pacemaker noted in the right ventricle. Pacemaker also noted in the right atrium. Mild to moderate mitral regurgitation. Mild to moderate tricuspid regurgitation. Mild aortic sclerosis with trace aortic regurgitation. Aortic root sclerosis/calcification. Small pericardial effusion. e. Congestive heart failure acute decompensation, first diagnosed in 4/2014.   2. Insertion of dual chamber pacer ICD on 5/10/2010.   3. History of hypertension. 4. Diabetes mellitus, diagnosed in 4/2014.   5. Hyponatremia in 4/2014 and again on a blood test on 5/8/2014.       PAST MEDICAL HISTORY:   As under cardiovascular history. Asthma. GERD. Depression. Osteoporosis. Overactive bladder. S/P Hysterectomy, 1972. S/P Tonsillectomy. S/P Appendectomy. S/P Right elbow fracture repair. UTI in  treated with oral antibiotics. Status post right and left capsulectomy and removal of extravasated implant material on the right on 12, status post bilateral breast implants in the remote past.  Bilateral hearing loss: Wears bilateral hearing aids. Right wrist fracture s/p fall, 2015. FAMILY HISTORY:   Mother , age 79, Alzheimers. Father , age 72, MI. One brother, history of colon surgery. One son, age 52, MI/ACB; two sons with bipolar disorder. SOCIAL HISTORY:   Denies smoking. O:  COMPLETE PHYSICAL EXAM:         There were no vitals taken for this visit. Appearance: Awake, alert and oriented x 3, no acute respiratory distress  Skin: Intact, no rash  Head: Normocephalic, atraumatic  Eyes: EOMI, no conjunctival erythema  ENMT: No pharyngeal erythema, MMM, no rhinorrhea, no dentures  Neck: Supple, no elevated JVP, no carotid bruits  Lungs: Clear to auscultation bilaterally. No wheezes, rales, or rhonchi. Cardiac: Grade 2/6 systolic murmur heard at the apex. Grade II/VI systolic murmur heard at the right upper sternal border. Abdomen: Soft, nontender, +bowel sounds  Extremities: Moves all extremities x 4, no lower extremity edema  Neurologic: No focal motor deficits apparent, normal mood and affect, alert and oriented x 3    Lab Results   Component Value Date    CREATININE 1.2 (H) 2023    BUN 29 (H) 2023     2023    K 4.8 2023     2023    CO2 22 2023     EKG: AP/VS, rate 55, QTc 490 msec, RBBB/LAFB    Echocardiogram: 16 (Macie Grover)   Mildly dilated left ventricle. Large apical aneurysm with dyskinetic apex. Severe hypokinesis of the apical septum and lateral wall.    Overall LVEF is visually estimated at 20-25%   The left atrium is moderate-severely dilated. Structurally normal mitral valve. Increased E point septal separation noted,suggesting decreased LV cardiac output   Mild mitral regurgitation is present. The aortic valve is trileaflet. The aortic valve appears mildly sclerotic. Mild aortic regurgitation is noted. Normal tricuspid valve structure and function. Mild tricuspid regurgitation. RVSP is 25-30 mmHg. There is a trivial localized near right ventricle pericardial effusion noted. ALMA: 6/9/2020 (Dr. Porsche Schmitt)   Gastric images not performed due to history of achalasia and recent   botulinum toxin injection - case discussed with Dr. Chhaya De Guzman before   procedure and felt no contraindication to ALMA with upper esophageal images   only. Ejection fraction is visually estimated at 25-30%. There was evidence of spontaneous echo contrast in the left atrium. No evidence of thrombus within left atrium or appendage. Small mobile echodensity likely fibrin stranding noted on RA lead. Mild-moderate mitral regurgitation directed centrally. Mild aortic valve regurgitation. Moderate to severe tricuspid regurgitation. Echocardiogram: 3/31/22 (Dr. Jose Ramon Bo)   Dilated left ventricle. Severely reduced left ventricular systolic function. Ejection fraction is visually estimated at 25-30%. Apical akinesis. Normal right ventricular size and function. Indeterminate diastolic function. Severely dilated left atrium by volume index. Moderate mitral regurgitation. Moderate aortic regurgitation. Moderate tricuspid regurgitation. PASP is estimated at 42 mmHg. Echocardiogram: 12/29/22 (Dr. Magaly Rasmussen)   Ejection fraction is visually estimated at 25%. Overall ejection fraction severely decreased. The anteroseptal and entire apex are akinetic. Left ventricle is mildly enlarged. Mild left ventricular concentric hypertrophy noted.    Normal right ventricle structure and function. Pacer wire visualized in   right ventricle. Left atrial volume index of 88 ml per meters squared BSA. The aortic valve is trileaflet. Moderate aortic stenosis is present. The aortic valve area is 1 cm2 with a   maximum gradient of 35 mmHg and a mean gradient of 18 mmHg. Moderate   aortic regurgitation is noted. Aortic valve pressure half-time 595 ms. Mild mitral regurgitation is present. Moderate tricuspid regurgitation. Pulmonary hypertension is mild to moderate. RVSP is 49 mmHg. No evidence for hemodynamically significant pericardial effusion. Echocardiogram: 2/7/23 (Dr. Kate Whalen)   Dilated left ventricle. Severely reduced left ventricular systolic function. Ejection fraction is visually estimated at 20-25% with WMA's (including apical akinesis). Dilated right ventricle and normal right ventricular function. Indeterminate diastolic function. Severely dilated left atrium by volume index. Moderate mitral regurgitation. Moderate aortic stenosis. Moderate aortic regurgitation. Moderate tricuspid regurgitation. PASP is estimated at 58 mmHg. ASSESSMENT / DIAGNOSIS:   CAD/prior MI s/p PCI (2009)  Ischemic cardiomyopathy with severe LV dysfunction / chronic systolic and diastolic congestive heart failure  Status post ICD implantation (s/p generator change in 2/2018)  Valvular heart disease. History of hypertension. Controlled recently. BP today 132/72 (-138 at prior office visits). Prior history of intermittent hypotension. Asthma. GERD. Depression. Osteoporosis. Overreactive bladder. Bilateral hearing loss: Wears bilateral hearing aids. History of bilateral breast implants with history of removal of extravasated implant material on the right. Diabetes mellitus: Diet controlled. Chronic kidney disease/prerenal azotemia.    S/p right thumb surgery on 5/10/16, Dr. Fara Knox  Dysphagia -- s/p esophageal dilatation in 11/2019 per patient  New onset atrial fibrillation s/p ALMA/CVN on 6/9/2020 (started on amiodarone at that time)  RBBB/LAFB    TREATMENT PLAN:  Results of 3/2022, 12/2022, and 2/2023 echocardiograms reviewed with the patient today -- serial echocardiograms  Continue current medications (including Toprol XL, ARB, low dose aldactone, and [address]). Aldactone and entresto previously stopped in the setting of electrolyte abnormalities and hypotension -- aldactone (12.5 mg daily) restarted in 12/2022. Switched from eliquis to dose adjusted xarelto per patient request for insurance purposes at 10/2022 office visit. Monitor renal function and electrolytes closely  Follow-up with EP re: ICD interrogation (follows with Dr. Macie Grover)  Questions answered again today re: GDMT options (2/22/23)  Follow-up at CHF clinic as scheduled  The above was discussed with the patient and her  today (2/22/23)    Greater than 30 minutes was spent counseling the patient, reviewing the rationale for the above recommendations and reviewing the patient's current medication list, problem list and results of all previously ordered testing.     Hang Ansari MD, MD  [facility]) Cardiology

## 2023-02-23 ENCOUNTER — HOSPITAL ENCOUNTER (OUTPATIENT)
Dept: OTHER | Age: 88
Setting detail: THERAPIES SERIES
Discharge: HOME | End: 2023-02-23
Payer: MEDICARE

## 2023-02-23 ENCOUNTER — TELEPHONE (OUTPATIENT)
Dept: CARDIOLOGY CLINIC | Age: 88
End: 2023-02-23

## 2023-02-23 VITALS
OXYGEN SATURATION: 98 % | HEART RATE: 56 BPM | DIASTOLIC BLOOD PRESSURE: 56 MMHG | SYSTOLIC BLOOD PRESSURE: 121 MMHG | RESPIRATION RATE: 18 BRPM

## 2023-02-23 LAB
ANION GAP SERPL CALCULATED.3IONS-SCNC: 10 MMOL/L (ref 7–16)
BUN BLDV-MCNC: 23 MG/DL (ref 6–23)
CALCIUM SERPL-MCNC: 9.6 MG/DL (ref 8.6–10.2)
CHLORIDE BLD-SCNC: 99 MMOL/L (ref 98–107)
CO2: 27 MMOL/L (ref 22–29)
CREAT SERPL-MCNC: 1.1 MG/DL (ref 0.5–1)
GFR SERPL CREATININE-BSD FRML MDRD: 49 ML/MIN/1.73
GLUCOSE BLD-MCNC: 89 MG/DL (ref 74–99)
POTASSIUM SERPL-SCNC: 5.5 MMOL/L (ref 3.5–5)
PRO-BNP: 6307 PG/ML (ref 0–450)
SODIUM BLD-SCNC: 136 MMOL/L (ref 132–146)

## 2023-02-23 PROCEDURE — 80048 BASIC METABOLIC PNL TOTAL CA: CPT

## 2023-02-23 PROCEDURE — 83880 ASSAY OF NATRIURETIC PEPTIDE: CPT

## 2023-02-23 PROCEDURE — 99214 OFFICE O/P EST MOD 30 MIN: CPT

## 2023-02-23 PROCEDURE — 36415 COLL VENOUS BLD VENIPUNCTURE: CPT

## 2023-02-23 RX ORDER — SPIRONOLACTONE 25 MG/1
25 TABLET ORAL DAILY
COMMUNITY

## 2023-02-23 NOTE — PLAN OF CARE
Problem: Chronic Conditions and Co-morbidities  Goal: Patient's chronic conditions and co-morbidity symptoms are monitored and maintained or improved  Flowsheets (Taken 2/23/2023 8306)  Care Plan - Patient's Chronic Conditions and Co-Morbidity Symptoms are Monitored and Maintained or Improved: Monitor and assess patient's chronic conditions and comorbid symptoms for stability, deterioration, or improvement

## 2023-02-23 NOTE — TELEPHONE ENCOUNTER
Patient seen at CHF Clinic today.  Potassium (5.5).  Patient takes Aldactone, does not any Potassium.  Please review and advise.

## 2023-02-23 NOTE — TELEPHONE ENCOUNTER
Patient notified of Dr. Hidalgo Clause recommendation. Med list amended. Patient states she just restarted Aldactone and has only taken two doses. Dr. Geovanna Lombardi notified, continue with recommendation.

## 2023-02-23 NOTE — PROGRESS NOTES
Congestive Heart Failure [address] [facility]   1935          Referring Provider: DUKE Christy  Primary Care Physician: Dr Kinsey Albarran  Cardiologist: Dr Jaki Nathan  Nephrologist: N/A        History of Present Illness:     Nubia Johnson is a 81 y.o. female with a history of HFrEF, most recent EF 25% on 12-29-22. Patient Story:    She does  have dyspnea with extreme exertion, shortness of breath, or decline in overall functional capacity. She does not have orthopnea, PND, nocturnal cough or hemoptysis. She does not have abdominal distention or bloating, early satiety, anorexia/change in appetite. She does has a good - Fair urinary response to  oral diuretic. She does not have  lower extremity edema. She admits to  lightheadedness,  some dizziness. She denies palpitations, syncope or near syncope. She does not complain of chest pain, pressure, discomfort.          No Known Allergies        Current Outpatient Medications on File Prior to Encounter   Medication Sig Dispense Refill    spironolactone (ALDACTONE) 25 MG tablet Take 25 mg by mouth daily      meclizine (ANTIVERT) 12.5 MG tablet Take 12.5 mg by mouth 3 times daily as needed      vitamin D (ERGOCALCIFEROL) 1.25 MG (96082 UT) CAPS capsule Take 1 capsule by mouth once a week Friday's 12 capsule 0    furosemide (LASIX) 20 MG tablet Take 0.5 tablets by mouth daily 90 tablet 0    Ctcscbklr-Lcvzlldbk-Zoleximchb (METAFOLBIC PLUS) 6-2-600 MG TABS Take by mouth daily (with breakfast)      losartan (COZAAR) 25 MG tablet Take 1 tablet by mouth daily 90 tablet 0    mometasone (NASONEX) 50 MCG/ACT nasal spray 2 sprays by Each Nostril route daily 1 each 3    omeprazole (PRILOSEC) 40 MG delayed release capsule TAKE 1 CAPSULE BY MOUTH EVERY DAY 90 capsule 0    simvastatin (ZOCOR) 20 MG tablet Take 1 tablet by mouth daily 90 tablet 0    Coenzyme Q10 (CO Q-10) 100 MG CAPS Take 1 capsule by mouth daily 90 capsule 0 Methylfol-Algae-T30-Wieygqxmfw (CEREFOLIN NAC) 6-90.314-2-600 MG TABS Take 1 tablet by mouth daily (Patient taking differently: Take 1 tablet by mouth daily Once a week on sunday) 90 tablet 0    budesonide (PULMICORT) 0.25 MG/2ML nebulizer suspension Take 2 mLs by nebulization in the morning and 2 mLs in the evening. 180 each 0    acetaminophen (TYLENOL) 500 MG tablet Take 500 mg by mouth every 8 hours as needed for Pain      rivaroxaban (XARELTO) 15 MG TABS tablet Take 1 tablet by mouth daily (with breakfast) 90 tablet 3    metoprolol succinate (TOPROL XL) 25 MG extended release tablet Take 1 tablet by mouth nightly TAKE 1 TABLET BY MOUTH EVERY DAY 90 tablet 3    spironolactone (ALDACTONE) 25 MG tablet Take 0.5 tablets by mouth daily 30 tablet 3    albuterol (PROVENTIL) (2.5 MG/3ML) 0.083% nebulizer solution Take 3 mLs by nebulization every 4 hours as needed for Wheezing or Shortness of Breath 120 each 2    amiodarone (CORDARONE) 200 MG tablet Take 0.5 tablets by mouth daily ##### Per Dr. Jacobo Del Castillo: Take 100 mg (half tablet) daily ##### 90 tablet 0    ASPIRIN LOW DOSE 81 MG EC tablet Take 1 tablet by mouth daily 90 tablet 0    diphenoxylate-atropine (LOMOTIL) 2.5-0.025 MG per tablet Take 1 tablet by mouth 4 times daily as needed for Diarrhea. Respiratory Therapy Supplies (FULL KIT NEBULIZER SET) MISC Use as directed with nebulized medication. 1 each 0    Multiple Vitamins-Minerals (OCUVITE PO) Take 1 tablet by mouth daily      [DISCONTINUED] mupirocin (BACTROBAN NASAL) 2 % nasal ointment Take by Nasal route 2 times daily. (Patient not taking: No sig reported) 1 each 3     No current facility-administered medications on file prior to encounter. Guideline directed medical:  ARNI/ACE I/ARB: Yes  Beta blocker:   Yes  Aldosterone antagonist:  Yes  SGLT2i:  No        Physical Examination:     BP (!) 121/56   Pulse 56   Resp 18   SpO2 98%     Assessment  Charting Type: Shift assessment    Neurological  Level of Consciousness: Alert (0)  Orientation Level: Oriented X4  Cognition: Appropriate judgement, Appropriate safety awareness, Appropriate attention/concentration, Appropriate for developmental age, Follows commands  Speech: Clear              Respiratory  Respiratory Pattern: Regular  Respiratory Depth: Normal  Respiratory Quality/Effort: Dyspnea with exertion, Dyspnea at rest  L Breath Sounds: Diminished, Clear  R Breath Sounds: Diminished, Clear              Cardiac  Cardiac Regularity: Regular  Cardiac Rhythm: Atrial paced    Rhythm Interpretation  Heart Rate: 56         Gastrointestinal  Abdominal (WDL): Exceptions to WDL  GI Symptoms: Bloating     Gastrointestinal  GI Symptoms: Bloating         Peripheral Vascular  Peripheral Vascular (WDL): Within Defined Limits                   Genitourinary  Genitourinary (WDL): Within Defined Limits    Psychosocial  Psychosocial (WDL): Within Defined Limits                        Heart Rate: 56                     LAB DATA:    Last 3 BMP      Sodium (mmol/L)   Date Value   02/02/2023 140   01/20/2023 137   01/12/2023 145     Potassium (mmol/L)   Date Value   02/02/2023 4.8   01/20/2023 4.6   01/12/2023 4.5     Potassium reflex Magnesium (mmol/L)   Date Value   12/28/2022 4.2   08/18/2022 4.6   06/08/2020 4.0     Chloride (mmol/L)   Date Value   02/02/2023 104   01/20/2023 99   01/12/2023 107     CO2 (mmol/L)   Date Value   02/02/2023 22   01/20/2023 28   01/12/2023 22     BUN (mg/dL)   Date Value   02/02/2023 29 (H)   01/20/2023 28 (H)   01/12/2023 29 (H)     Glucose (mg/dL)   Date Value   02/02/2023 96   01/20/2023 94   01/12/2023 93     Calcium (mg/dL)   Date Value   02/02/2023 9.9   01/20/2023 10.2   01/12/2023 9.4       Last 3 BNP       Pro-BNP (pg/mL)   Date Value   02/02/2023 6,805 (H)   01/20/2023 4,231 (H)   01/12/2023 6,911 (H)          CBC: No results for input(s): WBC, HGB, PLT in the last 72 hours.   BMP:    No results for input(s): NA, K, CL, CO2, BUN, CREATININE, GLUCOSE in the last 72 hours. Hepatic: No results for input(s): AST, ALT, ALB, BILITOT, ALKPHOS in the last 72 hours. Troponin: No results for input(s): TROPONINI in the last 72 hours. BNP: No results for input(s): BNP in the last 72 hours. Lipids: No results for input(s): CHOL, HDL in the last 72 hours. Invalid input(s): LDLCALCU  INR: No results for input(s): INR in the last 72 hours. WEIGHTS:    Wt Readings from Last 3 Encounters:   02/22/23 103 lb 4.8 oz (46.9 kg)   02/08/23 102 lb (46.3 kg)   02/02/23 103 lb (46.7 kg)         TELEMETRY:  Cardiac Regularity: Regular  Cardiac Rhythm/Interpretation: A Paced/SB with 1st degree        ASSESSMENT:  Venice Juárez is evolemic with stable weights. Patient states she's feeling better. She has some dizziness with slight movement. States she's back to driving. Interventions completed this visit:  IV diuretics given no  Lab work obtained yes, BMP/BNP   Reviewed currently prescribed medications with patient, educated on importance of compliance and answered any questions regarding their medication  Educated on signs and symptoms of HF  Educated on low sodium diet    PLAN:  Scheduled to follow up in CHF clinic on   Future Appointments   Date Time Provider Mohit Latham   3/8/2023  1:45 PM Ray Ganser, MD [address] [facility]   3/21/2023  9:15 AM [facility] CHF ROOM 1 [facility]   7/24/2023 10:30 AM [facility] RM [address] [facility]     Given clinic phone number and aware of signs and symptoms to call with any HF change in symptoms. Katey Heath

## 2023-03-08 ENCOUNTER — OFFICE VISIT (OUTPATIENT)
Dept: PRIMARY CARE CLINIC | Age: 88
End: 2023-03-08

## 2023-03-08 VITALS
BODY MASS INDEX: 20.76 KG/M2 | HEART RATE: 55 BPM | TEMPERATURE: 97.7 F | SYSTOLIC BLOOD PRESSURE: 116 MMHG | HEIGHT: 59 IN | OXYGEN SATURATION: 98 % | DIASTOLIC BLOOD PRESSURE: 64 MMHG | WEIGHT: 103 LBS

## 2023-03-08 DIAGNOSIS — I42.0 DILATED CARDIOMYOPATHY (HCC): ICD-10-CM

## 2023-03-08 DIAGNOSIS — J44.9 CHRONIC OBSTRUCTIVE PULMONARY DISEASE, UNSPECIFIED COPD TYPE (HCC): ICD-10-CM

## 2023-03-08 DIAGNOSIS — R13.19 ESOPHAGEAL DYSPHAGIA: Primary | ICD-10-CM

## 2023-03-08 DIAGNOSIS — Z95.810 AUTOMATIC IMPLANTABLE CARDIOVERTER-DEFIBRILLATOR IN SITU: ICD-10-CM

## 2023-03-08 DIAGNOSIS — I50.31 ACUTE DIASTOLIC CONGESTIVE HEART FAILURE (HCC): ICD-10-CM

## 2023-03-08 DIAGNOSIS — I25.10 CORONARY ARTERY DISEASE INVOLVING NATIVE CORONARY ARTERY OF NATIVE HEART WITHOUT ANGINA PECTORIS: ICD-10-CM

## 2023-03-08 DIAGNOSIS — I10 ESSENTIAL HYPERTENSION: ICD-10-CM

## 2023-03-08 ASSESSMENT — ENCOUNTER SYMPTOMS
ABDOMINAL DISTENTION: 0
BACK PAIN: 0
ABDOMINAL PAIN: 0
DIARRHEA: 0
TROUBLE SWALLOWING: 0
VOMITING: 0
NAUSEA: 0
SINUS PRESSURE: 0
SORE THROAT: 0
COLOR CHANGE: 0
ALLERGIC/IMMUNOLOGIC NEGATIVE: 1
BLOOD IN STOOL: 0
RHINORRHEA: 0
CONSTIPATION: 0

## 2023-03-08 NOTE — PROGRESS NOTES
Rico You presents today for   Follow up of HTN, ASHD, LVD, COPD  Current Outpatient Medications   Medication Sig Dispense Refill    vitamin D (ERGOCALCIFEROL) 1.25 MG (62555 UT) CAPS capsule Take 1 capsule by mouth once a week Friday's 12 capsule 0    furosemide (LASIX) 20 MG tablet Take 0.5 tablets by mouth daily 90 tablet 0    losartan (COZAAR) 25 MG tablet Take 1 tablet by mouth daily 90 tablet 0    mometasone (NASONEX) 50 MCG/ACT nasal spray 2 sprays by Each Nostril route daily 1 each 3    omeprazole (PRILOSEC) 40 MG delayed release capsule TAKE 1 CAPSULE BY MOUTH EVERY DAY 90 capsule 0    simvastatin (ZOCOR) 20 MG tablet Take 1 tablet by mouth daily 90 tablet 0    Methylfol-Algae-I68-Vjgyamphrx (CEREFOLIN NAC) 6-90.314-2-600 MG TABS Take 1 tablet by mouth daily (Patient taking differently: Take 1 tablet by mouth daily Once a week on sunday) 90 tablet 0    budesonide (PULMICORT) 0.25 MG/2ML nebulizer suspension Take 2 mLs by nebulization in the morning and 2 mLs in the evening. 180 each 0    rivaroxaban (XARELTO) 15 MG TABS tablet Take 1 tablet by mouth daily (with breakfast) 90 tablet 3    metoprolol succinate (TOPROL XL) 25 MG extended release tablet Take 1 tablet by mouth nightly TAKE 1 TABLET BY MOUTH EVERY DAY 90 tablet 3    amiodarone (CORDARONE) 200 MG tablet Take 0.5 tablets by mouth daily ##### Per Dr. Kalli Sandoval: Take 100 mg (half tablet) daily ##### 90 tablet 0    ASPIRIN LOW DOSE 81 MG EC tablet Take 1 tablet by mouth daily 90 tablet 0    Respiratory Therapy Supplies (FULL KIT NEBULIZER SET) MISC Use as directed with nebulized medication.  1 each 0    meclizine (ANTIVERT) 12.5 MG tablet Take 12.5 mg by mouth 3 times daily as needed (Patient not taking: Reported on 3/8/2023)      Gzydmasau-Zssyouchb-Drdtlpamvb (METAFOLBIC PLUS) 6-2-600 MG TABS Take by mouth daily (with breakfast) (Patient not taking: Reported on 3/8/2023)      Coenzyme Q10 (CO Q-10) 100 MG CAPS Take 1 capsule by mouth daily 90 capsule 0    acetaminophen (TYLENOL) 500 MG tablet Take 500 mg by mouth every 8 hours as needed for Pain      albuterol (PROVENTIL) (2.5 MG/3ML) 0.083% nebulizer solution Take 3 mLs by nebulization every 4 hours as needed for Wheezing or Shortness of Breath 120 each 2    Multiple Vitamins-Minerals (OCUVITE PO) Take 1 tablet by mouth daily       No current facility-administered medications for this visit. Past Medical History:   Diagnosis Date    Arthritis     Atrial [facility]     CAD (coronary artery disease)     Cardiomyopathy ([facility]     CHF (congestive heart failure) ([facility] 2010    history of    Chronic anticoagulation     Chronic bronchitis (HCC)     none recent    COPD (chronic obstructive pulmonary disease) ([facility]     COVID 08/2022    in hospital x5 days    Depression     GERD (gastroesophageal reflux disease)     HFrEF (heart failure with reduced ejection fraction) ([facility] 12/29/2016 12/26/16- LVEF 20-25%, large apical aneurysm, LA moderate-severely dilated, mildly enlarged RA, mild MR, mild TR, mild AR    Hyperlipidemia     Hypertension     Left ventricular dysfunction     Low vitamin D level     MI (myocardial infarction) ([facility] 10/30/2009    Osteopenia     Osteoporosis     Swallowing difficulty           Subjective:  AS above. Overall feeling pretty good. Thinks may need esophagus stretched again, had it years ago, is starting to have difficulty with food going down. No chest pain,increased sob or swelling. Saw cardiologist, put back on Spironolactone but had to be discontinued due to elevation in K level. Congestive Heart Failure  Pertinent negatives include no abdominal pain, chest pain or palpitations. Review of Systems   Constitutional:  Negative for activity change, appetite change and chills. HENT:  Negative for congestion, ear pain, mouth sores, postnasal drip, rhinorrhea, sinus pressure, sneezing, sore throat and trouble swallowing. Eyes:  Negative for visual disturbance. Cardiovascular:  Negative for chest pain, palpitations and leg swelling. Gastrointestinal:  Negative for abdominal distention, abdominal pain, blood in stool, constipation, diarrhea, nausea and vomiting. Dysphagia   Endocrine: Negative for cold intolerance, heat intolerance, polydipsia and polyuria. Genitourinary:  Negative for difficulty urinating, dysuria, flank pain, frequency and urgency. Musculoskeletal:  Negative for arthralgias, back pain, gait problem, neck pain and neck stiffness. Skin: Negative. Negative for color change. Allergic/Immunologic: Negative. Neurological:  Negative for dizziness, tremors, speech difficulty, weakness, light-headedness and headaches. Hematological: Negative. Psychiatric/Behavioral: Negative. Objective:  /64 (Site: Left Upper Arm, Position: Sitting, Cuff Size: Medium Adult)   Pulse 55   Temp 97.7 °F (36.5 °C)   Ht 4' 11\" (1.499 m)   Wt 103 lb (46.7 kg)   SpO2 98%   BMI 20.80 kg/m²      Physical Exam  Vitals reviewed. Constitutional:       Appearance: Normal appearance. HENT:      Head: Normocephalic. Right Ear: Tympanic membrane and external ear normal. There is no impacted cerumen. Left Ear: Tympanic membrane and external ear normal. There is no impacted cerumen. Nose: Nose normal.      Mouth/Throat:      Pharynx: Oropharynx is clear. No oropharyngeal exudate. Eyes:      Extraocular Movements: Extraocular movements intact. Conjunctiva/sclera: Conjunctivae normal.      Pupils: Pupils are equal, round, and reactive to light. Cardiovascular:      Rate and Rhythm: Normal rate and regular rhythm. Heart sounds: No murmur (claritza lsb and rt 2nd ics) heard. No friction rub. No gallop. Pulmonary:      Effort: Pulmonary effort is normal.      Breath sounds: Normal breath sounds. Abdominal:      General: Bowel sounds are normal. There is no distension. Palpations: Abdomen is soft. There is no mass. Tenderness: There is no abdominal tenderness. There is no guarding or rebound. Musculoskeletal:         General: No swelling, tenderness or deformity. Normal range of motion. Cervical back: Normal range of motion and neck supple. No tenderness. Lymphadenopathy:      Cervical: No cervical adenopathy. Skin:     General: Skin is warm. Coloration: Skin is not pale. Findings: No rash. Neurological:      General: No focal deficit present. Mental Status: She is alert and oriented to person, place, and time. Assessment:  Sukhdev Segal was seen today for congestive heart failure.     Diagnoses and all orders for this visit:    Esophageal dysphagia  Comments:  Referred for possible esophageal dilatation  Orders:  -     External Referral To Gastroenterology    Dilated cardiomyopathy ([facility]  Comments:  Compensated, no signs of failure    Coronary artery disease involving native coronary artery of native heart without angina pectoris  Comments:  No angina    Automatic implantable cardioverter-defibrillator in situ    Essential hypertension  Comments:  Controlled on meds, cont same    Acute on chronic /diastolic/ congestive heart failure ([address]  Comments:  stable    Chronic obstructive pulmonary disease, unspecified COPD type ([facility]  Comments:  Stable, no increased sob, good exercise tolerance at this time

## 2023-03-21 ENCOUNTER — HOSPITAL ENCOUNTER (OUTPATIENT)
Dept: OTHER | Age: 88
Setting detail: THERAPIES SERIES
Discharge: HOME | End: 2023-03-21
Payer: MEDICARE

## 2023-03-21 VITALS
HEART RATE: 55 BPM | SYSTOLIC BLOOD PRESSURE: 107 MMHG | OXYGEN SATURATION: 97 % | DIASTOLIC BLOOD PRESSURE: 53 MMHG | BODY MASS INDEX: 21.01 KG/M2 | WEIGHT: 104 LBS | RESPIRATION RATE: 18 BRPM

## 2023-03-21 LAB
ANION GAP SERPL CALCULATED.3IONS-SCNC: 9 MMOL/L (ref 7–16)
BNP BLD-MCNC: 3619 PG/ML (ref 0–450)
BUN SERPL-MCNC: 22 MG/DL (ref 6–23)
CALCIUM SERPL-MCNC: 9.2 MG/DL (ref 8.6–10.2)
CHLORIDE SERPL-SCNC: 96 MMOL/L (ref 98–107)
CO2 SERPL-SCNC: 27 MMOL/L (ref 22–29)
CREAT SERPL-MCNC: 1.1 MG/DL (ref 0.5–1)
GLUCOSE SERPL-MCNC: 100 MG/DL (ref 74–99)
POTASSIUM SERPL-SCNC: 4.6 MMOL/L (ref 3.5–5)
SODIUM SERPL-SCNC: 132 MMOL/L (ref 132–146)

## 2023-03-21 PROCEDURE — 80048 BASIC METABOLIC PNL TOTAL CA: CPT

## 2023-03-21 PROCEDURE — 83880 ASSAY OF NATRIURETIC PEPTIDE: CPT

## 2023-03-21 PROCEDURE — 96374 THER/PROPH/DIAG INJ IV PUSH: CPT

## 2023-03-21 PROCEDURE — 99214 OFFICE O/P EST MOD 30 MIN: CPT

## 2023-03-21 PROCEDURE — 2580000003 HC RX 258: Performed by: INTERNAL MEDICINE

## 2023-03-21 PROCEDURE — 6360000002 HC RX W HCPCS: Performed by: INTERNAL MEDICINE

## 2023-03-21 PROCEDURE — 36415 COLL VENOUS BLD VENIPUNCTURE: CPT

## 2023-03-21 RX ORDER — SODIUM CHLORIDE 0.9 % (FLUSH) 0.9 %
10 SYRINGE (ML) INJECTION 2 TIMES DAILY
Status: DISCONTINUED | OUTPATIENT
Start: 2023-03-21 | End: 2023-03-22 | Stop reason: HOSPADM

## 2023-03-21 RX ORDER — FUROSEMIDE 10 MG/ML
40 INJECTION INTRAMUSCULAR; INTRAVENOUS ONCE
Status: COMPLETED | OUTPATIENT
Start: 2023-03-21 | End: 2023-03-21

## 2023-03-21 RX ADMIN — SODIUM CHLORIDE, PRESERVATIVE FREE 10 ML: 5 INJECTION INTRAVENOUS at 09:29

## 2023-03-21 RX ADMIN — FUROSEMIDE 40 MG: 10 INJECTION, SOLUTION INTRAMUSCULAR; INTRAVENOUS at 09:29

## 2023-03-21 NOTE — PLAN OF CARE
Problem: Chronic Conditions and Co-morbidities  Goal: Patient's chronic conditions and co-morbidity symptoms are monitored and maintained or improved  Flowsheets (Taken 3/21/2023 1279)  Care Plan - Patient's Chronic Conditions and Co-Morbidity Symptoms are Monitored and Maintained or Improved: Monitor and assess patient's chronic conditions and comorbid symptoms for stability, deterioration, or improvement

## 2023-03-21 NOTE — PROGRESS NOTES
Appropriate attention/concentration, Appropriate for developmental age, Follows commands  Speech: Clear              Respiratory  Respiratory Pattern: Regular  Respiratory Depth: Normal  Respiratory Quality/Effort: Dyspnea with exertion, Dyspnea at rest  L Breath Sounds: Diminished, Clear  R Breath Sounds: Diminished, Clear              Cardiac  Cardiac Regularity: Regular  Cardiac Rhythm: Atrial paced    Rhythm Interpretation  Heart Rate: 55         Gastrointestinal  Abdominal (WDL): Exceptions to WDL  RUQ Bowel Sounds: Active  LUQ Bowel Sounds: Active  RLQ Bowel Sounds: Active  LLQ Bowel Sounds: Active  GI Symptoms: Bloating     Gastrointestinal  GI Symptoms: Bloating    Bowel Sounds  RUQ Bowel Sounds: Active  LUQ Bowel Sounds: Active  RLQ Bowel Sounds: Active  LLQ Bowel Sounds:  Active    Peripheral Vascular  Peripheral Vascular (WDL): Exceptions to WDL  Edema: Right lower extremity, Left lower extremity  RLE Edema: Trace, Pitting  LLE Edema: Trace, Pitting                   Genitourinary  Genitourinary (WDL): Within Defined Limits    Psychosocial  Psychosocial (WDL): Within Defined Limits                        Heart Rate: 55                     LAB DATA:    Last 3 BMP      Sodium (mmol/L)   Date Value   02/23/2023 136   02/02/2023 140   01/20/2023 137     Potassium (mmol/L)   Date Value   02/23/2023 5.5 (H)   02/02/2023 4.8   01/20/2023 4.6     Potassium reflex Magnesium (mmol/L)   Date Value   12/28/2022 4.2   08/18/2022 4.6   06/08/2020 4.0     Chloride (mmol/L)   Date Value   02/23/2023 99   02/02/2023 104   01/20/2023 99     CO2 (mmol/L)   Date Value   02/23/2023 27   02/02/2023 22   01/20/2023 28     BUN (mg/dL)   Date Value   02/23/2023 23   02/02/2023 29 (H)   01/20/2023 28 (H)     Glucose (mg/dL)   Date Value   02/23/2023 89   02/02/2023 96   01/20/2023 94     Calcium (mg/dL)   Date Value   02/23/2023 9.6   02/02/2023 9.9   01/20/2023 10.2       Last 3 BNP       Pro-BNP (pg/mL)   Date Value   02/23/2023

## 2023-04-04 ENCOUNTER — TELEPHONE (OUTPATIENT)
Dept: CARDIOLOGY CLINIC | Age: 88
End: 2023-04-04

## 2023-04-04 ENCOUNTER — HOSPITAL ENCOUNTER (OUTPATIENT)
Dept: OTHER | Age: 88
Setting detail: THERAPIES SERIES
Discharge: HOME | End: 2023-04-04
Payer: MEDICARE

## 2023-04-04 VITALS
WEIGHT: 105 LBS | BODY MASS INDEX: 21.21 KG/M2 | RESPIRATION RATE: 18 BRPM | SYSTOLIC BLOOD PRESSURE: 137 MMHG | HEART RATE: 55 BPM | DIASTOLIC BLOOD PRESSURE: 63 MMHG | OXYGEN SATURATION: 98 %

## 2023-04-04 LAB
ANION GAP SERPL CALCULATED.3IONS-SCNC: 12 MMOL/L (ref 7–16)
BNP BLD-MCNC: 8810 PG/ML (ref 0–450)
BUN SERPL-MCNC: 33 MG/DL (ref 6–23)
CALCIUM SERPL-MCNC: 10 MG/DL (ref 8.6–10.2)
CHLORIDE SERPL-SCNC: 97 MMOL/L (ref 98–107)
CO2 SERPL-SCNC: 26 MMOL/L (ref 22–29)
CREAT SERPL-MCNC: 1 MG/DL (ref 0.5–1)
GLUCOSE SERPL-MCNC: 93 MG/DL (ref 74–99)
POTASSIUM SERPL-SCNC: 4.8 MMOL/L (ref 3.5–5)
SODIUM SERPL-SCNC: 135 MMOL/L (ref 132–146)

## 2023-04-04 PROCEDURE — 6360000002 HC RX W HCPCS: Performed by: INTERNAL MEDICINE

## 2023-04-04 PROCEDURE — 36415 COLL VENOUS BLD VENIPUNCTURE: CPT

## 2023-04-04 PROCEDURE — 96374 THER/PROPH/DIAG INJ IV PUSH: CPT

## 2023-04-04 PROCEDURE — 99214 OFFICE O/P EST MOD 30 MIN: CPT

## 2023-04-04 PROCEDURE — 2580000003 HC RX 258: Performed by: INTERNAL MEDICINE

## 2023-04-04 PROCEDURE — 83880 ASSAY OF NATRIURETIC PEPTIDE: CPT

## 2023-04-04 PROCEDURE — 80048 BASIC METABOLIC PNL TOTAL CA: CPT

## 2023-04-04 RX ORDER — FUROSEMIDE 20 MG/1
20 TABLET ORAL DAILY
COMMUNITY

## 2023-04-04 RX ORDER — SODIUM CHLORIDE 0.9 % (FLUSH) 0.9 %
10 SYRINGE (ML) INJECTION PRN
Status: DISCONTINUED | OUTPATIENT
Start: 2023-04-04 | End: 2023-04-05 | Stop reason: HOSPADM

## 2023-04-04 RX ORDER — FUROSEMIDE 10 MG/ML
40 INJECTION INTRAMUSCULAR; INTRAVENOUS ONCE
Status: COMPLETED | OUTPATIENT
Start: 2023-04-04 | End: 2023-04-04

## 2023-04-04 RX ADMIN — SODIUM CHLORIDE, PRESERVATIVE FREE 10 ML: 5 INJECTION INTRAVENOUS at 10:54

## 2023-04-04 RX ADMIN — FUROSEMIDE 40 MG: 10 INJECTION, SOLUTION INTRAMUSCULAR; INTRAVENOUS at 10:54

## 2023-04-04 NOTE — PROGRESS NOTES
Voice message left with Prachi at Dr. Falguni Obando to please inform Dr. Darryle Sauers pt called for same day appt. For c/o  shortness of breath . BNP significantly higher than last month and pt received Lasix 40 mg IV at Clinic. Please check to see if Lasix po can be increased from 10 mg daily(which pt is presently taking)  to 20 mg daily. Please advise pt of any changes.
03/21/2023 3,619 (H)   02/23/2023 6,307 (H)   02/02/2023 6,805 (H)          CBC: No results for input(s): WBC, HGB, PLT in the last 72 hours. BMP:    No results for input(s): NA, K, CL, CO2, BUN, CREATININE, GLUCOSE in the last 72 hours. Hepatic: No results for input(s): AST, ALT, ALB, BILITOT, ALKPHOS in the last 72 hours. Troponin: No results for input(s): TROPONINI in the last 72 hours. BNP: No results for input(s): BNP in the last 72 hours. Lipids: No results for input(s): CHOL, HDL in the last 72 hours. Invalid input(s): LDLCALCU  INR: No results for input(s): INR in the last 72 hours. WEIGHTS:    Wt Readings from Last 3 Encounters:   04/04/23 105 lb (47.6 kg)   03/21/23 104 lb (47.2 kg)   03/08/23 103 lb (46.7 kg)         TELEMETRY:  Cardiac Regularity: Regular  Cardiac Rhythm/Interpretation: A Paced/SB with 1st degree        ASSESSMENT:Interventions completed this visit:  PT CALLED IN FOR SAME DAY APPT. PT.'S WEIGHT IS UP 1 LB, PT C/O FEELING BLOATED AND SHORT OF BREATH. PT C/O SCIATICA PAIN AND FEELS THAT ATTRIBUTES TO SHORTNESS OF BREATH  . NO JVD PRESENT. IV diuretics giveN   LASIX 20 MG IV  Lab work obtained yes, BMP/BNP   Reviewed currently prescribed medications with patient, educated on importance of compliance and answered any questions regarding their medication  Educated on signs and symptoms of HF  Educated on low sodium diet    PLAN:  Scheduled to follow up in CHF clinic on   Future Appointments   Date Time Provider Mohit Latham   4/11/2023  9:15 AM [facility] CHF ROOM 1 [facility]   6/20/2023  1:30 PM Alka Hanley MD [facility]   7/24/2023 10:30 AM [facility] RM 8 [facility] Justyna HOD     Given clinic phone number and aware of signs and symptoms to call with any HF change in symptoms. Efraín Woodson

## 2023-04-04 NOTE — TELEPHONE ENCOUNTER
CHF Clinic note from today:    \"Voice message left with Prachi at Dr. Shavon Wynn to please inform Dr. Gianluca Gallegos pt called for same day appt. For c/o  shortness of breath . BNP significantly higher than last month and pt received Lasix 40 mg IV at Clinic. Please check to see if Lasix po can be increased from 10 mg daily(which pt is presently taking)  to 20 mg daily. Please advise pt of any changes. \"

## 2023-04-04 NOTE — PLAN OF CARE
Problem: Chronic Conditions and Co-morbidities  Goal: Patient's chronic conditions and co-morbidity symptoms are monitored and maintained or improved  Flowsheets (Taken 4/4/2023 0980)  Care Plan - Patient's Chronic Conditions and Co-Morbidity Symptoms are Monitored and Maintained or Improved: Monitor and assess patient's chronic conditions and comorbid symptoms for stability, deterioration, or improvement

## 2023-04-07 RX ORDER — METHYLPREDNISOLONE 4 MG/1
TABLET ORAL
Qty: 1 KIT | Refills: 0 | Status: SHIPPED | OUTPATIENT
Start: 2023-04-07 | End: 2023-04-13

## 2023-04-07 NOTE — TELEPHONE ENCOUNTER
Has had really bad sciatic nerve pain in her right leg for a week now. Stated she has been \" living off ttylenol, and using a heating pad\" with no relief of symptoms.  Please advise

## 2023-04-07 NOTE — TELEPHONE ENCOUNTER
Notified patient that Dr Andres Pastrana is sending a medrol dose pack but to make sure she eats something with them so it doesn't upset your stomach.

## 2023-04-19 RX ORDER — FUROSEMIDE 20 MG/1
20 TABLET ORAL DAILY
Qty: 90 TABLET | Refills: 3 | Status: SHIPPED | OUTPATIENT
Start: 2023-04-19

## 2023-04-21 RX ORDER — ASPIRIN 81 MG/1
TABLET, COATED ORAL
Qty: 90 TABLET | Refills: 0 | Status: SHIPPED | OUTPATIENT
Start: 2023-04-21

## 2023-04-23 DIAGNOSIS — J44.1 CHRONIC OBSTRUCTIVE PULMONARY DISEASE WITH (ACUTE) EXACERBATION (HCC): ICD-10-CM

## 2023-04-24 RX ORDER — BUDESONIDE 0.25 MG/2ML
INHALANT ORAL
Qty: 120 ML | Refills: 3 | Status: SHIPPED | OUTPATIENT
Start: 2023-04-24

## 2023-04-24 NOTE — TELEPHONE ENCOUNTER
Last Appointment:  3/8/2023  Future Appointments   Date Time Provider Mohit Latham   4/25/2023  9:45 AM [facility] CHF ROOM 1 [facility]F [facility]   6/20/2023  1:30 PM Ema Baeza MD [facility]   7/24/2023 10:30 AM [facility] RM 8 [facility]

## 2023-04-25 ENCOUNTER — HOSPITAL ENCOUNTER (OUTPATIENT)
Dept: OTHER | Age: 88
Setting detail: THERAPIES SERIES
Discharge: HOME | End: 2023-04-25
Payer: MEDICARE

## 2023-04-25 VITALS
DIASTOLIC BLOOD PRESSURE: 56 MMHG | RESPIRATION RATE: 18 BRPM | HEART RATE: 60 BPM | SYSTOLIC BLOOD PRESSURE: 109 MMHG | BODY MASS INDEX: 20.6 KG/M2 | WEIGHT: 102 LBS | OXYGEN SATURATION: 98 %

## 2023-04-25 LAB
ANION GAP SERPL CALCULATED.3IONS-SCNC: 11 MMOL/L (ref 7–16)
BNP BLD-MCNC: 4906 PG/ML (ref 0–450)
BUN SERPL-MCNC: 18 MG/DL (ref 6–23)
CALCIUM SERPL-MCNC: 9.4 MG/DL (ref 8.6–10.2)
CHLORIDE SERPL-SCNC: 95 MMOL/L (ref 98–107)
CO2 SERPL-SCNC: 28 MMOL/L (ref 22–29)
CREAT SERPL-MCNC: 1.1 MG/DL (ref 0.5–1)
GLUCOSE SERPL-MCNC: 99 MG/DL (ref 74–99)
POTASSIUM SERPL-SCNC: 4.8 MMOL/L (ref 3.5–5)
SODIUM SERPL-SCNC: 134 MMOL/L (ref 132–146)

## 2023-04-25 PROCEDURE — 99214 OFFICE O/P EST MOD 30 MIN: CPT

## 2023-04-25 PROCEDURE — 36415 COLL VENOUS BLD VENIPUNCTURE: CPT

## 2023-04-25 PROCEDURE — 83880 ASSAY OF NATRIURETIC PEPTIDE: CPT

## 2023-04-25 PROCEDURE — 80048 BASIC METABOLIC PNL TOTAL CA: CPT

## 2023-04-25 NOTE — PROGRESS NOTES
for input(s): NA, K, CL, CO2, BUN, CREATININE, GLUCOSE in the last 72 hours. Hepatic: No results for input(s): AST, ALT, ALB, BILITOT, ALKPHOS in the last 72 hours. Troponin: No results for input(s): TROPONINI in the last 72 hours. BNP: No results for input(s): BNP in the last 72 hours. Lipids: No results for input(s): CHOL, HDL in the last 72 hours. Invalid input(s): LDLCALCU  INR: No results for input(s): INR in the last 72 hours. WEIGHTS:    Wt Readings from Last 3 Encounters:   04/25/23 102 lb (46.3 kg)   04/11/23 102 lb (46.3 kg)   04/04/23 105 lb (47.6 kg)         TELEMETRY:  Cardiac Regularity: Regular  Cardiac Rhythm/Interpretation: A Paced/SB with 1st degree        ASSESSMENT: Patient weight 102lbs today, WHICH IS STABLE. Denies SOB but continues to have sciatica pain. Lungs clear. NO JVD present. Patient DENIES ANY DISCOMFORT AND ADMITS TO TAKING ORAL DIURETIC THIS AM.    Interventions completed this visit:  IV diuretics given: No, patien  t Lab work obtained: Yes, BMP/BNP   Reviewed currently prescribed medications with patient, educated on importance of compliance and answered any questions regarding their medication  Educated on signs and symptoms of HF  Educated on low sodium diet    PLAN:  Scheduled to follow up in CHF clinic on   Future Appointments   Date Time Provider Mohit Latham   5/16/2023  9:15 AM [facility]   6/20/2023  1:30 PM Tim Huntley MD [facility]   7/24/2023 10:30 AM [facility]  [address] [facility] [address]     Given clinic phone number and aware of signs and symptoms to call with any HF change in symptoms.

## 2023-04-25 NOTE — PLAN OF CARE
Problem: Chronic Conditions and Co-morbidities  Goal: Patient's chronic conditions and co-morbidity symptoms are monitored and maintained or improved  Flowsheets (Taken 4/25/2023 1564)  Care Plan - Patient's Chronic Conditions and Co-Morbidity Symptoms are Monitored and Maintained or Improved: Monitor and assess patient's chronic conditions and comorbid symptoms for stability, deterioration, or improvement

## 2023-04-27 ENCOUNTER — TELEPHONE (OUTPATIENT)
Dept: CARDIOLOGY CLINIC | Age: 88
End: 2023-04-27

## 2023-04-27 NOTE — TELEPHONE ENCOUNTER
Patient needs cardiac clearance for EGD with anesthesia on 5/18/23 by [facility]. Patient denies any chest pain or palpitations since last visit in February 2023. Patient is able to complete all activities of daily living, including; climbing one flight of stairs and walking one block, she does get short of breath but she states this is normal for her and it has not increased. Recommended to hold Xarelto (2) days prior. Please advise.

## 2023-04-28 NOTE — TELEPHONE ENCOUNTER
Patient notified of Dr. Karuna Poole risk assessment/recommendation for procedure. Note faxed to [facility] [phone].

## 2023-05-01 RX ORDER — MOMETASONE FUROATE 50 UG/1
SPRAY, METERED NASAL
Qty: 1 EACH | Refills: 1 | Status: SHIPPED | OUTPATIENT
Start: 2023-05-01

## 2023-05-03 RX ORDER — AMIODARONE HYDROCHLORIDE 200 MG/1
TABLET ORAL
Qty: 45 TABLET | Refills: 1 | Status: SHIPPED | OUTPATIENT
Start: 2023-05-03

## 2023-05-16 ENCOUNTER — HOSPITAL ENCOUNTER (OUTPATIENT)
Dept: OTHER | Age: 88
Setting detail: THERAPIES SERIES
Discharge: HOME | End: 2023-05-16
Payer: MEDICARE

## 2023-05-16 VITALS
HEART RATE: 55 BPM | DIASTOLIC BLOOD PRESSURE: 66 MMHG | RESPIRATION RATE: 18 BRPM | OXYGEN SATURATION: 100 % | SYSTOLIC BLOOD PRESSURE: 145 MMHG | BODY MASS INDEX: 20.6 KG/M2 | WEIGHT: 102 LBS

## 2023-05-16 LAB
ANION GAP SERPL CALCULATED.3IONS-SCNC: 9 MMOL/L (ref 7–16)
BASOPHILS # BLD: 0.04 E9/L (ref 0–0.2)
BASOPHILS NFR BLD: 0.7 % (ref 0–2)
BNP BLD-MCNC: 5020 PG/ML (ref 0–450)
BUN SERPL-MCNC: 29 MG/DL (ref 6–23)
CALCIUM SERPL-MCNC: 9.5 MG/DL (ref 8.6–10.2)
CHLORIDE SERPL-SCNC: 100 MMOL/L (ref 98–107)
CO2 SERPL-SCNC: 28 MMOL/L (ref 22–29)
CREAT SERPL-MCNC: 1.1 MG/DL (ref 0.5–1)
EOSINOPHIL # BLD: 0.11 E9/L (ref 0.05–0.5)
EOSINOPHIL NFR BLD: 2 % (ref 0–6)
ERYTHROCYTE [DISTWIDTH] IN BLOOD BY AUTOMATED COUNT: 13.8 FL (ref 11.5–15)
FERRITIN SERPL-MCNC: 21 NG/ML
FOLATE SERPL-MCNC: >20 NG/ML (ref 4.8–24.2)
GLUCOSE SERPL-MCNC: 100 MG/DL (ref 74–99)
HCT VFR BLD AUTO: 31.2 % (ref 34–48)
HCT VFR BLD AUTO: 31.3 % (ref 34–48)
HGB BLD-MCNC: 9.8 G/DL (ref 11.5–15.5)
IMM GRANULOCYTES # BLD: 0.01 E9/L
IMM GRANULOCYTES NFR BLD: 0.2 % (ref 0–5)
IMMATURE RETIC FRACT: 23.5 % (ref 3–15.9)
IRON SATN MFR SERPL: 11 % (ref 15–50)
IRON SERPL-MCNC: 50 MCG/DL (ref 37–145)
LYMPHOCYTES # BLD: 0.72 E9/L (ref 1.5–4)
LYMPHOCYTES NFR BLD: 13.4 % (ref 20–42)
MCH RBC QN AUTO: 30.3 PG (ref 26–35)
MCHC RBC AUTO-ENTMCNC: 31.3 % (ref 32–34.5)
MCV RBC AUTO: 96.9 FL (ref 80–99.9)
MONOCYTES # BLD: 0.49 E9/L (ref 0.1–0.95)
MONOCYTES NFR BLD: 9.1 % (ref 2–12)
NEUTROPHILS # BLD: 4 E9/L (ref 1.8–7.3)
NEUTS SEG NFR BLD: 74.6 % (ref 43–80)
PLATELET # BLD AUTO: 421 E9/L (ref 130–450)
PMV BLD AUTO: 8.7 FL (ref 7–12)
POTASSIUM SERPL-SCNC: 5 MMOL/L (ref 3.5–5)
RBC # BLD AUTO: 3.23 E12/L (ref 3.5–5.5)
RETIC HGB EQUIVALENT: 29.2 PG (ref 28.2–36.6)
RETICS/RBC NFR: 1.9 % (ref 0.4–1.9)
RETICULOCYTE ABSOLUTE COUNT: 0.06 E12/L
SODIUM SERPL-SCNC: 137 MMOL/L (ref 132–146)
TIBC SERPL-MCNC: 460 MCG/DL (ref 250–450)
VIT B12 SERPL-MCNC: >2000 PG/ML (ref 211–946)
WBC # BLD: 5.4 E9/L (ref 4.5–11.5)

## 2023-05-16 PROCEDURE — 82746 ASSAY OF FOLIC ACID SERUM: CPT

## 2023-05-16 PROCEDURE — 80048 BASIC METABOLIC PNL TOTAL CA: CPT

## 2023-05-16 PROCEDURE — 83550 IRON BINDING TEST: CPT

## 2023-05-16 PROCEDURE — 36415 COLL VENOUS BLD VENIPUNCTURE: CPT

## 2023-05-16 PROCEDURE — 85025 COMPLETE CBC W/AUTO DIFF WBC: CPT

## 2023-05-16 PROCEDURE — 83540 ASSAY OF IRON: CPT

## 2023-05-16 PROCEDURE — 82728 ASSAY OF FERRITIN: CPT

## 2023-05-16 PROCEDURE — 85045 AUTOMATED RETICULOCYTE COUNT: CPT

## 2023-05-16 PROCEDURE — 83880 ASSAY OF NATRIURETIC PEPTIDE: CPT

## 2023-05-16 PROCEDURE — 82607 VITAMIN B-12: CPT

## 2023-05-16 PROCEDURE — 99214 OFFICE O/P EST MOD 30 MIN: CPT

## 2023-05-16 NOTE — PROGRESS NOTES
[facility] PRE-ADMISSION TESTING INSTRUCTIONS      ARRIVAL INSTRUCTIONS:  [x] Parking the day of Surgery is located in the Main Entrance lot. Upon entering the main door make an immediate right to the surgery reception desk. [x] Bring photo ID and insurance card    [] Bring in a copy of Living will or Durable Power of  papers. [x] Please be sure to arrange for responsible adult to provide transportation to and from the hospital    [x] Please arrange for responsible adult to be with you for the 24 hour period post procedure due to having anesthesia    [x] If you awake am of surgery not feeling well or have temperature >100 please call [phone]    GENERAL INSTRUCTIONS:    [x] Nothing by mouth after midnight, including gum, candy, mints or water    [x] You may brush your teeth, but do not swallow any water    [x] Take medications as instructed with 1-2 oz of water    [x] Stop herbal supplements and vitamins 5 days prior to procedure    [x] Follow preop dosing of blood thinners per physician instructions    [] Take 1/2 dose of evening insulin, but no insulin after midnight    [] No oral diabetic medications after midnight    [] If diabetic and have low blood sugar or feel symptomatic, take 1-2oz apple juice only    [] Bring inhalers day of surgery    [] Bring C-PAP/ Bi-Pap day of surgery    [] Bring urine specimen day of surgery    [x] Shower or bath with soap, lather and rinse well, AM of Surgery, no lotion, powders or creams to surgical site    [] Follow bowel prep as instructed per surgeon    [x] No tobacco products within 24 hours of surgery     [x] No alcohol or illegal drug use within 24 hours of surgery.     [x] Jewelry, body piercing's, eyeglasses, contact lenses and dentures are not permitted into surgery (bring cases)      [x] Please do not wear any nail polish, make up or hair products on the day of surgery    [x] You can expect a call the business day prior to

## 2023-05-16 NOTE — PROGRESS NOTES
[facility]   1935          Referring Provider: DUKE Griffin  Primary Care Physician: Dr Andres Pastrana  Cardiologist: Dr Kandi Layne  Nephrologist: N/A        History of Present Illness:     Hermes Cortez is a 81 y.o. female with a history of HFrEF, most recent EF 25% on 12-29-22. Patient Story:    She does have some dyspnea with  exertion, shortness of breath, or decline in overall functional capacity. She does not have orthopnea, PND, nocturnal cough or hemoptysis. She does  have  slight abdominal distention or bloating, early satiety, anorexia/change in appetite. She does have a fair - good urinary response to  oral diuretic. She does  NOT have  lower extremity edema. She denies lightheadedness,  denies dizziness. She denies palpitations, syncope or near syncope. She does not complain of chest pain, pressure, discomfort.          No Known Allergies        Current Outpatient Medications on File Prior to Encounter   Medication Sig Dispense Refill    amiodarone (CORDARONE) 200 MG tablet TAKE 1/2 TABLETS BY MOUTH DAILY 45 tablet 1    mometasone (NASONEX) 50 MCG/ACT nasal spray SPRAY 2 SPRAYS INTO EACH NOSTRIL EVERY DAY 1 each 1    budesonide (PULMICORT) 0.25 MG/2ML nebulizer suspension TAKE 2 MLS BY NEBULIZATION 2 TIMES DAILY 120 mL 3    ASPIRIN LOW DOSE 81 MG EC tablet TAKE 1 TABLET BY MOUTH EVERY DAY 90 tablet 0    furosemide (LASIX) 20 MG tablet Take 1 tablet by mouth daily 90 tablet 3    simvastatin (ZOCOR) 20 MG tablet TAKE 1 TABLET BY MOUTH EVERY DAY 90 tablet 0    meclizine (ANTIVERT) 12.5 MG tablet Take 12.5 mg by mouth 3 times daily as needed (Patient not taking: Reported on 3/8/2023)      vitamin D (ERGOCALCIFEROL) 1.25 MG (69872 UT) CAPS capsule Take 1 capsule by mouth once a week Friday's 12 capsule 0    Rxsociikb-Fpqugpprq-Awrutmcqww (METAFOLBIC PLUS) 6-2-600 MG TABS Take by mouth daily (with breakfast)

## 2023-05-16 NOTE — PLAN OF CARE
Problem: Chronic Conditions and Co-morbidities  Goal: Patient's chronic conditions and co-morbidity symptoms are monitored and maintained or improved  Flowsheets (Taken 5/16/2023 8957)  Care Plan - Patient's Chronic Conditions and Co-Morbidity Symptoms are Monitored and Maintained or Improved: Monitor and assess patient's chronic conditions and comorbid symptoms for stability, deterioration, or improvement

## 2023-05-18 ENCOUNTER — ANESTHESIA EVENT (OUTPATIENT)
Dept: ENDOSCOPY | Age: 88
End: 2023-05-18
Payer: MEDICARE

## 2023-05-18 ENCOUNTER — HOSPITAL ENCOUNTER (OUTPATIENT)
Age: 88
Setting detail: OUTPATIENT SURGERY
Discharge: HOME | End: 2023-05-18
Attending: INTERNAL MEDICINE | Admitting: INTERNAL MEDICINE
Payer: MEDICARE

## 2023-05-18 ENCOUNTER — ANESTHESIA (OUTPATIENT)
Dept: ENDOSCOPY | Age: 88
End: 2023-05-18
Payer: MEDICARE

## 2023-05-18 VITALS
HEIGHT: 59 IN | SYSTOLIC BLOOD PRESSURE: 124 MMHG | WEIGHT: 102 LBS | HEART RATE: 61 BPM | BODY MASS INDEX: 20.56 KG/M2 | RESPIRATION RATE: 18 BRPM | DIASTOLIC BLOOD PRESSURE: 63 MMHG | TEMPERATURE: 96.8 F | OXYGEN SATURATION: 98 %

## 2023-05-18 PROCEDURE — 7100000010 HC PHASE II RECOVERY - FIRST 15 MIN: Performed by: INTERNAL MEDICINE

## 2023-05-18 PROCEDURE — 3700000001 HC ADD 15 MINUTES (ANESTHESIA): Performed by: INTERNAL MEDICINE

## 2023-05-18 PROCEDURE — 2709999900 HC NON-CHARGEABLE SUPPLY: Performed by: INTERNAL MEDICINE

## 2023-05-18 PROCEDURE — 7100000011 HC PHASE II RECOVERY - ADDTL 15 MIN: Performed by: INTERNAL MEDICINE

## 2023-05-18 PROCEDURE — 3609013800 HC EGD SUBMUCOSAL/BOTOX INJECTION: Performed by: INTERNAL MEDICINE

## 2023-05-18 PROCEDURE — 6360000002 HC RX W HCPCS: Performed by: INTERNAL MEDICINE

## 2023-05-18 PROCEDURE — 6360000002 HC RX W HCPCS

## 2023-05-18 PROCEDURE — 3700000000 HC ANESTHESIA ATTENDED CARE: Performed by: INTERNAL MEDICINE

## 2023-05-18 PROCEDURE — 2580000003 HC RX 258

## 2023-05-18 RX ORDER — SODIUM CHLORIDE 9 MG/ML
INJECTION, SOLUTION INTRAVENOUS CONTINUOUS PRN
Status: DISCONTINUED | OUTPATIENT
Start: 2023-05-18 | End: 2023-05-18 | Stop reason: SDUPTHER

## 2023-05-18 RX ORDER — PROPOFOL 10 MG/ML
INJECTION, EMULSION INTRAVENOUS PRN
Status: DISCONTINUED | OUTPATIENT
Start: 2023-05-18 | End: 2023-05-18 | Stop reason: SDUPTHER

## 2023-05-18 RX ORDER — SODIUM CHLORIDE 0.9 % (FLUSH) 0.9 %
5-40 SYRINGE (ML) INJECTION EVERY 12 HOURS SCHEDULED
Status: DISCONTINUED | OUTPATIENT
Start: 2023-05-18 | End: 2023-05-18 | Stop reason: HOSPADM

## 2023-05-18 RX ORDER — SODIUM CHLORIDE 9 MG/ML
25 INJECTION, SOLUTION INTRAVENOUS PRN
Status: DISCONTINUED | OUTPATIENT
Start: 2023-05-18 | End: 2023-05-18 | Stop reason: HOSPADM

## 2023-05-18 RX ORDER — SODIUM CHLORIDE 0.9 % (FLUSH) 0.9 %
5-40 SYRINGE (ML) INJECTION PRN
Status: DISCONTINUED | OUTPATIENT
Start: 2023-05-18 | End: 2023-05-18 | Stop reason: HOSPADM

## 2023-05-18 RX ADMIN — SODIUM CHLORIDE: 9 INJECTION, SOLUTION INTRAVENOUS at 13:34

## 2023-05-18 RX ADMIN — PROPOFOL 110 MG: 10 INJECTION, EMULSION INTRAVENOUS at 13:38

## 2023-05-18 ASSESSMENT — PAIN SCALES - GENERAL
PAINLEVEL_OUTOF10: 0
PAINLEVEL_OUTOF10: 5

## 2023-05-18 ASSESSMENT — PAIN DESCRIPTION - DESCRIPTORS: DESCRIPTORS: DISCOMFORT

## 2023-05-18 ASSESSMENT — PAIN DESCRIPTION - ORIENTATION: ORIENTATION: RIGHT

## 2023-05-18 ASSESSMENT — LIFESTYLE VARIABLES: SMOKING_STATUS: 0

## 2023-05-18 ASSESSMENT — PAIN - FUNCTIONAL ASSESSMENT
PAIN_FUNCTIONAL_ASSESSMENT: ACTIVITIES ARE NOT PREVENTED
PAIN_FUNCTIONAL_ASSESSMENT: ACTIVITIES ARE NOT PREVENTED
PAIN_FUNCTIONAL_ASSESSMENT: 0-10

## 2023-05-18 ASSESSMENT — PAIN DESCRIPTION - PAIN TYPE: TYPE: SURGICAL PAIN

## 2023-05-18 ASSESSMENT — COPD QUESTIONNAIRES: CAT_SEVERITY: MODERATE

## 2023-05-18 ASSESSMENT — PAIN DESCRIPTION - FREQUENCY: FREQUENCY: CONTINUOUS

## 2023-05-18 ASSESSMENT — PAIN DESCRIPTION - LOCATION: LOCATION: ARM

## 2023-05-18 ASSESSMENT — PAIN DESCRIPTION - ONSET: ONSET: ON-GOING

## 2023-05-18 NOTE — OP NOTE
Operative Note      Patient: Chata Melgoza  YOB: 1935  MRN: [id number]    Date of Procedure: 5/18/2023    Pre-Op Diagnosis Codes:     * Anemia, unspecified type [D64.9], Dysphagia    Post-Op Diagnosis: SAME       Procedure(s):  EGD ESOPHAGOGASTRODUODENOSCOPY    Surgeon(s):  Brett Alejandre DO    Assistant:   * No surgical staff found *    Anesthesia: Monitor Anesthesia Care    Estimated Blood Loss (mL): < 5cc    Complications: None    Specimens:   * No specimens in log *    Implants:  * No implants in log *      Drains: * No LDAs found *    Detailed Description of Procedure:   Procedure:  Esophagogastroduodenoscopy    Indication:  Dysphagia, Achalasia, Anemia    Consent: Informed consent was obtained from the patient including and not limited to risk of perforation, aspiration of gastric contents or teeth, bleeding, infection, dental breakage, ileus, need for surgery, or worst case death. Sedation  MAC    Estimated Blood Loss -- < 5cc    Endoscope was advanced easily through mouth to second portion of duodenum                             Oropharynx views are limited but grossly normal.     Esophagus:     Mucosa is normal. GEJ at 37 cm. 200 units of Botox mixed into 5cc was injected in a 4 quadrant fashion in the lower esophageal sphincter with no complications or bleeding issues. There was no signs of recurrent candidiasis in the proximal esophagus. Mild LA A Esophagitis     Stomach:        Antrum is normal                          Gastric body is normal.                          Retroflexed views show normal fundus and cardia. Duodenum:    Bulb is normal.                          Second portion of duodenum is normal.     IMPRESSION AND PLAN:      1. Achalasia Type II - 200 units of Botox mixed into 5cc was injected in a 4 quadrant fashion in the lower esophageal sphincter with no complications or bleeding issues. Follow up in office to evaluate the results of injection.   No source of anemia

## 2023-05-18 NOTE — ANESTHESIA POSTPROCEDURE EVALUATION
Department of Anesthesiology  Postprocedure Note    Patient: Deneen Vincent  MRN: [id number]  YOB: 1935  Date of evaluation: 5/18/2023      Procedure Summary     Date: 05/18/23 Room / Location: [facility] / [facility]    Anesthesia Start: 8672 Anesthesia Stop: 1353    Procedure: EGD ESOPHAGOGASTRODUODENOSCOPY Diagnosis:       Anemia, unspecified type      (Anemia, unspecified type [D64.9])    Surgeons: Mitch Ramon DO Responsible Provider: Almas Pantoja MD    Anesthesia Type: MAC ASA Status: 4          Anesthesia Type: No value filed.     Olga Phase I: Olga Score: 10    Olga Phase II:        Anesthesia Post Evaluation    Patient location during evaluation: bedside (Endo)  Patient participation: complete - patient participated  Level of consciousness: awake and alert  Airway patency: patent  Nausea & Vomiting: no nausea and no vomiting  Complications: no  Cardiovascular status: hemodynamically stable  Respiratory status: acceptable  Hydration status: stable

## 2023-05-18 NOTE — PROGRESS NOTES
Immediately prior to the procedure the patient's History and Physical was reviewed- there are no changes with the current vitals. BP (!) 140/65   Pulse 55   Temp 96.8 °F (36 °C) (Temporal)   Resp 16   Ht 4' 11\" (1.499 m)   Wt 102 lb (46.3 kg)   SpO2 100%   BMI 20.60 kg/m²     No CP/SOB. Risks/benefits d/w pt. All questions answered. Proceed with EGD with Botox.     DO Tomy  5/18/2023  1:23 PM

## 2023-05-18 NOTE — H&P
Bipolar Disorder Son      Bipolar Disorder Son      Heart Disease Brother           cabg               PHYSICAL EXAM:  Vital Signs: TO BE UPDATED ON ADMISSION TODAY  GENERAL APPEARANCE:  awake, alert, oriented, cooperative, and in no acute distress  EYES:  Lids and lashes normal, PERRLA, EOMI, sclera clear, conjunctiva normal  HENT:  Normocephalic, without obvious abnormality, atramatic, oral pharynx with moist mucus membranes, tonsils without erythema or exudates  NECK:  Supple with no carotid bruits, JVD or thyromegaly. No cervical adenopathy  LUNGS:  Clear to auscultation bilaterally with no wheezes, rales or rhonchi. No increased work of breathing, good air exchange. CARDIOVASCULAR: Regular rate and rhythm, no murmur  ABDOMEN:  normal bowel sounds in all 4 quadrants, soft, non-distended, non-tender, no masses palpated, no hepatosplenomegally  MUSCULOSKELETAL:  There is no redness, warmth, or swelling of the joints. Full range of motion noted. Motor strength is 5 out of 5 all extremities bilaterally. Tone is normal.  EXTREMITIES: No edema, 2+ pulses bilaterally (radial and dorsalis pedis)  NEUROLOGIC:  Awake, alert, oriented to name, place and time. Cranial nerves II-XII are grossly intact. Motor is 5 out of 5 bilaterally. SKIN: Normal skin color, texture, and turgor. There is no redness, warmth, or swelling. No bruising or bleeding, no mottling. PSYCH: Affect, behavior and insight are all within normal limits. ASSESSMENT/PLAN     1. 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 Motility study showed achalasia. She is not a good surgical candidate and presents today for EGD with botox again, last was 3/2021 .  Pt had good response to Botox injection in past.    2.  Anemia -- 9.8     [facility],   5/18/2023

## 2023-05-18 NOTE — ANESTHESIA PRE PROCEDURE
Department of Anesthesiology  Preprocedure Note       Name:  Adele Anderson   Age:  81 y.o.  :  1935                                          MRN:  [id number]         Date:  2023      Surgeon: Vida Caballero):  Uriel Patel DO    Procedure: EGD ESOPHAGOGASTRODUODENOSCOPY    Medications prior to admission:   Prior to Admission medications    Medication Sig Start Date End Date Taking?  Authorizing Provider   amiodarone (CORDARONE) 200 MG tablet TAKE 1/2 TABLETS BY MOUTH DAILY  Patient taking differently: at bedtime 5/3/23   Arleen Dias MD   mometasone (NASONEX) 50 MCG/ACT nasal spray SPRAY 2 SPRAYS INTO EACH NOSTRIL EVERY DAY 23   Arleen Dias MD   budesonide (PULMICORT) 0.25 MG/2ML nebulizer suspension TAKE 2 MLS BY NEBULIZATION 2 TIMES DAILY  Patient taking differently: nightly 23   Arleen Dias MD   ASPIRIN LOW DOSE 81 MG EC tablet TAKE 1 TABLET BY MOUTH EVERY DAY 23   Arleen Dias MD   furosemide (LASIX) 20 MG tablet Take 1 tablet by mouth daily 23   Leona Wild MD   simvastatin (ZOCOR) 20 MG tablet TAKE 1 TABLET BY MOUTH EVERY DAY  Patient taking differently: nightly 23   Arleen Dias MD   vitamin D (ERGOCALCIFEROL) 1.25 MG (90976 UT) CAPS capsule Take 1 capsule by mouth once a week 23   Arleen Dias MD   losartan (COZAAR) 25 MG tablet Take 1 tablet by mouth daily 23   Arleen Dias MD   omeprazole (PRILOSEC) 40 MG delayed release capsule TAKE 1 CAPSULE BY MOUTH EVERY DAY 23   Arleen Dias MD   Coenzyme Q10 (CO Q-10) 100 MG CAPS Take 1 capsule by mouth daily 23   Arleen Dias MD   Jnhgxaqkm-Wxcoc-W01-Acetylcyst (CEREFOLIN NAC) 8-05.661-2-471 MG TABS Take 1 tablet by mouth daily 23   Arleen Dias MD   rivaroxaban (XARELTO) 15 MG TABS tablet Take 1 tablet by mouth daily (with breakfast) 23   Cheryl Briggs MD   metoprolol succinate (TOPROL XL) 25 MG extended release tablet Take 1 tablet

## 2023-05-21 ENCOUNTER — APPOINTMENT (OUTPATIENT)
Dept: GENERAL RADIOLOGY | Age: 88
End: 2023-05-21
Payer: MEDICARE

## 2023-05-21 ENCOUNTER — HOSPITAL ENCOUNTER (EMERGENCY)
Age: 88
Discharge: HOME | End: 2023-05-21
Attending: EMERGENCY MEDICINE
Payer: MEDICARE

## 2023-05-21 VITALS
DIASTOLIC BLOOD PRESSURE: 62 MMHG | TEMPERATURE: 99.2 F | RESPIRATION RATE: 21 BRPM | HEART RATE: 66 BPM | OXYGEN SATURATION: 94 % | SYSTOLIC BLOOD PRESSURE: 124 MMHG

## 2023-05-21 DIAGNOSIS — I50.9 ACUTE CONGESTIVE HEART FAILURE, UNSPECIFIED HEART FAILURE TYPE (HCC): Primary | ICD-10-CM

## 2023-05-21 LAB
ANION GAP SERPL CALCULATED.3IONS-SCNC: 12 MMOL/L (ref 7–16)
BASOPHILS # BLD: 0.07 E9/L (ref 0–0.2)
BASOPHILS NFR BLD: 0.8 % (ref 0–2)
BNP BLD-MCNC: 8968 PG/ML (ref 0–450)
BUN SERPL-MCNC: 22 MG/DL (ref 6–23)
CALCIUM SERPL-MCNC: 9 MG/DL (ref 8.6–10.2)
CHLORIDE SERPL-SCNC: 100 MMOL/L (ref 98–107)
CO2 SERPL-SCNC: 25 MMOL/L (ref 22–29)
CREAT SERPL-MCNC: 1 MG/DL (ref 0.5–1)
EOSINOPHIL # BLD: 0.07 E9/L (ref 0.05–0.5)
EOSINOPHIL NFR BLD: 0.9 % (ref 0–6)
ERYTHROCYTE [DISTWIDTH] IN BLOOD BY AUTOMATED COUNT: 14 FL (ref 11.5–15)
GLUCOSE SERPL-MCNC: 109 MG/DL (ref 74–99)
HCT VFR BLD AUTO: 30.3 % (ref 34–48)
HGB BLD-MCNC: 9.3 G/DL (ref 11.5–15.5)
INR BLD: 1.7
LYMPHOCYTES # BLD: 0.17 E9/L (ref 1.5–4)
LYMPHOCYTES NFR BLD: 1.7 % (ref 20–42)
MCH RBC QN AUTO: 29.8 PG (ref 26–35)
MCHC RBC AUTO-ENTMCNC: 30.7 % (ref 32–34.5)
MCV RBC AUTO: 97.1 FL (ref 80–99.9)
MONOCYTES # BLD: 0.66 E9/L (ref 0.1–0.95)
MONOCYTES NFR BLD: 7.6 % (ref 2–12)
NEUTROPHILS # BLD: 7.39 E9/L (ref 1.8–7.3)
NEUTS SEG NFR BLD: 89 % (ref 43–80)
OVALOCYTES: ABNORMAL
PLATELET # BLD AUTO: 367 E9/L (ref 130–450)
PMV BLD AUTO: 9.2 FL (ref 7–12)
POIKILOCYTES: ABNORMAL
POLYCHROMASIA: ABNORMAL
POTASSIUM SERPL-SCNC: 4.8 MMOL/L (ref 3.5–5)
PROTHROMBIN TIME: 18.4 SEC (ref 9.3–12.4)
RBC # BLD AUTO: 3.12 E12/L (ref 3.5–5.5)
SODIUM SERPL-SCNC: 137 MMOL/L (ref 132–146)
TEAR DROP CELLS: ABNORMAL
TROPONIN, HIGH SENSITIVITY: 29 NG/L (ref 0–9)
TROPONIN, HIGH SENSITIVITY: 33 NG/L (ref 0–9)
WBC # BLD: 8.3 E9/L (ref 4.5–11.5)

## 2023-05-21 PROCEDURE — 93005 ELECTROCARDIOGRAM TRACING: CPT | Performed by: EMERGENCY MEDICINE

## 2023-05-21 PROCEDURE — 83880 ASSAY OF NATRIURETIC PEPTIDE: CPT

## 2023-05-21 PROCEDURE — 71045 X-RAY EXAM CHEST 1 VIEW: CPT

## 2023-05-21 PROCEDURE — 99285 EMERGENCY DEPT VISIT HI MDM: CPT

## 2023-05-21 PROCEDURE — 85610 PROTHROMBIN TIME: CPT

## 2023-05-21 PROCEDURE — 96374 THER/PROPH/DIAG INJ IV PUSH: CPT

## 2023-05-21 PROCEDURE — 6360000002 HC RX W HCPCS: Performed by: EMERGENCY MEDICINE

## 2023-05-21 PROCEDURE — 80048 BASIC METABOLIC PNL TOTAL CA: CPT

## 2023-05-21 PROCEDURE — 85025 COMPLETE CBC W/AUTO DIFF WBC: CPT

## 2023-05-21 PROCEDURE — 6370000000 HC RX 637 (ALT 250 FOR IP): Performed by: EMERGENCY MEDICINE

## 2023-05-21 PROCEDURE — 84484 ASSAY OF TROPONIN QUANT: CPT

## 2023-05-21 RX ORDER — SIMVASTATIN 20 MG
20 TABLET ORAL NIGHTLY
COMMUNITY
End: 2023-05-23

## 2023-05-21 RX ORDER — METOPROLOL SUCCINATE 25 MG/1
25 TABLET, EXTENDED RELEASE ORAL DAILY
COMMUNITY

## 2023-05-21 RX ORDER — SIMVASTATIN 20 MG
20 TABLET ORAL NIGHTLY
COMMUNITY

## 2023-05-21 RX ORDER — AMIODARONE HYDROCHLORIDE 200 MG/1
100 TABLET ORAL DAILY
COMMUNITY

## 2023-05-21 RX ORDER — FUROSEMIDE 20 MG/1
20 TABLET ORAL 2 TIMES DAILY
Qty: 90 TABLET | Refills: 3 | Status: SHIPPED | OUTPATIENT
Start: 2023-05-21

## 2023-05-21 RX ORDER — BUDESONIDE 0.25 MG/2ML
1 INHALANT ORAL NIGHTLY
COMMUNITY

## 2023-05-21 RX ORDER — ACETAMINOPHEN 500 MG
500 TABLET ORAL EVERY 6 HOURS PRN
COMMUNITY

## 2023-05-21 RX ORDER — FUROSEMIDE 10 MG/ML
20 INJECTION INTRAMUSCULAR; INTRAVENOUS ONCE
Status: COMPLETED | OUTPATIENT
Start: 2023-05-21 | End: 2023-05-21

## 2023-05-21 RX ADMIN — NITROGLYCERIN 0.5 INCH: 20 OINTMENT TOPICAL at 18:58

## 2023-05-21 RX ADMIN — FUROSEMIDE 20 MG: 10 INJECTION, SOLUTION INTRAMUSCULAR; INTRAVENOUS at 18:59

## 2023-05-21 ASSESSMENT — PAIN - FUNCTIONAL ASSESSMENT: PAIN_FUNCTIONAL_ASSESSMENT: NONE - DENIES PAIN

## 2023-05-22 LAB
EKG ATRIAL RATE: 66 BPM
EKG P AXIS: 62 DEGREES
EKG P-R INTERVAL: 230 MS
EKG Q-T INTERVAL: 384 MS
EKG QRS DURATION: 128 MS
EKG QTC CALCULATION (BAZETT): 402 MS
EKG R AXIS: -54 DEGREES
EKG T AXIS: 113 DEGREES
EKG VENTRICULAR RATE: 66 BPM

## 2023-05-22 PROCEDURE — 93010 ELECTROCARDIOGRAM REPORT: CPT | Performed by: INTERNAL MEDICINE

## 2023-05-23 ENCOUNTER — OFFICE VISIT (OUTPATIENT)
Dept: PRIMARY CARE CLINIC | Age: 88
End: 2023-05-23
Payer: MEDICARE

## 2023-05-23 DIAGNOSIS — E11.9 CONTROLLED TYPE 2 DIABETES MELLITUS WITHOUT COMPLICATION, WITH LONG-TERM CURRENT USE OF INSULIN (HCC): Chronic | ICD-10-CM

## 2023-05-23 DIAGNOSIS — I50.9 ACUTE ON CHRONIC CONGESTIVE HEART FAILURE, UNSPECIFIED HEART FAILURE TYPE (HCC): Primary | ICD-10-CM

## 2023-05-23 DIAGNOSIS — Z79.4 CONTROLLED TYPE 2 DIABETES MELLITUS WITHOUT COMPLICATION, WITH LONG-TERM CURRENT USE OF INSULIN (HCC): Chronic | ICD-10-CM

## 2023-05-23 DIAGNOSIS — I10 ESSENTIAL HYPERTENSION: ICD-10-CM

## 2023-05-23 DIAGNOSIS — J44.1 COPD EXACERBATION (HCC): ICD-10-CM

## 2023-05-23 PROCEDURE — 3023F SPIROM DOC REV: CPT | Performed by: INTERNAL MEDICINE

## 2023-05-23 PROCEDURE — 1090F PRES/ABSN URINE INCON ASSESS: CPT | Performed by: INTERNAL MEDICINE

## 2023-05-23 PROCEDURE — 1123F ACP DISCUSS/DSCN MKR DOCD: CPT | Performed by: INTERNAL MEDICINE

## 2023-05-23 PROCEDURE — 1036F TOBACCO NON-USER: CPT | Performed by: INTERNAL MEDICINE

## 2023-05-23 PROCEDURE — G8427 DOCREV CUR MEDS BY ELIG CLIN: HCPCS | Performed by: INTERNAL MEDICINE

## 2023-05-23 PROCEDURE — 99214 OFFICE O/P EST MOD 30 MIN: CPT | Performed by: INTERNAL MEDICINE

## 2023-05-23 PROCEDURE — G8420 CALC BMI NORM PARAMETERS: HCPCS | Performed by: INTERNAL MEDICINE

## 2023-05-23 RX ORDER — L-MEFOL/A-CYST/MEB12/ALGAL OIL 6-600-2 MG
1 TABLET ORAL DAILY
Qty: 90 TABLET | Refills: 0 | Status: CANCELLED | OUTPATIENT
Start: 2023-05-23

## 2023-05-23 RX ORDER — FLUTICASONE FUROATE, UMECLIDINIUM BROMIDE AND VILANTEROL TRIFENATATE 200; 62.5; 25 UG/1; UG/1; UG/1
1 POWDER RESPIRATORY (INHALATION) DAILY
Qty: 1 EACH | Refills: 3 | Status: SHIPPED | OUTPATIENT
Start: 2023-05-23

## 2023-05-23 RX ORDER — DIMENHYDRINATE 50 MG
100 TABLET ORAL DAILY
Qty: 90 CAPSULE | Refills: 0 | Status: CANCELLED | OUTPATIENT
Start: 2023-05-23

## 2023-05-23 RX ORDER — ERGOCALCIFEROL 1.25 MG/1
50000 CAPSULE ORAL WEEKLY
Qty: 12 CAPSULE | Refills: 1 | Status: CANCELLED | OUTPATIENT
Start: 2023-05-23

## 2023-05-23 RX ORDER — OMEPRAZOLE 40 MG/1
CAPSULE, DELAYED RELEASE ORAL
Qty: 90 CAPSULE | Refills: 0 | Status: CANCELLED | OUTPATIENT
Start: 2023-05-23

## 2023-05-23 NOTE — PROGRESS NOTES
none recent    COPD (chronic obstructive pulmonary disease) ([facility]     COVID 08/2022    in hospital x5 days    Depression     GERD (gastroesophageal reflux disease)     HFrEF (heart failure with reduced ejection fraction) ([facility] 12/29/2016 12/26/16- LVEF 20-25%, large apical aneurysm, LA moderate-severely dilated, mildly enlarged RA, mild MR, mild TR, mild AR    Hyperlipidemia     Hypertension     ICD (implantable cardioverter-defibrillator) in place     [facility] Dev. follows with Dr Elisabeth Rios. last interrogated remotely  2/21/23    Left ventricular dysfunction     Low vitamin D level     MI (myocardial infarction) ([facility] 10/30/2009    Osteopenia     Osteoporosis     Swallowing difficulty           Subjective: Went to ER due to sudden increased sob. Thinks she over exerted herself by walking in the [place], by the time she came back home she could not catch her breath. Says she is finding out she can not over exert herself anymore. Was in ER for about 8 hrs, responded to tx, sent home. Was told to increase her Lasix to 40 qd. Feels better, still some cough and very slight Dyspnea. Uses Pulmicort only once a day, no other inhalers, wonders if she could use it twice a day. Went back to [facility]. Review of Systems   Constitutional:  Negative for activity change, appetite change and chills. HENT:  Negative for congestion, ear pain, mouth sores, postnasal drip, rhinorrhea, sinus pressure, sneezing, sore throat and trouble swallowing. Eyes:  Negative for visual disturbance. Respiratory:  Positive for shortness of breath. Cardiovascular:  Negative for chest pain, palpitations and leg swelling. Gastrointestinal:  Negative for abdominal distention, abdominal pain, blood in stool, constipation, diarrhea, nausea and vomiting. Endocrine: Negative for cold intolerance, heat intolerance, polydipsia and polyuria. Genitourinary:  Negative for difficulty urinating, dysuria, flank pain, frequency and urgency.

## 2023-05-24 VITALS
BODY MASS INDEX: 20.16 KG/M2 | HEIGHT: 59 IN | SYSTOLIC BLOOD PRESSURE: 110 MMHG | WEIGHT: 100 LBS | DIASTOLIC BLOOD PRESSURE: 70 MMHG | TEMPERATURE: 98 F

## 2023-05-24 ASSESSMENT — ENCOUNTER SYMPTOMS
VOMITING: 0
BACK PAIN: 0
SORE THROAT: 0
ABDOMINAL DISTENTION: 0
TROUBLE SWALLOWING: 0
NAUSEA: 0
ABDOMINAL PAIN: 0
COLOR CHANGE: 0
SHORTNESS OF BREATH: 1
ALLERGIC/IMMUNOLOGIC NEGATIVE: 1
RHINORRHEA: 0
DIARRHEA: 0
BLOOD IN STOOL: 0
CONSTIPATION: 0
SINUS PRESSURE: 0

## 2023-06-20 ENCOUNTER — OFFICE VISIT (OUTPATIENT)
Dept: PRIMARY CARE CLINIC | Age: 88
End: 2023-06-20
Payer: MEDICARE

## 2023-06-20 VITALS
BODY MASS INDEX: 19.56 KG/M2 | OXYGEN SATURATION: 96 % | HEIGHT: 59 IN | DIASTOLIC BLOOD PRESSURE: 54 MMHG | HEART RATE: 56 BPM | TEMPERATURE: 98.6 F | WEIGHT: 97 LBS | SYSTOLIC BLOOD PRESSURE: 92 MMHG

## 2023-06-20 DIAGNOSIS — I50.9 ACUTE ON CHRONIC CONGESTIVE HEART FAILURE, UNSPECIFIED HEART FAILURE TYPE (HCC): ICD-10-CM

## 2023-06-20 DIAGNOSIS — J44.1 COPD EXACERBATION (HCC): ICD-10-CM

## 2023-06-20 DIAGNOSIS — I10 ESSENTIAL HYPERTENSION: ICD-10-CM

## 2023-06-20 DIAGNOSIS — Z00.00 INITIAL MEDICARE ANNUAL WELLNESS VISIT: Primary | ICD-10-CM

## 2023-06-20 PROCEDURE — 99214 OFFICE O/P EST MOD 30 MIN: CPT | Performed by: INTERNAL MEDICINE

## 2023-06-20 PROCEDURE — 3023F SPIROM DOC REV: CPT | Performed by: INTERNAL MEDICINE

## 2023-06-20 PROCEDURE — 1123F ACP DISCUSS/DSCN MKR DOCD: CPT | Performed by: INTERNAL MEDICINE

## 2023-06-20 PROCEDURE — 1090F PRES/ABSN URINE INCON ASSESS: CPT | Performed by: INTERNAL MEDICINE

## 2023-06-20 PROCEDURE — 1036F TOBACCO NON-USER: CPT | Performed by: INTERNAL MEDICINE

## 2023-06-20 PROCEDURE — G8420 CALC BMI NORM PARAMETERS: HCPCS | Performed by: INTERNAL MEDICINE

## 2023-06-20 PROCEDURE — G8427 DOCREV CUR MEDS BY ELIG CLIN: HCPCS | Performed by: INTERNAL MEDICINE

## 2023-06-20 RX ORDER — OMEPRAZOLE 40 MG/1
CAPSULE, DELAYED RELEASE ORAL
Qty: 90 CAPSULE | Refills: 0 | Status: SHIPPED | OUTPATIENT
Start: 2023-06-20

## 2023-06-20 ASSESSMENT — ENCOUNTER SYMPTOMS
ALLERGIC/IMMUNOLOGIC NEGATIVE: 1
NAUSEA: 0
SINUS PRESSURE: 0
SORE THROAT: 0
COLOR CHANGE: 0
ABDOMINAL DISTENTION: 0
BACK PAIN: 0
BLOOD IN STOOL: 0
TROUBLE SWALLOWING: 0
RHINORRHEA: 0
ABDOMINAL PAIN: 0
DIARRHEA: 0
VOMITING: 0
CONSTIPATION: 0
SHORTNESS OF BREATH: 1

## 2023-06-20 ASSESSMENT — LIFESTYLE VARIABLES
HOW OFTEN DURING THE LAST YEAR HAVE YOU NEEDED AN ALCOHOLIC DRINK FIRST THING IN THE MORNING TO GET YOURSELF GOING AFTER A NIGHT OF HEAVY DRINKING: 0
HAS A RELATIVE, FRIEND, DOCTOR, OR ANOTHER HEALTH PROFESSIONAL EXPRESSED CONCERN ABOUT YOUR DRINKING OR SUGGESTED YOU CUT DOWN: 0
HOW MANY STANDARD DRINKS CONTAINING ALCOHOL DO YOU HAVE ON A TYPICAL DAY: 1 OR 2
HOW OFTEN DO YOU HAVE A DRINK CONTAINING ALCOHOL: 4 OR MORE TIMES A WEEK
HOW OFTEN DURING THE LAST YEAR HAVE YOU FAILED TO DO WHAT WAS NORMALLY EXPECTED FROM YOU BECAUSE OF DRINKING: 0
HOW OFTEN DURING THE LAST YEAR HAVE YOU HAD A FEELING OF GUILT OR REMORSE AFTER DRINKING: 0
HAVE YOU OR SOMEONE ELSE BEEN INJURED AS A RESULT OF YOUR DRINKING: 0
HOW OFTEN DURING THE LAST YEAR HAVE YOU BEEN UNABLE TO REMEMBER WHAT HAPPENED THE NIGHT BEFORE BECAUSE YOU HAD BEEN DRINKING: 0
HOW OFTEN DURING THE LAST YEAR HAVE YOU FOUND THAT YOU WERE NOT ABLE TO STOP DRINKING ONCE YOU HAD STARTED: 0

## 2023-06-20 ASSESSMENT — PATIENT HEALTH QUESTIONNAIRE - PHQ9
SUM OF ALL RESPONSES TO PHQ9 QUESTIONS 1 & 2: 0
2. FEELING DOWN, DEPRESSED OR HOPELESS: 0
SUM OF ALL RESPONSES TO PHQ QUESTIONS 1-9: 0
SUM OF ALL RESPONSES TO PHQ QUESTIONS 1-9: 0
1. LITTLE INTEREST OR PLEASURE IN DOING THINGS: 0
SUM OF ALL RESPONSES TO PHQ QUESTIONS 1-9: 0
SUM OF ALL RESPONSES TO PHQ QUESTIONS 1-9: 0

## 2023-06-20 NOTE — PATIENT INSTRUCTIONS
[place] on Aging online. You need 4164-5787 mg of calcium and 3576-7411 IU of vitamin D per day. It is possible to meet your calcium requirement with diet alone, but a vitamin D supplement is usually necessary to meet this goal.  When exposed to the sun, use a sunscreen that protects against both UVA and UVB radiation with an SPF of 30 or greater. Reapply every 2 to 3 hours or after sweating, drying off with a towel, or swimming. Always wear a seat belt when traveling in a car. Always wear a helmet when riding a bicycle or motorcycle.

## 2023-06-20 NOTE — PROGRESS NOTES
simvastatin (ZOCOR) 20 MG tablet Take 1 tablet by mouth nightly Yes Historical Provider, MD   acetaminophen (TYLENOL) 500 MG tablet Take 1 tablet by mouth every 6 hours as needed for Pain Yes Historical Provider, MD   furosemide (LASIX) 20 MG tablet Take 1 tablet by mouth 2 times daily  Patient taking differently: Take 1 tablet by mouth 2 times daily Patient states she takes BID every other day per Dr. Gonzalez Chambers MD   mometasone (NASONEX) 50 MCG/ACT nasal spray SPRAY 2 SPRAYS INTO EACH NOSTRIL EVERY DAY Yes Tomás Manzano MD   ASPIRIN LOW DOSE 81 MG EC tablet TAKE 1 TABLET BY MOUTH EVERY DAY Yes Tomás Manzano MD   losartan (COZAAR) 25 MG tablet Take 1 tablet by mouth daily Yes Tomás Manzano MD   Coenzyme Q10 (CO Q-10) 100 MG CAPS Take 1 capsule by mouth daily Yes Tomás Manzano MD   Zsmxdrkxt-Ohvkn-P71-Acetylcyst (CEREFOLIN NAC) 2-16.287-1-369 MG TABS Take 1 tablet by mouth daily Yes Tomás Manzano MD   rivaroxaban (XARELTO) 15 MG TABS tablet Take 1 tablet by mouth daily (with breakfast) Yes Forrest Smart MD   Respiratory Therapy Supplies (FULL KIT NEBULIZER SET) MISC Use as directed with nebulized medication. Yes Maxwell Greenberg MD   pirocin [facility] NASAL) 2 % nasal ointment Take by Nasal route 2 times daily.   Patient not taking: No sig reported  Lorenzo De Luna MD       [facility] (Including outside providers/suppliers regularly involved in providing care):   Patient Care Team:  Tomás Manzano MD as PCP - Filiberto Sam MD as PCP - Empaneled Provider  Forrest Smart MD as Consulting Physician (Cardiology)  Benjy Shelton MD as Consulting Physician (Cardiology)  Lorenzo De Luna MD as Consulting Physician (Pulmonology)     Reviewed and updated this visit:  Tobacco  Allergies  Meds  Problems  Med Hx  Surg Hx  Soc Hx  Fam Hx           Kiran Hooper presents today for     Current Outpatient Medications   Medication Sig Dispense Refill    omeprazole

## 2023-06-23 ENCOUNTER — HOSPITAL ENCOUNTER (OUTPATIENT)
Dept: GENERAL RADIOLOGY | Age: 88
End: 2023-06-23
Attending: INTERNAL MEDICINE
Payer: MEDICARE

## 2023-06-23 DIAGNOSIS — R13.19 CONSTANT LOW-GRADE DYSPHAGIA: ICD-10-CM

## 2023-06-23 PROCEDURE — 74230 X-RAY XM SWLNG FUNCJ C+: CPT

## 2023-06-23 PROCEDURE — 2500000003 HC RX 250 WO HCPCS: Performed by: RADIOLOGY

## 2023-06-23 PROCEDURE — 92526 ORAL FUNCTION THERAPY: CPT | Performed by: SPEECH-LANGUAGE PATHOLOGIST

## 2023-06-23 PROCEDURE — 92611 MOTION FLUOROSCOPY/SWALLOW: CPT | Performed by: SPEECH-LANGUAGE PATHOLOGIST

## 2023-06-23 RX ADMIN — BARIUM SULFATE 70 ML: 0.81 POWDER, FOR SUSPENSION ORAL at 13:44

## 2023-06-23 RX ADMIN — BARIUM SULFATE 120 ML: 400 SUSPENSION ORAL at 13:44

## 2023-06-23 RX ADMIN — BARIUM SULFATE 10 ML: 400 PASTE ORAL at 13:44

## 2023-06-23 NOTE — PROGRESS NOTES
SPEECH/LANGUAGE PATHOLOGY  VIDEOFLUOROSCOPIC STUDY OF SWALLOWING (MBS)   and PLAN OF CARE    PATIENT NAME:  Roque Phoenix  (female)     MRN:  [id number]    :  1935  (81 y.o.)  STATUS:  Inpatient: Room Room/bed info not found    TODAY'S DATE:  2023  REFERRING PROVIDER:   Dr. Jean-Pierre Pedroza: FL modified barium swallow with video  Date of order:  23   REASON FOR REFERRAL: Assess oropharyngeal swallow function; s/p EDG with Botox to LES   EVALUATING THERAPIST: Kurtis Alfredo SLP      RESULTS:      DYSPHAGIA DIAGNOSIS:  normal swallow function     DIET RECOMMENDATIONS:  Regular consistency solids (IDDSI level 7) with  thin liquids (IDDSI level 0)    FEEDING RECOMMENDATIONS:    Assistance level:  No assistance needed     Compensatory strategies recommended: small bites/ sips, alternate solids liquids     Discussed recommendations with nursing and/or faxed report to referring provider: Yes    SPEECH THERAPY  PLAN OF CARE   The dysphagia POC is established based on physician order and dysphagia diagnosis    Dysphagia therapy is not recommended       Conditions Requiring Skilled Therapeutic Intervention for dysphagia:    not applicable    SPECIFIC DYSPHAGIA INTERVENTIONS TO INCLUDE:     Not applicable    Specific instructions for next treatment:  not applicable   Treatment Goals:    Short Term Goals:  Not applicable no therapy warranted     Long Term Goals:   Not applicable no therapy warranted      Patient/family Goal:    not applicable                    ADMITTING DIAGNOSIS: Constant low-grade dysphagia [R13.19]     VISIT DIAGNOSIS:   Visit Diagnoses         Codes    Constant low-grade dysphagia     R13.19                PATIENT REPORT/COMPLAINT: food sticking in the throat    PRIOR LEVEL OF SWALLOW FUNCTION:    Past History of Dysphagia?:  yes-- esophageal     Home diet: Regular consistency solids (IDDSI level 7) with  thin liquids (IDDSI level 0)      Current Diet Order:  No diet

## 2023-06-26 ENCOUNTER — APPOINTMENT (OUTPATIENT)
Dept: OTHER | Age: 88
End: 2023-06-26
Payer: MEDICARE

## 2023-06-26 VITALS
DIASTOLIC BLOOD PRESSURE: 59 MMHG | BODY MASS INDEX: 20.2 KG/M2 | OXYGEN SATURATION: 99 % | RESPIRATION RATE: 18 BRPM | HEART RATE: 55 BPM | WEIGHT: 100 LBS | SYSTOLIC BLOOD PRESSURE: 118 MMHG

## 2023-06-26 LAB
ANION GAP SERPL CALCULATED.3IONS-SCNC: 10 MMOL/L (ref 7–16)
BNP BLD-MCNC: 5822 PG/ML (ref 0–450)
BUN SERPL-MCNC: 33 MG/DL (ref 6–23)
CALCIUM SERPL-MCNC: 9.7 MG/DL (ref 8.6–10.2)
CHLORIDE SERPL-SCNC: 101 MMOL/L (ref 98–107)
CO2 SERPL-SCNC: 26 MMOL/L (ref 22–29)
CREAT SERPL-MCNC: 1.3 MG/DL (ref 0.5–1)
GLUCOSE SERPL-MCNC: 99 MG/DL (ref 74–99)
POTASSIUM SERPL-SCNC: 4.5 MMOL/L (ref 3.5–5)
SODIUM SERPL-SCNC: 137 MMOL/L (ref 132–146)

## 2023-06-26 PROCEDURE — 99214 OFFICE O/P EST MOD 30 MIN: CPT

## 2023-06-26 PROCEDURE — 36415 COLL VENOUS BLD VENIPUNCTURE: CPT

## 2023-06-26 PROCEDURE — 83880 ASSAY OF NATRIURETIC PEPTIDE: CPT

## 2023-06-26 PROCEDURE — 80048 BASIC METABOLIC PNL TOTAL CA: CPT

## 2023-07-03 RX ORDER — MOMETASONE FUROATE 50 UG/1
SPRAY, METERED NASAL
Refills: 1 | OUTPATIENT
Start: 2023-07-03

## 2023-07-06 DIAGNOSIS — I50.31 ACUTE DIASTOLIC (CONGESTIVE) HEART FAILURE (HCC): ICD-10-CM

## 2023-07-06 DIAGNOSIS — I25.5 ISCHEMIC CARDIOMYOPATHY: ICD-10-CM

## 2023-07-06 RX ORDER — MOMETASONE FUROATE 50 UG/1
SPRAY, METERED NASAL
Qty: 1 EACH | Refills: 2 | Status: SHIPPED | OUTPATIENT
Start: 2023-07-06

## 2023-07-06 RX ORDER — SIMVASTATIN 20 MG
TABLET ORAL
Qty: 90 TABLET | OUTPATIENT
Start: 2023-07-06

## 2023-07-21 ENCOUNTER — OFFICE VISIT (OUTPATIENT)
Dept: PRIMARY CARE CLINIC | Age: 88
End: 2023-07-21

## 2023-07-21 VITALS
BODY MASS INDEX: 19.96 KG/M2 | SYSTOLIC BLOOD PRESSURE: 90 MMHG | WEIGHT: 99 LBS | HEIGHT: 59 IN | TEMPERATURE: 97.8 F | DIASTOLIC BLOOD PRESSURE: 60 MMHG

## 2023-07-21 DIAGNOSIS — J44.9 CHRONIC OBSTRUCTIVE PULMONARY DISEASE, UNSPECIFIED COPD TYPE (HCC): ICD-10-CM

## 2023-07-21 DIAGNOSIS — I50.9 CHF (CONGESTIVE HEART FAILURE), NYHA CLASS I, UNSPECIFIED FAILURE CHRONICITY, UNSPECIFIED TYPE (HCC): Primary | ICD-10-CM

## 2023-07-21 DIAGNOSIS — I42.0 DILATED CARDIOMYOPATHY (HCC): ICD-10-CM

## 2023-07-21 DIAGNOSIS — I10 ESSENTIAL HYPERTENSION: ICD-10-CM

## 2023-07-21 DIAGNOSIS — I25.10 CORONARY ARTERY DISEASE INVOLVING NATIVE CORONARY ARTERY OF NATIVE HEART WITHOUT ANGINA PECTORIS: ICD-10-CM

## 2023-07-21 RX ORDER — ASPIRIN 81 MG/1
81 TABLET, COATED ORAL DAILY
Qty: 90 TABLET | Refills: 0 | Status: SHIPPED | OUTPATIENT
Start: 2023-07-21

## 2023-07-21 RX ORDER — SIMVASTATIN 20 MG
20 TABLET ORAL NIGHTLY
Qty: 90 TABLET | Refills: 0 | Status: SHIPPED | OUTPATIENT
Start: 2023-07-21

## 2023-07-21 RX ORDER — METOPROLOL SUCCINATE 25 MG/1
25 TABLET, EXTENDED RELEASE ORAL DAILY
Qty: 90 TABLET | Refills: 0 | Status: SHIPPED | OUTPATIENT
Start: 2023-07-21

## 2023-07-21 ASSESSMENT — ENCOUNTER SYMPTOMS
CONSTIPATION: 0
SORE THROAT: 0
ABDOMINAL DISTENTION: 0
VOMITING: 0
SINUS PRESSURE: 0
BACK PAIN: 0
ABDOMINAL PAIN: 0
RHINORRHEA: 0
NAUSEA: 0
DIARRHEA: 0
ALLERGIC/IMMUNOLOGIC NEGATIVE: 1
SHORTNESS OF BREATH: 1
BLOOD IN STOOL: 0
TROUBLE SWALLOWING: 0
COLOR CHANGE: 0

## 2023-07-21 ASSESSMENT — COPD QUESTIONNAIRES: COPD: 1

## 2023-07-21 NOTE — PROGRESS NOTES
Jose Manuel Orellana presents today for follow up of CHF, COPD    Current Outpatient Medications   Medication Sig Dispense Refill    mometasone (NASONEX) 50 MCG/ACT nasal spray SPRAY 2 SPRAYS INTO EACH NOSTRIL EVERY DAY 1 each 2    omeprazole (PRILOSEC) 40 MG delayed release capsule TAKE 1 CAPSULE BY MOUTH EVERY DAY 90 capsule 0    fluticasone-umeclidin-vilant (TRELEGY ELLIPTA) 200-62.5-25 MCG/ACT AEPB inhaler Inhale 1 puff into the lungs daily 1 each 3    amiodarone (CORDARONE) 200 MG tablet Take 0.5 tablets by mouth daily      metoprolol succinate (TOPROL XL) 25 MG extended release tablet Take 1 tablet by mouth daily      simvastatin (ZOCOR) 20 MG tablet Take 1 tablet by mouth nightly      acetaminophen (TYLENOL) 500 MG tablet Take 1 tablet by mouth every 6 hours as needed for Pain      furosemide (LASIX) 20 MG tablet Take 1 tablet by mouth 2 times daily (Patient taking differently: Take 1 tablet by mouth 2 times daily Patient states she takes BID every other day per Dr. Yany Edgar) 90 tablet 3    ASPIRIN LOW DOSE 81 MG EC tablet TAKE 1 TABLET BY MOUTH EVERY DAY 90 tablet 0    losartan (COZAAR) 25 MG tablet Take 1 tablet by mouth daily 90 tablet 0    Coenzyme Q10 (CO Q-10) 100 MG CAPS Take 1 capsule by mouth daily 90 capsule 0    Methylfol-Algae-C10-Yrihwnnkni (CEREFOLIN NAC) 6-90.314-2-600 MG TABS Take 1 tablet by mouth daily 90 tablet 0    rivaroxaban (XARELTO) 15 MG TABS tablet Take 1 tablet by mouth daily (with breakfast) 90 tablet 3    Respiratory Therapy Supplies (FULL KIT NEBULIZER SET) MISC Use as directed with nebulized medication. 1 each 0     No current facility-administered medications for this visit.       Past Medical History:   Diagnosis Date    Arthritis     Atrial fibrillation [facility])     follows with Dr Lupe Mixon  on xarelto    CAD (coronary artery disease)     Cardiomyopathy [facility])     CHF (congestive heart failure) ([address]) 2010    history of follows with Dr Lupe Mixon 4/27/23    Chronic anticoagulation     Chronic

## 2023-07-24 ENCOUNTER — HOSPITAL ENCOUNTER (OUTPATIENT)
Dept: INFUSION THERAPY | Age: 88
Setting detail: INFUSION SERIES
Discharge: HOME | End: 2023-07-24
Payer: MEDICARE

## 2023-07-24 ENCOUNTER — HOSPITAL ENCOUNTER (OUTPATIENT)
Dept: OTHER | Age: 88
Setting detail: THERAPIES SERIES
Discharge: HOME | End: 2023-07-24
Payer: MEDICARE

## 2023-07-24 VITALS
OXYGEN SATURATION: 98 % | BODY MASS INDEX: 20 KG/M2 | WEIGHT: 99 LBS | HEART RATE: 55 BPM | DIASTOLIC BLOOD PRESSURE: 53 MMHG | SYSTOLIC BLOOD PRESSURE: 107 MMHG | RESPIRATION RATE: 18 BRPM

## 2023-07-24 VITALS
SYSTOLIC BLOOD PRESSURE: 104 MMHG | DIASTOLIC BLOOD PRESSURE: 52 MMHG | BODY MASS INDEX: 19.79 KG/M2 | RESPIRATION RATE: 18 BRPM | TEMPERATURE: 96.7 F | OXYGEN SATURATION: 97 % | HEART RATE: 55 BPM | WEIGHT: 98 LBS

## 2023-07-24 DIAGNOSIS — M81.0 OSTEOPOROSIS, UNSPECIFIED OSTEOPOROSIS TYPE, UNSPECIFIED PATHOLOGICAL FRACTURE PRESENCE: Primary | ICD-10-CM

## 2023-07-24 LAB
ANION GAP SERPL CALCULATED.3IONS-SCNC: 9 MMOL/L (ref 7–16)
BNP SERPL-MCNC: 4935 PG/ML (ref 0–450)
BUN SERPL-MCNC: 26 MG/DL (ref 6–23)
CALCIUM SERPL-MCNC: 9.2 MG/DL (ref 8.6–10.2)
CHLORIDE SERPL-SCNC: 96 MMOL/L (ref 98–107)
CO2 SERPL-SCNC: 28 MMOL/L (ref 22–29)
CREAT SERPL-MCNC: 1.1 MG/DL (ref 0.5–1)
GFR SERPL CREATININE-BSD FRML MDRD: 51 ML/MIN/1.73M2
GLUCOSE SERPL-MCNC: 115 MG/DL (ref 74–99)
POTASSIUM SERPL-SCNC: 4.2 MMOL/L (ref 3.5–5)
SODIUM SERPL-SCNC: 133 MMOL/L (ref 132–146)

## 2023-07-24 PROCEDURE — 2580000003 HC RX 258: Performed by: INTERNAL MEDICINE

## 2023-07-24 PROCEDURE — 99214 OFFICE O/P EST MOD 30 MIN: CPT

## 2023-07-24 PROCEDURE — 80048 BASIC METABOLIC PNL TOTAL CA: CPT

## 2023-07-24 PROCEDURE — 6360000002 HC RX W HCPCS: Performed by: OBSTETRICS & GYNECOLOGY

## 2023-07-24 PROCEDURE — 96372 THER/PROPH/DIAG INJ SC/IM: CPT

## 2023-07-24 PROCEDURE — 83880 ASSAY OF NATRIURETIC PEPTIDE: CPT

## 2023-07-24 PROCEDURE — 96374 THER/PROPH/DIAG INJ IV PUSH: CPT

## 2023-07-24 PROCEDURE — 6360000002 HC RX W HCPCS: Performed by: INTERNAL MEDICINE

## 2023-07-24 RX ORDER — FUROSEMIDE 10 MG/ML
40 INJECTION INTRAMUSCULAR; INTRAVENOUS ONCE
Status: COMPLETED | OUTPATIENT
Start: 2023-07-24 | End: 2023-07-24

## 2023-07-24 RX ORDER — SODIUM CHLORIDE 0.9 % (FLUSH) 0.9 %
10 SYRINGE (ML) INJECTION 2 TIMES DAILY
Status: DISCONTINUED | OUTPATIENT
Start: 2023-07-24 | End: 2023-07-25 | Stop reason: HOSPADM

## 2023-07-24 RX ORDER — CHOLECALCIFEROL (VITAMIN D3) 1250 MCG
1.25 CAPSULE ORAL DAILY
COMMUNITY

## 2023-07-24 RX ADMIN — SODIUM CHLORIDE, PRESERVATIVE FREE 10 ML: 5 INJECTION INTRAVENOUS at 09:31

## 2023-07-24 RX ADMIN — FUROSEMIDE 40 MG: 10 INJECTION, SOLUTION INTRAMUSCULAR; INTRAVENOUS at 09:31

## 2023-07-24 RX ADMIN — DENOSUMAB 60 MG: 60 INJECTION SUBCUTANEOUS at 10:28

## 2023-07-24 NOTE — PROGRESS NOTES
Tolerated injection well. Reviewed therapy plan, offered education material and/or discharge material, reviewed medication information and signs and symptoms  and educated on possible side effects, verbalizes good knowledge of current plan patient verbalizes understanding, and has no signs or symptoms to report at this time. Patient discharged. Patient alert and oriented x3. No distress noted. Vital signs stable. Patient denies any new or worsening pain. Patient denies any needs. All questions answered. Next appointment scheduled. declines copy of AVS. Instructed on lab draw for next injection.

## 2023-07-24 NOTE — PROGRESS NOTES
Congestive Heart Failure [address]   1935          Referring Provider: DUKE Darnell  Primary Care Physician: Dr Caroline Middleton  Cardiologist: Dr June Martínez  Nephrologist: N/A        History of Present Illness:     Lalita Wing is a 81 y.o. female with a history of HFrEF, most recent EF 25% on 12-29-22. Patient Story:    She does have dyspnea with  exertion, shortness of breath, or decline in overall functional capacity. She does not have orthopnea, PND, nocturnal cough or hemoptysis. She does  have  slight abdominal distention or bloating, early satiety, anorexia/change in appetite. She does have a fair - good urinary response to  oral diuretic. She does  NOT have  lower extremity edema. She denies lightheadedness,  denies dizziness. She denies palpitations, syncope or near syncope. She does not complain of chest pain, pressure, discomfort.          No Known Allergies        Current Outpatient Medications on File Prior to Encounter   Medication Sig Dispense Refill    Cholecalciferol (VITAMIN D3) 1.25 MG (25091 UT) CAPS Take 1.25 Units by mouth daily      metoprolol succinate (TOPROL XL) 25 MG extended release tablet Take 1 tablet by mouth daily 90 tablet 0    ASPIRIN LOW DOSE 81 MG EC tablet Take 1 tablet by mouth daily 90 tablet 0    simvastatin (ZOCOR) 20 MG tablet Take 1 tablet by mouth nightly 90 tablet 0    mometasone (NASONEX) 50 MCG/ACT nasal spray SPRAY 2 SPRAYS INTO EACH NOSTRIL EVERY DAY 1 each 2    omeprazole (PRILOSEC) 40 MG delayed release capsule TAKE 1 CAPSULE BY MOUTH EVERY DAY 90 capsule 0    fluticasone-umeclidin-vilant (TRELEGY ELLIPTA) 200-62.5-25 MCG/ACT AEPB inhaler Inhale 1 puff into the lungs daily 1 each 3    amiodarone (CORDARONE) 200 MG tablet Take 0.5 tablets by mouth daily      acetaminophen (TYLENOL) 500 MG tablet Take 1 tablet by mouth every 6 hours as needed for Pain      furosemide (LASIX) 20 MG tablet Take 1

## 2023-08-03 RX ORDER — LOSARTAN POTASSIUM 25 MG/1
TABLET ORAL
Qty: 90 TABLET | Refills: 0 | Status: SHIPPED | OUTPATIENT
Start: 2023-08-03

## 2023-08-11 NOTE — PROGRESS NOTES
Dr Clau Zuluaga notified pt with hx CHF EF 20-25% echo 2/2023,afib,  follows with Dr Leandra Bruce and [facility]. For EGD 8/17/23. Pt denies recent cardiac complaints/symptoms. No cardiac clearance will be required for this procedure.

## 2023-08-11 NOTE — PROGRESS NOTES
[address] PRE-ADMISSION TESTING INSTRUCTIONS      ARRIVAL INSTRUCTIONS:  [x] Parking the day of Surgery is located in the Main Entrance lot. Upon entering the main door make an immediate right to the surgery reception desk. [x] Bring photo ID and insurance card    [] Bring in a copy of Living will or Durable Power of  papers. [x] Please be sure to arrange for responsible adult to provide transportation to and from the hospital    [x] Please arrange for responsible adult to be with you for the 24 hour period post procedure due to having anesthesia    [x] If you awake am of surgery not feeling well or have temperature >100 please call [phone]    GENERAL INSTRUCTIONS:    [x] Nothing by mouth after midnight, including gum, candy, mints or water    [x] You may brush your teeth, but do not swallow any water    [x] Take medications as instructed with 1-2 oz of water    [x] Stop herbal supplements and vitamins 5 days prior to procedure    [x] Follow preop dosing of blood thinners per physician instructions    [] Take 1/2 dose of evening insulin, but no insulin after midnight    [] No oral diabetic medications after midnight    [] If diabetic and have low blood sugar or feel symptomatic, take 1-2oz apple juice only    [x] Bring inhalers day of surgery    [] Bring C-PAP/ Bi-Pap day of surgery    [] Bring urine specimen day of surgery    [x] Shower or bath with soap, lather and rinse well, AM of Surgery, no lotion, powders or creams to surgical site    [] Follow bowel prep as instructed per surgeon    [x] No tobacco products within 24 hours of surgery     [x] No alcohol or illegal drug use within 24 hours of surgery.     [x] Jewelry, body piercing's, eyeglasses, contact lenses and dentures are not permitted into surgery (bring cases)      [x] Please do not wear any nail polish, make up or hair products on the day of surgery    [x] You can expect a call the business day prior to procedure to notify you if your arrival time changes    [x] If you receive a survey after surgery we would greatly appreciate your comments    [x] Please notify surgeon if you develop any illness between now and time of surgery (cold, cough, sore throat, fever, nausea, vomiting) or any signs of infections  including skin, wounds, and dental.    []  The Outpatient Pharm[facility] is available to fill your prescription here on your day of surgery, ask your preop nurse for details

## 2023-08-16 NOTE — H&P
Name:  Jose Manuel Orellana  :  1935  MRN:  [id number]  Room:  Room/bed info not found  DOS:  2023    [facility]  Dr. Roberto Loving M.D. Dr. Soledad Long M.D. AMENA Cordon.O. Dr. Gilda Grider M.D. Dr. Chris Rincon D.O.       PCP:  Wilver Nguyen MD  Admitting Physician:  Jose Grossman DO  Chief Complaint:  No chief complaint on file. History of Present Illness  Jose Manuel Orellana is a 81 y.o. female with a past medical history significant for ischemic cardiomyopathy, LV dysfunction s/p ICD placement and dysphagia. Pt underwent outpatient  manometry in past and was found to have Achalasia subtype II. Pt continues to have recurrent dysphagia to solids and liquids. Pt wished to proceed with EGD with Botox injection for treatment of Achalasia. Had good response on EGD with Botox 23, 3/2021 and 2020. Did not get as lasting effect 2023 compared to prior. Pt with mild anemia. Colonoscopy 21 with moderate sigmoid DDC and good prep. We did receive cardiac clearance from Dr. Lupe Mixon from cardiology.         Histories  Past Medical History:   Diagnosis Date    Arthritis     Atrial fibrillation ([address])     follows with Dr Lupe Mixon  on xarelto    CAD (coronary artery disease)     Cardiomyopathy ([address])     CHF (congestive heart failure) ([address])     history of follows with Dr Lupe Mixon 23    Chronic anticoagulation     Chronic bronchitis ([address])     none recent    COPD (chronic obstructive pulmonary disease) ([address])     COVID 2022    in hospital x5 days    Depression     GERD (gastroesophageal reflux disease)     HFrEF (heart failure with reduced ejection fraction) ([address]) 2016- LVEF 20-25%, large apical aneurysm, LA moderate-severely dilated, mildly enlarged RA, mild MR, mild TR, mild AR    [place] (hard of hearing)     wears bilat hearing aids    Hyperlipidemia     Hypertension     ICD (implantable cardioverter-defibrillator) in place     [address] Admission medications    Medication Sig Start Date End Date Taking? Authorizing Provider   losartan (COZAAR) 25 MG tablet TAKE 1 TABLET BY MOUTH EVERY DAY 8/3/23   Lauren Scott MD   Cholecalciferol (VITAMIN D3) 1.25 MG (78532 UT) CAPS Take 1.25 Units by mouth daily    Historical Provider, MD   metoprolol succinate (TOPROL XL) 25 MG extended release tablet Take 1 tablet by mouth daily 7/21/23   Lauren Scott MD   ASPIRIN LOW DOSE 81 MG EC tablet Take 1 tablet by mouth daily 7/21/23   Lauren Scott MD   simvastatin (ZOCOR) 20 MG tablet Take 1 tablet by mouth nightly 7/21/23   Lauren Scott MD   mometasone (NASONEX) 50 MCG/ACT nasal spray SPRAY 2 SPRAYS INTO EACH NOSTRIL EVERY DAY 7/6/23   Lauren Scott MD   omeprazole (PRILOSEC) 40 MG delayed release capsule TAKE 1 CAPSULE BY MOUTH EVERY DAY 6/20/23   Lauren Scott MD   fluticasone-umeclidin-vilant (TRELEGY ELLIPTA) 200-62.5-25 MCG/ACT AEPB inhaler Inhale 1 puff into the lungs daily 5/23/23   Lauren Scott MD   amiodarone (CORDARONE) 200 MG tablet Take 0.5 tablets by mouth daily    Historical Provider, MD   furosemide (LASIX) 20 MG tablet Take 1 tablet by mouth 2 times daily  Patient taking differently: Take 1 tablet by mouth 2 times daily Patient states she 40mg one day and 20 mg one day per Dr. Andreas Diaz 5/21/23   Mary Escobedo MD   Coenzyme Q10 (CO Q-10) 100 MG CAPS Take 1 capsule by mouth daily 1/20/23   Lauren Scott MD   rivaroxaban (XARELTO) 15 MG TABS tablet Take 1 tablet by mouth daily (with breakfast) 1/20/23   Coby Culp MD   Respiratory Therapy Supplies (FULL KIT NEBULIZER SET) MISC Use as directed with nebulized medication. 8/23/22   Jamil Durán MD   pirocin [facility] NASAL) 2 % nasal ointment Take by Nasal route 2 times daily. Patient not taking: No sig reported 2/16/22 8/23/22  Martha Bruce MD       Allergies  Patient has no known allergies.     Social Hx  Social History     Socioeconomic History

## 2023-08-17 ENCOUNTER — ANESTHESIA (OUTPATIENT)
Dept: ENDOSCOPY | Age: 88
End: 2023-08-17
Payer: MEDICARE

## 2023-08-17 ENCOUNTER — ANESTHESIA EVENT (OUTPATIENT)
Dept: ENDOSCOPY | Age: 88
End: 2023-08-17
Payer: MEDICARE

## 2023-08-17 ENCOUNTER — HOSPITAL ENCOUNTER (OUTPATIENT)
Age: 88
Setting detail: OUTPATIENT SURGERY
Discharge: HOME | End: 2023-08-17
Attending: INTERNAL MEDICINE | Admitting: INTERNAL MEDICINE
Payer: MEDICARE

## 2023-08-17 VITALS
SYSTOLIC BLOOD PRESSURE: 115 MMHG | DIASTOLIC BLOOD PRESSURE: 58 MMHG | HEART RATE: 55 BPM | HEIGHT: 59 IN | BODY MASS INDEX: 18.75 KG/M2 | WEIGHT: 93 LBS | OXYGEN SATURATION: 97 % | RESPIRATION RATE: 17 BRPM | TEMPERATURE: 97 F

## 2023-08-17 DIAGNOSIS — R13.10 DYSPHAGIA, UNSPECIFIED TYPE: ICD-10-CM

## 2023-08-17 PROCEDURE — 88305 TISSUE EXAM BY PATHOLOGIST: CPT

## 2023-08-17 PROCEDURE — 3700000000 HC ANESTHESIA ATTENDED CARE: Performed by: INTERNAL MEDICINE

## 2023-08-17 PROCEDURE — 2709999900 HC NON-CHARGEABLE SUPPLY: Performed by: INTERNAL MEDICINE

## 2023-08-17 PROCEDURE — 3609017700 HC EGD DILATION GASTRIC/DUODENAL STRICTURE: Performed by: INTERNAL MEDICINE

## 2023-08-17 PROCEDURE — 3700000001 HC ADD 15 MINUTES (ANESTHESIA): Performed by: INTERNAL MEDICINE

## 2023-08-17 PROCEDURE — 88342 IMHCHEM/IMCYTCHM 1ST ANTB: CPT

## 2023-08-17 PROCEDURE — 7100000011 HC PHASE II RECOVERY - ADDTL 15 MIN: Performed by: INTERNAL MEDICINE

## 2023-08-17 PROCEDURE — 7100000010 HC PHASE II RECOVERY - FIRST 15 MIN: Performed by: INTERNAL MEDICINE

## 2023-08-17 PROCEDURE — 2580000003 HC RX 258

## 2023-08-17 PROCEDURE — C1726 CATH, BAL DIL, NON-VASCULAR: HCPCS | Performed by: INTERNAL MEDICINE

## 2023-08-17 PROCEDURE — 6360000002 HC RX W HCPCS

## 2023-08-17 PROCEDURE — 6360000002 HC RX W HCPCS: Performed by: INTERNAL MEDICINE

## 2023-08-17 RX ORDER — SODIUM CHLORIDE 9 MG/ML
25 INJECTION, SOLUTION INTRAVENOUS PRN
Status: DISCONTINUED | OUTPATIENT
Start: 2023-08-17 | End: 2023-08-17 | Stop reason: HOSPADM

## 2023-08-17 RX ORDER — SODIUM CHLORIDE 9 MG/ML
INJECTION, SOLUTION INTRAVENOUS CONTINUOUS PRN
Status: DISCONTINUED | OUTPATIENT
Start: 2023-08-17 | End: 2023-08-17 | Stop reason: SDUPTHER

## 2023-08-17 RX ORDER — SODIUM CHLORIDE 0.9 % (FLUSH) 0.9 %
5-40 SYRINGE (ML) INJECTION EVERY 12 HOURS SCHEDULED
Status: CANCELLED | OUTPATIENT
Start: 2023-08-17

## 2023-08-17 RX ORDER — SODIUM CHLORIDE 0.9 % (FLUSH) 0.9 %
5-40 SYRINGE (ML) INJECTION PRN
Status: CANCELLED | OUTPATIENT
Start: 2023-08-17

## 2023-08-17 RX ORDER — SODIUM CHLORIDE 0.9 % (FLUSH) 0.9 %
5-40 SYRINGE (ML) INJECTION EVERY 12 HOURS SCHEDULED
Status: DISCONTINUED | OUTPATIENT
Start: 2023-08-17 | End: 2023-08-17 | Stop reason: HOSPADM

## 2023-08-17 RX ORDER — SODIUM CHLORIDE 9 MG/ML
INJECTION, SOLUTION INTRAVENOUS PRN
Status: CANCELLED | OUTPATIENT
Start: 2023-08-17

## 2023-08-17 RX ORDER — PROPOFOL 10 MG/ML
INJECTION, EMULSION INTRAVENOUS PRN
Status: DISCONTINUED | OUTPATIENT
Start: 2023-08-17 | End: 2023-08-17 | Stop reason: SDUPTHER

## 2023-08-17 RX ORDER — SODIUM CHLORIDE 0.9 % (FLUSH) 0.9 %
5-40 SYRINGE (ML) INJECTION PRN
Status: DISCONTINUED | OUTPATIENT
Start: 2023-08-17 | End: 2023-08-17 | Stop reason: HOSPADM

## 2023-08-17 RX ADMIN — PROPOFOL 100 MG: 10 INJECTION, EMULSION INTRAVENOUS at 14:20

## 2023-08-17 RX ADMIN — SODIUM CHLORIDE: 9 INJECTION, SOLUTION INTRAVENOUS at 14:15

## 2023-08-17 ASSESSMENT — PAIN - FUNCTIONAL ASSESSMENT: PAIN_FUNCTIONAL_ASSESSMENT: 0-10

## 2023-08-17 NOTE — OP NOTE
Operative Note      Patient: Lily Brunson  YOB: 1935  MRN: [id number]    Date of Procedure: 8/17/2023    Pre-Op Diagnosis Codes: * Dysphagia, unspecified type [R13.10], Achalasia    Post-Op Diagnosis: SAME       Procedure(s):  EGD ESOPHAGOGASTRODUODENOSCOPY BOTOX INJECTION 200 UNITS    Surgeon(s):  Jaime Verdugo DO    Assistant:   * No surgical staff found *    Anesthesia: Monitor Anesthesia Care    Estimated Blood Loss (mL): < 5cc    Complications: None    Specimens:   * No specimens in log *    Implants:  * No implants in log *      Drains: * No LDAs found *    Detailed Description of Procedure:   Procedure:  Esophagogastroduodenoscopy     Indication:  Dysphagia, Achalasia, Anemia     Consent: Informed consent was obtained from the patient including and not limited to risk of perforation, aspiration of gastric contents or teeth, bleeding, infection, dental breakage, ileus, need for surgery, or worst case death. Sedation  MAC     Estimated Blood Loss -- < 5cc     Endoscope was advanced easily through mouth to second portion of duodenum                             Oropharynx views are limited but grossly normal.     Esophagus:     Mucosa is normal. GEJ at 38 cm. I did dilate with a 15-16.5-18mm TTS CRE balloon with good dilation effect and no deep rents or tears. 100 units of Botox mixed into 5cc was injected in a 4 quadrant fashion in the lower esophageal sphincter with no complications or bleeding issues. There was no signs of recurrent candidiasis in the proximal esophagus. Mild LA A Esophagitis     Stomach:        Antrum with mild gastritis with biopsy                          Gastric body is normal.                          Retroflexed views show normal fundus and cardia. Duodenum:    Bulb is normal.                          Second portion of duodenum is normal.     IMPRESSION AND PLAN:      1.  Achalasia Type II - 100 units of Botox mixed into 5cc was injected in a 4

## 2023-08-21 ENCOUNTER — NURSE ONLY (OUTPATIENT)
Dept: NON INVASIVE DIAGNOSTICS | Age: 88
End: 2023-08-21

## 2023-08-21 ENCOUNTER — HOSPITAL ENCOUNTER (OUTPATIENT)
Dept: OTHER | Age: 88
Setting detail: THERAPIES SERIES
Discharge: HOME | End: 2023-08-21
Payer: MEDICARE

## 2023-08-21 VITALS
BODY MASS INDEX: 19.19 KG/M2 | OXYGEN SATURATION: 95 % | WEIGHT: 95 LBS | DIASTOLIC BLOOD PRESSURE: 54 MMHG | HEART RATE: 53 BPM | SYSTOLIC BLOOD PRESSURE: 105 MMHG | RESPIRATION RATE: 18 BRPM

## 2023-08-21 DIAGNOSIS — Z95.810 PRESENCE OF AUTOMATIC CARDIOVERTER/DEFIBRILLATOR (AICD): Primary | ICD-10-CM

## 2023-08-21 DIAGNOSIS — I50.9 CHF (CONGESTIVE HEART FAILURE), NYHA CLASS I, UNSPECIFIED FAILURE CHRONICITY, UNSPECIFIED TYPE (HCC): ICD-10-CM

## 2023-08-21 DIAGNOSIS — I51.9 LEFT VENTRICULAR DYSFUNCTION: ICD-10-CM

## 2023-08-21 DIAGNOSIS — I25.5 ISCHEMIC CARDIOMYOPATHY: ICD-10-CM

## 2023-08-21 LAB
ANION GAP SERPL CALCULATED.3IONS-SCNC: 11 MMOL/L (ref 7–16)
BNP SERPL-MCNC: 6841 PG/ML (ref 0–450)
BUN SERPL-MCNC: 30 MG/DL (ref 6–23)
CALCIUM SERPL-MCNC: 9 MG/DL (ref 8.6–10.2)
CHLORIDE SERPL-SCNC: 96 MMOL/L (ref 98–107)
CO2 SERPL-SCNC: 27 MMOL/L (ref 22–29)
CREAT SERPL-MCNC: 1.3 MG/DL (ref 0.5–1)
GFR SERPL CREATININE-BSD FRML MDRD: 41 ML/MIN/1.73M2
GLUCOSE SERPL-MCNC: 101 MG/DL (ref 74–99)
POTASSIUM SERPL-SCNC: 4.6 MMOL/L (ref 3.5–5)
SODIUM SERPL-SCNC: 134 MMOL/L (ref 132–146)

## 2023-08-21 PROCEDURE — 6360000002 HC RX W HCPCS: Performed by: INTERNAL MEDICINE

## 2023-08-21 PROCEDURE — 80048 BASIC METABOLIC PNL TOTAL CA: CPT

## 2023-08-21 PROCEDURE — 2580000003 HC RX 258: Performed by: INTERNAL MEDICINE

## 2023-08-21 PROCEDURE — 99214 OFFICE O/P EST MOD 30 MIN: CPT

## 2023-08-21 PROCEDURE — 96374 THER/PROPH/DIAG INJ IV PUSH: CPT

## 2023-08-21 PROCEDURE — 83880 ASSAY OF NATRIURETIC PEPTIDE: CPT

## 2023-08-21 RX ORDER — L-MEFOL/A-CYST/MEB12/ALGAL OIL 6-600-2 MG
TABLET ORAL DAILY
COMMUNITY

## 2023-08-21 RX ORDER — FUROSEMIDE 10 MG/ML
40 INJECTION INTRAMUSCULAR; INTRAVENOUS ONCE
Status: COMPLETED | OUTPATIENT
Start: 2023-08-21 | End: 2023-08-21

## 2023-08-21 RX ORDER — SODIUM CHLORIDE 0.9 % (FLUSH) 0.9 %
10 SYRINGE (ML) INJECTION 2 TIMES DAILY
Status: DISCONTINUED | OUTPATIENT
Start: 2023-08-21 | End: 2023-08-22 | Stop reason: HOSPADM

## 2023-08-21 RX ADMIN — SODIUM CHLORIDE, PRESERVATIVE FREE 10 ML: 5 INJECTION INTRAVENOUS at 10:06

## 2023-08-21 RX ADMIN — FUROSEMIDE 40 MG: 10 INJECTION, SOLUTION INTRAMUSCULAR; INTRAVENOUS at 10:06

## 2023-08-21 NOTE — PLAN OF CARE
Problem: Chronic Conditions and Co-morbidities  Goal: Patient's chronic conditions and co-morbidity symptoms are monitored and maintained or improved  Flowsheets (Taken 8/21/2023 2389)  Care Plan - Patient's Chronic Conditions and Co-Morbidity Symptoms are Monitored and Maintained or Improved: Monitor and assess patient's chronic conditions and comorbid symptoms for stability, deterioration, or improvement

## 2023-08-21 NOTE — PROGRESS NOTES
Active  LUQ Bowel Sounds: Active  RLQ Bowel Sounds: Active  LLQ Bowel Sounds: Active  GI Symptoms: Bloating     Gastrointestinal  GI Symptoms: Bloating    Bowel Sounds  RUQ Bowel Sounds: Active  LUQ Bowel Sounds: Active  RLQ Bowel Sounds: Active  LLQ Bowel Sounds: Active    Peripheral Vascular  Peripheral Vascular (WDL): Within Defined Limits  Edema: None  RLE Edema: None  LLE Edema: None                   Genitourinary  Genitourinary (WDL): Within Defined Limits    Psychosocial  Psychosocial (WDL): Within Defined Limits                        Pulse: 53                     LAB DATA:    Last 3 BMP      Sodium (mmol/L)   Date Value   07/24/2023 133   06/26/2023 137   06/12/2023 138     Potassium (mmol/L)   Date Value   07/24/2023 4.2   06/26/2023 4.5   06/12/2023 4.4     Potassium reflex Magnesium (mmol/L)   Date Value   12/28/2022 4.2   08/18/2022 4.6   06/08/2020 4.0     Chloride (mmol/L)   Date Value   07/24/2023 96 (L)   06/26/2023 101   06/12/2023 100     CO2 (mmol/L)   Date Value   07/24/2023 28   06/26/2023 26   06/12/2023 28     BUN (mg/dL)   Date Value   07/24/2023 26 (H)   06/26/2023 33 (H)   06/12/2023 26 (H)     Glucose (mg/dL)   Date Value   07/24/2023 115 (H)   06/26/2023 99   06/12/2023 96     Calcium (mg/dL)   Date Value   07/24/2023 9.2   06/26/2023 9.7   06/12/2023 9.1       Last 3 BNP       Pro-BNP (pg/mL)   Date Value   07/24/2023 4,935 (H)   06/26/2023 5,822 (H)   06/12/2023 5,053 (H)          CBC: No results for input(s): WBC, HGB, PLT in the last 72 hours. BMP:    No results for input(s): NA, K, CL, CO2, BUN, CREATININE, GLUCOSE in the last 72 hours. Hepatic: No results for input(s): AST, ALT, ALB, BILITOT, ALKPHOS in the last 72 hours. Troponin: No results for input(s): TROPONINI in the last 72 hours. BNP: No results for input(s): BNP in the last 72 hours. Lipids: No results for input(s): CHOL, HDL in the last 72 hours.     Invalid input(s): LDLCALCU  INR: No results for input(s): INR in

## 2023-08-23 LAB — SURGICAL PATHOLOGY REPORT: NORMAL

## 2023-09-07 ENCOUNTER — APPOINTMENT (OUTPATIENT)
Dept: OTHER | Age: 88
End: 2023-09-07
Payer: MEDICARE

## 2023-09-11 ENCOUNTER — TELEPHONE (OUTPATIENT)
Dept: PRIMARY CARE CLINIC | Age: 88
End: 2023-09-11

## 2023-09-11 RX ORDER — OMEPRAZOLE 40 MG/1
CAPSULE, DELAYED RELEASE ORAL
Qty: 90 CAPSULE | Refills: 0 | Status: CANCELLED | OUTPATIENT
Start: 2023-09-11

## 2023-09-11 NOTE — TELEPHONE ENCOUNTER
Patient requesting a refill of her omeprazole (PRILOSEC) 40 MG delayed release capsule   Be sent into [facility] on [address]. Thank you.

## 2023-09-12 DIAGNOSIS — R13.19 ESOPHAGEAL DYSPHAGIA: Primary | ICD-10-CM

## 2023-09-12 RX ORDER — OMEPRAZOLE 40 MG/1
CAPSULE, DELAYED RELEASE ORAL
Qty: 90 CAPSULE | Refills: 0 | Status: CANCELLED | OUTPATIENT
Start: 2023-09-12

## 2023-09-12 RX ORDER — OMEPRAZOLE 40 MG/1
CAPSULE, DELAYED RELEASE ORAL
Qty: 90 CAPSULE | Refills: 0 | Status: SHIPPED | OUTPATIENT
Start: 2023-09-12

## 2023-09-18 ENCOUNTER — HOSPITAL ENCOUNTER (OUTPATIENT)
Dept: OTHER | Age: 88
Setting detail: THERAPIES SERIES
Discharge: HOME | End: 2023-09-18
Payer: MEDICARE

## 2023-09-18 VITALS
WEIGHT: 97 LBS | BODY MASS INDEX: 19.59 KG/M2 | OXYGEN SATURATION: 95 % | RESPIRATION RATE: 18 BRPM | DIASTOLIC BLOOD PRESSURE: 53 MMHG | HEART RATE: 56 BPM | SYSTOLIC BLOOD PRESSURE: 107 MMHG

## 2023-09-18 LAB
ANION GAP SERPL CALCULATED.3IONS-SCNC: 9 MMOL/L (ref 7–16)
BNP SERPL-MCNC: 4675 PG/ML (ref 0–450)
BUN SERPL-MCNC: 35 MG/DL (ref 6–23)
CALCIUM SERPL-MCNC: 9 MG/DL (ref 8.6–10.2)
CHLORIDE SERPL-SCNC: 100 MMOL/L (ref 98–107)
CO2 SERPL-SCNC: 27 MMOL/L (ref 22–29)
CREAT SERPL-MCNC: 1.1 MG/DL (ref 0.5–1)
GFR SERPL CREATININE-BSD FRML MDRD: 48 ML/MIN/1.73M2
GLUCOSE SERPL-MCNC: 106 MG/DL (ref 74–99)
POTASSIUM SERPL-SCNC: 4.4 MMOL/L (ref 3.5–5)
SODIUM SERPL-SCNC: 136 MMOL/L (ref 132–146)

## 2023-09-18 PROCEDURE — 80048 BASIC METABOLIC PNL TOTAL CA: CPT

## 2023-09-18 PROCEDURE — 83880 ASSAY OF NATRIURETIC PEPTIDE: CPT

## 2023-09-18 PROCEDURE — 99214 OFFICE O/P EST MOD 30 MIN: CPT

## 2023-09-18 NOTE — PLAN OF CARE
Problem: Chronic Conditions and Co-morbidities  Goal: Patient's chronic conditions and co-morbidity symptoms are monitored and maintained or improved  Flowsheets (Taken 9/18/2023 5844)  Care Plan - Patient's Chronic Conditions and Co-Morbidity Symptoms are Monitored and Maintained or Improved: Monitor and assess patient's chronic conditions and comorbid symptoms for stability, deterioration, or improvement

## 2023-09-19 ENCOUNTER — TELEPHONE (OUTPATIENT)
Dept: NON INVASIVE DIAGNOSTICS | Age: 88
End: 2023-09-19

## 2023-09-22 ENCOUNTER — TELEPHONE (OUTPATIENT)
Dept: OTHER | Age: 88
End: 2023-09-22

## 2023-09-22 DIAGNOSIS — Z59.6 PATIENT CANNOT AFFORD MEDICATIONS: Primary | ICD-10-CM

## 2023-09-22 DIAGNOSIS — I50.9 CHF (CONGESTIVE HEART FAILURE), NYHA CLASS I, UNSPECIFIED FAILURE CHRONICITY, UNSPECIFIED TYPE (HCC): ICD-10-CM

## 2023-09-22 DIAGNOSIS — Z13.9 ENCOUNTER FOR SCREENING INVOLVING SOCIAL DETERMINANTS OF HEALTH (SDOH): ICD-10-CM

## 2023-09-22 SDOH — ECONOMIC STABILITY - INCOME SECURITY: LOW INCOME: Z59.6

## 2023-09-27 ENCOUNTER — OFFICE VISIT (OUTPATIENT)
Dept: CARDIOLOGY CLINIC | Age: 88
End: 2023-09-27
Payer: MEDICARE

## 2023-09-27 VITALS
SYSTOLIC BLOOD PRESSURE: 120 MMHG | RESPIRATION RATE: 16 BRPM | DIASTOLIC BLOOD PRESSURE: 60 MMHG | WEIGHT: 97.6 LBS | HEART RATE: 55 BPM | BODY MASS INDEX: 19.68 KG/M2 | HEIGHT: 59 IN

## 2023-09-27 DIAGNOSIS — I34.0 NONRHEUMATIC MITRAL VALVE REGURGITATION: ICD-10-CM

## 2023-09-27 DIAGNOSIS — I35.0 NON-RHEUMATIC AORTIC STENOSIS: ICD-10-CM

## 2023-09-27 DIAGNOSIS — I48.19 PERSISTENT ATRIAL FIBRILLATION (HCC): Primary | ICD-10-CM

## 2023-09-27 PROCEDURE — 99214 OFFICE O/P EST MOD 30 MIN: CPT | Performed by: INTERNAL MEDICINE

## 2023-09-27 PROCEDURE — G8427 DOCREV CUR MEDS BY ELIG CLIN: HCPCS | Performed by: INTERNAL MEDICINE

## 2023-09-27 PROCEDURE — G8420 CALC BMI NORM PARAMETERS: HCPCS | Performed by: INTERNAL MEDICINE

## 2023-09-27 PROCEDURE — 1090F PRES/ABSN URINE INCON ASSESS: CPT | Performed by: INTERNAL MEDICINE

## 2023-09-27 PROCEDURE — 93000 ELECTROCARDIOGRAM COMPLETE: CPT | Performed by: INTERNAL MEDICINE

## 2023-09-27 PROCEDURE — 1123F ACP DISCUSS/DSCN MKR DOCD: CPT | Performed by: INTERNAL MEDICINE

## 2023-09-27 PROCEDURE — 1036F TOBACCO NON-USER: CPT | Performed by: INTERNAL MEDICINE

## 2023-09-27 RX ORDER — L-MEFOL/A-CYST/MEB12/ALGAL OIL 6-600-2 MG
TABLET ORAL
COMMUNITY

## 2023-09-27 NOTE — PROGRESS NOTES
Appearance: Awake, alert and oriented x 3, no acute respiratory distress  Skin: Intact, no rash  Head: Normocephalic, atraumatic  Eyes: EOMI, no conjunctival erythema  ENMT: No pharyngeal erythema, MMM, no rhinorrhea, no dentures  Neck: Supple, no elevated JVP, no carotid bruits  Lungs: Clear to auscultation bilaterally. No wheezes, rales, or rhonchi. Cardiac: Grade 2/6 systolic murmur heard at the apex. Grade II/VI systolic murmur heard at the right upper sternal border. Abdomen: Soft, nontender, +bowel sounds  Extremities: Moves all extremities x 4, no lower extremity edema  Neurologic: No focal motor deficits apparent, normal mood and affect, alert and oriented x 3    Lab Results   Component Value Date    CREATININE 1.1 (H) 09/18/2023    BUN 35 (H) 09/18/2023     09/18/2023    K 4.4 09/18/2023     09/18/2023    CO2 27 09/18/2023     EKG: AP/VS, rate 55, QTc 530 msec, RBBB/LAFB    Echocardiogram: 12/26/16 (Cici Domeees)   Mildly dilated left ventricle. Large apical aneurysm with dyskinetic apex. Severe hypokinesis of the apical septum and lateral wall. Overall LVEF is visually estimated at 20-25%   The left atrium is moderate-severely dilated. Structurally normal mitral valve. Increased E point septal separation noted,suggesting decreased LV cardiac output   Mild mitral regurgitation is present. The aortic valve is trileaflet. The aortic valve appears mildly sclerotic. Mild aortic regurgitation is noted. Normal tricuspid valve structure and function. Mild tricuspid regurgitation. RVSP is 25-30 mmHg. There is a trivial localized near right ventricle pericardial effusion noted. ALMA: 6/9/2020 (Dr. Ki Clements)   Gastric images not performed due to history of achalasia and recent   botulinum toxin injection - case discussed with Dr. Alexandra Peoples before   procedure and felt no contraindication to ALMA with upper esophageal images   only.    Ejection fraction is visually estimated at

## 2023-10-10 RX ORDER — MOMETASONE FUROATE 50 UG/1
SPRAY, METERED NASAL
Qty: 1 EACH | Refills: 2 | Status: SHIPPED | OUTPATIENT
Start: 2023-10-10

## 2023-10-16 ENCOUNTER — HOSPITAL ENCOUNTER (OUTPATIENT)
Dept: OTHER | Age: 88
Setting detail: THERAPIES SERIES
Discharge: HOME | End: 2023-10-16
Payer: MEDICARE

## 2023-10-16 ENCOUNTER — TELEPHONE (OUTPATIENT)
Dept: OTHER | Age: 88
End: 2023-10-16

## 2023-10-16 VITALS
BODY MASS INDEX: 19.79 KG/M2 | RESPIRATION RATE: 16 BRPM | SYSTOLIC BLOOD PRESSURE: 120 MMHG | WEIGHT: 98 LBS | HEART RATE: 56 BPM | DIASTOLIC BLOOD PRESSURE: 59 MMHG | OXYGEN SATURATION: 94 %

## 2023-10-16 DIAGNOSIS — I50.20 HFREF (HEART FAILURE WITH REDUCED EJECTION FRACTION) (HCC): Primary | ICD-10-CM

## 2023-10-16 LAB
ANION GAP SERPL CALCULATED.3IONS-SCNC: 15 MMOL/L (ref 7–16)
BNP SERPL-MCNC: 9405 PG/ML (ref 0–450)
BUN SERPL-MCNC: 31 MG/DL (ref 6–23)
CALCIUM SERPL-MCNC: 8.6 MG/DL (ref 8.6–10.2)
CHLORIDE SERPL-SCNC: 96 MMOL/L (ref 98–107)
CO2 SERPL-SCNC: 23 MMOL/L (ref 22–29)
CREAT SERPL-MCNC: 1 MG/DL (ref 0.5–1)
GFR SERPL CREATININE-BSD FRML MDRD: 52 ML/MIN/1.73M2
GLUCOSE SERPL-MCNC: 129 MG/DL (ref 74–99)
POTASSIUM SERPL-SCNC: 4.3 MMOL/L (ref 3.5–5)
SODIUM SERPL-SCNC: 134 MMOL/L (ref 132–146)

## 2023-10-16 PROCEDURE — 99214 OFFICE O/P EST MOD 30 MIN: CPT

## 2023-10-16 PROCEDURE — 80048 BASIC METABOLIC PNL TOTAL CA: CPT

## 2023-10-16 PROCEDURE — 83880 ASSAY OF NATRIURETIC PEPTIDE: CPT

## 2023-10-16 RX ORDER — FLUTICASONE FUROATE, UMECLIDINIUM BROMIDE AND VILANTEROL TRIFENATATE 200; 62.5; 25 UG/1; UG/1; UG/1
POWDER RESPIRATORY (INHALATION)
Qty: 1 EACH | Refills: 3 | Status: SHIPPED
Start: 2023-10-16 | End: 2023-10-20

## 2023-10-16 RX ORDER — SPIRONOLACTONE 25 MG/1
12.5 TABLET ORAL DAILY
Qty: 45 TABLET | Refills: 1 | Status: ON HOLD | OUTPATIENT
Start: 2023-10-16

## 2023-10-16 RX ORDER — METOCLOPRAMIDE 5 MG/1
5 TABLET ORAL 4 TIMES DAILY
Status: ON HOLD | COMMUNITY

## 2023-10-16 ASSESSMENT — PATIENT HEALTH QUESTIONNAIRE - PHQ9
1. LITTLE INTEREST OR PLEASURE IN DOING THINGS: NOT AT ALL
2. FEELING DOWN, DEPRESSED OR HOPELESS: NOT AT ALL
SUM OF ALL RESPONSES TO PHQ9 QUESTIONS 1 & 2: 0

## 2023-10-16 NOTE — TELEPHONE ENCOUNTER
Called and spoke with patient and her  regarding new medications. She is to start spironolactone 12.5mg once daily and get out patient labs in one week. Patient and ;her  expressed verbal understanding. They were encouraged to call the clinic with any questions.

## 2023-10-16 NOTE — PLAN OF CARE
Problem: Chronic Conditions and Co-morbidities  Goal: Patient's chronic conditions and co-morbidity symptoms are monitored and maintained or improved  Flowsheets (Taken 10/16/2023 7550)  Care Plan - Patient's Chronic Conditions and Co-Morbidity Symptoms are Monitored and Maintained or Improved: Monitor and assess patient's chronic conditions and comorbid symptoms for stability, deterioration, or improvement

## 2023-10-16 NOTE — TELEPHONE ENCOUNTER
Labs and CHF clinic note reviewed  Spoke with Belem ESCALERA in CHF clinic   Will retrial spironolactone 12.5 mg daily   Follow up labs in 1 week    Thank you

## 2023-10-16 NOTE — PROGRESS NOTES
Congestive Heart Failure [address]       Referring Provider: Dr Meliton Sandoval  Primary Care Physician: Joana Barrera MD   Cardiologist: Dr Meliton Sandoval  Nephrologist: N/A      HISTORY OF PRESENT ILLNESS:     Jas Isidro is a 81 y.o. (1935) female with a history of HFrEF, most recent EF:  Lab Results   Component Value Date    LVEF 23 02/07/2023    Geovany Duque Echo 12/26/2016         She presents to the CHF clinic for ongoing evaluation and monitoring of heart failure.     In the CHF clinic today she denies any adverse symptoms except:  [x] Dizziness or lightheadedness (with position change )  [] Syncope or near syncope  [] Recent Fall  [] Shortness of breath at rest   [] Dyspnea with exertion  [] Decline in functional capacity (unable to perform activities they had previously been able to do)  [] Fatigue   [x] Orthopnea  [] PND  [] Nocturnal cough  [] Hemoptysis  [] Chest pain, pressure, or discomfort  [] Palpitations  [] Abdominal distention  [] Abdominal bloating  [] Early satiety  [] Blood in stool   [] Diarrhea  [x] Constipation ( off and on )  [] Nausea/Vomiting  [] Decreased urinary response to oral diuretic   [] Scrotal swelling   [] Lower extremity edema  [] Used PRN doses of oral diuretic   [] Weight gain    Wt Readings from Last 3 Encounters:   10/16/23 98 lb (44.5 kg)   09/27/23 97 lb 9.6 oz (44.3 kg)   09/18/23 97 lb (44 kg)           SOCIAL HISTORY:  [x] Denies tobacco, alcohol or illicit drug abuse  [] Tobacco use:  [] ETOH use:  [] Illicit drug use:        MEDICATIONS:    No Known Allergies    Current Outpatient Medications:     metoclopramide (REGLAN) 5 MG tablet, Take 1 tablet by mouth 4 times daily, Disp: , Rfl:     rivaroxaban (XARELTO) 15 MG TABS tablet, Take 1 tablet by mouth daily (with breakfast), Disp: 90 tablet, Rfl: 2    mometasone (NASONEX) 50 MCG/ACT nasal spray, SPRAY 2 SPRAYS INTO EACH NOSTRIL EVERY DAY, Disp: 1 each, Rfl: 2

## 2023-10-20 ENCOUNTER — APPOINTMENT (OUTPATIENT)
Dept: GENERAL RADIOLOGY | Age: 88
DRG: 291 | End: 2023-10-20
Payer: MEDICARE

## 2023-10-20 ENCOUNTER — HOSPITAL ENCOUNTER (INPATIENT)
Age: 88
LOS: 4 days | Discharge: HOME | DRG: 291 | End: 2023-10-24
Attending: EMERGENCY MEDICINE | Admitting: INTERNAL MEDICINE
Payer: MEDICARE

## 2023-10-20 DIAGNOSIS — I50.9 ACUTE CONGESTIVE HEART FAILURE, UNSPECIFIED HEART FAILURE TYPE (HCC): ICD-10-CM

## 2023-10-20 DIAGNOSIS — R09.02 HYPOXIA: Primary | ICD-10-CM

## 2023-10-20 DIAGNOSIS — E87.5 HYPERKALEMIA: ICD-10-CM

## 2023-10-20 DIAGNOSIS — E87.1 HYPONATREMIA: ICD-10-CM

## 2023-10-20 PROBLEM — I50.43 CHF (CONGESTIVE HEART FAILURE), NYHA CLASS I, ACUTE ON CHRONIC, COMBINED (HCC): Status: ACTIVE | Noted: 2023-10-20

## 2023-10-20 LAB
ALBUMIN SERPL-MCNC: 4 G/DL (ref 3.5–5.2)
ALP SERPL-CCNC: 71 U/L (ref 35–104)
ALT SERPL-CCNC: 22 U/L (ref 0–32)
ANION GAP SERPL CALCULATED.3IONS-SCNC: 11 MMOL/L (ref 7–16)
AST SERPL-CCNC: 26 U/L (ref 0–31)
BASOPHILS # BLD: 0.01 K/UL (ref 0–0.2)
BASOPHILS NFR BLD: 0 % (ref 0–2)
BILIRUB SERPL-MCNC: 0.5 MG/DL (ref 0–1.2)
BNP SERPL-MCNC: ABNORMAL PG/ML (ref 0–450)
BUN SERPL-MCNC: 28 MG/DL (ref 6–23)
CALCIUM SERPL-MCNC: 8.6 MG/DL (ref 8.6–10.2)
CHLORIDE SERPL-SCNC: 90 MMOL/L (ref 98–107)
CO2 SERPL-SCNC: 25 MMOL/L (ref 22–29)
CREAT SERPL-MCNC: 1.1 MG/DL (ref 0.5–1)
EKG ATRIAL RATE: 55 BPM
EKG P-R INTERVAL: 328 MS
EKG Q-T INTERVAL: 800 MS
EKG QRS DURATION: 136 MS
EKG QTC CALCULATION (BAZETT): 765 MS
EKG R AXIS: -39 DEGREES
EKG T AXIS: 101 DEGREES
EKG VENTRICULAR RATE: 55 BPM
EOSINOPHIL # BLD: 0.02 K/UL (ref 0.05–0.5)
EOSINOPHILS RELATIVE PERCENT: 0 % (ref 0–6)
ERYTHROCYTE [DISTWIDTH] IN BLOOD BY AUTOMATED COUNT: 18.7 % (ref 11.5–15)
GFR SERPL CREATININE-BSD FRML MDRD: 48 ML/MIN/1.73M2
GLUCOSE SERPL-MCNC: 105 MG/DL (ref 74–99)
HCT VFR BLD AUTO: 24.4 % (ref 34–48)
HGB BLD-MCNC: 7.8 G/DL (ref 11.5–15.5)
IMM GRANULOCYTES # BLD AUTO: 0.05 K/UL (ref 0–0.58)
IMM GRANULOCYTES NFR BLD: 1 % (ref 0–5)
INR PPP: 3.9
LYMPHOCYTES NFR BLD: 0.47 K/UL (ref 1.5–4)
LYMPHOCYTES RELATIVE PERCENT: 8 % (ref 20–42)
MAGNESIUM SERPL-MCNC: 2.4 MG/DL (ref 1.6–2.6)
MCH RBC QN AUTO: 27.1 PG (ref 26–35)
MCHC RBC AUTO-ENTMCNC: 32 G/DL (ref 32–34.5)
MCV RBC AUTO: 84.7 FL (ref 80–99.9)
MONOCYTES NFR BLD: 0.52 K/UL (ref 0.1–0.95)
MONOCYTES NFR BLD: 9 % (ref 2–12)
NEUTROPHILS NFR BLD: 82 % (ref 43–80)
NEUTS SEG NFR BLD: 4.8 K/UL (ref 1.8–7.3)
PLATELET # BLD AUTO: 479 K/UL (ref 130–450)
PMV BLD AUTO: 10.3 FL (ref 7–12)
POTASSIUM SERPL-SCNC: 5.2 MMOL/L (ref 3.5–5)
PROT SERPL-MCNC: 6.4 G/DL (ref 6.4–8.3)
PROTHROMBIN TIME: 42 SEC (ref 9.3–12.4)
RBC # BLD AUTO: 2.88 M/UL (ref 3.5–5.5)
RBC # BLD: ABNORMAL 10*6/UL
SARS-COV-2 RDRP RESP QL NAA+PROBE: NOT DETECTED
SODIUM SERPL-SCNC: 126 MMOL/L (ref 132–146)
SPECIMEN DESCRIPTION: NORMAL
TROPONIN I SERPL HS-MCNC: 28 NG/L (ref 0–9)
WBC OTHER # BLD: 5.9 K/UL (ref 4.5–11.5)

## 2023-10-20 PROCEDURE — 87635 SARS-COV-2 COVID-19 AMP PRB: CPT

## 2023-10-20 PROCEDURE — 99285 EMERGENCY DEPT VISIT HI MDM: CPT

## 2023-10-20 PROCEDURE — 93005 ELECTROCARDIOGRAM TRACING: CPT | Performed by: EMERGENCY MEDICINE

## 2023-10-20 PROCEDURE — 84484 ASSAY OF TROPONIN QUANT: CPT

## 2023-10-20 PROCEDURE — 2700000000 HC OXYGEN THERAPY PER DAY

## 2023-10-20 PROCEDURE — 2060000000 HC ICU INTERMEDIATE R&B

## 2023-10-20 PROCEDURE — 6370000000 HC RX 637 (ALT 250 FOR IP): Performed by: INTERNAL MEDICINE

## 2023-10-20 PROCEDURE — 93010 ELECTROCARDIOGRAM REPORT: CPT | Performed by: INTERNAL MEDICINE

## 2023-10-20 PROCEDURE — 85025 COMPLETE CBC W/AUTO DIFF WBC: CPT

## 2023-10-20 PROCEDURE — 85610 PROTHROMBIN TIME: CPT

## 2023-10-20 PROCEDURE — 71045 X-RAY EXAM CHEST 1 VIEW: CPT

## 2023-10-20 PROCEDURE — 83735 ASSAY OF MAGNESIUM: CPT

## 2023-10-20 PROCEDURE — 80053 COMPREHEN METABOLIC PANEL: CPT

## 2023-10-20 PROCEDURE — 83880 ASSAY OF NATRIURETIC PEPTIDE: CPT

## 2023-10-20 PROCEDURE — 6360000002 HC RX W HCPCS: Performed by: EMERGENCY MEDICINE

## 2023-10-20 PROCEDURE — 6370000000 HC RX 637 (ALT 250 FOR IP): Performed by: EMERGENCY MEDICINE

## 2023-10-20 RX ORDER — L-MEFOL/A-CYST/MEB12/ALGAL OIL 6-600-2 MG
1 TABLET ORAL EVERY EVENING
Status: DISCONTINUED | OUTPATIENT
Start: 2023-10-20 | End: 2023-10-20

## 2023-10-20 RX ORDER — BUMETANIDE 0.25 MG/ML
1 INJECTION INTRAMUSCULAR; INTRAVENOUS DAILY
Status: DISCONTINUED | OUTPATIENT
Start: 2023-10-21 | End: 2023-10-22

## 2023-10-20 RX ORDER — MOMETASONE FUROATE 50 UG/1
2 SPRAY, METERED NASAL DAILY
COMMUNITY

## 2023-10-20 RX ORDER — LOSARTAN POTASSIUM 25 MG/1
25 TABLET ORAL DAILY
COMMUNITY

## 2023-10-20 RX ORDER — BUDESONIDE 0.25 MG/2ML
1 INHALANT ORAL
COMMUNITY

## 2023-10-20 RX ORDER — FLUTICASONE PROPIONATE 50 MCG
2 SPRAY, SUSPENSION (ML) NASAL DAILY
Status: DISCONTINUED | OUTPATIENT
Start: 2023-10-20 | End: 2023-10-24 | Stop reason: HOSPADM

## 2023-10-20 RX ORDER — OMEPRAZOLE 40 MG/1
40 CAPSULE, DELAYED RELEASE ORAL DAILY
COMMUNITY

## 2023-10-20 RX ORDER — LANOLIN ALCOHOL/MO/W.PET/CERES
3 CREAM (GRAM) TOPICAL NIGHTLY PRN
Status: DISCONTINUED | OUTPATIENT
Start: 2023-10-20 | End: 2023-10-24 | Stop reason: HOSPADM

## 2023-10-20 RX ORDER — ASPIRIN 81 MG/1
81 TABLET ORAL DAILY
Status: DISCONTINUED | OUTPATIENT
Start: 2023-10-20 | End: 2023-10-24 | Stop reason: HOSPADM

## 2023-10-20 RX ORDER — PROCHLORPERAZINE EDISYLATE 5 MG/ML
10 INJECTION INTRAMUSCULAR; INTRAVENOUS EVERY 6 HOURS PRN
Status: DISCONTINUED | OUTPATIENT
Start: 2023-10-20 | End: 2023-10-24 | Stop reason: HOSPADM

## 2023-10-20 RX ORDER — BUDESONIDE 0.25 MG/2ML
0.25 INHALANT ORAL
Status: DISCONTINUED | OUTPATIENT
Start: 2023-10-20 | End: 2023-10-24 | Stop reason: HOSPADM

## 2023-10-20 RX ORDER — UBIDECARENONE 100 MG
100 CAPSULE ORAL NIGHTLY
COMMUNITY

## 2023-10-20 RX ORDER — FLUTICASONE FUROATE, UMECLIDINIUM BROMIDE AND VILANTEROL TRIFENATATE 200; 62.5; 25 UG/1; UG/1; UG/1
1 POWDER RESPIRATORY (INHALATION) DAILY
COMMUNITY

## 2023-10-20 RX ORDER — AMIODARONE HYDROCHLORIDE 200 MG/1
100 TABLET ORAL DAILY
Status: DISCONTINUED | OUTPATIENT
Start: 2023-10-20 | End: 2023-10-24 | Stop reason: HOSPADM

## 2023-10-20 RX ORDER — METOPROLOL SUCCINATE 25 MG/1
25 TABLET, EXTENDED RELEASE ORAL DAILY
Status: DISCONTINUED | OUTPATIENT
Start: 2023-10-20 | End: 2023-10-24 | Stop reason: HOSPADM

## 2023-10-20 RX ORDER — FUROSEMIDE 10 MG/ML
20 INJECTION INTRAMUSCULAR; INTRAVENOUS ONCE
Status: COMPLETED | OUTPATIENT
Start: 2023-10-20 | End: 2023-10-20

## 2023-10-20 RX ORDER — LOSARTAN POTASSIUM 50 MG/1
25 TABLET ORAL DAILY
Status: DISCONTINUED | OUTPATIENT
Start: 2023-10-20 | End: 2023-10-24 | Stop reason: HOSPADM

## 2023-10-20 RX ORDER — ACETAMINOPHEN 500 MG
500 TABLET ORAL EVERY 4 HOURS PRN
Status: DISCONTINUED | OUTPATIENT
Start: 2023-10-20 | End: 2023-10-24 | Stop reason: HOSPADM

## 2023-10-20 RX ORDER — FUROSEMIDE 20 MG/1
20 TABLET ORAL 2 TIMES DAILY
Status: ON HOLD | COMMUNITY
End: 2023-10-24 | Stop reason: HOSPADM

## 2023-10-20 RX ADMIN — METOPROLOL SUCCINATE 25 MG: 25 TABLET, EXTENDED RELEASE ORAL at 19:06

## 2023-10-20 RX ADMIN — FUROSEMIDE 20 MG: 10 INJECTION, SOLUTION INTRAMUSCULAR; INTRAVENOUS at 13:59

## 2023-10-20 RX ADMIN — NITROGLYCERIN 0.5 INCH: 20 OINTMENT TOPICAL at 11:03

## 2023-10-20 RX ADMIN — AMIODARONE HYDROCHLORIDE 100 MG: 200 TABLET ORAL at 19:07

## 2023-10-20 RX ADMIN — LOSARTAN POTASSIUM 25 MG: 50 TABLET, FILM COATED ORAL at 19:09

## 2023-10-20 RX ADMIN — ASPIRIN 81 MG: 81 TABLET, COATED ORAL at 19:08

## 2023-10-20 ASSESSMENT — LIFESTYLE VARIABLES
HOW MANY STANDARD DRINKS CONTAINING ALCOHOL DO YOU HAVE ON A TYPICAL DAY: PATIENT DOES NOT DRINK
HOW OFTEN DO YOU HAVE A DRINK CONTAINING ALCOHOL: NEVER
HOW MANY STANDARD DRINKS CONTAINING ALCOHOL DO YOU HAVE ON A TYPICAL DAY: PATIENT DOES NOT DRINK
HOW OFTEN DO YOU HAVE A DRINK CONTAINING ALCOHOL: NEVER

## 2023-10-20 ASSESSMENT — PATIENT HEALTH QUESTIONNAIRE - PHQ9
SUM OF ALL RESPONSES TO PHQ QUESTIONS 1-9: 0
2. FEELING DOWN, DEPRESSED OR HOPELESS: 0
SUM OF ALL RESPONSES TO PHQ9 QUESTIONS 1 & 2: 0
1. LITTLE INTEREST OR PLEASURE IN DOING THINGS: 0
SUM OF ALL RESPONSES TO PHQ QUESTIONS 1-9: 0

## 2023-10-20 ASSESSMENT — PAIN SCALES - GENERAL
PAINLEVEL_OUTOF10: 0

## 2023-10-20 ASSESSMENT — PAIN - FUNCTIONAL ASSESSMENT: PAIN_FUNCTIONAL_ASSESSMENT: NONE - DENIES PAIN

## 2023-10-21 PROBLEM — I48.0 PAF (PAROXYSMAL ATRIAL FIBRILLATION) (HCC): Status: ACTIVE | Noted: 2020-06-07

## 2023-10-21 PROBLEM — R00.1 SINUS BRADYCARDIA: Status: ACTIVE | Noted: 2023-10-21

## 2023-10-21 PROBLEM — Z79.899 LONG TERM CURRENT USE OF AMIODARONE: Status: ACTIVE | Noted: 2023-10-21

## 2023-10-21 PROBLEM — R09.02 HYPOXIA: Status: ACTIVE | Noted: 2023-10-21

## 2023-10-21 PROBLEM — I38 VHD (VALVULAR HEART DISEASE): Status: ACTIVE | Noted: 2023-10-21

## 2023-10-21 LAB
ALBUMIN SERPL-MCNC: 3.9 G/DL (ref 3.5–5.2)
ALP SERPL-CCNC: 67 U/L (ref 35–104)
ALT SERPL-CCNC: 21 U/L (ref 0–32)
ANION GAP SERPL CALCULATED.3IONS-SCNC: 12 MMOL/L (ref 7–16)
AST SERPL-CCNC: 29 U/L (ref 0–31)
B PARAP IS1001 DNA NPH QL NAA+NON-PROBE: NOT DETECTED
B PERT DNA SPEC QL NAA+PROBE: NOT DETECTED
BASOPHILS # BLD: 0.02 K/UL (ref 0–0.2)
BASOPHILS NFR BLD: 0 % (ref 0–2)
BILIRUB SERPL-MCNC: 0.5 MG/DL (ref 0–1.2)
BUN SERPL-MCNC: 28 MG/DL (ref 6–23)
C PNEUM DNA NPH QL NAA+NON-PROBE: NOT DETECTED
CALCIUM SERPL-MCNC: 8.4 MG/DL (ref 8.6–10.2)
CHLORIDE SERPL-SCNC: 90 MMOL/L (ref 98–107)
CO2 SERPL-SCNC: 23 MMOL/L (ref 22–29)
CREAT SERPL-MCNC: 1.1 MG/DL (ref 0.5–1)
EOSINOPHIL # BLD: 0.07 K/UL (ref 0.05–0.5)
EOSINOPHILS RELATIVE PERCENT: 1 % (ref 0–6)
ERYTHROCYTE [DISTWIDTH] IN BLOOD BY AUTOMATED COUNT: 16.1 % (ref 11.5–15)
FLUAV RNA NPH QL NAA+NON-PROBE: NOT DETECTED
FLUBV RNA NPH QL NAA+NON-PROBE: NOT DETECTED
GFR SERPL CREATININE-BSD FRML MDRD: 51 ML/MIN/1.73M2
GLUCOSE SERPL-MCNC: 111 MG/DL (ref 74–99)
HADV DNA NPH QL NAA+NON-PROBE: NOT DETECTED
HCOV 229E RNA NPH QL NAA+NON-PROBE: NOT DETECTED
HCOV HKU1 RNA NPH QL NAA+NON-PROBE: NOT DETECTED
HCOV NL63 RNA NPH QL NAA+NON-PROBE: NOT DETECTED
HCOV OC43 RNA NPH QL NAA+NON-PROBE: NOT DETECTED
HCT VFR BLD AUTO: 23.8 % (ref 34–48)
HGB BLD-MCNC: 7.4 G/DL (ref 11.5–15.5)
HMPV RNA NPH QL NAA+NON-PROBE: NOT DETECTED
HPIV1 RNA NPH QL NAA+NON-PROBE: NOT DETECTED
HPIV2 RNA NPH QL NAA+NON-PROBE: NOT DETECTED
HPIV3 RNA NPH QL NAA+NON-PROBE: NOT DETECTED
HPIV4 RNA NPH QL NAA+NON-PROBE: NOT DETECTED
IMM GRANULOCYTES # BLD AUTO: 0.05 K/UL (ref 0–0.58)
IMM GRANULOCYTES NFR BLD: 1 % (ref 0–5)
LYMPHOCYTES NFR BLD: 0.75 K/UL (ref 1.5–4)
LYMPHOCYTES RELATIVE PERCENT: 12 % (ref 20–42)
M PNEUMO DNA NPH QL NAA+NON-PROBE: NOT DETECTED
MCH RBC QN AUTO: 26.1 PG (ref 26–35)
MCHC RBC AUTO-ENTMCNC: 31.1 G/DL (ref 32–34.5)
MCV RBC AUTO: 84.1 FL (ref 80–99.9)
MONOCYTES NFR BLD: 0.8 K/UL (ref 0.1–0.95)
MONOCYTES NFR BLD: 13 % (ref 2–12)
NEUTROPHILS NFR BLD: 73 % (ref 43–80)
NEUTS SEG NFR BLD: 4.51 K/UL (ref 1.8–7.3)
PLATELET # BLD AUTO: 420 K/UL (ref 130–450)
PMV BLD AUTO: 8.9 FL (ref 7–12)
POTASSIUM SERPL-SCNC: 5.2 MMOL/L (ref 3.5–5)
PROT SERPL-MCNC: 6 G/DL (ref 6.4–8.3)
RBC # BLD AUTO: 2.83 M/UL (ref 3.5–5.5)
RSV RNA NPH QL NAA+NON-PROBE: NOT DETECTED
RV+EV RNA NPH QL NAA+NON-PROBE: NOT DETECTED
SARS-COV-2 RNA NPH QL NAA+NON-PROBE: NOT DETECTED
SODIUM SERPL-SCNC: 125 MMOL/L (ref 132–146)
SPECIMEN DESCRIPTION: NORMAL
TROPONIN I SERPL HS-MCNC: 32 NG/L (ref 0–9)
WBC OTHER # BLD: 6.2 K/UL (ref 4.5–11.5)

## 2023-10-21 PROCEDURE — 80053 COMPREHEN METABOLIC PANEL: CPT

## 2023-10-21 PROCEDURE — 0202U NFCT DS 22 TRGT SARS-COV-2: CPT

## 2023-10-21 PROCEDURE — 99223 1ST HOSP IP/OBS HIGH 75: CPT | Performed by: INTERNAL MEDICINE

## 2023-10-21 PROCEDURE — 93306 TTE W/DOPPLER COMPLETE: CPT

## 2023-10-21 PROCEDURE — 2700000000 HC OXYGEN THERAPY PER DAY

## 2023-10-21 PROCEDURE — 6360000002 HC RX W HCPCS: Performed by: INTERNAL MEDICINE

## 2023-10-21 PROCEDURE — 6370000000 HC RX 637 (ALT 250 FOR IP): Performed by: INTERNAL MEDICINE

## 2023-10-21 PROCEDURE — 85025 COMPLETE CBC W/AUTO DIFF WBC: CPT

## 2023-10-21 PROCEDURE — APPSS60 APP SPLIT SHARED TIME 46-60 MINUTES: Performed by: PHYSICIAN ASSISTANT

## 2023-10-21 PROCEDURE — 2500000003 HC RX 250 WO HCPCS: Performed by: INTERNAL MEDICINE

## 2023-10-21 PROCEDURE — 84484 ASSAY OF TROPONIN QUANT: CPT

## 2023-10-21 PROCEDURE — 6360000004 HC RX CONTRAST MEDICATION: Performed by: PHYSICIAN ASSISTANT

## 2023-10-21 PROCEDURE — 36415 COLL VENOUS BLD VENIPUNCTURE: CPT

## 2023-10-21 PROCEDURE — 94664 DEMO&/EVAL PT USE INHALER: CPT

## 2023-10-21 PROCEDURE — 2060000000 HC ICU INTERMEDIATE R&B

## 2023-10-21 PROCEDURE — 94640 AIRWAY INHALATION TREATMENT: CPT

## 2023-10-21 RX ADMIN — BUDESONIDE 250 MCG: 0.25 SUSPENSION RESPIRATORY (INHALATION) at 20:48

## 2023-10-21 RX ADMIN — BUDESONIDE 250 MCG: 0.25 SUSPENSION RESPIRATORY (INHALATION) at 07:23

## 2023-10-21 RX ADMIN — PERFLUTREN 1.5 ML: 6.52 INJECTION, SUSPENSION INTRAVENOUS at 11:35

## 2023-10-21 RX ADMIN — ASPIRIN 81 MG: 81 TABLET, COATED ORAL at 08:08

## 2023-10-21 RX ADMIN — AMIODARONE HYDROCHLORIDE 100 MG: 200 TABLET ORAL at 08:10

## 2023-10-21 RX ADMIN — ACETAMINOPHEN 500 MG: 325 TABLET ORAL at 08:08

## 2023-10-21 RX ADMIN — ACETAMINOPHEN 500 MG: 325 TABLET ORAL at 22:08

## 2023-10-21 RX ADMIN — BUMETANIDE 1 MG: 0.25 INJECTION INTRAMUSCULAR; INTRAVENOUS at 08:09

## 2023-10-21 ASSESSMENT — PAIN SCALES - GENERAL
PAINLEVEL_OUTOF10: 2
PAINLEVEL_OUTOF10: 0
PAINLEVEL_OUTOF10: 4
PAINLEVEL_OUTOF10: 0
PAINLEVEL_OUTOF10: 8

## 2023-10-21 ASSESSMENT — PAIN DESCRIPTION - DESCRIPTORS: DESCRIPTORS: ACHING

## 2023-10-21 ASSESSMENT — PAIN DESCRIPTION - LOCATION
LOCATION: SHOULDER
LOCATION: BACK
LOCATION: SHOULDER

## 2023-10-21 ASSESSMENT — PAIN DESCRIPTION - ORIENTATION: ORIENTATION: RIGHT;LEFT

## 2023-10-21 NOTE — PROGRESS NOTES
4 Eyes Skin Assessment     NAME:  Huyen Mclean  YOB: 1935  MEDICAL RECORD NUMBER:  [id number]    The patient is being assessed for  Admission    I agree that at least one RN has performed a thorough Head to Toe Skin Assessment on the patient. ALL assessment sites listed below have been assessed. Areas assessed by both nurses:    Head, Face, Ears, Shoulders, Back, Chest, Arms, Elbows, Hands, Sacrum. Buttock, Coccyx, Ischium, Legs. Feet and Heels, Under Medical Devices , and Other          Does the Patient have a Wound? No noted wound(s)       Rubin Prevention initiated by RN: No  Wound Care Orders initiated by RN: No    Pressure Injury (Stage 3,4, Unstageable, DTI, NWPT, and Complex wounds) if present, place Wound referral order by RN under : No    New Ostomies, if present place, Ostomy referral order under : No     Nurse 1 eSignature: Electronically signed by Mina Boyd RN on 10/21/23 at 3:44 AM EDT    **SHARE this note so that the co-signing nurse can place an eSignature**    Nurse 2 eSignature: Electronically signed by Mina Boyd RN on 10/21/23 at 3:44 AM EDT 4 Eyes Skin Assessment     NAME:  Rocio Larsen OF BIRTH:  1935  MEDICAL RECORD NUMBER:  [id number]    The patient is being assessed for  Admission    I agree that at least one RN has performed a thorough Head to Toe Skin Assessment on the patient. ALL assessment sites listed below have been assessed. Areas assessed by both nurses:    Head, Face, Ears, Shoulders, Back, Chest, Arms, Elbows, Hands, Sacrum. Buttock, Coccyx, Ischium, Legs. Feet and Heels, Under Medical Devices , and Other          Does the Patient have a Wound?  No noted wound(s)       Rubin Prevention initiated by RN: No  Wound Care Orders initiated by RN: No    Pressure Injury (Stage 3,4, Unstageable, DTI, NWPT, and Complex wounds) if present, place Wound referral order by RN under : No    New Ostomies, if present place,

## 2023-10-21 NOTE — PLAN OF CARE
Problem: Discharge Planning  Goal: Discharge to home or other facility with appropriate resources  Recent Flowsheet Documentation  Taken 10/20/2023 2216 by Reita Meigs, RN  Discharge to home or other facility with appropriate resources: Identify barriers to discharge with patient and caregiver

## 2023-10-21 NOTE — PROGRESS NOTES
Otolaryngology consult placed via perfect serve.     Electronically signed by Andres Anaya RN on 10/21/2023 at 4:47 PM

## 2023-10-22 LAB
ALBUMIN SERPL-MCNC: 3.4 G/DL (ref 3.5–5.2)
ALP SERPL-CCNC: 75 U/L (ref 35–104)
ALT SERPL-CCNC: 47 U/L (ref 0–32)
ANION GAP SERPL CALCULATED.3IONS-SCNC: 14 MMOL/L (ref 7–16)
AST SERPL-CCNC: 54 U/L (ref 0–31)
BASOPHILS # BLD: 0.02 K/UL (ref 0–0.2)
BASOPHILS NFR BLD: 0 % (ref 0–2)
BILIRUB SERPL-MCNC: 0.5 MG/DL (ref 0–1.2)
BUN SERPL-MCNC: 38 MG/DL (ref 6–23)
CALCIUM SERPL-MCNC: 8.7 MG/DL (ref 8.6–10.2)
CHLORIDE SERPL-SCNC: 90 MMOL/L (ref 98–107)
CO2 SERPL-SCNC: 21 MMOL/L (ref 22–29)
CREAT SERPL-MCNC: 1.2 MG/DL (ref 0.5–1)
EOSINOPHIL # BLD: 0.06 K/UL (ref 0.05–0.5)
EOSINOPHILS RELATIVE PERCENT: 1 % (ref 0–6)
ERYTHROCYTE [DISTWIDTH] IN BLOOD BY AUTOMATED COUNT: 16.1 % (ref 11.5–15)
GFR SERPL CREATININE-BSD FRML MDRD: 42 ML/MIN/1.73M2
GLUCOSE SERPL-MCNC: 103 MG/DL (ref 74–99)
HCT VFR BLD AUTO: 22.7 % (ref 34–48)
HCT VFR BLD AUTO: 25.8 % (ref 34–48)
HGB BLD-MCNC: 7.1 G/DL (ref 11.5–15.5)
HGB BLD-MCNC: 8 G/DL (ref 11.5–15.5)
IMM GRANULOCYTES # BLD AUTO: 0.08 K/UL (ref 0–0.58)
IMM GRANULOCYTES NFR BLD: 1 % (ref 0–5)
LYMPHOCYTES NFR BLD: 0.82 K/UL (ref 1.5–4)
LYMPHOCYTES RELATIVE PERCENT: 13 % (ref 20–42)
MCH RBC QN AUTO: 25.9 PG (ref 26–35)
MCHC RBC AUTO-ENTMCNC: 31.3 G/DL (ref 32–34.5)
MCV RBC AUTO: 82.8 FL (ref 80–99.9)
MONOCYTES NFR BLD: 0.91 K/UL (ref 0.1–0.95)
MONOCYTES NFR BLD: 14 % (ref 2–12)
NEUTROPHILS NFR BLD: 71 % (ref 43–80)
NEUTS SEG NFR BLD: 4.67 K/UL (ref 1.8–7.3)
PLATELET # BLD AUTO: 399 K/UL (ref 130–450)
PMV BLD AUTO: 8.6 FL (ref 7–12)
POTASSIUM SERPL-SCNC: 4.9 MMOL/L (ref 3.5–5)
PROT SERPL-MCNC: 6 G/DL (ref 6.4–8.3)
RBC # BLD AUTO: 2.74 M/UL (ref 3.5–5.5)
SODIUM SERPL-SCNC: 125 MMOL/L (ref 132–146)
TROPONIN I SERPL HS-MCNC: 33 NG/L (ref 0–9)
WBC OTHER # BLD: 6.6 K/UL (ref 4.5–11.5)

## 2023-10-22 PROCEDURE — 2060000000 HC ICU INTERMEDIATE R&B

## 2023-10-22 PROCEDURE — 36415 COLL VENOUS BLD VENIPUNCTURE: CPT

## 2023-10-22 PROCEDURE — 6370000000 HC RX 637 (ALT 250 FOR IP): Performed by: INTERNAL MEDICINE

## 2023-10-22 PROCEDURE — 99221 1ST HOSP IP/OBS SF/LOW 40: CPT | Performed by: OTOLARYNGOLOGY

## 2023-10-22 PROCEDURE — 85018 HEMOGLOBIN: CPT

## 2023-10-22 PROCEDURE — 85025 COMPLETE CBC W/AUTO DIFF WBC: CPT

## 2023-10-22 PROCEDURE — 94640 AIRWAY INHALATION TREATMENT: CPT

## 2023-10-22 PROCEDURE — 2500000003 HC RX 250 WO HCPCS: Performed by: INTERNAL MEDICINE

## 2023-10-22 PROCEDURE — 84484 ASSAY OF TROPONIN QUANT: CPT

## 2023-10-22 PROCEDURE — 6360000002 HC RX W HCPCS: Performed by: INTERNAL MEDICINE

## 2023-10-22 PROCEDURE — 85014 HEMATOCRIT: CPT

## 2023-10-22 PROCEDURE — 80053 COMPREHEN METABOLIC PANEL: CPT

## 2023-10-22 PROCEDURE — 99233 SBSQ HOSP IP/OBS HIGH 50: CPT | Performed by: INTERNAL MEDICINE

## 2023-10-22 RX ORDER — ENEMA 19; 7 G/133ML; G/133ML
1 ENEMA RECTAL
Status: ACTIVE | OUTPATIENT
Start: 2023-10-22 | End: 2023-10-23

## 2023-10-22 RX ORDER — BUMETANIDE 0.25 MG/ML
1 INJECTION INTRAMUSCULAR; INTRAVENOUS 2 TIMES DAILY
Status: DISCONTINUED | OUTPATIENT
Start: 2023-10-22 | End: 2023-10-24

## 2023-10-22 RX ADMIN — BUDESONIDE 250 MCG: 0.25 SUSPENSION RESPIRATORY (INHALATION) at 09:11

## 2023-10-22 RX ADMIN — BUDESONIDE 250 MCG: 0.25 SUSPENSION RESPIRATORY (INHALATION) at 20:17

## 2023-10-22 RX ADMIN — ASPIRIN 81 MG: 81 TABLET, COATED ORAL at 08:58

## 2023-10-22 RX ADMIN — FLUTICASONE PROPIONATE 2 SPRAY: 50 SPRAY, METERED NASAL at 08:59

## 2023-10-22 RX ADMIN — BUMETANIDE 1 MG: 0.25 INJECTION INTRAMUSCULAR; INTRAVENOUS at 08:57

## 2023-10-22 RX ADMIN — ACETAMINOPHEN 500 MG: 325 TABLET ORAL at 08:57

## 2023-10-22 RX ADMIN — METOPROLOL SUCCINATE 25 MG: 25 TABLET, EXTENDED RELEASE ORAL at 08:59

## 2023-10-22 RX ADMIN — ACETAMINOPHEN 500 MG: 325 TABLET ORAL at 18:16

## 2023-10-22 RX ADMIN — AMIODARONE HYDROCHLORIDE 100 MG: 200 TABLET ORAL at 08:57

## 2023-10-22 RX ADMIN — MAGNESIUM HYDROXIDE 30 ML: 400 SUSPENSION ORAL at 19:35

## 2023-10-22 RX ADMIN — BUMETANIDE 1 MG: 0.25 INJECTION INTRAMUSCULAR; INTRAVENOUS at 18:16

## 2023-10-22 RX ADMIN — PROCHLORPERAZINE EDISYLATE 10 MG: 5 INJECTION INTRAMUSCULAR; INTRAVENOUS at 18:17

## 2023-10-22 ASSESSMENT — PAIN SCALES - GENERAL
PAINLEVEL_OUTOF10: 0
PAINLEVEL_OUTOF10: 4
PAINLEVEL_OUTOF10: 0
PAINLEVEL_OUTOF10: 9

## 2023-10-22 NOTE — PLAN OF CARE
Problem: Discharge Planning  Goal: Discharge to home or other facility with appropriate resources  Outcome: Progressing  Flowsheets  Taken 10/22/2023 0800 by Eliane Geiger RN  Discharge to home or other facility with appropriate resources: Identify barriers to discharge with patient and caregiver  Taken 10/21/2023 2300 by Renata Tomas RN  Discharge to home or other facility with appropriate resources: Identify barriers to discharge with patient and caregiver     Problem: ABCDS Injury Assessment  Goal: Absence of physical injury  Outcome: Progressing     Problem: Safety - Adult  Goal: Free from fall injury  Outcome: Progressing  Flowsheets (Taken 10/22/2023 0045 by Renata Tomas RN)  Free From Fall Injury: Instruct family/caregiver on patient safety     Problem: Pain  Goal: Verbalizes/displays adequate comfort level or baseline comfort level  Outcome: Progressing

## 2023-10-22 NOTE — PROGRESS NOTES
[place] MD FACP  Internal medicine  Progress Notes      CHIEF COMPLAINT: Shortness of breath and dysphagia      HISTORY OF PRESENT ILLNESS:   10/21/2023  Patient was seen on the floor earlier this morning at the main campus [address] reviewed in detail with the patient spoke with the ER physician at the time of admission  50-year-old  woman with the extensive cardiovascular history  Presented to emergency room with worsening dyspnea  In addition she has been having dysphagia which aggravates her dyspnea  She reports that she is being followed by ENT  Admitted with a remarkably elevated BNP and hypoxia  10/22/2023  Patient was seen on the floor earlier this morning  Dyspnea is better    Past Medical History:    Past Medical History:   Diagnosis Date    Arthritis     Atrial fibrillation ([address])     follows with Dr Gaye Orozco  on xarelto    CAD (coronary artery disease)     Cardiomyopathy ([address])     CHF (congestive heart failure) ([address]) 2010    history of follows with Dr Gaye Orozco 4/27/23    Chronic anticoagulation     Chronic bronchitis ([address])     none recent    COPD (chronic obstructive pulmonary disease) ([address])     COVID 08/2022    in hospital x5 days    Depression     GERD (gastroesophageal reflux disease)     HFrEF (heart failure with reduced ejection fraction) ([address]) 12/29/2016 12/26/16- LVEF 20-25%, large apical aneurysm, LA moderate-severely dilated, mildly enlarged RA, mild MR, mild TR, mild AR    [place] (hard of hearing)     wears bilat hearing aids    Hyperlipidemia     Hypertension     ICD (implantable cardioverter-defibrillator) in place     St Dev. follows with Dr Gavin Mireles.  last interrogated remotely  2/21/23    Left ventricular dysfunction     Low vitamin D level     MI (myocardial infarction) ([address]) 10/30/2009    Osteopenia     Osteoporosis     Swallowing difficulty        Past Surgical History:    Past Surgical History:   Procedure Laterality Date    APPENDECTOMY      BREAST Long term current use of amiodarone         PLAN:  Check ultrasound of the neck  Cardiology and ENT consults/input appreciated  Resume anticoagulant therapy  Free water restriction  Questions answered to her satisfaction    Darvin Byers MD  5:19 PM  10/22/2023

## 2023-10-22 NOTE — PROGRESS NOTES
INPATIENT CARDIOLOGY FOLLOW-UP    Name: Rizwan Cordero    Age: 81 y.o. Date of Admission: 10/20/2023  6:57 AM    Date of Service: 10/22/2023    Primary Cardiologist: Dr Prince Sotelo    Chief Complaint: Follow-up for CHF    Interim History:  States morning meds caused upset stomach. Still short of breath with exertion. Denies chest pain. Hemoglobin down to 7.1. Denies obvious bleeding.     Review of Systems:   Negative except as described above    Problem List:  Patient Active Problem List   Diagnosis    CAD (coronary artery disease)    H/O acute myocardial infarction of anterolateral wall    History of PTCA    Ischemic cardiomyopathy with implantable cardioverter-defibrillator (ICD)    Automatic implantable cardioverter-defibrillator in situ    Essential hypertension    COPD (chronic obstructive pulmonary disease) ([address])    DM II (diabetes mellitus, type II), controlled ([address])    Acute on chronic /diastolic/ congestive heart failure ([address])    Pulmonary nodule    Osteoporosis    Influenza with respiratory manifestation other than pneumonia    Pneumonia due to organism    S/P ICD (internal cardiac defibrillator) procedure    PAF (paroxysmal atrial fibrillation) (HCC)    Respiratory failure (HCC)    Acute on chronic congestive heart failure (HCC)    CHF (congestive heart failure), NYHA class I, unspecified failure chronicity, unspecified type (HCC)    HFrEF (heart failure with reduced ejection fraction) ([address])    Chronic renal disease, stage III ([address]) [180210]    Type 2 diabetes mellitus with chronic kidney disease    Moderate aortic stenosis    Moderate aortic regurgitation    Dilated cardiomyopathy (HCC)    On rivaroxaban therapy    CHF (congestive heart failure), NYHA class I, acute on chronic, combined (HCC)    Hypoxia    VHD (valvular heart disease)    Sinus bradycardia    Long term current use of amiodarone       Current Medications:    Current Facility-Administered Medications:     amiodarone (CORDARONE) tablet

## 2023-10-22 NOTE — CONSULTS
OTOLARYNGOLOGY  CONSULT NOTE  10/22/2023    Physician Consulted: Dr. Camilla Arguello  Reason for Consult: dysphagia  Referring Physician: Dr. Marty Earl is a 81 y.o. female who presents for evaluation of dysphagia. Patient is admitted with worsening dyspnea, admitted with CHF exacerbation who notes persistent dysphagia. Patient has a hx of achalasia which she follows with Dr Monica Amaral for and states she has been worked up for dysphagia  throughout this year and is doing much better. She has trouble swallowing and breathing at the same time and feels she lost a few pounds from dyspnea. She had a barium swallow with Dr Monica Amaral on 5/023 that was wnl   And her last injection/EGD with Dr Monica Amaral was 8/2023. She has had no change in her voice or hoarseness of voice. Past Medical History:   Diagnosis Date    Arthritis     Atrial fibrillation ([address])     follows with Dr Shae Fernández  on xarelto    CAD (coronary artery disease)     Cardiomyopathy ([address])     CHF (congestive heart failure) ([address]) 2010    history of follows with Dr Shae Fernández 4/27/23    Chronic anticoagulation     Chronic bronchitis ([address])     none recent    COPD (chronic obstructive pulmonary disease) ([address])     COVID 08/2022    in hospital x5 days    Depression     GERD (gastroesophageal reflux disease)     HFrEF (heart failure with reduced ejection fraction) ([address]) 12/29/2016 12/26/16- LVEF 20-25%, large apical aneurysm, LA moderate-severely dilated, mildly enlarged RA, mild MR, mild TR, mild AR    [place] (hard of hearing)     wears bilat hearing aids    Hyperlipidemia     Hypertension     ICD (implantable cardioverter-defibrillator) in place     St Dev. follows with Dr Caprice Briggs.  last interrogated remotely  2/21/23    Left ventricular dysfunction     Low vitamin D level     MI (myocardial infarction) ([address]) 10/30/2009    Osteopenia     Osteoporosis     Swallowing difficulty        Past Surgical History:   Procedure Laterality Date    APPENDECTOMY      BREAST

## 2023-10-23 ENCOUNTER — APPOINTMENT (OUTPATIENT)
Dept: ULTRASOUND IMAGING | Age: 88
DRG: 291 | End: 2023-10-23
Payer: MEDICARE

## 2023-10-23 LAB
ALBUMIN SERPL-MCNC: 3.7 G/DL (ref 3.5–5.2)
ALP SERPL-CCNC: 78 U/L (ref 35–104)
ALT SERPL-CCNC: 64 U/L (ref 0–32)
ANION GAP SERPL CALCULATED.3IONS-SCNC: 16 MMOL/L (ref 7–16)
AST SERPL-CCNC: 75 U/L (ref 0–31)
BASOPHILS # BLD: 0.02 K/UL (ref 0–0.2)
BASOPHILS NFR BLD: 0 % (ref 0–2)
BILIRUB SERPL-MCNC: 0.5 MG/DL (ref 0–1.2)
BUN SERPL-MCNC: 35 MG/DL (ref 6–23)
CALCIUM SERPL-MCNC: 8.6 MG/DL (ref 8.6–10.2)
CHLORIDE SERPL-SCNC: 89 MMOL/L (ref 98–107)
CO2 SERPL-SCNC: 23 MMOL/L (ref 22–29)
CREAT SERPL-MCNC: 1.2 MG/DL (ref 0.5–1)
EOSINOPHIL # BLD: 0.07 K/UL (ref 0.05–0.5)
EOSINOPHILS RELATIVE PERCENT: 1 % (ref 0–6)
ERYTHROCYTE [DISTWIDTH] IN BLOOD BY AUTOMATED COUNT: 16 % (ref 11.5–15)
GFR SERPL CREATININE-BSD FRML MDRD: 43 ML/MIN/1.73M2
GLUCOSE SERPL-MCNC: 95 MG/DL (ref 74–99)
HCT VFR BLD AUTO: 24.2 % (ref 34–48)
HGB BLD-MCNC: 7.4 G/DL (ref 11.5–15.5)
IMM GRANULOCYTES # BLD AUTO: 0.06 K/UL (ref 0–0.58)
IMM GRANULOCYTES NFR BLD: 1 % (ref 0–5)
LYMPHOCYTES NFR BLD: 0.61 K/UL (ref 1.5–4)
LYMPHOCYTES RELATIVE PERCENT: 9 % (ref 20–42)
MCH RBC QN AUTO: 26.1 PG (ref 26–35)
MCHC RBC AUTO-ENTMCNC: 30.6 G/DL (ref 32–34.5)
MCV RBC AUTO: 85.2 FL (ref 80–99.9)
MONOCYTES NFR BLD: 0.91 K/UL (ref 0.1–0.95)
MONOCYTES NFR BLD: 14 % (ref 2–12)
NEUTROPHILS NFR BLD: 75 % (ref 43–80)
NEUTS SEG NFR BLD: 4.93 K/UL (ref 1.8–7.3)
PLATELET # BLD AUTO: 431 K/UL (ref 130–450)
PMV BLD AUTO: 8.8 FL (ref 7–12)
POTASSIUM SERPL-SCNC: 4.7 MMOL/L (ref 3.5–5)
PROT SERPL-MCNC: 5.8 G/DL (ref 6.4–8.3)
RBC # BLD AUTO: 2.84 M/UL (ref 3.5–5.5)
SODIUM SERPL-SCNC: 128 MMOL/L (ref 132–146)
WBC OTHER # BLD: 6.6 K/UL (ref 4.5–11.5)

## 2023-10-23 PROCEDURE — 76536 US EXAM OF HEAD AND NECK: CPT

## 2023-10-23 PROCEDURE — 6370000000 HC RX 637 (ALT 250 FOR IP): Performed by: INTERNAL MEDICINE

## 2023-10-23 PROCEDURE — 6360000002 HC RX W HCPCS: Performed by: INTERNAL MEDICINE

## 2023-10-23 PROCEDURE — 36415 COLL VENOUS BLD VENIPUNCTURE: CPT

## 2023-10-23 PROCEDURE — 2060000000 HC ICU INTERMEDIATE R&B

## 2023-10-23 PROCEDURE — 2700000000 HC OXYGEN THERAPY PER DAY

## 2023-10-23 PROCEDURE — 97535 SELF CARE MNGMENT TRAINING: CPT

## 2023-10-23 PROCEDURE — 99233 SBSQ HOSP IP/OBS HIGH 50: CPT | Performed by: INTERNAL MEDICINE

## 2023-10-23 PROCEDURE — 2500000003 HC RX 250 WO HCPCS: Performed by: INTERNAL MEDICINE

## 2023-10-23 PROCEDURE — 80053 COMPREHEN METABOLIC PANEL: CPT

## 2023-10-23 PROCEDURE — 85025 COMPLETE CBC W/AUTO DIFF WBC: CPT

## 2023-10-23 PROCEDURE — 94640 AIRWAY INHALATION TREATMENT: CPT

## 2023-10-23 PROCEDURE — 97165 OT EVAL LOW COMPLEX 30 MIN: CPT

## 2023-10-23 RX ADMIN — AMIODARONE HYDROCHLORIDE 100 MG: 200 TABLET ORAL at 08:00

## 2023-10-23 RX ADMIN — FLUTICASONE PROPIONATE 2 SPRAY: 50 SPRAY, METERED NASAL at 08:01

## 2023-10-23 RX ADMIN — BUMETANIDE 1 MG: 0.25 INJECTION INTRAMUSCULAR; INTRAVENOUS at 07:59

## 2023-10-23 RX ADMIN — BUDESONIDE 250 MCG: 0.25 SUSPENSION RESPIRATORY (INHALATION) at 08:53

## 2023-10-23 RX ADMIN — METOPROLOL SUCCINATE 25 MG: 25 TABLET, EXTENDED RELEASE ORAL at 08:00

## 2023-10-23 RX ADMIN — BUMETANIDE 1 MG: 0.25 INJECTION INTRAMUSCULAR; INTRAVENOUS at 18:52

## 2023-10-23 RX ADMIN — ACETAMINOPHEN 500 MG: 325 TABLET ORAL at 07:59

## 2023-10-23 ASSESSMENT — PAIN SCALES - GENERAL
PAINLEVEL_OUTOF10: 0
PAINLEVEL_OUTOF10: 5
PAINLEVEL_OUTOF10: 0

## 2023-10-23 NOTE — PROGRESS NOTES
OCCUPATIONAL THERAPY INITIAL EVALUATION    GUS Schilling [address] [place] [address]   [address], [place], [place]      RIVERO:                                                  Patient Name: Harjeet Soriano  MRN: [id number]  : 1935  Room: [address] [place] [address]    Evaluating OT: Nisha JesiSAMY rutherford, OTR/L  # [id number]    Referring Provider:  Eddi Odonnell MD  Specific Provider Orders:  Lo Yousif and Treat\"23    Diagnosis: Hyperkalemia [E87.5]  Hyponatremia [E87.1]  Hypoxia [R09.02]  CHF (congestive heart failure), NYHA class I, acute on chronic, combined ([address]) [I50.43]  Acute congestive heart failure, unspecified heart failure type ([address]) [I50.9]    Pt was admitted w/ SOB, Hypoxia, Cough    Pertinent Medical History:  Pt has a past medical history of Arthritis, Atrial fibrillation ([address]), CAD (coronary artery disease), Cardiomyopathy ([address]), CHF (congestive heart failure) ([address]), Chronic anticoagulation, Chronic bronchitis ([address]), COPD (chronic obstructive pulmonary disease) ([address]), COVID, Depression, GERD (gastroesophageal reflux disease), HFrEF (heart failure with reduced ejection fraction) ([address]), [place] (hard of hearing), Hyperlipidemia, Hypertension, ICD (implantable cardioverter-defibrillator) in place, Left ventricular dysfunction, Low vitamin D level, MI (myocardial infarction) ([address]), Osteopenia, Osteoporosis, and Swallowing difficulty. ,  has a past surgical history that includes Hysterectomy; Appendectomy; Tonsillectomy; Breast surgery (); Cosmetic surgery; Breast Capsulectomy (2012); Diagnostic Cardiac Cath Lab Procedure (10/30/2009); cardiovascular stress test (2010); transthoracic echocardiogram (3/30/11ize and function, ); Cardiac surgery (); Cardiac pacemaker placement (05/10/2010); Cardiac defibrillator placement; Colonoscopy (2021); other surgical history (Right, 2015);  Cardiac defibrillator placement (2010); other surgical history ~ 30 second seated rest break    Pt required extended time and frequent seated rest breaks d/t SOB, Dizziness, Hypoxia       Functional Assessment:  AM-PAC Daily Activity Raw Score: 15/24     Initial Eval Status  Date: 10-23-23   Treatment Status  Date: STGs = LTGs  Time frame: 10-14 days   Feeding SUP/Set up        NA   Grooming SUP/Set up    Light ax standing at sink  VCs for safety w/ standing ax, PLB for SOB    Mod I  Standing at the [employer] A/Set up    Donning/doffing gown seated EOB  VCs to problem solve task, for PLB    Mod I     LB Dressing Mod A/Set up    Min A to don socks, absorbant undergarments + Min A for safety w/ standing balance for clothing adjustment, seated rest break during task  Pt ed for safe/adaptive techs, use of adaptive equip, VCs for PLB    Mod I     Bathing NT    Pt ed re: Benefits of use of Shower chair/Tub Bench, resources for obtaining equip    Mod I      Toileting Mod A/Set up    Assist for hygiene, clothing adjustment and for safety w/ standing balance w/ use of [employer]  Pt/Family ed re: benefits of use of BSC for use at home HS    Mod I     Bed Mobility  Supine to sit: SUP   Sit to supine:  NT     VCs for safety    Supine to sit: IND  Sit to supine: IND     Functional Transfers Min A    EOB, Chair, Commode  Pt ed for safety/hand placement    Mod I     Functional Mobility Min A w/ WW  Mod VCs for safety, PLB    Short distances in room, bathroom  Pt ed for safety/improved safety awareness, walker safety, PLB    Mod I     Balance Sitting:     Static:  Remote SUP     Dynamic:  Close SUP w/ functional ax      Standing:     Static:  Close SUP w/ [employer]    Dynamic:  Min A w/ functional ax/mobility w/ [employer]    Sitting:     Static:  IND    Dynamic:  IND w/ functional ax    Standing:     Static:  Mod I w/ AD PRN    Dynamic:  Mod I w/ functional ax/mobility w/ AD PRN   Activity Tolerance Fair    Limited by SOB, Hypoxia, Dizziness    Good(-)   Visual/  Perceptual    Hearing:  WNL   Glasses:

## 2023-10-23 NOTE — PROGRESS NOTES
Physician Progress Note      PATIENTDaltrenu Hernandez  CSN #:                  [id number]  :                       1935  ADMIT DATE:       10/20/2023 6:57 AM  [facility] DATE:  RESPONDING  PROVIDER #:        Roopa Avina MD          QUERY TEXT:    Dear Provider,    Pt presents with HFrEF exacerbation. Pt noted to also have hypertension,   ischemic cardiomyopathy, possibly severe AS, Mild to moderate AR, Mild to   moderate MR, Moderate TR and CAD. If possible, please document in progress   notes and discharge summary the etiology of CHF, if able to be determined. The medical record reflects the following:  Risk Factors: hypertension, possibly severe AS, Mild to moderate AR, Mild to   moderate MR,  Moderate TR, ischemic cardiomyopathy, CAD with prior STEMI , PAF  Clinical Indicators: 10/22 Cardiology note: Her dyspnea is complex and   multifactorial.  Aside from decompensated heart failure  with severe LV dysfunction, she has severe pulmonary hypertension, possibly   severe low-flow low gradient AS (versus pseudo  severe given severe LV dysfunction), and worsening anemia in the context of   DOAC and aspirin use. Bradycardia/chronotropic  incompetence may also be contributing. Pro BNP 16,709  CXR: There are findings of pulmonary vascular congestion. No right pleural   effusion is noted. ?  10/21 TTE: LVEF  20-25%. Mild-moderate mitral regurgitation. Moderate   tricuspid regurgitation. Mild pulmonic regurgitation. Severe low-flow low-gradient aortic stenosis. Mild-moderate aortic valve   regurgitation. Compared to prior study aortic valve area is lower.   Treatment: Imaging, Labs,  IV lasix,  IV Bumex, Toprol XL, Cozaar, ASA,   Amiodarone      Thank you,  C  Options provided:  -- CHF due to Hypertensive Heart Disease  -- CHF due to Hypertensive Heart Disease and Valvular Heart Disease  -- CHF not due to Hypertension but due to valvular heart disease  -- CHF due to Hypertensive Heart Disease and CAD  -- CHF not due to Hypertension but due to CAD  -- CHF due to Hypertensive Heart Disease and ICMP  -- CHF not due to Hypertension but due to ICMP  -- CHF due to HTN, CAD, ICMP and valvular disease  -- Other - I will add my own diagnosis  -- Disagree - Not applicable / Not valid  -- Disagree - Clinically unable to determine / Unknown  -- Refer to Clinical Documentation Reviewer    PROVIDER RESPONSE TEXT:    Provider disagreed with this query.   Please see my detailed note    Query created by: Nae Yip on 10/23/2023 9:24 AM      Electronically signed by:  Ame Milian MD 10/23/2023 10:04 AM

## 2023-10-23 NOTE — PROGRESS NOTES
INPATIENT CARDIOLOGY FOLLOW-UP    Name: Malik [facility]    Age: 81 y.o. Date of Admission: 10/20/2023  6:57 AM    Date of Service: 10/23/2023    Primary Cardiologist: Dr Serafin Ordonez    Chief Complaint: Follow-up for CHF    Interim History:  Shortness of breath is better no chest pain still has decreased appetite and orthopnea and gets winded going to the bathroom. Denies obvious bleeding.     Review of Systems:   Negative except as described above    Problem List:  Patient Active Problem List   Diagnosis    CAD (coronary artery disease)    H/O acute myocardial infarction of anterolateral wall    History of PTCA    Ischemic cardiomyopathy with implantable cardioverter-defibrillator (ICD)    Automatic implantable cardioverter-defibrillator in situ    Essential hypertension    COPD (chronic obstructive pulmonary disease) ([address])    DM II (diabetes mellitus, type II), controlled ([address])    Acute on chronic /diastolic/ congestive heart failure ([address])    Pulmonary nodule    Osteoporosis    Influenza with respiratory manifestation other than pneumonia    Pneumonia due to organism    S/P ICD (internal cardiac defibrillator) procedure    PAF (paroxysmal atrial fibrillation) (HCC)    Respiratory failure (HCC)    Acute on chronic congestive heart failure (HCC)    CHF (congestive heart failure), NYHA class I, unspecified failure chronicity, unspecified type (HCC)    HFrEF (heart failure with reduced ejection fraction) ([address])    Chronic renal disease, stage III ([address]) [743045]    Type 2 diabetes mellitus with chronic kidney disease    Moderate aortic stenosis    Moderate aortic regurgitation    Dilated cardiomyopathy (HCC)    On rivaroxaban therapy    CHF (congestive heart failure), NYHA class I, acute on chronic, combined (HCC)    Hypoxia    VHD (valvular heart disease)    Sinus bradycardia    Long term current use of amiodarone       Current Medications:    Current Facility-Administered Medications:     bumetanide (BUMEX) injection botulinum injection 5/2023, repeat injection and dilation 8/2023    RECOMMENDATIONS:  Her dyspnea is complex and multifactorial.  Aside from decompensated heart failure with severe LV dysfunction, she has severe pulmonary hypertension, possibly severe low-flow low gradient AS (versus pseudo severe given severe LV dysfunction), and worsening anemia in the context of DOAC and aspirin use. Bradycardia/chronotropic incompetence may also be contributing. Continue IV bumetanide to 1 mg twice daily with strict I's and O's, caution to avoid overdiuresis, check proBNP level in a.m. Avoid the use of spironolactone given advanced age and abnormal kidney function and hyperkalemia  Continue to hold losartan for now, continue metoprolol  Continue to hold aspirin and rivaroxaban: Would avoid this combination in the future and continue on DOAC alone when appropriate  Check FOBT  Consider GI consult to evaluate for GI source of blood loss  Repeat H&H this afternoon  Consider blood transfusion may help her symptoms defer to primary  Consider ischemic evaluation when above issues resolved preferably a Lexiscan nuclear stress test.  Consider dobutamine stress echo to assess aortic valve and if truly severe may benefit from TAVR evaluation  Continue amiodarone  Consider increasing lower rate limit of ICD pacing  Given above complex issues, palliative care consult and addressing CODE STATUS may be reasonable  Aggressive risk factor modification  Echo results were reviewed, as above and discussed with the patient  Further care per primary service and consultants    Elen Powell MD, [address] [place] [address],[address] Cardiology    NOTE: This report was transcribed using voice recognition software. Every effort was made to ensure accuracy; however, inadvertent computerized transcription errors may be present.

## 2023-10-23 NOTE — ACP (ADVANCE CARE PLANNING)
Advance Care Planning   Healthcare Decision Maker:    Primary Decision Maker: Elodia Simmonds - [phone]    Secondary Decision Maker: Marshal Giang  [phone]    Click here to complete Healthcare Decision Makers including selection of the Healthcare Decision Maker Relationship (ie \"Primary\").

## 2023-10-23 NOTE — CARE COORDINATION
Transition of Care: Met with pt at bedside to discuss transition of care. She lives in a 1 story home with her spouse. 3 steps to enter. DME: fww and cane. Independent with ADL's. PCP . Pharmacy [facility] [place]. No home O2. Pt planning to return home at discharge. Will need ambulating pulse ox at discharge. Cardiology and ENT consulted. Awaiting US Neck. CM will continue to follow for any discharge needs that arise (TF)      Erica Mays [facility]  Case Management  [phone]        Case Management Assessment  Initial Evaluation    Date/Time of Evaluation: 10/23/2023 3:22 PM  Assessment Completed by: Erica Mays RN    If patient is discharged prior to next notation, then this note serves as note for discharge by case management. Patient Name: Sergey Baldwin                   YOB: 1935  Diagnosis: Hyperkalemia [E87.5]  Hyponatremia [E87.1]  Hypoxia [R09.02]  CHF (congestive heart failure), NYHA class I, acute on chronic, combined ([address]) [I50.43]  Acute congestive heart failure, unspecified heart failure type ([address]) [I50.9]                   Date / Time: 10/20/2023  6:57 AM    Patient Admission Status: Inpatient   Readmission Risk (Low < 19, Mod (19-27), High > 27): Readmission Risk Score: 18.1    Current PCP: Kimberly Murillo MD  PCP verified by CM? Yes    Chart Reviewed: Yes      History Provided by: Patient  Patient Orientation: Alert and Oriented    Patient Cognition: Alert    Hospitalization in the last 30 days (Readmission):  No    If yes, Readmission Assessment in  Navigator will be completed.     Advance Directives:      Code Status: Prior   Patient's Primary Decision Maker is: Legal Next of Kin    Primary Decision Maker: See Jack - Spouse - [phone]    Secondary Decision Maker: Jocelyn Huggins - [phone]    Discharge Planning:    Patient lives with: Spouse/Significant Other Type of Home: House  Primary Care Giver: Self  Patient Support Systems include: [O55.6]    IF APPLICABLE: The Patient and/or patient representative Gisel Hinton and her family were provided with a choice of provider and agrees with the discharge plan. Freedom of choice list with basic dialogue that supports the patient's individualized plan of care/goals and shares the quality data associated with the providers was provided to:     Patient Representative Name:       The Patient and/or Patient Representative Agree with the Discharge Plan?       Tracey Miles RN  Case Management Department  Ph: [phone]

## 2023-10-24 VITALS
BODY MASS INDEX: 20.82 KG/M2 | OXYGEN SATURATION: 98 % | HEIGHT: 58 IN | DIASTOLIC BLOOD PRESSURE: 64 MMHG | RESPIRATION RATE: 18 BRPM | TEMPERATURE: 97.1 F | WEIGHT: 99.21 LBS | HEART RATE: 55 BPM | SYSTOLIC BLOOD PRESSURE: 130 MMHG

## 2023-10-24 LAB — BNP SERPL-MCNC: ABNORMAL PG/ML (ref 0–450)

## 2023-10-24 PROCEDURE — 99232 SBSQ HOSP IP/OBS MODERATE 35: CPT | Performed by: INTERNAL MEDICINE

## 2023-10-24 PROCEDURE — 94640 AIRWAY INHALATION TREATMENT: CPT

## 2023-10-24 PROCEDURE — 97530 THERAPEUTIC ACTIVITIES: CPT

## 2023-10-24 PROCEDURE — 6370000000 HC RX 637 (ALT 250 FOR IP): Performed by: INTERNAL MEDICINE

## 2023-10-24 PROCEDURE — 97161 PT EVAL LOW COMPLEX 20 MIN: CPT

## 2023-10-24 PROCEDURE — 2500000003 HC RX 250 WO HCPCS: Performed by: INTERNAL MEDICINE

## 2023-10-24 PROCEDURE — 6360000002 HC RX W HCPCS: Performed by: INTERNAL MEDICINE

## 2023-10-24 PROCEDURE — 36415 COLL VENOUS BLD VENIPUNCTURE: CPT

## 2023-10-24 PROCEDURE — 83880 ASSAY OF NATRIURETIC PEPTIDE: CPT

## 2023-10-24 PROCEDURE — 2700000000 HC OXYGEN THERAPY PER DAY

## 2023-10-24 RX ORDER — BUMETANIDE 1 MG/1
1 TABLET ORAL 2 TIMES DAILY
Qty: 30 TABLET | Refills: 3 | Status: SHIPPED | OUTPATIENT
Start: 2023-10-24

## 2023-10-24 RX ORDER — BUMETANIDE 1 MG/1
1 TABLET ORAL 2 TIMES DAILY
Status: DISCONTINUED | OUTPATIENT
Start: 2023-10-24 | End: 2023-10-24 | Stop reason: HOSPADM

## 2023-10-24 RX ADMIN — BUDESONIDE 250 MCG: 0.25 SUSPENSION RESPIRATORY (INHALATION) at 08:27

## 2023-10-24 RX ADMIN — FLUTICASONE PROPIONATE 2 SPRAY: 50 SPRAY, METERED NASAL at 08:42

## 2023-10-24 RX ADMIN — AMIODARONE HYDROCHLORIDE 100 MG: 200 TABLET ORAL at 08:43

## 2023-10-24 RX ADMIN — BUMETANIDE 1 MG: 0.25 INJECTION INTRAMUSCULAR; INTRAVENOUS at 08:42

## 2023-10-24 ASSESSMENT — PAIN SCALES - GENERAL
PAINLEVEL_OUTOF10: 0
PAINLEVEL_OUTOF10: 0

## 2023-10-24 NOTE — PROGRESS NOTES
Loren Lopez MD FACP  Internal medicine  Progress Notes      CHIEF COMPLAINT: Shortness of breath and dysphagia      HISTORY OF PRESENT ILLNESS:   10/21/2023  Patient was seen on the floor earlier this morning at the main campus [address] reviewed in detail with the patient spoke with the ER physician at the time of admission  61-year-old  woman with the extensive cardiovascular history  Presented to emergency room with worsening dyspnea  In addition she has been having dysphagia which aggravates her dyspnea  She reports that she is being followed by ENT  Admitted with a remarkably elevated BNP and hypoxia  10/22/2023  Patient was seen on the floor earlier this morning  Dyspnea is better  10/23/2023  Patient was seen on the floor earlier this morning  No acute events overnight  Continues to complain of dyspnea on exertion  10/24/2023  Patient was seen on the floor earlier this morning  Registered nurse at bedside  Dyspneic and hypoxic only on exertion  Past Medical History:    Past Medical History:   Diagnosis Date    Arthritis     Atrial fibrillation ([address])     follows with Dr Leandra Bruce  on xarelto    CAD (coronary artery disease)     Cardiomyopathy ([address])     CHF (congestive heart failure) ([address]) 2010    history of follows with Dr Leandra Bruce 4/27/23    Chronic anticoagulation     Chronic bronchitis ([address])     none recent    COPD (chronic obstructive pulmonary disease) ([address])     COVID 08/2022    in hospital x5 days    Depression     GERD (gastroesophageal reflux disease)     HFrEF (heart failure with reduced ejection fraction) ([address]) 12/29/2016 12/26/16- LVEF 20-25%, large apical aneurysm, LA moderate-severely dilated, mildly enlarged RA, mild MR, mild TR, mild AR    [place] (hard of hearing)     wears bilat hearing aids    Hyperlipidemia     Hypertension     ICD (implantable cardioverter-defibrillator) in place     St Dev. follows with Dr Debbie Hernandez.  last interrogated remotely  2/21/23    Left

## 2023-10-24 NOTE — PROGRESS NOTES
Physical Therapy Initial Assessment     Name: Daniel Chen  : 1935  MRN: [id number]      Date of Service: 10/24/2023    Evaluating PT:  Donn Mckeon PT, FANY [id number]    Room #:  6186/1444-Z  Diagnosis:  Hyperkalemia [E87.5]  Hyponatremia [E87.1]  Hypoxia [R09.02]  CHF (congestive heart failure), NYHA class I, acute on chronic, combined ([address]) [I50.43]  Acute congestive heart failure, unspecified heart failure type ([address]) [I50.9]  PMHx/PSHx:    Past Medical History:   Diagnosis Date    Arthritis     Atrial fibrillation ([address])     follows with Dr Denton Mcnair  on xarelto    CAD (coronary artery disease)     Cardiomyopathy ([address])     CHF (congestive heart failure) ([address])     history of follows with Dr Denton Mcnair 23    Chronic anticoagulation     Chronic bronchitis ([address])     none recent    COPD (chronic obstructive pulmonary disease) ([address])     COVID 2022    in hospital x5 days    Depression     GERD (gastroesophageal reflux disease)     HFrEF (heart failure with reduced ejection fraction) ([address]) 2016- LVEF 20-25%, large apical aneurysm, LA moderate-severely dilated, mildly enlarged RA, mild MR, mild TR, mild AR    [place] (hard of hearing)     wears bilat hearing aids    Hyperlipidemia     Hypertension     ICD (implantable cardioverter-defibrillator) in place     St Dev. follows with Dr Hiral De La Fuente.  last interrogated remotely  23    Left ventricular dysfunction     Low vitamin D level     MI (myocardial infarction) ([address]) 10/30/2009    Osteopenia     Osteoporosis     Swallowing difficulty      Procedure/Surgery:  none this admission   Reason for admission: Hyperkalemia [E87.5]  Hyponatremia [E87.1]  Hypoxia [R09.02]  CHF (congestive heart failure), NYHA class I, acute on chronic, combined ([address]) [I50.43]  Acute congestive heart failure, unspecified heart failure type ([address]) [I50.9]  Precautions:  Fall Risk, O2, [place] ([employer]), Incontinent   Equipment Needs:  none at this time Positioning to prevent skin breakdown and contractures  [] Safety and Education Training   [] Patient/Caregiver Education   [] HEP  [] Other     PT long term treatment goals are located in above grid    Frequency of treatments: 2-5x/week x 1-2 weeks. Time in  1030  Time out  1054    Total Treatment Time  10 minutes     Evaluation Time includes thorough review of current medical information, gathering information on past medical history/social history and prior level of function, completion of standardized testing/informal observation of tasks, assessment of data and education on plan of care and goals.     CPT codes:  [x] Low Complexity PT evaluation 99978  [] Moderate Complexity PT evaluation 77164  [] High Complexity PT evaluation 23680  [] PT Re-evaluation 39199  [] Gait training 58187 -- minutes  [] Manual therapy [id number] -- minutes  [x] Therapeutic activities 41203 10 minutes  [] Therapeutic exercises 65546 -- minutes  [] Neuromuscular reeducation 82412 -- minutes     Jimbo Kumar, PT, DPT  [id number]

## 2023-10-24 NOTE — CARE COORDINATION
CM Update: Plan at discharge is Home with spouse. She worked with therapy and did well. No Home O2, Will need ambulating pulse ox at discharge. Will choice if pt qualifies. Spouse will provide transport. US neck completed, showing thyroid nodules and tortuous vessel. Cardiology and ENT following. Pt follows with  outpatient. CM to follow (TF)      1320--Discharge order noted. DME order placed for Home O2. Pt has no preference on company. Call placed to [facility] with [facility]. Will deliver portable tank to bedside and arrange to deliver concentrator and supplies to her home (TF)        Mery [facility]  Case Management  [phone]        The Plan for Transition of Care is related to the following treatment goals: Home with Home O2    The Patient and/or patient representative  was provided with a choice of provider and agrees   with the discharge plan. [x] Yes [] No    Freedom of choice list was provided with basic dialogue that supports the patient's individualized plan of care/goals, treatment preferences and shares the quality data associated with the providers.  [x] Yes [] No

## 2023-10-24 NOTE — PROGRESS NOTES
Patient's oxygen saturation sitting    100% on 3 L. 98% on room air  Patient's oxygen saturation standing    100% on 3 L     91% room air  Patient's oxygen saturation ambulating in vergara    %  on 3 L. 81% room air    Pt.  Ambulated a total of 40 ft.

## 2023-10-24 NOTE — PROGRESS NOTES
Per Dr. Phu Cavanaugh, A verbal order was taken to restart asprin and Xarelto. Pt. Aware, and it is on the AVS med detail. Pt. To follow up with Dr. Zahira Torres within one week.

## 2023-10-24 NOTE — PROGRESS NOTES
Discharge discussed with patient. IV catheter removed without complications. Cardiac monitor removed and placed in appropriate storage per unit guidelines. Discharge instructions given and thoroughly explained to patient discussing new medications, diagnosis and follow-ups needed for outpatient. Patient verified all belongings are accounted for. This RN to wheel patient down to private car after diner is finished.  at bedside to drive her home.

## 2023-10-24 NOTE — PLAN OF CARE
Problem: Discharge Planning  Goal: Discharge to home or other facility with appropriate resources  Outcome: Completed     Problem: ABCDS Injury Assessment  Goal: Absence of physical injury  Outcome: Completed     Problem: Safety - Adult  Goal: Free from fall injury  Outcome: Completed     Problem: Pain  Goal: Verbalizes/displays adequate comfort level or baseline comfort level  Outcome: Completed  Flowsheets (Taken 10/24/2023 9078)  Verbalizes/displays adequate comfort level or baseline comfort level: Encourage patient to monitor pain and request assistance     Problem: Chronic Conditions and Co-morbidities  Goal: Patient's chronic conditions and co-morbidity symptoms are monitored and maintained or improved  Outcome: Completed

## 2023-10-24 NOTE — PROGRESS NOTES
Nutrition Note    Type and Reason for Visit: Patient Education    Nutrition Assessment:  Pt to be educated per CHF LOS protocol. Chart reviewed. Pt provided w/ written edu on the Cardiac/CHF Diet which includes main diet guidelines, ways to reduce sodium intake and sample meal plans. Handout and contact info placed in d/c instructions. Will follow-up per policy.     Governor OLGA Archibald, LD  Contact: ext [phone]

## 2023-10-24 NOTE — PROGRESS NOTES
07/25/2016     Lab Results   Component Value Date    TRIG 68 05/11/2022    TRIG 49 07/12/2017    TRIG 38 07/25/2016     Lab Results   Component Value Date    HDL 79 05/11/2022    HDL 66 07/12/2017    HDL 66 07/25/2016     Lab Results   Component Value Date    LDLCALC 79 05/11/2022    LDLCALC 50 07/12/2017    LDLCALC 52 07/25/2016     Lab Results   Component Value Date    LABVLDL 14 05/11/2022    LABVLDL 10 07/12/2017    LABVLDL 8 07/25/2016     No results found for: \"CHOLHDLRATIO\"  Recent Labs     10/24/23  0502   PROBNP 15,161*         Cardiac Tests:    EKG:   10/20/2023: Atrial paced 55 bpm with IVCD QRS duration 136 ms possible anterolateral infarct    Telemetry: Atrial paced rhythm with heart rate in the 50s with a prolonged CA interval, no arrhythmias overnight. Labs and vitals were reviewed: As above blood pressure 130/64 heart rate 55 sats 96% on 3 L    Labs-BUNs/creatinine 38/1.2>> 35/1.2, sodium 125>> 128, proBNP 16,709>> 15,000 161, troponins 28, 32 133, AST/ALT 75/64, hemoglobin 8>> 7.4 platelets 809, respiratory panel was negative. No BMP for today    Chest X-ray:   10/20/23    IMPRESSION:  1. Cardiomegaly findings of CHF  2. Questionable left pleural effusion versus left lower lobe airspace disease. Echocardiogram:   TTE 10/21/23   Summary   Micro-bubble contrast injected to enhance left ventricular visualization. Left ventricle is mildly dilated. LVEDD 5.6 cm. LV systolic function is severely reduced. Ejection fraction is visually estimated at 20-25%. Grade II diastolic dysfunction. Akinesis of the mid anteroseptal, mid inferoseptal walls and entire apex. Normal left ventricular wall thickness. Abnormal LV septal motion consistent with conduction disorder. Left ventricle false tendon. Right ventricle global systolic function is low normal.   Biatrial dilation. Mild-moderate mitral regurgitation. Moderate tricuspid regurgitation. Mild pulmonic regurgitation.    Severe low-flow low-gradient aortic stenosis. Mild-moderate aortic valve regurgitation. Compared to prior study aortic valve area is lower. Stress test:      Cardiac catheterization:   10/30/2009: Cardiac catheterization/primary angioplasty: Left main short vessel with no significant disease. LAD occluded proximally. LCX: 90% stenosis of the 3rd obtuse marginal branch. RCA, a small nondominant vessel, which was non-selectively visualized. Successful balloon angioplasty and stenting to the proximal LAD with restoration of DORI 3 flow in the vessel. Device interrogation 8/2023   3.3 years battery life remaining  DDDR LRL 55 bpm  90% atrial pacing  2.2% RV pacing  No device shocks or therapies  Less than 1% AT/AF burden    ----------------------------------------------------------------------------------------------------------------------------------------------------------------  IMPRESSION:  Dyspnea: Multifactorial  Acute on chronic HFrEF.  proBNP 17,000>> improving, UOP seems not accurate>> improving. Ischemic cardiomyopathy EF 20-25%  VHD: Possibly severe AS (low-flow low gradient), mild to moderate AR, mild to moderate MR, moderate TR. Pulmonary pressures 80s considering her age and other comorbid conditions she may not be a candidate for TAVR. [employer] 2010, generator change 2018  CAD with prior anterior STEMI 2009 s/p LAD PCI  PAF diagnosed 2020. ALMA/cardioversion plus amiodarone initiated. AC dose adjusted rivaroxaban  Sinus node dysfunction  RBBB/LAFB  Pulmonary hypertension WHO group 2  Acute on chronic anemia worsening Hgb 7.8-7.1. Recent baseline 9-10.  12.4 in 2022  CKD creatinine 1.2.   Hyponatremia  Hyperkalemia improved  Mildly abnormal LFTs  Type 2 diabetes  Hypoacusis severe  Achalasia follows with GI; botulinum injection 5/2023, repeat injection and dilation 8/2023    RECOMMENDATIONS:  Her dyspnea is complex and multifactorial.  Aside from decompensated heart failure

## 2023-10-25 ENCOUNTER — CARE COORDINATION (OUTPATIENT)
Dept: CARE COORDINATION | Age: 88
End: 2023-10-25

## 2023-10-25 RX ORDER — SIMVASTATIN 20 MG
20 TABLET ORAL
Qty: 90 TABLET | Refills: 0 | Status: SHIPPED | OUTPATIENT
Start: 2023-10-25

## 2023-10-25 RX ORDER — ASPIRIN 81 MG/1
81 TABLET, COATED ORAL DAILY
Qty: 90 TABLET | Refills: 0 | Status: SHIPPED | OUTPATIENT
Start: 2023-10-25

## 2023-10-25 NOTE — CARE COORDINATION
Care Transitions Initial Follow Up Call    Call within 2 business days of discharge: Yes    Care Transition Nurse attempted initial CT outreach to primary/secondary numbers and no VM opportunity. Patient: Lalita Wing Patient : 1935   MRN: <[id number]>  Reason for Admission: 10/20/2023 - 10/24/2023 [facility] IP. CHF  Discharge Date: 10/24/23 RARS: Readmission Risk Score: 18  NR  CT    P [facility] CLINICAL/ STAFF routed to schedule 7 day hosp fu PCP. Echo  8:15  CHF  8:45  Follow up with Alia Miguel MD (Cardiology) in 1 week (10/30/2023); hospital follow up appt. START taking:  bumetanide (BUMEX)  STOP taking:  Bactroban Nasal 2 % nasal ointment (mupirocin)  furosemide 20 MG tablet (LASIX)  metoclopramide 5 MG tablet (REGLAN)  spironolactone 25 MG tablet (ALDACTONE)    PDM: Donnie Hernandez [phone]  PMH: CKD 3, DM, CAD, CHF, Cardiomyopathy, ICD, COPD, HVD. Last Discharge Facility       Date Complaint Diagnosis Description Type Department Provider    10/20/23 Shortness of Breath; Cough Hypoxia . .. ED to Hosp-Admission (Discharged) (ADMITTED) [facility] P[facility] Yuko Reyes MD; Damian Rocha. ..             Malik Gutiérrez RN

## 2023-10-26 ENCOUNTER — CARE COORDINATION (OUTPATIENT)
Dept: CARE COORDINATION | Age: 88
End: 2023-10-26

## 2023-10-26 DIAGNOSIS — I50.43 CHF (CONGESTIVE HEART FAILURE), NYHA CLASS I, ACUTE ON CHRONIC, COMBINED (HCC): Primary | ICD-10-CM

## 2023-10-26 PROCEDURE — 1111F DSCHRG MED/CURRENT MED MERGE: CPT | Performed by: INTERNAL MEDICINE

## 2023-10-26 NOTE — CARE COORDINATION
events. Wearing 3L NC con't. Has health watch and pulse oximeter at home. States her sats are always well into 90's. Has/taking meds as directed. Picked up Bumex and has stopped Lasix. Will schedule cardio appt and other appts reviewed w/ spouse, v/u.     Care Transition Nurse reviewed discharge instructions with patient who verbalized understanding. The patient was given an opportunity to ask questions and does not have any further questions or concerns at this time. Were discharge instructions available to patient? Yes. Reviewed appropriate site of care based on symptoms and resources available to patient including: PCP  Specialist  Urgent care clinics  [facility]  When to call 911  Condition related references. The patient agrees to contact the PCP office for questions related to their healthcare. Advance Care Planning:   Does patient have an Advance Directive: reviewed and current. Medication reconciliation was performed with family, who verbalizes understanding of administration of home medications. Medications reviewed, 1111F entered: yes    Was patient discharged with a pulse oximeter? no    Non-face-to-face services provided:  Advised to report any overnight/wkly wt gains of 3 lbs or more, avoid more than 2000 mg sodium/day, avoid exceeding 50 oz fluid/daily but to drink as close as she can tolerate to avoid dehydration. Offered patient enrollment in the Remote Patient Monitoring (RPM) program for in-home monitoring:  Not reviewed this call .     Care Transitions 24 Hour Call    Do you have a copy of your discharge instructions?: Yes  Do you have all of your prescriptions and are they filled?: Yes  Have you scheduled your follow up appointment?: Yes  Post Acute Services: Home Health (Comment: She is receiving PT)  Do you feel like you have everything you need to keep you well at home?: Yes  Care Transitions Interventions    Physical Therapy: Declined                 Occupational Therapy: Declined

## 2023-10-27 ENCOUNTER — OFFICE VISIT (OUTPATIENT)
Dept: PRIMARY CARE CLINIC | Age: 88
End: 2023-10-27
Payer: MEDICARE

## 2023-10-27 VITALS
WEIGHT: 95 LBS | HEIGHT: 58 IN | TEMPERATURE: 97.7 F | DIASTOLIC BLOOD PRESSURE: 42 MMHG | BODY MASS INDEX: 19.94 KG/M2 | SYSTOLIC BLOOD PRESSURE: 82 MMHG

## 2023-10-27 DIAGNOSIS — I50.20 HFREF (HEART FAILURE WITH REDUCED EJECTION FRACTION) (HCC): ICD-10-CM

## 2023-10-27 DIAGNOSIS — R09.02 HYPOXIA: ICD-10-CM

## 2023-10-27 DIAGNOSIS — Z09 HOSPITAL DISCHARGE FOLLOW-UP: Primary | ICD-10-CM

## 2023-10-27 DIAGNOSIS — I10 ESSENTIAL HYPERTENSION: ICD-10-CM

## 2023-10-27 PROCEDURE — 99214 OFFICE O/P EST MOD 30 MIN: CPT | Performed by: INTERNAL MEDICINE

## 2023-10-27 PROCEDURE — 1090F PRES/ABSN URINE INCON ASSESS: CPT | Performed by: INTERNAL MEDICINE

## 2023-10-27 PROCEDURE — 1036F TOBACCO NON-USER: CPT | Performed by: INTERNAL MEDICINE

## 2023-10-27 PROCEDURE — G8427 DOCREV CUR MEDS BY ELIG CLIN: HCPCS | Performed by: INTERNAL MEDICINE

## 2023-10-27 PROCEDURE — 1123F ACP DISCUSS/DSCN MKR DOCD: CPT | Performed by: INTERNAL MEDICINE

## 2023-10-27 PROCEDURE — 1111F DSCHRG MED/CURRENT MED MERGE: CPT | Performed by: INTERNAL MEDICINE

## 2023-10-27 PROCEDURE — G8420 CALC BMI NORM PARAMETERS: HCPCS | Performed by: INTERNAL MEDICINE

## 2023-10-27 PROCEDURE — G8484 FLU IMMUNIZE NO ADMIN: HCPCS | Performed by: INTERNAL MEDICINE

## 2023-10-27 NOTE — PROGRESS NOTES
mildly enlarged RA, mild MR, mild TR, mild AR    [place] (hard of hearing)     wears bilat hearing aids    Hyperlipidemia     Hypertension     ICD (implantable cardioverter-defibrillator) in place     [facility]. follows with Dr Debbie Hernandez. last interrogated remotely  2/21/23    Left ventricular dysfunction     Low vitamin D level     MI (myocardial infarction) ([address]) 10/30/2009    Osteopenia     Osteoporosis     Swallowing difficulty           Subjective:  Was admitted due to dyspnea. Discharged on 3lts O2 at home, only on exertion. Is in need of small portable tank. Not sob at rest.  Gets sob only when walking a few feet. Able to sleep well at night. No swelling. Taking Bumex twice a day. No chest pain or palpitations. Brings in ne med list for reconciliation. Review of Systems   Constitutional:  Negative for activity change, appetite change and chills. HENT:  Negative for congestion, ear pain, mouth sores, postnasal drip, rhinorrhea, sinus pressure, sneezing, sore throat and trouble swallowing. Eyes:  Negative for visual disturbance. Respiratory:  Positive for shortness of breath. Cardiovascular:  Negative for chest pain, palpitations and leg swelling. Gastrointestinal:  Negative for abdominal distention, abdominal pain, blood in stool, constipation, diarrhea, nausea and vomiting. Endocrine: Negative for cold intolerance, heat intolerance, polydipsia and polyuria. Genitourinary:  Negative for difficulty urinating, dysuria, flank pain, frequency and urgency. Musculoskeletal:  Negative for arthralgias, back pain, gait problem, neck pain and neck stiffness. Skin: Negative. Negative for color change. Allergic/Immunologic: Negative. Neurological:  Negative for dizziness, tremors, speech difficulty, weakness, light-headedness and headaches. Hematological: Negative. Psychiatric/Behavioral: Negative.             Objective:  BP (!) 82/42 (Site: Right Upper Arm, Position: Sitting, Cuff Size:

## 2023-10-30 ASSESSMENT — ENCOUNTER SYMPTOMS
NAUSEA: 0
COLOR CHANGE: 0
DIARRHEA: 0
BLOOD IN STOOL: 0
RHINORRHEA: 0
ABDOMINAL PAIN: 0
ALLERGIC/IMMUNOLOGIC NEGATIVE: 1
SHORTNESS OF BREATH: 1
ABDOMINAL DISTENTION: 0
TROUBLE SWALLOWING: 0
BACK PAIN: 0
CONSTIPATION: 0
VOMITING: 0
SINUS PRESSURE: 0
SORE THROAT: 0

## 2023-10-30 NOTE — PROGRESS NOTES
Physician Progress Note      Deena Ivan  CSN #:                  [id number]  :                       1935  ADMIT DATE:       10/20/2023 6:57 AM  DISCH DATE:        10/24/2023 6:49 PM  RESPONDING  PROVIDER #:        Kim Barragan MD          QUERY TEXT:    Dear Provider,    Pt presents with HFrEF exacerbation. Pt noted to also have hypertension,   ischemic cardiomyopathy, possibly severe AS, Mild to moderate AR, Mild to   moderate MR, Moderate TR and CAD. If possible, please document in progress   notes and discharge summary the etiology of CHF, if able to be determined. The medical record reflects the following:  Risk Factors: hypertension, possibly severe AS, Mild to moderate AR, Mild to   moderate MR,  Moderate TR, ischemic cardiomyopathy, CAD with prior STEMI , PAF  Clinical Indicators: 10/22 Cardiology note: Her dyspnea is complex and   multifactorial.  Aside from decompensated heart failure  with severe LV dysfunction, she has severe pulmonary hypertension, possibly   severe low-flow low gradient AS (versus pseudo  severe given severe LV dysfunction), and worsening anemia in the context of   DOAC and aspirin use. Bradycardia/chronotropic  incompetence may also be contributing. Pro BNP 16,709  CXR: There are findings of pulmonary vascular congestion. No right pleural   effusion is noted. ?  10/21 TTE: LVEF  20-25%. Mild-moderate mitral regurgitation. Moderate   tricuspid regurgitation. Mild pulmonic regurgitation. Severe low-flow low-gradient aortic stenosis. Mild-moderate aortic valve   regurgitation. Compared to prior study aortic valve area is lower.   Treatment: Imaging, Labs,  IV lasix,  IV Bumex, Toprol XL, Cozaar, ASA,   Amiodarone      Thank you,  Fernando Moore RN  Clinical Documentation Improvement  [phone]  Options provided:  -- CHF due to Hypertensive Heart Disease  -- CHF due to Hypertensive Heart Disease and Valvular Heart Disease  -- CHF not

## 2023-10-31 DIAGNOSIS — R09.02 HYPOXIA: Primary | ICD-10-CM

## 2023-11-01 RX ORDER — ASPIRIN 81 MG/1
81 TABLET, COATED ORAL DAILY
Qty: 90 TABLET | Refills: 0 | OUTPATIENT
Start: 2023-11-01

## 2023-11-03 ENCOUNTER — CARE COORDINATION (OUTPATIENT)
Dept: CARE COORDINATION | Age: 88
End: 2023-11-03

## 2023-11-03 NOTE — CARE COORDINATION
Care Transitions Follow Up Call    Care Transition Nurse attempted subsequent CT outreach leaving [facility] VM, purpose of call, my contact info. Patient: Brianna Durbin  Patient : 1935   MRN: [id number]  Reason for Admission:  10/20/2023 - 10/24/2023 [facility] IP. CHF  Discharge Date: 10/24/23 RARS: Readmission Risk Score: 18  NR  CT      Echo  8:15  CHF  8:45  Abd CT  2:00     PCP Hosp FU 10/27 2:15. Attended. Raina Woods MD (Cardiology)  10:45. PDM: [facility] [phone]  PMH: CKD 3, DM, CAD, CHF, Cardiomyopathy, ICD, COPD, HVD, [place] and uses [employer].     Elvie Lopez RN

## 2023-11-06 ENCOUNTER — HOSPITAL ENCOUNTER (OUTPATIENT)
Dept: CT IMAGING | Age: 88
Discharge: HOME | End: 2023-11-08
Payer: MEDICARE

## 2023-11-06 ENCOUNTER — OFFICE VISIT (OUTPATIENT)
Dept: CARDIOLOGY CLINIC | Age: 88
End: 2023-11-06
Payer: MEDICARE

## 2023-11-06 VITALS
SYSTOLIC BLOOD PRESSURE: 96 MMHG | BODY MASS INDEX: 19.19 KG/M2 | RESPIRATION RATE: 18 BRPM | WEIGHT: 91.4 LBS | HEIGHT: 58 IN | HEART RATE: 55 BPM | DIASTOLIC BLOOD PRESSURE: 54 MMHG

## 2023-11-06 DIAGNOSIS — K76.89 LIVER NODULE: ICD-10-CM

## 2023-11-06 DIAGNOSIS — R19.4 FREQUENT BOWEL MOVEMENTS: ICD-10-CM

## 2023-11-06 DIAGNOSIS — R63.4 WEIGHT LOSS: ICD-10-CM

## 2023-11-06 DIAGNOSIS — I10 ESSENTIAL HYPERTENSION: ICD-10-CM

## 2023-11-06 DIAGNOSIS — I42.0 DILATED CARDIOMYOPATHY (HCC): ICD-10-CM

## 2023-11-06 DIAGNOSIS — I48.0 PAROXYSMAL ATRIAL FIBRILLATION (HCC): ICD-10-CM

## 2023-11-06 DIAGNOSIS — R10.84 ABDOMINAL PAIN, GENERALIZED: ICD-10-CM

## 2023-11-06 DIAGNOSIS — I35.0 NON-RHEUMATIC AORTIC STENOSIS: ICD-10-CM

## 2023-11-06 DIAGNOSIS — I34.0 NONRHEUMATIC MITRAL VALVE REGURGITATION: ICD-10-CM

## 2023-11-06 DIAGNOSIS — I50.22 CHRONIC HFREF (HEART FAILURE WITH REDUCED EJECTION FRACTION) (HCC): Primary | ICD-10-CM

## 2023-11-06 PROCEDURE — 1123F ACP DISCUSS/DSCN MKR DOCD: CPT | Performed by: INTERNAL MEDICINE

## 2023-11-06 PROCEDURE — 74177 CT ABD & PELVIS W/CONTRAST: CPT

## 2023-11-06 PROCEDURE — G8484 FLU IMMUNIZE NO ADMIN: HCPCS | Performed by: INTERNAL MEDICINE

## 2023-11-06 PROCEDURE — 1111F DSCHRG MED/CURRENT MED MERGE: CPT | Performed by: INTERNAL MEDICINE

## 2023-11-06 PROCEDURE — 99214 OFFICE O/P EST MOD 30 MIN: CPT | Performed by: INTERNAL MEDICINE

## 2023-11-06 PROCEDURE — G8420 CALC BMI NORM PARAMETERS: HCPCS | Performed by: INTERNAL MEDICINE

## 2023-11-06 PROCEDURE — 93000 ELECTROCARDIOGRAM COMPLETE: CPT | Performed by: INTERNAL MEDICINE

## 2023-11-06 PROCEDURE — 1090F PRES/ABSN URINE INCON ASSESS: CPT | Performed by: INTERNAL MEDICINE

## 2023-11-06 PROCEDURE — G8427 DOCREV CUR MEDS BY ELIG CLIN: HCPCS | Performed by: INTERNAL MEDICINE

## 2023-11-06 PROCEDURE — 6360000004 HC RX CONTRAST MEDICATION: Performed by: RADIOLOGY

## 2023-11-06 PROCEDURE — 1036F TOBACCO NON-USER: CPT | Performed by: INTERNAL MEDICINE

## 2023-11-06 RX ORDER — SPIRONOLACTONE 25 MG/1
25 TABLET ORAL DAILY
COMMUNITY

## 2023-11-06 RX ORDER — LOSARTAN POTASSIUM 25 MG/1
25 TABLET ORAL DAILY
Qty: 90 TABLET | Refills: 0 | Status: SHIPPED | OUTPATIENT
Start: 2023-11-06

## 2023-11-06 RX ORDER — L-MEFOL/A-CYST/MEB12/ALGAL OIL 6-600-2 MG
TABLET ORAL
COMMUNITY

## 2023-11-06 RX ORDER — FUROSEMIDE 20 MG/1
20 TABLET ORAL 2 TIMES DAILY
COMMUNITY

## 2023-11-06 RX ORDER — METOCLOPRAMIDE 5 MG/1
5 TABLET ORAL 4 TIMES DAILY
COMMUNITY

## 2023-11-06 RX ORDER — ACETAMINOPHEN 500 MG
500 TABLET ORAL EVERY 6 HOURS PRN
COMMUNITY
End: 2023-11-09 | Stop reason: SDUPTHER

## 2023-11-06 RX ADMIN — IOPAMIDOL 75 ML: 755 INJECTION, SOLUTION INTRAVENOUS at 14:29

## 2023-11-06 RX ADMIN — IOPAMIDOL 18 ML: 755 INJECTION, SOLUTION INTRAVENOUS at 14:30

## 2023-11-06 NOTE — PROGRESS NOTES
OFFICE FOLLOW-UP        PRIMARY CARE PHYSICIAN:      Ervin Blount MD    Date of Service: 11/6/2023     ALLERGIES / SENSITIVITIES:        No Known Allergies     REVIEWED MEDICATIONS:        Current Outpatient Medications:     acetaminophen (TYLENOL) 500 MG tablet, Take 1 tablet by mouth every 6 hours as needed for Pain, Disp: , Rfl:     Methylfol-Algae-Y27-Lmjqmdnwxd (CEREFOLIN NAC) 6-90.314-2-600 MG TABS, Take by mouth, Disp: , Rfl:     furosemide (LASIX) 20 MG tablet, Take 1 tablet by mouth 2 times daily, Disp: , Rfl:     spironolactone (ALDACTONE) 25 MG tablet, Take 1 tablet by mouth daily Indications: taking 1/2 pill daily, Disp: , Rfl:     metoclopramide (REGLAN) 5 MG tablet, Take 1 tablet by mouth 4 times daily, Disp: , Rfl:     simvastatin (ZOCOR) 20 MG tablet, TAKE 1 TABLET BY MOUTH EVERY DAY AT NIGHT, Disp: 90 tablet, Rfl: 0    ASPIRIN LOW DOSE 81 MG EC tablet, TAKE 1 TABLET BY MOUTH EVERY DAY, Disp: 90 tablet, Rfl: 0    bumetanide (BUMEX) 1 MG tablet, Take 1 tablet by mouth 2 times daily, Disp: 30 tablet, Rfl: 3    budesonide (PULMICORT) 0.25 MG/2ML nebulizer suspension, Take 2 mLs by nebulization in the morning and 2 mLs in the evening., Disp: , Rfl:     coenzyme Q10 100 MG CAPS capsule, Take 1 capsule by mouth at bedtime, Disp: , Rfl:     losartan (COZAAR) 25 MG tablet, Take 1 tablet by mouth daily, Disp: , Rfl:     mometasone (NASONEX) 50 MCG/ACT nasal spray, 2 sprays by Nasal route daily, Disp: , Rfl:     omeprazole (PRILOSEC) 40 MG delayed release capsule, Take 1 capsule by mouth daily, Disp: , Rfl:     fluticasone-umeclidin-vilant (TRELEGY ELLIPTA) 200-62.5-25 MCG/ACT AEPB inhaler, Inhale 1 puff into the lungs daily, Disp: , Rfl:     rivaroxaban (XARELTO) 15 MG TABS tablet, Take 1 tablet by mouth daily (with breakfast), Disp: 90 tablet, Rfl: 2    metoprolol succinate (TOPROL XL) 25 MG extended release tablet, Take 1 tablet by mouth daily, Disp: 90 tablet, Rfl: 0    amiodarone (CORDARONE) 200 MG

## 2023-11-09 RX ORDER — ACETAMINOPHEN 500 MG
500 TABLET ORAL EVERY 6 HOURS PRN
Qty: 120 TABLET | Refills: 3 | Status: SHIPPED | OUTPATIENT
Start: 2023-11-09

## 2023-11-09 NOTE — TELEPHONE ENCOUNTER
Patient requesting a refill of her       acetaminophen (TYLENOL) 500 MG tablet   Please send to [facility] on [facility]. Thank you.

## 2023-11-10 ENCOUNTER — CARE COORDINATION (OUTPATIENT)
Dept: CARE COORDINATION | Age: 88
End: 2023-11-10

## 2023-11-10 NOTE — CARE COORDINATION
Care Transitions Follow Up Call    Care Transition Nurse attempted secondary subsequent CT outreach and no VM opportunity. CTN s/o. Patient: Jose Manuel Orellana  Patient : 1935   MRN: [id number]  Reason for Admission:   10/20/2023 - 10/24/2023 [facility] IP. CHF  Discharge Date: 10/24/23 RARS: Readmission Risk Score: 18  NR  CT      Echo  8:15  CHF  8:45  Abd CT  2:00     PCP Hosp FU 10/27 2:15. Attended. Jus Richter MD (Cardiology)  10:45. Attended. PDM: [facility] [phone]  PMH: CKD 3, DM, CAD, CHF, Cardiomyopathy, ICD, COPD, HVD, [place] and uses [employer].       Andressa Prieto, RN

## 2023-11-13 ENCOUNTER — HOSPITAL ENCOUNTER (OUTPATIENT)
Dept: OTHER | Age: 88
Setting detail: THERAPIES SERIES
Discharge: HOME | End: 2023-11-13
Payer: MEDICARE

## 2023-11-13 VITALS
WEIGHT: 96 LBS | HEART RATE: 80 BPM | RESPIRATION RATE: 18 BRPM | DIASTOLIC BLOOD PRESSURE: 51 MMHG | SYSTOLIC BLOOD PRESSURE: 108 MMHG | BODY MASS INDEX: 20.06 KG/M2

## 2023-11-13 LAB
ANION GAP SERPL CALCULATED.3IONS-SCNC: 12 MMOL/L (ref 7–16)
BNP SERPL-MCNC: 5770 PG/ML (ref 0–450)
BUN SERPL-MCNC: 34 MG/DL (ref 6–23)
CALCIUM SERPL-MCNC: 8.6 MG/DL (ref 8.6–10.2)
CHLORIDE SERPL-SCNC: 93 MMOL/L (ref 98–107)
CO2 SERPL-SCNC: 26 MMOL/L (ref 22–29)
CREAT SERPL-MCNC: 1.2 MG/DL (ref 0.5–1)
GFR SERPL CREATININE-BSD FRML MDRD: 46 ML/MIN/1.73M2
GLUCOSE SERPL-MCNC: 102 MG/DL (ref 74–99)
POTASSIUM SERPL-SCNC: 4.1 MMOL/L (ref 3.5–5)
SODIUM SERPL-SCNC: 131 MMOL/L (ref 132–146)

## 2023-11-13 PROCEDURE — 80048 BASIC METABOLIC PNL TOTAL CA: CPT

## 2023-11-13 PROCEDURE — 83880 ASSAY OF NATRIURETIC PEPTIDE: CPT

## 2023-11-13 PROCEDURE — 99214 OFFICE O/P EST MOD 30 MIN: CPT

## 2023-11-13 NOTE — PLAN OF CARE
Problem: Chronic Conditions and Co-morbidities  Goal: Patient's chronic conditions and co-morbidity symptoms are monitored and maintained or improved  Outcome: Progressing  Continue plan

## 2023-11-13 NOTE — PROGRESS NOTES
460 (H)     Hepatic:  AST (U/L)   Date Value   10/23/2023 75 (H)     ALT (U/L)   Date Value   10/23/2023 64 (H)     Total Bilirubin (mg/dL)   Date Value   10/23/2023 0.5     Alkaline Phosphatase (U/L)   Date Value   10/23/2023 78     INR:  INR (no units)   Date Value   10/20/2023 3.9         Wt Readings from Last 3 Encounters:   11/13/23 43.5 kg (96 lb)   11/06/23 41.5 kg (91 lb 6.4 oz)   10/27/23 43.1 kg (95 lb)           ASSESSMENT/PLAN:    [x] Euvolemic with no JVD/HJR, lungs clear, no abdominal bloating, no lower extremity edema and stable weights with good urinary response to oral diuretic          [] Hypervolemic, with increased from baseline:  [] Shortness of breath/LYON  [x] JVD  [] HJR  [] Abnormal lung assessment:   [] Orthopnea  [] PND  [] Decreased urinary response to oral diuretic   [] Scrotal swelling   [] Lower extremity edema  [] Compression stockings provided  [] Decline in functional capacity (unable to perform activities they had previously been able to do)  [] Weight gain   [] IV diuretics given No  [] Provider notified of recurrent IV diuretic use    [x]Lab work obtained    [x] Patient/Family Educated On:  [x] HF zones (Green, Yellow, Red) and aware of when to take action   [x] Daily weights  [] Scale provided   [x] Low sodium diet (2000 mg)  Barriers to compliance  [] Refuses to monitor diet  [] Socioeconomic difficulties  [] Unable to cook for self (use of frozen meals, can goods, etc)  [] CHF CHW consulted  [] Low sodium meal delivery options given to patient  [] Dietician consulted   [] Low sodium recipes provided  [] Sodium free seasoning provided   [x] Fluid intake (64 oz)  [x] Reviewed currently prescribed medications with patient, educated on importance of compliance and answered any questions regarding their medication  [] Pill box provided to patient  [] Patient using pill packing pharmacy   [] CPAP/BiPAP use  [] Low impact exercise / cardiac rehab   [] [employer] use  [x] Patient aware

## 2023-11-22 PROCEDURE — 93296 REM INTERROG EVL PM/IDS: CPT | Performed by: INTERNAL MEDICINE

## 2023-11-22 PROCEDURE — 93297 REM INTERROG DEV EVAL ICPMS: CPT | Performed by: INTERNAL MEDICINE

## 2023-11-22 PROCEDURE — G2066 INTER DEVC REMOTE 30D: HCPCS | Performed by: INTERNAL MEDICINE

## 2023-11-22 PROCEDURE — 93295 DEV INTERROG REMOTE 1/2/MLT: CPT | Performed by: INTERNAL MEDICINE

## 2023-11-30 RX ORDER — ASPIRIN 81 MG/1
81 TABLET, COATED ORAL DAILY
Qty: 90 TABLET | Refills: 0 | Status: SHIPPED | OUTPATIENT
Start: 2023-11-30

## 2023-11-30 RX ORDER — AMINO AC/WHEY PROT CONC, ISOL 26G-150/39
POWDER (GRAM) ORAL DAILY
Qty: 90 CAPSULE | Refills: 0 | Status: SHIPPED | OUTPATIENT
Start: 2023-11-30

## 2023-12-06 RX ORDER — BUDESONIDE 0.25 MG/2ML
1 INHALANT ORAL
Qty: 180 EACH | Refills: 0 | Status: SHIPPED
Start: 2023-12-06 | End: 2023-12-08 | Stop reason: SDUPTHER

## 2023-12-06 NOTE — TELEPHONE ENCOUNTER
Patient requesting a refill of her     budesonide (PULMICORT) 0.25 MG/2ML nebulizer suspension   Be sent into   [facility]/pharmacy #[id number] - [place], [address]     States she uses it 2 times a day. Thank you.

## 2023-12-08 ENCOUNTER — OFFICE VISIT (OUTPATIENT)
Dept: PRIMARY CARE CLINIC | Age: 88
End: 2023-12-08
Payer: MEDICARE

## 2023-12-08 VITALS
BODY MASS INDEX: 19.1 KG/M2 | HEIGHT: 58 IN | DIASTOLIC BLOOD PRESSURE: 60 MMHG | TEMPERATURE: 97.3 F | SYSTOLIC BLOOD PRESSURE: 102 MMHG | WEIGHT: 91 LBS

## 2023-12-08 DIAGNOSIS — I42.0 DILATED CARDIOMYOPATHY (HCC): ICD-10-CM

## 2023-12-08 DIAGNOSIS — I10 ESSENTIAL HYPERTENSION: ICD-10-CM

## 2023-12-08 DIAGNOSIS — I38 VALVULAR HEART DISEASE: ICD-10-CM

## 2023-12-08 DIAGNOSIS — I50.9 ACUTE ON CHRONIC CONGESTIVE HEART FAILURE, UNSPECIFIED HEART FAILURE TYPE (HCC): ICD-10-CM

## 2023-12-08 DIAGNOSIS — R64 CACHEXIA (HCC): Primary | ICD-10-CM

## 2023-12-08 DIAGNOSIS — R09.02 HYPOXIA: ICD-10-CM

## 2023-12-08 PROCEDURE — 1090F PRES/ABSN URINE INCON ASSESS: CPT | Performed by: INTERNAL MEDICINE

## 2023-12-08 PROCEDURE — 1036F TOBACCO NON-USER: CPT | Performed by: INTERNAL MEDICINE

## 2023-12-08 PROCEDURE — 99214 OFFICE O/P EST MOD 30 MIN: CPT | Performed by: INTERNAL MEDICINE

## 2023-12-08 PROCEDURE — G8427 DOCREV CUR MEDS BY ELIG CLIN: HCPCS | Performed by: INTERNAL MEDICINE

## 2023-12-08 PROCEDURE — G8420 CALC BMI NORM PARAMETERS: HCPCS | Performed by: INTERNAL MEDICINE

## 2023-12-08 PROCEDURE — G8484 FLU IMMUNIZE NO ADMIN: HCPCS | Performed by: INTERNAL MEDICINE

## 2023-12-08 PROCEDURE — 1123F ACP DISCUSS/DSCN MKR DOCD: CPT | Performed by: INTERNAL MEDICINE

## 2023-12-08 RX ORDER — OMEPRAZOLE 40 MG/1
40 CAPSULE, DELAYED RELEASE ORAL DAILY
Qty: 90 CAPSULE | Refills: 1 | Status: ON HOLD | OUTPATIENT
Start: 2023-12-08

## 2023-12-08 RX ORDER — BUDESONIDE 0.25 MG/2ML
1 INHALANT ORAL
Qty: 180 EACH | Refills: 1 | Status: ON HOLD | OUTPATIENT
Start: 2023-12-08

## 2023-12-08 RX ORDER — BUMETANIDE 1 MG/1
1 TABLET ORAL 2 TIMES DAILY
Qty: 180 TABLET | Refills: 1 | Status: ON HOLD | OUTPATIENT
Start: 2023-12-08

## 2023-12-08 NOTE — PROGRESS NOTES
Rizwan Cordero presents today for follow up after recent admission    Current Outpatient Medications   Medication Sig Dispense Refill    budesonide (PULMICORT) 0.25 MG/2ML nebulizer suspension Take 2 mLs by nebulization in the morning and 2 mLs in the evening. 180 each 1    bumetanide (BUMEX) 1 MG tablet Take 1 tablet by mouth 2 times daily 180 tablet 1    omeprazole (PRILOSEC) 40 MG delayed release capsule Take 1 capsule by mouth daily 90 capsule 1    [employer] COENZYME Q-10 100 MG CAPS capsule TAKE 1 CAPSULE BY MOUTH EVERY DAY 90 capsule 0    ASPIRIN LOW DOSE 81 MG EC tablet TAKE 1 TABLET BY MOUTH EVERY DAY 90 tablet 0    acetaminophen (TYLENOL) 500 MG tablet Take 1 tablet by mouth every 6 hours as needed for Pain 120 tablet 3    losartan (COZAAR) 25 MG tablet TAKE 1 TABLET BY MOUTH EVERY DAY 90 tablet 0    Methylfol-Algae-L61-Bbtrwwmjku (CEREFOLIN NAC) 6-90.314-2-600 MG TABS Take by mouth      spironolactone (ALDACTONE) 25 MG tablet Take 1 tablet by mouth daily Indications: taking 1/2 pill daily      metoclopramide (REGLAN) 5 MG tablet Take 1 tablet by mouth 4 times daily      simvastatin (ZOCOR) 20 MG tablet TAKE 1 TABLET BY MOUTH EVERY DAY AT NIGHT 90 tablet 0    mometasone (NASONEX) 50 MCG/ACT nasal spray 2 sprays by Nasal route daily      fluticasone-umeclidin-vilant (TRELEGY ELLIPTA) 200-62.5-25 MCG/ACT AEPB inhaler Inhale 1 puff into the lungs daily      rivaroxaban (XARELTO) 15 MG TABS tablet Take 1 tablet by mouth daily (with breakfast) 90 tablet 2    metoprolol succinate (TOPROL XL) 25 MG extended release tablet Take 1 tablet by mouth daily 90 tablet 0    amiodarone (CORDARONE) 200 MG tablet Take 0.5 tablets by mouth daily      furosemide (LASIX) 20 MG tablet Take 1 tablet by mouth 2 times daily       No current facility-administered medications for this visit.       Past Medical History:   Diagnosis Date    Arthritis     Atrial fibrillation [facility])     follows with Dr Prince Sotelo  on xarelto    CAD (coronary artery

## 2023-12-10 ENCOUNTER — HOSPITAL ENCOUNTER (OUTPATIENT)
Dept: CT IMAGING | Age: 88
Discharge: HOME | End: 2023-12-12
Payer: MEDICARE

## 2023-12-10 DIAGNOSIS — K83.8 COMMON BILE DUCT DILATATION: ICD-10-CM

## 2023-12-10 DIAGNOSIS — R63.4 WEIGHT LOSS: ICD-10-CM

## 2023-12-10 DIAGNOSIS — K76.89 FLOATING LIVER: ICD-10-CM

## 2023-12-10 PROCEDURE — 71260 CT THORAX DX C+: CPT

## 2023-12-10 PROCEDURE — 74175 CTA ABDOMEN W/CONTRAST: CPT

## 2023-12-10 PROCEDURE — 6360000004 HC RX CONTRAST MEDICATION: Performed by: RADIOLOGY

## 2023-12-10 RX ADMIN — IOPAMIDOL 120 ML: 755 INJECTION, SOLUTION INTRAVENOUS at 09:06

## 2023-12-12 ENCOUNTER — HOSPITAL ENCOUNTER (OUTPATIENT)
Dept: OTHER | Age: 88
Setting detail: THERAPIES SERIES
Discharge: HOME | End: 2023-12-12
Payer: MEDICARE

## 2023-12-12 ENCOUNTER — TELEPHONE (OUTPATIENT)
Dept: CARDIOLOGY CLINIC | Age: 88
End: 2023-12-12

## 2023-12-12 VITALS
SYSTOLIC BLOOD PRESSURE: 102 MMHG | DIASTOLIC BLOOD PRESSURE: 78 MMHG | HEART RATE: 55 BPM | RESPIRATION RATE: 16 BRPM | OXYGEN SATURATION: 94 % | BODY MASS INDEX: 20.06 KG/M2 | WEIGHT: 96 LBS

## 2023-12-12 PROBLEM — R64 CACHEXIA (HCC): Status: ACTIVE | Noted: 2023-12-12

## 2023-12-12 LAB
ANION GAP SERPL CALCULATED.3IONS-SCNC: 20 MMOL/L (ref 7–16)
BNP SERPL-MCNC: 7781 PG/ML (ref 0–450)
BUN SERPL-MCNC: 44 MG/DL (ref 6–23)
CALCIUM SERPL-MCNC: 9.2 MG/DL (ref 8.6–10.2)
CHLORIDE SERPL-SCNC: 98 MMOL/L (ref 98–107)
CO2 SERPL-SCNC: 23 MMOL/L (ref 22–29)
CREAT SERPL-MCNC: 1.4 MG/DL (ref 0.5–1)
GFR SERPL CREATININE-BSD FRML MDRD: 38 ML/MIN/1.73M2
GLUCOSE SERPL-MCNC: 132 MG/DL (ref 74–99)
POTASSIUM SERPL-SCNC: 4.6 MMOL/L (ref 3.5–5)
SODIUM SERPL-SCNC: 141 MMOL/L (ref 132–146)

## 2023-12-12 PROCEDURE — 80048 BASIC METABOLIC PNL TOTAL CA: CPT

## 2023-12-12 PROCEDURE — 83880 ASSAY OF NATRIURETIC PEPTIDE: CPT

## 2023-12-12 PROCEDURE — 99214 OFFICE O/P EST MOD 30 MIN: CPT

## 2023-12-12 ASSESSMENT — ENCOUNTER SYMPTOMS
VOMITING: 0
COLOR CHANGE: 0
DIARRHEA: 0
NAUSEA: 0
ABDOMINAL DISTENTION: 0
SORE THROAT: 0
SHORTNESS OF BREATH: 1
TROUBLE SWALLOWING: 0
SINUS PRESSURE: 0
CONSTIPATION: 0
RHINORRHEA: 0
BACK PAIN: 0
ABDOMINAL PAIN: 0
ALLERGIC/IMMUNOLOGIC NEGATIVE: 1
BLOOD IN STOOL: 0

## 2023-12-12 NOTE — PROGRESS NOTES
Congestive Heart Failure [address]       Referring Provider: Dr Lupe Mixon  Primary Care Physician: Wilver Nguyen MD   Cardiologist: Dr Lupe Mixon  Nephrologist: N/A      HISTORY OF PRESENT ILLNESS:     Jose Manuel Orellana is a 81 y.o. (1935) female with a history of HFrEF, most recent EF:  Lab Results   Component Value Date    LVEF 23 02/07/2023    Maury Guadalupe Echo 12/26/2016         She presents to the CHF clinic for ongoing evaluation and monitoring of heart failure.     In the CHF clinic today she denies any adverse symptoms except:  [] Dizziness or lightheadedness (with position change )  [] Syncope or near syncope  [] Recent Fall  [] Shortness of breath at rest   [] Dyspnea with exertion  [] Decline in functional capacity (unable to perform activities they had previously been able to do)  [] Fatigue   [] Orthopnea  [] PND  [x] Intermittent cough  [] Hemoptysis  [] Chest pain, pressure, or discomfort  [] Palpitations  [] Abdominal distention  [] Abdominal bloating  [] Early satiety  [] Blood in stool   [] Diarrhea  [] Constipation   [] Nausea/Vomiting  [] Decreased urinary response to oral diuretic   [] Scrotal swelling   [] Lower extremity edema  [] Used PRN doses of oral diuretic   [] Weight gain    Wt Readings from Last 3 Encounters:   12/12/23 43.5 kg (96 lb)   12/08/23 41.3 kg (91 lb)   11/13/23 43.5 kg (96 lb)           SOCIAL HISTORY:  [x] Denies tobacco, alcohol or illicit drug abuse  [] Tobacco use:  [] ETOH use:  [] Illicit drug use:        MEDICATIONS:    No Known Allergies    Current Outpatient Medications:     budesonide (PULMICORT) 0.25 MG/2ML nebulizer suspension, Take 2 mLs by nebulization in the morning and 2 mLs in the evening., Disp: 180 each, Rfl: 1    bumetanide (BUMEX) 1 MG tablet, Take 1 tablet by mouth 2 times daily, Disp: 180 tablet, Rfl: 1    omeprazole (PRILOSEC) 40 MG delayed release capsule, Take 1 capsule by mouth daily, Disp: 90 capsule,

## 2023-12-12 NOTE — PLAN OF CARE
Problem: Chronic Conditions and Co-morbidities  Goal: Patient's chronic conditions and co-morbidity symptoms are monitored and maintained or improved  Flowsheets (Taken 12/12/2023 1382)  Care Plan - Patient's Chronic Conditions and Co-Morbidity Symptoms are Monitored and Maintained or Improved: Monitor and assess patient's chronic conditions and comorbid symptoms for stability, deterioration, or improvement

## 2023-12-12 NOTE — TELEPHONE ENCOUNTER
Weyman Seip, RN   [facility] Yesika Lomax MD    Patient had a bump up in her BNP from 5,770 to 7781 and a rise in her BUN from 34 to 44 and creatine 1.2 to 1.4.  Patient's weight was stable and euvolemic on exam.

## 2023-12-17 ENCOUNTER — APPOINTMENT (OUTPATIENT)
Dept: CT IMAGING | Age: 88
DRG: 871 | End: 2023-12-17
Payer: MEDICARE

## 2023-12-17 ENCOUNTER — HOSPITAL ENCOUNTER (INPATIENT)
Age: 88
LOS: 7 days | Discharge: NURSING, REHAB OR LONG-TERM CARE | DRG: 871 | End: 2023-12-24
Attending: EMERGENCY MEDICINE | Admitting: STUDENT IN AN ORGANIZED HEALTH CARE EDUCATION/TRAINING PROGRAM
Payer: MEDICARE

## 2023-12-17 ENCOUNTER — APPOINTMENT (OUTPATIENT)
Dept: CT IMAGING | Age: 88
DRG: 871 | End: 2023-12-17
Attending: EMERGENCY MEDICINE
Payer: MEDICARE

## 2023-12-17 ENCOUNTER — APPOINTMENT (OUTPATIENT)
Dept: GENERAL RADIOLOGY | Age: 88
DRG: 871 | End: 2023-12-17
Payer: MEDICARE

## 2023-12-17 DIAGNOSIS — A41.9 SEPSIS, DUE TO UNSPECIFIED ORGANISM, UNSPECIFIED WHETHER ACUTE ORGAN DYSFUNCTION PRESENT (HCC): Primary | ICD-10-CM

## 2023-12-17 DIAGNOSIS — J18.9 PNEUMONIA DUE TO INFECTIOUS ORGANISM, UNSPECIFIED LATERALITY, UNSPECIFIED PART OF LUNG: ICD-10-CM

## 2023-12-17 DIAGNOSIS — N30.00 ACUTE CYSTITIS WITHOUT HEMATURIA: ICD-10-CM

## 2023-12-17 PROBLEM — D64.89 OTHER SPECIFIED ANEMIAS: Status: ACTIVE | Noted: 2023-12-17

## 2023-12-17 LAB
ALBUMIN SERPL-MCNC: 3.8 G/DL (ref 3.5–5.2)
ALP SERPL-CCNC: 55 U/L (ref 35–104)
ALT SERPL-CCNC: 31 U/L (ref 0–32)
ANION GAP SERPL CALCULATED.3IONS-SCNC: 16 MMOL/L (ref 7–16)
AST SERPL-CCNC: 53 U/L (ref 0–31)
B.E.: -8.7 MMOL/L (ref -3–3)
BACTERIA URNS QL MICRO: ABNORMAL
BASOPHILS # BLD: 0 K/UL (ref 0–0.2)
BASOPHILS NFR BLD: 0 % (ref 0–2)
BILIRUB SERPL-MCNC: 0.5 MG/DL (ref 0–1.2)
BILIRUB UR QL STRIP: NEGATIVE
BNP SERPL-MCNC: ABNORMAL PG/ML (ref 0–450)
BUN SERPL-MCNC: 54 MG/DL (ref 6–23)
CALCIUM SERPL-MCNC: 8.7 MG/DL (ref 8.6–10.2)
CHLORIDE SERPL-SCNC: 92 MMOL/L (ref 98–107)
CLARITY UR: ABNORMAL
CO2 SERPL-SCNC: 24 MMOL/L (ref 22–29)
COHB: 0.4 % (ref 0–1.5)
COLOR UR: YELLOW
CREAT SERPL-MCNC: 1.8 MG/DL (ref 0.5–1)
CRITICAL: ABNORMAL
CRP SERPL HS-MCNC: 174 MG/L (ref 0–5)
DATE ANALYZED: ABNORMAL
DATE OF COLLECTION: ABNORMAL
EKG ATRIAL RATE: 64 BPM
EKG P AXIS: 61 DEGREES
EKG P-R INTERVAL: 282 MS
EKG Q-T INTERVAL: 432 MS
EKG QRS DURATION: 140 MS
EKG QTC CALCULATION (BAZETT): 445 MS
EKG R AXIS: -54 DEGREES
EKG T AXIS: 156 DEGREES
EKG VENTRICULAR RATE: 64 BPM
EOSINOPHIL # BLD: 0 K/UL (ref 0.05–0.5)
EOSINOPHILS RELATIVE PERCENT: 0 % (ref 0–6)
ERYTHROCYTE [DISTWIDTH] IN BLOOD BY AUTOMATED COUNT: 18.1 % (ref 11.5–15)
ERYTHROCYTE [SEDIMENTATION RATE] IN BLOOD BY WESTERGREN METHOD: 91 MM/HR (ref 0–20)
FLUAV RNA RESP QL NAA+PROBE: NOT DETECTED
FLUBV RNA RESP QL NAA+PROBE: NOT DETECTED
GFR SERPL CREATININE-BSD FRML MDRD: 26 ML/MIN/1.73M2
GLUCOSE BLD-MCNC: 146 MG/DL (ref 74–99)
GLUCOSE BLD-MCNC: 189 MG/DL (ref 74–99)
GLUCOSE SERPL-MCNC: 113 MG/DL (ref 74–99)
GLUCOSE UR STRIP-MCNC: NEGATIVE MG/DL
HCO3: 14.7 MMOL/L (ref 22–26)
HCT VFR BLD AUTO: 23.1 % (ref 34–48)
HGB BLD-MCNC: 7.2 G/DL (ref 11.5–15.5)
HGB UR QL STRIP.AUTO: ABNORMAL
HHB: 2.2 % (ref 0–5)
KETONES UR STRIP-MCNC: NEGATIVE MG/DL
LAB: ABNORMAL
LACTATE BLDV-SCNC: 2.2 MMOL/L (ref 0.5–2.2)
LACTATE BLDV-SCNC: 2.4 MMOL/L (ref 0.5–1.9)
LACTATE BLDV-SCNC: 3.4 MMOL/L (ref 0.5–1.9)
LEUKOCYTE ESTERASE UR QL STRIP: ABNORMAL
LYMPHOCYTES NFR BLD: 0.15 K/UL (ref 1.5–4)
LYMPHOCYTES RELATIVE PERCENT: 1 % (ref 20–42)
Lab: 1544
MCH RBC QN AUTO: 26.8 PG (ref 26–35)
MCHC RBC AUTO-ENTMCNC: 31.2 G/DL (ref 32–34.5)
MCV RBC AUTO: 85.9 FL (ref 80–99.9)
METHB: 0.2 % (ref 0–1.5)
MODE: ABNORMAL
MONOCYTES NFR BLD: 1.33 K/UL (ref 0.1–0.95)
MONOCYTES NFR BLD: 8 % (ref 2–12)
NEUTROPHILS NFR BLD: 91 % (ref 43–80)
NEUTS SEG NFR BLD: 15.42 K/UL (ref 1.8–7.3)
NITRITE UR QL STRIP: NEGATIVE
O2 CONTENT: 12 ML/DL
O2 SATURATION: 97.8 % (ref 92–98.5)
O2HB: 97.2 % (ref 94–97)
OPERATOR ID: 2863
PATIENT TEMP: 37 C
PCO2: 23.8 MMHG (ref 35–45)
PH BLOOD GAS: 7.41 (ref 7.35–7.45)
PH UR STRIP: 6 [PH] (ref 5–9)
PLATELET # BLD AUTO: 283 K/UL (ref 130–450)
PMV BLD AUTO: 9.8 FL (ref 7–12)
PO2: 107.1 MMHG (ref 75–100)
POTASSIUM SERPL-SCNC: 4.6 MMOL/L (ref 3.5–5)
PROCALCITONIN SERPL-MCNC: 2.33 NG/ML (ref 0–0.08)
PROT SERPL-MCNC: 6.3 G/DL (ref 6.4–8.3)
PROT UR STRIP-MCNC: 30 MG/DL
RBC # BLD AUTO: 2.69 M/UL (ref 3.5–5.5)
RBC # BLD: ABNORMAL 10*6/UL
RBC #/AREA URNS HPF: ABNORMAL /HPF
SARS-COV-2 RNA RESP QL NAA+PROBE: NOT DETECTED
SODIUM SERPL-SCNC: 132 MMOL/L (ref 132–146)
SOURCE, BLOOD GAS: ABNORMAL
SOURCE: NORMAL
SP GR UR STRIP: 1.01 (ref 1–1.03)
SPECIMEN DESCRIPTION: NORMAL
THB: 8.6 G/DL (ref 11.5–16.5)
TIME ANALYZED: 1549
TROPONIN I SERPL HS-MCNC: 62 NG/L (ref 0–9)
TROPONIN I SERPL HS-MCNC: 67 NG/L (ref 0–9)
UROBILINOGEN UR STRIP-ACNC: 0.2 EU/DL (ref 0–1)
WBC #/AREA URNS HPF: ABNORMAL /HPF
WBC OTHER # BLD: 16.9 K/UL (ref 4.5–11.5)

## 2023-12-17 PROCEDURE — 36415 COLL VENOUS BLD VENIPUNCTURE: CPT

## 2023-12-17 PROCEDURE — 73120 X-RAY EXAM OF HAND: CPT

## 2023-12-17 PROCEDURE — 85652 RBC SED RATE AUTOMATED: CPT

## 2023-12-17 PROCEDURE — 93010 ELECTROCARDIOGRAM REPORT: CPT | Performed by: INTERNAL MEDICINE

## 2023-12-17 PROCEDURE — 99285 EMERGENCY DEPT VISIT HI MDM: CPT

## 2023-12-17 PROCEDURE — 6360000002 HC RX W HCPCS

## 2023-12-17 PROCEDURE — 2060000000 HC ICU INTERMEDIATE R&B

## 2023-12-17 PROCEDURE — 87154 CUL TYP ID BLD PTHGN 6+ TRGT: CPT

## 2023-12-17 PROCEDURE — 70450 CT HEAD/BRAIN W/O DYE: CPT

## 2023-12-17 PROCEDURE — 71045 X-RAY EXAM CHEST 1 VIEW: CPT

## 2023-12-17 PROCEDURE — 82805 BLOOD GASES W/O2 SATURATION: CPT

## 2023-12-17 PROCEDURE — 83880 ASSAY OF NATRIURETIC PEPTIDE: CPT

## 2023-12-17 PROCEDURE — 2500000003 HC RX 250 WO HCPCS: Performed by: EMERGENCY MEDICINE

## 2023-12-17 PROCEDURE — 94640 AIRWAY INHALATION TREATMENT: CPT

## 2023-12-17 PROCEDURE — 85025 COMPLETE CBC W/AUTO DIFF WBC: CPT

## 2023-12-17 PROCEDURE — 93005 ELECTROCARDIOGRAM TRACING: CPT | Performed by: NURSE PRACTITIONER

## 2023-12-17 PROCEDURE — 86850 RBC ANTIBODY SCREEN: CPT

## 2023-12-17 PROCEDURE — 83605 ASSAY OF LACTIC ACID: CPT

## 2023-12-17 PROCEDURE — 86923 COMPATIBILITY TEST ELECTRIC: CPT

## 2023-12-17 PROCEDURE — 86901 BLOOD TYPING SEROLOGIC RH(D): CPT

## 2023-12-17 PROCEDURE — 6360000002 HC RX W HCPCS: Performed by: NURSE PRACTITIONER

## 2023-12-17 PROCEDURE — 80053 COMPREHEN METABOLIC PANEL: CPT

## 2023-12-17 PROCEDURE — 86140 C-REACTIVE PROTEIN: CPT

## 2023-12-17 PROCEDURE — 96361 HYDRATE IV INFUSION ADD-ON: CPT

## 2023-12-17 PROCEDURE — 82962 GLUCOSE BLOOD TEST: CPT

## 2023-12-17 PROCEDURE — 74176 CT ABD & PELVIS W/O CONTRAST: CPT

## 2023-12-17 PROCEDURE — 87086 URINE CULTURE/COLONY COUNT: CPT

## 2023-12-17 PROCEDURE — 87040 BLOOD CULTURE FOR BACTERIA: CPT

## 2023-12-17 PROCEDURE — 81001 URINALYSIS AUTO W/SCOPE: CPT

## 2023-12-17 PROCEDURE — 84484 ASSAY OF TROPONIN QUANT: CPT

## 2023-12-17 PROCEDURE — 87077 CULTURE AEROBIC IDENTIFY: CPT

## 2023-12-17 PROCEDURE — 6360000002 HC RX W HCPCS: Performed by: EMERGENCY MEDICINE

## 2023-12-17 PROCEDURE — 86900 BLOOD TYPING SEROLOGIC ABO: CPT

## 2023-12-17 PROCEDURE — 2580000003 HC RX 258

## 2023-12-17 PROCEDURE — 2580000003 HC RX 258: Performed by: EMERGENCY MEDICINE

## 2023-12-17 PROCEDURE — 2580000003 HC RX 258: Performed by: STUDENT IN AN ORGANIZED HEALTH CARE EDUCATION/TRAINING PROGRAM

## 2023-12-17 PROCEDURE — 2580000003 HC RX 258: Performed by: NURSE PRACTITIONER

## 2023-12-17 PROCEDURE — 6370000000 HC RX 637 (ALT 250 FOR IP): Performed by: NURSE PRACTITIONER

## 2023-12-17 PROCEDURE — 84145 PROCALCITONIN (PCT): CPT

## 2023-12-17 PROCEDURE — 87636 SARSCOV2 & INF A&B AMP PRB: CPT

## 2023-12-17 PROCEDURE — 96365 THER/PROPH/DIAG IV INF INIT: CPT

## 2023-12-17 PROCEDURE — 93005 ELECTROCARDIOGRAM TRACING: CPT | Performed by: EMERGENCY MEDICINE

## 2023-12-17 PROCEDURE — 2500000003 HC RX 250 WO HCPCS: Performed by: NURSE PRACTITIONER

## 2023-12-17 RX ORDER — PANTOPRAZOLE SODIUM 40 MG/1
40 TABLET, DELAYED RELEASE ORAL
Status: DISCONTINUED | OUTPATIENT
Start: 2023-12-18 | End: 2023-12-24 | Stop reason: HOSPADM

## 2023-12-17 RX ORDER — ERGOCALCIFEROL 1.25 MG/1
50000 CAPSULE ORAL WEEKLY
COMMUNITY

## 2023-12-17 RX ORDER — ASPIRIN 81 MG/1
81 TABLET ORAL DAILY
Status: DISCONTINUED | OUTPATIENT
Start: 2023-12-17 | End: 2023-12-24 | Stop reason: HOSPADM

## 2023-12-17 RX ORDER — SODIUM CHLORIDE 0.9 % (FLUSH) 0.9 %
5-40 SYRINGE (ML) INJECTION PRN
Status: DISCONTINUED | OUTPATIENT
Start: 2023-12-17 | End: 2023-12-24 | Stop reason: HOSPADM

## 2023-12-17 RX ORDER — PROCHLORPERAZINE MALEATE 10 MG
10 TABLET ORAL EVERY 8 HOURS PRN
Status: DISCONTINUED | OUTPATIENT
Start: 2023-12-17 | End: 2023-12-24 | Stop reason: HOSPADM

## 2023-12-17 RX ORDER — LOSARTAN POTASSIUM 50 MG/1
25 TABLET ORAL DAILY
Status: DISCONTINUED | OUTPATIENT
Start: 2023-12-17 | End: 2023-12-24 | Stop reason: HOSPADM

## 2023-12-17 RX ORDER — FLUTICASONE PROPIONATE 50 MCG
1 SPRAY, SUSPENSION (ML) NASAL DAILY
Status: DISCONTINUED | OUTPATIENT
Start: 2023-12-18 | End: 2023-12-24 | Stop reason: HOSPADM

## 2023-12-17 RX ORDER — SODIUM CHLORIDE 0.9 % (FLUSH) 0.9 %
5-40 SYRINGE (ML) INJECTION EVERY 12 HOURS SCHEDULED
Status: DISCONTINUED | OUTPATIENT
Start: 2023-12-17 | End: 2023-12-24 | Stop reason: HOSPADM

## 2023-12-17 RX ORDER — METOCLOPRAMIDE 5 MG/1
5 TABLET ORAL 4 TIMES DAILY
Status: DISCONTINUED | OUTPATIENT
Start: 2023-12-17 | End: 2023-12-24 | Stop reason: HOSPADM

## 2023-12-17 RX ORDER — ARFORMOTEROL TARTRATE 15 UG/2ML
15 SOLUTION RESPIRATORY (INHALATION)
Status: DISCONTINUED | OUTPATIENT
Start: 2023-12-17 | End: 2023-12-24 | Stop reason: HOSPADM

## 2023-12-17 RX ORDER — ATORVASTATIN CALCIUM 10 MG/1
10 TABLET, FILM COATED ORAL DAILY
Status: DISCONTINUED | OUTPATIENT
Start: 2023-12-17 | End: 2023-12-24 | Stop reason: HOSPADM

## 2023-12-17 RX ORDER — SODIUM CHLORIDE, SODIUM LACTATE, POTASSIUM CHLORIDE, CALCIUM CHLORIDE 600; 310; 30; 20 MG/100ML; MG/100ML; MG/100ML; MG/100ML
INJECTION, SOLUTION INTRAVENOUS CONTINUOUS
Status: DISCONTINUED | OUTPATIENT
Start: 2023-12-17 | End: 2023-12-18

## 2023-12-17 RX ORDER — 0.9 % SODIUM CHLORIDE 0.9 %
300 INTRAVENOUS SOLUTION INTRAVENOUS ONCE
Status: DISCONTINUED | OUTPATIENT
Start: 2023-12-17 | End: 2023-12-24 | Stop reason: HOSPADM

## 2023-12-17 RX ORDER — ACETAMINOPHEN 650 MG/1
650 SUPPOSITORY RECTAL EVERY 6 HOURS PRN
Status: DISCONTINUED | OUTPATIENT
Start: 2023-12-17 | End: 2023-12-24 | Stop reason: HOSPADM

## 2023-12-17 RX ORDER — SODIUM CHLORIDE, SODIUM LACTATE, POTASSIUM CHLORIDE, CALCIUM CHLORIDE 600; 310; 30; 20 MG/100ML; MG/100ML; MG/100ML; MG/100ML
INJECTION, SOLUTION INTRAVENOUS CONTINUOUS
Status: DISCONTINUED | OUTPATIENT
Start: 2023-12-17 | End: 2023-12-17

## 2023-12-17 RX ORDER — POLYETHYLENE GLYCOL 3350 17 G/17G
17 POWDER, FOR SOLUTION ORAL DAILY PRN
Status: DISCONTINUED | OUTPATIENT
Start: 2023-12-17 | End: 2023-12-24 | Stop reason: HOSPADM

## 2023-12-17 RX ORDER — ONDANSETRON 4 MG/1
4 TABLET, ORALLY DISINTEGRATING ORAL EVERY 8 HOURS PRN
Status: DISCONTINUED | OUTPATIENT
Start: 2023-12-17 | End: 2023-12-17 | Stop reason: ALTCHOICE

## 2023-12-17 RX ORDER — METOPROLOL SUCCINATE 25 MG/1
25 TABLET, EXTENDED RELEASE ORAL DAILY
Status: DISCONTINUED | OUTPATIENT
Start: 2023-12-17 | End: 2023-12-24 | Stop reason: HOSPADM

## 2023-12-17 RX ORDER — BUMETANIDE 1 MG/1
1 TABLET ORAL 2 TIMES DAILY
Status: DISCONTINUED | OUTPATIENT
Start: 2023-12-17 | End: 2023-12-24 | Stop reason: HOSPADM

## 2023-12-17 RX ORDER — SODIUM CHLORIDE 9 MG/ML
INJECTION, SOLUTION INTRAVENOUS PRN
Status: DISCONTINUED | OUTPATIENT
Start: 2023-12-17 | End: 2023-12-24 | Stop reason: HOSPADM

## 2023-12-17 RX ORDER — 0.9 % SODIUM CHLORIDE 0.9 %
500 INTRAVENOUS SOLUTION INTRAVENOUS ONCE
Status: COMPLETED | OUTPATIENT
Start: 2023-12-17 | End: 2023-12-17

## 2023-12-17 RX ORDER — BUDESONIDE 0.25 MG/2ML
250 INHALANT ORAL
Status: DISCONTINUED | OUTPATIENT
Start: 2023-12-17 | End: 2023-12-24 | Stop reason: HOSPADM

## 2023-12-17 RX ORDER — PROCHLORPERAZINE EDISYLATE 5 MG/ML
10 INJECTION INTRAMUSCULAR; INTRAVENOUS EVERY 6 HOURS PRN
Status: DISCONTINUED | OUTPATIENT
Start: 2023-12-17 | End: 2023-12-24 | Stop reason: HOSPADM

## 2023-12-17 RX ORDER — AMIODARONE HYDROCHLORIDE 200 MG/1
100 TABLET ORAL DAILY
Status: DISCONTINUED | OUTPATIENT
Start: 2023-12-17 | End: 2023-12-24 | Stop reason: HOSPADM

## 2023-12-17 RX ORDER — ACETAMINOPHEN 325 MG/1
650 TABLET ORAL EVERY 6 HOURS PRN
Status: DISCONTINUED | OUTPATIENT
Start: 2023-12-17 | End: 2023-12-24 | Stop reason: HOSPADM

## 2023-12-17 RX ORDER — SPIRONOLACTONE 25 MG/1
25 TABLET ORAL DAILY
Status: DISCONTINUED | OUTPATIENT
Start: 2023-12-17 | End: 2023-12-24 | Stop reason: HOSPADM

## 2023-12-17 RX ORDER — ONDANSETRON 2 MG/ML
4 INJECTION INTRAMUSCULAR; INTRAVENOUS EVERY 6 HOURS PRN
Status: DISCONTINUED | OUTPATIENT
Start: 2023-12-17 | End: 2023-12-17 | Stop reason: ALTCHOICE

## 2023-12-17 RX ADMIN — DOXYCYCLINE 100 MG: 100 INJECTION, POWDER, LYOPHILIZED, FOR SOLUTION INTRAVENOUS at 11:58

## 2023-12-17 RX ADMIN — IPRATROPIUM BROMIDE 0.5 MG: 0.5 SOLUTION RESPIRATORY (INHALATION) at 20:07

## 2023-12-17 RX ADMIN — DOXYCYCLINE 100 MG: 100 INJECTION, POWDER, LYOPHILIZED, FOR SOLUTION INTRAVENOUS at 21:08

## 2023-12-17 RX ADMIN — AMIODARONE HYDROCHLORIDE 100 MG: 200 TABLET ORAL at 13:01

## 2023-12-17 RX ADMIN — BUDESONIDE 250 MCG: 0.25 INHALANT RESPIRATORY (INHALATION) at 20:07

## 2023-12-17 RX ADMIN — SODIUM CHLORIDE, POTASSIUM CHLORIDE, SODIUM LACTATE AND CALCIUM CHLORIDE: 600; 310; 30; 20 INJECTION, SOLUTION INTRAVENOUS at 11:54

## 2023-12-17 RX ADMIN — CEFEPIME 1000 MG: 1 INJECTION, POWDER, FOR SOLUTION INTRAMUSCULAR; INTRAVENOUS at 22:23

## 2023-12-17 RX ADMIN — ARFORMOTEROL TARTRATE 15 MCG: 15 SOLUTION RESPIRATORY (INHALATION) at 20:07

## 2023-12-17 RX ADMIN — BUDESONIDE 250 MCG: 0.25 INHALANT RESPIRATORY (INHALATION) at 12:17

## 2023-12-17 RX ADMIN — ARFORMOTEROL TARTRATE 15 MCG: 15 SOLUTION RESPIRATORY (INHALATION) at 12:16

## 2023-12-17 RX ADMIN — ATORVASTATIN CALCIUM 10 MG: 10 TABLET, FILM COATED ORAL at 13:01

## 2023-12-17 RX ADMIN — ACETAMINOPHEN 650 MG: 325 TABLET ORAL at 16:53

## 2023-12-17 RX ADMIN — ASPIRIN 81 MG: 81 TABLET, COATED ORAL at 13:01

## 2023-12-17 RX ADMIN — IPRATROPIUM BROMIDE 0.5 MG: 0.5 SOLUTION RESPIRATORY (INHALATION) at 12:17

## 2023-12-17 RX ADMIN — METOCLOPRAMIDE 5 MG: 5 TABLET ORAL at 13:01

## 2023-12-17 RX ADMIN — SODIUM CHLORIDE, PRESERVATIVE FREE 10 ML: 5 INJECTION INTRAVENOUS at 22:24

## 2023-12-17 RX ADMIN — METOCLOPRAMIDE 5 MG: 5 TABLET ORAL at 21:08

## 2023-12-17 RX ADMIN — SODIUM CHLORIDE 500 ML: 9 INJECTION, SOLUTION INTRAVENOUS at 06:33

## 2023-12-17 RX ADMIN — SODIUM CHLORIDE 500 ML: 9 INJECTION, SOLUTION INTRAVENOUS at 08:24

## 2023-12-17 RX ADMIN — CEFEPIME 2000 MG: 2 INJECTION, POWDER, FOR SOLUTION INTRAVENOUS at 08:24

## 2023-12-17 RX ADMIN — IPRATROPIUM BROMIDE 0.5 MG: 0.5 SOLUTION RESPIRATORY (INHALATION) at 17:21

## 2023-12-17 NOTE — H&P
[facility] History and Physical      CHIEF COMPLAINT: Fatigue    History of Present Illness: This is a 81-year-old female with a history of CAD, HFrEF, COPD, HLD, HTN, cardiomyopathy with ICD, and atrial fibrillation on Xarelto who presents to the ED today with complaints of fatigue. Patient states she is overall not felt well recently. She reports feeling more tired and having no energy. She lives at home with her  and he states he noticed his wife was weaker over the past 24 hours. He also reports her needing an assistive 3 to get out of a chair   He reports her falling twice at home 1 time specifically she hit her head and her right hand. Patient currently denies any other pain. She denies headache. Respirations are unlabored on her baseline oxygen of 3 L. Patient does states she has a productive cough but swallows the mucus. Per patient's  she does frequently cough with oral intake and is following outpatient with Dr. Citlaly Marsh for esophageal stenosis. ED course is as follows: Chloride 92, BUN/creatinine 54/1.8, LA 2.4> 3.4, glucose 113, proBNP 27,411, troponin 62> 67, AST 53, WBCs 16.9, Hgb 7.2.  BC and UC pending. Flu A/B and COVID-negative. UA shows large leuks, 21-50 WBCs, bacteria. CT A/P shows no evidence of hydronephrosis but possible slight increase size of the right kidney suggesting pyelonephritis, distended urinary bladder wall thickening in the cecum and right colon, nodular density in the posterior right costophrenic sulcus appears to represent a dilated portion of the vessel that is unchanged from prior studies. CXR shows patchy airspace disease/pneumonia in the right upper lobe, no gross findings of CHF.     Patient will be admitted for further management    Informant(s) for H&P: Patient    REVIEW OF SYSTEMS:  A comprehensive review of systems was negative except for: what is in the HPI      PMH:  Past Medical History:   Diagnosis Date

## 2023-12-17 NOTE — ED PROVIDER NOTES
time range)   sodium chloride flush 0.9 % injection 5-40 mL (has no administration in time range)   0.9 % sodium chloride infusion (has no administration in time range)   polyethylene glycol (GLYCOLAX) packet 17 g (has no administration in time range)   acetaminophen (TYLENOL) tablet 650 mg (has no administration in time range)     Or   acetaminophen (TYLENOL) suppository 650 mg (has no administration in time range)   lactated ringers IV soln infusion (has no administration in time range)   doxycycline (VIBRAMYCIN) 100 mg in sodium chloride 0.9 % 100 mL IVPB (Iduc0Lur) (has no administration in time range)   prochlorperazine (COMPAZINE) injection 10 mg (has no administration in time range)     Or   prochlorperazine (COMPAZINE) tablet 10 mg (has no administration in time range)   cefepime (MAXIPIME) 1,000 mg in sodium chloride 0.9 % 50 mL IVPB (Ayoj1Yit) (has no administration in time range)   sodium chloride 0.9 % bolus 500 mL (0 mLs IntraVENous Stopped 12/17/23 0730)   ceFEPIme (MAXIPIME) 2,000 mg in sodium chloride 0.9 % 100 mL IVPB (Hfvu0Rat) (0 mg IntraVENous Stopped 12/17/23 0900)   sodium chloride 0.9 % bolus 500 mL (500 mLs IntraVENous New Bag 12/17/23 0824)           Is this patient to be included in the SEP-1 Core Measure due to severe sepsis or septic shock? Yes   SEP-1 CORE MEASURE DATA      Sepsis Criteria   Severe Sepsis Criteria   Septic Shock Criteria     Must be confirmed or suspected to move forward with diagnosis of sepsis. Must meet 2:    [] Temperature > 100.9 F (38.3 C)        or < 96.8 F (36 C)  [] HR > 90  [] RR > 20  [] WBC > 12 or < 4 or 10% bands      AND:      [] Infection Confirmed or        Suspected.      Must meet 1:    [] Lactate > 2       or   [] Signs of Organ Dysfunction:    - SBP < 90 or MAP < 65  - Altered mental status  - Creatinine > 2 or increased from      baseline  - Urine Output < 0.5 ml/kg/hr  - Bilirubin > 2  - INR > 1.5 (not anticoagulated)  - Platelets < 075,408  -

## 2023-12-17 NOTE — ED NOTES
Pt states she hasn't felt like eating, denies nausea, states she just feels sick because she hasn't eaten or drank her boost drinks. When asked why she hasn't drank her boosts, she says she doesn't know.  Patient wears 3L at baseline     Lalito Guaman, [address]  12/17/23 1704

## 2023-12-18 PROBLEM — I50.21 ACUTE SYSTOLIC HEART FAILURE (HCC): Status: ACTIVE | Noted: 2023-12-18

## 2023-12-18 PROBLEM — I36.1 NONRHEUMATIC TRICUSPID VALVE REGURGITATION: Status: ACTIVE | Noted: 2023-12-18

## 2023-12-18 PROBLEM — D64.9 ACUTE ANEMIA: Status: ACTIVE | Noted: 2023-12-17

## 2023-12-18 PROBLEM — E87.20 LACTIC ACIDOSIS: Status: ACTIVE | Noted: 2023-12-18

## 2023-12-18 PROBLEM — I34.0 NONRHEUMATIC MITRAL VALVE REGURGITATION: Status: ACTIVE | Noted: 2023-12-18

## 2023-12-18 PROBLEM — R93.0 ABNORMAL HEAD CT: Status: ACTIVE | Noted: 2023-12-18

## 2023-12-18 LAB
ALBUMIN SERPL-MCNC: 3.5 G/DL (ref 3.5–5.2)
ALP SERPL-CCNC: 64 U/L (ref 35–104)
ALT SERPL-CCNC: 601 U/L (ref 0–32)
ANION GAP SERPL CALCULATED.3IONS-SCNC: 16 MMOL/L (ref 7–16)
AST SERPL-CCNC: 1078 U/L (ref 0–31)
BILIRUB SERPL-MCNC: 0.8 MG/DL (ref 0–1.2)
BUN SERPL-MCNC: 64 MG/DL (ref 6–23)
CALCIUM SERPL-MCNC: 8.2 MG/DL (ref 8.6–10.2)
CHLORIDE SERPL-SCNC: 98 MMOL/L (ref 98–107)
CO2 SERPL-SCNC: 21 MMOL/L (ref 22–29)
CREAT SERPL-MCNC: 1.9 MG/DL (ref 0.5–1)
CREAT UR-MCNC: 65.7 MG/DL (ref 29–226)
CREAT UR-MCNC: 66.1 MG/DL (ref 29–226)
ERYTHROCYTE [DISTWIDTH] IN BLOOD BY AUTOMATED COUNT: 18.5 % (ref 11.5–15)
GFR SERPL CREATININE-BSD FRML MDRD: 25 ML/MIN/1.73M2
GLUCOSE SERPL-MCNC: 99 MG/DL (ref 74–99)
HCT VFR BLD AUTO: 22.6 % (ref 34–48)
HCT VFR BLD AUTO: 27.2 % (ref 34–48)
HGB BLD-MCNC: 6.9 G/DL (ref 11.5–15.5)
HGB BLD-MCNC: 8.4 G/DL (ref 11.5–15.5)
IRON SATN MFR SERPL: 3 % (ref 15–50)
IRON SERPL-MCNC: 9 UG/DL (ref 37–145)
LACTATE BLDV-SCNC: 2.1 MMOL/L (ref 0.5–2.2)
LACTATE BLDV-SCNC: 2.9 MMOL/L (ref 0.5–2.2)
LACTATE BLDV-SCNC: 4.6 MMOL/L (ref 0.5–2.2)
LACTATE BLDV-SCNC: 4.6 MMOL/L (ref 0.5–2.2)
MCH RBC QN AUTO: 26.4 PG (ref 26–35)
MCHC RBC AUTO-ENTMCNC: 30.5 G/DL (ref 32–34.5)
MCV RBC AUTO: 86.6 FL (ref 80–99.9)
PLATELET # BLD AUTO: 248 K/UL (ref 130–450)
PMV BLD AUTO: 10.6 FL (ref 7–12)
POTASSIUM SERPL-SCNC: 5 MMOL/L (ref 3.5–5)
PROT SERPL-MCNC: 6.1 G/DL (ref 6.4–8.3)
RBC # BLD AUTO: 2.61 M/UL (ref 3.5–5.5)
SODIUM SERPL-SCNC: 135 MMOL/L (ref 132–146)
SODIUM UR-SCNC: <20 MMOL/L
SODIUM UR-SCNC: <20 MMOL/L
TIBC SERPL-MCNC: 327 UG/DL (ref 250–450)
UUN UR-MCNC: 625 MG/DL (ref 800–1666)
WBC OTHER # BLD: 13.2 K/UL (ref 4.5–11.5)

## 2023-12-18 PROCEDURE — 6370000000 HC RX 637 (ALT 250 FOR IP): Performed by: NURSE PRACTITIONER

## 2023-12-18 PROCEDURE — 84300 ASSAY OF URINE SODIUM: CPT

## 2023-12-18 PROCEDURE — 84540 ASSAY OF URINE/UREA-N: CPT

## 2023-12-18 PROCEDURE — P9016 RBC LEUKOCYTES REDUCED: HCPCS

## 2023-12-18 PROCEDURE — 2580000003 HC RX 258: Performed by: NURSE PRACTITIONER

## 2023-12-18 PROCEDURE — 6360000002 HC RX W HCPCS

## 2023-12-18 PROCEDURE — 6360000002 HC RX W HCPCS: Performed by: STUDENT IN AN ORGANIZED HEALTH CARE EDUCATION/TRAINING PROGRAM

## 2023-12-18 PROCEDURE — 82570 ASSAY OF URINE CREATININE: CPT

## 2023-12-18 PROCEDURE — 80053 COMPREHEN METABOLIC PANEL: CPT

## 2023-12-18 PROCEDURE — 6360000002 HC RX W HCPCS: Performed by: NURSE PRACTITIONER

## 2023-12-18 PROCEDURE — 92610 EVALUATE SWALLOWING FUNCTION: CPT

## 2023-12-18 PROCEDURE — 30233N1 TRANSFUSION OF NONAUTOLOGOUS RED BLOOD CELLS INTO PERIPHERAL VEIN, PERCUTANEOUS APPROACH: ICD-10-PCS | Performed by: STUDENT IN AN ORGANIZED HEALTH CARE EDUCATION/TRAINING PROGRAM

## 2023-12-18 PROCEDURE — 2060000000 HC ICU INTERMEDIATE R&B

## 2023-12-18 PROCEDURE — 36415 COLL VENOUS BLD VENIPUNCTURE: CPT

## 2023-12-18 PROCEDURE — 83550 IRON BINDING TEST: CPT

## 2023-12-18 PROCEDURE — 92526 ORAL FUNCTION THERAPY: CPT

## 2023-12-18 PROCEDURE — 94640 AIRWAY INHALATION TREATMENT: CPT

## 2023-12-18 PROCEDURE — 85014 HEMATOCRIT: CPT

## 2023-12-18 PROCEDURE — 2580000003 HC RX 258: Performed by: INTERNAL MEDICINE

## 2023-12-18 PROCEDURE — 99223 1ST HOSP IP/OBS HIGH 75: CPT | Performed by: INTERNAL MEDICINE

## 2023-12-18 PROCEDURE — 2580000003 HC RX 258: Performed by: STUDENT IN AN ORGANIZED HEALTH CARE EDUCATION/TRAINING PROGRAM

## 2023-12-18 PROCEDURE — 6360000002 HC RX W HCPCS: Performed by: INTERNAL MEDICINE

## 2023-12-18 PROCEDURE — 36430 TRANSFUSION BLD/BLD COMPNT: CPT

## 2023-12-18 PROCEDURE — 2700000000 HC OXYGEN THERAPY PER DAY

## 2023-12-18 PROCEDURE — 85027 COMPLETE CBC AUTOMATED: CPT

## 2023-12-18 PROCEDURE — APPSS60 APP SPLIT SHARED TIME 46-60 MINUTES: Performed by: CLINICAL NURSE SPECIALIST

## 2023-12-18 PROCEDURE — 83605 ASSAY OF LACTIC ACID: CPT

## 2023-12-18 PROCEDURE — 85018 HEMOGLOBIN: CPT

## 2023-12-18 PROCEDURE — 99222 1ST HOSP IP/OBS MODERATE 55: CPT | Performed by: NEUROLOGICAL SURGERY

## 2023-12-18 PROCEDURE — 83540 ASSAY OF IRON: CPT

## 2023-12-18 RX ORDER — SODIUM CHLORIDE 9 MG/ML
INJECTION, SOLUTION INTRAVENOUS PRN
Status: DISCONTINUED | OUTPATIENT
Start: 2023-12-18 | End: 2023-12-24 | Stop reason: HOSPADM

## 2023-12-18 RX ADMIN — PANTOPRAZOLE SODIUM 40 MG: 40 TABLET, DELAYED RELEASE ORAL at 06:45

## 2023-12-18 RX ADMIN — IPRATROPIUM BROMIDE 0.5 MG: 0.5 SOLUTION RESPIRATORY (INHALATION) at 20:49

## 2023-12-18 RX ADMIN — ARFORMOTEROL TARTRATE 15 MCG: 15 SOLUTION RESPIRATORY (INHALATION) at 08:37

## 2023-12-18 RX ADMIN — WATER 2000 MG: 1 INJECTION INTRAMUSCULAR; INTRAVENOUS; SUBCUTANEOUS at 13:38

## 2023-12-18 RX ADMIN — SODIUM CHLORIDE, PRESERVATIVE FREE 10 ML: 5 INJECTION INTRAVENOUS at 09:00

## 2023-12-18 RX ADMIN — PROCHLORPERAZINE EDISYLATE 10 MG: 5 INJECTION INTRAMUSCULAR; INTRAVENOUS at 20:06

## 2023-12-18 RX ADMIN — FLUTICASONE PROPIONATE 1 SPRAY: 50 SPRAY, METERED NASAL at 11:52

## 2023-12-18 RX ADMIN — METOCLOPRAMIDE 5 MG: 5 TABLET ORAL at 09:00

## 2023-12-18 RX ADMIN — SODIUM CHLORIDE, PRESERVATIVE FREE 10 ML: 5 INJECTION INTRAVENOUS at 20:07

## 2023-12-18 RX ADMIN — ARFORMOTEROL TARTRATE 15 MCG: 15 SOLUTION RESPIRATORY (INHALATION) at 20:49

## 2023-12-18 RX ADMIN — BUDESONIDE 250 MCG: 0.25 INHALANT RESPIRATORY (INHALATION) at 08:38

## 2023-12-18 RX ADMIN — IPRATROPIUM BROMIDE 0.5 MG: 0.5 SOLUTION RESPIRATORY (INHALATION) at 16:34

## 2023-12-18 RX ADMIN — IPRATROPIUM BROMIDE 0.5 MG: 0.5 SOLUTION RESPIRATORY (INHALATION) at 12:31

## 2023-12-18 RX ADMIN — IPRATROPIUM BROMIDE 0.5 MG: 0.5 SOLUTION RESPIRATORY (INHALATION) at 08:37

## 2023-12-18 RX ADMIN — BUDESONIDE 250 MCG: 0.25 INHALANT RESPIRATORY (INHALATION) at 20:49

## 2023-12-18 RX ADMIN — SODIUM CHLORIDE 125 MG: 9 INJECTION, SOLUTION INTRAVENOUS at 13:53

## 2023-12-18 NOTE — ACP (ADVANCE CARE PLANNING)
Advance Care Planning   Healthcare Decision Maker:    Primary Decision Maker: Ezequiel Coreas - [phone]    Secondary Decision Maker: Deanna Ponce - Child - [phone]    Click here to complete Healthcare Decision Makers including selection of the Healthcare Decision Maker Relationship (ie \"Primary\").

## 2023-12-18 NOTE — PLAN OF CARE
Problem: Chronic Conditions and Co-morbidities  Goal: Patient's chronic conditions and co-morbidity symptoms are monitored and maintained or improved  12/18/2023 0749 by Barbara Hinojosa RN  Outcome: Progressing  Flowsheets (Taken 12/18/2023 0000)  Care Plan - Patient's Chronic Conditions and Co-Morbidity Symptoms are Monitored and Maintained or Improved: Monitor and assess patient's chronic conditions and comorbid symptoms for stability, deterioration, or improvement  12/17/2023 2226 by Shruti Broderick RN  Outcome: Progressing     Problem: Discharge Planning  Goal: Discharge to home or other facility with appropriate resources  12/18/2023 0749 by Barbara Hinojosa RN  Outcome: Progressing  12/17/2023 2226 by Shruti Broderick RN  Outcome: Progressing     Problem: Skin/Tissue Integrity  Goal: Absence of new skin breakdown  Description: 1. Monitor for areas of redness and/or skin breakdown  2. Assess vascular access sites hourly  3. Every 4-6 hours minimum:  Change oxygen saturation probe site  4. Every 4-6 hours:  If on nasal continuous positive airway pressure, respiratory therapy assess nares and determine need for appliance change or resting period.   12/18/2023 0749 by Barbara Hinojosa RN  Outcome: Progressing  12/17/2023 2226 by Shruti Broderick RN  Outcome: Progressing     Problem: Safety - Adult  Goal: Free from fall injury  12/18/2023 0749 by Barbara Hinojosa RN  Outcome: Progressing  12/17/2023 2226 by Shruti Broderick RN  Outcome: Progressing     Problem: ABCDS Injury Assessment  Goal: Absence of physical injury  12/18/2023 0749 by Barbara Hinojosa RN  Outcome: Progressing  12/17/2023 2226 by Shruti Broderick RN  Outcome: Progressing

## 2023-12-18 NOTE — CARE COORDINATION
I met with pt in the room this a.m. she is very hard of  hearing with hearing aides in. Pt lives with spouse in a ranch home with 3 steps to enter thru the back. Pt has 6 children with 2 , one in [address] and one in [facility]. The couple is retired with spouse driving and pt not for about 1 month now. She said she is independent pta with bathing , dressing, simple meals and medications. They go out or order in for dinner. No c pta. Pt has a fww, w/c, cane, nebulizer, and shower chair. The home has a walk in shower. PT and O T to eval. And plan is home. MRI of the brain is pending but pt has a defibrilator. Cardio and ID were consulted. Pt presently on 2 iv abx's and lactated ringers. Speech is to see and pt is on 3l o2 nc. Occult stool and urine cx pending. Blood cx positive. Creatine is 1.9 and lactic acid is 2. 9. hgb is 6.9  with transfusion ordered. Plan per pt is home but will follow up with pt once seen by therapies. RALPH Chew  .2023    Case Management Assessment  Initial Evaluation    Date/Time of Evaluation: 2023 10:14 AM  Assessment Completed by: RALPH Chew    If patient is discharged prior to next notation, then this note serves as note for discharge by case management. Patient Name: Ami Mackey                   YOB: 1935  Diagnosis: Acute cystitis without hematuria [N30.00]  Sepsis ([address]) [A41.9]  Pneumonia due to infectious organism, unspecified laterality, unspecified part of lung [J18.9]  Sepsis, due to unspecified organism, unspecified whether acute organ dysfunction present [facility]) [A41.9]                   Date / Time: 2023  6:02 AM    Patient Admission Status: Inpatient   Readmission Risk (Low < 19, Mod (19-27), High > 27): Readmission Risk Score: 18    Current PCP: Shantell Zimmer MD  PCP verified by CM?  Yes    Chart Reviewed: Yes      History Provided by: Patient  Patient Orientation: Alert and Oriented    Patient Cognition:

## 2023-12-19 ENCOUNTER — APPOINTMENT (OUTPATIENT)
Dept: GENERAL RADIOLOGY | Age: 88
DRG: 871 | End: 2023-12-19
Payer: MEDICARE

## 2023-12-19 LAB
ABO/RH: NORMAL
ALBUMIN SERPL-MCNC: 3.3 G/DL (ref 3.5–5.2)
ALP SERPL-CCNC: 79 U/L (ref 35–104)
ALT SERPL-CCNC: 1145 U/L (ref 0–32)
ANION GAP SERPL CALCULATED.3IONS-SCNC: 16 MMOL/L (ref 7–16)
ANTIBODY SCREEN: NEGATIVE
ARM BAND NUMBER: NORMAL
AST SERPL-CCNC: 1439 U/L (ref 0–31)
BILIRUB SERPL-MCNC: 0.8 MG/DL (ref 0–1.2)
BLOOD BANK BLOOD PRODUCT EXPIRATION DATE: NORMAL
BLOOD BANK DISPENSE STATUS: NORMAL
BLOOD BANK ISBT PRODUCT BLOOD TYPE: 600
BLOOD BANK PRODUCT CODE: NORMAL
BLOOD BANK SAMPLE EXPIRATION: NORMAL
BLOOD BANK UNIT TYPE AND RH: NORMAL
BPU ID: NORMAL
BUN SERPL-MCNC: 61 MG/DL (ref 6–23)
CALCIUM SERPL-MCNC: 8.2 MG/DL (ref 8.6–10.2)
CHLORIDE SERPL-SCNC: 99 MMOL/L (ref 98–107)
CO2 SERPL-SCNC: 19 MMOL/L (ref 22–29)
COMPONENT: NORMAL
CREAT SERPL-MCNC: 1.5 MG/DL (ref 0.5–1)
CROSSMATCH RESULT: NORMAL
EKG ATRIAL RATE: 62 BPM
EKG P AXIS: 64 DEGREES
EKG P-R INTERVAL: 262 MS
EKG Q-T INTERVAL: 466 MS
EKG QRS DURATION: 150 MS
EKG QTC CALCULATION (BAZETT): 472 MS
EKG R AXIS: -58 DEGREES
EKG T AXIS: 172 DEGREES
EKG VENTRICULAR RATE: 62 BPM
ERYTHROCYTE [DISTWIDTH] IN BLOOD BY AUTOMATED COUNT: 18 % (ref 11.5–15)
GFR SERPL CREATININE-BSD FRML MDRD: 35 ML/MIN/1.73M2
GLUCOSE SERPL-MCNC: 100 MG/DL (ref 74–99)
HCT VFR BLD AUTO: 27 % (ref 34–48)
HGB BLD-MCNC: 8.5 G/DL (ref 11.5–15.5)
LACTATE BLDV-SCNC: 1.5 MMOL/L (ref 0.5–2.2)
LACTATE BLDV-SCNC: 1.7 MMOL/L (ref 0.5–2.2)
LACTATE BLDV-SCNC: 1.9 MMOL/L (ref 0.5–2.2)
LACTATE BLDV-SCNC: 2 MMOL/L (ref 0.5–2.2)
MAGNESIUM SERPL-MCNC: 2.5 MG/DL (ref 1.6–2.6)
MCH RBC QN AUTO: 27 PG (ref 26–35)
MCHC RBC AUTO-ENTMCNC: 31.5 G/DL (ref 32–34.5)
MCV RBC AUTO: 85.7 FL (ref 80–99.9)
MICROORGANISM SPEC CULT: ABNORMAL
MICROORGANISM SPEC CULT: ABNORMAL
PHOSPHATE SERPL-MCNC: 2.7 MG/DL (ref 2.5–4.5)
PLATELET # BLD AUTO: 228 K/UL (ref 130–450)
PMV BLD AUTO: 10.1 FL (ref 7–12)
POTASSIUM SERPL-SCNC: 4.6 MMOL/L (ref 3.5–5)
PROT SERPL-MCNC: 5.8 G/DL (ref 6.4–8.3)
RBC # BLD AUTO: 3.15 M/UL (ref 3.5–5.5)
SODIUM SERPL-SCNC: 134 MMOL/L (ref 132–146)
SPECIMEN DESCRIPTION: ABNORMAL
TRANSFUSION STATUS: NORMAL
UNIT DIVISION: 0
UNIT ISSUE DATE/TIME: NORMAL
WBC OTHER # BLD: 11 K/UL (ref 4.5–11.5)

## 2023-12-19 PROCEDURE — 74230 X-RAY XM SWLNG FUNCJ C+: CPT

## 2023-12-19 PROCEDURE — 99232 SBSQ HOSP IP/OBS MODERATE 35: CPT | Performed by: NEUROLOGICAL SURGERY

## 2023-12-19 PROCEDURE — 99232 SBSQ HOSP IP/OBS MODERATE 35: CPT | Performed by: INTERNAL MEDICINE

## 2023-12-19 PROCEDURE — 2060000000 HC ICU INTERMEDIATE R&B

## 2023-12-19 PROCEDURE — 97530 THERAPEUTIC ACTIVITIES: CPT

## 2023-12-19 PROCEDURE — 6360000002 HC RX W HCPCS: Performed by: INTERNAL MEDICINE

## 2023-12-19 PROCEDURE — 93010 ELECTROCARDIOGRAM REPORT: CPT | Performed by: INTERNAL MEDICINE

## 2023-12-19 PROCEDURE — 2500000003 HC RX 250 WO HCPCS: Performed by: PHYSICIAN ASSISTANT

## 2023-12-19 PROCEDURE — 36415 COLL VENOUS BLD VENIPUNCTURE: CPT

## 2023-12-19 PROCEDURE — 84100 ASSAY OF PHOSPHORUS: CPT

## 2023-12-19 PROCEDURE — 6360000002 HC RX W HCPCS: Performed by: NURSE PRACTITIONER

## 2023-12-19 PROCEDURE — 97165 OT EVAL LOW COMPLEX 30 MIN: CPT

## 2023-12-19 PROCEDURE — 97161 PT EVAL LOW COMPLEX 20 MIN: CPT

## 2023-12-19 PROCEDURE — 85027 COMPLETE CBC AUTOMATED: CPT

## 2023-12-19 PROCEDURE — 80053 COMPREHEN METABOLIC PANEL: CPT

## 2023-12-19 PROCEDURE — 2700000000 HC OXYGEN THERAPY PER DAY

## 2023-12-19 PROCEDURE — 94640 AIRWAY INHALATION TREATMENT: CPT

## 2023-12-19 PROCEDURE — 83735 ASSAY OF MAGNESIUM: CPT

## 2023-12-19 PROCEDURE — 2580000003 HC RX 258: Performed by: NURSE PRACTITIONER

## 2023-12-19 PROCEDURE — 2580000003 HC RX 258: Performed by: INTERNAL MEDICINE

## 2023-12-19 PROCEDURE — 83605 ASSAY OF LACTIC ACID: CPT

## 2023-12-19 RX ADMIN — WATER 2000 MG: 1 INJECTION INTRAMUSCULAR; INTRAVENOUS; SUBCUTANEOUS at 12:38

## 2023-12-19 RX ADMIN — FLUTICASONE PROPIONATE 1 SPRAY: 50 SPRAY, METERED NASAL at 08:45

## 2023-12-19 RX ADMIN — BARIUM SULFATE 15 ML: 400 SUSPENSION ORAL at 14:59

## 2023-12-19 RX ADMIN — BARIUM SULFATE 15 ML: 400 PASTE ORAL at 14:59

## 2023-12-19 RX ADMIN — SODIUM CHLORIDE, PRESERVATIVE FREE 10 ML: 5 INJECTION INTRAVENOUS at 08:45

## 2023-12-19 RX ADMIN — BUDESONIDE 250 MCG: 0.25 INHALANT RESPIRATORY (INHALATION) at 10:16

## 2023-12-19 RX ADMIN — SODIUM CHLORIDE, PRESERVATIVE FREE 10 ML: 5 INJECTION INTRAVENOUS at 21:06

## 2023-12-19 RX ADMIN — IPRATROPIUM BROMIDE 0.5 MG: 0.5 SOLUTION RESPIRATORY (INHALATION) at 10:16

## 2023-12-19 RX ADMIN — BARIUM SULFATE 15 ML: 0.81 POWDER, FOR SUSPENSION ORAL at 14:59

## 2023-12-19 RX ADMIN — ARFORMOTEROL TARTRATE 15 MCG: 15 SOLUTION RESPIRATORY (INHALATION) at 10:17

## 2023-12-19 NOTE — CARE COORDINATION
SOCIAL WORK/CASEMANAGEMENT TRANSITION OF CARE Vidal SWANSON, [address] [facility]): I met with pt this a.m. and she is very hard of hearing and her hearing aides were in but not charged. Assisted pt with charging them. PT and OT to haseeb Desai in place. Cannot have a MRI of the brain due to pace maker not being compatible. Pt is to have a CT of the head per neurosurgery in about 4 weeks for a mass. Creatine is 1.5 down from 1.9. hgb is 8.5 up from 6.9. blood cxs are negative. Pt is on 3l o2 nc, aerosols and iv rocephin with ID. For + urine cx . U/S of the retroperitoneal and liver are pending. Renal with cardio are following.   Will follow up with pt and spouse after seen by therapies for discharge plan Tamiko Hale  12/19/2023

## 2023-12-20 LAB
ACB COMPLEX DNA BLD POS QL NAA+NON-PROBE: NOT DETECTED
ALBUMIN SERPL-MCNC: 3.4 G/DL (ref 3.5–5.2)
ALP SERPL-CCNC: 85 U/L (ref 35–104)
ALT SERPL-CCNC: 1138 U/L (ref 0–32)
ANION GAP SERPL CALCULATED.3IONS-SCNC: 14 MMOL/L (ref 7–16)
AST SERPL-CCNC: 991 U/L (ref 0–31)
B FRAGILIS DNA BLD POS QL NAA+NON-PROBE: NOT DETECTED
BILIRUB SERPL-MCNC: 0.6 MG/DL (ref 0–1.2)
BIOFIRE TEST COMMENT: ABNORMAL
BLACTX-M ISLT/SPM QL: NOT DETECTED
BLAIMP ISLT/SPM QL: NOT DETECTED
BLAKPC ISLT/SPM QL: NOT DETECTED
BLAOXA-48-LIKE ISLT/SPM QL: NOT DETECTED
BLAVIM ISLT/SPM QL: NOT DETECTED
BUN SERPL-MCNC: 51 MG/DL (ref 6–23)
C ALBICANS DNA BLD POS QL NAA+NON-PROBE: NOT DETECTED
C AURIS DNA BLD POS QL NAA+NON-PROBE: NOT DETECTED
C GATTII+NEOFOR DNA BLD POS QL NAA+N-PRB: NOT DETECTED
C GLABRATA DNA BLD POS QL NAA+NON-PROBE: NOT DETECTED
C KRUSEI DNA BLD POS QL NAA+NON-PROBE: NOT DETECTED
C PARAP DNA BLD POS QL NAA+NON-PROBE: NOT DETECTED
C TROPICLS DNA BLD POS QL NAA+NON-PROBE: NOT DETECTED
CALCIUM SERPL-MCNC: 8.6 MG/DL (ref 8.6–10.2)
CHLORIDE SERPL-SCNC: 106 MMOL/L (ref 98–107)
CO2 SERPL-SCNC: 22 MMOL/L (ref 22–29)
COLISTIN RES MCR-1 ISLT/SPM QL: NOT DETECTED
CREAT SERPL-MCNC: 1.2 MG/DL (ref 0.5–1)
E CLOAC COMP DNA BLD POS NAA+NON-PROBE: NOT DETECTED
E COLI DNA BLD POS QL NAA+NON-PROBE: DETECTED
E FAECALIS DNA BLD POS QL NAA+NON-PROBE: NOT DETECTED
E FAECIUM DNA BLD POS QL NAA+NON-PROBE: NOT DETECTED
ENTEROBACTERALES DNA BLD POS NAA+N-PRB: DETECTED
ERYTHROCYTE [DISTWIDTH] IN BLOOD BY AUTOMATED COUNT: 18.2 % (ref 11.5–15)
GFR SERPL CREATININE-BSD FRML MDRD: 43 ML/MIN/1.73M2
GLUCOSE SERPL-MCNC: 103 MG/DL (ref 74–99)
GP B STREP DNA BLD POS QL NAA+NON-PROBE: NOT DETECTED
HAEM INFLU DNA BLD POS QL NAA+NON-PROBE: NOT DETECTED
HCT VFR BLD AUTO: 27.5 % (ref 34–48)
HGB BLD-MCNC: 8.5 G/DL (ref 11.5–15.5)
K OXYTOCA DNA BLD POS QL NAA+NON-PROBE: NOT DETECTED
KLEBSIELLA SP DNA BLD POS QL NAA+NON-PRB: NOT DETECTED
KLEBSIELLA SP DNA BLD POS QL NAA+NON-PRB: NOT DETECTED
L MONOCYTOG DNA BLD POS QL NAA+NON-PROBE: NOT DETECTED
LACTATE BLDV-SCNC: 1.4 MMOL/L (ref 0.5–2.2)
LACTATE BLDV-SCNC: 1.6 MMOL/L (ref 0.5–2.2)
MAGNESIUM SERPL-MCNC: 2.8 MG/DL (ref 1.6–2.6)
MCH RBC QN AUTO: 26.7 PG (ref 26–35)
MCHC RBC AUTO-ENTMCNC: 30.9 G/DL (ref 32–34.5)
MCV RBC AUTO: 86.5 FL (ref 80–99.9)
MICROORGANISM SPEC CULT: ABNORMAL
MICROORGANISM SPEC CULT: ABNORMAL
MICROORGANISM/AGENT SPEC: ABNORMAL
MICROORGANISM/AGENT SPEC: ABNORMAL
N MEN DNA BLD POS QL NAA+NON-PROBE: NOT DETECTED
P AERUGINOSA DNA BLD POS NAA+NON-PROBE: NOT DETECTED
PHOSPHATE SERPL-MCNC: 2.7 MG/DL (ref 2.5–4.5)
PLATELET # BLD AUTO: 245 K/UL (ref 130–450)
PMV BLD AUTO: 10.4 FL (ref 7–12)
POTASSIUM SERPL-SCNC: 4.5 MMOL/L (ref 3.5–5)
PROT SERPL-MCNC: 6 G/DL (ref 6.4–8.3)
PROTEUS SP DNA BLD POS QL NAA+NON-PROBE: NOT DETECTED
RBC # BLD AUTO: 3.18 M/UL (ref 3.5–5.5)
RESISTANT GENE NDM BY PCR: NOT DETECTED
S AUREUS DNA BLD POS QL NAA+NON-PROBE: NOT DETECTED
S AUREUS+CONS DNA BLD POS NAA+NON-PROBE: NOT DETECTED
S EPIDERMIDIS DNA BLD POS QL NAA+NON-PRB: NOT DETECTED
S LUGDUNENSIS DNA BLD POS QL NAA+NON-PRB: NOT DETECTED
S MALTOPHILIA DNA BLD POS QL NAA+NON-PRB: NOT DETECTED
S MARCESCENS DNA BLD POS NAA+NON-PROBE: NOT DETECTED
S PNEUM DNA BLD POS QL NAA+NON-PROBE: NOT DETECTED
S PYO DNA BLD POS QL NAA+NON-PROBE: NOT DETECTED
SALMONELLA DNA BLD POS QL NAA+NON-PROBE: NOT DETECTED
SERVICE CMNT-IMP: ABNORMAL
SERVICE CMNT-IMP: ABNORMAL
SODIUM SERPL-SCNC: 142 MMOL/L (ref 132–146)
SPECIMEN DESCRIPTION: ABNORMAL
SPECIMEN DESCRIPTION: ABNORMAL
STREPTOCOCCUS DNA BLD POS NAA+NON-PROBE: NOT DETECTED
WBC OTHER # BLD: 12.9 K/UL (ref 4.5–11.5)

## 2023-12-20 PROCEDURE — 6370000000 HC RX 637 (ALT 250 FOR IP): Performed by: INTERNAL MEDICINE

## 2023-12-20 PROCEDURE — 94640 AIRWAY INHALATION TREATMENT: CPT

## 2023-12-20 PROCEDURE — 6370000000 HC RX 637 (ALT 250 FOR IP): Performed by: NURSE PRACTITIONER

## 2023-12-20 PROCEDURE — 83735 ASSAY OF MAGNESIUM: CPT

## 2023-12-20 PROCEDURE — 80053 COMPREHEN METABOLIC PANEL: CPT

## 2023-12-20 PROCEDURE — 2580000003 HC RX 258: Performed by: NURSE PRACTITIONER

## 2023-12-20 PROCEDURE — 36415 COLL VENOUS BLD VENIPUNCTURE: CPT

## 2023-12-20 PROCEDURE — 2060000000 HC ICU INTERMEDIATE R&B

## 2023-12-20 PROCEDURE — 2700000000 HC OXYGEN THERAPY PER DAY

## 2023-12-20 PROCEDURE — 6360000002 HC RX W HCPCS: Performed by: NURSE PRACTITIONER

## 2023-12-20 PROCEDURE — 85027 COMPLETE CBC AUTOMATED: CPT

## 2023-12-20 PROCEDURE — 83605 ASSAY OF LACTIC ACID: CPT

## 2023-12-20 PROCEDURE — 84100 ASSAY OF PHOSPHORUS: CPT

## 2023-12-20 RX ORDER — CEFDINIR 300 MG/1
300 CAPSULE ORAL DAILY
Status: DISCONTINUED | OUTPATIENT
Start: 2023-12-20 | End: 2023-12-24 | Stop reason: HOSPADM

## 2023-12-20 RX ADMIN — METOCLOPRAMIDE 5 MG: 5 TABLET ORAL at 08:56

## 2023-12-20 RX ADMIN — METOCLOPRAMIDE 5 MG: 5 TABLET ORAL at 20:20

## 2023-12-20 RX ADMIN — SODIUM CHLORIDE, PRESERVATIVE FREE 10 ML: 5 INJECTION INTRAVENOUS at 20:21

## 2023-12-20 RX ADMIN — BUDESONIDE 250 MCG: 0.25 INHALANT RESPIRATORY (INHALATION) at 08:45

## 2023-12-20 RX ADMIN — IPRATROPIUM BROMIDE 0.5 MG: 0.5 SOLUTION RESPIRATORY (INHALATION) at 08:46

## 2023-12-20 RX ADMIN — IPRATROPIUM BROMIDE 0.5 MG: 0.5 SOLUTION RESPIRATORY (INHALATION) at 13:46

## 2023-12-20 RX ADMIN — CEFDINIR 300 MG: 300 CAPSULE ORAL at 12:01

## 2023-12-20 RX ADMIN — ARFORMOTEROL TARTRATE 15 MCG: 15 SOLUTION RESPIRATORY (INHALATION) at 19:34

## 2023-12-20 RX ADMIN — IPRATROPIUM BROMIDE 0.5 MG: 0.5 SOLUTION RESPIRATORY (INHALATION) at 16:37

## 2023-12-20 RX ADMIN — SODIUM CHLORIDE, PRESERVATIVE FREE 10 ML: 5 INJECTION INTRAVENOUS at 08:57

## 2023-12-20 RX ADMIN — FLUTICASONE PROPIONATE 1 SPRAY: 50 SPRAY, METERED NASAL at 08:56

## 2023-12-20 RX ADMIN — METOCLOPRAMIDE 5 MG: 5 TABLET ORAL at 18:14

## 2023-12-20 RX ADMIN — ARFORMOTEROL TARTRATE 15 MCG: 15 SOLUTION RESPIRATORY (INHALATION) at 08:44

## 2023-12-20 RX ADMIN — BUDESONIDE 250 MCG: 0.25 INHALANT RESPIRATORY (INHALATION) at 19:34

## 2023-12-20 RX ADMIN — IPRATROPIUM BROMIDE 0.5 MG: 0.5 SOLUTION RESPIRATORY (INHALATION) at 19:33

## 2023-12-20 RX ADMIN — METOCLOPRAMIDE 5 MG: 5 TABLET ORAL at 12:01

## 2023-12-20 NOTE — CARE COORDINATION
CM Update: Met with patient Saad Hassan () Leonel Landers (son) and Jeffrey Story (son) at bedside to discuss transition of care plan. Discharge plan is SNF pending choices. Provided family and patient list. They choiced 1. [facility] 2. [facility]. Called referral to [address] [place] [address]. Awaiting call back. Patient to discharge with Desai. Pace maker is not MRI compatible. Needs: U/S of the retroperitoneal and liver. CM/SW to follow.  MT

## 2023-12-21 ENCOUNTER — APPOINTMENT (OUTPATIENT)
Dept: ULTRASOUND IMAGING | Age: 88
DRG: 871 | End: 2023-12-21
Payer: MEDICARE

## 2023-12-21 LAB
ALBUMIN SERPL-MCNC: 3.4 G/DL (ref 3.5–5.2)
ALP SERPL-CCNC: 91 U/L (ref 35–104)
ALT SERPL-CCNC: 823 U/L (ref 0–32)
ANION GAP SERPL CALCULATED.3IONS-SCNC: 17 MMOL/L (ref 7–16)
AST SERPL-CCNC: 328 U/L (ref 0–31)
BILIRUB SERPL-MCNC: 0.9 MG/DL (ref 0–1.2)
BUN SERPL-MCNC: 56 MG/DL (ref 6–23)
CALCIUM SERPL-MCNC: 8.5 MG/DL (ref 8.6–10.2)
CHLORIDE SERPL-SCNC: 104 MMOL/L (ref 98–107)
CO2 SERPL-SCNC: 20 MMOL/L (ref 22–29)
CREAT SERPL-MCNC: 1.2 MG/DL (ref 0.5–1)
ERYTHROCYTE [DISTWIDTH] IN BLOOD BY AUTOMATED COUNT: 18.4 % (ref 11.5–15)
GFR SERPL CREATININE-BSD FRML MDRD: 44 ML/MIN/1.73M2
GLUCOSE SERPL-MCNC: 101 MG/DL (ref 74–99)
HCT VFR BLD AUTO: 28.3 % (ref 34–48)
HGB BLD-MCNC: 8.8 G/DL (ref 11.5–15.5)
MAGNESIUM SERPL-MCNC: 2.8 MG/DL (ref 1.6–2.6)
MCH RBC QN AUTO: 27.1 PG (ref 26–35)
MCHC RBC AUTO-ENTMCNC: 31.1 G/DL (ref 32–34.5)
MCV RBC AUTO: 87.1 FL (ref 80–99.9)
PHOSPHATE SERPL-MCNC: 2.9 MG/DL (ref 2.5–4.5)
PLATELET # BLD AUTO: 234 K/UL (ref 130–450)
PMV BLD AUTO: 10.6 FL (ref 7–12)
POTASSIUM SERPL-SCNC: 4.6 MMOL/L (ref 3.5–5)
PROT SERPL-MCNC: 6 G/DL (ref 6.4–8.3)
RBC # BLD AUTO: 3.25 M/UL (ref 3.5–5.5)
SODIUM SERPL-SCNC: 141 MMOL/L (ref 132–146)
WBC OTHER # BLD: 13 K/UL (ref 4.5–11.5)

## 2023-12-21 PROCEDURE — 2700000000 HC OXYGEN THERAPY PER DAY

## 2023-12-21 PROCEDURE — 97530 THERAPEUTIC ACTIVITIES: CPT

## 2023-12-21 PROCEDURE — 83735 ASSAY OF MAGNESIUM: CPT

## 2023-12-21 PROCEDURE — 80053 COMPREHEN METABOLIC PANEL: CPT

## 2023-12-21 PROCEDURE — 6360000002 HC RX W HCPCS: Performed by: NURSE PRACTITIONER

## 2023-12-21 PROCEDURE — 2060000000 HC ICU INTERMEDIATE R&B

## 2023-12-21 PROCEDURE — 76770 US EXAM ABDO BACK WALL COMP: CPT

## 2023-12-21 PROCEDURE — 84100 ASSAY OF PHOSPHORUS: CPT

## 2023-12-21 PROCEDURE — 6370000000 HC RX 637 (ALT 250 FOR IP): Performed by: NURSE PRACTITIONER

## 2023-12-21 PROCEDURE — 6370000000 HC RX 637 (ALT 250 FOR IP): Performed by: INTERNAL MEDICINE

## 2023-12-21 PROCEDURE — 85027 COMPLETE CBC AUTOMATED: CPT

## 2023-12-21 PROCEDURE — 2580000003 HC RX 258: Performed by: NURSE PRACTITIONER

## 2023-12-21 PROCEDURE — 76705 ECHO EXAM OF ABDOMEN: CPT

## 2023-12-21 PROCEDURE — 94640 AIRWAY INHALATION TREATMENT: CPT

## 2023-12-21 PROCEDURE — 36415 COLL VENOUS BLD VENIPUNCTURE: CPT

## 2023-12-21 PROCEDURE — 97535 SELF CARE MNGMENT TRAINING: CPT

## 2023-12-21 RX ADMIN — IPRATROPIUM BROMIDE 0.5 MG: 0.5 SOLUTION RESPIRATORY (INHALATION) at 08:35

## 2023-12-21 RX ADMIN — CEFDINIR 300 MG: 300 CAPSULE ORAL at 14:03

## 2023-12-21 RX ADMIN — FLUTICASONE PROPIONATE 1 SPRAY: 50 SPRAY, METERED NASAL at 14:01

## 2023-12-21 RX ADMIN — IPRATROPIUM BROMIDE 0.5 MG: 0.5 SOLUTION RESPIRATORY (INHALATION) at 16:20

## 2023-12-21 RX ADMIN — BUDESONIDE 250 MCG: 0.25 INHALANT RESPIRATORY (INHALATION) at 21:27

## 2023-12-21 RX ADMIN — ARFORMOTEROL TARTRATE 15 MCG: 15 SOLUTION RESPIRATORY (INHALATION) at 08:35

## 2023-12-21 RX ADMIN — BUDESONIDE 250 MCG: 0.25 INHALANT RESPIRATORY (INHALATION) at 08:35

## 2023-12-21 RX ADMIN — METOCLOPRAMIDE 5 MG: 5 TABLET ORAL at 14:01

## 2023-12-21 RX ADMIN — IPRATROPIUM BROMIDE 0.5 MG: 0.5 SOLUTION RESPIRATORY (INHALATION) at 21:27

## 2023-12-21 RX ADMIN — SODIUM CHLORIDE, PRESERVATIVE FREE 10 ML: 5 INJECTION INTRAVENOUS at 19:39

## 2023-12-21 RX ADMIN — IPRATROPIUM BROMIDE 0.5 MG: 0.5 SOLUTION RESPIRATORY (INHALATION) at 12:24

## 2023-12-21 RX ADMIN — ARFORMOTEROL TARTRATE 15 MCG: 15 SOLUTION RESPIRATORY (INHALATION) at 21:27

## 2023-12-21 NOTE — CARE COORDINATION
CM Update: Met with Esther Moreno () at bedside to discuss transition of care plan. Patient in [address] Discharge plan is SNF pending acceptance. Called Joellen Milligan [phone] of "[facility] this morning to check status of referral from yesterday. Awaiting call back. Called new referral to Melindachasidy Jose [phone] of [facility]. She will look at patient and call back if she can accept. Explained to family two referrals currently called out. Family prefers [facility]. Patient to discharge with Desai. Pace maker is not MRI compatible. Needs: U/S of the retroperitoneal and liver interpreted. CM/SW to follow. MT     [facility] accepts patient. Notified family. [facility] notified us the patient hasn't had a BM since 12/18. Patient must have a BM before they can accept. They are holding a bed. Notified Nursing.

## 2023-12-21 NOTE — PLAN OF CARE
Problem: Chronic Conditions and Co-morbidities  Goal: Patient's chronic conditions and co-morbidity symptoms are monitored and maintained or improved  12/21/2023 1658 by Syeda Daley RN  Outcome: Progressing  12/21/2023 0330 by Mike Bocanegra RN  Outcome: Progressing     Problem: Discharge Planning  Goal: Discharge to home or other facility with appropriate resources  12/21/2023 1658 by Syeda Daley RN  Outcome: Progressing  12/21/2023 0330 by Mike Bocanegra RN  Outcome: Progressing     Problem: Skin/Tissue Integrity  Goal: Absence of new skin breakdown  Description: 1. Monitor for areas of redness and/or skin breakdown  2. Assess vascular access sites hourly  3. Every 4-6 hours minimum:  Change oxygen saturation probe site  4. Every 4-6 hours:  If on nasal continuous positive airway pressure, respiratory therapy assess nares and determine need for appliance change or resting period.   12/21/2023 1658 by Syeda Daley RN  Outcome: Progressing  12/21/2023 0330 by Mike Bocanegra RN  Outcome: Progressing     Problem: Safety - Adult  Goal: Free from fall injury  12/21/2023 1658 by Syeda Daley RN  Outcome: Progressing  12/21/2023 0330 by Mike Bocanegra RN  Outcome: Progressing     Problem: ABCDS Injury Assessment  Goal: Absence of physical injury  12/21/2023 1658 by Syeda Daley RN  Outcome: Progressing  12/21/2023 0330 by Mike Bocanegra RN  Outcome: Progressing

## 2023-12-21 NOTE — DISCHARGE INSTR - COC
Continuity of Care Form    Patient Name: Domingo Jain   :  1935  MRN:  [id number]    [address] date:  2023  Discharge date:  23    Code Status Order: DNR-CCA   Advance Directives:     Admitting Physician:  Evelia Diaz MD  PCP: Michael Tenorio MD    Discharging Nurse: [facility]/Room#: 6802/8874-V  Discharging Unit Phone Number: [phone]    Emergency Contact:   Extended Emergency Contact Information  Primary Emergency Contact: See Jack  Address: [address] [facility], [address] [place] [address] [place] Phone: [phone]  Relation: Spouse  Secondary Emergency Contact: Florence [place] [address] [place] Phone: [phone]  Relation: Child    Past Surgical History:  Past Surgical History:   Procedure Laterality Date    APPENDECTOMY      BREAST CAPSULECTOMY  2012    BILATERAL BREAST CAPSULECTOMIES    BREAST SURGERY  89     cosmetic implants    [address]  2010    CARDIAC DEFIBRILLATOR PLACEMENT Left 2018    [employer] ICD change out,     CARDIAC PACEMAKER PLACEMENT  05/10/2010    Dual chamber pacer ICD.     CARDIAC SURGERY  2009    stent    CARDIOVASCULAR STRESS TEST  2010    COLONOSCOPY  2021    [facility], repeat as needed    COSMETIC SURGERY      eyelids breast    DIAGNOSTIC CARDIAC CATH LAB PROCEDURE  10/30/2009    ESOPHAGEAL MOTILITY STUDY N/A 2020    ESOPHAGEAL MOTILITY/MANOMETRY STUDY performed by Felisha Diaz DO at [address] ( [facility])       US THYROID BIOPSY      OTHER SURGICAL HISTORY Right 2015    ORIF RIGHT DISTAL RADIUS    OTHER SURGICAL HISTORY Right 04/10/2016    IP joint release of thumb    TONSILLECTOMY      TRANSTHORACIC ECHOCARDIOGRAM  3/30/11ize and function,     UPPER GASTROINTESTINAL ENDOSCOPY N/A 2020    EGD SUBMUCOSAL/BOTOX INJECTION

## 2023-12-22 ENCOUNTER — APPOINTMENT (OUTPATIENT)
Dept: GENERAL RADIOLOGY | Age: 88
DRG: 871 | End: 2023-12-22
Payer: MEDICARE

## 2023-12-22 LAB
ALBUMIN SERPL-MCNC: 3.6 G/DL (ref 3.5–5.2)
ALP SERPL-CCNC: 89 U/L (ref 35–104)
ALT SERPL-CCNC: 599 U/L (ref 0–32)
ANION GAP SERPL CALCULATED.3IONS-SCNC: 16 MMOL/L (ref 7–16)
AST SERPL-CCNC: 154 U/L (ref 0–31)
BILIRUB SERPL-MCNC: 1 MG/DL (ref 0–1.2)
BUN SERPL-MCNC: 65 MG/DL (ref 6–23)
CALCIUM SERPL-MCNC: 8.8 MG/DL (ref 8.6–10.2)
CHLORIDE SERPL-SCNC: 104 MMOL/L (ref 98–107)
CO2 SERPL-SCNC: 21 MMOL/L (ref 22–29)
CREAT SERPL-MCNC: 1.3 MG/DL (ref 0.5–1)
ERYTHROCYTE [DISTWIDTH] IN BLOOD BY AUTOMATED COUNT: 18.7 % (ref 11.5–15)
GFR SERPL CREATININE-BSD FRML MDRD: 41 ML/MIN/1.73M2
GLUCOSE SERPL-MCNC: 101 MG/DL (ref 74–99)
HCT VFR BLD AUTO: 29.4 % (ref 34–48)
HGB BLD-MCNC: 9 G/DL (ref 11.5–15.5)
MAGNESIUM SERPL-MCNC: 3.1 MG/DL (ref 1.6–2.6)
MCH RBC QN AUTO: 27 PG (ref 26–35)
MCHC RBC AUTO-ENTMCNC: 30.6 G/DL (ref 32–34.5)
MCV RBC AUTO: 88.3 FL (ref 80–99.9)
PHOSPHATE SERPL-MCNC: 3.8 MG/DL (ref 2.5–4.5)
PLATELET # BLD AUTO: 249 K/UL (ref 130–450)
PMV BLD AUTO: 10.5 FL (ref 7–12)
POTASSIUM SERPL-SCNC: 4.8 MMOL/L (ref 3.5–5)
PROT SERPL-MCNC: 6.3 G/DL (ref 6.4–8.3)
RBC # BLD AUTO: 3.33 M/UL (ref 3.5–5.5)
SODIUM SERPL-SCNC: 141 MMOL/L (ref 132–146)
WBC OTHER # BLD: 14.2 K/UL (ref 4.5–11.5)

## 2023-12-22 PROCEDURE — 6370000000 HC RX 637 (ALT 250 FOR IP): Performed by: INTERNAL MEDICINE

## 2023-12-22 PROCEDURE — 2580000003 HC RX 258: Performed by: NURSE PRACTITIONER

## 2023-12-22 PROCEDURE — 36415 COLL VENOUS BLD VENIPUNCTURE: CPT

## 2023-12-22 PROCEDURE — 83735 ASSAY OF MAGNESIUM: CPT

## 2023-12-22 PROCEDURE — 74230 X-RAY XM SWLNG FUNCJ C+: CPT

## 2023-12-22 PROCEDURE — 6370000000 HC RX 637 (ALT 250 FOR IP): Performed by: STUDENT IN AN ORGANIZED HEALTH CARE EDUCATION/TRAINING PROGRAM

## 2023-12-22 PROCEDURE — 84100 ASSAY OF PHOSPHORUS: CPT

## 2023-12-22 PROCEDURE — 2700000000 HC OXYGEN THERAPY PER DAY

## 2023-12-22 PROCEDURE — 2500000003 HC RX 250 WO HCPCS: Performed by: PHYSICIAN ASSISTANT

## 2023-12-22 PROCEDURE — 85027 COMPLETE CBC AUTOMATED: CPT

## 2023-12-22 PROCEDURE — 6370000000 HC RX 637 (ALT 250 FOR IP): Performed by: NURSE PRACTITIONER

## 2023-12-22 PROCEDURE — 80053 COMPREHEN METABOLIC PANEL: CPT

## 2023-12-22 PROCEDURE — 2060000000 HC ICU INTERMEDIATE R&B

## 2023-12-22 PROCEDURE — 92526 ORAL FUNCTION THERAPY: CPT

## 2023-12-22 PROCEDURE — 92611 MOTION FLUOROSCOPY/SWALLOW: CPT

## 2023-12-22 RX ORDER — CEFDINIR 300 MG/1
300 CAPSULE ORAL DAILY
Qty: 9 CAPSULE | Refills: 0 | DISCHARGE
Start: 2023-12-22 | End: 2023-12-31

## 2023-12-22 RX ADMIN — BARIUM SULFATE 15 ML: 400 SUSPENSION ORAL at 10:41

## 2023-12-22 RX ADMIN — PETROLATUM: 420 OINTMENT TOPICAL at 14:01

## 2023-12-22 RX ADMIN — METOCLOPRAMIDE 5 MG: 5 TABLET ORAL at 20:11

## 2023-12-22 RX ADMIN — SODIUM CHLORIDE, PRESERVATIVE FREE 10 ML: 5 INJECTION INTRAVENOUS at 13:55

## 2023-12-22 RX ADMIN — METOCLOPRAMIDE 5 MG: 5 TABLET ORAL at 14:01

## 2023-12-22 RX ADMIN — SODIUM CHLORIDE, PRESERVATIVE FREE 10 ML: 5 INJECTION INTRAVENOUS at 20:11

## 2023-12-22 RX ADMIN — FLUTICASONE PROPIONATE 1 SPRAY: 50 SPRAY, METERED NASAL at 13:54

## 2023-12-22 RX ADMIN — CEFDINIR 300 MG: 300 CAPSULE ORAL at 13:54

## 2023-12-22 RX ADMIN — METOCLOPRAMIDE 5 MG: 5 TABLET ORAL at 18:02

## 2023-12-22 RX ADMIN — BARIUM SULFATE 15 ML: 400 PASTE ORAL at 10:41

## 2023-12-22 RX ADMIN — BARIUM SULFATE 15 ML: 0.81 POWDER, FOR SUSPENSION ORAL at 10:42

## 2023-12-22 NOTE — PLAN OF CARE
Problem: Chronic Conditions and Co-morbidities  Goal: Patient's chronic conditions and co-morbidity symptoms are monitored and maintained or improved  12/22/2023 1041 by Stephane Sanchez RN  Outcome: Progressing  12/22/2023 0106 by Betzaida Groves RN  Outcome: Progressing     Problem: Discharge Planning  Goal: Discharge to home or other facility with appropriate resources  12/22/2023 1041 by Stephane Sanchez RN  Outcome: Progressing  12/22/2023 0106 by Betzaida Groves RN  Outcome: Progressing     Problem: Skin/Tissue Integrity  Goal: Absence of new skin breakdown  Description: 1. Monitor for areas of redness and/or skin breakdown  2. Assess vascular access sites hourly  3. Every 4-6 hours minimum:  Change oxygen saturation probe site  4. Every 4-6 hours:  If on nasal continuous positive airway pressure, respiratory therapy assess nares and determine need for appliance change or resting period.   12/22/2023 1041 by Stephane Sanchez RN  Outcome: Progressing  12/22/2023 0106 by Betzaida Groves RN  Outcome: Progressing     Problem: Safety - Adult  Goal: Free from fall injury  12/22/2023 1041 by Stephane Sanchez RN  Outcome: Progressing  12/22/2023 0106 by Betzaida Groves RN  Outcome: Progressing     Problem: ABCDS Injury Assessment  Goal: Absence of physical injury  12/22/2023 1041 by Stephane Sanchez RN  Outcome: Progressing  12/22/2023 0106 by Betzaida Groves RN  Outcome: Progressing

## 2023-12-22 NOTE — CARE COORDINATION
Chart reviewed and case reviewed in IDR. New consult to GI received today for elevated LFT's. Await their Input. PO Omnicef 300 mg daily. Video swallow completed today. Recommendation for regular diet with honey thick liquids received. Patient did have a bowel movement yesterday. JADE spoke with Shae Saab at [facility]. They can accept the patient for transition of care when medically stable. PASRR completed. Envelope and ambulette form in the soft chart. Patient is 100% on 3L O2 per NC. Will continue to follow.      Ema Holloway RN.  Adwoa Lazo Catharine  [phone]

## 2023-12-22 NOTE — PLAN OF CARE
Problem: Chronic Conditions and Co-morbidities  Goal: Patient's chronic conditions and co-morbidity symptoms are monitored and maintained or improved  12/22/2023 0106 by Marla Valdes RN  Outcome: Progressing     Problem: Discharge Planning  Goal: Discharge to home or other facility with appropriate resources  12/22/2023 0106 by Marla Valdes RN  Outcome: Progressing     Problem: Skin/Tissue Integrity  Goal: Absence of new skin breakdown  Description: 1. Monitor for areas of redness and/or skin breakdown  2. Assess vascular access sites hourly  3. Every 4-6 hours minimum:  Change oxygen saturation probe site  4. Every 4-6 hours:  If on nasal continuous positive airway pressure, respiratory therapy assess nares and determine need for appliance change or resting period.   12/22/2023 0106 by Marla Valdes RN  Outcome: Progressing     Problem: Safety - Adult  Goal: Free from fall injury  12/22/2023 0106 by Marla Valdes RN  Outcome: Progressing     Problem: ABCDS Injury Assessment  Goal: Absence of physical injury  12/22/2023 0106 by Marla Valdes RN  Outcome: Progressing

## 2023-12-22 NOTE — DISCHARGE INSTRUCTIONS
Place the patients barroso to leg bag on discharge from the hospital.  Please give the patient a night bag (large bag) and barroso instructions upon discharge. Please have the patient contact the office for barroso removal instructions. The catheter may go red (hematuria), may go clear, and may go red. This is a normal process, as long as the catheter is draining. Call the office if any concerns should arise for further instructions, [phone]. Call upon discharge to schedule a follow-up visit with Yuly Baker/Sri/Flakita ([facility]) at [phone] About Indwelling Urinary Catheter Care to Prevent Infection  Overview     A urinary catheter is a flexible plastic tube that's used to drain urine from your bladder when you can't urinate on your own. The catheter allows urine to drain from the bladder into a bag. Two types of drainage bags may be used with a urinary catheter. A bedside bag is a large bag that you can hang on the side of your bed or on a chair. You can use it overnight or anytime you will be sitting or lying down for a long time. A leg bag is a small bag that you can use during the day. It is usually attached to your thigh or calf and hidden under your clothes. Having a urinary catheter increases your risk of getting a urinary tract infection. Germs may get on the catheter and cause an infection in your bladder or kidneys. The longer you have a catheter, the more likely it is that you will get an infection. You can help prevent this problem with good hygiene and careful handling of your catheter and drainage bags. How can you help prevent infection? Take care to stay clean  Always wash your hands well before and after you handle your catheter. Clean the skin around the catheter daily using soap and water. Dry with a clean towel afterward. You can shower with your catheter and drainage bag in place unless your doctor told you not to.   When you clean around the catheter,

## 2023-12-23 LAB
ALBUMIN SERPL-MCNC: 3.5 G/DL (ref 3.5–5.2)
ALP SERPL-CCNC: 87 U/L (ref 35–104)
ALT SERPL-CCNC: 427 U/L (ref 0–32)
ANION GAP SERPL CALCULATED.3IONS-SCNC: 15 MMOL/L (ref 7–16)
AST SERPL-CCNC: 72 U/L (ref 0–31)
BILIRUB SERPL-MCNC: 1.1 MG/DL (ref 0–1.2)
BUN SERPL-MCNC: 68 MG/DL (ref 6–23)
CALCIUM SERPL-MCNC: 8.8 MG/DL (ref 8.6–10.2)
CHLORIDE SERPL-SCNC: 105 MMOL/L (ref 98–107)
CO2 SERPL-SCNC: 22 MMOL/L (ref 22–29)
CREAT SERPL-MCNC: 1.1 MG/DL (ref 0.5–1)
ERYTHROCYTE [DISTWIDTH] IN BLOOD BY AUTOMATED COUNT: 18.6 % (ref 11.5–15)
GFR SERPL CREATININE-BSD FRML MDRD: 47 ML/MIN/1.73M2
GLUCOSE SERPL-MCNC: 99 MG/DL (ref 74–99)
HCT VFR BLD AUTO: 29.2 % (ref 34–48)
HGB BLD-MCNC: 8.8 G/DL (ref 11.5–15.5)
MAGNESIUM SERPL-MCNC: 2.9 MG/DL (ref 1.6–2.6)
MCH RBC QN AUTO: 27 PG (ref 26–35)
MCHC RBC AUTO-ENTMCNC: 30.1 G/DL (ref 32–34.5)
MCV RBC AUTO: 89.6 FL (ref 80–99.9)
PHOSPHATE SERPL-MCNC: 3.8 MG/DL (ref 2.5–4.5)
PLATELET # BLD AUTO: 212 K/UL (ref 130–450)
PMV BLD AUTO: 11 FL (ref 7–12)
POTASSIUM SERPL-SCNC: 4.3 MMOL/L (ref 3.5–5)
PROT SERPL-MCNC: 5.9 G/DL (ref 6.4–8.3)
RBC # BLD AUTO: 3.26 M/UL (ref 3.5–5.5)
SODIUM SERPL-SCNC: 142 MMOL/L (ref 132–146)
WBC OTHER # BLD: 14.7 K/UL (ref 4.5–11.5)

## 2023-12-23 PROCEDURE — 6370000000 HC RX 637 (ALT 250 FOR IP): Performed by: INTERNAL MEDICINE

## 2023-12-23 PROCEDURE — 6360000002 HC RX W HCPCS: Performed by: NURSE PRACTITIONER

## 2023-12-23 PROCEDURE — 99232 SBSQ HOSP IP/OBS MODERATE 35: CPT | Performed by: INTERNAL MEDICINE

## 2023-12-23 PROCEDURE — 36415 COLL VENOUS BLD VENIPUNCTURE: CPT

## 2023-12-23 PROCEDURE — 83735 ASSAY OF MAGNESIUM: CPT

## 2023-12-23 PROCEDURE — 84100 ASSAY OF PHOSPHORUS: CPT

## 2023-12-23 PROCEDURE — 6370000000 HC RX 637 (ALT 250 FOR IP): Performed by: NURSE PRACTITIONER

## 2023-12-23 PROCEDURE — 80053 COMPREHEN METABOLIC PANEL: CPT

## 2023-12-23 PROCEDURE — 2060000000 HC ICU INTERMEDIATE R&B

## 2023-12-23 PROCEDURE — 94640 AIRWAY INHALATION TREATMENT: CPT

## 2023-12-23 PROCEDURE — 85027 COMPLETE CBC AUTOMATED: CPT

## 2023-12-23 PROCEDURE — 2700000000 HC OXYGEN THERAPY PER DAY

## 2023-12-23 PROCEDURE — 2580000003 HC RX 258: Performed by: NURSE PRACTITIONER

## 2023-12-23 RX ORDER — METOPROLOL SUCCINATE 25 MG/1
25 TABLET, EXTENDED RELEASE ORAL ONCE
Status: COMPLETED | OUTPATIENT
Start: 2023-12-23 | End: 2023-12-23

## 2023-12-23 RX ADMIN — ARFORMOTEROL TARTRATE 15 MCG: 15 SOLUTION RESPIRATORY (INHALATION) at 18:43

## 2023-12-23 RX ADMIN — ARFORMOTEROL TARTRATE 15 MCG: 15 SOLUTION RESPIRATORY (INHALATION) at 08:30

## 2023-12-23 RX ADMIN — IPRATROPIUM BROMIDE 0.5 MG: 0.5 SOLUTION RESPIRATORY (INHALATION) at 14:06

## 2023-12-23 RX ADMIN — PANTOPRAZOLE SODIUM 40 MG: 40 TABLET, DELAYED RELEASE ORAL at 05:14

## 2023-12-23 RX ADMIN — IPRATROPIUM BROMIDE 0.5 MG: 0.5 SOLUTION RESPIRATORY (INHALATION) at 18:43

## 2023-12-23 RX ADMIN — SODIUM CHLORIDE, PRESERVATIVE FREE 10 ML: 5 INJECTION INTRAVENOUS at 09:15

## 2023-12-23 RX ADMIN — SODIUM CHLORIDE, PRESERVATIVE FREE 10 ML: 5 INJECTION INTRAVENOUS at 20:20

## 2023-12-23 RX ADMIN — RIVAROXABAN 15 MG: 15 TABLET, FILM COATED ORAL at 16:17

## 2023-12-23 RX ADMIN — METOCLOPRAMIDE 5 MG: 5 TABLET ORAL at 20:20

## 2023-12-23 RX ADMIN — FLUTICASONE PROPIONATE 1 SPRAY: 50 SPRAY, METERED NASAL at 09:13

## 2023-12-23 RX ADMIN — METOCLOPRAMIDE 5 MG: 5 TABLET ORAL at 16:17

## 2023-12-23 RX ADMIN — METOCLOPRAMIDE 5 MG: 5 TABLET ORAL at 12:48

## 2023-12-23 RX ADMIN — IPRATROPIUM BROMIDE 0.5 MG: 0.5 SOLUTION RESPIRATORY (INHALATION) at 08:32

## 2023-12-23 RX ADMIN — BUDESONIDE 250 MCG: 0.25 INHALANT RESPIRATORY (INHALATION) at 18:42

## 2023-12-23 RX ADMIN — METOPROLOL SUCCINATE 25 MG: 25 TABLET, EXTENDED RELEASE ORAL at 16:17

## 2023-12-23 RX ADMIN — METOCLOPRAMIDE 5 MG: 5 TABLET ORAL at 09:14

## 2023-12-23 RX ADMIN — BUDESONIDE 250 MCG: 0.25 INHALANT RESPIRATORY (INHALATION) at 08:31

## 2023-12-23 RX ADMIN — IPRATROPIUM BROMIDE 0.5 MG: 0.5 SOLUTION RESPIRATORY (INHALATION) at 16:27

## 2023-12-23 RX ADMIN — CEFDINIR 300 MG: 300 CAPSULE ORAL at 09:14

## 2023-12-23 NOTE — CONSULTS
[address], [address] [place] [address]                                  CONSULTATION    PATIENT NAME: Ronald Villagomez                     :        1935  MED REC NO:   [id number]                            ROOM:       8507  ACCOUNT NO:   [de-identified]                           ADMIT DATE: 2023  PROVIDER:     Cecil Arteaga MD    CONSULT DATE:  2023    REASON FOR CONSULTATION:  Hyperdensity in the medulla (abnormal head  CT). HISTORY OF PRESENT ILLNESS:  The patient is an 52-year-old lady who  presented to emergency room with generalized fatigue for the last day,  she had not felt well. She was tired, did not feel that she had any  energy and her  was unable to assist her in mobility. EMS was  called and she was brought to [facility] for further  evaluation and management. She denies any new numbness or tingling or  loss of control of bowel or bladder function. Part of her workup  included CT scan of her head that showed a hypodensity in the medulla,  suspicious for either hemorrhage or mass. As a result of that  Neurosurgery Service was consulted. PAST MEDICAL HISTORY:  Positive for coronary artery disease, atrial  fibrillation, cardiomyopathy, CHF, chronic bronchitis, COPD, depression,  gastroesophageal reflux, hyperlipidemia, hypertension, osteoporosis. PAST SURGICAL HISTORY:  Positive for appendectomy, defibrillator  placement, upper GI endoscopy, cardiac catheterization. FAMILY HISTORY:  Positive for bipolar disorder in her son and heart  disease in her brother. ALLERGIES:  She has no known drug allergies. SOCIAL HISTORY:  Negative for tobacco use. She does consume alcoholic  beverages socially. REVIEW OF SYSTEMS:  HEENT:  Negative for headache, double vision, or  blurry vision. CARDIOVASCULAR:  Positive for irregular heart rate.    RESPIRATORY:  Negative for shortness of
[address], [address] [facility]                                  CONSULTATION    PATIENT NAME: Earl Rivera                     :        1935  MED REC NO:   [id number]                            ROOM:       8507  ACCOUNT NO:   [de-identified]                           ADMIT DATE: 2023  PROVIDER:     Lauren Segura MD    CONSULT DATE:  2023    CHIEF COMPLAINT:  Weakness. HISTORY OF PRESENT ILLNESS:  This 81-year-old female who was admitted to  the hospital on 2023 from her home, was brought to the hospital  because of severe weakness. She had a Desai catheter inserted at the  time of admission. Her serum creatinine is in the range of 1.5. A CT  scan of her abdomen did not show any significant hydronephrosis. There  was some question of a distended bladder. The patient has been treated apparently by Dr. Chau Russo for urinary tract  infection. She does normally void with good control. She does  occasionally have some urgency and stress incontinence but this has not  been terribly annoying and she does wear Depends at home. She denies  ever seeing any evidence of hematuria. PAST MEDICAL HISTORY:  Significant for arthritis, atrial fibrillation,  coronary artery disease, cardiomyopathy, congestive heart failure,  chronic anticoagulation, chronic bronchitis, COPD, GERD, hyperlipidemia,  hypertension, left ventricular dysfunction, myocardial infarction,  osteoporosis, osteopenia and swallowing difficulty. PAST SURGICAL HISTORY:  Includes appendectomy, breast surgery, cardiac  defibrillator placement, cardiac pacemaker, cardiac surgery,  hysterectomy, orthopedic surgery on her distal radius, right;  tonsillectomy, upper and lower gastrointestinal endoscopy.     MEDICATIONS FROM HOME:  Include Pulmicort, Bumex, Prilosec, CoQ10  enzyme, aspirin, Cozaar, Aldactone, Reglan, Nasonex, Xarelto,
Inpatient Cardiology Consultation      Reason for Consult:  HFrEF & ? Transaminitis secondary to HF? Consulting Physician: Dr. Payam Rousseau    Requesting Physician:  Dr. Zain Meade     Date of Consultation: 12/18/2023    HISTORY OF PRESENT ILLNESS:   Ms. Jennifer Joy is an 81 yr old white female known to Dr. Bernadine Hwang- last seen in office 11/6/2023 & EP Dr Wojciech Wilson 10/2023 HF - changed from Lasix 20mg QD to to Bumex 1 mg BID (stopped aldactone due to advanced age, CKD & hyperkalemia)  11/6/2023 Wt 91lbs BP 96/54 HR 55     CHF Clinic Visit 12/12/2023 Wt 96lbs (was 91lbs 12/8) /78 HR 55 BUN 44 Cr 1.4 BNP 7781 / Hgb 7.4 / AST 75 ALT 64 No IV diuretics were given  - nursing noted euvolemic - next CHF clinic appointment 1/16/24     [facility] ED 12/17/2023 0600 via EMS for SOB / fatigue lack of energy   Arrival vitals: T 98.5 HR 62 BP 80/46 - 78/45 SPO2 100%   Significant Labs: Trop 62>>67, ProBNP 27,411 BUN 54 Cr 1.8>>BUN 64 Cr 1.9 K 5 Na 135 Lactic acid 2.9 Procalcitonin 2.33  AST 1078 WBC 16.9 >>13.2 Hgb 7.2 >>6.9  Iron 9 Iron sat 3  sed rate 91   Blood cultures + E coli & enterobacterales  Urine Culture + E coli   Rapid influenza and covid negative   ABG on 4 L NC O2:  Ph 7.409 PCO2 23.8 PO2 107.1 HCO3 14.7   RAD: CT head: 1.5cm mass in post L medulla oblongata ? Hematoma or neoplasm - MRI pending  US liver & retroperitoneal US pending   PCXR: Pneumonia RUL / no gross CHF   CT abd / pelvis w/o contrast:  pyelonephritis  R kidney / 2.4 cm ovoid densely calcified nodular mass in the right lobe of the liver is unchanged and has a benign appearance. ED treatment: NS IV bolus totaling 1L, Reglan, Atrovent, doxycycline, cefepime, Pulmicort, Lipitor, Aspirin 81, Brovan, cordarone 100mg, Tylenol     Patient seen and examined.   Recent vitals: T max 100.5 >>97.2 HR 67 BP 99/53 - 114/60 SPO2 99% 3 l NC O2   No weight - no I & O  Patient ambulates with a Rolator at baseline   Patient reports not feeling well
[facility])   Gastroenterology, Hepatology, &  Advanced Endoscopy    Consult       ASSESSMENT AND PLAN:    ***        HISTORY OF PRESENT ILLNESS:      ***    Past Medical History:        Diagnosis Date    Arthritis     Atrial fibrillation ([address])     follows with Dr Katarzyna Patino  on xarelto    CAD (coronary artery disease)     Cardiomyopathy ([address])     CHF (congestive heart failure) ([address]) 2010    history of follows with Dr Katarzyna Patino 4/27/23    Chronic anticoagulation     Chronic bronchitis ([address])     none recent    COPD (chronic obstructive pulmonary disease) ([address])     COVID 08/2022    in hospital x5 days    Depression     GERD (gastroesophageal reflux disease)     HFrEF (heart failure with reduced ejection fraction) ([address]) 12/29/2016 12/26/16- LVEF 20-25%, large apical aneurysm, LA moderate-severely dilated, mildly enlarged RA, mild MR, mild TR, mild AR    [place] (hard of hearing)     wears bilat hearing aids    Hyperlipidemia     Hypertension     ICD (implantable cardioverter-defibrillator) in place     [facility] follows with Dr Radha Edouard. last interrogated remotely  2/21/23    Left ventricular dysfunction     Low vitamin D level     MI (myocardial infarction) ([address]) 10/30/2009    Osteopenia     Osteoporosis     Swallowing difficulty      Past Surgical History:        Procedure Laterality Date    APPENDECTOMY      BREAST CAPSULECTOMY  03/07/2012    BILATERAL BREAST CAPSULECTOMIES    BREAST SURGERY  1962     cosmetic implants    CARDIAC DEFIBRILLATOR PLACEMENT      [facility]    CARDIAC DEFIBRILLATOR PLACEMENT  05/2010    CARDIAC DEFIBRILLATOR PLACEMENT Left 02/13/2018    [employer] ICD change out,     CARDIAC PACEMAKER PLACEMENT  05/10/2010    Dual chamber pacer ICD.     CARDIAC SURGERY  2009    stent    CARDIOVASCULAR STRESS TEST  03/04/2010    COLONOSCOPY  05/26/2021    [facility], repeat as needed    COSMETIC SURGERY      eyelids breast    DIAGNOSTIC CARDIAC CATH LAB PROCEDURE  10/30/2009    ESOPHAGEAL MOTILITY
[facility]  Nephrology Consult Note    Patient's Name: Linda Cheung    Reason for Consult:  MARIVEL, cardiorenal syndrome     Chief Complaint: Generalized fatigue  History Obtained From:  patient, relative(s), past medical records, and EMR    History of Present Illness:    Linda Cheung is a 81 y.o. female with a past medical history of CAD, atrial fibrillation, CHF, COPD, and hypertension. She presented to the ED on 12/17 with generalized fatigue. Vital signs on 12/17 includes temperature 98.5, respirations 17, pulse 63, BP 80/46, she was 96% SpO2. Lab data on 12/17 includes BUN 54, creatinine 1.8, lactic acid 2.4, procalcitonin 2.33, proBNP 27,411, WBC 16.9 K, and hemoglobin 7.2. She had a UA which showed 3+ bacteria, moderate hemoglobin, large leukocyte esterase, 30 protein. She had a CT of the abdomen and pelvis on 12/17 which showed mild edema in the right perinephric fat, no hydronephrosis, possible slight increase in size of the right kidney all suggest pyelonephritis, distended urinary bladder. She had a CT of the head on 12/17 which showed a 1.5 cm mass posterior left medulla oblongata, possible tiny cerebral hemorrhages. She had a blood culture which came back positive for E. coli and Enterobacterales. She had a urine culture which came back positive for E. coli. Nephrology been consulted to see the patient for MARIVEL, cardiorenal syndrome. Patient has a recent baseline creatinine of 1.1-1.2. Her creatinine is noted at 1.9 today. At present, patient was seen and examined. She reports that she came in because she was weak prior to admission. She explained that at home she was having intermittent dizziness and shortness of breath. She also reports that she has been experiencing nausea since Sunday. She reports that her appetite has been very poor. She denies any vomiting, diarrhea, or abdominal pain. She denies any chest pain. She denies any headache, dysuria, or fevers.   Her son is at
Component Value Date/Time    TSH 0.825 06/10/2020 06:24 AM       U/A:    Lab Results   Component Value Date/Time    COLORU Yellow 12/17/2023 06:23 AM    PHUR 6.0 12/17/2023 06:23 AM    WBCUA 21 TO 50 12/17/2023 06:23 AM    WBCUA PACKED 06/22/2011 02:00 PM    RBCUA 10 TO 20 12/17/2023 06:23 AM    RBCUA 1-3 06/22/2011 02:00 PM    BACTERIA 3+ 12/17/2023 06:23 AM    CLARITYU SL CLOUDY 06/07/2020 01:12 PM    SPECGRAV 1.010 12/17/2023 06:23 AM    LEUKOCYTESUR LARGE 12/17/2023 06:23 AM    UROBILINOGEN 0.2 12/17/2023 06:23 AM    BILIRUBINUR NEGATIVE 12/17/2023 06:23 AM    BLOODU SMALL 06/07/2020 01:12 PM    GLUCOSEU NEGATIVE 12/17/2023 06:23 AM       ABG:  No results found for: \"REG7VWL\", \"BEART\", \"N4UIUZQK\", \"PHART\", \"THGBART\", \"FUA3XTG\", \"PO2ART\", \"MUX2OUX\"    MICROBIOLOGY:    Blood culture -      Escherichia Coli DETECTED Abnormal      Enterobacter Cloacae Complex Not Detected    Enterobacterales DETECTED Abnormal        Urine Culture -             Specimen Description . CLEAN CATCH URINE    Culture ESCHERICHIA COLI >100,000 CFU/ML Susceptibility to follow. Abnormal            Radiology :    CT scan of abdomen and pelvis -      1. Mild edema in the right perinephric fat and a very small amount of fluid  within Astorga's pouch but no evidence of hydronephrosis and possible slight  increase in size of the right kidney all suggests pyelonephritis. 2.  Distended urinary bladder but otherwise unremarkable. A degree of  urinary retention cannot be excluded if suspected clinically. 3.  Wall of the cecum and right colon appears somewhat prominent in thickness  but this may just be due to nondistention of the lumen unless a degree of  colitis is suspected clinically. 4.  Nonspecific bowel gas pattern with no current evidence of intestinal  obstruction. 5.  Distended gallbladder and prominence of the extrahepatic ducts but  unchanged. 6.  Cardiomegaly.     7.  Nodular density in the posterior right costophrenic

## 2023-12-23 NOTE — PLAN OF CARE
Problem: Chronic Conditions and Co-morbidities  Goal: Patient's chronic conditions and co-morbidity symptoms are monitored and maintained or improved  12/22/2023 2234 by Erin Chin RN  Outcome: Progressing  12/22/2023 1041 by Yo Brizuela RN  Outcome: Progressing     Problem: Discharge Planning  Goal: Discharge to home or other facility with appropriate resources  12/22/2023 2234 by Erin Chin RN  Outcome: Progressing  12/22/2023 1041 by Yo Brizuela RN  Outcome: Progressing     Problem: Skin/Tissue Integrity  Goal: Absence of new skin breakdown  Description: 1. Monitor for areas of redness and/or skin breakdown  2. Assess vascular access sites hourly  3. Every 4-6 hours minimum:  Change oxygen saturation probe site  4. Every 4-6 hours:  If on nasal continuous positive airway pressure, respiratory therapy assess nares and determine need for appliance change or resting period.   12/22/2023 2234 by Erin Chin RN  Outcome: Progressing  12/22/2023 1041 by Yo Brizuela RN  Outcome: Progressing     Problem: Safety - Adult  Goal: Free from fall injury  12/22/2023 2234 by Erin Chin RN  Outcome: Progressing  Flowsheets (Taken 12/22/2023 1950)  Free From Fall Injury: Instruct family/caregiver on patient safety  12/22/2023 1041 by Yo Brizuela RN  Outcome: Progressing     Problem: ABCDS Injury Assessment  Goal: Absence of physical injury  12/22/2023 2234 by Erin Chin RN  Outcome: Progressing  Flowsheets (Taken 12/22/2023 1950)  Absence of Physical Injury: Implement safety measures based on patient assessment  12/22/2023 1041 by Yo Brizuela RN  Outcome: Progressing

## 2023-12-24 VITALS
HEART RATE: 59 BPM | WEIGHT: 91.05 LBS | BODY MASS INDEX: 19.11 KG/M2 | DIASTOLIC BLOOD PRESSURE: 64 MMHG | RESPIRATION RATE: 16 BRPM | OXYGEN SATURATION: 100 % | HEIGHT: 58 IN | SYSTOLIC BLOOD PRESSURE: 105 MMHG | TEMPERATURE: 97.3 F

## 2023-12-24 LAB
ALBUMIN SERPL-MCNC: 3.2 G/DL (ref 3.5–5.2)
ALP SERPL-CCNC: 76 U/L (ref 35–104)
ALT SERPL-CCNC: 322 U/L (ref 0–32)
ANION GAP SERPL CALCULATED.3IONS-SCNC: 19 MMOL/L (ref 7–16)
AST SERPL-CCNC: 75 U/L (ref 0–31)
BILIRUB SERPL-MCNC: 1.2 MG/DL (ref 0–1.2)
BUN SERPL-MCNC: 60 MG/DL (ref 6–23)
CALCIUM SERPL-MCNC: 9 MG/DL (ref 8.6–10.2)
CHLORIDE SERPL-SCNC: 104 MMOL/L (ref 98–107)
CO2 SERPL-SCNC: 19 MMOL/L (ref 22–29)
CREAT SERPL-MCNC: 1 MG/DL (ref 0.5–1)
ERYTHROCYTE [DISTWIDTH] IN BLOOD BY AUTOMATED COUNT: 19.3 % (ref 11.5–15)
GFR SERPL CREATININE-BSD FRML MDRD: 54 ML/MIN/1.73M2
GLUCOSE SERPL-MCNC: 95 MG/DL (ref 74–99)
HCT VFR BLD AUTO: 28.7 % (ref 34–48)
HGB BLD-MCNC: 8.6 G/DL (ref 11.5–15.5)
MAGNESIUM SERPL-MCNC: 3 MG/DL (ref 1.6–2.6)
MCH RBC QN AUTO: 27.3 PG (ref 26–35)
MCHC RBC AUTO-ENTMCNC: 30 G/DL (ref 32–34.5)
MCV RBC AUTO: 91.1 FL (ref 80–99.9)
PHOSPHATE SERPL-MCNC: 4.3 MG/DL (ref 2.5–4.5)
PLATELET # BLD AUTO: 205 K/UL (ref 130–450)
PMV BLD AUTO: 10.8 FL (ref 7–12)
POTASSIUM SERPL-SCNC: 5.1 MMOL/L (ref 3.5–5)
PROT SERPL-MCNC: 5.8 G/DL (ref 6.4–8.3)
RBC # BLD AUTO: 3.15 M/UL (ref 3.5–5.5)
SODIUM SERPL-SCNC: 142 MMOL/L (ref 132–146)
WBC OTHER # BLD: 13.8 K/UL (ref 4.5–11.5)

## 2023-12-24 PROCEDURE — 80053 COMPREHEN METABOLIC PANEL: CPT

## 2023-12-24 PROCEDURE — 6370000000 HC RX 637 (ALT 250 FOR IP): Performed by: NURSE PRACTITIONER

## 2023-12-24 PROCEDURE — 36415 COLL VENOUS BLD VENIPUNCTURE: CPT

## 2023-12-24 PROCEDURE — 99239 HOSP IP/OBS DSCHRG MGMT >30: CPT | Performed by: INTERNAL MEDICINE

## 2023-12-24 PROCEDURE — 6360000002 HC RX W HCPCS: Performed by: NURSE PRACTITIONER

## 2023-12-24 PROCEDURE — 83735 ASSAY OF MAGNESIUM: CPT

## 2023-12-24 PROCEDURE — 84100 ASSAY OF PHOSPHORUS: CPT

## 2023-12-24 PROCEDURE — 94640 AIRWAY INHALATION TREATMENT: CPT

## 2023-12-24 PROCEDURE — 2580000003 HC RX 258: Performed by: NURSE PRACTITIONER

## 2023-12-24 PROCEDURE — 85027 COMPLETE CBC AUTOMATED: CPT

## 2023-12-24 PROCEDURE — 6370000000 HC RX 637 (ALT 250 FOR IP): Performed by: INTERNAL MEDICINE

## 2023-12-24 PROCEDURE — 2700000000 HC OXYGEN THERAPY PER DAY

## 2023-12-24 RX ORDER — FLUTICASONE PROPIONATE 50 MCG
1 SPRAY, SUSPENSION (ML) NASAL DAILY
Qty: 16 G | Refills: 3 | Status: SHIPPED | OUTPATIENT
Start: 2023-12-25

## 2023-12-24 RX ORDER — POLYETHYLENE GLYCOL 3350 17 G/17G
17 POWDER, FOR SOLUTION ORAL DAILY PRN
Qty: 527 G | Refills: 0 | Status: SHIPPED | OUTPATIENT
Start: 2023-12-24 | End: 2024-01-23

## 2023-12-24 RX ORDER — ARFORMOTEROL TARTRATE 15 UG/2ML
15 SOLUTION RESPIRATORY (INHALATION)
Qty: 120 ML | Refills: 3 | Status: SHIPPED | OUTPATIENT
Start: 2023-12-24

## 2023-12-24 RX ADMIN — IPRATROPIUM BROMIDE 0.5 MG: 0.5 SOLUTION RESPIRATORY (INHALATION) at 13:29

## 2023-12-24 RX ADMIN — FLUTICASONE PROPIONATE 1 SPRAY: 50 SPRAY, METERED NASAL at 09:05

## 2023-12-24 RX ADMIN — CEFDINIR 300 MG: 300 CAPSULE ORAL at 09:05

## 2023-12-24 RX ADMIN — METOPROLOL SUCCINATE 25 MG: 25 TABLET, EXTENDED RELEASE ORAL at 09:05

## 2023-12-24 RX ADMIN — ARFORMOTEROL TARTRATE 15 MCG: 15 SOLUTION RESPIRATORY (INHALATION) at 09:25

## 2023-12-24 RX ADMIN — SODIUM CHLORIDE, PRESERVATIVE FREE 10 ML: 5 INJECTION INTRAVENOUS at 09:05

## 2023-12-24 RX ADMIN — METOCLOPRAMIDE 5 MG: 5 TABLET ORAL at 09:05

## 2023-12-24 RX ADMIN — IPRATROPIUM BROMIDE 0.5 MG: 0.5 SOLUTION RESPIRATORY (INHALATION) at 09:24

## 2023-12-24 RX ADMIN — BUDESONIDE 250 MCG: 0.25 INHALANT RESPIRATORY (INHALATION) at 09:26

## 2023-12-24 RX ADMIN — PANTOPRAZOLE SODIUM 40 MG: 40 TABLET, DELAYED RELEASE ORAL at 05:49

## 2023-12-24 NOTE — PLAN OF CARE
Problem: Chronic Conditions and Co-morbidities  Goal: Patient's chronic conditions and co-morbidity symptoms are monitored and maintained or improved  12/23/2023 2326 by Nancy Garces RN  Outcome: Progressing  12/23/2023 1723 by Jaime Baron RN  Outcome: Progressing     Problem: Discharge Planning  Goal: Discharge to home or other facility with appropriate resources  12/23/2023 2326 by Nancy Garces RN  Outcome: Progressing  12/23/2023 1723 by Jaime Baron RN  Outcome: Progressing     Problem: Skin/Tissue Integrity  Goal: Absence of new skin breakdown  Description: 1. Monitor for areas of redness and/or skin breakdown  2. Assess vascular access sites hourly  3. Every 4-6 hours minimum:  Change oxygen saturation probe site  4. Every 4-6 hours:  If on nasal continuous positive airway pressure, respiratory therapy assess nares and determine need for appliance change or resting period.   12/23/2023 2326 by Nancy Garces RN  Outcome: Progressing  12/23/2023 1723 by Jaime Baron RN  Outcome: Progressing     Problem: Safety - Adult  Goal: Free from fall injury  12/23/2023 2326 by Nancy Garces RN  Outcome: Progressing  Flowsheets (Taken 12/23/2023 1936)  Free From Fall Injury: Instruct family/caregiver on patient safety  12/23/2023 1723 by Jaime Baron RN  Outcome: Progressing     Problem: ABCDS Injury Assessment  Goal: Absence of physical injury  12/23/2023 2326 by Nancy Garces RN  Outcome: Progressing  Flowsheets (Taken 12/23/2023 1936)  Absence of Physical Injury: Implement safety measures based on patient assessment  12/23/2023 1723 by Jaime Baron RN  Outcome: Progressing

## 2023-12-24 NOTE — DISCHARGE SUMMARY
Patient ID: Марина Garcia      Patient's PCP: Lauren Scott MD    Admit Date: 12/17/2023     Discharge Date:   12/24/2023    Admitting Physician: Clement Singh MD     Discharge Physician: Laura Restrepo MD     Discharge Diagnoses: Active Hospital Problems    Diagnosis Date Noted    Nonrheumatic aortic valve stenosis [I35.0] 02/10/2023     Priority: Medium    Nonrheumatic aortic valve insufficiency [I35.1] 02/10/2023     Priority: Medium    Chronic renal disease, stage III ([address]) [919153] [N18.30] 01/20/2023     Priority: Medium    HFrEF (heart failure with reduced ejection fraction) ([address]) [I50.20] 01/12/2023     Priority: Medium    Abnormal head CT [R93.0] 20/91/0768    Acute systolic heart failure ([address]) [I50.21] 12/18/2023    Nonrheumatic mitral valve regurgitation [I34.0] 12/18/2023    Nonrheumatic tricuspid valve regurgitation [I36.1] 12/18/2023    Lactic acidosis [E87.20] 12/18/2023    Sepsis ([address]) [A41.9] 12/17/2023    Acute anemia [D64.9] 12/17/2023    PAF (paroxysmal atrial fibrillation) ([address]) [I48.0] 06/07/2020    Elevated LFTs [R79.89] 05/07/2014    DM II (diabetes mellitus, type II), controlled ([address]) [E11.9] 04/18/2014    COPD (chronic obstructive pulmonary disease) ([address]) [J44.9] 04/17/2014    Essential hypertension [I10] 07/10/2013    Implantable cardioverter-defibrillator (ICD) in situ [Z95.810] 07/10/2013    Ischemic cardiomyopathy [I25.5] 07/10/2013       The patient was seen and examined on day of discharge and this discharge summary is in conjunction with any daily progress note from day of discharge. Hospital Course: This is a 52-year-old female with a history of CAD, HFrEF, COPD, HLD, HTN, cardiomyopathy with ICD, and atrial fibrillation on Xarelto who presents to the ED with complaints of fatigue. ED course is as follows: Chloride 92, BUN/creatinine 54/1.8, LA 2.4> 3.4, glucose 113, proBNP 27,411, troponin 62> 67, AST 53, WBCs 16.9, Hgb 7.2.  BC and UC pending.   Flu

## 2023-12-24 NOTE — CARE COORDINATION
Social Work/Case Management Transition of Care Planning (Critical access hospital [phone]):  Discharge order noted. Confirmed with Almita patient can admit today. Transport via PAS ambulette with a 2:30pm  time. Called the unit to undate. Notified Tiffany Wren.   RALPH Sands  12/24/2023

## 2023-12-26 LAB
25(OH)D3 SERPL-MCNC: 62 NG/ML (ref 30–100)
ALBUMIN SERPL-MCNC: 3.3 G/DL (ref 3.5–5.2)
ALP SERPL-CCNC: 89 U/L (ref 35–104)
ALT SERPL-CCNC: 192 U/L (ref 0–32)
ANION GAP SERPL CALCULATED.3IONS-SCNC: 12 MMOL/L (ref 7–16)
AST SERPL-CCNC: 82 U/L (ref 0–31)
BILIRUB SERPL-MCNC: 1.5 MG/DL (ref 0–1.2)
BUN SERPL-MCNC: 62 MG/DL (ref 6–23)
CALCIUM SERPL-MCNC: 8.4 MG/DL (ref 8.6–10.2)
CHLORIDE SERPL-SCNC: 102 MMOL/L (ref 98–107)
CHOLEST SERPL-MCNC: 109 MG/DL
CO2 SERPL-SCNC: 26 MMOL/L (ref 22–29)
CREAT SERPL-MCNC: 1.2 MG/DL (ref 0.5–1)
ERYTHROCYTE [DISTWIDTH] IN BLOOD BY AUTOMATED COUNT: 20.2 % (ref 11.5–15)
GFR SERPL CREATININE-BSD FRML MDRD: 46 ML/MIN/1.73M2
GLUCOSE SERPL-MCNC: 100 MG/DL (ref 74–99)
HBA1C MFR BLD: 6 % (ref 4–5.6)
HCT VFR BLD AUTO: 28.6 % (ref 34–48)
HDLC SERPL-MCNC: 24 MG/DL
HGB BLD-MCNC: 8.7 G/DL (ref 11.5–15.5)
LDLC SERPL CALC-MCNC: 71 MG/DL
MCH RBC QN AUTO: 27.8 PG (ref 26–35)
MCHC RBC AUTO-ENTMCNC: 30.4 G/DL (ref 32–34.5)
MCV RBC AUTO: 91.4 FL (ref 80–99.9)
PLATELET # BLD AUTO: 242 K/UL (ref 130–450)
PMV BLD AUTO: 11.1 FL (ref 7–12)
POTASSIUM SERPL-SCNC: 5.3 MMOL/L (ref 3.5–5)
PROT SERPL-MCNC: 5.7 G/DL (ref 6.4–8.3)
RBC # BLD AUTO: 3.13 M/UL (ref 3.5–5.5)
SODIUM SERPL-SCNC: 140 MMOL/L (ref 132–146)
TRIGL SERPL-MCNC: 72 MG/DL
VLDLC SERPL CALC-MCNC: 14 MG/DL
WBC OTHER # BLD: 16.6 K/UL (ref 4.5–11.5)

## 2023-12-28 ENCOUNTER — OUTSIDE SERVICES (OUTPATIENT)
Dept: PRIMARY CARE CLINIC | Age: 88
End: 2023-12-28

## 2023-12-28 ENCOUNTER — CARE COORDINATION (OUTPATIENT)
Dept: CARE COORDINATION | Age: 88
End: 2023-12-28

## 2023-12-28 DIAGNOSIS — I48.91 ATRIAL FIBRILLATION, UNSPECIFIED TYPE (HCC): ICD-10-CM

## 2023-12-28 DIAGNOSIS — I10 ESSENTIAL HYPERTENSION: ICD-10-CM

## 2023-12-28 DIAGNOSIS — N39.0 URINARY TRACT INFECTION WITHOUT HEMATURIA, SITE UNSPECIFIED: ICD-10-CM

## 2023-12-28 DIAGNOSIS — J18.9 PNEUMONIA DUE TO INFECTIOUS ORGANISM, UNSPECIFIED LATERALITY, UNSPECIFIED PART OF LUNG: ICD-10-CM

## 2023-12-28 DIAGNOSIS — A41.9 SEPSIS, DUE TO UNSPECIFIED ORGANISM, UNSPECIFIED WHETHER ACUTE ORGAN DYSFUNCTION PRESENT (HCC): ICD-10-CM

## 2023-12-28 DIAGNOSIS — I50.9 CONGESTIVE HEART FAILURE, UNSPECIFIED HF CHRONICITY, UNSPECIFIED HEART FAILURE TYPE (HCC): Primary | ICD-10-CM

## 2023-12-28 DIAGNOSIS — R93.0 ABNORMAL FINDINGS ON DIAGNOSTIC IMAGING OF SKULL AND HEAD, NOT ELSEWHERE CLASSIFIED: ICD-10-CM

## 2023-12-28 DIAGNOSIS — I42.0 DILATED CARDIOMYOPATHY (HCC): ICD-10-CM

## 2023-12-28 DIAGNOSIS — N18.32 STAGE 3B CHRONIC KIDNEY DISEASE (HCC): ICD-10-CM

## 2023-12-28 DIAGNOSIS — R94.5 ABNORMAL LIVER FUNCTION: ICD-10-CM

## 2023-12-28 DIAGNOSIS — D64.9 ANEMIA, UNSPECIFIED TYPE: ICD-10-CM

## 2023-12-28 NOTE — PROGRESS NOTES
EGD ESOPHAGOGASTRODUODENOSCOPY BOTOX INJECTION 200 UNITS performed by Andres Melo DO at [facility] ENDOSCOPY      Family History   Problem Relation Age of Onset    Bipolar Disorder Son     Bipolar Disorder Son     Heart Disease Brother         cabg      Social History     Socioeconomic History    Marital status:    Tobacco Use    Smoking status: Never    Smokeless tobacco: Never   Vaping Use    Vaping Use: Never used   Substance and Sexual Activity    Alcohol use: Yes     Comment: ocasional wine    Drug use: No    Sexual activity: Defer     Social Determinants of Health     Financial Resource Strain: Low Risk  (2/8/2023)    Overall Financial Resource Strain (CARDIA)     Difficulty of Paying Living Expenses: Not hard at all   Transportation Needs: Unknown (2/8/2023)    PRAPARE - Transportation     Lack of Transportation (Non-Medical): No   Physical Activity: Sufficiently Active (6/20/2023)    Exercise Vital Sign     Days of Exercise per Week: 7 days     Minutes of Exercise per Session: 30 min   Housing Stability: Unknown (2/8/2023)    Housing Stability Vital Sign     Unstable Housing in the Last Year: No        Current Outpatient Medications   Medication Sig Dispense Refill    arformoterol tartrate (BROVANA) 15 MCG/2ML NEBU Take 2 mLs by nebulization in the morning and 2 mLs in the evening. 120 mL 3    fluticasone (FLONASE) 50 MCG/ACT nasal spray 1 spray by Each Nostril route daily 16 g 3    ipratropium (ATROVENT) 0.02 % nebulizer solution Take 2.5 mLs by nebulization 4 times daily 2.5 mL 3    polyethylene glycol (GLYCOLAX) 17 g packet Take 1 packet by mouth daily as needed for Constipation 527 g 0    white petrolatum OINT ointment Apply topically 2 times daily as needed (2 skin lesion) 10 g 0    Respiratory Therapy Supplies (FULL KIT NEBULIZER SET) MISC Use as directed with nebulized medication.  1 each 0    cefdinir (OMNICEF) 300 MG capsule Take 1 capsule by mouth daily for 9 days 9 capsule 0    vitamin D

## 2023-12-28 NOTE — CARE COORDINATION
Care Transitions Post-Acute Facility Update Call    2023    Patient: Kiran Hugo Patient : 1935   MRN: <[id number]>  Reason for Admission: Sepsis, pneumonia, UTI  Discharge Date: 23 RARS: Readmission Risk Score: 22    Patient admitted to [facility], will advise SNF [facility] team will also follow during stay and request collaboration

## 2023-12-30 VITALS
WEIGHT: 94 LBS | RESPIRATION RATE: 18 BRPM | BODY MASS INDEX: 19.65 KG/M2 | SYSTOLIC BLOOD PRESSURE: 106 MMHG | TEMPERATURE: 97.9 F | HEART RATE: 68 BPM | OXYGEN SATURATION: 96 % | DIASTOLIC BLOOD PRESSURE: 68 MMHG

## 2024-01-03 ENCOUNTER — OUTSIDE SERVICES (OUTPATIENT)
Dept: PRIMARY CARE CLINIC | Age: 89
End: 2024-01-03
Payer: MEDICARE

## 2024-01-03 DIAGNOSIS — A41.9 SEPSIS, DUE TO UNSPECIFIED ORGANISM, UNSPECIFIED WHETHER ACUTE ORGAN DYSFUNCTION PRESENT (HCC): ICD-10-CM

## 2024-01-03 DIAGNOSIS — I25.10 CORONARY ARTERY DISEASE INVOLVING NATIVE CORONARY ARTERY OF NATIVE HEART WITHOUT ANGINA PECTORIS: ICD-10-CM

## 2024-01-03 DIAGNOSIS — D64.9 ANEMIA, UNSPECIFIED TYPE: ICD-10-CM

## 2024-01-03 DIAGNOSIS — I48.91 ATRIAL FIBRILLATION, UNSPECIFIED TYPE (HCC): ICD-10-CM

## 2024-01-03 DIAGNOSIS — N18.32 STAGE 3B CHRONIC KIDNEY DISEASE (HCC): ICD-10-CM

## 2024-01-03 DIAGNOSIS — I10 ESSENTIAL HYPERTENSION: ICD-10-CM

## 2024-01-03 DIAGNOSIS — N39.0 URINARY TRACT INFECTION WITHOUT HEMATURIA, SITE UNSPECIFIED: ICD-10-CM

## 2024-01-03 DIAGNOSIS — I42.0 DILATED CARDIOMYOPATHY (HCC): ICD-10-CM

## 2024-01-03 DIAGNOSIS — R94.5 ABNORMAL LIVER FUNCTION: ICD-10-CM

## 2024-01-03 DIAGNOSIS — R53.1 WEAKNESS GENERALIZED: ICD-10-CM

## 2024-01-03 DIAGNOSIS — J18.9 PNEUMONIA DUE TO INFECTIOUS ORGANISM, UNSPECIFIED LATERALITY, UNSPECIFIED PART OF LUNG: ICD-10-CM

## 2024-01-03 DIAGNOSIS — I50.9 CONGESTIVE HEART FAILURE, UNSPECIFIED HF CHRONICITY, UNSPECIFIED HEART FAILURE TYPE (HCC): Primary | ICD-10-CM

## 2024-01-03 PROCEDURE — 99309 SBSQ NF CARE MODERATE MDM 30: CPT | Performed by: NURSE PRACTITIONER

## 2024-01-04 ENCOUNTER — CARE COORDINATION (OUTPATIENT)
Dept: CARE COORDINATION | Age: 89
End: 2024-01-04

## 2024-01-04 NOTE — CARE COORDINATION
Care Transitions Post-Acute Facility Update Call    2024    Patient: Barbara Jack Patient : 1935   MRN: <[id number]>  Reason for Admission: Sepsis, pneumonia, UTI  Discharge Date: 23 RARS: Readmission Risk Score: 22         Post Acute Facility Update    Care Transitions Post Acute Facility Update    Care Transitions Interventions    Physical Therapy: Declined     Occupational Therapy: Declined           Post Acute Facility Update   Post Acute Facility: [facility]    ELOS: 20 days      Nursing   Prescribed diet: nectar thick- hoping to upgrade after swallow eval today    Nutrition intake assessment: fair, BID house supplement   Wounds: Neg   Reported Nursing Updates: VSS, Desai out and voiding, swallow eval today hopes to progress to regular diet, oxygen PRN       Rehab/Functional   Prior Level of Functioning:  assist PRN   Cognitive function assessment: A&O X3, some confusion at times   ADLs: Minimal Assistance   Bed Mobility: Minimal Assistance   Transfer Assistance: Contact Guard Assist - Hands on patient for balance   Ambulation Assistance: Contact Guard Assist - Hands on patient for balance   How far (in feet) is the patient ambulating?: 50   Does patient use an assistive device?: Yes   Assistive Devices: 50 ft FWW CGA   Rehab Notes: CGA.min A with most activities, speech eval this am , steps 5 min A X 2 rails       SW/Discharge Planning   Goals of Care: home with    Palliative/Hospice Referral indicated: Neg   Caregiver support: family   Anticipated date for discharge: 24   Discharge Planning / Barriers: Weakness, cognition, have care conference scheduled with family will discuss additional support as needed   Care Progression on Track?: Pos  Next IDT Planned Review: 1/10/24           Next IDT Planned Review: 1/10/23    Future Appointments   Date Time Provider Department Center   2024  9:15 AM [facility] CHF ROOM 1 [facility]   2024 10:00 AM [facility] CT SCAN 2

## 2024-01-05 ENCOUNTER — OUTSIDE SERVICES (OUTPATIENT)
Dept: PRIMARY CARE CLINIC | Age: 89
End: 2024-01-05
Payer: MEDICARE

## 2024-01-05 DIAGNOSIS — N39.0 URINARY TRACT INFECTION WITHOUT HEMATURIA, SITE UNSPECIFIED: ICD-10-CM

## 2024-01-05 DIAGNOSIS — I25.10 CORONARY ARTERY DISEASE INVOLVING NATIVE CORONARY ARTERY OF NATIVE HEART WITHOUT ANGINA PECTORIS: ICD-10-CM

## 2024-01-05 DIAGNOSIS — A41.9 SEPSIS, DUE TO UNSPECIFIED ORGANISM, UNSPECIFIED WHETHER ACUTE ORGAN DYSFUNCTION PRESENT (HCC): ICD-10-CM

## 2024-01-05 DIAGNOSIS — D64.9 ANEMIA, UNSPECIFIED TYPE: ICD-10-CM

## 2024-01-05 DIAGNOSIS — N18.32 STAGE 3B CHRONIC KIDNEY DISEASE (HCC): ICD-10-CM

## 2024-01-05 DIAGNOSIS — R94.5 ABNORMAL LIVER FUNCTION: ICD-10-CM

## 2024-01-05 DIAGNOSIS — I50.9 CONGESTIVE HEART FAILURE, UNSPECIFIED HF CHRONICITY, UNSPECIFIED HEART FAILURE TYPE (HCC): Primary | ICD-10-CM

## 2024-01-05 DIAGNOSIS — R53.1 WEAKNESS GENERALIZED: ICD-10-CM

## 2024-01-05 DIAGNOSIS — I48.91 ATRIAL FIBRILLATION, UNSPECIFIED TYPE (HCC): ICD-10-CM

## 2024-01-05 DIAGNOSIS — I10 ESSENTIAL HYPERTENSION: ICD-10-CM

## 2024-01-05 DIAGNOSIS — J18.9 PNEUMONIA DUE TO INFECTIOUS ORGANISM, UNSPECIFIED LATERALITY, UNSPECIFIED PART OF LUNG: ICD-10-CM

## 2024-01-05 DIAGNOSIS — I42.0 DILATED CARDIOMYOPATHY (HCC): ICD-10-CM

## 2024-01-05 PROCEDURE — 99309 SBSQ NF CARE MODERATE MDM 30: CPT | Performed by: NURSE PRACTITIONER

## 2024-01-08 ENCOUNTER — OUTSIDE SERVICES (OUTPATIENT)
Dept: PRIMARY CARE CLINIC | Age: 89
End: 2024-01-08
Payer: MEDICARE

## 2024-01-08 DIAGNOSIS — A41.9 SEPSIS, DUE TO UNSPECIFIED ORGANISM, UNSPECIFIED WHETHER ACUTE ORGAN DYSFUNCTION PRESENT (HCC): ICD-10-CM

## 2024-01-08 DIAGNOSIS — D64.9 ANEMIA, UNSPECIFIED TYPE: ICD-10-CM

## 2024-01-08 DIAGNOSIS — I42.0 DILATED CARDIOMYOPATHY (HCC): ICD-10-CM

## 2024-01-08 DIAGNOSIS — N18.32 STAGE 3B CHRONIC KIDNEY DISEASE (HCC): ICD-10-CM

## 2024-01-08 DIAGNOSIS — I50.9 CONGESTIVE HEART FAILURE, UNSPECIFIED HF CHRONICITY, UNSPECIFIED HEART FAILURE TYPE (HCC): Primary | ICD-10-CM

## 2024-01-08 DIAGNOSIS — I48.91 ATRIAL FIBRILLATION, UNSPECIFIED TYPE (HCC): ICD-10-CM

## 2024-01-08 DIAGNOSIS — R94.5 ABNORMAL LIVER FUNCTION: ICD-10-CM

## 2024-01-08 DIAGNOSIS — R53.1 WEAKNESS GENERALIZED: ICD-10-CM

## 2024-01-08 DIAGNOSIS — J96.01 ACUTE RESPIRATORY FAILURE WITH HYPOXIA (HCC): ICD-10-CM

## 2024-01-08 DIAGNOSIS — J18.9 PNEUMONIA DUE TO INFECTIOUS ORGANISM, UNSPECIFIED LATERALITY, UNSPECIFIED PART OF LUNG: ICD-10-CM

## 2024-01-08 DIAGNOSIS — I10 ESSENTIAL HYPERTENSION: ICD-10-CM

## 2024-01-08 DIAGNOSIS — I25.10 CORONARY ARTERY DISEASE INVOLVING NATIVE CORONARY ARTERY OF NATIVE HEART WITHOUT ANGINA PECTORIS: ICD-10-CM

## 2024-01-08 DIAGNOSIS — N39.0 URINARY TRACT INFECTION WITHOUT HEMATURIA, SITE UNSPECIFIED: ICD-10-CM

## 2024-01-08 PROBLEM — K72.00 SHOCK LIVER: Status: ACTIVE | Noted: 2024-01-08

## 2024-01-08 PROBLEM — R78.81 BACTEREMIA: Status: ACTIVE | Noted: 2024-01-08

## 2024-01-08 PROCEDURE — 99309 SBSQ NF CARE MODERATE MDM 30: CPT | Performed by: NURSE PRACTITIONER

## 2024-01-09 LAB
ALBUMIN SERPL-MCNC: 3.1 G/DL (ref 3.5–5.2)
ALP SERPL-CCNC: 84 U/L (ref 35–104)
ALT SERPL-CCNC: 22 U/L (ref 0–32)
ANION GAP SERPL CALCULATED.3IONS-SCNC: 8 MMOL/L (ref 7–16)
AST SERPL-CCNC: 24 U/L (ref 0–31)
BILIRUB SERPL-MCNC: 0.5 MG/DL (ref 0–1.2)
BUN SERPL-MCNC: 15 MG/DL (ref 6–23)
CALCIUM SERPL-MCNC: 9 MG/DL (ref 8.6–10.2)
CHLORIDE SERPL-SCNC: 106 MMOL/L (ref 98–107)
CO2 SERPL-SCNC: 27 MMOL/L (ref 22–29)
CREAT SERPL-MCNC: 0.8 MG/DL (ref 0.5–1)
ERYTHROCYTE [DISTWIDTH] IN BLOOD BY AUTOMATED COUNT: 21.6 % (ref 11.5–15)
GFR SERPL CREATININE-BSD FRML MDRD: >60 ML/MIN/1.73M2
GLUCOSE SERPL-MCNC: 84 MG/DL (ref 74–99)
HCT VFR BLD AUTO: 28.9 % (ref 34–48)
HGB BLD-MCNC: 8.5 G/DL (ref 11.5–15.5)
MCH RBC QN AUTO: 27.8 PG (ref 26–35)
MCHC RBC AUTO-ENTMCNC: 29.4 G/DL (ref 32–34.5)
MCV RBC AUTO: 94.4 FL (ref 80–99.9)
PLATELET # BLD AUTO: 340 K/UL (ref 130–450)
PMV BLD AUTO: 10.1 FL (ref 7–12)
POTASSIUM SERPL-SCNC: 4.7 MMOL/L (ref 3.5–5)
PROT SERPL-MCNC: 5.5 G/DL (ref 6.4–8.3)
RBC # BLD AUTO: 3.06 M/UL (ref 3.5–5.5)
SODIUM SERPL-SCNC: 141 MMOL/L (ref 132–146)
WBC OTHER # BLD: 5.8 K/UL (ref 4.5–11.5)

## 2024-01-10 ENCOUNTER — OUTSIDE SERVICES (OUTPATIENT)
Dept: PRIMARY CARE CLINIC | Age: 89
End: 2024-01-10
Payer: MEDICARE

## 2024-01-10 DIAGNOSIS — A41.9 SEPSIS, DUE TO UNSPECIFIED ORGANISM, UNSPECIFIED WHETHER ACUTE ORGAN DYSFUNCTION PRESENT (HCC): ICD-10-CM

## 2024-01-10 DIAGNOSIS — N18.32 STAGE 3B CHRONIC KIDNEY DISEASE (HCC): ICD-10-CM

## 2024-01-10 DIAGNOSIS — N39.0 URINARY TRACT INFECTION WITHOUT HEMATURIA, SITE UNSPECIFIED: ICD-10-CM

## 2024-01-10 DIAGNOSIS — I25.10 CORONARY ARTERY DISEASE INVOLVING NATIVE CORONARY ARTERY OF NATIVE HEART WITHOUT ANGINA PECTORIS: ICD-10-CM

## 2024-01-10 DIAGNOSIS — I48.91 ATRIAL FIBRILLATION, UNSPECIFIED TYPE (HCC): ICD-10-CM

## 2024-01-10 DIAGNOSIS — R94.5 ABNORMAL LIVER FUNCTION: ICD-10-CM

## 2024-01-10 DIAGNOSIS — D64.9 ANEMIA, UNSPECIFIED TYPE: ICD-10-CM

## 2024-01-10 DIAGNOSIS — I42.0 DILATED CARDIOMYOPATHY (HCC): ICD-10-CM

## 2024-01-10 DIAGNOSIS — I10 ESSENTIAL HYPERTENSION: ICD-10-CM

## 2024-01-10 DIAGNOSIS — J18.9 PNEUMONIA DUE TO INFECTIOUS ORGANISM, UNSPECIFIED LATERALITY, UNSPECIFIED PART OF LUNG: ICD-10-CM

## 2024-01-10 DIAGNOSIS — J96.01 ACUTE RESPIRATORY FAILURE WITH HYPOXIA (HCC): ICD-10-CM

## 2024-01-10 DIAGNOSIS — I50.9 CONGESTIVE HEART FAILURE, UNSPECIFIED HF CHRONICITY, UNSPECIFIED HEART FAILURE TYPE (HCC): Primary | ICD-10-CM

## 2024-01-10 PROCEDURE — 99309 SBSQ NF CARE MODERATE MDM 30: CPT | Performed by: NURSE PRACTITIONER

## 2024-01-11 ENCOUNTER — CARE COORDINATION (OUTPATIENT)
Dept: CARE COORDINATION | Age: 89
End: 2024-01-11

## 2024-01-11 NOTE — CARE COORDINATION
Care Transitions Post-Acute Facility Update Call    2024    Patient: Barbara Jack Patient : 1935   MRN: <[id number]>  Reason for Admission: Sepsis, pneumonia, UTI  Discharge Date: 23 RARS: Readmission Risk Score: 22    Post Acute Facility Update    Care Transitions Post Acute Facility Update    Care Transitions Interventions    Physical Therapy: Declined     Occupational Therapy: Declined           Post Acute Facility Update   Post Acute Facility: [facility]    ELOS: 20 days      Nursing   Prescribed diet: regular, 1200 cc fluid restriction    Nutrition intake assessment: fair- good   Wounds: Neg   Reported Nursing Updates: VSS, no nursing issues identified       Rehab/Functional   Prior Level of Functioning: home with PRN assist from family   Cognitive function assessment: A&O 2-3, some confusion   ADLs: Minimal Assistance   Bed Mobility: Contact Guard Assist - Hands on patient for balance   Transfer Assistance: Stand by Assist - Presence and Cueing   Ambulation Assistance: Contact Guard Assist - Hands on patient for balance, Stand by Assist - Presence and Cueing   How far (in feet) is the patient ambulating?: 90   Does patient use an assistive device?: Yes   Assistive Devices: FWW   Rehab Notes: 90 ft FWW SBA, SBA transfers, 5 steps 2 rails CGA, upper body bath/dressing CGA/ lower body min A, concern for management of tubing for oxygen,  and patient discussed with therapy - feels needs additional work to manage that with amb and steps, will plan to focus on that this week with plan for DC next week       SW/Discharge Planning   Goals of Care: home with family, HH   Palliative/Hospice Referral indicated: Neg   Caregiver support:    Anticipated date for discharge: 24   Discharge Planning / Barriers: Fall risk, cognition   Care Progression on Track?: Pos  Next IDT Planned Review: 24           Next IDT Planned Review: 23    Future Appointments   Date Time Provider

## 2024-01-15 ENCOUNTER — OUTSIDE SERVICES (OUTPATIENT)
Dept: PRIMARY CARE CLINIC | Age: 89
End: 2024-01-15
Payer: MEDICARE

## 2024-01-15 DIAGNOSIS — J18.9 PNEUMONIA DUE TO INFECTIOUS ORGANISM, UNSPECIFIED LATERALITY, UNSPECIFIED PART OF LUNG: ICD-10-CM

## 2024-01-15 DIAGNOSIS — I25.10 CORONARY ARTERY DISEASE INVOLVING NATIVE CORONARY ARTERY OF NATIVE HEART WITHOUT ANGINA PECTORIS: ICD-10-CM

## 2024-01-15 DIAGNOSIS — I48.91 ATRIAL FIBRILLATION, UNSPECIFIED TYPE (HCC): ICD-10-CM

## 2024-01-15 DIAGNOSIS — I42.0 DILATED CARDIOMYOPATHY (HCC): ICD-10-CM

## 2024-01-15 DIAGNOSIS — I50.9 CONGESTIVE HEART FAILURE, UNSPECIFIED HF CHRONICITY, UNSPECIFIED HEART FAILURE TYPE (HCC): Primary | ICD-10-CM

## 2024-01-15 DIAGNOSIS — N39.0 URINARY TRACT INFECTION WITHOUT HEMATURIA, SITE UNSPECIFIED: ICD-10-CM

## 2024-01-15 DIAGNOSIS — I10 ESSENTIAL HYPERTENSION: ICD-10-CM

## 2024-01-15 DIAGNOSIS — D64.9 ANEMIA, UNSPECIFIED TYPE: ICD-10-CM

## 2024-01-15 DIAGNOSIS — J96.01 ACUTE RESPIRATORY FAILURE WITH HYPOXIA (HCC): ICD-10-CM

## 2024-01-15 DIAGNOSIS — N18.32 STAGE 3B CHRONIC KIDNEY DISEASE (HCC): ICD-10-CM

## 2024-01-15 DIAGNOSIS — R94.5 ABNORMAL LIVER FUNCTION: ICD-10-CM

## 2024-01-15 DIAGNOSIS — A41.9 SEPSIS, DUE TO UNSPECIFIED ORGANISM, UNSPECIFIED WHETHER ACUTE ORGAN DYSFUNCTION PRESENT (HCC): ICD-10-CM

## 2024-01-15 PROCEDURE — 99309 SBSQ NF CARE MODERATE MDM 30: CPT | Performed by: NURSE PRACTITIONER

## 2024-01-16 ENCOUNTER — HOSPITAL ENCOUNTER (OUTPATIENT)
Dept: OTHER | Age: 89
Setting detail: THERAPIES SERIES
Discharge: HOME | End: 2024-01-16
Payer: MEDICARE

## 2024-01-16 VITALS
HEART RATE: 60 BPM | SYSTOLIC BLOOD PRESSURE: 116 MMHG | WEIGHT: 100 LBS | OXYGEN SATURATION: 94 % | RESPIRATION RATE: 18 BRPM | BODY MASS INDEX: 20.9 KG/M2 | DIASTOLIC BLOOD PRESSURE: 53 MMHG

## 2024-01-16 LAB
ANION GAP SERPL CALCULATED.3IONS-SCNC: 11 MMOL/L (ref 7–16)
BNP SERPL-MCNC: ABNORMAL PG/ML (ref 0–450)
BUN SERPL-MCNC: 9 MG/DL (ref 6–23)
CALCIUM SERPL-MCNC: 8.6 MG/DL (ref 8.6–10.2)
CHLORIDE SERPL-SCNC: 99 MMOL/L (ref 98–107)
CO2 SERPL-SCNC: 27 MMOL/L (ref 22–29)
CREAT SERPL-MCNC: 0.8 MG/DL (ref 0.5–1)
GFR SERPL CREATININE-BSD FRML MDRD: >60 ML/MIN/1.73M2
GLUCOSE SERPL-MCNC: 103 MG/DL (ref 74–99)
POTASSIUM SERPL-SCNC: 4 MMOL/L (ref 3.5–5)
SODIUM SERPL-SCNC: 137 MMOL/L (ref 132–146)

## 2024-01-16 PROCEDURE — 2580000003 HC RX 258: Performed by: NURSE PRACTITIONER

## 2024-01-16 PROCEDURE — 96374 THER/PROPH/DIAG INJ IV PUSH: CPT

## 2024-01-16 PROCEDURE — 99214 OFFICE O/P EST MOD 30 MIN: CPT

## 2024-01-16 PROCEDURE — 83880 ASSAY OF NATRIURETIC PEPTIDE: CPT

## 2024-01-16 PROCEDURE — 6360000002 HC RX W HCPCS: Performed by: NURSE PRACTITIONER

## 2024-01-16 PROCEDURE — 80048 BASIC METABOLIC PNL TOTAL CA: CPT

## 2024-01-16 RX ORDER — PANTOPRAZOLE SODIUM 40 MG/10ML
40 INJECTION, POWDER, LYOPHILIZED, FOR SOLUTION INTRAVENOUS DAILY
COMMUNITY

## 2024-01-16 RX ORDER — POTASSIUM CHLORIDE 750 MG/1
10 TABLET, EXTENDED RELEASE ORAL DAILY
Qty: 90 TABLET | Refills: 1 | Status: SHIPPED | OUTPATIENT
Start: 2024-01-16

## 2024-01-16 RX ORDER — SODIUM CHLORIDE 0.9 % (FLUSH) 0.9 %
10 SYRINGE (ML) INJECTION 2 TIMES DAILY
Status: DISCONTINUED | OUTPATIENT
Start: 2024-01-16 | End: 2024-01-17 | Stop reason: HOSPADM

## 2024-01-16 RX ORDER — FUROSEMIDE 10 MG/ML
40 INJECTION INTRAMUSCULAR; INTRAVENOUS ONCE
Status: COMPLETED | OUTPATIENT
Start: 2024-01-16 | End: 2024-01-16

## 2024-01-16 RX ORDER — TORSEMIDE 10 MG/1
10 TABLET ORAL DAILY
Qty: 90 TABLET | Refills: 3 | Status: SHIPPED | OUTPATIENT
Start: 2024-01-16

## 2024-01-16 RX ADMIN — SODIUM CHLORIDE, PRESERVATIVE FREE 10 ML: 5 INJECTION INTRAVENOUS at 09:47

## 2024-01-16 RX ADMIN — FUROSEMIDE 40 MG: 10 INJECTION, SOLUTION INTRAMUSCULAR; INTRAVENOUS at 09:46

## 2024-01-16 ASSESSMENT — PATIENT HEALTH QUESTIONNAIRE - PHQ9
2. FEELING DOWN, DEPRESSED OR HOPELESS: NOT AT ALL
1. LITTLE INTEREST OR PLEASURE IN DOING THINGS: NOT AT ALL
SUM OF ALL RESPONSES TO PHQ9 QUESTIONS 1 & 2: 0

## 2024-01-16 NOTE — PLAN OF CARE
Problem: Chronic Conditions and Co-morbidities  Goal: Patient's chronic conditions and co-morbidity symptoms are monitored and maintained or improved  Flowsheets (Taken 1/16/2024 1687)  Care Plan - Patient's Chronic Conditions and Co-Morbidity Symptoms are Monitored and Maintained or Improved: Monitor and assess patient's chronic conditions and comorbid symptoms for stability, deterioration, or improvement

## 2024-01-16 NOTE — PROGRESS NOTES
Congestive Heart Failure Clinic   [facility]       Referring Provider: Dr Craft  Primary Care Physician: Julita Dyer MD   Cardiologist: Dr Craft  Nephrologist: N/A      HISTORY OF PRESENT ILLNESS:     Barbara Jack is a 88 y.o. (1935) female with a history of HFrEF, most recent EF:  Lab Results   Component Value Date    LVEF 23 02/07/2023    LVEFMODE Echo 12/26/2016         She presents to the CHF clinic for ongoing evaluation and monitoring of heart failure.    In the CHF clinic today she denies any adverse symptoms except:  [x] Dizziness or lightheadedness (with position change )  [] Syncope or near syncope  [] Recent Fall  [x] Shortness of breath at rest   [x] Dyspnea with exertion  [] Decline in functional capacity (unable to perform activities they had previously been able to do)  [] Fatigue   [x] Orthopnea  [] PND  [] Nocturnal cough  [] Hemoptysis  [] Chest pain, pressure, or discomfort  [] Palpitations  [] Abdominal distention  [] Abdominal bloating  [] Early satiety  [] Blood in stool   [] Diarrhea  [] Constipation ( off and on )  [] Nausea/Vomiting  [] Decreased urinary response to oral diuretic   [] Scrotal swelling   [x] Lower extremity edema  [] Used PRN doses of oral diuretic   [] Weight gain    Wt Readings from Last 3 Encounters:   01/16/24 45.4 kg (100 lb)   12/24/23 41.3 kg (91 lb 0.8 oz)   12/12/23 43.5 kg (96 lb)           SOCIAL HISTORY:  [x] Denies tobacco, alcohol or illicit drug abuse  [] Tobacco use:  [] ETOH use:  [] Illicit drug use:        MEDICATIONS:    No Known Allergies    Current Outpatient Medications:     pantoprazole (PROTONIX) 40 MG injection, Infuse 40 mg intravenously daily, Disp: , Rfl:     arformoterol tartrate (BROVANA) 15 MCG/2ML NEBU, Take 2 mLs by nebulization in the morning and 2 mLs in the evening., Disp: 120 mL, Rfl: 3    fluticasone (FLONASE) 50 MCG/ACT nasal spray, 1 spray by Each Nostril route daily, Disp: 16 g,

## 2024-01-16 NOTE — PROGRESS NOTES
Venice Guerrero, APRN - CNP  Nurse Practitioner  Specialty: Nurse Practitioner     Result Encounter Note     Signed     Date of Service: 1/16/2024  9:15 AM     Signed         Labs and CHF clinic note reviewed  Spoke with Kym ESCALERA in CHF clinic      Patient was hospitalized in December (discharged 1 month ago) off diuretics as she was admitted to bactermia, hypotension, anemia and MARIVEL.      Now has 3+ pitting edema, shortness of breath, 5 lb weight increase.      Will start torsemide 10 mg daily   KDUR 10 meq daily   Elevate legs, will have SNF apply ACE wraps in AM and off in PM  Follow up in CHF clinic in 1 week               Spoke to Yaneth from [facility] and above orders given, Yaneth repeated orders and verbalized understanding.   Orders also faxed to [facility] , confirmation received.

## 2024-01-16 NOTE — FLOWSHEET NOTE
01/16/24 0928   Over the past 2 weeks, how often have you been bothered by any of the following problems?   Little interest or pleasure in doing things Not at all   Feeling down, depressed, or hopeless Not at all   Patient Health Questionnaire-2 Score 0

## 2024-01-16 NOTE — RESULT ENCOUNTER NOTE
Labs and CHF clinic note reviewed  Spoke with Kym ESCALERA in CHF clinic     Patient was hospitalized in December (discharged 1 month ago) off diuretics as she was admitted to bactermia, hypotension, anemia and MARIVEL.     Now has 3+ pitting edema, shortness of breath, 5 lb weight increase.     Will start torsemide 10 mg daily   KDUR 10 meq daily   Elevate legs, will have SNF apply ACE wraps in AM and off in PM  Follow up in CHF clinic in 1 week     Thank you

## 2024-01-17 ENCOUNTER — OFFICE VISIT (OUTPATIENT)
Dept: NEUROSURGERY | Age: 89
End: 2024-01-17
Payer: MEDICARE

## 2024-01-17 ENCOUNTER — HOSPITAL ENCOUNTER (OUTPATIENT)
Dept: CT IMAGING | Age: 89
Discharge: HOME | End: 2024-01-19
Attending: NEUROLOGICAL SURGERY
Payer: MEDICARE

## 2024-01-17 ENCOUNTER — OUTSIDE SERVICES (OUTPATIENT)
Dept: PRIMARY CARE CLINIC | Age: 89
End: 2024-01-17
Payer: MEDICARE

## 2024-01-17 ENCOUNTER — CARE COORDINATION (OUTPATIENT)
Dept: CARE COORDINATION | Age: 89
End: 2024-01-17

## 2024-01-17 VITALS — HEIGHT: 60 IN | WEIGHT: 100 LBS | RESPIRATION RATE: 14 BRPM | BODY MASS INDEX: 19.63 KG/M2

## 2024-01-17 DIAGNOSIS — R94.5 ABNORMAL LIVER FUNCTION: ICD-10-CM

## 2024-01-17 DIAGNOSIS — J18.9 PNEUMONIA DUE TO INFECTIOUS ORGANISM, UNSPECIFIED LATERALITY, UNSPECIFIED PART OF LUNG: ICD-10-CM

## 2024-01-17 DIAGNOSIS — D64.9 ANEMIA, UNSPECIFIED TYPE: ICD-10-CM

## 2024-01-17 DIAGNOSIS — G93.9 BRAIN LESION: ICD-10-CM

## 2024-01-17 DIAGNOSIS — R93.0 ABNORMAL HEAD CT: ICD-10-CM

## 2024-01-17 DIAGNOSIS — N18.32 STAGE 3B CHRONIC KIDNEY DISEASE (HCC): ICD-10-CM

## 2024-01-17 DIAGNOSIS — I42.0 DILATED CARDIOMYOPATHY (HCC): ICD-10-CM

## 2024-01-17 DIAGNOSIS — I50.9 CONGESTIVE HEART FAILURE, UNSPECIFIED HF CHRONICITY, UNSPECIFIED HEART FAILURE TYPE (HCC): Primary | ICD-10-CM

## 2024-01-17 DIAGNOSIS — I25.10 CORONARY ARTERY DISEASE INVOLVING NATIVE CORONARY ARTERY OF NATIVE HEART WITHOUT ANGINA PECTORIS: ICD-10-CM

## 2024-01-17 DIAGNOSIS — A41.9 SEPSIS, DUE TO UNSPECIFIED ORGANISM, UNSPECIFIED WHETHER ACUTE ORGAN DYSFUNCTION PRESENT (HCC): ICD-10-CM

## 2024-01-17 DIAGNOSIS — J96.01 ACUTE RESPIRATORY FAILURE WITH HYPOXIA (HCC): ICD-10-CM

## 2024-01-17 DIAGNOSIS — R93.0 ABNORMAL HEAD CT: Primary | ICD-10-CM

## 2024-01-17 DIAGNOSIS — I48.91 ATRIAL FIBRILLATION, UNSPECIFIED TYPE (HCC): ICD-10-CM

## 2024-01-17 DIAGNOSIS — N39.0 URINARY TRACT INFECTION WITHOUT HEMATURIA, SITE UNSPECIFIED: ICD-10-CM

## 2024-01-17 DIAGNOSIS — I10 ESSENTIAL HYPERTENSION: ICD-10-CM

## 2024-01-17 PROCEDURE — G8420 CALC BMI NORM PARAMETERS: HCPCS | Performed by: STUDENT IN AN ORGANIZED HEALTH CARE EDUCATION/TRAINING PROGRAM

## 2024-01-17 PROCEDURE — 1036F TOBACCO NON-USER: CPT | Performed by: STUDENT IN AN ORGANIZED HEALTH CARE EDUCATION/TRAINING PROGRAM

## 2024-01-17 PROCEDURE — 1111F DSCHRG MED/CURRENT MED MERGE: CPT | Performed by: STUDENT IN AN ORGANIZED HEALTH CARE EDUCATION/TRAINING PROGRAM

## 2024-01-17 PROCEDURE — 1123F ACP DISCUSS/DSCN MKR DOCD: CPT | Performed by: STUDENT IN AN ORGANIZED HEALTH CARE EDUCATION/TRAINING PROGRAM

## 2024-01-17 PROCEDURE — 1090F PRES/ABSN URINE INCON ASSESS: CPT | Performed by: STUDENT IN AN ORGANIZED HEALTH CARE EDUCATION/TRAINING PROGRAM

## 2024-01-17 PROCEDURE — 99213 OFFICE O/P EST LOW 20 MIN: CPT | Performed by: STUDENT IN AN ORGANIZED HEALTH CARE EDUCATION/TRAINING PROGRAM

## 2024-01-17 PROCEDURE — G8484 FLU IMMUNIZE NO ADMIN: HCPCS | Performed by: STUDENT IN AN ORGANIZED HEALTH CARE EDUCATION/TRAINING PROGRAM

## 2024-01-17 PROCEDURE — 70450 CT HEAD/BRAIN W/O DYE: CPT

## 2024-01-17 PROCEDURE — 99309 SBSQ NF CARE MODERATE MDM 30: CPT | Performed by: NURSE PRACTITIONER

## 2024-01-17 PROCEDURE — G8427 DOCREV CUR MEDS BY ELIG CLIN: HCPCS | Performed by: STUDENT IN AN ORGANIZED HEALTH CARE EDUCATION/TRAINING PROGRAM

## 2024-01-17 NOTE — CARE COORDINATION
Care Transitions Post-Acute Facility Update Call    2024    Patient: Barbara Jack Patient : 1935   MRN: <[id number]>  Reason for Admission: Sepsis, pneumonia, UTI  Discharge Date: 23 RARS: Readmission Risk Score: 22     Post Acute Facility Update    Care Transitions Post Acute Facility Update    Care Transitions Interventions    Physical Therapy: Declined     Occupational Therapy: Declined           Post Acute Facility Update   Post Acute Facility: [facility]    ELOS: 20 days      Nursing   Prescribed diet: TIFFANY    Nutrition intake assessment: isabell   Disease specific clinical considerations: CHF   Reported Nursing Updates: TIFFANY, had CHF clinic visit- 5 # weight gain- orders to elevate legs and wrap in morning off at night, Torsemide 10 mg added, continues with 2 L oxygen       Rehab/Functional   Prior Level of Functioning: PRN A   Cognitive function assessment: A&O X 3, some confusion   ADLs: Stand by Assist - Presence and Cueing   Bed Mobility: Stand by Assist - Presence and Cueing   Transfer Assistance: Stand by Assist - Presence and Cueing   Ambulation Assistance: Stand by Assist - Presence and Cueing   How far (in feet) is the patient ambulating?: 100   Does patient use an assistive device?: Yes   Assistive Devices: FWW   Rehab Notes: 100 ft SBA FWW- safety improved with oxygen tubing management, 5 steps with B rails SBA, toileting CGA, hygiene CGA/min A       SW/Discharge Planning   Goals of Care: home with HH   Palliative/Hospice Referral indicated: Neg   Caregiver support:    Anticipated date for discharge: 24   Discharge Planning / Barriers: Weakness, cognition, limited mobility   Care Progression on Track?: Pos  Next IDT Planned Review: 24           Next IDT Planned Review: 23    Future Appointments   Date Time Provider Department Center   2024  1:00 PM [facility] RM 8 [facility]   2024  9:30 AM [facility] ROOM 1 [facility]

## 2024-01-17 NOTE — PROGRESS NOTES
Hospital Follow-up     This is a 88 year old female who presents to the office for a 1 month follow-up for abnormal head CT     Subjective: Patient stated she is doing well. She denies any headaches or dizziness. No numbness or weakness. No other complaints. Head CT reviewed with patient.      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      Assessment: This is a 88 y.o.  female presenting for a 1 month follow-up for abnormal head CT     Plan:  -CT scan reviewed, lesion still present, it is a hyperdense tumor, patient unable to obtain MRI d/t pacemaker and can not obtain CT W contrast d/t renal function, patient declined referral to [facility] or [facility]  -No neurosurgical intervention recommended  -Discussed signs and symptoms that would prompt an ER visit.  -OARRS report reviewed   -Call or return to neurosurgery office sooner if symptoms worsen or if new issues arise in the interim.    Electronically signed by Kenya Aldana PA-C on 2024 at 4:04 PM

## 2024-01-19 RX ORDER — METOPROLOL SUCCINATE 25 MG/1
25 TABLET, EXTENDED RELEASE ORAL DAILY
Qty: 90 TABLET | Refills: 0 | Status: SHIPPED | OUTPATIENT
Start: 2024-01-19

## 2024-01-22 RX ORDER — SIMVASTATIN 20 MG
20 TABLET ORAL
Qty: 90 TABLET | Refills: 0 | OUTPATIENT
Start: 2024-01-22

## 2024-01-22 RX ORDER — METOPROLOL SUCCINATE 25 MG/1
25 TABLET, EXTENDED RELEASE ORAL NIGHTLY
Qty: 90 TABLET | Refills: 3 | OUTPATIENT
Start: 2024-01-22

## 2024-01-23 ENCOUNTER — CARE COORDINATION (OUTPATIENT)
Dept: CARE COORDINATION | Age: 89
End: 2024-01-23

## 2024-01-23 ENCOUNTER — OFFICE VISIT (OUTPATIENT)
Dept: PRIMARY CARE CLINIC | Age: 89
End: 2024-01-23
Payer: MEDICARE

## 2024-01-23 VITALS
BODY MASS INDEX: 18.06 KG/M2 | TEMPERATURE: 97.5 F | OXYGEN SATURATION: 96 % | SYSTOLIC BLOOD PRESSURE: 92 MMHG | WEIGHT: 92 LBS | DIASTOLIC BLOOD PRESSURE: 48 MMHG | HEART RATE: 89 BPM | HEIGHT: 60 IN

## 2024-01-23 DIAGNOSIS — J41.0 SIMPLE CHRONIC BRONCHITIS (HCC): ICD-10-CM

## 2024-01-23 DIAGNOSIS — I42.0 DILATED CARDIOMYOPATHY (HCC): ICD-10-CM

## 2024-01-23 DIAGNOSIS — J18.9 PNEUMONIA DUE TO INFECTIOUS ORGANISM, UNSPECIFIED LATERALITY, UNSPECIFIED PART OF LUNG: Primary | ICD-10-CM

## 2024-01-23 DIAGNOSIS — N18.32 STAGE 3B CHRONIC KIDNEY DISEASE (HCC): ICD-10-CM

## 2024-01-23 DIAGNOSIS — N18.32 TYPE 2 DIABETES MELLITUS WITH STAGE 3B CHRONIC KIDNEY DISEASE, WITHOUT LONG-TERM CURRENT USE OF INSULIN (HCC): ICD-10-CM

## 2024-01-23 DIAGNOSIS — R64 CACHEXIA (HCC): ICD-10-CM

## 2024-01-23 DIAGNOSIS — E11.22 TYPE 2 DIABETES MELLITUS WITH STAGE 3B CHRONIC KIDNEY DISEASE, WITHOUT LONG-TERM CURRENT USE OF INSULIN (HCC): ICD-10-CM

## 2024-01-23 DIAGNOSIS — I50.23 ACUTE ON CHRONIC SYSTOLIC (CONGESTIVE) HEART FAILURE (HCC): ICD-10-CM

## 2024-01-23 PROCEDURE — 99214 OFFICE O/P EST MOD 30 MIN: CPT | Performed by: INTERNAL MEDICINE

## 2024-01-23 PROCEDURE — 1090F PRES/ABSN URINE INCON ASSESS: CPT | Performed by: INTERNAL MEDICINE

## 2024-01-23 PROCEDURE — G8427 DOCREV CUR MEDS BY ELIG CLIN: HCPCS | Performed by: INTERNAL MEDICINE

## 2024-01-23 PROCEDURE — 3023F SPIROM DOC REV: CPT | Performed by: INTERNAL MEDICINE

## 2024-01-23 PROCEDURE — 1111F DSCHRG MED/CURRENT MED MERGE: CPT | Performed by: INTERNAL MEDICINE

## 2024-01-23 PROCEDURE — G8419 CALC BMI OUT NRM PARAM NOF/U: HCPCS | Performed by: INTERNAL MEDICINE

## 2024-01-23 PROCEDURE — G8484 FLU IMMUNIZE NO ADMIN: HCPCS | Performed by: INTERNAL MEDICINE

## 2024-01-23 PROCEDURE — 1123F ACP DISCUSS/DSCN MKR DOCD: CPT | Performed by: INTERNAL MEDICINE

## 2024-01-23 PROCEDURE — 1036F TOBACCO NON-USER: CPT | Performed by: INTERNAL MEDICINE

## 2024-01-23 RX ORDER — PANTOPRAZOLE SODIUM 40 MG/1
TABLET, DELAYED RELEASE ORAL
COMMUNITY
Start: 2024-01-18 | End: 2024-01-25

## 2024-01-23 RX ORDER — SIMVASTATIN 20 MG
20 TABLET ORAL NIGHTLY
Qty: 90 TABLET | Refills: 0 | Status: SHIPPED | OUTPATIENT
Start: 2024-01-23

## 2024-01-23 ASSESSMENT — PATIENT HEALTH QUESTIONNAIRE - PHQ9
2. FEELING DOWN, DEPRESSED OR HOPELESS: 0
SUM OF ALL RESPONSES TO PHQ QUESTIONS 1-9: 0
SUM OF ALL RESPONSES TO PHQ QUESTIONS 1-9: 0
1. LITTLE INTEREST OR PLEASURE IN DOING THINGS: 0
SUM OF ALL RESPONSES TO PHQ9 QUESTIONS 1 & 2: 0
SUM OF ALL RESPONSES TO PHQ QUESTIONS 1-9: 0
SUM OF ALL RESPONSES TO PHQ QUESTIONS 1-9: 0

## 2024-01-23 NOTE — TELEPHONE ENCOUNTER
----- Message from Carmita Hayes sent at 1/23/2024  3:13 PM EST -----  Subject: Refill Request    QUESTIONS  Name of Medication? losartan (COZAAR) 25 MG tablet  Patient-reported dosage and instructions? 25mg, 1x daily  How many days do you have left? 0  [id number]  Pharmacy phone number (if available)? [phone]  ---------------------------------------------------------------------------  --------------  CALL BACK INFO  What is the best way for the office to contact you? OK to leave message on   voicemail,OK to respond with electronic message via [employer] portal (only   for patients who have registered [employer] account)  Preferred Call Back Phone Number? [phone]  ---------------------------------------------------------------------------  --------------  SCRIPT ANSWERS  Relationship to Patient? Spouse/Partner  Representative Name? See   Is the representative on the Communication Release of Information (VIPUL)   form in Epic? Yes

## 2024-01-23 NOTE — PROGRESS NOTES
Barbara ROGELIO Jack presents today for follow up after recent hospitalization    Current Outpatient Medications   Medication Sig Dispense Refill    metoprolol succinate (TOPROL XL) 25 MG extended release tablet TAKE 1 TABLET BY MOUTH EVERY DAY 90 tablet 0    potassium chloride (KLOR-CON M) 10 MEQ extended release tablet Take 1 tablet by mouth daily 90 tablet 1    arformoterol tartrate (BROVANA) 15 MCG/2ML NEBU Take 2 mLs by nebulization in the morning and 2 mLs in the evening. 120 mL 3    fluticasone (FLONASE) 50 MCG/ACT nasal spray 1 spray by Each Nostril route daily (Patient not taking: Reported on 1/25/2024) 16 g 3    ipratropium (ATROVENT) 0.02 % nebulizer solution Take 2.5 mLs by nebulization 4 times daily 2.5 mL 3    Respiratory Therapy Supplies (FULL KIT NEBULIZER SET) MISC Use as directed with nebulized medication. 1 each 0    vitamin D (ERGOCALCIFEROL) 1.25 MG (64218 UT) CAPS capsule Take 1 capsule by mouth once a week      omeprazole (PRILOSEC) 40 MG delayed release capsule Take 1 capsule by mouth daily 90 capsule 1    ASPIRIN LOW DOSE 81 MG EC tablet TAKE 1 TABLET BY MOUTH EVERY DAY 90 tablet 0    acetaminophen (TYLENOL) 500 MG tablet Take 1 tablet by mouth every 6 hours as needed for Pain 120 tablet 3    Methylfol-Algae-Q18-Smhuvsddva (CEREFOLIN NAC) 6-90.314-2-600 MG TABS Take by mouth once a week      fluticasone-umeclidin-vilant (TRELEGY ELLIPTA) 200-62.5-25 MCG/ACT AEPB inhaler Inhale 1 puff into the lungs daily      rivaroxaban (XARELTO) 15 MG TABS tablet Take 1 tablet by mouth daily (with breakfast) 90 tablet 2    budesonide (PULMICORT) 0.25 MG/2ML nebulizer suspension Take 2 mLs by nebulization in the morning and 2 mLs in the evening. 180 each 1    amiodarone (PACERONE) 100 MG tablet Take 1 tablet by mouth daily      losartan (COZAAR) 25 MG tablet Take 1 tablet by mouth daily 90 tablet 0    simvastatin (ZOCOR) 20 MG tablet Take 1 tablet by mouth nightly 90 tablet 0    torsemide (DEMADEX) 10 MG tablet

## 2024-01-23 NOTE — CARE COORDINATION
Care Transitions Initial Follow Up Call    Call within 2 business days of discharge: Yes    Patient Current Location:  Home: [address] [place] [address]    Post Acute Care Manager contacted the spouse/partner by telephone to perform post hospital discharge assessment. Verified name and  with spouse/partner as identifiers. Provided introduction to self, and explanation of the Post Acute Care Manager role.     Patient: Barbara Jack Patient : 1935   MRN: <[id number]>  Reason for Admission: Sepsis, pneumonia, UTI  Discharge Date: 23 RARS: Readmission Risk Score: 22      Last Discharge Facility       Date Complaint Diagnosis Description Type Department Provider    23 Illness Sepsis, due to unspecified organism, unspecified whether acute organ dysfunction present (HCC) ... ED to Hosp-Admission (Discharged) (ADMITTED) [facility]att MD; Obed Salcido..            Was this an external facility discharge? Yes, 24  Discharge Facility: [facility]    Challenges to be reviewed by the provider   Additional needs identified to be addressed with provider: Yes  medications-will not be able to continue to refill  Trelegy says the cost is over $400 and will not be able to afford, also currently has Xarelto but is concerned about cost in the future- would like to know if anysamples for Xarelto available               Method of communication with provider: chart routing.    Spoke to patient's  to complete LATIA, [facility] home health nurse has been out twice and PT expected tomorrow, had PCP visit today, concerns about cost of Trelegy and Xarelto - currently has each- will not be able to afford refill of Trelegy - message to PCP to see if another medication can be substituted, says patient doing well at home, denies DME needs    Post Acute Care Manager reviewed discharge instructions and red flags with spouse/partner who verbalized understanding. The spouse/partner was given an

## 2024-01-23 NOTE — TELEPHONE ENCOUNTER
Patient requesting a refill of her     simvastatin (ZOCOR) 20 MG [facility] [address], Also states they do NOT NEED the Losartin they have 2 bottles of it. Thank you.

## 2024-01-24 ENCOUNTER — HOSPITAL ENCOUNTER (OUTPATIENT)
Dept: INFUSION THERAPY | Age: 89
Setting detail: INFUSION SERIES
Discharge: HOME | End: 2024-01-24

## 2024-01-24 ENCOUNTER — CARE COORDINATION (OUTPATIENT)
Dept: CARE COORDINATION | Age: 89
End: 2024-01-24

## 2024-01-24 RX ORDER — LOSARTAN POTASSIUM 25 MG/1
25 TABLET ORAL DAILY
Qty: 90 TABLET | Refills: 0 | Status: SHIPPED | OUTPATIENT
Start: 2024-01-24

## 2024-01-24 SDOH — ECONOMIC STABILITY: TRANSPORTATION INSECURITY
IN THE PAST 12 MONTHS, HAS LACK OF TRANSPORTATION KEPT YOU FROM MEETINGS, WORK, OR FROM GETTING THINGS NEEDED FOR DAILY LIVING?: NO

## 2024-01-24 SDOH — ECONOMIC STABILITY: HOUSING INSECURITY: IN THE LAST 12 MONTHS, HOW MANY PLACES HAVE YOU LIVED?: 1

## 2024-01-24 SDOH — ECONOMIC STABILITY: INCOME INSECURITY: IN THE LAST 12 MONTHS, WAS THERE A TIME WHEN YOU WERE NOT ABLE TO PAY THE MORTGAGE OR RENT ON TIME?: NO

## 2024-01-24 SDOH — ECONOMIC STABILITY: TRANSPORTATION INSECURITY
IN THE PAST 12 MONTHS, HAS THE LACK OF TRANSPORTATION KEPT YOU FROM MEDICAL APPOINTMENTS OR FROM GETTING MEDICATIONS?: NO

## 2024-01-24 SDOH — ECONOMIC STABILITY: FOOD INSECURITY: WITHIN THE PAST 12 MONTHS, THE FOOD YOU BOUGHT JUST DIDN'T LAST AND YOU DIDN'T HAVE MONEY TO GET MORE.: NEVER TRUE

## 2024-01-24 SDOH — ECONOMIC STABILITY: FOOD INSECURITY: WITHIN THE PAST 12 MONTHS, YOU WORRIED THAT YOUR FOOD WOULD RUN OUT BEFORE YOU GOT MONEY TO BUY MORE.: NEVER TRUE

## 2024-01-24 ASSESSMENT — SOCIAL DETERMINANTS OF HEALTH (SDOH)
DO YOU BELONG TO ANY CLUBS OR ORGANIZATIONS SUCH AS CHURCH GROUPS UNIONS, FRATERNAL OR ATHLETIC GROUPS, OR SCHOOL GROUPS?: YES
HOW OFTEN DO YOU GET TOGETHER WITH FRIENDS OR RELATIVES?: THREE TIMES A WEEK
HOW OFTEN DO YOU ATTENT MEETINGS OF THE CLUB OR ORGANIZATION YOU BELONG TO?: 1 TO 4 TIMES PER YEAR
IN A TYPICAL WEEK, HOW MANY TIMES DO YOU TALK ON THE PHONE WITH FAMILY, FRIENDS, OR NEIGHBORS?: THREE TIMES A WEEK
HOW OFTEN DO YOU ATTEND CHURCH OR RELIGIOUS SERVICES?: MORE THAN 4 TIMES PER YEAR

## 2024-01-24 NOTE — CARE COORDINATION
Ambulatory Care Coordination Note  2024    Patient Current Location:  Home: [facility] [address]  [place] [address]    ACM contacted the patient and spouse/partner by telephone. Verified name and  with patient and spouse/partner as identifiers. Provided introduction to self, and explanation of the ACM role.   Will discuss RPM further , pt is great candidate for services.     Spoke with Cui, pt's  who states pt is doing well. Pt will have home care through [employer] ( believes that is the company). He was not sure if she was getting therapy but knew a nurse would be coming. She also goes to CHF clinic and will have an appointment tomorrow.  is very attentive and resourceful. She is taking her medication as directed, she does not test her BS . Her last A1C was in December and was 6.0. Home monitoring of vitals, weight via RPM would be beneficial for pt and will discuss further with care coordination.  No complaints of dizziness, headache, dyspnea or chest pain today.     CHF:  Denies s/s CHF Exacerbation.  Discussed Zone Tool and verbalizes understanding.  Today's weight: 92 pounds     Follow HF Zone Tool:  Has Caregiver Assistance, Verbalizes Understanding, Needs Reinforcement, and Able to teachback  Daily weights before breakfast and log them:  Has Caregiver Assistance, Verbalizes Understanding, Needs Reinforcement, and Able to teachback  Follow low sodium diet:  Has Caregiver Assistance, Verbalizes Understanding, Needs Reinforcement, and Able to teachback  Report weight gain or loss of greater than or equal to 3 pounds in 1-7 days:  Has Caregiver Assistance, Verbalizes Understanding, Needs Reinforcement, and Able to teachback  Balance activity and rest periods:  Verbalizes Understanding and Needs Reinforcement  Check for swelling in hands, feet, ankles and stomach:  Verbalizes Understanding and Needs Reinforcement  Take medications as prescribed:  Has Caregiver Assistance, Verbalizes

## 2024-01-25 ENCOUNTER — HOSPITAL ENCOUNTER (OUTPATIENT)
Dept: OTHER | Age: 89
Setting detail: THERAPIES SERIES
Discharge: HOME | End: 2024-01-25
Payer: MEDICARE

## 2024-01-25 VITALS
SYSTOLIC BLOOD PRESSURE: 99 MMHG | DIASTOLIC BLOOD PRESSURE: 53 MMHG | HEART RATE: 69 BPM | RESPIRATION RATE: 18 BRPM | BODY MASS INDEX: 18.55 KG/M2 | OXYGEN SATURATION: 99 % | WEIGHT: 95 LBS

## 2024-01-25 DIAGNOSIS — I50.9 CONGESTIVE HEART FAILURE, NYHA CLASS 1, UNSPECIFIED CONGESTIVE HEART FAILURE TYPE (HCC): Primary | ICD-10-CM

## 2024-01-25 LAB
ANION GAP SERPL CALCULATED.3IONS-SCNC: 10 MMOL/L (ref 7–16)
BNP SERPL-MCNC: ABNORMAL PG/ML (ref 0–450)
BUN SERPL-MCNC: 19 MG/DL (ref 6–23)
CALCIUM SERPL-MCNC: 9.7 MG/DL (ref 8.6–10.2)
CHLORIDE SERPL-SCNC: 98 MMOL/L (ref 98–107)
CO2 SERPL-SCNC: 33 MMOL/L (ref 22–29)
CREAT SERPL-MCNC: 1.1 MG/DL (ref 0.5–1)
GFR SERPL CREATININE-BSD FRML MDRD: 50 ML/MIN/1.73M2
GLUCOSE SERPL-MCNC: 129 MG/DL (ref 74–99)
POTASSIUM SERPL-SCNC: 4.4 MMOL/L (ref 3.5–5)
SODIUM SERPL-SCNC: 141 MMOL/L (ref 132–146)

## 2024-01-25 PROCEDURE — 83880 ASSAY OF NATRIURETIC PEPTIDE: CPT

## 2024-01-25 PROCEDURE — 99214 OFFICE O/P EST MOD 30 MIN: CPT

## 2024-01-25 PROCEDURE — 80048 BASIC METABOLIC PNL TOTAL CA: CPT

## 2024-01-25 RX ORDER — AMIODARONE HYDROCHLORIDE 100 MG/1
100 TABLET ORAL DAILY
COMMUNITY

## 2024-01-25 NOTE — PLAN OF CARE
Problem: Chronic Conditions and Co-morbidities  Goal: Patient's chronic conditions and co-morbidity symptoms are monitored and maintained or improved  Flowsheets (Taken 1/25/2024 3476)  Care Plan - Patient's Chronic Conditions and Co-Morbidity Symptoms are Monitored and Maintained or Improved: Monitor and assess patient's chronic conditions and comorbid symptoms for stability, deterioration, or improvement

## 2024-01-26 ENCOUNTER — TELEPHONE (OUTPATIENT)
Dept: PRIMARY CARE CLINIC | Age: 89
End: 2024-01-26

## 2024-01-26 RX ORDER — BUDESONIDE 0.25 MG/2ML
1 INHALANT ORAL
Qty: 180 EACH | Refills: 1 | Status: SHIPPED | OUTPATIENT
Start: 2024-01-26

## 2024-01-26 NOTE — TELEPHONE ENCOUNTER
Patients  requesting a refill of her    budesonide (PULMICORT) 0.25 MG/2ML nebulizer suspension Please send to [facility] on [address].   States she has a one week supply left in the home. Thank you.

## 2024-01-26 NOTE — PROGRESS NOTES
Venice Guerrero, APRN - CNP  Nurse Practitioner  Specialty: Nurse Practitioner     Result Encounter Note     Signed     Date of Service: 1/25/2024  9:30 AM     Signed         Labs and CHF clinic note reviewed  Vitals: 99/53, 69     Restarted diuretics last week   Please have SNF draw BMP/BNP in 1 week and fax to CHF Clinic   Follow up in 2 weeks as scheduled -sooner if needed                Spoke to  , pt is no longer in SNF.  Above instructions given to  for repeat labs in one week,  repeated instructions and verbalized understanding.  
aid  Teeth: Missing teeth  Respiratory  Respiratory Pattern: Regular  Respiratory Depth: Normal  Respiratory Quality/Effort: Dyspnea with exertion, Dyspnea at rest  L Breath Sounds: Diminished, Clear  R Breath Sounds: Diminished, Clear        Cardiac  Cardiac Regularity: Regular  Heart Sounds: Murmur  Cardiac Rhythm: Atrial paced  Rhythm Interpretation  Pulse: 69     Gastrointestinal  Abdominal (WDL): Within Defined Limits  RUQ Bowel Sounds: Active  LUQ Bowel Sounds: Active  RLQ Bowel Sounds: Active  LLQ Bowel Sounds: Active  GI Symptoms: Bloating   Gastrointestinal  GI Symptoms: Bloating  Bowel Sounds  RUQ Bowel Sounds: Active  LUQ Bowel Sounds: Active  RLQ Bowel Sounds: Active  LLQ Bowel Sounds: Active  Peripheral Vascular  Peripheral Vascular (WDL): Within Defined Limits  Edema: Right lower extremity, Left lower extremity  RLE Edema: Pitting, +1  LLE Edema: Pitting, +1           Genitourinary  Genitourinary (WDL): Within Defined Limits  Psychosocial  Psychosocial (WDL): Within Defined Limits              Pulse: 69                 LAB DATA:  BMP:  Sodium (mmol/L)   Date Value   01/25/2024 141   01/16/2024 137   01/09/2024 141     Potassium (mmol/L)   Date Value   01/25/2024 4.4   01/16/2024 4.0   01/09/2024 4.7     Potassium reflex Magnesium (mmol/L)   Date Value   12/28/2022 4.2   08/18/2022 4.6   06/08/2020 4.0     Chloride (mmol/L)   Date Value   01/25/2024 98   01/16/2024 99   01/09/2024 106     CO2 (mmol/L)   Date Value   01/25/2024 33 (H)   01/16/2024 27   01/09/2024 27     BUN (mg/dL)   Date Value   01/25/2024 19   01/16/2024 9   01/09/2024 15     Creatinine (mg/dL)   Date Value   01/25/2024 1.1 (H)   01/16/2024 0.8   01/09/2024 0.8     Glucose (mg/dL)   Date Value   01/25/2024 129 (H)   01/16/2024 103 (H)   01/09/2024 84     Calcium (mg/dL)   Date Value   01/25/2024 9.7   01/16/2024 8.6   01/09/2024 9.0     BNP:  Pro-BNP (pg/mL)   Date Value   01/25/2024 17,943 (H)   01/16/2024 19,910 (H)   12/17/2023

## 2024-01-26 NOTE — RESULT ENCOUNTER NOTE
Labs and CHF clinic note reviewed  Vitals: 99/53, 69    Restarted diuretics last week   Please have SNF draw BMP/BNP in 1 week and fax to CHF Clinic   Follow up in 2 weeks as scheduled -sooner if needed    Thank you

## 2024-01-30 ENCOUNTER — TELEPHONE (OUTPATIENT)
Dept: PRIMARY CARE CLINIC | Age: 89
End: 2024-01-30

## 2024-01-30 NOTE — TELEPHONE ENCOUNTER
Pts  called and said the pharmacy stated they are waiting for medicare to get back to them regarding the nebulizer. Pharmacy encouraged them to reach out to dr hernandez to see if the office can reach out to medicare and see if they can get it approved faster.

## 2024-01-31 ENCOUNTER — CARE COORDINATION (OUTPATIENT)
Dept: PRIMARY CARE CLINIC | Age: 89
End: 2024-01-31

## 2024-01-31 ENCOUNTER — CARE COORDINATION (OUTPATIENT)
Dept: CARE COORDINATION | Age: 89
End: 2024-01-31

## 2024-01-31 ENCOUNTER — TELEPHONE (OUTPATIENT)
Dept: PRIMARY CARE CLINIC | Age: 89
End: 2024-01-31

## 2024-01-31 DIAGNOSIS — I50.9 CHF (CONGESTIVE HEART FAILURE), NYHA CLASS I, UNSPECIFIED FAILURE CHRONICITY, UNSPECIFIED TYPE (HCC): Primary | ICD-10-CM

## 2024-01-31 DIAGNOSIS — J44.9 CHRONIC OBSTRUCTIVE PULMONARY DISEASE, UNSPECIFIED COPD TYPE (HCC): ICD-10-CM

## 2024-01-31 NOTE — TELEPHONE ENCOUNTER
Spoke to Yarelis , Manager for Ambulatory care who wanted Dr. Dyer to know that Brittany spoke to them to let them know that she was short of breath but did not have her oxygen on. Stated Almita told her to only wear her oxygen if her stats fell below 90. Yarelis advised her to put her oxygen on but was concerned because she only has 1 to 2 liters. Asking to have a new prescription sent in so she can wear it as needed and up the liters to 3.  Also states the patient has COPD. Thank you.

## 2024-01-31 NOTE — TELEPHONE ENCOUNTER
Phoned patient regarding the oxygen issue and  says they are fine with oxygen set up they have . No problems. He is having difficult getting his insurance [employer] through [facility] to approve the Pulmicort 0.25 nebulizer  (budesonide) BID. Will do a prior auth on Cover my Meds or call and figure out what the hold up on this is.

## 2024-01-31 NOTE — CARE COORDINATION
Continue current cardiac medications  
  3/5/2024 11:15 AM Julita Dyer MD [facility]   3/8/2024  1:45 PM David Craft MD [place] [facility]   2024 11:00 AM David Craft MD [place] [facility]   ,   Diabetes Assessment      Meal Planning: Avoidance of concentrated sweets   How often do you test your blood sugar?: No Testing   Do you have barriers with adherence to non-pharmacologic self-management interventions? (Nutrition/Exercise/Self-Monitoring): No   Have you ever had to go to the ED for symptoms of low blood sugar?: No       No patient-reported symptoms   Do you have hyperglycemia symptoms?: No   Do you have hypoglycemia symptoms?: No   Blood Sugar Monitoring Regimen: Not Testing   Blood Sugar Trends: No Change        ,   Congestive Heart Failure Assessment    Do you understand a low sodium diet?: Yes  Do you understand how to read food labels?: Yes  Do you salt your food before tasting it?: No     Shortness of breath (worse than baseline)      Symptoms:  CHF associated shortness of breath: Pos, CHF associated weakness: Pos      Symptom course: no change  Weight trend: stable  Salt intake watch compared to last visit: stable     , and   COPD Assessment    Does the patient understand envrionmental exposure?: Yes  Is the patient able to verbalize Rescue vs. Long Acting medications?: Yes  Does the patient have a nebulizer?: Yes  Does the patient use a space with inhaled medications?: No     Shortness of breath (worse than baseline)         Symptoms:  COPD associated increased fatigue: Pos      Symptom course: no change  Increase use of rapid acting/rescue inhaled medications?: No  Change in chronic cough?: No/At Baseline  Change in sputum?: No/At Baseline  Sputum characteristics: Clear  Self Monitoring - SaO2: Yes  Baseline SaO2 Readin  Have you had a recent diagnosis of pneumonia either by PCP or at a hospital?: No

## 2024-01-31 NOTE — PROGRESS NOTES
Remote Patient Monitoring Treatment Plan    Received request from [facility]/Holly Wiggins, RN  to order remote patient monitoring for in home monitoring of CHF; Condition managed by PCP; also attend CHF Clinic managed by DUKE Landis CNP;  COPD; Condition managed by PCP.  and order completed.     Patient will be monitoring blood pressure   pulse ox   weight  survey questions.      Patient will engage in Remote Patient Monitoring each day to develop the skills necessary for self management.       RPM Care Team Responsibilities:   Alerts will be reviewed daily and addressed within 2-4 hours during operational hours (Monday -Friday 9 am-4 pm)  Alert response and intervention documented in patient medical record  Alert response escalated to PCP per protocol and documented in patient medical record  Patient monitored over approximately  days  Discharge from program based on self-management readiness    See care coordination encounters for additional details.

## 2024-01-31 NOTE — CARE COORDINATION
Remote Patient Kit Ordering Note      Date/Time:  1/31/2024 2:35 PM      [x] [facility] confirmed patient shipping address  [x] Patient will receive package over the next 1-3 business days. Someone 21 years or older must be present to sign for UPS delivery.  [x] HRS will contact patient within 24 hours, an HRS  will call the patient directly: If the patient does not answer, HRS will follow up with the clinical team notifying them about the unsuccessful attempt to contact the patient. HRS will make three call attempts to the patient.Provide patient with [facility] Virtual install number is: [phone].  [x] ACM will contact patient once equipment is active to welcome them to the program.                                                         [x] Hours of RPM monitoring - Monday-Friday 4985-4827; encourage patient to get vitals entered by Noon each day to have the alert addressed same day.  [x][facility].                     All questions answered at this time. ACM made aware the RPM kit has been ordered.      [facility] notified patient of RPM equipment order.

## 2024-02-02 ENCOUNTER — HOSPITAL ENCOUNTER (EMERGENCY)
Age: 89
Discharge: HOME | End: 2024-02-02
Payer: MEDICARE

## 2024-02-02 ENCOUNTER — CARE COORDINATION (OUTPATIENT)
Dept: CARE COORDINATION | Age: 89
End: 2024-02-02

## 2024-02-02 ENCOUNTER — APPOINTMENT (OUTPATIENT)
Dept: CT IMAGING | Age: 89
End: 2024-02-02
Payer: MEDICARE

## 2024-02-02 ENCOUNTER — HOSPITAL ENCOUNTER (OUTPATIENT)
Age: 89
Discharge: HOME | End: 2024-02-02
Payer: MEDICARE

## 2024-02-02 VITALS
BODY MASS INDEX: 17.97 KG/M2 | TEMPERATURE: 98.4 F | OXYGEN SATURATION: 98 % | RESPIRATION RATE: 16 BRPM | DIASTOLIC BLOOD PRESSURE: 59 MMHG | WEIGHT: 92 LBS | HEART RATE: 57 BPM | SYSTOLIC BLOOD PRESSURE: 112 MMHG

## 2024-02-02 DIAGNOSIS — S09.90XA INJURY OF HEAD, INITIAL ENCOUNTER: ICD-10-CM

## 2024-02-02 DIAGNOSIS — G93.9 BRAIN LESION: ICD-10-CM

## 2024-02-02 DIAGNOSIS — I50.9 CONGESTIVE HEART FAILURE, NYHA CLASS 1, UNSPECIFIED CONGESTIVE HEART FAILURE TYPE (HCC): ICD-10-CM
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
## 2024-02-02 LAB
ALBUMIN SERPL-MCNC: 3.4 G/DL (ref 3.5–5.2)
ALP SERPL-CCNC: 76 U/L (ref 35–104)
ALT SERPL-CCNC: 11 U/L (ref 0–32)
ANION GAP SERPL CALCULATED.3IONS-SCNC: 8 MMOL/L (ref 7–16)
ANION GAP SERPL CALCULATED.3IONS-SCNC: 9 MMOL/L (ref 7–16)
AST SERPL-CCNC: 18 U/L (ref 0–31)
BASOPHILS # BLD: 0.03 K/UL (ref 0–0.2)
BASOPHILS NFR BLD: 1 % (ref 0–2)
BILIRUB SERPL-MCNC: 0.5 MG/DL (ref 0–1.2)
BNP SERPL-MCNC: ABNORMAL PG/ML (ref 0–450)
BUN SERPL-MCNC: 25 MG/DL (ref 6–23)
BUN SERPL-MCNC: 25 MG/DL (ref 6–23)
CALCIUM SERPL-MCNC: 9 MG/DL (ref 8.6–10.2)
CALCIUM SERPL-MCNC: 9.2 MG/DL (ref 8.6–10.2)
CHLORIDE SERPL-SCNC: 100 MMOL/L (ref 98–107)
CHLORIDE SERPL-SCNC: 99 MMOL/L (ref 98–107)
CO2 SERPL-SCNC: 34 MMOL/L (ref 22–29)
CO2 SERPL-SCNC: 34 MMOL/L (ref 22–29)
CREAT SERPL-MCNC: 1.2 MG/DL (ref 0.5–1)
CREAT SERPL-MCNC: 1.2 MG/DL (ref 0.5–1)
EOSINOPHIL # BLD: 0.1 K/UL (ref 0.05–0.5)
EOSINOPHILS RELATIVE PERCENT: 2 % (ref 0–6)
ERYTHROCYTE [DISTWIDTH] IN BLOOD BY AUTOMATED COUNT: 20.6 % (ref 11.5–15)
FERRITIN SERPL-MCNC: 116 NG/ML
FOLATE SERPL-MCNC: >20 NG/ML (ref 4.8–24.2)
GFR SERPL CREATININE-BSD FRML MDRD: 44 ML/MIN/1.73M2
GFR SERPL CREATININE-BSD FRML MDRD: 44 ML/MIN/1.73M2
GLUCOSE SERPL-MCNC: 111 MG/DL (ref 74–99)
GLUCOSE SERPL-MCNC: 113 MG/DL (ref 74–99)
HCT VFR BLD AUTO: 28.2 % (ref 34–48)
HGB BLD-MCNC: 8.6 G/DL (ref 11.5–15.5)
IMM GRANULOCYTES # BLD AUTO: 0.06 K/UL (ref 0–0.58)
IMM GRANULOCYTES NFR BLD: 1 % (ref 0–5)
IMM RETICS NFR: 27.7 % (ref 3–15.9)
IRON SATN MFR SERPL: 11 % (ref 15–50)
IRON SERPL-MCNC: 35 UG/DL (ref 37–145)
LYMPHOCYTES NFR BLD: 0.81 K/UL (ref 1.5–4)
LYMPHOCYTES RELATIVE PERCENT: 13 % (ref 20–42)
MCH RBC QN AUTO: 28.9 PG (ref 26–35)
MCHC RBC AUTO-ENTMCNC: 30.5 G/DL (ref 32–34.5)
MCV RBC AUTO: 94.6 FL (ref 80–99.9)
MONOCYTES NFR BLD: 0.62 K/UL (ref 0.1–0.95)
MONOCYTES NFR BLD: 10 % (ref 2–12)
NEUTROPHILS NFR BLD: 74 % (ref 43–80)
NEUTS SEG NFR BLD: 4.69 K/UL (ref 1.8–7.3)
PLATELET # BLD AUTO: 397 K/UL (ref 130–450)
PMV BLD AUTO: 8.4 FL (ref 7–12)
POTASSIUM SERPL-SCNC: 4.4 MMOL/L (ref 3.5–5)
POTASSIUM SERPL-SCNC: 4.5 MMOL/L (ref 3.5–5)
PROT SERPL-MCNC: 6.1 G/DL (ref 6.4–8.3)
RBC # BLD AUTO: 2.98 M/UL (ref 3.5–5.5)
RBC # BLD: ABNORMAL 10*6/UL
RETIC HEMOGLOBIN: 27.7 PG (ref 28.2–36.6)
RETICS # AUTO: 0.08 M/UL
RETICS/RBC NFR AUTO: 2.8 % (ref 0.4–1.9)
SODIUM SERPL-SCNC: 142 MMOL/L (ref 132–146)
SODIUM SERPL-SCNC: 142 MMOL/L (ref 132–146)
TIBC SERPL-MCNC: 309 UG/DL (ref 250–450)
VIT B12 SERPL-MCNC: >2000 PG/ML (ref 211–946)
WBC OTHER # BLD: 6.3 K/UL (ref 4.5–11.5)

## 2024-02-02 PROCEDURE — 85045 AUTOMATED RETICULOCYTE COUNT: CPT

## 2024-02-02 PROCEDURE — 36415 COLL VENOUS BLD VENIPUNCTURE: CPT

## 2024-02-02 PROCEDURE — 83880 ASSAY OF NATRIURETIC PEPTIDE: CPT

## 2024-02-02 PROCEDURE — 72125 CT NECK SPINE W/O DYE: CPT

## 2024-02-02 PROCEDURE — 80048 BASIC METABOLIC PNL TOTAL CA: CPT

## 2024-02-02 PROCEDURE — 80053 COMPREHEN METABOLIC PANEL: CPT

## 2024-02-02 PROCEDURE — 82746 ASSAY OF FOLIC ACID SERUM: CPT

## 2024-02-02 PROCEDURE — 99284 EMERGENCY DEPT VISIT MOD MDM: CPT

## 2024-02-02 PROCEDURE — 82607 VITAMIN B-12: CPT

## 2024-02-02 PROCEDURE — 83550 IRON BINDING TEST: CPT

## 2024-02-02 PROCEDURE — 83540 ASSAY OF IRON: CPT

## 2024-02-02 PROCEDURE — 82728 ASSAY OF FERRITIN: CPT

## 2024-02-02 PROCEDURE — 70450 CT HEAD/BRAIN W/O DYE: CPT

## 2024-02-02 PROCEDURE — 85025 COMPLETE CBC W/AUTO DIFF WBC: CPT

## 2024-02-02 RX ORDER — CHOLECALCIFEROL (VITAMIN D3) 1250 MCG
CAPSULE ORAL
COMMUNITY

## 2024-02-02 ASSESSMENT — PAIN - FUNCTIONAL ASSESSMENT: PAIN_FUNCTIONAL_ASSESSMENT: 0-10

## 2024-02-02 ASSESSMENT — PAIN DESCRIPTION - LOCATION: LOCATION: HEAD

## 2024-02-02 ASSESSMENT — PAIN SCALES - GENERAL: PAINLEVEL_OUTOF10: 5

## 2024-02-02 ASSESSMENT — PAIN DESCRIPTION - ONSET: ONSET: SUDDEN

## 2024-02-02 ASSESSMENT — PAIN DESCRIPTION - FREQUENCY: FREQUENCY: CONTINUOUS

## 2024-02-02 ASSESSMENT — PAIN DESCRIPTION - PAIN TYPE: TYPE: ACUTE PAIN

## 2024-02-02 ASSESSMENT — PAIN DESCRIPTION - DESCRIPTORS: DESCRIPTORS: DISCOMFORT;ACHING;SORE

## 2024-02-02 NOTE — CARE COORDINATION
tasting it?: No     No patient-reported symptoms      Symptoms:  CHF associated dyspnea on exertion: Pos, CHF associated shortness of breath: Pos, CHF associated weakness: Pos      Symptom course: stable  Weight trend: stable  Salt intake watch compared to last visit: stable     , and   COPD Assessment    Does the patient understand envrionmental exposure?: Yes  Is the patient able to verbalize Rescue vs. Long Acting medications?: Yes  Does the patient have a nebulizer?: Yes  Does the patient use a space with inhaled medications?: No     No patient-reported symptoms         Symptoms:     Have you had a recent diagnosis of pneumonia either by PCP or at a hospital?: No

## 2024-02-03 ASSESSMENT — ENCOUNTER SYMPTOMS
ALLERGIC/IMMUNOLOGIC NEGATIVE: 1
RESPIRATORY NEGATIVE: 1
GASTROINTESTINAL NEGATIVE: 1
EYES NEGATIVE: 1

## 2024-02-03 NOTE — ED PROVIDER NOTES
commands  [place] Coma Scale Score: 15                CIWA Assessment  BP: (!) 112/59  Pulse: 57           PHYSICAL EXAM  1 or more Elements     ED Triage Vitals   BP Temp Temp Source Pulse Respirations SpO2 Height Weight - Scale   02/02/24 1413 02/02/24 1413 02/02/24 1413 02/02/24 1413 02/02/24 1413 02/02/24 1413 -- 02/02/24 1428   (!) 112/59 98.4 °F (36.9 °C) Oral 57 16 98 %  41.7 kg (92 lb)       Physical Exam  Vitals and nursing note reviewed.   Constitutional:       Appearance: Normal appearance.   HENT:      Head: Normocephalic.      Comments: There is a contusion and ecchymosis noted to the left occipital region.  No break in the skin no bleeding is noted.     Mouth/Throat:      Mouth: Mucous membranes are moist.   Eyes:      General: No scleral icterus.     Pupils: Pupils are equal, round, and reactive to light.   Neck:      Comments: There is mild tenderness to the midline of the cervical spine no step-off on exam.  Cardiovascular:      Rate and Rhythm: Normal rate.   Pulmonary:      Effort: Pulmonary effort is normal.   Abdominal:      Palpations: Abdomen is soft.   Musculoskeletal:         General: Normal range of motion.      Cervical back: Tenderness present.   Skin:     General: Skin is warm and dry.      Capillary Refill: Capillary refill takes less than 2 seconds.   Neurological:      Mental Status: She is alert and oriented to person, place, and time.   Psychiatric:         Mood and Affect: Mood normal.             DIAGNOSTIC RESULTS   LABS:    Labs Reviewed - No data to display    When ordered only abnormal lab results are displayed. All other labs were within normal range or not returned as of this dictation.    EKG: When ordered, EKG's are interpreted by the Emergency Department Physician in the absence of a cardiologist.  Please see their note for interpretation of EKG.    RADIOLOGY:   Non-plain film images such as CT, Ultrasound and MRI are read by the radiologist. Plain radiographic images are

## 2024-02-05 RX ORDER — AMINO AC/WHEY PROT CONC, ISOL 26G-150/39
POWDER (GRAM) ORAL DAILY
Qty: 90 CAPSULE | Refills: 0 | OUTPATIENT
Start: 2024-02-05

## 2024-02-05 RX ORDER — SIMVASTATIN 20 MG
20 TABLET ORAL NIGHTLY
Qty: 90 TABLET | Refills: 0 | OUTPATIENT
Start: 2024-02-05

## 2024-02-05 RX ORDER — METOPROLOL SUCCINATE 25 MG/1
25 TABLET, EXTENDED RELEASE ORAL DAILY
Qty: 90 TABLET | Refills: 0 | OUTPATIENT
Start: 2024-02-05

## 2024-02-05 RX ORDER — AMIODARONE HYDROCHLORIDE 200 MG/1
TABLET ORAL
Qty: 45 TABLET | Refills: 1 | OUTPATIENT
Start: 2024-02-05

## 2024-02-05 NOTE — TELEPHONE ENCOUNTER
Pt  called in saying the insurance is charging $133 for nebulizer. He wants to know if there is an alternate medication she can use that will be cheaper.

## 2024-02-06 ENCOUNTER — CARE COORDINATION (OUTPATIENT)
Dept: CASE MANAGEMENT | Age: 89
End: 2024-02-06

## 2024-02-06 NOTE — CARE COORDINATION
Remote Alert Monitoring Note  Rpm alert to be reviewed by the provider   red alert   pulse ox reading (86%)   Additional needs to be addressed by PCP: No                   Patient recheck SpO2 level at 98%.  No further action needed at this time    Amanda Caballero LPN    [phone]  [facility] Coordinator / RPM

## 2024-02-06 NOTE — CARE COORDINATION
Remote Alert Monitoring Note  Rpm alert to be reviewed by the provider   red alert   pulse ox reading (86%)   Additional needs to be addressed by PCP: No                    Date/Time:  2024 9:47 AM  Patient Current Location: [facility] contacted patient by telephone. Verified patients name and  as identifiers.  Background: COPD, CHF  Refer to 911 immediately if:  Patient unresponsive or unable to provide history  Change in cognition or sudden confusion  Patient unable to respond in complete sentences  Intense chest pain/tightness  Any concern for any clinical emergency  Red Alert: Provider response time of 1 hr required for any red alert requiring intervention  Yellow Alert: Provider response time of 3hr required for any escalated yellow alert    O2 Triage  Are you having any Chest Pain? no   Are you having any Shortness of Breath? no   Swelling in your hands or feet? no     Are you having any other health concerns or issues? no      Clinical Interventions: Reviewed and followed up on alerts and treatments-Patient has low SpO2 level of 86%.  Called and spoke with patient's Spouse, See.  He denies CP, SOB, Patient uses oxygen 2/L continuously, Swelling hands and feet, Dizziness. Patient's hand are ice cold and usually are.  Patient has not used Nebulizer yet today (Uses about 11 AM and 7 PM)  Has used Trelegy Inhaler X 1. Visiting nurse suggests patient sit on her hands to warm them up before recheck SpO2 level.     Education of patient/family/caregiver/guardian to support self-management-Recheck SpO2 level.    Have warm hands, hold hands and fingers very still while testing SpO2 level. Take a few deep breaths before testing.    Advised Patient to contact PCP  regarding any health concerns for early outpatient intervention in an effort to avoid hospitalization.  Report any worsening symptoms to PCP and/or Call 911 and/or GO TO  EMERGENCY ROOM if symptoms are severe or worsening.   Expresses

## 2024-02-07 ENCOUNTER — CARE COORDINATION (OUTPATIENT)
Dept: CARE COORDINATION | Age: 89
End: 2024-02-07

## 2024-02-07 ENCOUNTER — CARE COORDINATION (OUTPATIENT)
Dept: CASE MANAGEMENT | Age: 89
End: 2024-02-07

## 2024-02-07 ENCOUNTER — HOSPITAL ENCOUNTER (OUTPATIENT)
Dept: SPEECH THERAPY | Age: 89
Setting detail: THERAPIES SERIES
Discharge: HOME | End: 2024-02-07

## 2024-02-07 NOTE — PROGRESS NOTES
referral.    Physician/Provider Signature:________________________________Date:__________________  By signing above, the therapist’s plan is approved by the physician/provider. All patients under Medicare Insurance must be signed by physician/provider.

## 2024-02-07 NOTE — CARE COORDINATION
Do you have hyperglycemia symptoms?: No   Do you have hypoglycemia symptoms?: No   Blood Sugar Trends: No Change        ,   Congestive Heart Failure Assessment    Do you understand a low sodium diet?: Yes  Do you understand how to read food labels?: Yes  Do you salt your food before tasting it?: No     No patient-reported symptoms      Symptoms:  CHF associated dyspnea on exertion: Pos, CHF associated fatigue: Pos, CHF associated leg swelling: Pos, CHF associated shortness of breath: Pos, CHF associated weakness: Pos      Symptom course: stable  Weight trend: stable  Salt intake watch compared to last visit: stable     , and   COPD Assessment    Does the patient understand envrionmental exposure?: Yes  Is the patient able to verbalize Rescue vs. Long Acting medications?: Yes  Does the patient have a nebulizer?: Yes  Does the patient use a space with inhaled medications?: No     No patient-reported symptoms         Symptoms:     Breathlessness: exertion, minimal exertion  Increase use of rapid acting/rescue inhaled medications?: No                Remote Patient Monitoring Welcome No  Date/Time:  2024 9:49 AM  Patient Current Location: Home: [address] [place] [address]  Verified patients name and  as identifiers.  Completed and confirmed the following:   Emergency Contact: See Jack (Spouse)   [phone] (Home Phone)  [x] Patient received all RPM equipment (tablet, scale, blood pressure device and cuff, and pulse oximeter)  Cuff Size: small (6.3\"-9.4\")    Weight Scale:  regular (<330lbs)                    [x] Instructed patient keep box for use when returning equipment                                                          [x] Reviewed Patient Welcome Letter with patient    [x]  Reviewed Consent Form  Copy of consent form in chart.                 [x] Reviewed expectations for patient and care team  Monitoring hours M-F 9-4pm  It is important to take your vitals every day, even on the

## 2024-02-07 NOTE — CARE COORDINATION
Remote Alert Monitoring Note  Rpm alert to be reviewed by the provider   red alert   pulse ox reading (90%)   Additional needs to be addressed by PCP: No                    Date/Time:  2024 9:23 AM  Patient Current Location: [facility] contacted family by telephone. Verified patients name and  as identifiers.  Background: CHF, COPD  Refer to 911 immediately if:  Patient unresponsive or unable to provide history  Change in cognition or sudden confusion  Patient unable to respond in complete sentences  Intense chest pain/tightness  Any concern for any clinical emergency  Red Alert: Provider response time of 1 hr required for any red alert requiring intervention  Yellow Alert: Provider response time of 3hr required for any escalated yellow alert    O2 Triage  Are you having any Chest Pain? no   Are you having any Shortness of Breath? no   Swelling in your hands or feet? no     Are you having any other health concerns or issues? no      Clinical Interventions: Reviewed and followed up on alerts and treatments-Patient has low SpO2 level of 90%.  Called and spoke with , See. He denies CP, Swelling, SOB, Patient uses oxygen 2/L continuously, No Dizziness.  Patient uses Trelegy Inhaler at lunch time and uses Nebulizer twice daily. Patient has chronically cold hands.  Patient is getting ready to use Nebulizer shortly and will warm up her hands and recheck SpO2 level. Has Speech therapy appointment this morning also.    Education of patient/family/caregiver/guardian to support self-management-Recheck SpO2 level.   Have warm hands, hold hands and fingers very still while testing SpO2 level. Take a few deep breaths before testing.    Advised Patient to contact PCP  regarding CP, worsening SOB and any health concerns for early outpatient intervention in an effort to avoid hospitalization.  Report any worsening symptoms to PCP and/or Call 911 and/or GO TO  EMERGENCY ROOM if symptoms are severe or worsening.

## 2024-02-07 NOTE — CARE COORDINATION
Remote Alert Monitoring Note  Rpm alert to be reviewed by the provider   red alert   pulse ox reading (90%)   Additional needs to be addressed by PCP: No                 Patient's , See rechecked SpO2 level at 92%.  No further action needed at this time.    Amanda Caballero LPN    [phone]  [facility] Coordinator / RPM

## 2024-02-08 ENCOUNTER — HOSPITAL ENCOUNTER (OUTPATIENT)
Dept: OTHER | Age: 89
Setting detail: THERAPIES SERIES
Discharge: HOME | End: 2024-02-08
Payer: MEDICARE

## 2024-02-08 VITALS
SYSTOLIC BLOOD PRESSURE: 113 MMHG | HEART RATE: 55 BPM | OXYGEN SATURATION: 99 % | BODY MASS INDEX: 18.75 KG/M2 | RESPIRATION RATE: 18 BRPM | WEIGHT: 96 LBS | DIASTOLIC BLOOD PRESSURE: 58 MMHG

## 2024-02-08 LAB
ANION GAP SERPL CALCULATED.3IONS-SCNC: 9 MMOL/L (ref 7–16)
BNP SERPL-MCNC: ABNORMAL PG/ML (ref 0–450)
BUN SERPL-MCNC: 23 MG/DL (ref 6–23)
CALCIUM SERPL-MCNC: 9.1 MG/DL (ref 8.6–10.2)
CHLORIDE SERPL-SCNC: 98 MMOL/L (ref 98–107)
CO2 SERPL-SCNC: 33 MMOL/L (ref 22–29)
CREAT SERPL-MCNC: 0.9 MG/DL (ref 0.5–1)
GFR SERPL CREATININE-BSD FRML MDRD: 59 ML/MIN/1.73M2
GLUCOSE SERPL-MCNC: 101 MG/DL (ref 74–99)
POTASSIUM SERPL-SCNC: 4 MMOL/L (ref 3.5–5)
SODIUM SERPL-SCNC: 140 MMOL/L (ref 132–146)

## 2024-02-08 PROCEDURE — 83880 ASSAY OF NATRIURETIC PEPTIDE: CPT

## 2024-02-08 PROCEDURE — 80048 BASIC METABOLIC PNL TOTAL CA: CPT

## 2024-02-08 PROCEDURE — 99214 OFFICE O/P EST MOD 30 MIN: CPT

## 2024-02-08 NOTE — PROGRESS NOTES
Gastrointestinal  GI Symptoms: Bloating  Bowel Sounds  RUQ Bowel Sounds: Active  LUQ Bowel Sounds: Active  RLQ Bowel Sounds: Active  LLQ Bowel Sounds: Active  Peripheral Vascular  Peripheral Vascular (WDL): Within Defined Limits           Genitourinary  Genitourinary (WDL): Within Defined Limits  Psychosocial  Psychosocial (WDL): Within Defined Limits              Pulse: 55                 LAB DATA:  BMP:  Sodium (mmol/L)   Date Value   02/08/2024 140   02/02/2024 142   02/02/2024 142     Potassium (mmol/L)   Date Value   02/08/2024 4.0   02/02/2024 4.4   02/02/2024 4.5     Potassium reflex Magnesium (mmol/L)   Date Value   12/28/2022 4.2   08/18/2022 4.6   06/08/2020 4.0     Chloride (mmol/L)   Date Value   02/08/2024 98   02/02/2024 99   02/02/2024 100     CO2 (mmol/L)   Date Value   02/08/2024 33 (H)   02/02/2024 34 (H)   02/02/2024 34 (H)     BUN (mg/dL)   Date Value   02/08/2024 23   02/02/2024 25 (H)   02/02/2024 25 (H)     Creatinine (mg/dL)   Date Value   02/08/2024 0.9   02/02/2024 1.2 (H)   02/02/2024 1.2 (H)     Glucose (mg/dL)   Date Value   02/08/2024 101 (H)   02/02/2024 111 (H)   02/02/2024 113 (H)     Calcium (mg/dL)   Date Value   02/08/2024 9.1   02/02/2024 9.0   02/02/2024 9.2     BNP:  Pro-BNP (pg/mL)   Date Value   02/08/2024 15,707 (H)   02/02/2024 20,060 (H)   01/25/2024 17,943 (H)      CBC:  WBC (k/uL)   Date Value   02/02/2024 6.3     Hemoglobin (g/dL)   Date Value   02/02/2024 8.6 (L)     Hematocrit (%)   Date Value   02/02/2024 28.2 (L)     Platelets (k/uL)   Date Value   02/02/2024 397     Iron Studies:  Ferritin (ng/mL)   Date Value   02/02/2024 116     Iron (ug/dL)   Date Value   02/02/2024 35 (L)     TIBC (ug/dL)   Date Value   02/02/2024 309     Hepatic:  AST (U/L)   Date Value   02/02/2024 18     ALT (U/L)   Date Value   02/02/2024 11     Total Bilirubin (mg/dL)   Date Value   02/02/2024 0.5     Alkaline Phosphatase (U/L)   Date Value   02/02/2024 76     INR:  INR (no units)   Date

## 2024-02-08 NOTE — PLAN OF CARE
Problem: Chronic Conditions and Co-morbidities  Goal: Patient's chronic conditions and co-morbidity symptoms are monitored and maintained or improved  Flowsheets (Taken 2/8/2024 1936)  Care Plan - Patient's Chronic Conditions and Co-Morbidity Symptoms are Monitored and Maintained or Improved: Monitor and assess patient's chronic conditions and comorbid symptoms for stability, deterioration, or improvement

## 2024-02-15 ENCOUNTER — CARE COORDINATION (OUTPATIENT)
Dept: CARE COORDINATION | Age: 89
End: 2024-02-15

## 2024-02-15 ENCOUNTER — CARE COORDINATION (OUTPATIENT)
Dept: CASE MANAGEMENT | Age: 89
End: 2024-02-15

## 2024-02-15 NOTE — CARE COORDINATION
self-management interventions? (Nutrition/Exercise/Self-Monitoring): No   Have you ever had to go to the ED for symptoms of low blood sugar?: No       No patient-reported symptoms   Do you have hyperglycemia symptoms?: No   Do you have hypoglycemia symptoms?: No   Blood Sugar Trends: No Change        ,   Congestive Heart Failure Assessment    Do you understand a low sodium diet?: Yes  Do you understand how to read food labels?: Yes  Do you salt your food before tasting it?: No     No patient-reported symptoms      Symptoms:          , and   COPD Assessment    Does the patient understand envrionmental exposure?: Yes  Is the patient able to verbalize Rescue vs. Long Acting medications?: Yes  Does the patient have a nebulizer?: Yes  Does the patient use a space with inhaled medications?: No            Symptoms:     Have you had a recent diagnosis of pneumonia either by PCP or at a hospital?: No

## 2024-02-15 NOTE — CARE COORDINATION
Remote Alert Monitoring Note  Rpm alert to be reviewed by the provider   red alert   pulse ox reading (91%)   Additional needs to be addressed by PCP: No                    Date/Time:  2/15/2024 9:44 AM  Patient Current Location: [facility] contacted patient by telephone. Verified patients name and  as identifiers.  Background: CHF, COPD  Refer to 911 immediately if:  Patient unresponsive or unable to provide history  Change in cognition or sudden confusion  Patient unable to respond in complete sentences  Intense chest pain/tightness  Any concern for any clinical emergency  Red Alert: Provider response time of 1 hr required for any red alert requiring intervention  Yellow Alert: Provider response time of 3hr required for any escalated yellow alert    O2 Triage  Are you having any Chest Pain? no   Are you having any Shortness of Breath? yes   Swelling in your hands or feet? no     Are you having any other health concerns or issues? no      Clinical Interventions: Reviewed and followed up on alerts and treatments-Patient has low SpO2 level of 91%.  Patient denies CP swelling hands and feet.  Patient states, I am SOB and get dizzy sometimes\"..Patient was not using oxygen this morning when she first checked SpO2 level. Patient will put on oxygen 2/L and warm up hands before rechecking SpO2 level.  Patient has not used Nebulizer or Trelegy inhaler today. Patient rechecked SpO2 at 98%.    Education of patient/family/caregiver/guardian to support self-management-Recheck SpO2 level.   Have warm hands, hold hands and fingers very still while testing SpO2 level. Take a few deep breaths before testing.    Advised Patient to contact PCP  regarding CP, increased SOB and dizziness and any health concerns for early outpatient intervention in an effort to avoid hospitalization.  Report any worsening symptoms to PCP and/or Call 911 and/or GO TO  EMERGENCY ROOM if symptoms are severe or worsening.   Expresses understanding.

## 2024-02-16 VITALS
HEART RATE: 55 BPM | WEIGHT: 90.6 LBS | OXYGEN SATURATION: 99 % | SYSTOLIC BLOOD PRESSURE: 100 MMHG | DIASTOLIC BLOOD PRESSURE: 62 MMHG | BODY MASS INDEX: 17.69 KG/M2

## 2024-02-20 ENCOUNTER — TELEPHONE (OUTPATIENT)
Dept: PRIMARY CARE CLINIC | Age: 89
End: 2024-02-20

## 2024-02-20 DIAGNOSIS — R13.10 DYSPHAGIA, UNSPECIFIED TYPE: Primary | ICD-10-CM

## 2024-02-20 NOTE — TELEPHONE ENCOUNTER
Elizabeth from [facility] called and requested Dr. Dyer put an order in for a modified barium swallow study (MBSS) for the patient. She said there has to be a diagnosis code of R13.10 or the insurance will deny it. She wanted it sent to [facility] but was notified that it will be in her chart once it is ordered.

## 2024-02-21 ENCOUNTER — CARE COORDINATION (OUTPATIENT)
Dept: CASE MANAGEMENT | Age: 89
End: 2024-02-21

## 2024-02-21 NOTE — CARE COORDINATION
Remote Alert Monitoring Note  Rpm alert to be reviewed by the provider   red alert   pulse ox reading (90%)   Additional needs to be addressed by N/A: No                    Date/Time:  2024 11:11 AM  Patient Current Location: Home: [address] [place], [place] [address]in [facility] [address]  LPN contacted patient by telephone. Verified patients name and  as identifiers.  Background: ENROLLED IN [facility] FOR COPD AND CHF  Refer to 911 immediately if:  Patient unresponsive or unable to provide history  Change in cognition or sudden confusion  Patient unable to respond in complete sentences  Intense chest pain/tightness  Any concern for any clinical emergency  Red Alert: Provider response time of 1 hr required for any red alert requiring intervention  Yellow Alert: Provider response time of 3hr required for any escalated yellow alert    O2 Triage  Are you having any Chest Pain? no   Are you having any Shortness of Breath? no   Swelling in your hands or feet? no     Are you having any other health concerns or issues? Yes  NASAL CONGESTION      Clinical Interventions: Reviewed and followed up on alerts and treatments-spoke to Brittany regarding RPM alert for low pulse ox reading. Wears oxygen at 2lpm Pt states she has no abnormal shortness of breath. She reports she has  nasal congestion.   Pt reports her hands are very cold. She has used gloves to warm hands. Rubs hands together. She will run her hands under warm water.   Pt speaks clearly in full sentences with no respiratory distress  Educated on checking pulse ox reading. Wipe inside of pulse oximeter with a clean tissue.  Rub hands together briskly for 10 seconds to improve circulation. Lay hand flat on table, dresser or counter. Place pulse oximeter on index finger. verbalized understanding.  Pt education given to recheck oxygen level if below 92%. Requested pt recheck her pulse ox reading. New reading 91%. Pt's  requesting to use their own pulse oximeter. Pt warmed hand again.

## 2024-02-22 ENCOUNTER — HOSPITAL ENCOUNTER (OUTPATIENT)
Dept: OTHER | Age: 89
Setting detail: THERAPIES SERIES
Discharge: HOME | End: 2024-02-22
Payer: MEDICARE

## 2024-02-22 VITALS
HEART RATE: 55 BPM | DIASTOLIC BLOOD PRESSURE: 48 MMHG | WEIGHT: 90 LBS | RESPIRATION RATE: 18 BRPM | SYSTOLIC BLOOD PRESSURE: 92 MMHG | BODY MASS INDEX: 17.58 KG/M2 | OXYGEN SATURATION: 91 %

## 2024-02-22 LAB
ANION GAP SERPL CALCULATED.3IONS-SCNC: 10 MMOL/L (ref 7–16)
BNP SERPL-MCNC: 7770 PG/ML (ref 0–450)
BUN SERPL-MCNC: 35 MG/DL (ref 6–23)
CALCIUM SERPL-MCNC: 9.7 MG/DL (ref 8.6–10.2)
CHLORIDE SERPL-SCNC: 96 MMOL/L (ref 98–107)
CO2 SERPL-SCNC: 33 MMOL/L (ref 22–29)
CREAT SERPL-MCNC: 1.1 MG/DL (ref 0.5–1)
GFR SERPL CREATININE-BSD FRML MDRD: 49 ML/MIN/1.73M2
GLUCOSE SERPL-MCNC: 100 MG/DL (ref 74–99)
POTASSIUM SERPL-SCNC: 4.9 MMOL/L (ref 3.5–5)
SODIUM SERPL-SCNC: 139 MMOL/L (ref 132–146)

## 2024-02-22 PROCEDURE — 99214 OFFICE O/P EST MOD 30 MIN: CPT

## 2024-02-22 PROCEDURE — 80048 BASIC METABOLIC PNL TOTAL CA: CPT

## 2024-02-22 PROCEDURE — 83880 ASSAY OF NATRIURETIC PEPTIDE: CPT

## 2024-02-22 RX ORDER — BUDESONIDE 0.25 MG/2ML
1 INHALANT ORAL
COMMUNITY

## 2024-02-22 NOTE — PROGRESS NOTES
Congestive Heart Failure Clinic   [facility]       Referring Provider: Dr Craft  Primary Care Physician: Julita Dyer MD   Cardiologist: Dr Craft  Nephrologist: N/A      HISTORY OF PRESENT ILLNESS:     Barbara Jack is a 88 y.o. (1935) female with a history of HFrEF, most recent EF:  Lab Results   Component Value Date    LVEF 23 02/07/2023    LVEFMODE Echo 12/26/2016         She presents to the CHF clinic for ongoing evaluation and monitoring of heart failure.    In the CHF clinic today she denies any adverse symptoms except:  [x] Dizziness or lightheadedness (with position change )  [] Syncope or near syncope  [] Recent Fall  [] Shortness of breath at rest   [x] Dyspnea with exertion  [] Decline in functional capacity (unable to perform activities they had previously been able to do)  [] Fatigue   [] Orthopnea  [] PND  [] Nocturnal cough  [] Hemoptysis  [] Chest pain, pressure, or discomfort  [] Palpitations  [] Abdominal distention  [] Abdominal bloating  [] Early satiety  [] Blood in stool   [] Diarrhea  [] Constipation ( off and on )  [] Nausea/Vomiting  [] Decreased urinary response to oral diuretic   [] Scrotal swelling   [] Lower extremity edema  [] Used PRN doses of oral diuretic   [] Weight gain    Wt Readings from Last 3 Encounters:   02/22/24 40.8 kg (90 lb)   02/08/24 43.5 kg (96 lb)   02/22/24 40.9 kg (90 lb 3.2 oz)           SOCIAL HISTORY:  [x] Denies tobacco, alcohol or illicit drug abuse  [] Tobacco use:  [] ETOH use:  [] Illicit drug use:        MEDICATIONS:    No Known Allergies    Current Outpatient Medications:     Coenzyme Q10 ([employer] COQ-10 PO), Take 1 tablet by mouth nightly, Disp: , Rfl:     budesonide (PULMICORT) 0.25 MG/2ML nebulizer suspension, Take 2 mLs by nebulization in the morning and 2 mLs in the evening., Disp: , Rfl:     Cholecalciferol (VITAMIN D3) 1.25 MG (49216 UT) CAPS, Take by mouth Once a week at 5 PM Monday (Patient not

## 2024-02-22 NOTE — PROGRESS NOTES
Spoke with pts  See. Instructed to decrease torsemide to 10 mg every other day. Explained she can take a dose on the off day if she has any increased swelling, weight gain or shortness of breath.   Follow up in CHF clinic as scheduled   Read instructions back and verbalized understanding.

## 2024-02-22 NOTE — PLAN OF CARE
Problem: Chronic Conditions and Co-morbidities  Goal: Patient's chronic conditions and co-morbidity symptoms are monitored and maintained or improved  Flowsheets (Taken 2/22/2024 0832)  Care Plan - Patient's Chronic Conditions and Co-Morbidity Symptoms are Monitored and Maintained or Improved: Monitor and assess patient's chronic conditions and comorbid symptoms for stability, deterioration, or improvement

## 2024-02-22 NOTE — RESULT ENCOUNTER NOTE
Labs and CHF clinic today reviewed today  Spoke with Jaki ESCALERA in CHF Clinic     Vitals: 92/48, 55    Decrease torsemide to 10 mg every other day  However she can take on the off day if she has any increased swelling, weight gain or shortness of breath.   Follow up in CHF clinic as scheduled     Thank you

## 2024-02-28 ENCOUNTER — CARE COORDINATION (OUTPATIENT)
Dept: CASE MANAGEMENT | Age: 89
End: 2024-02-28

## 2024-02-28 ENCOUNTER — NURSE ONLY (OUTPATIENT)
Dept: NON INVASIVE DIAGNOSTICS | Age: 89
End: 2024-02-28

## 2024-02-28 DIAGNOSIS — Z95.810 PRESENCE OF AUTOMATIC CARDIOVERTER/DEFIBRILLATOR (AICD): Primary | ICD-10-CM

## 2024-02-28 DIAGNOSIS — I48.0 PAROXYSMAL ATRIAL FIBRILLATION (HCC): ICD-10-CM

## 2024-02-28 DIAGNOSIS — I25.5 ISCHEMIC CARDIOMYOPATHY: ICD-10-CM

## 2024-02-28 NOTE — CARE COORDINATION
Remote Alert Monitoring Note  Rpm alert to be reviewed by the provider   red alert   blood pressure reading (85/42)   Additional needs to be addressed by N/A: No                 Date/Time:  2024 10:54 AM  Patient Current Location: Home: [address] [place] [id number]  LPN contacted patient by telephone. Verified patients name and  as identifiers.  Background: enrolled in RPM for COPD AND CHF  Refer to 911 immediately if:  Patient unresponsive or unable to provide history  Change in cognition or sudden confusion  Patient unable to respond in complete sentences  Intense chest pain/tightness  Any concern for any clinical emergency  Red Alert: Provider response time of 1 hr required for any red alert requiring intervention  Yellow Alert: Provider response time of 3hr required for any escalated yellow alert    BP Triage  Are you having any Chest Pain? no   Are you having any Shortness of Breath? no   Do you have a headache or have any vision changes? no   Are you having any numbness or tingling? no   Are you having any other health concerns or issues? no        Clinical Interventions: Reviewed and followed up on alerts and treatments-spoke to darryn Cui. Pt has no chest pain or dyspnea. See reports pt is sitting comfortably in a chair reading a book, Requested recheck of blood pressure metrics. See reports they are eating breakfast at this time and will recheck when able.   Plan/Follow Up: Will continue to review, monitor and address alerts with follow up based on severity of symptoms and risk factors.

## 2024-02-28 NOTE — CARE COORDINATION
Remote Patient Monitoring Note  Date/Time:  2024 11:45 AM  Patient Current Location: Home: [address] [place] [address]  LPN contacted patient by telephone regarding red alert received for blood pressure reading (85/42). Verified patients name and  as identifiers.  Background: ENROLLED IN [facility] FOR COPD AND CHF  Clinical Interventions: Reviewed and followed up on alerts and treatments-noted pt rechecked blood pressure metrics. New reading is 91/41. All metrics now WNL    Plan/Follow Up: Will continue to review, monitor and address alerts with follow up based on severity of symptoms and risk factors.

## 2024-02-29 ENCOUNTER — TELEPHONE (OUTPATIENT)
Dept: CARDIOLOGY CLINIC | Age: 89
End: 2024-02-29

## 2024-02-29 DIAGNOSIS — Z01.89 NEED FOR ASSESSMENT FOR SLEEP APNEA: ICD-10-CM

## 2024-02-29 DIAGNOSIS — R06.02 SOB (SHORTNESS OF BREATH): Primary | ICD-10-CM

## 2024-02-29 NOTE — TELEPHONE ENCOUNTER
Home health nurse states patient has been waking up with SOB and extreme fatigue, she is asking if you would order a sleep study, please advise

## 2024-03-04 ENCOUNTER — CARE COORDINATION (OUTPATIENT)
Dept: CARE COORDINATION | Age: 89
End: 2024-03-04

## 2024-03-04 NOTE — CARE COORDINATION
Ambulatory Care Coordination Note  3/4/2024    Patient Current Location:  Home: [facility] [place]  [facility] [id number]     ACM contacted the patient and spouse/partner by telephone. Verified name and  with patient and spouse/partner as identifiers. Provided introduction to self, and explanation of the ACM role.     Challenges to be reviewed by the provider   Additional needs identified to be addressed with provider: No  none               Method of communication with provider: phone.    ACM: Holly Buenrostro RN    Continues to use her oxygen  at 2 LNC. She does have dyspnea especially with activity having to stop to rest. She has a follow up appointment tomorrow 3/5/24 with her PCP. She will be scheduled for a sleep study test in the future. Appetite is good. Has good support from her  and continues to have care from [facility]. Will continue to follow for RPM and care     Offered patient enrollment in the Remote Patient Monitoring (RPM) program for in-home monitoring: Yes, patient already enrolled.    CHF:  Denies s/s CHF Exacerbation.  Discussed Zone Tool and verbalizes understanding.  Today's weight: 42.4 kg    DM:  Denies s/s of Hypo/Hyperglycemia.  Discussed DM Zone Tool and verbalizes understanding.      COPD:  Denies s/s of COPD Exacerbation.  Discussed the Zone Tool and verbalizes understanding.     FOLLOW-UP PLAN:    Continue Care Coordination and Assess Plan Of Care  Review RPM Metrics and educate as appropriate  -DM Management/Zone Tool  -CHF Management/Zone Tool  -COPD Management/Zone Tool  -Exercise Status  -Current Weight  -Falls/Near Falls?  -OV AVS Reinforcement  -Appointment Reminders    Next Anticipated Outreach by PHP Care Team:  2 Weeks       Lab Results       None                 Goals Addressed                      This Visit's Progress      Conditions and Symptoms (pt-stated)   On track      I will schedule office visits, as directed by my provider.  I will keep my

## 2024-03-05 ENCOUNTER — OFFICE VISIT (OUTPATIENT)
Dept: PRIMARY CARE CLINIC | Age: 89
End: 2024-03-05
Payer: MEDICARE

## 2024-03-05 ENCOUNTER — CARE COORDINATION (OUTPATIENT)
Dept: CASE MANAGEMENT | Age: 89
End: 2024-03-05

## 2024-03-05 VITALS
HEIGHT: 60 IN | TEMPERATURE: 98 F | HEART RATE: 55 BPM | BODY MASS INDEX: 18.26 KG/M2 | WEIGHT: 93 LBS | SYSTOLIC BLOOD PRESSURE: 98 MMHG | OXYGEN SATURATION: 96 % | DIASTOLIC BLOOD PRESSURE: 48 MMHG

## 2024-03-05 DIAGNOSIS — I50.9 ACUTE ON CHRONIC CONGESTIVE HEART FAILURE, UNSPECIFIED HEART FAILURE TYPE (HCC): ICD-10-CM

## 2024-03-05 DIAGNOSIS — I48.0 PAF (PAROXYSMAL ATRIAL FIBRILLATION) (HCC): ICD-10-CM

## 2024-03-05 DIAGNOSIS — I10 ESSENTIAL HYPERTENSION: Primary | ICD-10-CM

## 2024-03-05 PROCEDURE — G8484 FLU IMMUNIZE NO ADMIN: HCPCS | Performed by: INTERNAL MEDICINE

## 2024-03-05 PROCEDURE — G8427 DOCREV CUR MEDS BY ELIG CLIN: HCPCS | Performed by: INTERNAL MEDICINE

## 2024-03-05 PROCEDURE — 1090F PRES/ABSN URINE INCON ASSESS: CPT | Performed by: INTERNAL MEDICINE

## 2024-03-05 PROCEDURE — 1123F ACP DISCUSS/DSCN MKR DOCD: CPT | Performed by: INTERNAL MEDICINE

## 2024-03-05 PROCEDURE — 99214 OFFICE O/P EST MOD 30 MIN: CPT | Performed by: INTERNAL MEDICINE

## 2024-03-05 PROCEDURE — G8419 CALC BMI OUT NRM PARAM NOF/U: HCPCS | Performed by: INTERNAL MEDICINE

## 2024-03-05 PROCEDURE — 1036F TOBACCO NON-USER: CPT | Performed by: INTERNAL MEDICINE

## 2024-03-05 RX ORDER — POTASSIUM CHLORIDE 750 MG/1
10 TABLET, EXTENDED RELEASE ORAL DAILY
Qty: 90 TABLET | Refills: 1 | Status: SHIPPED | OUTPATIENT
Start: 2024-03-05

## 2024-03-05 RX ORDER — MOMETASONE FUROATE 50 UG/1
SPRAY, METERED NASAL
COMMUNITY
Start: 2024-02-17

## 2024-03-05 RX ORDER — TORSEMIDE 10 MG/1
10 TABLET ORAL DAILY
Qty: 90 TABLET | Refills: 0 | Status: SHIPPED | OUTPATIENT
Start: 2024-03-05

## 2024-03-05 RX ORDER — OMEPRAZOLE 40 MG/1
40 CAPSULE, DELAYED RELEASE ORAL DAILY
Qty: 90 CAPSULE | Refills: 0 | Status: CANCELLED | OUTPATIENT
Start: 2024-03-05

## 2024-03-05 RX ORDER — BUDESONIDE 0.25 MG/2ML
1 INHALANT ORAL
Qty: 60 EACH | Refills: 2 | Status: SHIPPED
Start: 2024-03-05 | End: 2024-03-08 | Stop reason: SDUPTHER

## 2024-03-05 RX ORDER — FLUTICASONE FUROATE, UMECLIDINIUM BROMIDE AND VILANTEROL TRIFENATATE 200; 62.5; 25 UG/1; UG/1; UG/1
1 POWDER RESPIRATORY (INHALATION) DAILY
Qty: 1 EACH | Refills: 2 | Status: CANCELLED | OUTPATIENT
Start: 2024-03-05

## 2024-03-05 RX ORDER — ASPIRIN 81 MG/1
81 TABLET, COATED ORAL DAILY
Qty: 90 TABLET | Refills: 0 | Status: SHIPPED | OUTPATIENT
Start: 2024-03-05

## 2024-03-05 SDOH — ECONOMIC STABILITY: FOOD INSECURITY: WITHIN THE PAST 12 MONTHS, THE FOOD YOU BOUGHT JUST DIDN'T LAST AND YOU DIDN'T HAVE MONEY TO GET MORE.: NEVER TRUE

## 2024-03-05 SDOH — ECONOMIC STABILITY: FOOD INSECURITY: WITHIN THE PAST 12 MONTHS, YOU WORRIED THAT YOUR FOOD WOULD RUN OUT BEFORE YOU GOT MONEY TO BUY MORE.: NEVER TRUE

## 2024-03-05 SDOH — ECONOMIC STABILITY: INCOME INSECURITY: HOW HARD IS IT FOR YOU TO PAY FOR THE VERY BASICS LIKE FOOD, HOUSING, MEDICAL CARE, AND HEATING?: NOT HARD AT ALL

## 2024-03-05 NOTE — PROGRESS NOTES
Barbara Jack presents today for   Follow up of HTN, High chol, Respiratory insufficiency, LVD  Current Outpatient Medications   Medication Sig Dispense Refill    mometasone (NASONEX) 50 MCG/ACT nasal spray       ASPIRIN LOW DOSE 81 MG EC tablet Take 1 tablet by mouth daily 90 tablet 0    budesonide (PULMICORT) 0.25 MG/2ML nebulizer suspension Take 2 mLs by nebulization in the morning and 2 mLs in the evening. 60 each 2    torsemide (DEMADEX) 10 MG tablet Take 1 tablet by mouth daily 90 tablet 0    potassium chloride (KLOR-CON M) 10 MEQ extended release tablet Take 1 tablet by mouth daily 90 tablet 1    Coenzyme Q10 ([employer] COQ-10 PO) Take 1 tablet by mouth nightly      amiodarone (PACERONE) 100 MG tablet Take 1 tablet by mouth daily      losartan (COZAAR) 25 MG tablet Take 1 tablet by mouth daily 90 tablet 0    simvastatin (ZOCOR) 20 MG tablet Take 1 tablet by mouth nightly 90 tablet 0    metoprolol succinate (TOPROL XL) 25 MG extended release tablet TAKE 1 TABLET BY MOUTH EVERY DAY 90 tablet 0    Respiratory Therapy Supplies (FULL KIT NEBULIZER SET) MISC Use as directed with nebulized medication. 1 each 0    rivaroxaban (XARELTO) 15 MG TABS tablet Take 1 tablet by mouth daily (with breakfast) 90 tablet 2    Cholecalciferol (VITAMIN D3) 1.25 MG (90073 UT) CAPS Take by mouth Once a week at 5 PM Monday (Patient not taking: Reported on 2/22/2024)      Methylfol-Algae-M82-Rqhhvjodze (CEREFOLIN NAC) 6-90.314-2-600 MG TABS Take 1 tablet by mouth daily  unsure, states they may have d/c this d/t cost (Patient not taking: Reported on 3/5/2024)       No current facility-administered medications for this visit.      Past Medical History:   Diagnosis Date    Arthritis     Atrial fibrillation (HCC)     follows with Dr Cratf  on xarelto    CAD (coronary artery disease)     Cardiomyopathy (HCC)     CHF (congestive heart failure) (HCC) 2010    history of follows with Dr Craft 4/27/23    Chronic anticoagulation     Chronic  Hypertriglyceridemia Treatment: I explained this is common when taking isotretinoin. If this worsens they will contact us. They may try OTC ibuprofen.

## 2024-03-05 NOTE — CARE COORDINATION
Remote Alert Monitoring Note  Rpm alert to be reviewed by the provider   red alert   pulse ox reading (55%)   Additional needs to be addressed by PCP: No                    Date/Time:  3/5/2024 1:35 PM  Patient Current Location: [facility] contacted patient by telephone. Verified patients name and  as identifiers.  Background: COPD, CHF  Refer to 911 immediately if:  Patient unresponsive or unable to provide history  Change in cognition or sudden confusion  Patient unable to respond in complete sentences  Intense chest pain/tightness  Any concern for any clinical emergency  Red Alert: Provider response time of 1 hr required for any red alert requiring intervention  Yellow Alert: Provider response time of 3hr required for any escalated yellow alert    O2 Triage  Are you having any Chest Pain? no   Are you having any Shortness of Breath? no   Swelling in your hands or feet? no     Are you having any other health concerns or issues? no      Clinical Interventions: Reviewed and followed up on alerts and treatments-Patient had SpO2 level at 55%.. Called and spoke to patient's . He said he put the numbers in backwards.  SpO2 level is 95% and HR is 55.  Patient is asymptomatic with no complaints.  Using Oxygen 2/L continuously.        Plan/Follow Up: Will continue to review, monitor and address alerts with follow up based on severity of symptoms and risk factors.

## 2024-03-07 ASSESSMENT — ENCOUNTER SYMPTOMS
COLOR CHANGE: 0
NAUSEA: 0
VOMITING: 0
SHORTNESS OF BREATH: 1
ABDOMINAL DISTENTION: 0
ALLERGIC/IMMUNOLOGIC NEGATIVE: 1
BLOOD IN STOOL: 0
CONSTIPATION: 0
ABDOMINAL PAIN: 0
TROUBLE SWALLOWING: 0
SORE THROAT: 0
DIARRHEA: 0
BACK PAIN: 0
RHINORRHEA: 0
SINUS PRESSURE: 0

## 2024-03-08 ENCOUNTER — OFFICE VISIT (OUTPATIENT)
Dept: CARDIOLOGY CLINIC | Age: 89
End: 2024-03-08

## 2024-03-08 ENCOUNTER — CARE COORDINATION (OUTPATIENT)
Dept: CASE MANAGEMENT | Age: 89
End: 2024-03-08

## 2024-03-08 VITALS
BODY MASS INDEX: 18.31 KG/M2 | HEIGHT: 59 IN | WEIGHT: 90.8 LBS | DIASTOLIC BLOOD PRESSURE: 58 MMHG | SYSTOLIC BLOOD PRESSURE: 106 MMHG | RESPIRATION RATE: 18 BRPM | HEART RATE: 55 BPM

## 2024-03-08 DIAGNOSIS — I48.0 PAROXYSMAL ATRIAL FIBRILLATION (HCC): ICD-10-CM

## 2024-03-08 DIAGNOSIS — I35.0 NONRHEUMATIC AORTIC VALVE STENOSIS: ICD-10-CM

## 2024-03-08 DIAGNOSIS — I10 ESSENTIAL HYPERTENSION: ICD-10-CM

## 2024-03-08 DIAGNOSIS — I42.0 DILATED CARDIOMYOPATHY (HCC): ICD-10-CM

## 2024-03-08 DIAGNOSIS — I50.22 CHRONIC HFREF (HEART FAILURE WITH REDUCED EJECTION FRACTION) (HCC): Primary | ICD-10-CM

## 2024-03-08 RX ORDER — OMEPRAZOLE 40 MG/1
40 CAPSULE, DELAYED RELEASE ORAL DAILY
COMMUNITY

## 2024-03-08 RX ORDER — ACETAMINOPHEN 500 MG
500 TABLET ORAL EVERY 6 HOURS PRN
COMMUNITY

## 2024-03-08 RX ORDER — IPRATROPIUM BROMIDE AND ALBUTEROL SULFATE 2.5; .5 MG/3ML; MG/3ML
1 SOLUTION RESPIRATORY (INHALATION) EVERY 4 HOURS
COMMUNITY

## 2024-03-08 RX ORDER — BUDESONIDE 0.25 MG/2ML
1 INHALANT ORAL
Qty: 60 EACH | Refills: 2 | Status: SHIPPED | OUTPATIENT
Start: 2024-03-08

## 2024-03-08 RX ORDER — DIAPER,BRIEF,ADULT, DISPOSABLE
200 EACH MISCELLANEOUS NIGHTLY
Qty: 30 CAPSULE | Refills: 0 | Status: SHIPPED | OUTPATIENT
Start: 2024-03-08

## 2024-03-08 RX ORDER — FLUTICASONE FUROATE, UMECLIDINIUM BROMIDE AND VILANTEROL TRIFENATATE 200; 62.5; 25 UG/1; UG/1; UG/1
1 POWDER RESPIRATORY (INHALATION) DAILY
COMMUNITY

## 2024-03-08 NOTE — CARE COORDINATION
Date/Time:  3/8/2024 1:22 PM  LPN attempted to reach patient by telephone regarding red alert in remote patient monitoring program. Left HIPPA compliant message requesting a return call. Will attempt to reach patient again.    RPM RED ALERT FOR LOW  PULSE OX READING 29%  Noted pt has an appt with Cardiology Dr Craft today at 145p

## 2024-03-08 NOTE — TELEPHONE ENCOUNTER
requesting a refill of her      budesonide (PULMICORT) 0.25 MG/2ML nebulizer suspension  and her    Coenzyme Q10 ([facility]S COQ-10 PO)    Please send to   [facility]/pharmacy #[id number] - [place], [place] - [address] -   Thank you.

## 2024-03-08 NOTE — PROGRESS NOTES
congestive heart failure  Status post ICD implantation (s/p generator change in 2/2018)  Chronic hypoxi\c respiratory failure -- on home O2  Valvular heart disease (including severe low-flow low-gradient aortic stenosis)  History of hypertension. Controlled recently. BP today 106/58, BP at last office visit 96/54 (SBP  at prior office visits). History of intermittent hypotension.  Asthma.   GERD.   Depression.   Osteoporosis.   Overreactive bladder.   Bilateral hearing loss: Wears bilateral hearing aids.   History of bilateral breast implants with history of removal of extravasated implant material on the right.   Diabetes mellitus: Diet controlled.   Chronic kidney disease/prerenal azotemia.   S/p right thumb surgery on 5/10/16, Dr. Wayne  Dysphagia -- s/p esophageal dilatation in 11/2019 per patient  New onset atrial fibrillation s/p ALMA/CVN on 6/9/2020 (started on amiodarone at that time)  RBBB/LAFB  Achalasia -- follows with GI; s/p lower esophageal sphincter botox injection on 5/18/23 and s/p lower esophageal sphincter botox injection and dilation on 8/17/23    TREATMENT PLAN:  Results of multiple echocardiograms reviewed with the patient today / treatment options for VHD reviewed with the patient and her  today (recent decision for noninvasive management)  Continue current medications (including Toprol XL, ARB, low dose aldactone, and OAC). Aldactone and entresto previously stopped in the setting of electrolyte abnormalities and hypotension -- aldactone (12.5 mg daily) restarted in 12/2022. Switched from eliquis to dose adjusted xarelto per patient request for insurance purposes at 10/2022 office visit.  Monitor renal function and electrolytes closely  Follow-up with EP re: ICD interrogation (follows with Dr. Keller)  Questions answered again today re: GDMT options  Follow-up at CHF clinic as scheduled  Evaluation with sleep medicine pending  The above was discussed with the patient and her

## 2024-03-11 ENCOUNTER — CARE COORDINATION (OUTPATIENT)
Dept: CASE MANAGEMENT | Age: 89
End: 2024-03-11

## 2024-03-11 ENCOUNTER — HOSPITAL ENCOUNTER (OUTPATIENT)
Dept: OTHER | Age: 89
Setting detail: THERAPIES SERIES
Discharge: HOME | End: 2024-03-11
Payer: MEDICARE

## 2024-03-11 VITALS
HEART RATE: 55 BPM | DIASTOLIC BLOOD PRESSURE: 49 MMHG | OXYGEN SATURATION: 94 % | RESPIRATION RATE: 18 BRPM | BODY MASS INDEX: 17.43 KG/M2 | SYSTOLIC BLOOD PRESSURE: 107 MMHG | WEIGHT: 86.3 LBS

## 2024-03-11 DIAGNOSIS — J44.9 CHRONIC OBSTRUCTIVE PULMONARY DISEASE, UNSPECIFIED COPD TYPE (HCC): Primary | ICD-10-CM

## 2024-03-11 LAB
ANION GAP SERPL CALCULATED.3IONS-SCNC: 7 MMOL/L (ref 7–16)
BNP SERPL-MCNC: ABNORMAL PG/ML (ref 0–450)
BUN SERPL-MCNC: 36 MG/DL (ref 6–23)
CALCIUM SERPL-MCNC: 9 MG/DL (ref 8.6–10.2)
CHLORIDE SERPL-SCNC: 96 MMOL/L (ref 98–107)
CO2 SERPL-SCNC: 35 MMOL/L (ref 22–29)
CREAT SERPL-MCNC: 1.1 MG/DL (ref 0.5–1)
GFR SERPL CREATININE-BSD FRML MDRD: 48 ML/MIN/1.73M2
GLUCOSE SERPL-MCNC: 97 MG/DL (ref 74–99)
POTASSIUM SERPL-SCNC: 4.3 MMOL/L (ref 3.5–5)
SODIUM SERPL-SCNC: 138 MMOL/L (ref 132–146)

## 2024-03-11 PROCEDURE — 80048 BASIC METABOLIC PNL TOTAL CA: CPT

## 2024-03-11 PROCEDURE — 83880 ASSAY OF NATRIURETIC PEPTIDE: CPT

## 2024-03-11 PROCEDURE — 99214 OFFICE O/P EST MOD 30 MIN: CPT

## 2024-03-11 RX ORDER — BUDESONIDE 0.25 MG/2ML
1 INHALANT ORAL
Qty: 60 EACH | Refills: 2 | Status: ON HOLD | OUTPATIENT
Start: 2024-03-11

## 2024-03-11 NOTE — CARE COORDINATION
Remote Alert Monitoring Note  Rpm alert to be reviewed by the provider   red alert   pulse ox reading (73) and pulse ox heart rate (122)   Additional needs to be addressed by N/A: No                    Date/Time:  3/11/2024 3:05 PM  Patient Current Location: Home: [facility] [address]  [facility] [id number]  LPN contacted family by telephone. Verified patients name and  as identifiers.  Background: enrolled in RPM for COPD AND CHF   Refer to 911 immediately if:  Patient unresponsive or unable to provide history  Change in cognition or sudden confusion  Patient unable to respond in complete sentences  Intense chest pain/tightness  Any concern for any clinical emergency  Red Alert: Provider response time of 1 hr required for any red alert requiring intervention  Yellow Alert: Provider response time of 3hr required for any escalated yellow alert    O2 Triage  Are you having any Chest Pain? no   Are you having any Shortness of Breath? no   Swelling in your hands or feet? no     Are you having any other health concerns or issues? no      HR Triage  Are you having any Chest Pain? no   Are you having any Shortness of Breath? no   Are you having any dizziness? no   Are you feeling more fatigued or tired than normal? no   Are you having any other health concerns or issues? no      Clinical Interventions: Reviewed and followed up on alerts and treatments-spoke to  William regarding RPM red alert for high HR and low Pulse ox reading. Pt has no chest pain  or dyspnea. Seen at CHF clinic this morning. Wears oxygen at 4lpm as directed. Requested William change batteries in pulse ox and recheck metrics. Verbalized understanding.     Plan/Follow Up: Will continue to review, monitor and address alerts with follow up based on severity of symptoms and risk factors.

## 2024-03-11 NOTE — CARE COORDINATION
Remote Patient Monitoring Note  Date/Time:  3/11/2024 3:21 PM  Patient Current Location: Home: [address] [place] [address]  LPN contacted family by telephone regarding red alert received for pulse ox reading (73) and pulse ox heart rate (122). Verified patients name and  as identifiers.  Background: enrolled in RPM for COPD AND CHF  Clinical Interventions: Reviewed and followed up on alerts and treatments-Pt's  changed the batteries in the pulse ox. New reading 100% / HR 55. All metrics WNL    Plan/Follow Up: Will continue to review, monitor and address alerts with follow up based on severity of symptoms and risk factors.

## 2024-03-11 NOTE — CARE COORDINATION
Remote Patient Monitoring Note      Date/Time:  3/11/2024 1:30 PM  Patient Current Location: Home: [address] [place] [address]  LPN contacted family by telephone regarding  RPM  . Verified patients name and  as identifiers.  Background: enrolled in RPM for COPD AND CHF  Clinical Interventions: Reviewed and followed up on alerts and treatments-\spoke to pt's   See regarding RPM    pt was seen at the CHF clinic today. Metrics entered manually from visit.   Plan/Follow Up: Will continue to review, monitor and address alerts with follow up based on severity of symptoms and risk factors.

## 2024-03-11 NOTE — PROGRESS NOTES
Congestive Heart Failure Clinic   [facility]       Referring Provider: Dr Craft  Primary Care Physician: Julita Dyer MD   Cardiologist: Dr Craft  Nephrologist: N/A      HISTORY OF PRESENT ILLNESS:     Barbara Jack is a 88 y.o. (1935) female with a history of HFrEF, most recent EF:  Lab Results   Component Value Date    LVEF 23 02/07/2023    LVEFMODE Echo 12/26/2016         She presents to the CHF clinic for ongoing evaluation and monitoring of heart failure.    In the CHF clinic today she denies any adverse symptoms except:  [x] Dizziness or lightheadedness (with position change )  [] Syncope or near syncope  [] Recent Fall  [] Shortness of breath at rest   [x] Dyspnea with exertion  [x] Decline in functional capacity (unable to perform activities they had previously been able to do)  [x] Fatigue   [] Orthopnea  [] PND  [] Nocturnal cough  [] Hemoptysis  [] Chest pain, pressure, or discomfort  [] Palpitations  [] Abdominal distention  [] Abdominal bloating  [] Early satiety  [] Blood in stool   [] Diarrhea  [] Constipation ( off and on )  [] Nausea/Vomiting  [] Decreased urinary response to oral diuretic   [] Scrotal swelling   [] Lower extremity edema  [] Used PRN doses of oral diuretic   [] Weight gain    Wt Readings from Last 3 Encounters:   03/11/24 39.1 kg (86 lb 4.8 oz)   03/08/24 41.2 kg (90 lb 12.8 oz)   03/05/24 42.2 kg (93 lb)           SOCIAL HISTORY:  [x] Denies tobacco, alcohol or illicit drug abuse  [] Tobacco use:  [] ETOH use:  [] Illicit drug use:        MEDICATIONS:    No Known Allergies    Current Outpatient Medications:     ipratropium 0.5 mg-albuterol 2.5 mg (DUONEB) 0.5-2.5 (3) MG/3ML SOLN nebulizer solution, Inhale 3 mLs into the lungs every 4 hours, Disp: , Rfl:     fluticasone-umeclidin-vilant (TRELEGY ELLIPTA) 200-62.5-25 MCG/ACT AEPB inhaler, Inhale 1 puff into the lungs daily, Disp: , Rfl:     omeprazole (PRILOSEC) 40 MG delayed

## 2024-03-12 ENCOUNTER — HOSPITAL ENCOUNTER (INPATIENT)
Age: 89
LOS: 6 days | Discharge: HOME HEALTH | DRG: 813 | End: 2024-03-18
Attending: STUDENT IN AN ORGANIZED HEALTH CARE EDUCATION/TRAINING PROGRAM | Admitting: STUDENT IN AN ORGANIZED HEALTH CARE EDUCATION/TRAINING PROGRAM
Payer: MEDICARE

## 2024-03-12 ENCOUNTER — HOSPITAL ENCOUNTER (EMERGENCY)
Age: 89
Discharge: ANOTHER HOSPITAL | End: 2024-03-12
Attending: STUDENT IN AN ORGANIZED HEALTH CARE EDUCATION/TRAINING PROGRAM
Payer: MEDICARE

## 2024-03-12 ENCOUNTER — APPOINTMENT (OUTPATIENT)
Dept: GENERAL RADIOLOGY | Age: 89
End: 2024-03-12
Payer: MEDICARE

## 2024-03-12 ENCOUNTER — CARE COORDINATION (OUTPATIENT)
Dept: CASE MANAGEMENT | Age: 89
End: 2024-03-12

## 2024-03-12 VITALS
BODY MASS INDEX: 17.14 KG/M2 | TEMPERATURE: 97.7 F | HEART RATE: 55 BPM | WEIGHT: 85 LBS | RESPIRATION RATE: 16 BRPM | DIASTOLIC BLOOD PRESSURE: 58 MMHG | HEIGHT: 59 IN | OXYGEN SATURATION: 100 % | SYSTOLIC BLOOD PRESSURE: 118 MMHG

## 2024-03-12 DIAGNOSIS — R42 LIGHTHEADEDNESS: ICD-10-CM

## 2024-03-12 DIAGNOSIS — D64.9 ANEMIA, UNSPECIFIED TYPE: ICD-10-CM

## 2024-03-12 DIAGNOSIS — N39.0 URINARY TRACT INFECTION WITHOUT HEMATURIA, SITE UNSPECIFIED: ICD-10-CM

## 2024-03-12 DIAGNOSIS — R79.89 ELEVATED BRAIN NATRIURETIC PEPTIDE (BNP) LEVEL: ICD-10-CM

## 2024-03-12 DIAGNOSIS — D64.9 ANEMIA, UNSPECIFIED TYPE: Primary | ICD-10-CM

## 2024-03-12 PROBLEM — K92.2 GI BLEED: Status: ACTIVE | Noted: 2024-03-12

## 2024-03-12 PROBLEM — N18.9 ACUTE KIDNEY INJURY SUPERIMPOSED ON CKD (HCC): Status: ACTIVE | Noted: 2024-03-12

## 2024-03-12 PROBLEM — N17.9 ACUTE KIDNEY INJURY SUPERIMPOSED ON CKD (HCC): Status: ACTIVE | Noted: 2024-03-12

## 2024-03-12 LAB
ALBUMIN SERPL-MCNC: 3.9 G/DL (ref 3.5–5.2)
ALP SERPL-CCNC: 58 U/L (ref 35–104)
ALT SERPL-CCNC: 12 U/L (ref 0–32)
ANION GAP SERPL CALCULATED.3IONS-SCNC: 8 MMOL/L (ref 7–16)
AST SERPL-CCNC: 21 U/L (ref 0–31)
BASOPHILS # BLD: 0.02 K/UL (ref 0–0.2)
BASOPHILS NFR BLD: 0 % (ref 0–2)
BILIRUB SERPL-MCNC: 0.3 MG/DL (ref 0–1.2)
BILIRUB UR QL STRIP: NEGATIVE
BNP SERPL-MCNC: ABNORMAL PG/ML (ref 0–450)
BUN SERPL-MCNC: 36 MG/DL (ref 6–23)
CALCIUM SERPL-MCNC: 9.4 MG/DL (ref 8.6–10.2)
CHLORIDE SERPL-SCNC: 100 MMOL/L (ref 98–107)
CLARITY UR: CLEAR
CO2 SERPL-SCNC: 35 MMOL/L (ref 22–29)
COLOR UR: YELLOW
CREAT SERPL-MCNC: 1.4 MG/DL (ref 0.5–1)
EOSINOPHIL # BLD: 0.06 K/UL (ref 0.05–0.5)
EOSINOPHILS RELATIVE PERCENT: 1 % (ref 0–6)
EPI CELLS #/AREA URNS HPF: ABNORMAL /HPF
ERYTHROCYTE [DISTWIDTH] IN BLOOD BY AUTOMATED COUNT: 15.9 % (ref 11.5–15)
FERRITIN SERPL-MCNC: 44 NG/ML
FOLATE SERPL-MCNC: >20 NG/ML (ref 4.8–24.2)
GFR SERPL CREATININE-BSD FRML MDRD: 35 ML/MIN/1.73M2
GLUCOSE SERPL-MCNC: 115 MG/DL (ref 74–99)
GLUCOSE UR STRIP-MCNC: NEGATIVE MG/DL
HCT VFR BLD AUTO: 23.5 % (ref 34–48)
HGB BLD-MCNC: 7 G/DL (ref 11.5–15.5)
HGB UR QL STRIP.AUTO: NEGATIVE
IMM GRANULOCYTES # BLD AUTO: <0.03 K/UL (ref 0–0.58)
IMM GRANULOCYTES NFR BLD: 0 % (ref 0–5)
IRON SATN MFR SERPL: 6 % (ref 15–50)
IRON SERPL-MCNC: 24 UG/DL (ref 37–145)
KETONES UR STRIP-MCNC: NEGATIVE MG/DL
LACTATE BLDV-SCNC: 1.8 MMOL/L (ref 0.5–2.2)
LEUKOCYTE ESTERASE UR QL STRIP: ABNORMAL
LYMPHOCYTES NFR BLD: 0.82 K/UL (ref 1.5–4)
LYMPHOCYTES RELATIVE PERCENT: 12 % (ref 20–42)
MCH RBC QN AUTO: 28.1 PG (ref 26–35)
MCHC RBC AUTO-ENTMCNC: 29.8 G/DL (ref 32–34.5)
MCV RBC AUTO: 94.4 FL (ref 80–99.9)
MONOCYTES NFR BLD: 0.79 K/UL (ref 0.1–0.95)
MONOCYTES NFR BLD: 12 % (ref 2–12)
NEUTROPHILS NFR BLD: 74 % (ref 43–80)
NEUTS SEG NFR BLD: 4.88 K/UL (ref 1.8–7.3)
NITRITE UR QL STRIP: NEGATIVE
PH UR STRIP: 5.5 [PH] (ref 5–9)
PLATELET # BLD AUTO: 397 K/UL (ref 130–450)
PMV BLD AUTO: 9.2 FL (ref 7–12)
POTASSIUM SERPL-SCNC: 5.2 MMOL/L (ref 3.5–5)
PROT SERPL-MCNC: 6.4 G/DL (ref 6.4–8.3)
PROT UR STRIP-MCNC: NEGATIVE MG/DL
RBC # BLD AUTO: 2.49 M/UL (ref 3.5–5.5)
RBC #/AREA URNS HPF: ABNORMAL /HPF
SODIUM SERPL-SCNC: 143 MMOL/L (ref 132–146)
SP GR UR STRIP: <1.005 (ref 1–1.03)
TIBC SERPL-MCNC: 392 UG/DL (ref 250–450)
TROPONIN I SERPL HS-MCNC: 32 NG/L (ref 0–9)
TROPONIN I SERPL HS-MCNC: 36 NG/L (ref 0–9)
UROBILINOGEN UR STRIP-ACNC: 2 EU/DL (ref 0–1)
VIT B12 SERPL-MCNC: >2000 PG/ML (ref 211–946)
WBC #/AREA URNS HPF: ABNORMAL /HPF
WBC OTHER # BLD: 6.6 K/UL (ref 4.5–11.5)

## 2024-03-12 PROCEDURE — 96365 THER/PROPH/DIAG IV INF INIT: CPT

## 2024-03-12 PROCEDURE — 86923 COMPATIBILITY TEST ELECTRIC: CPT

## 2024-03-12 PROCEDURE — 86900 BLOOD TYPING SEROLOGIC ABO: CPT

## 2024-03-12 PROCEDURE — 96375 TX/PRO/DX INJ NEW DRUG ADDON: CPT

## 2024-03-12 PROCEDURE — 86901 BLOOD TYPING SEROLOGIC RH(D): CPT

## 2024-03-12 PROCEDURE — 83550 IRON BINDING TEST: CPT

## 2024-03-12 PROCEDURE — 82746 ASSAY OF FOLIC ACID SERUM: CPT

## 2024-03-12 PROCEDURE — 82728 ASSAY OF FERRITIN: CPT

## 2024-03-12 PROCEDURE — C9113 INJ PANTOPRAZOLE SODIUM, VIA: HCPCS | Performed by: STUDENT IN AN ORGANIZED HEALTH CARE EDUCATION/TRAINING PROGRAM

## 2024-03-12 PROCEDURE — 96366 THER/PROPH/DIAG IV INF ADDON: CPT

## 2024-03-12 PROCEDURE — 85025 COMPLETE CBC W/AUTO DIFF WBC: CPT

## 2024-03-12 PROCEDURE — 80053 COMPREHEN METABOLIC PANEL: CPT

## 2024-03-12 PROCEDURE — 93005 ELECTROCARDIOGRAM TRACING: CPT | Performed by: STUDENT IN AN ORGANIZED HEALTH CARE EDUCATION/TRAINING PROGRAM

## 2024-03-12 PROCEDURE — 86850 RBC ANTIBODY SCREEN: CPT

## 2024-03-12 PROCEDURE — 83880 ASSAY OF NATRIURETIC PEPTIDE: CPT

## 2024-03-12 PROCEDURE — 1200000000 HC SEMI PRIVATE

## 2024-03-12 PROCEDURE — APPSS60 APP SPLIT SHARED TIME 46-60 MINUTES: Performed by: NURSE PRACTITIONER

## 2024-03-12 PROCEDURE — 83605 ASSAY OF LACTIC ACID: CPT

## 2024-03-12 PROCEDURE — 87086 URINE CULTURE/COLONY COUNT: CPT

## 2024-03-12 PROCEDURE — 81001 URINALYSIS AUTO W/SCOPE: CPT

## 2024-03-12 PROCEDURE — 99285 EMERGENCY DEPT VISIT HI MDM: CPT

## 2024-03-12 PROCEDURE — 6360000002 HC RX W HCPCS: Performed by: STUDENT IN AN ORGANIZED HEALTH CARE EDUCATION/TRAINING PROGRAM

## 2024-03-12 PROCEDURE — 82607 VITAMIN B-12: CPT

## 2024-03-12 PROCEDURE — 2580000003 HC RX 258: Performed by: STUDENT IN AN ORGANIZED HEALTH CARE EDUCATION/TRAINING PROGRAM

## 2024-03-12 PROCEDURE — 99222 1ST HOSP IP/OBS MODERATE 55: CPT | Performed by: STUDENT IN AN ORGANIZED HEALTH CARE EDUCATION/TRAINING PROGRAM

## 2024-03-12 PROCEDURE — 71045 X-RAY EXAM CHEST 1 VIEW: CPT

## 2024-03-12 PROCEDURE — 84484 ASSAY OF TROPONIN QUANT: CPT

## 2024-03-12 PROCEDURE — 83540 ASSAY OF IRON: CPT

## 2024-03-12 RX ORDER — METOCLOPRAMIDE 5 MG/1
5 TABLET ORAL 4 TIMES DAILY
Status: DISCONTINUED | OUTPATIENT
Start: 2024-03-12 | End: 2024-03-18 | Stop reason: HOSPADM

## 2024-03-12 RX ORDER — ONDANSETRON 2 MG/ML
4 INJECTION INTRAMUSCULAR; INTRAVENOUS EVERY 6 HOURS PRN
Status: DISCONTINUED | OUTPATIENT
Start: 2024-03-12 | End: 2024-03-18 | Stop reason: HOSPADM

## 2024-03-12 RX ORDER — METOPROLOL SUCCINATE 25 MG/1
25 TABLET, EXTENDED RELEASE ORAL DAILY
Status: DISCONTINUED | OUTPATIENT
Start: 2024-03-13 | End: 2024-03-18 | Stop reason: HOSPADM

## 2024-03-12 RX ORDER — ASPIRIN 81 MG/1
81 TABLET ORAL DAILY
Status: DISCONTINUED | OUTPATIENT
Start: 2024-03-13 | End: 2024-03-18 | Stop reason: HOSPADM

## 2024-03-12 RX ORDER — METOCLOPRAMIDE 5 MG/1
5 TABLET ORAL 4 TIMES DAILY
Status: ON HOLD | COMMUNITY

## 2024-03-12 RX ORDER — LOSARTAN POTASSIUM 25 MG/1
25 TABLET ORAL DAILY
Status: DISCONTINUED | OUTPATIENT
Start: 2024-03-13 | End: 2024-03-18 | Stop reason: HOSPADM

## 2024-03-12 RX ORDER — FERROUS SULFATE 325(65) MG
325 TABLET ORAL
Status: DISCONTINUED | OUTPATIENT
Start: 2024-03-13 | End: 2024-03-18 | Stop reason: HOSPADM

## 2024-03-12 RX ORDER — SPIRONOLACTONE 25 MG/1
25 TABLET ORAL DAILY
Status: DISCONTINUED | OUTPATIENT
Start: 2024-03-13 | End: 2024-03-18 | Stop reason: HOSPADM

## 2024-03-12 RX ORDER — ATORVASTATIN CALCIUM 10 MG/1
10 TABLET, FILM COATED ORAL DAILY
Status: DISCONTINUED | OUTPATIENT
Start: 2024-03-13 | End: 2024-03-18 | Stop reason: HOSPADM

## 2024-03-12 RX ORDER — SODIUM CHLORIDE 0.9 % (FLUSH) 0.9 %
5-40 SYRINGE (ML) INJECTION PRN
Status: DISCONTINUED | OUTPATIENT
Start: 2024-03-12 | End: 2024-03-18 | Stop reason: HOSPADM

## 2024-03-12 RX ORDER — AMIODARONE HYDROCHLORIDE 200 MG/1
100 TABLET ORAL DAILY
Status: DISCONTINUED | OUTPATIENT
Start: 2024-03-13 | End: 2024-03-18 | Stop reason: HOSPADM

## 2024-03-12 RX ORDER — BUMETANIDE 1 MG/1
1 TABLET ORAL 2 TIMES DAILY
Status: DISCONTINUED | OUTPATIENT
Start: 2024-03-13 | End: 2024-03-18 | Stop reason: HOSPADM

## 2024-03-12 RX ORDER — FERROUS SULFATE 325(65) MG
325 TABLET ORAL 2 TIMES DAILY WITH MEALS
Status: DISCONTINUED | OUTPATIENT
Start: 2024-03-13 | End: 2024-03-12

## 2024-03-12 RX ORDER — BUMETANIDE 1 MG/1
1 TABLET ORAL 2 TIMES DAILY
Status: ON HOLD | COMMUNITY

## 2024-03-12 RX ORDER — SODIUM CHLORIDE 9 MG/ML
INJECTION, SOLUTION INTRAVENOUS PRN
Status: DISCONTINUED | OUTPATIENT
Start: 2024-03-12 | End: 2024-03-18 | Stop reason: HOSPADM

## 2024-03-12 RX ORDER — BUDESONIDE 0.25 MG/2ML
250 INHALANT ORAL
Status: DISCONTINUED | OUTPATIENT
Start: 2024-03-12 | End: 2024-03-18 | Stop reason: HOSPADM

## 2024-03-12 RX ORDER — SPIRONOLACTONE 25 MG/1
25 TABLET ORAL DAILY
Status: ON HOLD | COMMUNITY

## 2024-03-12 RX ORDER — ONDANSETRON 4 MG/1
4 TABLET, ORALLY DISINTEGRATING ORAL EVERY 8 HOURS PRN
Status: DISCONTINUED | OUTPATIENT
Start: 2024-03-12 | End: 2024-03-18 | Stop reason: HOSPADM

## 2024-03-12 RX ORDER — ACETAMINOPHEN 650 MG/1
650 SUPPOSITORY RECTAL EVERY 6 HOURS PRN
Status: DISCONTINUED | OUTPATIENT
Start: 2024-03-12 | End: 2024-03-18 | Stop reason: HOSPADM

## 2024-03-12 RX ORDER — SODIUM CHLORIDE 0.9 % (FLUSH) 0.9 %
5-40 SYRINGE (ML) INJECTION EVERY 12 HOURS SCHEDULED
Status: DISCONTINUED | OUTPATIENT
Start: 2024-03-12 | End: 2024-03-18 | Stop reason: HOSPADM

## 2024-03-12 RX ORDER — ACETAMINOPHEN 325 MG/1
650 TABLET ORAL EVERY 6 HOURS PRN
Status: DISCONTINUED | OUTPATIENT
Start: 2024-03-12 | End: 2024-03-18 | Stop reason: HOSPADM

## 2024-03-12 RX ORDER — SODIUM CHLORIDE 9 MG/ML
INJECTION, SOLUTION INTRAVENOUS PRN
Status: DISCONTINUED | OUTPATIENT
Start: 2024-03-12 | End: 2024-03-14

## 2024-03-12 RX ADMIN — PANTOPRAZOLE SODIUM 80 MG: 40 INJECTION, POWDER, FOR SOLUTION INTRAVENOUS at 16:48

## 2024-03-12 RX ADMIN — WATER 1000 MG: 1 INJECTION INTRAMUSCULAR; INTRAVENOUS; SUBCUTANEOUS at 18:22

## 2024-03-12 ASSESSMENT — ENCOUNTER SYMPTOMS
EYE ITCHING: 0
ABDOMINAL DISTENTION: 0
COUGH: 0
CONSTIPATION: 0
EYE PAIN: 0
VOMITING: 0
VOMITING: 0
NAUSEA: 0
DIARRHEA: 0
COUGH: 0
VOMITING: 0
SORE THROAT: 0
SINUS PRESSURE: 0
RHINORRHEA: 0
RHINORRHEA: 0
NAUSEA: 0
DIARRHEA: 0
ABDOMINAL PAIN: 0
SORE THROAT: 0
WHEEZING: 0
EYE REDNESS: 0
EYE PAIN: 0
RHINORRHEA: 0
SHORTNESS OF BREATH: 0
EYE REDNESS: 0
WHEEZING: 0
ABDOMINAL PAIN: 0
WHEEZING: 0
WHEEZING: 0
EYE PAIN: 0
EYE ITCHING: 0
CHEST TIGHTNESS: 0
EYE DISCHARGE: 0
EYE ITCHING: 0
CHEST TIGHTNESS: 0
CONSTIPATION: 0
EYE REDNESS: 0
NAUSEA: 0
SORE THROAT: 0
SINUS PRESSURE: 0
SHORTNESS OF BREATH: 0
CONSTIPATION: 0
SINUS PRESSURE: 0
DIARRHEA: 0
ABDOMINAL DISTENTION: 0
ABDOMINAL PAIN: 0
ABDOMINAL DISTENTION: 0
DIARRHEA: 0
SORE THROAT: 0
ABDOMINAL PAIN: 0
VOMITING: 0
NAUSEA: 0
ABDOMINAL DISTENTION: 0
CHEST TIGHTNESS: 0
NAUSEA: 0
NAUSEA: 0
EYE DISCHARGE: 0
EYE DISCHARGE: 0
SINUS PRESSURE: 0
SHORTNESS OF BREATH: 0
EYE REDNESS: 0
SORE THROAT: 0
EYE DISCHARGE: 0
SINUS PRESSURE: 0
PHOTOPHOBIA: 0
RHINORRHEA: 0
SHORTNESS OF BREATH: 0
COUGH: 0
SHORTNESS OF BREATH: 0
SINUS PRESSURE: 0
CHEST TIGHTNESS: 0
EYE DISCHARGE: 0
COUGH: 0
CHEST TIGHTNESS: 0
EYE REDNESS: 0
RHINORRHEA: 0
EYE ITCHING: 0
NAUSEA: 0
DIARRHEA: 0
VOMITING: 0
ABDOMINAL PAIN: 0
DIARRHEA: 0
RHINORRHEA: 0
VOMITING: 0
CHEST TIGHTNESS: 0
SORE THROAT: 0
CONSTIPATION: 0
SHORTNESS OF BREATH: 0
CONSTIPATION: 0
COUGH: 0
EYE DISCHARGE: 0
CONSTIPATION: 0
CHEST TIGHTNESS: 0
WHEEZING: 0
ABDOMINAL DISTENTION: 0
EYE PAIN: 0
ABDOMINAL PAIN: 0
ABDOMINAL DISTENTION: 0
EYE REDNESS: 0
DIARRHEA: 0
SHORTNESS OF BREATH: 0
EYE PAIN: 0
VOMITING: 0
EYE ITCHING: 0
COUGH: 0
ABDOMINAL DISTENTION: 0
COUGH: 0
EYE ITCHING: 0
WHEEZING: 0
EYE PAIN: 0
ABDOMINAL PAIN: 0

## 2024-03-12 ASSESSMENT — PAIN - FUNCTIONAL ASSESSMENT
PAIN_FUNCTIONAL_ASSESSMENT: NONE - DENIES PAIN
PAIN_FUNCTIONAL_ASSESSMENT: NONE - DENIES PAIN

## 2024-03-12 NOTE — PROGRESS NOTES
Barbara Jack (:  1935) is a 88 y.o. female that is seen today for skilled assessment    Subjective   SUBJECTIVE/OBJECTIVE:  Barbara is awake alert and in no obvious distress.  She is sitting up in bed in room with her TV on.  She has no complaints of pain or discomfort when asked.  She continues to work in therapies as tolerated.  She will follow-up with CHF clinic and cardiology.  Nursing has no concerns and will let Dr. NP or PA know of any changes    Location: [facility]  All nursing and progress notes reviewed.    Past Medical History:   Diagnosis Date    Arthritis     Atrial fibrillation ([facility])     follows with Dr Craft  on xarelto    CAD (coronary artery disease)     Cardiomyopathy ([facility])     CHF (congestive heart failure) ([facility])     history of follows with Dr Craft 23    Chronic anticoagulation     Chronic bronchitis ([facility])     none recent    COPD (chronic obstructive pulmonary disease) ([facility])     COVID 2022    in hospital x5 days    Depression     GERD (gastroesophageal reflux disease)     HFrEF (heart failure with reduced ejection fraction) ([facility]) 2016- LVEF 20-25%, large apical aneurysm, LA moderate-severely dilated, mildly enlarged RA, mild MR, mild TR, mild AR    [place] (hard of hearing)     wears bilat hearing aids    Hyperlipidemia     Hypertension     ICD (implantable cardioverter-defibrillator) in place     St Dev. follows with Dr Keller. last interrogated remotely  23    Left ventricular dysfunction     Low vitamin D level     MI (myocardial infarction) ([facility]) 10/30/2009    Osteopenia     Osteoporosis     Swallowing difficulty        No Known Allergies   Current Outpatient Medications   Medication Sig Dispense Refill    bumetanide (BUMEX) 1 MG tablet Take 1 tablet by mouth 2 times daily      metoclopramide (REGLAN) 5 MG tablet Take 1 tablet by mouth 4 times daily      Mometasone Furoate (NASONEX NA) by Nasal route      spironolactone

## 2024-03-12 NOTE — CARE COORDINATION
Remote Alert Monitoring Note  Rpm alert to be reviewed by the provider   red alert   pulse ox reading (68%)   Additional needs to be addressed by N/A: No                    Date/Time:  3/12/2024 11:12 AM  Patient Current Location: Home: [address] [place] [id number]  LPN contacted family by telephone. Verified patients name and  as identifiers.  Background:  ENROLLED in RPM for COPD AND CHF  Refer to 911 immediately if:  Patient unresponsive or unable to provide history  Change in cognition or sudden confusion  Patient unable to respond in complete sentences  Intense chest pain/tightness  Any concern for any clinical emergency  Red Alert: Provider response time of 1 hr required for any red alert requiring intervention  Yellow Alert: Provider response time of 3hr required for any escalated yellow alert    O2 Triage  Are you having any Chest Pain? no   Are you having any Shortness of Breath? no   Swelling in your hands or feet? no     Are you having any other health concerns or issues? no      Clinical Interventions: Reviewed and followed up on alerts and treatments-spoke to  William regarding Rpm  red alert for low pulse ox reading. Triston states Brittany has no chest pain or dyspnea. \" She is sitting in her chair reading a book\" Wears oxygen at 3lpm.  \" Her hands are ice cold\" Educated on warming hands prior to checking pulse ox,. Batteries changed in pulse ox yesterday. William states he has to leave for a few hours to do errands and will recheck when he returns home.     Plan/Follow Up: Will continue to review, monitor and address alerts with follow up based on severity of symptoms and risk factors.

## 2024-03-12 NOTE — ED PROVIDER NOTES
and son; Heart Disease in her brother.     The patient’s home medications have been reviewed.    Allergies: Patient has no known allergies.    -------------------------------------------------- RESULTS -------------------------------------------------    Lab  Results for orders placed or performed during the hospital encounter of 03/12/24   CBC with Auto Differential   Result Value Ref Range    WBC 6.6 4.5 - 11.5 k/uL    RBC 2.49 (L) 3.50 - 5.50 m/uL    Hemoglobin 7.0 (L) 11.5 - 15.5 g/dL    Hematocrit 23.5 (L) 34.0 - 48.0 %    MCV 94.4 80.0 - 99.9 fL    MCH 28.1 26.0 - 35.0 pg    MCHC 29.8 (L) 32.0 - 34.5 g/dL    RDW 15.9 (H) 11.5 - 15.0 %    Platelets 397 130 - 450 k/uL    MPV 9.2 7.0 - 12.0 fL    Neutrophils % 74 43.0 - 80.0 %    Lymphocytes % 12 (L) 20.0 - 42.0 %    Monocytes % 12 2.0 - 12.0 %    Eosinophils % 1 0 - 6 %    Basophils % 0 0.0 - 2.0 %    Immature Granulocytes 0 0.0 - 5.0 %    Neutrophils Absolute 4.88 1.80 - 7.30 k/uL    Lymphocytes Absolute 0.82 (L) 1.50 - 4.00 k/uL    Monocytes Absolute 0.79 0.10 - 0.95 k/uL    Eosinophils Absolute 0.06 0.05 - 0.50 k/uL    Basophils Absolute 0.02 0.00 - 0.20 k/uL    Absolute Immature Granulocyte <0.03 0.00 - 0.58 k/uL   Comprehensive Metabolic Panel w/ Reflex to MG   Result Value Ref Range    Sodium 143 132 - 146 mmol/L    Potassium 5.2 (H) 3.5 - 5.0 mmol/L    Chloride 100 98 - 107 mmol/L    CO2 35 (H) 22 - 29 mmol/L    Anion Gap 8 7 - 16 mmol/L    Glucose 115 (H) 74 - 99 mg/dL    BUN 36 (H) 6 - 23 mg/dL    Creatinine 1.4 (H) 0.50 - 1.00 mg/dL    Est, Glom Filt Rate 35 (L) >60 mL/min/1.73m2    Calcium 9.4 8.6 - 10.2 mg/dL    Total Protein 6.4 6.4 - 8.3 g/dL    Albumin 3.9 3.5 - 5.2 g/dL    Total Bilirubin 0.3 0.0 - 1.2 mg/dL    Alkaline Phosphatase 58 35 - 104 U/L    ALT 12 0 - 32 U/L    AST 21 0 - 31 U/L   Troponin   Result Value Ref Range    Troponin, High Sensitivity 36 (H) 0 - 9 ng/L   Lactic Acid   Result Value Ref Range    Lactic Acid 1.8 0.5 - 2.2 mmol/L

## 2024-03-12 NOTE — PROGRESS NOTES
Barbara Jack (:  1935) is a 88 y.o. female that is seen today for skilled assessment    Subjective   SUBJECTIVE/OBJECTIVE:  Barbara is awake alert and in no obvious distress.  She is sitting up in wheelchair in room.  She has no complaints of pain or discomfort when asked.  She continues to work in therapies as tolerated. She is to follow-up with CHF clinic cardiology and psych NP.  Nursing has no concerns and will let Dr. LOVE or PA know of any changes    Location: [facility] nursing [facility]  All nursing and progress notes reviewed.    Past Medical History:   Diagnosis Date    Arthritis     Atrial fibrillation ([facility])     follows with Dr Craft  on xarelto    CAD (coronary artery disease)     Cardiomyopathy ([facility])     CHF (congestive heart failure) ([facility])     history of follows with Dr Craft 23    Chronic anticoagulation     Chronic bronchitis ([facility])     none recent    COPD (chronic obstructive pulmonary disease) ([facility])     COVID 2022    in hospital x5 days    Depression     GERD (gastroesophageal reflux disease)     HFrEF (heart failure with reduced ejection fraction) ([facility]) 2016- LVEF 20-25%, large apical aneurysm, LA moderate-severely dilated, mildly enlarged RA, mild MR, mild TR, mild AR    [place] (hard of hearing)     wears bilat hearing aids    Hyperlipidemia     Hypertension     ICD (implantable cardioverter-defibrillator) in place     St Dev. follows with Dr Keller. last interrogated remotely  23    Left ventricular dysfunction     Low vitamin D level     MI (myocardial infarction) ([facility]) 10/30/2009    Osteopenia     Osteoporosis     Swallowing difficulty        No Known Allergies   Current Outpatient Medications   Medication Sig Dispense Refill    bumetanide (BUMEX) 1 MG tablet Take 1 tablet by mouth 2 times daily      metoclopramide (REGLAN) 5 MG tablet Take 1 tablet by mouth 4 times daily      Mometasone Furoate (NASONEX NA) by Nasal route      spironolactone

## 2024-03-12 NOTE — PROGRESS NOTES
without angina pectoris  12. Weakness generalized         review all medications and diagnosis    review and continue losartan 25 mg once a day for hypertension  review and continue Flonase 1 spray both nostrils daily for allergies    order and review labs weekly for first 3 weeks, then monthly    continue therapies, monitor progress     Follow-up with CHF clinic  Follow-up with cardiology    More than 30 min was spent between patient care, chart review, discussing patient management with patient and nursing staff along with interpreting diagnostics and plan of care.            An electronic signature was used to authenticate this note.    --DUKE Canales - CNP

## 2024-03-12 NOTE — CARE COORDINATION
Remote Patient Monitoring Note  Date/Time:  3/12/2024 2:46 PM  Patient Current Location: [facility] noted  pt is now in the ED at [facility] for dizziness.  Background:   ENROLLED in RPM for COPD AND CHF   Plan/Follow Up: Will continue to review, monitor and address alerts with follow up based on severity of symptoms and risk factors.

## 2024-03-12 NOTE — PROGRESS NOTES
medications and diagnosis    review and continue Lasix 20 mg daily for CHF  review and continue rivaroxaban 15mg daily for afib    order and review labs weekly for first 3 weeks, then monthly    continue therapies, monitor progress     Follow-up with CHF clinic  Follow-up with cardiology    More than 30 min was spent between patient care, chart review, discussing patient management with patient and nursing staff along with interpreting diagnostics and plan of care.            An electronic signature was used to authenticate this note.    --DUKE Canales - CNP

## 2024-03-13 ENCOUNTER — APPOINTMENT (OUTPATIENT)
Dept: GENERAL RADIOLOGY | Age: 89
DRG: 813 | End: 2024-03-13
Attending: STUDENT IN AN ORGANIZED HEALTH CARE EDUCATION/TRAINING PROGRAM
Payer: MEDICARE

## 2024-03-13 ENCOUNTER — CARE COORDINATION (OUTPATIENT)
Dept: CARE COORDINATION | Age: 89
End: 2024-03-13

## 2024-03-13 ENCOUNTER — CARE COORDINATION (OUTPATIENT)
Dept: CASE MANAGEMENT | Age: 89
End: 2024-03-13

## 2024-03-13 ENCOUNTER — ANESTHESIA EVENT (OUTPATIENT)
Dept: ENDOSCOPY | Age: 89
End: 2024-03-13
Payer: MEDICARE

## 2024-03-13 PROBLEM — T45.515A ANTICOAGULANT ADVERSE REACTION: Status: ACTIVE | Noted: 2024-03-13

## 2024-03-13 PROBLEM — Z79.01 ON RIVAROXABAN THERAPY: Status: ACTIVE | Noted: 2024-03-13

## 2024-03-13 PROBLEM — D62 ACUTE BLOOD LOSS ANEMIA: Status: ACTIVE | Noted: 2024-03-13

## 2024-03-13 PROBLEM — R13.10 DYSPHAGIA: Status: ACTIVE | Noted: 2024-03-13

## 2024-03-13 LAB
ALBUMIN SERPL-MCNC: 3.5 G/DL (ref 3.5–5.2)
ALP SERPL-CCNC: 52 U/L (ref 35–104)
ALT SERPL-CCNC: 8 U/L (ref 0–32)
AMMONIA PLAS-SCNC: 56 UMOL/L (ref 11–51)
ANION GAP SERPL CALCULATED.3IONS-SCNC: 8 MMOL/L (ref 7–16)
AST SERPL-CCNC: 19 U/L (ref 0–31)
BILIRUB SERPL-MCNC: 0.4 MG/DL (ref 0–1.2)
BUN SERPL-MCNC: 32 MG/DL (ref 6–23)
CALCIUM SERPL-MCNC: 8.7 MG/DL (ref 8.6–10.2)
CHLORIDE SERPL-SCNC: 103 MMOL/L (ref 98–107)
CO2 SERPL-SCNC: 32 MMOL/L (ref 22–29)
CREAT SERPL-MCNC: 1.2 MG/DL (ref 0.5–1)
CREAT UR-MCNC: 70.2 MG/DL (ref 29–226)
EKG ATRIAL RATE: 108 BPM
EKG Q-T INTERVAL: 278 MS
EKG QRS DURATION: 146 MS
EKG QTC CALCULATION (BAZETT): 372 MS
EKG R AXIS: -53 DEGREES
EKG T AXIS: 100 DEGREES
EKG VENTRICULAR RATE: 108 BPM
GFR SERPL CREATININE-BSD FRML MDRD: 45 ML/MIN/1.73M2
GLUCOSE SERPL-MCNC: 91 MG/DL (ref 74–99)
HCT VFR BLD AUTO: 23.6 % (ref 34–48)
HCT VFR BLD AUTO: 23.7 % (ref 34–48)
HCT VFR BLD AUTO: 24.2 % (ref 34–48)
HGB BLD-MCNC: 7 G/DL (ref 11.5–15.5)
HGB BLD-MCNC: 7 G/DL (ref 11.5–15.5)
HGB BLD-MCNC: 7.1 G/DL (ref 11.5–15.5)
INR PPP: 1.6
PARTIAL THROMBOPLASTIN TIME: 27.5 SEC (ref 24.5–35.1)
POTASSIUM SERPL-SCNC: 4.2 MMOL/L (ref 3.5–5)
PROT SERPL-MCNC: 5.8 G/DL (ref 6.4–8.3)
PROTHROMBIN TIME: 17.8 SEC (ref 9.3–12.4)
SODIUM SERPL-SCNC: 143 MMOL/L (ref 132–146)
SODIUM UR-SCNC: 60 MMOL/L
TROPONIN I SERPL HS-MCNC: 42 NG/L (ref 0–9)
UUN UR-MCNC: 754 MG/DL (ref 800–1666)

## 2024-03-13 PROCEDURE — 6360000002 HC RX W HCPCS: Performed by: NURSE PRACTITIONER

## 2024-03-13 PROCEDURE — 82140 ASSAY OF AMMONIA: CPT

## 2024-03-13 PROCEDURE — 97535 SELF CARE MNGMENT TRAINING: CPT

## 2024-03-13 PROCEDURE — C9113 INJ PANTOPRAZOLE SODIUM, VIA: HCPCS | Performed by: NURSE PRACTITIONER

## 2024-03-13 PROCEDURE — 36430 TRANSFUSION BLD/BLD COMPNT: CPT

## 2024-03-13 PROCEDURE — 74230 X-RAY XM SWLNG FUNCJ C+: CPT

## 2024-03-13 PROCEDURE — 6370000000 HC RX 637 (ALT 250 FOR IP): Performed by: NURSE PRACTITIONER

## 2024-03-13 PROCEDURE — 36415 COLL VENOUS BLD VENIPUNCTURE: CPT

## 2024-03-13 PROCEDURE — 99233 SBSQ HOSP IP/OBS HIGH 50: CPT | Performed by: INTERNAL MEDICINE

## 2024-03-13 PROCEDURE — 85610 PROTHROMBIN TIME: CPT

## 2024-03-13 PROCEDURE — 84484 ASSAY OF TROPONIN QUANT: CPT

## 2024-03-13 PROCEDURE — 1200000000 HC SEMI PRIVATE

## 2024-03-13 PROCEDURE — 92526 ORAL FUNCTION THERAPY: CPT

## 2024-03-13 PROCEDURE — 84300 ASSAY OF URINE SODIUM: CPT

## 2024-03-13 PROCEDURE — 92611 MOTION FLUOROSCOPY/SWALLOW: CPT

## 2024-03-13 PROCEDURE — 94664 DEMO&/EVAL PT USE INHALER: CPT

## 2024-03-13 PROCEDURE — 85730 THROMBOPLASTIN TIME PARTIAL: CPT

## 2024-03-13 PROCEDURE — 84540 ASSAY OF URINE/UREA-N: CPT

## 2024-03-13 PROCEDURE — 82570 ASSAY OF URINE CREATININE: CPT

## 2024-03-13 PROCEDURE — 85018 HEMOGLOBIN: CPT

## 2024-03-13 PROCEDURE — 97165 OT EVAL LOW COMPLEX 30 MIN: CPT

## 2024-03-13 PROCEDURE — 30233N1 TRANSFUSION OF NONAUTOLOGOUS RED BLOOD CELLS INTO PERIPHERAL VEIN, PERCUTANEOUS APPROACH: ICD-10-PCS | Performed by: INTERNAL MEDICINE

## 2024-03-13 PROCEDURE — 94640 AIRWAY INHALATION TREATMENT: CPT

## 2024-03-13 PROCEDURE — 85014 HEMATOCRIT: CPT

## 2024-03-13 PROCEDURE — 2580000003 HC RX 258: Performed by: NURSE PRACTITIONER

## 2024-03-13 PROCEDURE — P9016 RBC LEUKOCYTES REDUCED: HCPCS

## 2024-03-13 PROCEDURE — 97161 PT EVAL LOW COMPLEX 20 MIN: CPT

## 2024-03-13 PROCEDURE — 2700000000 HC OXYGEN THERAPY PER DAY

## 2024-03-13 PROCEDURE — A4216 STERILE WATER/SALINE, 10 ML: HCPCS | Performed by: NURSE PRACTITIONER

## 2024-03-13 PROCEDURE — 93010 ELECTROCARDIOGRAM REPORT: CPT | Performed by: INTERNAL MEDICINE

## 2024-03-13 PROCEDURE — 80053 COMPREHEN METABOLIC PANEL: CPT

## 2024-03-13 PROCEDURE — 2500000003 HC RX 250 WO HCPCS: Performed by: RADIOLOGY

## 2024-03-13 RX ADMIN — BARIUM SULFATE 70 ML: 0.81 POWDER, FOR SUSPENSION ORAL at 12:32

## 2024-03-13 RX ADMIN — BUDESONIDE 250 MCG: 0.25 INHALANT RESPIRATORY (INHALATION) at 19:35

## 2024-03-13 RX ADMIN — SODIUM CHLORIDE, PRESERVATIVE FREE 10 ML: 5 INJECTION INTRAVENOUS at 00:31

## 2024-03-13 RX ADMIN — BARIUM SULFATE 10 ML: 400 PASTE ORAL at 12:32

## 2024-03-13 RX ADMIN — METOPROLOL SUCCINATE 25 MG: 25 TABLET, FILM COATED, EXTENDED RELEASE ORAL at 09:55

## 2024-03-13 RX ADMIN — PANTOPRAZOLE SODIUM 40 MG: 40 INJECTION, POWDER, FOR SOLUTION INTRAVENOUS at 16:09

## 2024-03-13 RX ADMIN — BARIUM SULFATE 120 ML: 400 SUSPENSION ORAL at 12:32

## 2024-03-13 RX ADMIN — BUDESONIDE 250 MCG: 0.25 INHALANT RESPIRATORY (INHALATION) at 08:29

## 2024-03-13 RX ADMIN — METOCLOPRAMIDE 5 MG: 5 TABLET ORAL at 12:00

## 2024-03-13 RX ADMIN — METOCLOPRAMIDE 5 MG: 5 TABLET ORAL at 07:46

## 2024-03-13 RX ADMIN — FERROUS SULFATE TAB 325 MG (65 MG ELEMENTAL FE) 325 MG: 325 (65 FE) TAB at 09:55

## 2024-03-13 RX ADMIN — SPIRONOLACTONE 25 MG: 25 TABLET ORAL at 09:55

## 2024-03-13 RX ADMIN — ATORVASTATIN CALCIUM 10 MG: 40 TABLET, FILM COATED ORAL at 20:33

## 2024-03-13 RX ADMIN — METOCLOPRAMIDE 5 MG: 5 TABLET ORAL at 20:33

## 2024-03-13 RX ADMIN — AMIODARONE HYDROCHLORIDE 100 MG: 200 TABLET ORAL at 09:53

## 2024-03-13 RX ADMIN — METOCLOPRAMIDE 5 MG: 5 TABLET ORAL at 00:31

## 2024-03-13 RX ADMIN — METOCLOPRAMIDE 5 MG: 5 TABLET ORAL at 16:09

## 2024-03-13 RX ADMIN — SODIUM CHLORIDE, PRESERVATIVE FREE 10 ML: 5 INJECTION INTRAVENOUS at 20:33

## 2024-03-13 NOTE — PLAN OF CARE
Patient's chart updated to reflect:      .    - HF care plan, HF education points and HF discharge instructions.  -Orders: 2 gram sodium diet, daily weights, I/O.  -PCP and cardiology follow up appointments to be scheduled within 7 days of hospital discharge.  -CHF education session will be provided to the patient prior to hospital discharge.    Karlene Weldon RN   Heart Failure Navigator

## 2024-03-13 NOTE — CONSULTS
[facility]   Inpatient CHF Nurse Navigator Consult      Cardiologist: Dr Venancio Antoinear is a 88 y.o. (1935) female with a history of HFrEF, most recent EF:  Lab Results   Component Value Date    LVEF 23 02/07/2023    LVEFMODE Echo 12/26/2016       Patient was awake and alert, laying in bed during the consultation and is agreeable to heart failure education. She was engaged and asked appropriate questions throughout the education session. Her family is at the bedside.     Barriers identified during consult contributing to HF Hospitalization:  [] Limited medication adherence   [] Poor health literacy, education regarding HF medications provided   [] Pill box provided to patient  [] Difficulty affording medications  [] Difficulty obtaining/ managing medications  [] Prescription assistance information given     [] Not weighing themselves daily  [x] Weight log provided for easy monitoring  [] Scale provided     [] Not following low sodium diet  [] Food insecurity   [x] 2 gram sodium diet education provided   [] Low sodium recipes provided  [] Sodium free seasoning provided   [] Low sodium meal delivery options given to patient  [] Dietician consulted     [] Lack of transportation to appointments     [] Depression, given chronic illness  [] Primary team notified     [] Goals of care need addressed  [] Palliative care consulted     [] CHF CHW consulted, to assist with     Chart Reviewed:  Diet: Diet NPO Exceptions are: Sips of Water with Meds  ADULT DIET; Dysphagia - Soft and Bite Sized; Low Fat/Low Chol/High Fiber/2 gm Na   Daily Weights: Patient Vitals for the past 96 hrs (Last 3 readings):   Weight   03/12/24 2158 38.8 kg (85 lb 9.6 oz)     I/O:   Intake/Output Summary (Last 24 hours) at 3/13/2024 1446  Last data filed at 3/13/2024 0607  Gross per 24 hour   Intake 596.83 ml   Output --   Net 596.83 ml       [] Nursing staff/manager notified of inaccurate vieira

## 2024-03-13 NOTE — PROGRESS NOTES
SPEECH/LANGUAGE PATHOLOGY  VIDEOFLUOROSCOPIC STUDY OF SWALLOWING (MBS)   and PLAN OF CARE    PATIENT NAME:  Barbara Jack  (female)     MRN:  [id number]    :  1935  (88 y.o.)  STATUS:  Inpatient: Room 0541/0541-A    TODAY'S DATE:  3/13/2024  REFERRING PROVIDER:   Dr. Ibrahim   SPECIFIC PROVIDER ORDER: FL modified barium swallow with video  Date of order:  3/13/24   REASON FOR REFERRAL: Assess oropharyngeal swallow function    EVALUATING THERAPIST: Bailee Agrawal, SLP      RESULTS:      DYSPHAGIA DIAGNOSIS:  mild  oropharyngeal phase dysphagia     DIET RECOMMENDATIONS:  Soft and bite size consistency solids (IDDSI level 6) with  thin liquids (IDDSI level 0) by cup sips, no straw WHEN CLEARED BY PHYSICIAN    FEEDING RECOMMENDATIONS:    Assistance level:  Set-up is required for all oral intake     Compensatory strategies recommended: Upright in bed/ chair as tolerated, Fully alert for all PO, Small bites/sips, and NO STRAW     Discussed recommendations with nursing and/or faxed report to referring provider: Yes    Laryngeal Penetration and Aspiration:  Penetration WITHOUT aspiration was observed in today's study with  thin liquid    SPEECH THERAPY  PLAN OF CARE   The dysphagia POC is established based on physician order and dysphagia diagnosis    Meal time assessment for 1-2 sessions to provide diet modification and compensatory strategy implementation due to need to ensure proper implementation of compensatory strategies during PO intake       Conditions Requiring Skilled Therapeutic Intervention for dysphagia:    Patient is performing below functional baseline d/t  current acute condition, Multiple diagnoses, multiple medications, and increased dependency upon caregivers.    SPECIFIC DYSPHAGIA INTERVENTIONS TO INCLUDE:     compensatory swallowing strategies to improve airway protection and swallow function.  Training in positioning for improved integrity of swallow    Specific instructions for next treatment:   head CT    Acute systolic heart failure (HCC)    Nonrheumatic mitral valve regurgitation    Nonrheumatic tricuspid valve regurgitation    Lactic acidosis    Bacteremia    Urinary tract infection without hematuria    Shock liver    Symptomatic anemia    GI bleed    Acute kidney injury superimposed on CKD (HCC)    Anticoagulant adverse reaction    Acute blood loss anemia    On rivaroxaban therapy       INTERVENTION  Speech Pathologist (SLP) completed education with the patient/family regarding type of swallowing impairment. Reviewed current solid/liquid consistency diet recommendations and discussed compensatory strategies to ensure safe PO intake. Reviewed aspiration precautions. Encouraged patient and/or family to engage SLP in unstructured Q&A session relative to identified deficit areas; indicated understanding of all information provided via satisfactory verbal response.    CPT Code: 36530  dysphagia tx

## 2024-03-13 NOTE — PROGRESS NOTES
IV team consult ordered STAT to place new peripheral IV as patient has poor venous access and current one has infiltrated with blood currently transfusing.     Attending DUKE Sutherland notified - advised to elevate extremity and apply ice to affected area.

## 2024-03-13 NOTE — PROGRESS NOTES
NA) by Nasal route      spironolactone (ALDACTONE) 25 MG tablet Take 1 tablet by mouth daily      budesonide (PULMICORT) 0.25 MG/2ML nebulizer suspension Take 2 mLs by nebulization in the morning and 2 mLs in the evening. 60 each 2    omeprazole (PRILOSEC) 40 MG delayed release capsule Take 1 capsule by mouth daily      acetaminophen (TYLENOL) 500 MG tablet Take 1 tablet by mouth every 6 hours as needed for Pain      coenzyme Q10 ([employer] COQ-10) 200 MG CAPS capsule Take 1 capsule by mouth nightly 30 capsule 0    ASPIRIN LOW DOSE 81 MG EC tablet Take 1 tablet by mouth daily 90 tablet 0    amiodarone (PACERONE) 100 MG tablet Take 1 tablet by mouth daily      losartan (COZAAR) 25 MG tablet Take 1 tablet by mouth daily 90 tablet 0    simvastatin (ZOCOR) 20 MG tablet Take 1 tablet by mouth nightly 90 tablet 0    metoprolol succinate (TOPROL XL) 25 MG extended release tablet TAKE 1 TABLET BY MOUTH EVERY DAY 90 tablet 0    Respiratory Therapy Supplies (FULL KIT NEBULIZER SET) MISC Use as directed with nebulized medication. 1 each 0    Methylfol-Algae-T83-Hbxzxioiuv (CEREFOLIN NAC) 6-90.314-2-600 MG TABS Take 1 tablet by mouth daily  unsure, states they may have d/c this d/t cost      rivaroxaban (XARELTO) 15 MG TABS tablet Take 1 tablet by mouth daily (with breakfast) 90 tablet 2     No current facility-administered medications for this visit.        Review of Systems   Constitutional:  Negative for appetite change, chills, diaphoresis, fatigue and fever.   HENT:  Negative for congestion, ear pain, rhinorrhea, sinus pressure, sneezing and sore throat.    Eyes:  Negative for pain, discharge, redness and itching.   Respiratory:  Negative for cough, chest tightness, shortness of breath and wheezing.    Cardiovascular:  Negative for chest pain and palpitations.   Gastrointestinal:  Negative for abdominal distention, abdominal pain, constipation, diarrhea, nausea and vomiting.   Musculoskeletal:  Positive for gait problem.

## 2024-03-13 NOTE — PROGRESS NOTES
4 Eyes Skin Assessment     NAME:  Barbara Jack  YOB: 1935  MEDICAL RECORD NUMBER:  [id number]    The patient is being assessed for  Admission    I agree that at least one RN has performed a thorough Head to Toe Skin Assessment on the patient. ALL assessment sites listed below have been assessed.      Areas assessed by both nurses:    Head, Face, Ears, Shoulders, Back, Chest, Arms, Elbows, Hands, Sacrum. Buttock, Coccyx, Ischium, Legs. Feet and Heels, and Under Medical Devices         Does the Patient have a Wound? No noted wound(s)       Rubin Prevention initiated by RN: Yes  Wound Care Orders initiated by RN: No    Pressure Injury (Stage 3,4, Unstageable, DTI, NWPT, and Complex wounds) if present, place Wound referral order by RN under : No    New Ostomies, if present place, Ostomy referral order under : No     Nurse 1 eSignature: Electronically signed by Ijeoma Wise RN on 3/13/24 at 7:04 AM EDT    **SHARE this note so that the co-signing nurse can place an eSignature**    Nurse 2 eSignature: {Esignature:[id number]}

## 2024-03-13 NOTE — PLAN OF CARE
Problem: ABCDS Injury Assessment  Goal: Absence of physical injury  Outcome: Progressing     Problem: Skin/Tissue Integrity  Goal: Absence of new skin breakdown  Description: 1.  Monitor for areas of redness and/or skin breakdown  2.  Assess vascular access sites hourly  3.  Every 4-6 hours minimum:  Change oxygen saturation probe site  4.  Every 4-6 hours:  If on nasal continuous positive airway pressure, respiratory therapy assess nares and determine need for appliance change or resting period.  Outcome: Progressing     Problem: Safety - Adult  Goal: Free from fall injury  Outcome: Progressing

## 2024-03-13 NOTE — PROGRESS NOTES
While rounding this  found the patient to be asleep soundly and appeared comfortable. This  engaged the patient's spouse in conversation, and he wanted the patient to sleep. Patient did not receive communion today, but prayer was said and  remains available for support.

## 2024-03-13 NOTE — CONSENT
Informed Consent for Blood Component Transfusion Note    I have discussed with the patient the rationale for blood component transfusion; its benefits in treating or preventing fatigue, organ damage, or death; and its risk which includes mild transfusion reactions, rare risk of blood borne infection, or more serious but rare reactions. I have discussed the alternatives to transfusion, including the risk and consequences of not receiving transfusion. The patient had an opportunity to ask questions and had agreed to proceed with transfusion of blood components.    Electronically signed by DUKE Redmond CNP on 3/12/24 at 10:20 PM EDT

## 2024-03-13 NOTE — PROGRESS NOTES
Physical Therapy  Facility/Department: [address] MED SURG/TELE  Physical Therapy Initial Assessment    Name: Barbara Jack  : 1935  MRN: [id number]  Date of Service: 3/13/2024    Attending Provider:  Gonzalo Ibrahim MD    Evaluating PT:  Darshan Damon Jr., P.T.    Room #:  [address] [place] [address]  Diagnosis:  Symptomatic anemia [D64.9]  GI bleed [K92.2]  Pertinent PMHx/PSHx:  [place] uses hearing aides  Precautions:  falls, bed/chair alarm if someone is not with pt    SUBJECTIVE:    Pt lives with her  in a 1 story home with 2 stairs and no rail to enter.   reports pt holds onto him on the stairs.  Pt ambulated with a ww, rollator, or a cane PTA. Uses 3L Home O2    OBJECTIVE:   Initial Evaluation  Date: 3/13/24 Treatment Short Term/ Long Term   Goals   Was pt agreeable to Eval/treatment? yes     Does pt have pain? No c/o pain     Bed Mobility  Rolling: Independent  Supine to sit: Independent  Sit to supine: NA  Scooting: Independent  Independent   Transfers Sit to stand: supervision  Stand to sit: supervision  Stand pivot: supervision with ww  Independent    Ambulation   15+50 feet with ww supervision  150 feet with ww supervision   Stair negotiation: ascended and descended NA  2 steps with no rail CGA   AM-PAC 6 Clicks        BLE ROM is WFL.   BLE strength is grossly 4/5 to 4+/5.   Balance: sitting is Independent and standing with ww is supervision  Endurance: fair    ASSESSMENT:    Conditions Requiring Skilled Therapeutic Intervention:    [x]Decreased strength     []Decreased ROM  [x]Decreased functional mobility  [x]Decreased balance   [x]Decreased endurance   []Decreased posture  []Decreased sensation  []Decreased coordination   []Decreased vision  []Decreased safety awareness   []Increased pain       Comments:  Pt was found in bed with  present and both were agreeable to PT.  Pt is [place] and  assisted pt with hearing aides prior to me speaking to her.  Pt sat up to EOB and asked to use BR  completion of standardized testing/informal observation of tasks, assessment of data and education on plan of care and goals.    CPT codes:  [x] Low Complexity PT evaluation 99255  [] Moderate Complexity PT evaluation 67596  [] High Complexity PT evaluation 61110  [] PT Re-evaluation 14327  [] Gait training 65400 ** minutes  [] Manual therapy 47518 ** minutes  [] Therapeutic activities 17302 ** minutes  [] Therapeutic exercises 95720 ** minutes  [] Neuromuscular reeducation 62856 ** minutes     Darshan Damon Jr., P.T.  License Number: [id number]

## 2024-03-13 NOTE — H&P
(congestive heart failure) ([facility]) 2010    history of follows with Dr Craft 4/27/23    Chronic anticoagulation     Chronic bronchitis ([facility])     none recent    COPD (chronic obstructive pulmonary disease) ([facility])     COVID 08/2022    in hospital x5 days    Depression     GERD (gastroesophageal reflux disease)     HFrEF (heart failure with reduced ejection fraction) ([facility]) 12/29/2016 12/26/16- LVEF 20-25%, large apical aneurysm, LA moderate-severely dilated, mildly enlarged RA, mild MR, mild TR, mild AR    [place] (hard of hearing)     wears bilat hearing aids    Hyperlipidemia     Hypertension     ICD (implantable cardioverter-defibrillator) in place     [facility] follows with Dr Keller. last interrogated remotely  2/21/23    Left ventricular dysfunction     Low vitamin D level     MI (myocardial infarction) ([facility]) 10/30/2009    Osteopenia     Osteoporosis     Swallowing difficulty        Surgical History:  Past Surgical History:   Procedure Laterality Date    APPENDECTOMY      BREAST CAPSULECTOMY  03/07/2012    BILATERAL BREAST CAPSULECTOMIES    BREAST SURGERY  1962     cosmetic implants    CARDIAC DEFIBRILLATOR PLACEMENT      [facility]    CARDIAC DEFIBRILLATOR PLACEMENT  05/2010    CARDIAC DEFIBRILLATOR PLACEMENT Left 02/13/2018    [employer] ICD change out,     CARDIAC PACEMAKER PLACEMENT  05/10/2010    Dual chamber pacer ICD.    CARDIAC SURGERY  2009    stent    CARDIOVASCULAR STRESS TEST  03/04/2010    COLONOSCOPY  05/26/2021    [facility], repeat as needed    COSMETIC SURGERY      eyelids breast    DIAGNOSTIC CARDIAC CATH LAB PROCEDURE  10/30/2009    ESOPHAGEAL MOTILITY STUDY N/A 01/02/2020    ESOPHAGEAL MOTILITY/MANOMETRY STUDY performed by Lawrence Hernandez DO at [facility] ENDOSCOPY    HYSTERECTOMY (CERVIX STATUS UNKNOWN)      IR US THYROID BIOPSY      OTHER SURGICAL HISTORY Right 12/17/2015    ORIF RIGHT DISTAL RADIUS    OTHER SURGICAL HISTORY Right 04/10/2016    IP joint release of thumb    TONSILLECTOMY   superimposed on CKD-baseline creatinine appears to be around 1.1.  Creatinine now 1.4.  Monitor.  Hold dose of Bumex tonight and resume home dose in a.m. due to poor EF.    5.  PAF-hold Xarelto for now due to positive occult blood and anemia.  Monitor on telemetry.    Code Status: DNR CCA  DVT prophylaxis: Bleeding risk    49 minutes or more spent reviewing patient chart, assessing patient, discussing plan of care with patient and family, discussing plan of care with collaborating physician, and documentation.     NOTE: This report was transcribed using voice recognition software. Every effort was made to ensure accuracy; however, inadvertent computerized transcription errors may be present.  Electronically signed by DUKE Redmond CNP on 3/12/2024 at 10:23 PM

## 2024-03-13 NOTE — PROGRESS NOTES
Barbara Jack (:  1935) is a 88 y.o. female that is seen today for skilled assessment    Subjective   SUBJECTIVE/OBJECTIVE:  Barbara is awake alert and in no obvious distress.  She is sitting up in wheelchair and working in therapies.  She has no complaints of pain or discomfort when asked.  She continues to work in therapies as tolerated. She will follow-up with CHF clinic cardiology and psych NP.  Nursing has no concerns and will let DrEvans LOVE or PA know of any changes    Location: [facility] nursing [facility]  All nursing and progress notes reviewed.    Past Medical History:   Diagnosis Date    Arthritis     Atrial fibrillation ([facility])     follows with Dr Craft  on xarelto    CAD (coronary artery disease)     Cardiomyopathy ([facility])     CHF (congestive heart failure) ([facility])     history of follows with Dr Craft 23    Chronic anticoagulation     Chronic bronchitis ([facility])     none recent    COPD (chronic obstructive pulmonary disease) ([facility])     COVID 2022    in hospital x5 days    Depression     GERD (gastroesophageal reflux disease)     HFrEF (heart failure with reduced ejection fraction) ([facility]) 2016- LVEF 20-25%, large apical aneurysm, LA moderate-severely dilated, mildly enlarged RA, mild MR, mild TR, mild AR    [place] (hard of hearing)     wears bilat hearing aids    Hyperlipidemia     Hypertension     ICD (implantable cardioverter-defibrillator) in place     [facility] follows with Dr Keller. last interrogated remotely  23    Left ventricular dysfunction     Low vitamin D level     MI (myocardial infarction) ([facility]) 10/30/2009    Osteopenia     Osteoporosis     Swallowing difficulty        No Known Allergies   Current Outpatient Medications   Medication Sig Dispense Refill    bumetanide (BUMEX) 1 MG tablet Take 1 tablet by mouth 2 times daily      metoclopramide (REGLAN) 5 MG tablet Take 1 tablet by mouth 4 times daily      Mometasone Furoate (NASONEX NA) by Nasal route

## 2024-03-13 NOTE — PROGRESS NOTES
Progress  Note  No chief complaint on file.    Historical Issues:  Current Facility-Administered Medications   Medication Dose Route Frequency Provider Last Rate Last Admin    [START ON 3/14/2024] onabotulinumtoxinA (BOTOX) injection 100 Units  100 Units IntraMUSCular Once Lossev, Kobe Eaton MD        sodium chloride flush 0.9 % injection 5-40 mL  5-40 mL IntraVENous 2 times per day Khalida Sutherland APRN - CNP   10 mL at 03/13/24 0031    sodium chloride flush 0.9 % injection 5-40 mL  5-40 mL IntraVENous PRN Khalida Sutherland APRN - CNP        0.9 % sodium chloride infusion   IntraVENous PRN Khalida Sutherland APRN - CNP        ondansetron (ZOFRAN-ODT) disintegrating tablet 4 mg  4 mg Oral Q8H PRN Khalida Sutherland APRN - CNP        Or    ondansetron (ZOFRAN) injection 4 mg  4 mg IntraVENous Q6H PRN Khalida Sutherland APRN - CNP        acetaminophen (TYLENOL) tablet 650 mg  650 mg Oral Q6H PRN Khalida Sutherland APRN - CNP        Or    acetaminophen (TYLENOL) suppository 650 mg  650 mg Rectal Q6H PRN Khalida Sutherland APRN - CNP        pantoprazole (PROTONIX) 40 mg in sodium chloride (PF) 0.9 % 10 mL injection  40 mg IntraVENous Q12H Khalida Sutherland APRN - CNP        amiodarone (CORDARONE) tablet 100 mg  100 mg Oral Daily Khalida Sutherland APRN - CNP   100 mg at 03/13/24 0953    budesonide (PULMICORT) nebulizer suspension 250 mcg  250 mcg Nebulization BID RT Khalida Sutherland APRN - CNP   250 mcg at 03/13/24 0829    [Held by provider] bumetanide (BUMEX) tablet 1 mg  1 mg Oral BID Khalida Sutherland APRN - CNP        [Held by provider] losartan (COZAAR) tablet 25 mg  25 mg Oral Daily Khalida Sutherland APRN - CNP        metoclopramide (REGLAN) tablet 5 mg  5 mg Oral 4x Daily Khalida Sutherland APRN - CNP   5 mg at 03/13/24 0746    metoprolol succinate (TOPROL XL) extended release tablet 25 mg  25 mg Oral Daily Khalida Sutherland APRN - CNP   25 mg at 03/13/24 0955    [Held by provider] rivaroxaban

## 2024-03-13 NOTE — CARE COORDINATION
Barbara is currently in the hospital . RPM placed on pause at this time. Will resume care coordination and RPM when discharged to home.    FOLLOW-UP PLAN:    Continue Care Coordination and Assess Plan Of Care  Review RPM Metrics and educate as appropriate  -Resume CC and RPM  -DM Management/Zone Tool  -CHF Management/Zone Tool  -COPD Management/Zone Tool  -Current Weight  -Falls/Near Falls?  -OV AVS Reinforcement  -Appointment Reminders    Next Anticipated Outreach by PHP Care Team:  1 Week

## 2024-03-13 NOTE — PROGRESS NOTES
Occupational Therapy  OCCUPATIONAL THERAPY INITIAL EVALUATION  [facility]  [address], [place]    Date: 3/13/2024     Patient Name: Barbara Jack  MRN: [id number]  : 1935  Room: [address] [place] [address]    Evaluating OT: Stacey Smith, OTR/L - [id number]    Referring Provider: Gonzalo Ibrahim MD  Specific Provider Orders/Date: \"OT eval and treat\" - 3/13/2024    Diagnosis: Symptomatic anemia [D64.9]  GI bleed [K92.2]     Pertinent Medical History: a-fib, arthritis, CAD, CHF, COPD, depression, HTN, osteopenia, osteoporosis, MI     Precautions: fall risk, O2 via nasal cannula, [place]  Additional Precautions: skin integrity    Assessment of Current Deficits:    [x] Functional mobility   [x] ADLs  [x] Strength               [x] Cognition   [x] Functional transfers   [x] IADLs         [x] Safety Awareness   [x] Endurance   [] Fine Motor Coordination  [x] Balance      [] Vision/Perception   [x] Sensation    [] Gross Motor Coordination [] ROM          [] Delirium                  [] Motor Control     OT PLAN OF CARE   OT POC is based on physician orders, patient diagnosis, and results of clinical assessment.  Frequency/Duration 2-5 days/week for 2-4 weeks PRN   Specific OT Treatment Interventions to Include:   * Instruction/training on adapted ADL techniques and AE recommendations to increase functional independence within precautions       * Training on energy conservation strategies, correct breathing pattern and techniques to improve independence/tolerance for self-care routine  * Functional transfer/mobility training/DME recommendations for increased independence, safety, and fall prevention  * Patient/Family education to increase follow through with safety techniques and functional independence  * Recommendation of environmental modifications for increased safety with functional transfers/mobility and ADLs  * Cognitive retraining/development of therapeutic  ADL/IADL tasks for functional independence and quality of life.     Treatment: OT treatment provided this date included:   Instruction/training on safety and adapted techniques for completion of ADLs.    Instruction/training on safe functional mobility/transfer techniques.     Patient education provided regardin) importance of having staff assistance with ADLs and other OOB activities to prevent falls/injury during hospitalization, 2) techniques to maximize safety with management of walker, particularly during ADLs. Patient indicated Fair understanding.    Further skilled OT treatment indicated to increase patient's safety and independence with completion of ADL/IADL tasks in order to maximize patient's functional independence and quality of life.    Rehab Potential: Good for established goals.  Patient / Family Goal: Patient anticipates returning home.  Patient and/or family were instructed on functional diagnosis, prognosis/goals, and OT plan of care. Demonstrated Good understanding.    Eval Complexity: Low    Time In: 1450  Time Out: 1515  Total Treatment Time: 10 minutes      Minutes Units   OT Eval Low 44032 15 1   OT Eval Medium 34748     OT Eval High 31629     OT Re-Eval 84693     Therapeutic Ex 71234     Therapeutic Activities 49098     ADL/Self Care 61233 10 1   Orthotic Management 59103     Neuro Re-Ed 37755     Non-Billable Time N/A ---     Evaluation time includes thorough review of current medical information, gathering information on past medical history/social history and prior level of function, completion of standardized testing/informal observation of tasks, assessment of data, and education on plan of care and goals.    Stacey Smith OTR/L  License Number: [id number]

## 2024-03-13 NOTE — PROGRESS NOTES
Speech Language Pathology      NAME:  Barbara Jack  :  1935  DATE: 3/13/2024  ROOM:  [address] [place] [address]-A    Order received. Chart reviewed.    Per order comments, \"patient uses thickened liquids at home,  unsure of consistency.\" However, during chart review, patient with completion of several different swallowing assessments with different recommendations. Patient also most recently recommended for a MBSS following an outpatient CSE.  Unclear at this time what diet patient currently is on. Recommend MBSS to determine safest LRD.     An MBSS is recommended and requires a physician's order.     Symptomatic anemia [D64.9]  GI bleed [K92.2]    Thank you,    Bailee Agrawal M.S. CCC-SLP/L  Speech Language Pathologist  [id number]

## 2024-03-13 NOTE — CARE COORDINATION
Remote Patient Monitoring Note  Date/Time:  3/13/2024 3:25 PM  Patient Current Location: [place]  Background: ENROLLED IN RPM FOR COPD AND CHF  Clinical Interventions: Reviewed and followed up on alerts and treatments-noted pt admitted to [facility] on 3/12/24.   RPM PAUSED  Update to AC  Plan/Follow Up: Will continue to review, monitor and address alerts with follow up based on severity of symptoms and risk factors.

## 2024-03-13 NOTE — CONSULTS
02/13/2020    EGD SUBMUCOSAL/BOTOX INJECTION performed by David Raymond DO at [facility] ENDOSCOPY    UPPER GASTROINTESTINAL ENDOSCOPY N/A 03/25/2021    EGD SUBMUCOSAL/BOTOX INJECTION performed by David Raymond DO at [facility] ENDOSCOPY    UPPER GASTROINTESTINAL ENDOSCOPY  10/16/2019    UPPER GASTROINTESTINAL ENDOSCOPY N/A 5/18/2023    EGD SUBMUCOSAL/BOTOX INJECTION performed by David Raymond DO at [facility] ENDOSCOPY    UPPER GASTROINTESTINAL ENDOSCOPY N/A 8/17/2023    EGD ESOPHAGOGASTRODUODENOSCOPY BOTOX INJECTION 200 UNITS performed by David Raymond DO at [facility] ENDOSCOPY       Meds:  Current Facility-Administered Medications   Medication Dose Route Frequency Provider Last Rate Last Admin    [START ON 3/14/2024] onabotulinumtoxinA (BOTOX) injection 100 Units  100 Units IntraMUSCular Once Lossev, Kobe Eaton MD        sodium chloride flush 0.9 % injection 5-40 mL  5-40 mL IntraVENous 2 times per day Khalida Sutherland APRN - CNP   10 mL at 03/13/24 0031    sodium chloride flush 0.9 % injection 5-40 mL  5-40 mL IntraVENous PRN Khalida Sutherland APRN - CNP        0.9 % sodium chloride infusion   IntraVENous PRN Khalida Sutherland APRN - CNP        ondansetron (ZOFRAN-ODT) disintegrating tablet 4 mg  4 mg Oral Q8H PRN Khalida Sutherland APRN - CNP        Or    ondansetron (ZOFRAN) injection 4 mg  4 mg IntraVENous Q6H PRN Khalida Sutherland APRN - CNP        acetaminophen (TYLENOL) tablet 650 mg  650 mg Oral Q6H PRN Khalida Sutherland APRN - CNP        Or    acetaminophen (TYLENOL) suppository 650 mg  650 mg Rectal Q6H PRN Khalida Sutherland APRN - CNP        pantoprazole (PROTONIX) 40 mg in sodium chloride (PF) 0.9 % 10 mL injection  40 mg IntraVENous Q12H Khalida Sutherland APRN - CNP        amiodarone (CORDARONE) tablet 100 mg  100 mg Oral Daily Khalida Sutherland APRN - CNP   100 mg at 03/13/24 0953    budesonide (PULMICORT) nebulizer suspension 250 mcg  250 mcg Nebulization BID RT Khalida Sutherland APRN - CNP   250 mcg  -severe cardiomyopathy, atrial fibrillation, renal failure  -Recommend holding of oral anticoagulation if clinically feasible  -Per admitting/pertinent consultants    Above has been discussed in detail with patient and family at bedside and all questions answered to their satisfaction.  They verbalized understanding and agreement with the plan as delineated    Thank you for the opportunity to see this patient in consultation    Kobe Orr MD  3/13/2024  2:59 PM    NOTE:  This report was transcribed using voice recognition software.  Every effort was made to ensure accuracy; however, inadvertent computerized transcription errors may be present.    Agree with above.  PT well known to our service.  Pt on Xarelto and being over 75 significantly increases risk of bleeding.  Pt has type 2 achalasia and has had EGD' s Botox 5/18/23, 3/2021 and 2/13/2020.  Did not get as lasting effect 5/2023 compared to prior.  Last EGD 8/17/23.  Last colonoscopy was 5/26/21 with moderate sigmoid DDC and good prep. Now with positive FOBT but no overt bleeding.  Pt still with dysphagia.  Plan for EGD 3/14/24, sooner if bleeding or urgent need arises.  Xarelto on hold.  Our service will follow.  See orders.    David Raymond D.O.  3/13/24

## 2024-03-13 NOTE — ANESTHESIA PRE PROCEDURE
Depression     GERD (gastroesophageal reflux disease)     HFrEF (heart failure with reduced ejection fraction) ([facility]) 12/29/2016 12/26/16- LVEF 20-25%, large apical aneurysm, LA moderate-severely dilated, mildly enlarged RA, mild MR, mild TR, mild AR    [place] (hard of hearing)     wears bilat hearing aids    Hyperlipidemia     Hypertension     ICD (implantable cardioverter-defibrillator) in place     [facility] follows with Dr Keller. last interrogated remotely  2/21/23    Left ventricular dysfunction     Low vitamin D level     MI (myocardial infarction) ([facility]) 10/30/2009    Osteopenia     Osteoporosis     Swallowing difficulty        Past Surgical History:        Procedure Laterality Date    APPENDECTOMY      BREAST CAPSULECTOMY  03/07/2012    BILATERAL BREAST CAPSULECTOMIES    BREAST SURGERY  1962     cosmetic implants    CARDIAC DEFIBRILLATOR PLACEMENT      [facility]    CARDIAC DEFIBRILLATOR PLACEMENT  05/2010    CARDIAC DEFIBRILLATOR PLACEMENT Left 02/13/2018    [employer] ICD change out,     CARDIAC PACEMAKER PLACEMENT  05/10/2010    Dual chamber pacer ICD.    CARDIAC SURGERY  2009    stent    CARDIOVASCULAR STRESS TEST  03/04/2010    COLONOSCOPY  05/26/2021    [facility], repeat as needed    COSMETIC SURGERY      eyelids breast    DIAGNOSTIC CARDIAC CATH LAB PROCEDURE  10/30/2009    ESOPHAGEAL MOTILITY STUDY N/A 01/02/2020    ESOPHAGEAL MOTILITY/MANOMETRY STUDY performed by Lawrence Hernandez DO at [facility] ENDOSCOPY    HYSTERECTOMY (CERVIX STATUS UNKNOWN)      IR US THYROID BIOPSY      OTHER SURGICAL HISTORY Right 12/17/2015    ORIF RIGHT DISTAL RADIUS    OTHER SURGICAL HISTORY Right 04/10/2016    IP joint release of thumb    TONSILLECTOMY      TRANSTHORACIC ECHOCARDIOGRAM  3/30/11ize and function,     UPPER GASTROINTESTINAL ENDOSCOPY N/A 02/13/2020    EGD SUBMUCOSAL/BOTOX INJECTION performed by David Raymond DO at [facility] ENDOSCOPY    UPPER GASTROINTESTINAL ENDOSCOPY N/A 03/25/2021    EGD

## 2024-03-14 ENCOUNTER — ANESTHESIA (OUTPATIENT)
Dept: ENDOSCOPY | Age: 89
End: 2024-03-14
Payer: MEDICARE

## 2024-03-14 LAB
HCT VFR BLD AUTO: 22.6 % (ref 34–48)
HCT VFR BLD AUTO: 25.1 % (ref 34–48)
HCT VFR BLD AUTO: 26.7 % (ref 34–48)
HCT VFR BLD AUTO: 28.3 % (ref 34–48)
HGB BLD-MCNC: 6.7 G/DL (ref 11.5–15.5)
HGB BLD-MCNC: 7.5 G/DL (ref 11.5–15.5)
HGB BLD-MCNC: 7.8 G/DL (ref 11.5–15.5)
HGB BLD-MCNC: 8.3 G/DL (ref 11.5–15.5)
MICROORGANISM SPEC CULT: ABNORMAL
MICROORGANISM SPEC CULT: ABNORMAL
SPECIMEN DESCRIPTION: ABNORMAL

## 2024-03-14 PROCEDURE — 99233 SBSQ HOSP IP/OBS HIGH 50: CPT | Performed by: INTERNAL MEDICINE

## 2024-03-14 PROCEDURE — 88305 TISSUE EXAM BY PATHOLOGIST: CPT

## 2024-03-14 PROCEDURE — 2700000000 HC OXYGEN THERAPY PER DAY

## 2024-03-14 PROCEDURE — 0DJ08ZZ INSPECTION OF UPPER INTESTINAL TRACT, VIA NATURAL OR ARTIFICIAL OPENING ENDOSCOPIC: ICD-10-PCS | Performed by: INTERNAL MEDICINE

## 2024-03-14 PROCEDURE — 2580000003 HC RX 258: Performed by: NURSE PRACTITIONER

## 2024-03-14 PROCEDURE — 1200000000 HC SEMI PRIVATE

## 2024-03-14 PROCEDURE — 7100000011 HC PHASE II RECOVERY - ADDTL 15 MIN: Performed by: INTERNAL MEDICINE

## 2024-03-14 PROCEDURE — 88342 IMHCHEM/IMCYTCHM 1ST ANTB: CPT

## 2024-03-14 PROCEDURE — 2709999900 HC NON-CHARGEABLE SUPPLY: Performed by: INTERNAL MEDICINE

## 2024-03-14 PROCEDURE — 3700000000 HC ANESTHESIA ATTENDED CARE: Performed by: INTERNAL MEDICINE

## 2024-03-14 PROCEDURE — 2580000003 HC RX 258: Performed by: NURSE ANESTHETIST, CERTIFIED REGISTERED

## 2024-03-14 PROCEDURE — 85018 HEMOGLOBIN: CPT

## 2024-03-14 PROCEDURE — 6360000002 HC RX W HCPCS: Performed by: NURSE PRACTITIONER

## 2024-03-14 PROCEDURE — 6370000000 HC RX 637 (ALT 250 FOR IP): Performed by: NURSE PRACTITIONER

## 2024-03-14 PROCEDURE — 6360000002 HC RX W HCPCS: Performed by: NURSE ANESTHETIST, CERTIFIED REGISTERED

## 2024-03-14 PROCEDURE — 7100000010 HC PHASE II RECOVERY - FIRST 15 MIN: Performed by: INTERNAL MEDICINE

## 2024-03-14 PROCEDURE — A4216 STERILE WATER/SALINE, 10 ML: HCPCS | Performed by: NURSE PRACTITIONER

## 2024-03-14 PROCEDURE — 3609012400 HC EGD TRANSORAL BIOPSY SINGLE/MULTIPLE: Performed by: INTERNAL MEDICINE

## 2024-03-14 PROCEDURE — 85014 HEMATOCRIT: CPT

## 2024-03-14 PROCEDURE — C9113 INJ PANTOPRAZOLE SODIUM, VIA: HCPCS | Performed by: NURSE PRACTITIONER

## 2024-03-14 PROCEDURE — 6360000002 HC RX W HCPCS: Performed by: INTERNAL MEDICINE

## 2024-03-14 PROCEDURE — 3E0G8GC INTRODUCTION OF OTHER THERAPEUTIC SUBSTANCE INTO UPPER GI, VIA NATURAL OR ARTIFICIAL OPENING ENDOSCOPIC: ICD-10-PCS | Performed by: INTERNAL MEDICINE

## 2024-03-14 PROCEDURE — 94640 AIRWAY INHALATION TREATMENT: CPT

## 2024-03-14 RX ORDER — PANTOPRAZOLE SODIUM 20 MG/1
20 TABLET, DELAYED RELEASE ORAL
Status: DISCONTINUED | OUTPATIENT
Start: 2024-03-15 | End: 2024-03-18 | Stop reason: HOSPADM

## 2024-03-14 RX ORDER — PROPOFOL 10 MG/ML
INJECTION, EMULSION INTRAVENOUS PRN
Status: DISCONTINUED | OUTPATIENT
Start: 2024-03-14 | End: 2024-03-14 | Stop reason: SDUPTHER

## 2024-03-14 RX ORDER — SODIUM CHLORIDE 9 MG/ML
INJECTION, SOLUTION INTRAVENOUS PRN
Status: DISCONTINUED | OUTPATIENT
Start: 2024-03-14 | End: 2024-03-18 | Stop reason: HOSPADM

## 2024-03-14 RX ORDER — SODIUM CHLORIDE 9 MG/ML
INJECTION, SOLUTION INTRAVENOUS CONTINUOUS PRN
Status: DISCONTINUED | OUTPATIENT
Start: 2024-03-14 | End: 2024-03-14 | Stop reason: SDUPTHER

## 2024-03-14 RX ADMIN — BUDESONIDE 250 MCG: 0.25 INHALANT RESPIRATORY (INHALATION) at 18:32

## 2024-03-14 RX ADMIN — PROPOFOL 100 MG: 10 INJECTION, EMULSION INTRAVENOUS at 15:05

## 2024-03-14 RX ADMIN — FERROUS SULFATE TAB 325 MG (65 MG ELEMENTAL FE) 325 MG: 325 (65 FE) TAB at 08:43

## 2024-03-14 RX ADMIN — SODIUM CHLORIDE: 9 INJECTION, SOLUTION INTRAVENOUS at 15:04

## 2024-03-14 RX ADMIN — ATORVASTATIN CALCIUM 10 MG: 40 TABLET, FILM COATED ORAL at 23:27

## 2024-03-14 RX ADMIN — BUDESONIDE 250 MCG: 0.25 INHALANT RESPIRATORY (INHALATION) at 08:55

## 2024-03-14 RX ADMIN — SODIUM CHLORIDE, PRESERVATIVE FREE 10 ML: 5 INJECTION INTRAVENOUS at 23:26

## 2024-03-14 RX ADMIN — METOCLOPRAMIDE 5 MG: 5 TABLET ORAL at 23:27

## 2024-03-14 RX ADMIN — METOCLOPRAMIDE 5 MG: 5 TABLET ORAL at 17:13

## 2024-03-14 RX ADMIN — PANTOPRAZOLE SODIUM 40 MG: 40 INJECTION, POWDER, FOR SOLUTION INTRAVENOUS at 05:48

## 2024-03-14 RX ADMIN — METOCLOPRAMIDE 5 MG: 5 TABLET ORAL at 08:43

## 2024-03-14 RX ADMIN — SODIUM CHLORIDE, PRESERVATIVE FREE 10 ML: 5 INJECTION INTRAVENOUS at 08:43

## 2024-03-14 ASSESSMENT — LIFESTYLE VARIABLES: SMOKING_STATUS: 0

## 2024-03-14 ASSESSMENT — PAIN - FUNCTIONAL ASSESSMENT: PAIN_FUNCTIONAL_ASSESSMENT: 0-10

## 2024-03-14 NOTE — OP NOTE
Operative Note      Patient: Barbara Jack  YOB: 1935  MRN: [id number]    Date of Procedure: 3/14/2024    Pre-Op Diagnosis Codes:     * Anemia, unspecified type [D64.9], Achalasia Type 2, Dysphagia    Post-Op Diagnosis: SAME       Procedure(s):  ESOPHAGOGASTRODUODENOSCOPY 100 UNITS BOTOX INJECTION   (PHARMACY NOTIFIED)    Surgeon(s):  David Raymodn DO    Assistant:   * No surgical staff found *    Anesthesia: Monitor Anesthesia Care    Estimated Blood Loss (mL): < 5cc    Complications: None    Specimens:   * No specimens in log *    Implants:  * No implants in log *      Drains: * No LDAs found *    Detailed Description of Procedure:   Procedure:  Esophagogastroduodenoscopy     Indication:  Dysphagia, Achalasia, Anemia     Consent: Informed consent was obtained from the patient including and not limited to risk of perforation, aspiration of gastric contents or teeth, bleeding, infection, dental breakage, ileus, need for surgery, or worst case death.     Sedation  MAC     Estimated Blood Loss -- < 5cc     Endoscope was advanced easily through mouth to second portion of duodenum                             Oropharynx views are limited but grossly normal.     Esophagus:     Mucosa is normal. GEJ at 38 cm.  100 units of Botox mixed into 5cc was injected in a 4 quadrant fashion in the lower esophageal sphincter with no complications or bleeding issues.       There was no signs of recurrent candidiasis in the proximal esophagus.   Mild LA A Esophagitis     Stomach:        Antrum is normal                          Gastric body along greater curvature with focal moderate gastritis with biopsy.  No AVM's seen.                          Retroflexed views show normal fundus and cardia.  NO fresh or old blood.     Duodenum:    Bulb is normal.                          Second portion of duodenum is normal.  No AVM's seen.  NO fresh or old blood.     IMPRESSION AND PLAN:      1. Achalasia Type II - 100 units of  Botox mixed into 5cc was injected in a 4 quadrant fashion in the lower esophageal sphincter with no complications or bleeding issues.       No source of anemia seen on EGD.  Pt 88 and not a great candidate for thoracic surgery in regards to achalaisa, if continued swallowing issues and not having a lasting effect of Botox anymore will consult [facility] for any further suggestions.    Last colonoscopy was 5/26/21 with moderate sigmoid DDC and good prep.  Pt with no gross outward bleeding she is aware of.  Pt understands risks/benefits and risks of bleeding on Xarelto, higher risk being over 75.  Hg up to 8.3 today.  8-9 has been baseline last several months.  Recommend PO iron.       Continue PPI daily.  Small bites, chew well, eat slow, sips of water after each bite.  Ok for soft and bite sized solids with thin liquids, no straw.  No silent aspiration reported on MBS 3/13/24.  Our service will follow.  Wait at least 2 days before restarting anticoagulation if necessary to restart.  Pt is DNR-CCA.     Follow up as outpatient in office, call [phone] to schedule for appointment.        MUSTAPHA ARTHUR DO  3/14/24  [id number]    Electronically signed by MUSTAPHA ARTHUR DO on 3/14/2024 at 2:09 PM

## 2024-03-14 NOTE — PLAN OF CARE
Problem: Discharge Planning  Goal: Discharge to home or other facility with appropriate resources  Outcome: Progressing     Problem: ABCDS Injury Assessment  Goal: Absence of physical injury  Outcome: Progressing     Problem: Skin/Tissue Integrity  Goal: Absence of new skin breakdown  Description: 1.  Monitor for areas of redness and/or skin breakdown  2.  Assess vascular access sites hourly  3.  Every 4-6 hours minimum:  Change oxygen saturation probe site  4.  Every 4-6 hours:  If on nasal continuous positive airway pressure, respiratory therapy assess nares and determine need for appliance change or resting period.  Outcome: Progressing     Problem: Safety - Adult  Goal: Free from fall injury  Outcome: Progressing     Problem: Chronic Conditions and Co-morbidities  Goal: Patient's chronic conditions and co-morbidity symptoms are monitored and maintained or improved  Outcome: Progressing

## 2024-03-14 NOTE — PROGRESS NOTES
Occupational Therapy    Attempted OT treatment, patient politely declined due to waiting for a procedure this date. OT to re-attempt at a later date/time as able and appropriate.     Rosetta Mitchell OTR/L  License Number: [id number]

## 2024-03-14 NOTE — PLAN OF CARE
Problem: ABCDS Injury Assessment  Goal: Absence of physical injury  3/14/2024 1320 by Portia Mace RN  Outcome: Progressing  3/14/2024 0318 by Tashia Acosta RN  Outcome: Progressing     Problem: Skin/Tissue Integrity  Goal: Absence of new skin breakdown  Description: 1.  Monitor for areas of redness and/or skin breakdown  2.  Assess vascular access sites hourly  3.  Every 4-6 hours minimum:  Change oxygen saturation probe site  4.  Every 4-6 hours:  If on nasal continuous positive airway pressure, respiratory therapy assess nares and determine need for appliance change or resting period.  3/14/2024 1320 by Portia Mace RN  Outcome: Progressing  3/14/2024 0318 by Tashia Acosta RN  Outcome: Progressing     Problem: Safety - Adult  Goal: Free from fall injury  3/14/2024 1320 by Portia Mace RN  Outcome: Progressing  3/14/2024 0318 by Tashia Acosta RN  Outcome: Progressing

## 2024-03-14 NOTE — PROGRESS NOTES
Immediately prior to the procedure the patient's History and Physical was reviewed- there are no changes with the current vitals.  /60   Pulse 60   Temp 97.2 °F (36.2 °C) (Temporal)   Resp 16   Ht 1.499 m (4' 11\")   Wt 38.6 kg (85 lb)   SpO2 97%   BMI 17.17 kg/m²     No CP/SOB.  Risks/benefits d/w pt.  All questions answered.  Proceed with EGD with possible LES Botox injection for H/O Achalasia.    MBS/Speech therapy reviewed.      MUSTAPHA ARTHUR DO  3/14/2024  2:09 PM

## 2024-03-14 NOTE — PROGRESS NOTES
Speech Language Pathology  NAME:  Barbara Jack  :  1935  DATE: 3/14/2024  ROOM:  [facility]    Pt unavailable at 1226 for Dysphagia therapy services due to:    Patient NPO for procedure not able to address dysphagia goals due to this.    Will re-attempt as appropriate. Thank you.

## 2024-03-14 NOTE — PROGRESS NOTES
Progress  Note  No chief complaint on file.    Historical Issues:  Current Facility-Administered Medications   Medication Dose Route Frequency Provider Last Rate Last Admin    [START ON 3/15/2024] pantoprazole (PROTONIX) tablet 20 mg  20 mg Oral QAM David Valentine DO        onabotulinumtoxinA (BOTOX) injection 100 Units  100 Units IntraMUSCular Once Lossev, Kobe Eaton MD        sodium chloride flush 0.9 % injection 5-40 mL  5-40 mL IntraVENous 2 times per day Khalida Sutherland APRN - CNP   10 mL at 03/14/24 0843    sodium chloride flush 0.9 % injection 5-40 mL  5-40 mL IntraVENous PRN Khalida Sutherland APRN - CNP        0.9 % sodium chloride infusion   IntraVENous PRN Khalida Sutherland APRN - CNP        ondansetron (ZOFRAN-ODT) disintegrating tablet 4 mg  4 mg Oral Q8H PRN Khalida Sutherland APRN - CNP        Or    ondansetron (ZOFRAN) injection 4 mg  4 mg IntraVENous Q6H PRN Khalida Sutherland APRN - CNP        acetaminophen (TYLENOL) tablet 650 mg  650 mg Oral Q6H PRN Khalida Sutherland APRN - CNP        Or    acetaminophen (TYLENOL) suppository 650 mg  650 mg Rectal Q6H PRN Khalida Sutherland APRN - CNP        amiodarone (CORDARONE) tablet 100 mg  100 mg Oral Daily Khalida Sutherland APRN - CNP   100 mg at 03/13/24 0953    budesonide (PULMICORT) nebulizer suspension 250 mcg  250 mcg Nebulization BID RT Khalida Sutherland APRN - CNP   250 mcg at 03/14/24 0855    [Held by provider] bumetanide (BUMEX) tablet 1 mg  1 mg Oral BID Khalida Sutherland APRN - CNP        [Held by provider] losartan (COZAAR) tablet 25 mg  25 mg Oral Daily Khalida Sutherland APRN - CNP        metoclopramide (REGLAN) tablet 5 mg  5 mg Oral 4x Daily Khalida Sutherland APRN - CNP   5 mg at 03/14/24 0843    [Held by provider] metoprolol succinate (TOPROL XL) extended release tablet 25 mg  25 mg Oral Daily Khalida Sutherland APRN - CNP   25 mg at 03/13/24 0955    [Held by provider] rivaroxaban (XARELTO) tablet 15 mg  15 mg Oral  kidney disease    CAD (coronary artery disease)    Implantable cardioverter-defibrillator (ICD) in situ    Essential hypertension    COPD (chronic obstructive pulmonary disease) (HCC)    PAF (paroxysmal atrial fibrillation) (HCC)    GI bleed    Acute kidney injury superimposed on CKD (HCC)    Anticoagulant adverse reaction    Acute blood loss anemia    On rivaroxaban therapy    Dysphagia  Resolved Problems:    * No resolved hospital problems. *      Plan:  Scope performed  No visible bleeding on scope but this does not rule out bleeding.  Colonoscopy not going to be pursued  I will not restart 10 a inhibitor for 48 hours.  If I restart I will observe 1 day in hospital.      Case Discussed with:      Electronically signed by Gonzalo Ibrahim MD on 3/14/2024 at 4:12 PM

## 2024-03-14 NOTE — PROGRESS NOTES
Physician Progress Note      PATIENT:               RANDEE RED  CSN #:                  [id number]  :                       1935  ADMIT DATE:       3/12/2024 9:20 PM  DISCH DATE:  RESPONDING  PROVIDER #:        Gonzalo Ibrahim MD          QUERY TEXT:    Dear Attending Physician,    Pt admitted with positive FOBT and Acute blood loss anemia and is on   anticoagulation. Noted documentation of \"positive FOBT-Likely multifactorial   and precipitated/exacerbated by oral anticoagulation \" by ordered GI   consultant 3/13.  If possible, please document in progress notes and discharge   summary if you are evaluating and/or treating any of the following:    The medical record reflects the following:  Risk Factors: Afib, HTN, chronic Xarelto use  Clinical Indicators: Per GI 3/13,\"... GI bleed anemia Acute on chronic anemia   No evidence of overt bleed but positive FOBT  Likely multifactorial and   precipitated/exacerbated by oral anticoagulation ...\"  Treatment: PPI, monitor H/H, anticoagulant on hold    Thank you,  Shawna Gutierrez RN CCDS  Clinical Documentation Improvement Specialist  Phone# [phone]  Options provided:  -- GIB associated with Xarelto  -- Bleeding unrelated to anticoagulation  -- Other - I will add my own diagnosis  -- Disagree - Not applicable / Not valid  -- Disagree - Clinically unable to determine / Unknown  -- Refer to Clinical Documentation Reviewer    PROVIDER RESPONSE TEXT:    This patient has GIB associated with Xarelto.    Query created by: Shawna Gutierrez on 3/14/2024 11:11 AM      Electronically signed by:  Gonzalo Ibrahim MD 3/14/2024 4:12 PM

## 2024-03-14 NOTE — ANESTHESIA POSTPROCEDURE EVALUATION
Department of Anesthesiology  Postprocedure Note    Patient: Barbara Jack  MRN: [id number]  YOB: 1935  Date of evaluation: 3/14/2024    Procedure Summary       Date: 03/14/24 Room / Location: [place] [address] / [facility]    Anesthesia Start: 1504 Anesthesia Stop: 1512    Procedures:       ESOPHAGOGASTRODUODENOSCOPY 100 UNITS BOTOX INJECTION   (PHARMACY NOTIFIED)      ESOPHAGOGASTRODUODENOSCOPY BIOPSY Diagnosis:       Anemia, unspecified type      (Anemia, unspecified type [D64.9])    Surgeons: David Raymond DO Responsible Provider: Rocio Quezada DO    Anesthesia Type: MAC ASA Status: 4            Anesthesia Type: No value filed.    Olga Phase I: Olga Score: 10    Olga Phase II: Olga Score: 9    Anesthesia Post Evaluation    Patient location during evaluation: PACU  Patient participation: complete - patient participated  Level of consciousness: awake and alert  Airway patency: patent  Nausea & Vomiting: no nausea and no vomiting  Cardiovascular status: hemodynamically stable  Respiratory status: acceptable  Hydration status: euvolemic  Pain management: adequate    No notable events documented.

## 2024-03-15 ENCOUNTER — APPOINTMENT (OUTPATIENT)
Dept: NUCLEAR MEDICINE | Age: 89
DRG: 813 | End: 2024-03-15
Attending: STUDENT IN AN ORGANIZED HEALTH CARE EDUCATION/TRAINING PROGRAM
Payer: MEDICARE

## 2024-03-15 LAB
HCT VFR BLD AUTO: 28 % (ref 34–48)
HCT VFR BLD AUTO: 28.2 % (ref 34–48)
HCT VFR BLD AUTO: 30 % (ref 34–48)
HGB BLD-MCNC: 8.4 G/DL (ref 11.5–15.5)
HGB BLD-MCNC: 8.4 G/DL (ref 11.5–15.5)
HGB BLD-MCNC: 8.9 G/DL (ref 11.5–15.5)

## 2024-03-15 PROCEDURE — 78278 ACUTE GI BLOOD LOSS IMAGING: CPT

## 2024-03-15 PROCEDURE — 85018 HEMOGLOBIN: CPT

## 2024-03-15 PROCEDURE — A9560 TC99M LABELED RBC: HCPCS | Performed by: RADIOLOGY

## 2024-03-15 PROCEDURE — 97530 THERAPEUTIC ACTIVITIES: CPT

## 2024-03-15 PROCEDURE — 6360000002 HC RX W HCPCS: Performed by: NURSE PRACTITIONER

## 2024-03-15 PROCEDURE — 2580000003 HC RX 258: Performed by: NURSE PRACTITIONER

## 2024-03-15 PROCEDURE — 99232 SBSQ HOSP IP/OBS MODERATE 35: CPT | Performed by: INTERNAL MEDICINE

## 2024-03-15 PROCEDURE — 1200000000 HC SEMI PRIVATE

## 2024-03-15 PROCEDURE — 2700000000 HC OXYGEN THERAPY PER DAY

## 2024-03-15 PROCEDURE — 6370000000 HC RX 637 (ALT 250 FOR IP): Performed by: NURSE PRACTITIONER

## 2024-03-15 PROCEDURE — 36430 TRANSFUSION BLD/BLD COMPNT: CPT

## 2024-03-15 PROCEDURE — P9016 RBC LEUKOCYTES REDUCED: HCPCS

## 2024-03-15 PROCEDURE — 6370000000 HC RX 637 (ALT 250 FOR IP): Performed by: INTERNAL MEDICINE

## 2024-03-15 PROCEDURE — 94640 AIRWAY INHALATION TREATMENT: CPT

## 2024-03-15 PROCEDURE — 36415 COLL VENOUS BLD VENIPUNCTURE: CPT

## 2024-03-15 PROCEDURE — 3430000000 HC RX DIAGNOSTIC RADIOPHARMACEUTICAL: Performed by: RADIOLOGY

## 2024-03-15 PROCEDURE — 85014 HEMATOCRIT: CPT

## 2024-03-15 RX ADMIN — METOCLOPRAMIDE 5 MG: 5 TABLET ORAL at 12:39

## 2024-03-15 RX ADMIN — SODIUM CHLORIDE, PRESERVATIVE FREE 10 ML: 5 INJECTION INTRAVENOUS at 22:35

## 2024-03-15 RX ADMIN — METOCLOPRAMIDE 5 MG: 5 TABLET ORAL at 22:36

## 2024-03-15 RX ADMIN — BUDESONIDE 250 MCG: 0.25 INHALANT RESPIRATORY (INHALATION) at 20:41

## 2024-03-15 RX ADMIN — METOCLOPRAMIDE 5 MG: 5 TABLET ORAL at 17:33

## 2024-03-15 RX ADMIN — SPIRONOLACTONE 25 MG: 25 TABLET ORAL at 08:44

## 2024-03-15 RX ADMIN — METOCLOPRAMIDE 5 MG: 5 TABLET ORAL at 06:35

## 2024-03-15 RX ADMIN — ATORVASTATIN CALCIUM 10 MG: 40 TABLET, FILM COATED ORAL at 22:35

## 2024-03-15 RX ADMIN — Medication 20 MILLICURIE: at 13:56

## 2024-03-15 RX ADMIN — SODIUM CHLORIDE, PRESERVATIVE FREE 10 ML: 5 INJECTION INTRAVENOUS at 08:43

## 2024-03-15 RX ADMIN — SODIUM CHLORIDE, PRESERVATIVE FREE 10 ML: 5 INJECTION INTRAVENOUS at 10:56

## 2024-03-15 RX ADMIN — FERROUS SULFATE TAB 325 MG (65 MG ELEMENTAL FE) 325 MG: 325 (65 FE) TAB at 08:44

## 2024-03-15 RX ADMIN — PANTOPRAZOLE SODIUM 20 MG: 20 TABLET, DELAYED RELEASE ORAL at 06:35

## 2024-03-15 RX ADMIN — BUDESONIDE 250 MCG: 0.25 INHALANT RESPIRATORY (INHALATION) at 08:27

## 2024-03-15 NOTE — PROGRESS NOTES
Speech Language Pathology  NAME:  Barbara Jack  :  1935  DATE: 3/15/2024  ROOM:  [facility]/05-A    Pt unavailable at 1440 for Dysphagia therapy services due to:    Off unit for testing/ procedure      Will re-attempt as appropriate. Thank you.

## 2024-03-15 NOTE — CARE COORDINATION
3/15/2024  Social Work Discharge Planning:[facility] 20/24. [facility] is active with Pt-SILVIA order is needed. Pt is independent from home with her spouse. Pt is on baseline o2 -uses [facility]. Electronically signed by RALPH Beasley on 3/15/2024 at 10:06 AM

## 2024-03-15 NOTE — PROGRESS NOTES
SPIRITUAL HEALTH SERVICES - ADAMARIS Hernandez Encounter    Name: Barbara Jack                  Referral: Routine Visit    Sacraments  Anointed (Last Rites): Yes  Apostolic [place]: No  Confession: No  Communion: Yes     Assessment:  Patient receptive to  visit.      Intervention:   provided spiritual support and sacramental ministry for patient.     Outcome:  Patient expressed gratitude for visit.    Plan:  Chaplains will remain available to offer spiritual and emotional support as needed.      Electronically signed by Chaplain Reggie, on 3/15/2024 at 3:45 PM.  Spiritual Care Department  [facility]  [phone]

## 2024-03-15 NOTE — PROGRESS NOTES
Physical Therapy  Facility/Department: [address] MED SURG/TELE  Treatment Note  Name: Barbara Jack  : 1935  MRN: [id number]  Date of Service: 3/15/2024    Attending Provider:  Gonzalo Ibrahim MD    Evaluating PT:  Darshan Damon Jr., P.T.    Room #:  [facility]  Diagnosis:  Symptomatic anemia [D64.9]  GI bleed [K92.2]  Pertinent PMHx/PSHx: [place] uses hearing aides  Precautions:  falls, bed/chair alarm if someone is not with pt    SUBJECTIVE:    Pt lives with her  in a 1 story home with 2 stairs and no rail to enter.   reports pt holds onto him on the stairs.  Pt ambulated with a ww, rollator, or a cane PTA. Uses 3L Home O2    OBJECTIVE:   Initial Evaluation  Date: 3/13/24 Treatment Short Term/ Long Term   Goals   Was pt agreeable to Eval/treatment? yes yes    Does pt have pain? No c/o pain No c/o pain    Bed Mobility  Rolling: Independent  Supine to sit: Independent  Sit to supine: NA  Scooting: Independent Rolling: Independent  Supine to sit: Independent  Sit to supine: Independent  Scooting: Independent Independent   Transfers Sit to stand: supervision  Stand to sit: supervision  Stand pivot: supervision with ww Sit to stand: supervision  Stand to sit: supervision  Stand pivot: supervision Independent    Ambulation   15+50 feet with ww supervision 200 feet with ww supervision 150 feet with ww supervision   Stair negotiation: ascended and descended NA NA 2 steps with no rail CGA   AM-PAC 6 Clicks       BLE ROM is WFL.   BLE strength is grossly 4/5 to 4+/5.   Balance: sitting is Independent and standing with ww is supervision  Endurance: fair+    ASSESSMENT:    Comments:  Pt was found just getting back into bed after sitting up in chair this am with  and 2 other visitors present and pt was agreeable to PT.  Pt was on 3L O2 throughout PT session.  She walked with ww and had no SOB during or after amb.     Pt was left supine in bed with call light left by patient and  and

## 2024-03-15 NOTE — PROGRESS NOTES
Progress  Note  No chief complaint on file.    Historical Issues:  Current Facility-Administered Medications   Medication Dose Route Frequency Provider Last Rate Last Admin    pantoprazole (PROTONIX) tablet 20 mg  20 mg Oral QAM David Valentine DO   20 mg at 03/15/24 0635    0.9 % sodium chloride infusion   IntraVENous PRN Khalida Sutherland APRN - CNP        onabotulinumtoxinA (BOTOX) injection 100 Units  100 Units IntraMUSCular Once Lossev, Kobe Eaton MD        sodium chloride flush 0.9 % injection 5-40 mL  5-40 mL IntraVENous 2 times per day Khalida Sutherland APRN - CNP   10 mL at 03/15/24 0843    sodium chloride flush 0.9 % injection 5-40 mL  5-40 mL IntraVENous PRN Khalida Sutherland APRN - CNP   10 mL at 03/15/24 1056    0.9 % sodium chloride infusion   IntraVENous PRN Khalida Sutherland APRN - CNP        ondansetron (ZOFRAN-ODT) disintegrating tablet 4 mg  4 mg Oral Q8H PRN Khalida Sutherland APRN - CNP        Or    ondansetron (ZOFRAN) injection 4 mg  4 mg IntraVENous Q6H PRN Khalida Sutherland APRN - CNP        acetaminophen (TYLENOL) tablet 650 mg  650 mg Oral Q6H PRN Khalida Sutherland APRN - CNP        Or    acetaminophen (TYLENOL) suppository 650 mg  650 mg Rectal Q6H PRN Khalida Sutherland APRN - CNP        amiodarone (CORDARONE) tablet 100 mg  100 mg Oral Daily Khalida Sutherland APRN - CNP   100 mg at 03/13/24 0953    budesonide (PULMICORT) nebulizer suspension 250 mcg  250 mcg Nebulization BID RT Khalida Sutherland APRN - CNP   250 mcg at 03/15/24 0827    [Held by provider] bumetanide (BUMEX) tablet 1 mg  1 mg Oral BID Khalida Sutherland APRN - CNP        [Held by provider] losartan (COZAAR) tablet 25 mg  25 mg Oral Daily Khalida Sutherland APRN - CNP        metoclopramide (REGLAN) tablet 5 mg  5 mg Oral 4x Daily Khalida Sutherland APRN - CNP   5 mg at 03/15/24 1239    [Held by provider] metoprolol succinate (TOPROL XL) extended release tablet 25 mg  25 mg Oral Daily Khalida Sutherland,

## 2024-03-15 NOTE — PROGRESS NOTES
PROGRESS NOTE  By Kobe Orr M.D.    [facility]  Dr. Arleen Baker M.D.,  Dr. Ervin Peterson M.D.,   Dr. David Raymond D.O.,  Dr. Mehrdad Munroe M.D.,  VITALY McdonaldO.,          Barbara Jack  88 y.o.  female    SUBJECTIVE:  No new complaints.  Denies abdominal pain and reports being able to tolerate diet however still difficulty swallowing to some degree.   at bedside.  Decreased H&H overnight requiring 1 unit of PRBC transfusion this morning      OBJECTIVE:    BP (!) 123/59   Pulse 59   Temp 97.5 °F (36.4 °C) (Oral)   Resp 18   Ht 1.499 m (4' 11\")   Wt 38.6 kg (85 lb)   SpO2 100%   BMI 17.17 kg/m²     General: NAD/older adult  female.  HEENT: Anicteric sclera/moist oral mucosa  Neck: Supple with trachea midline  Chest: Symmetric excursion/nonlabored respirations  Cor: Regular  Abd.: Soft/nontender and nondistended  Extr.:  Decreased muscle tone and bulk throughout, consistent with age and condition  Skin: Warm and dry/anicteric      DATA:    Monitor data reviewed -sinus rhythm/AV block noted.       Lab Results   Component Value Date/Time    WBC 6.6 03/12/2024 02:45 PM    RBC 2.49 03/12/2024 02:45 PM    HGB 6.7 03/14/2024 11:23 PM    HCT 22.6 03/14/2024 11:23 PM    MCV 94.4 03/12/2024 02:45 PM    MCH 28.1 03/12/2024 02:45 PM    MCHC 29.8 03/12/2024 02:45 PM    RDW 15.9 03/12/2024 02:45 PM     03/12/2024 02:45 PM    MPV 9.2 03/12/2024 02:45 PM     Lab Results   Component Value Date/Time     03/13/2024 06:19 AM    K 4.2 03/13/2024 06:19 AM    K 4.2 12/28/2022 07:44 AM     03/13/2024 06:19 AM    CO2 32 03/13/2024 06:19 AM    BUN 32 03/13/2024 06:19 AM    CREATININE 1.2 03/13/2024 06:19 AM    CALCIUM 8.7 03/13/2024 06:19 AM    PROT 5.8 03/13/2024 06:19 AM    LABALBU 3.5 03/13/2024 06:19 AM    BILITOT 0.4 03/13/2024 06:19 AM    ALKPHOS 52 03/13/2024 06:19 AM    AST 19 03/13/2024 06:19 AM    ALT 8 03/13/2024 06:19 AM     Lab Results   Component Value  Acute blood loss anemia    On rivaroxaban therapy    Dysphagia     1.  GI bleed anemia  -Acute on chronic anemia  -No evidence of overt bleed but positive FOBT  -Likely multifactorial and precipitated/exacerbated by oral anticoagulation  -Iron deficient/normocytic  -Twice a day IV PPI  -Monitor H&H; defer transfusion to admitting  -Elevate head of bed; oxygen nasal cannula  -Plan for further evaluation/treatment with upper endoscopy -would recommend holding of oral anticoagulation for 48 to 72 hours in nonemergent setting  -Recommendations regarding colonoscopy to follow     2.  Dysphagia  -History of achalasia with Botox injections  -Botox injection with EGD as above     3.  Comorbidities -severe cardiomyopathy, atrial fibrillation, renal failure  -Recommend holding of oral anticoagulation if clinically feasible  -Per admitting/pertinent consultants     Status post EGD with Botox injection.  Patient is experiencing some residual dysphagia however able to tolerate diet.  /patient once again advised on small bites of soft food and good mastication/upright position for meals.  Discussed decreased H&H without evidence of overt bleed -patient had received blood transfusion this morning and will require close monitoring with likely ongoing holding of oral anticoagulation  Above has been discussed in detail with patient and  at bedside and all questions answered to their satisfaction.  They verbalized understanding and agreement with the plan as delineated     Kobe Orr MD  3/15/2024  11:52 AM    NOTE:  This report was transcribed using voice recognition software.  Every effort was made to ensure accuracy; however, inadvertent computerized transcription errors may be present.

## 2024-03-15 NOTE — PLAN OF CARE
Problem: ABCDS Injury Assessment  Goal: Absence of physical injury  3/15/2024 1332 by Portia Mace RN  Outcome: Progressing  3/15/2024 0650 by Cristiane Michaels RN  Outcome: Progressing     Problem: Skin/Tissue Integrity  Goal: Absence of new skin breakdown  Description: 1.  Monitor for areas of redness and/or skin breakdown  2.  Assess vascular access sites hourly  3.  Every 4-6 hours minimum:  Change oxygen saturation probe site  4.  Every 4-6 hours:  If on nasal continuous positive airway pressure, respiratory therapy assess nares and determine need for appliance change or resting period.  3/15/2024 1332 by Portia Mace RN  Outcome: Progressing  3/15/2024 0650 by Cristiane Michaels RN  Outcome: Progressing     Problem: Safety - Adult  Goal: Free from fall injury  3/15/2024 1332 by Portia Mace RN  Outcome: Progressing  3/15/2024 0650 by Cristiane Michaels RN  Outcome: Progressing

## 2024-03-15 NOTE — ACP (ADVANCE CARE PLANNING)
3/15/2024  Advance Care Planning   Healthcare Decision Maker:    Primary Decision Maker: ArasharGiorgiCui - Spouse - [phone]    Secondary Decision Maker: BrysonGanga - Child - [phone]    Click here to complete Healthcare Decision Makers including selection of the Healthcare Decision Maker Relationship (ie \"Primary\").

## 2024-03-15 NOTE — PROGRESS NOTES
increased safety/participation in ADL/IADL tasks  * Therapeutic exercise to improve motor endurance, ROM, and functional strength for ADLs/functional transfers  * Therapeutic activities to facilitate/challenge dynamic balance, stand tolerance for increased safety and independence with ADLs  * Neuro-muscular re-education: facilitation of righting/equilibrium reactions, midline orientation, scapular stability/mobility, normalization of muscle tone, and facilitation of volitional active controled movement  * Positioning to improve skin integrity, interaction with environment and functional independence     Recommended Adaptive Equipment: TBD     Home Living: Patient lives with her  in a one-floor home.  Bathroom Setup: walk-in shower (with seat and grab bar)  Equipment Owned: walker, cane, rollator, home O2     Prior Level of Function (PLOF): Patient is typically independent with most ADLs; patient's  completes IADLs. Patient is typically Mod I with functional mobility (with use of walker, as needed, within their home - rollator used in the community). Patient's  assists with ADLs, as needed.  Driving: No, not recently.     Pain Level: Patient denied experiencing pain.  Cognition: Patient alert and oriented grossly. Fair command follow demonstrated. Patient cooperative and pleasant. Decreased insight to current abilities/limitations demonstrated.   Memory: WFL grossly  Sequencing: WFL grossly  Problem Solving: Fair  Judgement/Safety: Fair              Impulsivity demonstrated occasionally.     Functional Assessment:                  AM-PAC Daily Activity Raw Score: 18/24    Initial Eval Status  Date: 3/13/2024 Treatment Status  Date: 3/15/24 Short Term Goals = Long Term Goals   Feeding Setup   Independent   Grooming CGA for hand hygiene and oral care tasks while standing at sink. Cues and assistance given with management of walker to maximize safety.   Mod I (seated/standing at sink)   UB Dressing  goals.     Time In: 9:20  Time Out: 9:30     Min Units   Therapeutic Ex 22489     Therapeutic Activities 68882 10 1   ADL/Self Care 22030     Orthotic Management 78981     Neuro Re-Ed 00622     Non-Billable Time     TOTAL TIMED TREATMENT 10 1       Emelyn BURROWS/RUBIN [id number]

## 2024-03-16 LAB
ABO/RH: NORMAL
ANTIBODY SCREEN: NEGATIVE
ARM BAND NUMBER: NORMAL
BLOOD BANK BLOOD PRODUCT EXPIRATION DATE: NORMAL
BLOOD BANK BLOOD PRODUCT EXPIRATION DATE: NORMAL
BLOOD BANK DISPENSE STATUS: NORMAL
BLOOD BANK DISPENSE STATUS: NORMAL
BLOOD BANK ISBT PRODUCT BLOOD TYPE: 6200
BLOOD BANK ISBT PRODUCT BLOOD TYPE: 6200
BLOOD BANK PRODUCT CODE: NORMAL
BLOOD BANK PRODUCT CODE: NORMAL
BLOOD BANK SAMPLE EXPIRATION: NORMAL
BLOOD BANK UNIT TYPE AND RH: NORMAL
BLOOD BANK UNIT TYPE AND RH: NORMAL
BPU ID: NORMAL
BPU ID: NORMAL
COMPONENT: NORMAL
COMPONENT: NORMAL
CROSSMATCH RESULT: NORMAL
CROSSMATCH RESULT: NORMAL
HCT VFR BLD AUTO: 26.7 % (ref 34–48)
HCT VFR BLD AUTO: 27.8 % (ref 34–48)
HCT VFR BLD AUTO: 33.1 % (ref 34–48)
HGB BLD-MCNC: 7.9 G/DL (ref 11.5–15.5)
HGB BLD-MCNC: 8.4 G/DL (ref 11.5–15.5)
HGB BLD-MCNC: 9.8 G/DL (ref 11.5–15.5)
TRANSFUSION STATUS: NORMAL
TRANSFUSION STATUS: NORMAL
UNIT DIVISION: 0
UNIT DIVISION: 0
UNIT ISSUE DATE/TIME: NORMAL
UNIT ISSUE DATE/TIME: NORMAL

## 2024-03-16 PROCEDURE — 1200000000 HC SEMI PRIVATE

## 2024-03-16 PROCEDURE — 36415 COLL VENOUS BLD VENIPUNCTURE: CPT

## 2024-03-16 PROCEDURE — 6370000000 HC RX 637 (ALT 250 FOR IP): Performed by: NURSE PRACTITIONER

## 2024-03-16 PROCEDURE — 2700000000 HC OXYGEN THERAPY PER DAY

## 2024-03-16 PROCEDURE — 6360000002 HC RX W HCPCS: Performed by: NURSE PRACTITIONER

## 2024-03-16 PROCEDURE — 6370000000 HC RX 637 (ALT 250 FOR IP): Performed by: INTERNAL MEDICINE

## 2024-03-16 PROCEDURE — 85014 HEMATOCRIT: CPT

## 2024-03-16 PROCEDURE — 94640 AIRWAY INHALATION TREATMENT: CPT

## 2024-03-16 PROCEDURE — 85018 HEMOGLOBIN: CPT

## 2024-03-16 PROCEDURE — 99231 SBSQ HOSP IP/OBS SF/LOW 25: CPT | Performed by: INTERNAL MEDICINE

## 2024-03-16 PROCEDURE — 97530 THERAPEUTIC ACTIVITIES: CPT

## 2024-03-16 PROCEDURE — 2580000003 HC RX 258: Performed by: NURSE PRACTITIONER

## 2024-03-16 RX ADMIN — METOCLOPRAMIDE 5 MG: 5 TABLET ORAL at 10:59

## 2024-03-16 RX ADMIN — AMIODARONE HYDROCHLORIDE 100 MG: 200 TABLET ORAL at 09:14

## 2024-03-16 RX ADMIN — METOCLOPRAMIDE 5 MG: 5 TABLET ORAL at 17:05

## 2024-03-16 RX ADMIN — BUDESONIDE 250 MCG: 0.25 INHALANT RESPIRATORY (INHALATION) at 09:28

## 2024-03-16 RX ADMIN — SPIRONOLACTONE 25 MG: 25 TABLET ORAL at 09:14

## 2024-03-16 RX ADMIN — METOCLOPRAMIDE 5 MG: 5 TABLET ORAL at 20:32

## 2024-03-16 RX ADMIN — ATORVASTATIN CALCIUM 10 MG: 40 TABLET, FILM COATED ORAL at 20:33

## 2024-03-16 RX ADMIN — SODIUM CHLORIDE, PRESERVATIVE FREE 10 ML: 5 INJECTION INTRAVENOUS at 09:14

## 2024-03-16 RX ADMIN — PANTOPRAZOLE SODIUM 20 MG: 20 TABLET, DELAYED RELEASE ORAL at 06:35

## 2024-03-16 RX ADMIN — METOCLOPRAMIDE 5 MG: 5 TABLET ORAL at 06:35

## 2024-03-16 RX ADMIN — SODIUM CHLORIDE, PRESERVATIVE FREE 10 ML: 5 INJECTION INTRAVENOUS at 20:33

## 2024-03-16 RX ADMIN — FERROUS SULFATE TAB 325 MG (65 MG ELEMENTAL FE) 325 MG: 325 (65 FE) TAB at 09:14

## 2024-03-16 ASSESSMENT — PAIN SCALES - GENERAL: PAINLEVEL_OUTOF10: 0

## 2024-03-16 NOTE — PROGRESS NOTES
PROGRESS NOTE  By Kobe Orr M.D.    [facility]  Dr. Arleen Baker M.D.,  Dr. Ervin Peterson M.D.,   Dr. David Raymond D.O.,  Dr. Mehrdad Munroe M.D.,  Dr. Lawrence Hernandez DEvansO.,          Barbara Jack  88 y.o.  female    SUBJECTIVE:  No new complaints.   at bedside    OBJECTIVE:    BP (!) 119/58   Pulse 56   Temp 98.1 °F (36.7 °C) (Oral)   Resp 16   Ht 1.499 m (4' 11\")   Wt 39 kg (86 lb)   SpO2 94%   BMI 17.37 kg/m²     General: NAD/older adult  female  HEENT: No discharge from nose or ears  Neck: Trachea midline  Chest: Symmetric excursion/nonlabored respirations  Cor: Regular/monitor sinus rhythm  Abd.: Not distended  Extr.:  Decreased muscle tone and bulk throughout, consistent with age and condition  Skin: Anicteric/dry      DATA:    Monitor data reviewed -sinus rhythm noted.       Lab Results   Component Value Date/Time    WBC 6.6 03/12/2024 02:45 PM    RBC 2.49 03/12/2024 02:45 PM    HGB 9.8 03/16/2024 12:47 PM    HCT 33.1 03/16/2024 12:47 PM    MCV 94.4 03/12/2024 02:45 PM    MCH 28.1 03/12/2024 02:45 PM    MCHC 29.8 03/12/2024 02:45 PM    RDW 15.9 03/12/2024 02:45 PM     03/12/2024 02:45 PM    MPV 9.2 03/12/2024 02:45 PM     Lab Results   Component Value Date/Time     03/13/2024 06:19 AM    K 4.2 03/13/2024 06:19 AM    K 4.2 12/28/2022 07:44 AM     03/13/2024 06:19 AM    CO2 32 03/13/2024 06:19 AM    BUN 32 03/13/2024 06:19 AM    CREATININE 1.2 03/13/2024 06:19 AM    CALCIUM 8.7 03/13/2024 06:19 AM    PROT 5.8 03/13/2024 06:19 AM    LABALBU 3.5 03/13/2024 06:19 AM    BILITOT 0.4 03/13/2024 06:19 AM    ALKPHOS 52 03/13/2024 06:19 AM    AST 19 03/13/2024 06:19 AM    ALT 8 03/13/2024 06:19 AM     Lab Results   Component Value Date/Time    LIPASE 34 11/19/2018 11:10 AM     No results found for: \"AMYLASE\"      ASSESSMENT/PLAN:  Patient Active Problem List   Diagnosis    CAD (coronary artery disease)    H/O acute myocardial infarction of anterolateral

## 2024-03-16 NOTE — PROGRESS NOTES
Progress  Note  No chief complaint on file.    Historical Issues:  Current Facility-Administered Medications   Medication Dose Route Frequency Provider Last Rate Last Admin    pantoprazole (PROTONIX) tablet 20 mg  20 mg Oral QAM David Valentine DO   20 mg at 03/16/24 0635    0.9 % sodium chloride infusion   IntraVENous PRN Khalida Sutherland APRN - CNP        onabotulinumtoxinA (BOTOX) injection 100 Units  100 Units IntraMUSCular Once Lossev, Kobe Eaton MD        sodium chloride flush 0.9 % injection 5-40 mL  5-40 mL IntraVENous 2 times per day Khalida Sutherland APRN - CNP   10 mL at 03/16/24 0914    sodium chloride flush 0.9 % injection 5-40 mL  5-40 mL IntraVENous PRN Khalida Sutherland APRN - CNP   10 mL at 03/15/24 1056    0.9 % sodium chloride infusion   IntraVENous PRN Khalida Sutherland APRN - CNP        ondansetron (ZOFRAN-ODT) disintegrating tablet 4 mg  4 mg Oral Q8H PRN Khalida Sutherland APRN - CNP        Or    ondansetron (ZOFRAN) injection 4 mg  4 mg IntraVENous Q6H PRN Khalida Sutherland APRN - CNP        acetaminophen (TYLENOL) tablet 650 mg  650 mg Oral Q6H PRN Khalida Sutherland APRN - CNP        Or    acetaminophen (TYLENOL) suppository 650 mg  650 mg Rectal Q6H PRN Khalida Sutherland APRN - CNP        amiodarone (CORDARONE) tablet 100 mg  100 mg Oral Daily Khalida Sutherland APRN - CNP   100 mg at 03/16/24 0914    budesonide (PULMICORT) nebulizer suspension 250 mcg  250 mcg Nebulization BID RT Khalida Sutherland APRN - CNP   250 mcg at 03/16/24 0928    [Held by provider] bumetanide (BUMEX) tablet 1 mg  1 mg Oral BID Khalida Sutherland APRN - CNP        [Held by provider] losartan (COZAAR) tablet 25 mg  25 mg Oral Daily Khalida Sutherland APRN - CNP        metoclopramide (REGLAN) tablet 5 mg  5 mg Oral 4x Daily Khalida Sutherland APRN - CNP   5 mg at 03/16/24 1059    [Held by provider] metoprolol succinate (TOPROL XL) extended release tablet 25 mg  25 mg Oral Daily Khalida Sutherland,

## 2024-03-16 NOTE — CARE COORDINATION
Social Work discharge planning  Charge RN aware of [facility] for discharge. Pt will need resume [facility] order before discharge please.  Electronically signed by LUKE Gutierrez on 3/16/2024 at 9:46 AM

## 2024-03-16 NOTE — PROGRESS NOTES
Physical Therapy  Facility/Department: [address] MED SURG/TELE  Treatment Note  Name: Barbara Jack  : 1935  MRN: [id number]  Date of Service: 3/16/2024    Attending Provider:  Gonzalo Ibrahim MD    Evaluating PT:  Darshan Damon Jr., P.T.    Room #:  [facility]/0541-  Diagnosis:  Symptomatic anemia [D64.9]  GI bleed [K92.2]  Pertinent PMHx/PSHx: [place] uses hearing aides  Precautions:  falls, bed/chair alarm if someone is not with pt    SUBJECTIVE:    Pt lives with her  in a 1 story home with 2 stairs and no rail to enter.   reports pt holds onto him on the stairs.  Pt ambulated with a ww, rollator, or a cane PTA. Uses 3L Home O2    OBJECTIVE:   Initial Evaluation  Date: 3/13/24 Treatment Short Term/ Long Term   Goals   Was pt agreeable to Eval/treatment? yes yes    Does pt have pain? No c/o pain No c/o pain    Bed Mobility  Rolling: Independent  Supine to sit: Independent  Sit to supine: NA  Scooting: Independent NA, pt was found and left sitting up in chair.  Independent   Transfers Sit to stand: supervision  Stand to sit: supervision  Stand pivot: supervision with ww Sit to stand: supervision  Stand to sit: supervision  Stand pivot: supervision Independent    Ambulation   15+50 feet with ww supervision 100+150 feet with ww supervision 150 feet with ww supervision   Stair negotiation: ascended and descended NA 3 steps with 1 rail SBA 2 steps with no rail CGA   AM-PAC 6 Clicks       BLE ROM is WFL.   BLE strength is grossly 4/5 to 4+/5.   Balance: sitting is Independent and standing with ww is supervision  Endurance: fair+    ASSESSMENT:    Comments:  Pt was found sitting up in chair and was agreeable to PT.  She was on 2L O2 throughout session.  She walked to end of the [place] with ww and asked to sit down for short seated rest break.  After a short break she was able to performed stairs and then sat back down for another short rest break.  Pt stood up again and walked with ww back to her  room down far side of the [address].  Pt c/o BLE feeling heavy and she reported feeling tired and Hgb is 7.9 this am.      Pt was left sitting up in chair with call light left by patient and  and son present with her. Chair alarm was re-activated.     PLAN:    Pt is making good progress toward established Physical Therapy goals.  Continue with physical therapy current plan of care.    Time in  09:50  Time out  10:10    Total Treatment Time 20 minutes     Evaluation Time includes thorough review of current medical information, gathering information on past medical history/social history and prior level of function, completion of standardized testing/informal observation of tasks, assessment of data and education on plan of care and goals.    CPT codes:  [] Low Complexity PT evaluation 27180  [] Moderate Complexity PT evaluation 62522  [] High Complexity PT evaluation 21072  [] PT Re-evaluation 32715  [] Gait training 36927 ** minutes  [] Manual therapy 46941 ** minutes  [x] Therapeutic activities 42624 20 minutes  [] Therapeutic exercises 15200 ** minutes  [] Neuromuscular reeducation 51461 ** minutes     Darshan Damon Jr., P.T.  License Number: [id number]

## 2024-03-16 NOTE — PLAN OF CARE
Problem: Discharge Planning  Goal: Discharge to home or other facility with appropriate resources  Outcome: Progressing     Problem: ABCDS Injury Assessment  Goal: Absence of physical injury  Outcome: Progressing     Problem: Skin/Tissue Integrity  Goal: Absence of new skin breakdown  Description: 1.  Monitor for areas of redness and/or skin breakdown  2.  Assess vascular access sites hourly  3.  Every 4-6 hours minimum:  Change oxygen saturation probe site  4.  Every 4-6 hours:  If on nasal continuous positive airway pressure, respiratory therapy assess nares and determine need for appliance change or resting period.  Outcome: Progressing     Problem: Safety - Adult  Goal: Free from fall injury  Outcome: Progressing     Problem: Chronic Conditions and Co-morbidities  Goal: Patient's chronic conditions and co-morbidity symptoms are monitored and maintained or improved  Outcome: Progressing     Problem: Pain  Goal: Verbalizes/displays adequate comfort level or baseline comfort level  Outcome: Progressing

## 2024-03-17 LAB
HCT VFR BLD AUTO: 28.7 % (ref 34–48)
HCT VFR BLD AUTO: 30.6 % (ref 34–48)
HCT VFR BLD AUTO: 31.1 % (ref 34–48)
HGB BLD-MCNC: 8.5 G/DL (ref 11.5–15.5)
HGB BLD-MCNC: 9.1 G/DL (ref 11.5–15.5)
HGB BLD-MCNC: 9.3 G/DL (ref 11.5–15.5)

## 2024-03-17 PROCEDURE — 97530 THERAPEUTIC ACTIVITIES: CPT

## 2024-03-17 PROCEDURE — 94640 AIRWAY INHALATION TREATMENT: CPT

## 2024-03-17 PROCEDURE — 2580000003 HC RX 258: Performed by: NURSE PRACTITIONER

## 2024-03-17 PROCEDURE — 6370000000 HC RX 637 (ALT 250 FOR IP): Performed by: INTERNAL MEDICINE

## 2024-03-17 PROCEDURE — 6370000000 HC RX 637 (ALT 250 FOR IP): Performed by: NURSE PRACTITIONER

## 2024-03-17 PROCEDURE — 1200000000 HC SEMI PRIVATE

## 2024-03-17 PROCEDURE — 6360000002 HC RX W HCPCS: Performed by: NURSE PRACTITIONER

## 2024-03-17 PROCEDURE — 85018 HEMOGLOBIN: CPT

## 2024-03-17 PROCEDURE — 36415 COLL VENOUS BLD VENIPUNCTURE: CPT

## 2024-03-17 PROCEDURE — 2700000000 HC OXYGEN THERAPY PER DAY

## 2024-03-17 PROCEDURE — 85014 HEMATOCRIT: CPT

## 2024-03-17 PROCEDURE — 99233 SBSQ HOSP IP/OBS HIGH 50: CPT | Performed by: INTERNAL MEDICINE

## 2024-03-17 RX ADMIN — METOCLOPRAMIDE 5 MG: 5 TABLET ORAL at 10:41

## 2024-03-17 RX ADMIN — FERROUS SULFATE TAB 325 MG (65 MG ELEMENTAL FE) 325 MG: 325 (65 FE) TAB at 08:28

## 2024-03-17 RX ADMIN — AMIODARONE HYDROCHLORIDE 100 MG: 200 TABLET ORAL at 08:28

## 2024-03-17 RX ADMIN — ATORVASTATIN CALCIUM 10 MG: 40 TABLET, FILM COATED ORAL at 20:37

## 2024-03-17 RX ADMIN — ASPIRIN 81 MG: 81 TABLET, COATED ORAL at 09:44

## 2024-03-17 RX ADMIN — METOCLOPRAMIDE 5 MG: 5 TABLET ORAL at 06:37

## 2024-03-17 RX ADMIN — PANTOPRAZOLE SODIUM 20 MG: 20 TABLET, DELAYED RELEASE ORAL at 06:36

## 2024-03-17 RX ADMIN — SODIUM CHLORIDE, PRESERVATIVE FREE 10 ML: 5 INJECTION INTRAVENOUS at 08:30

## 2024-03-17 RX ADMIN — METOCLOPRAMIDE 5 MG: 5 TABLET ORAL at 17:58

## 2024-03-17 RX ADMIN — SPIRONOLACTONE 25 MG: 25 TABLET ORAL at 08:28

## 2024-03-17 RX ADMIN — BUDESONIDE 250 MCG: 0.25 INHALANT RESPIRATORY (INHALATION) at 21:53

## 2024-03-17 RX ADMIN — METOPROLOL SUCCINATE 25 MG: 25 TABLET, FILM COATED, EXTENDED RELEASE ORAL at 09:44

## 2024-03-17 RX ADMIN — BUMETANIDE 1 MG: 1 TABLET ORAL at 17:58

## 2024-03-17 RX ADMIN — RIVAROXABAN 15 MG: 15 TABLET, FILM COATED ORAL at 08:28

## 2024-03-17 RX ADMIN — BUMETANIDE 1 MG: 1 TABLET ORAL at 09:44

## 2024-03-17 RX ADMIN — SODIUM CHLORIDE, PRESERVATIVE FREE 10 ML: 5 INJECTION INTRAVENOUS at 20:37

## 2024-03-17 RX ADMIN — METOCLOPRAMIDE 5 MG: 5 TABLET ORAL at 20:37

## 2024-03-17 RX ADMIN — BUDESONIDE 250 MCG: 0.25 INHALANT RESPIRATORY (INHALATION) at 09:00

## 2024-03-17 NOTE — PLAN OF CARE
Problem: Discharge Planning  Goal: Discharge to home or other facility with appropriate resources  3/17/2024 0839 by Shruthi Hayes RN  Outcome: Progressing  3/17/2024 0659 by Mable Art RN  Outcome: Progressing  3/16/2024 2253 by Mable Art RN  Outcome: Progressing     Problem: ABCDS Injury Assessment  Goal: Absence of physical injury  3/17/2024 0839 by Shurthi Hayes RN  Outcome: Progressing  3/17/2024 0659 by Mable Art RN  Outcome: Progressing  3/16/2024 2253 by Mable Art RN  Outcome: Progressing     Problem: Skin/Tissue Integrity  Goal: Absence of new skin breakdown  Description: 1.  Monitor for areas of redness and/or skin breakdown  2.  Assess vascular access sites hourly  3.  Every 4-6 hours minimum:  Change oxygen saturation probe site  4.  Every 4-6 hours:  If on nasal continuous positive airway pressure, respiratory therapy assess nares and determine need for appliance change or resting period.  3/17/2024 0839 by Shruthi Hayes RN  Outcome: Progressing  3/17/2024 0659 by Mable Art RN  Outcome: Progressing  3/17/2024 0659 by Mable Art RN  Outcome: Progressing  3/16/2024 2253 by Mable Art RN  Outcome: Progressing     Problem: Safety - Adult  Goal: Free from fall injury  3/17/2024 0839 by Shruthi Hayes RN  Outcome: Progressing  3/17/2024 0659 by Mable Art RN  Outcome: Progressing  3/17/2024 0659 by Mable Art RN  Outcome: Progressing  3/16/2024 2253 by Mable Art RN  Outcome: Progressing     Problem: Chronic Conditions and Co-morbidities  Goal: Patient's chronic conditions and co-morbidity symptoms are monitored and maintained or improved  3/17/2024 0839 by Shruthi Hayes RN  Outcome: Progressing  3/17/2024 0659 by Mable Art RN  Outcome: Progressing  3/17/2024 0659 by Mable Art RN  Outcome: Progressing  3/17/2024 0659 by Mable Art RN  Outcome: Progressing  3/16/2024 2253 by Mable Art RN  Outcome: Progressing     Problem:

## 2024-03-17 NOTE — PROGRESS NOTES
PROGRESS NOTE  By Kobe Orr M.D.    [facility]  Dr. Arleen Baker M.D.,  Dr. Ervin Peterson M.D.,   Dr. David Raymond D.O.,  Dr. Mehrdad Munroe M.D.,  VITALY McdonaldO.,          Barbara Jack  88 y.o.  female    SUBJECTIVE:  Denies abdominal pain.  Denies nausea vomiting.   at bedside -according to him she had good appetite yesterday    OBJECTIVE:    /66   Pulse 66   Temp 98.4 °F (36.9 °C) (Oral)   Resp 18   Ht 1.499 m (4' 11\")   Wt 41.7 kg (92 lb)   SpO2 95%   BMI 18.58 kg/m²     General: NAD/older adult  female  HEENT: Anicteric sclera/moist oral mucosa  Neck: Supple with trachea appearing midline  Chest: Symmetric excursion/nonlabored respirations  Cor: Regular  Abdomen: Soft and nontender/nondistended  Extr.:  No significant peripheral edema.  Decreased muscle tone and bulk throughout  Skin: Warm and dry/anicteric      DATA:    Monitor data reviewed -sinus rhythm noted.       Lab Results   Component Value Date/Time    WBC 6.6 03/12/2024 02:45 PM    RBC 2.49 03/12/2024 02:45 PM    HGB 9.1 03/17/2024 11:12 AM    HCT 30.6 03/17/2024 11:12 AM    MCV 94.4 03/12/2024 02:45 PM    MCH 28.1 03/12/2024 02:45 PM    MCHC 29.8 03/12/2024 02:45 PM    RDW 15.9 03/12/2024 02:45 PM     03/12/2024 02:45 PM    MPV 9.2 03/12/2024 02:45 PM     Lab Results   Component Value Date/Time     03/13/2024 06:19 AM    K 4.2 03/13/2024 06:19 AM    K 4.2 12/28/2022 07:44 AM     03/13/2024 06:19 AM    CO2 32 03/13/2024 06:19 AM    BUN 32 03/13/2024 06:19 AM    CREATININE 1.2 03/13/2024 06:19 AM    CALCIUM 8.7 03/13/2024 06:19 AM    PROT 5.8 03/13/2024 06:19 AM    LABALBU 3.5 03/13/2024 06:19 AM    BILITOT 0.4 03/13/2024 06:19 AM    ALKPHOS 52 03/13/2024 06:19 AM    AST 19 03/13/2024 06:19 AM    ALT 8 03/13/2024 06:19 AM     Lab Results   Component Value Date/Time    LIPASE 34 11/19/2018 11:10 AM     No results found for: \"AMYLASE\"      ASSESSMENT/PLAN:  Patient Active

## 2024-03-17 NOTE — PROGRESS NOTES
Physical Therapy  Facility/Department: [address] MED SURG/TELE  Treatment Note  Name: Barbara Jack  : 1935  MRN: [id number]  Date of Service: 3/17/2024    Attending Provider:  Gonzalo Ibrahim MD    Evaluating PT:  Darshan Damon Jr., P.T.    Room #:  [facility]/0541-  Diagnosis:  Symptomatic anemia [D64.9]  GI bleed [K92.2]  Pertinent PMHx/PSHx: [place] uses hearing aides  Precautions:  falls, bed/chair alarm if someone is not with pt    SUBJECTIVE:    Pt lives with her  in a 1 story home with 2 stairs and no rail to enter.   reports pt holds onto him on the stairs.  Pt ambulated with a ww, rollator, or a cane PTA. Uses 3L Home O2    OBJECTIVE:   Initial Evaluation  Date: 3/13/24 Treatment Short Term/ Long Term   Goals   Was pt agreeable to Eval/treatment? yes yes    Does pt have pain? No c/o pain No c/o pain    Bed Mobility  Rolling: Independent  Supine to sit: Independent  Sit to supine: NA  Scooting: Independent NA, pt was found and left sitting up in chair.  Independent   Transfers Sit to stand: supervision  Stand to sit: supervision  Stand pivot: supervision with ww Sit to stand: supervision  Stand to sit: supervision  Stand pivot: supervision Independent    Ambulation   15+50 feet with ww supervision 100+150 feet with ww supervision 150 feet with ww supervision   Stair negotiation: ascended and descended NA 4 steps with 1 rail SBA 2 steps with no rail CGA   AM-PAC 6 Clicks       BLE ROM is WFL.   BLE strength is grossly 4/5 to 4+/5.   Balance: sitting is Independent and standing with ww is supervision  Endurance: fair+    ASSESSMENT:    Comments:  Pt was found sitting up in chair and was agreeable to PT.  She was on 2L O2 throughout session.  She walked to end of the [place] with ww and asked to sit down for short seated rest break.  After a short break she was able to performed stairs and then sat back down for another short rest break.  Pt stood up again and walked with ww back to her

## 2024-03-17 NOTE — PROGRESS NOTES
Progress  Note  No chief complaint on file.    Historical Issues:  Current Facility-Administered Medications   Medication Dose Route Frequency Provider Last Rate Last Admin    pantoprazole (PROTONIX) tablet 20 mg  20 mg Oral QAM David Valentine DO   20 mg at 03/17/24 0636    0.9 % sodium chloride infusion   IntraVENous PRN Khalida Sutherland APRN - CNP        onabotulinumtoxinA (BOTOX) injection 100 Units  100 Units IntraMUSCular Once Lossev, Kobe Eaton MD        sodium chloride flush 0.9 % injection 5-40 mL  5-40 mL IntraVENous 2 times per day Khalida Sutherland APRN - CNP   10 mL at 03/17/24 0830    sodium chloride flush 0.9 % injection 5-40 mL  5-40 mL IntraVENous PRN Khalida Sutherland APRN - CNP   10 mL at 03/15/24 1056    0.9 % sodium chloride infusion   IntraVENous PRN Khalida Sutherland APRN - CNP        ondansetron (ZOFRAN-ODT) disintegrating tablet 4 mg  4 mg Oral Q8H PRN Khalida Sutherland APRN - CNP        Or    ondansetron (ZOFRAN) injection 4 mg  4 mg IntraVENous Q6H PRN Khalida Sutherland APRN - CNP        acetaminophen (TYLENOL) tablet 650 mg  650 mg Oral Q6H PRN Khalida Sutherland APRN - CNP        Or    acetaminophen (TYLENOL) suppository 650 mg  650 mg Rectal Q6H PRN Khalida Sutherland APRN - CNP        amiodarone (CORDARONE) tablet 100 mg  100 mg Oral Daily Khalida Sutherland APRN - CNP   100 mg at 03/17/24 0828    budesonide (PULMICORT) nebulizer suspension 250 mcg  250 mcg Nebulization BID RT Khalida Sutherland APRN - CNP   250 mcg at 03/17/24 0900    bumetanide (BUMEX) tablet 1 mg  1 mg Oral BID Khalida Sutherland APRN - CNP   1 mg at 03/17/24 0944    [Held by provider] losartan (COZAAR) tablet 25 mg  25 mg Oral Daily Khalida Sutherland APRN - CNP        metoclopramide (REGLAN) tablet 5 mg  5 mg Oral 4x Daily Khalida Sutherland APRN - CNP   5 mg at 03/17/24 1041    metoprolol succinate (TOPROL XL) extended release tablet 25 mg  25 mg Oral Daily Khalida Sutherland, APRN - CNP   25  *      Plan:  She presented to the hospital with a GI bleed.  An upper endoscopy was negative for source of bleeding.  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  There is no immediate plan to perform a colonoscopy nor capsule endoscopy.  She was on Xarelto prior to her hospitalization due to atrial fibrillation.  Xarelto has been started today as the hemoglobin has stabilized and has had no further dips.  If she is able to maintain her hemoglobin on Monday she should be stable for discharge with further follow-up with her gastroenterologist.  Likewise, cardiac medications have been held due to hypovolemic shock and acute blood loss anemia.  These medications have been restarted.  Will monitor baseline creatinine versus creatinine on Monday.      Case Discussed with: Family      Electronically signed by Gonzalo Ibrahim MD on 3/17/2024 at 12:47 PM

## 2024-03-18 VITALS
HEIGHT: 59 IN | TEMPERATURE: 98.5 F | RESPIRATION RATE: 16 BRPM | HEART RATE: 55 BPM | BODY MASS INDEX: 17.74 KG/M2 | DIASTOLIC BLOOD PRESSURE: 55 MMHG | OXYGEN SATURATION: 98 % | SYSTOLIC BLOOD PRESSURE: 105 MMHG | WEIGHT: 88 LBS

## 2024-03-18 LAB
ANION GAP SERPL CALCULATED.3IONS-SCNC: 10 MMOL/L (ref 7–16)
BUN SERPL-MCNC: 24 MG/DL (ref 6–23)
CALCIUM SERPL-MCNC: 9.1 MG/DL (ref 8.6–10.2)
CHLORIDE SERPL-SCNC: 100 MMOL/L (ref 98–107)
CO2 SERPL-SCNC: 32 MMOL/L (ref 22–29)
CREAT SERPL-MCNC: 1 MG/DL (ref 0.5–1)
GFR SERPL CREATININE-BSD FRML MDRD: 55 ML/MIN/1.73M2
GLUCOSE SERPL-MCNC: 90 MG/DL (ref 74–99)
HCT VFR BLD AUTO: 29 % (ref 34–48)
HGB BLD-MCNC: 8.8 G/DL (ref 11.5–15.5)
POTASSIUM SERPL-SCNC: 4.2 MMOL/L (ref 3.5–5)
SODIUM SERPL-SCNC: 142 MMOL/L (ref 132–146)

## 2024-03-18 PROCEDURE — 80048 BASIC METABOLIC PNL TOTAL CA: CPT

## 2024-03-18 PROCEDURE — 85014 HEMATOCRIT: CPT

## 2024-03-18 PROCEDURE — 6360000002 HC RX W HCPCS: Performed by: NURSE PRACTITIONER

## 2024-03-18 PROCEDURE — 6370000000 HC RX 637 (ALT 250 FOR IP): Performed by: INTERNAL MEDICINE

## 2024-03-18 PROCEDURE — 6370000000 HC RX 637 (ALT 250 FOR IP): Performed by: NURSE PRACTITIONER

## 2024-03-18 PROCEDURE — 2580000003 HC RX 258: Performed by: NURSE PRACTITIONER

## 2024-03-18 PROCEDURE — 97530 THERAPEUTIC ACTIVITIES: CPT

## 2024-03-18 PROCEDURE — 85018 HEMOGLOBIN: CPT

## 2024-03-18 PROCEDURE — 2700000000 HC OXYGEN THERAPY PER DAY

## 2024-03-18 PROCEDURE — 94640 AIRWAY INHALATION TREATMENT: CPT

## 2024-03-18 PROCEDURE — 99239 HOSP IP/OBS DSCHRG MGMT >30: CPT | Performed by: STUDENT IN AN ORGANIZED HEALTH CARE EDUCATION/TRAINING PROGRAM

## 2024-03-18 PROCEDURE — 92610 EVALUATE SWALLOWING FUNCTION: CPT

## 2024-03-18 PROCEDURE — 92526 ORAL FUNCTION THERAPY: CPT

## 2024-03-18 RX ORDER — LOSARTAN POTASSIUM 25 MG/1
25 TABLET ORAL DAILY
Status: ON HOLD | COMMUNITY
End: 2024-03-18 | Stop reason: HOSPADM

## 2024-03-18 RX ORDER — FERROUS SULFATE 325(65) MG
325 TABLET ORAL
Qty: 30 TABLET | Refills: 3 | Status: SHIPPED | OUTPATIENT
Start: 2024-03-19

## 2024-03-18 RX ORDER — METOCLOPRAMIDE 5 MG/1
5 TABLET ORAL 4 TIMES DAILY
COMMUNITY

## 2024-03-18 RX ADMIN — SODIUM CHLORIDE, PRESERVATIVE FREE 10 ML: 5 INJECTION INTRAVENOUS at 08:53

## 2024-03-18 RX ADMIN — AMIODARONE HYDROCHLORIDE 100 MG: 200 TABLET ORAL at 08:50

## 2024-03-18 RX ADMIN — METOPROLOL SUCCINATE 25 MG: 25 TABLET, FILM COATED, EXTENDED RELEASE ORAL at 08:49

## 2024-03-18 RX ADMIN — RIVAROXABAN 15 MG: 15 TABLET, FILM COATED ORAL at 08:53

## 2024-03-18 RX ADMIN — FERROUS SULFATE TAB 325 MG (65 MG ELEMENTAL FE) 325 MG: 325 (65 FE) TAB at 08:50

## 2024-03-18 RX ADMIN — METOCLOPRAMIDE 5 MG: 5 TABLET ORAL at 11:46

## 2024-03-18 RX ADMIN — PANTOPRAZOLE SODIUM 20 MG: 20 TABLET, DELAYED RELEASE ORAL at 06:32

## 2024-03-18 RX ADMIN — METOCLOPRAMIDE 5 MG: 5 TABLET ORAL at 06:32

## 2024-03-18 RX ADMIN — BUDESONIDE 250 MCG: 0.25 INHALANT RESPIRATORY (INHALATION) at 09:52

## 2024-03-18 RX ADMIN — ASPIRIN 81 MG: 81 TABLET, COATED ORAL at 08:50

## 2024-03-18 ASSESSMENT — PAIN SCALES - GENERAL: PAINLEVEL_OUTOF10: 0

## 2024-03-18 NOTE — DISCHARGE SUMMARY
[facility] Hospitalist Physician Discharge Summary       [facility]  [address] [place] [address]  [phone]          Activity level: As tolerated     Dispo: Home      Condition on discharge: Stable     Patient ID:  Barbara Jack  [id number]  88 y.o.  1935    Admit date: 3/12/2024    Discharge date and time:  3/18/2024  9:31 AM    Admission Diagnoses: Active Problems:    HFrEF (heart failure with reduced ejection fraction) (HCC)    Chronic renal disease, stage III (HCC) [874119]    Type 2 diabetes mellitus with chronic kidney disease    CAD (coronary artery disease)    Implantable cardioverter-defibrillator (ICD) in situ    Essential hypertension    COPD (chronic obstructive pulmonary disease) (HCC)    PAF (paroxysmal atrial fibrillation) (HCC)    GI bleed    Acute kidney injury superimposed on CKD (HCC)    Anticoagulant adverse reaction    Acute blood loss anemia    On rivaroxaban therapy    Dysphagia  Resolved Problems:    * No resolved hospital problems. *      Discharge Diagnoses: Active Problems:    HFrEF (heart failure with reduced ejection fraction) (HCC)    Chronic renal disease, stage III (HCC) [838588]    Type 2 diabetes mellitus with chronic kidney disease    CAD (coronary artery disease)    Implantable cardioverter-defibrillator (ICD) in situ    Essential hypertension    COPD (chronic obstructive pulmonary disease) (HCC)    PAF (paroxysmal atrial fibrillation) (HCC)    GI bleed    Acute kidney injury superimposed on CKD (HCC)    Anticoagulant adverse reaction    Acute blood loss anemia    On rivaroxaban therapy    Dysphagia  Resolved Problems:    * No resolved hospital problems. *      Consults:  IP CONSULT TO GI  IP CONSULT TO IV TEAM  IP CONSULT TO IV TEAM  IP CONSULT TO IV TEAM  IP CONSULT TO HEART FAILURE NURSE/COORDINATOR  IP CONSULT TO IV TEAM  IP CONSULT TO IV TEAM  IP CONSULT TO IV TEAM    Hospital Course:   Patient Barbara Jack is a 88 y.o. presented

## 2024-03-18 NOTE — PROGRESS NOTES
Physical Therapy  Facility/Department: [address] MED SURG/TELE  Treatment Note  Name: Barbara Jack  : 1935  MRN: [id number]  Date of Service: 3/18/2024    Attending Provider:  Erinn Merida MD    Evaluating PT:  Darshan Damon Jr., P.T.    Room #:  0541/0541-A  Diagnosis:  Symptomatic anemia [D64.9]  GI bleed [K92.2]  Pertinent PMHx/PSHx: [place] uses hearing aides  Precautions:  falls, bed/chair alarm if someone is not with pt    SUBJECTIVE:    Pt lives with her  in a 1 story home with 2 stairs and no rail to enter.   reports pt holds onto him on the stairs.  Pt ambulated with a ww, rollator, or a cane PTA. Uses 3L Home O2    OBJECTIVE:   Initial Evaluation  Date: 3/13/24 Treatment Short Term/ Long Term   Goals   Was pt agreeable to Eval/treatment? yes yes    Does pt have pain? No c/o pain No c/o pain    Bed Mobility  Rolling: Independent  Supine to sit: Independent  Sit to supine: NA  Scooting: Independent Rolling: Independent  Supine to sit: Independent  Sit to supine: Independent  Scooting: Independent  Independent   Transfers Sit to stand: supervision  Stand to sit: supervision  Stand pivot: supervision with ww Sit to stand: supervision  Stand to sit: supervision  Stand pivot: supervision Independent    Ambulation   15+50 feet with ww supervision 100+150 feet with ww supervision 150 feet with ww supervision   Stair negotiation: ascended and descended NA 5 steps with 1 rail SBA 2 steps with no rail CGA   AM-PAC 6 Clicks       BLE ROM is WFL.   BLE strength is grossly 4/5 to 4+/5.   Balance: sitting is Independent and standing with ww is supervision  Endurance: fair+    ASSESSMENT:    Comments:  Pt was found supine in bed and was agreeable to PT.  She was on 2L O2 throughout session.  She walked to end of the vergara with ww and asked to sit down for short seated rest break.  After a short break she was able to performed stairs and then walked with ww back to her room down far side of

## 2024-03-18 NOTE — CARE COORDINATION
Discharge orders noted.  Will plan follow-up in the office -patient/family goal to go clinical appointment and with questions/concerns at [phone].  Thank you for the opportunity to participate in the care of Mrs. Guero Orr MD]

## 2024-03-18 NOTE — CARE COORDINATION
3/18/2024  Social Work Discharge Planning: Discharge home with [facility]-they were notified. Order is in. Pt has home o2. Electronically signed by RALPH Beasley on 3/18/2024 at 10:53 AM

## 2024-03-18 NOTE — PROGRESS NOTES
SPEECH/LANGUAGE PATHOLOGY  CLINICAL ASSESSMENT OF SWALLOWING FUNCTION   and PLAN OF CARE    PATIENT NAME:  Barbara Jack  (female)     MRN:  [id number]    :  1935  (88 y.o.)  STATUS:  Inpatient: Room 0541/0541-A    TODAY'S DATE:  3/18/2024  REFERRING PROVIDER:   Dr. Ibrahim  SPECIFIC PROVIDER ORDER: SLP swallowing-dysphagia evaluation and treatment Date of order:  3/17/24  REASON FOR REFERRAL: Assess swallow function    EVALUATING THERAPIST: Bailee Agrawal, [place]                 RESULTS:    DYSPHAGIA DIAGNOSIS:   Clinical indicators of mild  oral phase dysphagia  and questionable pharyngeal phase dysphagia     Per chart review, patient completed MBSS during this admission on 3/13/24 where she was found to have mild oropharyngeal phase dysphagia and was recommended for a soft and bite size diet. This date, the patient endorses inconsistent coughing with liquids and difficulty with foods that \"require a lot of chewing.\" Discussed diet options for patient that involve less mastication. The patient requested a puree diet at this time.       DIET RECOMMENDATIONS:  Pureed consistency solids (IDDSI level 4) with  thin liquids (IDDSI level 0) by small cup sips, no straw     FEEDING RECOMMENDATIONS:     Assistance level:  Set-up is required for all oral intake      Compensatory strategies recommended: Thorough oral care to prevent colonization of oral bacteria , Upright in bed/ chair as tolerated, Fully alert for all PO, and Small bites/sips      Discussed recommendations with nursing and/or faxed report to referring provider: Yes    SPEECH THERAPY  PLAN OF CARE   The dysphagia POC is established based on physician order, dysphagia diagnosis and results of clinical assessment     Skilled SLP intervention for dysphagia management up to 6 x per week until goals met, pt plateaus in function and/or discharged from hospital    Conditions Requiring Skilled Therapeutic Intervention for dysphagia:    Patient is performing

## 2024-03-18 NOTE — PROGRESS NOTES
CLINICAL PHARMACY NOTE: MEDS TO BEDS    Total # of Prescriptions Filled: 1   The following medications were delivered to the patient:  IRON 325    Additional Documentation:

## 2024-03-19 ENCOUNTER — CARE COORDINATION (OUTPATIENT)
Dept: CARE COORDINATION | Age: 89
End: 2024-03-19

## 2024-03-19 ENCOUNTER — CARE COORDINATION (OUTPATIENT)
Dept: CASE MANAGEMENT | Age: 89
End: 2024-03-19

## 2024-03-19 LAB — SURGICAL PATHOLOGY REPORT: NORMAL

## 2024-03-19 NOTE — CARE COORDINATION
Remote Patient Monitoring Note    Date/Time:  3/19/2024 2:03 PM  Patient Current Location: Home: [address] [place] [id number]  LPN contacted patient by telephone regarding  RPM   PAUSED Verified patients name and  as identifiers.  Background: ENROLLED IN RPM for  COPD AND CHF  Clinical Interventions: Reviewed and followed up on alerts and treatments-pt discharged from    RPM resumed today.  Call to darryn Thomas.    William reports the tablet still says PAUSED.   Call to [facility] IT, spoke to Elvira. Elvira sending an update to the tablet. Call to William to update.   Pt to resume metrics   Plan/Follow Up: Will continue to review, monitor and address alerts with follow up based on severity of symptoms and risk factors.

## 2024-03-19 NOTE — CARE COORDINATION
Care Transitions Initial Follow Up Call    Call within 2 business days of discharge: Yes    Patient Current Location:  Home: [address] [place] [address]    -Phone answered by patient's  See (on communication release of information form)  for initial care transition call post hospital discharge.    -Patient transferred from [facility] and admitted to [facility] on 3/12/24 with symptoms of symptomatic anemia/GIB.   Hgb found to be 7.0% and received blood.  EGD done on 3/14/24 was negative.  -Patient's  reports his wife currently remains asleep, was up and down a couple times last night to urinate.  CTN inquired to [facility] regarding [facility]-visit planned for 2:30 pm today.  CTN requesting return call from patient when it is convenient for her-See agreeable and wrote down CTN's contact information.  -Patient's  inquiring regarding restarting of RPM equipment.  CTN informed [facility] that I would alert RPM to unpause.  -CTN to staff message RPM team copying ACM requesting unpausing.  -CTN noted in EMR PCP TCM/HFU scheduled on 3/27/24.  -CTN will await return call from patient.         Patient: Barbara Jack Patient : 1935   MRN: [id number]  Reason for Admission: anemia  Discharge Date: 3/18/24 RARS: Readmission Risk Score: 22.8      Last Discharge Facility       Date Complaint Diagnosis Description Type Department Provider    3/12/24  Anemia, unspecified type Admission (Discharged) [facility] Erinn Merida MD    3/12/24 Dizziness Anemia, unspecified type ... ED (TRANSFER) [facility] Adrienne James MD                Follow Up  Future Appointments   Date Time Provider Department Center   3/22/2024 10:15 AM [facility] CHF ROOM 1 [facility] [place]   3/26/2024  9:45 AM Mireya Beard APRN - CNS [facility] [place]   3/27/2024  1:45 PM Julita Dyer MD [facility]   2024 10:30 AM [facility] CHF ROOM 1 [facility] [place]   2024 10:45 AM Julita Dyer MD

## 2024-03-20 ENCOUNTER — TELEPHONE (OUTPATIENT)
Dept: PHARMACY | Facility: CLINIC | Age: 89
End: 2024-03-20

## 2024-03-20 ENCOUNTER — CARE COORDINATION (OUTPATIENT)
Dept: CARE COORDINATION | Age: 89
End: 2024-03-20

## 2024-03-20 NOTE — CARE COORDINATION
available to patient including: PCP  Specialist  Home health. The patient and spouse/partner agrees to contact the PCP office for questions related to their healthcare.       Medication reconciliation was not performed with patient and spouse/partner.  Patient's  is agreeable to a call from [facility] to set up a telephone appointment to review medications-CTN will route to [facility]. 1111F entered: no    Was patient discharged with a pulse oximeter? no    Non-face-to-face services provided:  Scheduled appointment with PCP-3/27/24  Scheduled appointment with Specialist-as noted below.  GI(Dr Raymond)3/28/24 and [facility](3/29/24)per patient's .  Obtained and reviewed discharge summary and/or continuity of care documents  Establishment or re-establishment of referrals-CTN to route to [facility]    Offered patient enrollment in the Remote Patient Monitoring (RPM) program for in-home monitoring: Yes, patient already enrolled and reactivated yesterday-green metrics Blood Pressure: 112/59, 63bpm Pulseox: 99%, 56bpm Survey: 0/14 Weight: 82.2lbs Note Created at: 03/19/2024 03:28 PM ET.  To obtain metrics and enter for today after conversation completed with CTN.  Noted in HRS Yarelis PORTILLO showing as clinician.    Care Transitions 24 Hour Call    Do you have a copy of your discharge instructions?: Yes  Do you have all of your prescriptions and are they filled?: Yes  Have you been contacted by a [facility] Pharmacist?: No  Have you scheduled your follow up appointment?: Yes  How are you going to get to your appointment?: Car - family or friend to transport  Post Acute Services: Home Health (Comment: She is receiving PT)  Patient Home Equipment: Nebulizer, Oxygen  Do you have support at home?: Partner/Spouse/SO  Do you feel like you have everything you need to keep you well at home?: Yes  Are you an active caregiver in your home?: No  Care Transitions Interventions    Pharmacist: Completed

## 2024-03-20 NOTE — TELEPHONE ENCOUNTER
Received a referral:  from Care Coordinator  to review patient’s medications. Called patient to schedule a time to speak with a pharmacist over the telephone.   Spoke to , See and advised them of the above message.  See verified understanding and scheduled their appointment: tomorrow at 1:30pm      Rupal Hassan CPhT.   Population Health Clinical   [facility] Clinical [facility]  Toll free: [phone]    For Pharmacy Admin Tracking Only    Program: [employer]  CPA in place:  No  Recommendation Provided To: Patient/Caregiver: 1 via Telephone  Intervention Detail: Scheduled Appointment  Intervention Accepted By: Patient/Caregiver: 1  Gap Closed?: Yes   Time Spent (min): 10

## 2024-03-21 ENCOUNTER — TELEPHONE (OUTPATIENT)
Dept: PHARMACY | Facility: CLINIC | Age: 89
End: 2024-03-21

## 2024-03-21 DIAGNOSIS — D62 ACUTE BLOOD LOSS ANEMIA: Primary | ICD-10-CM

## 2024-03-21 PROCEDURE — 1111F DSCHRG MED/CURRENT MED MERGE: CPT

## 2024-03-21 NOTE — TELEPHONE ENCOUNTER
[facility] CLINICAL PHARMACY REVIEW: Post-Discharge Transitions of Care (LATIA)  Subjective/Objective:  Barbara Jack is a 88 y.o. female. Patient was discharged from [facility] on 3/18/24 with a diagnosis of Anemia.    Patient outreach to review discharge medications and provide medication review and management.    Spoke with patient.    No Known Allergies    Discharge Medications (as per discharging medication list found in AVS):  There are NEW medications for you. START taking them after you leave the hospital:  Current Outpatient Medications   Medication Sig comments    ferrous sulfate (IRON 325) 325 (65 Fe) MG tablet Take 1 tablet by mouth daily (with breakfast) - Taking as prescribed  - general education provided. Discussed constipation and possible stool discoloration     You told us you were taking these medications at home, but the amount or how often you take this medication has CHANGED:  N/a    These are medications you told us you were taking at home, CONTINUE taking them after you leave the hospital:  Current Outpatient Medications   Medication Sig comments    ipratropium (ATROVENT) 0.02 % nebulizer solution Take 2.5 mLs by nebulization 2 times daily Never filled from pharmacy active order available - has not filled, not currently using, but order is active at pharmacy    metoclopramide (REGLAN) 5 MG tablet Take 1 tablet by mouth 4 times daily - Taking as prescribed    bumetanide (BUMEX) 1 MG tablet Take 1 tablet by mouth 2 times daily - Reports that she is taking, but also noted that she may be using Torsemide. Attempting to clarify with CHF clinic    Mometasone Furoate (NASONEX NA) by Nasal route     budesonide (PULMICORT) 0.25 MG/2ML nebulizer suspension Take 2 mLs by nebulization in the morning and 2 mLs in the evening. - Taking as prescribed    omeprazole (PRILOSEC) 40 MG delayed release capsule Take 1 capsule by mouth daily - Taking as prescribed    acetaminophen (TYLENOL) 500 MG tablet

## 2024-03-22 ENCOUNTER — HOSPITAL ENCOUNTER (OUTPATIENT)
Dept: OTHER | Age: 89
Setting detail: THERAPIES SERIES
Discharge: HOME | End: 2024-03-22
Payer: MEDICARE

## 2024-03-22 ENCOUNTER — CARE COORDINATION (OUTPATIENT)
Dept: CARE COORDINATION | Age: 89
End: 2024-03-22

## 2024-03-22 VITALS
RESPIRATION RATE: 20 BRPM | SYSTOLIC BLOOD PRESSURE: 125 MMHG | HEART RATE: 55 BPM | WEIGHT: 83 LBS | OXYGEN SATURATION: 95 % | BODY MASS INDEX: 16.76 KG/M2 | DIASTOLIC BLOOD PRESSURE: 60 MMHG

## 2024-03-22 LAB
ANION GAP SERPL CALCULATED.3IONS-SCNC: 11 MMOL/L (ref 7–16)
BNP SERPL-MCNC: ABNORMAL PG/ML (ref 0–450)
BUN SERPL-MCNC: 42 MG/DL (ref 6–23)
CALCIUM SERPL-MCNC: 9.2 MG/DL (ref 8.6–10.2)
CHLORIDE SERPL-SCNC: 93 MMOL/L (ref 98–107)
CO2 SERPL-SCNC: 31 MMOL/L (ref 22–29)
CREAT SERPL-MCNC: 1.2 MG/DL (ref 0.5–1)
GFR SERPL CREATININE-BSD FRML MDRD: 45 ML/MIN/1.73M2
GLUCOSE SERPL-MCNC: 116 MG/DL (ref 74–99)
POTASSIUM SERPL-SCNC: 5.1 MMOL/L (ref 3.5–5)
SODIUM SERPL-SCNC: 135 MMOL/L (ref 132–146)

## 2024-03-22 PROCEDURE — 80048 BASIC METABOLIC PNL TOTAL CA: CPT

## 2024-03-22 PROCEDURE — 99214 OFFICE O/P EST MOD 30 MIN: CPT

## 2024-03-22 PROCEDURE — 83880 ASSAY OF NATRIURETIC PEPTIDE: CPT

## 2024-03-22 RX ORDER — FLUTICASONE FUROATE, UMECLIDINIUM BROMIDE AND VILANTEROL TRIFENATATE 100; 62.5; 25 UG/1; UG/1; UG/1
1 POWDER RESPIRATORY (INHALATION) DAILY
COMMUNITY

## 2024-03-22 RX ORDER — POTASSIUM CHLORIDE 1.5 G/1.58G
10 POWDER, FOR SOLUTION ORAL DAILY
COMMUNITY

## 2024-03-22 RX ORDER — TORSEMIDE 10 MG/1
10 TABLET ORAL DAILY
COMMUNITY

## 2024-03-22 RX ORDER — LOSARTAN POTASSIUM 25 MG/1
25 TABLET ORAL DAILY
COMMUNITY

## 2024-03-22 RX ORDER — CHOLECALCIFEROL (VITAMIN D3) 1250 MCG
50000 CAPSULE ORAL DAILY
COMMUNITY

## 2024-03-22 NOTE — PLAN OF CARE
Problem: Chronic Conditions and Co-morbidities  Goal: Patient's chronic conditions and co-morbidity symptoms are monitored and maintained or improved  Flowsheets (Taken 3/22/2024 1028)  Care Plan - Patient's Chronic Conditions and Co-Morbidity Symptoms are Monitored and Maintained or Improved: Monitor and assess patient's chronic conditions and comorbid symptoms for stability, deterioration, or improvement

## 2024-03-22 NOTE — CARE COORDINATION
Ambulatory Care Coordination Note  3/22/2024    Patient Current Location:  Home: [facility] [address] [place]  [facility] [address]     ACM contacted the patient by telephone. Verified name and  with patient as identifiers. Provided introduction to self, and explanation of the ACM role.     Challenges to be reviewed by the provider   Additional needs identified to be addressed with provider: No  none               Method of communication with provider: phone.    ACM: Holly Buenrostro RN    Barbara is coming along per her  See. She went to CHF clinic today and did well. Her weight he mentioned was 80 pounds. She eats well and has never been a big eater he states. She is taking boost as a supplement. She is wearing her oxygen and does occasionally forgets but Cui does remind her. She has a walker and uses most of the time. She continues with home care nursing and therapy. RPM equipment is being used consistently for VSS and weight. She denies chest pain, dizziness, headache with occasional dyspnea.mostly with activity. Will continue to follow for RPM and care coordination.    COPD:  Denies s/s of COPD Exacerbation.  Discussed the Zone Tool and verbalizes understanding.     CHF:  Denies s/s CHF Exacerbation.  Discussed Zone Tool and verbalizes understanding.  Today's weight: 80 pounds    FOLLOW-UP PLAN:    Continue Care Coordination and Assess Plan Of Care  Review RPM Metrics and educate as appropriate  -CHF Management/Zone Tool  -COPD Management/Zone Tool  -Current Weight  -Falls/Near Falls?  -OV AVS Reinforcement  -Appointment Reminders    Next Anticipated Outreach by [facility] Care Team:  2 Weeks     Offered patient enrollment in the Remote Patient Monitoring (RPM) program for in-home monitoring: Yes, patient already enrolled.    Lab Results       None                 Goals Addressed                      This Visit's Progress      Conditions and Symptoms (pt-stated)   On track      I will schedule office visits, as

## 2024-03-22 NOTE — PROGRESS NOTES
Congestive Heart Failure Clinic   [facility]       Referring Provider: Dr Craft  Primary Care Physician: Julita Dyer MD   Cardiologist: Dr Craft  Nephrologist: N/A      HISTORY OF PRESENT ILLNESS:     Barbara Jack is a 88 y.o. (1935) female with a history of HFrEF, most recent EF:  Lab Results   Component Value Date    LVEF 23 02/07/2023    LVEFMODE Echo 12/26/2016         She presents to the CHF clinic for ongoing evaluation and monitoring of heart failure.    In the CHF clinic today she denies any adverse symptoms except:  [x] Dizziness or lightheadedness (with position change )  [] Syncope or near syncope  [] Recent Fall  [] Shortness of breath at rest   [x] Dyspnea with exertion  [x] Decline in functional capacity (unable to perform activities they had previously been able to do)  [x] Fatigue   [] Orthopnea  [] PND  [] Nocturnal cough  [] Hemoptysis  [] Chest pain, pressure, or discomfort  [] Palpitations  [] Abdominal distention  [] Abdominal bloating  [] Early satiety  [] Blood in stool   [] Diarrhea  [] Constipation ( off and on )  [] Nausea/Vomiting  [] Decreased urinary response to oral diuretic   [] Scrotal swelling   [] Lower extremity edema  [] Used PRN doses of oral diuretic   [] Weight gain    Wt Readings from Last 3 Encounters:   03/22/24 37.6 kg (83 lb)   03/18/24 39.9 kg (88 lb)   03/12/24 38.6 kg (85 lb)           SOCIAL HISTORY:  [x] Denies tobacco, alcohol or illicit drug abuse  [] Tobacco use:  [] ETOH use:  [] Illicit drug use:        MEDICATIONS:    No Known Allergies    Current Outpatient Medications:     losartan (COZAAR) 25 MG tablet, Take 1 tablet by mouth daily, Disp: , Rfl:     fluticasone-umeclidin-vilant (TRELEGY ELLIPTA) 100-62.5-25 MCG/ACT AEPB inhaler, Inhale 1 puff into the lungs daily, Disp: , Rfl:     potassium chloride (KLOR-CON) 20 MEQ packet, Take 10 mEq by mouth daily, Disp: , Rfl:     torsemide (DEMADEX) 10 MG tablet,

## 2024-03-25 NOTE — TELEPHONE ENCOUNTER
Noted that Bumex was cancelled on 3/22/24 by provider in CHF clinic. Will sign off.    ANGELI Artis, PharmD, BCACP  Ambulatory Care Clinical Pharmacist- [facility] Clinical Pharmacy  Department, toll free: [phone]

## 2024-03-26 ENCOUNTER — HOSPITAL ENCOUNTER (OUTPATIENT)
Dept: OTHER | Age: 89
Setting detail: THERAPIES SERIES
Discharge: HOME | End: 2024-03-26
Payer: MEDICARE

## 2024-03-26 ENCOUNTER — TELEPHONE (OUTPATIENT)
Dept: CARDIOLOGY CLINIC | Age: 89
End: 2024-03-26

## 2024-03-26 VITALS
WEIGHT: 82.9 LBS | BODY MASS INDEX: 16.74 KG/M2 | SYSTOLIC BLOOD PRESSURE: 109 MMHG | HEART RATE: 62 BPM | DIASTOLIC BLOOD PRESSURE: 55 MMHG

## 2024-03-26 DIAGNOSIS — R09.02 HYPOXIA: ICD-10-CM

## 2024-03-26 DIAGNOSIS — R91.1 PULMONARY NODULE: Primary | ICD-10-CM

## 2024-03-26 DIAGNOSIS — I50.43 CHF (CONGESTIVE HEART FAILURE), NYHA CLASS I, ACUTE ON CHRONIC, COMBINED (HCC): ICD-10-CM

## 2024-03-26 DIAGNOSIS — J96.10 CHRONIC RESPIRATORY FAILURE, UNSPECIFIED WHETHER WITH HYPOXIA OR HYPERCAPNIA (HCC): ICD-10-CM

## 2024-03-26 LAB
ANION GAP SERPL CALCULATED.3IONS-SCNC: 14 MMOL/L (ref 7–16)
BNP SERPL-MCNC: ABNORMAL PG/ML (ref 0–450)
BUN SERPL-MCNC: 51 MG/DL (ref 6–23)
CALCIUM SERPL-MCNC: 9.7 MG/DL (ref 8.6–10.2)
CHLORIDE SERPL-SCNC: 91 MMOL/L (ref 98–107)
CO2 SERPL-SCNC: 30 MMOL/L (ref 22–29)
CREAT SERPL-MCNC: 1.3 MG/DL (ref 0.5–1)
GFR SERPL CREATININE-BSD FRML MDRD: 41 ML/MIN/1.73M2
GLUCOSE SERPL-MCNC: 99 MG/DL (ref 74–99)
POTASSIUM SERPL-SCNC: 4.7 MMOL/L (ref 3.5–5)
SODIUM SERPL-SCNC: 135 MMOL/L (ref 132–146)

## 2024-03-26 PROCEDURE — 99214 OFFICE O/P EST MOD 30 MIN: CPT

## 2024-03-26 PROCEDURE — 80048 BASIC METABOLIC PNL TOTAL CA: CPT

## 2024-03-26 PROCEDURE — 99215 OFFICE O/P EST HI 40 MIN: CPT | Performed by: CLINICAL NURSE SPECIALIST

## 2024-03-26 PROCEDURE — 83880 ASSAY OF NATRIURETIC PEPTIDE: CPT

## 2024-03-26 NOTE — TELEPHONE ENCOUNTER
----- Message from DUKE Reeder - CNS sent at 3/26/2024 12:49 PM EDT -----  Labs reviewed.     Increase torsemide to 20 mg daily for three days, then return to 10 mg daily   Follow up next week as scheduled

## 2024-03-26 NOTE — PROGRESS NOTES
Congestive Heart Failure Clinic   [facility]       Reason for Visit: Heart Failure     Primary Cardiologist: Dr. Craft     History of Present Illness:      Mrs. Jack is an 88 year old lady who presents today in post hospital follow up, accompanied by her . She has a lengthy past medical history including coronary artery disease; ischemic cardiomyopathy; chronic HFrEF; chronic hypoxic respiratory failure; paroxysmal atrial fibrillation; and achalasia. She also has severe low flow, low gradient aortic stenosis and she and her  previously decided to pursue noninvasive therapy.   She presented to the emergency room on March 12 due to dizziness, weakness, and fatigue; she was reportedly hypoxic on her baseline 3 liters at home. On arrival to the ER, she was found to have hemoccult positive stool, BNP was 10,544,  H&H was 7.0 & 23.5, and Cr 1.4. She was transfused with PRBCs, Aspirin, and Xarelto were held. She was seen in consult by gastroenterology (Dr. Raymond) and she underwent EGD with Botox injection; no active source of bleeding was identified. She had recurrent transfusion on March 15 and was discharged home on Xarelto and with iron supplement on March 18.   Since returning home, she has had no further gross bleeding, although her  notes that her stools are dark due to her taking iron. She wears 3 liters of oxygen at all times, and she and her  both feel that she has had progressive exertional dyspnea and activity intolerance over the past few months. She denies  orthopnea, PND, nocturnal cough or hemoptysis. She denies abdominal distention or bloating or early satiety. She has had some loss of appetite over the past few months. She has unintentional weight loss (about fourteen pounds) over the past few months. She does not have lower extremity edema. She denies palpitations or syncope. She often becomes dizzy when changing positions, and knows

## 2024-03-26 NOTE — DISCHARGE INSTRUCTIONS
-We will call later today with results of your blood work and any medication changes     -Stop Aspirin     -Follow up with Dr. Craft as scheduled    -Weigh yourself daily    -Stay Hydrated, 64 oz     -Diet should sodium restricted to 2 grams    -Again watch your daily weight trends and if you gain water weight please follow below instructions.    -If you gain 3-5 pounds in 2-3 days OR notice that you are retaining fluid in anyway just like you did before then take an extra dose of your water pill (Demadex/Torsemide) every day until you lose the weight or feel better.     -If you notice that you have taken more than 2 extra doses in 1 week then please call and let us know.    -If at any time you feel that you are retaining fluid, your medications are not working, or you feel ill in anyway, then please call us for either same day appointment or the next day, and for instructions. Our goal is to keep you out of the emergency room and the hospital and we have ways to do it. You just need to call us in a timely manner.     -If you become sick for other reasons, and notice that you are not urinating as much, the urine is very dark, you have significant diarrhea or vomiting, then please DO NOT take your water pill and CALL US immediately.

## 2024-03-26 NOTE — RESULT ENCOUNTER NOTE
Labs reviewed.     Increase torsemide to 20 mg daily for three days, then return to 10 mg daily   Follow up next week as scheduled

## 2024-03-27 ENCOUNTER — OFFICE VISIT (OUTPATIENT)
Dept: PRIMARY CARE CLINIC | Age: 89
End: 2024-03-27
Payer: MEDICARE

## 2024-03-27 VITALS
BODY MASS INDEX: 15.72 KG/M2 | HEART RATE: 54 BPM | SYSTOLIC BLOOD PRESSURE: 98 MMHG | HEIGHT: 59 IN | WEIGHT: 78 LBS | DIASTOLIC BLOOD PRESSURE: 52 MMHG | TEMPERATURE: 98 F | OXYGEN SATURATION: 96 %

## 2024-03-27 DIAGNOSIS — R09.02 HYPOXIA: ICD-10-CM

## 2024-03-27 DIAGNOSIS — D62 ACUTE BLOOD LOSS ANEMIA: ICD-10-CM

## 2024-03-27 DIAGNOSIS — I10 ESSENTIAL HYPERTENSION: ICD-10-CM

## 2024-03-27 DIAGNOSIS — I50.31 ACUTE DIASTOLIC CONGESTIVE HEART FAILURE (HCC): Primary | ICD-10-CM

## 2024-03-27 PROCEDURE — 1111F DSCHRG MED/CURRENT MED MERGE: CPT | Performed by: INTERNAL MEDICINE

## 2024-03-27 PROCEDURE — 99214 OFFICE O/P EST MOD 30 MIN: CPT | Performed by: INTERNAL MEDICINE

## 2024-03-27 PROCEDURE — G8427 DOCREV CUR MEDS BY ELIG CLIN: HCPCS | Performed by: INTERNAL MEDICINE

## 2024-03-27 PROCEDURE — G8419 CALC BMI OUT NRM PARAM NOF/U: HCPCS | Performed by: INTERNAL MEDICINE

## 2024-03-27 PROCEDURE — 1090F PRES/ABSN URINE INCON ASSESS: CPT | Performed by: INTERNAL MEDICINE

## 2024-03-27 PROCEDURE — 1123F ACP DISCUSS/DSCN MKR DOCD: CPT | Performed by: INTERNAL MEDICINE

## 2024-03-27 PROCEDURE — G8484 FLU IMMUNIZE NO ADMIN: HCPCS | Performed by: INTERNAL MEDICINE

## 2024-03-27 PROCEDURE — 1036F TOBACCO NON-USER: CPT | Performed by: INTERNAL MEDICINE

## 2024-03-27 RX ORDER — METOPROLOL SUCCINATE 25 MG/1
25 TABLET, EXTENDED RELEASE ORAL DAILY
Qty: 90 TABLET | Refills: 0 | Status: SHIPPED | OUTPATIENT
Start: 2024-03-27

## 2024-03-27 RX ORDER — SIMVASTATIN 20 MG
20 TABLET ORAL NIGHTLY
Qty: 90 TABLET | Refills: 0 | Status: SHIPPED | OUTPATIENT
Start: 2024-03-27

## 2024-03-27 ASSESSMENT — ENCOUNTER SYMPTOMS
ABDOMINAL DISTENTION: 0
ABDOMINAL PAIN: 0
SORE THROAT: 0
ALLERGIC/IMMUNOLOGIC NEGATIVE: 1
SINUS PRESSURE: 0
TROUBLE SWALLOWING: 0
CONSTIPATION: 0
BLOOD IN STOOL: 0
COLOR CHANGE: 0
NAUSEA: 0
RHINORRHEA: 0
VOMITING: 0
BACK PAIN: 0
DIARRHEA: 0

## 2024-03-27 NOTE — PROGRESS NOTES
Bowel sounds are normal. There is no distension.      Palpations: Abdomen is soft. There is no mass.      Tenderness: There is no abdominal tenderness. There is no guarding or rebound.   Musculoskeletal:         General: No swelling, tenderness or deformity.      Cervical back: Neck supple. No tenderness.   Lymphadenopathy:      Cervical: No cervical adenopathy.   Skin:     General: Skin is warm.      Coloration: Skin is not pale.      Findings: No rash.   Neurological:      General: No focal deficit present.      Mental Status: She is alert and oriented to person, place, and time.          Assessment:  Barbara was seen today for follow-up from hospital.    Diagnoses and all orders for this visit:    Acute on chronic /diastolic/ congestive heart failure (HCC)  Comments:  Follows up at CHF clinic, clinically improved. On double dose of diuretic for 3 days, monitor bp at home.    Essential hypertension  Comments:  On the low side, probably due to recent drop in blood count and increased dose of diuretic.  Hold Losartan for now.    Hypoxia  Comments:  Impproving, being able to be off O2 for short periods, cont 3 lts ant night and prn    Acute blood loss anemia  Comments:  Source not clear, not actively bleeding at this time.  Is to watch for black or bloody stools. Monitor bp.    Other orders  -     metoprolol succinate (TOPROL XL) 25 MG extended release tablet; Take 1 tablet by mouth daily  -     simvastatin (ZOCOR) 20 MG tablet; Take 1 tablet by mouth nightly

## 2024-03-29 ENCOUNTER — CARE COORDINATION (OUTPATIENT)
Dept: CASE MANAGEMENT | Age: 89
End: 2024-03-29

## 2024-03-29 NOTE — CARE COORDINATION
Remote Alert Monitoring Note  Rpm alert to be reviewed by the provider   red alert   weight (Gained 6# over night)   Additional needs to be addressed by PCP: No                    Date/Time:  3/29/2024 1:40 PM  Patient Current Location: [facility] contacted patient by telephone. Verified patients name and  as identifiers.  Background: COPD, CHF  Refer to 911 immediately if:  Patient unresponsive or unable to provide history  Change in cognition or sudden confusion  Patient unable to respond in complete sentences  Intense chest pain/tightness  Any concern for any clinical emergency  Red Alert: Provider response time of 1 hr required for any red alert requiring intervention  Yellow Alert: Provider response time of 3hr required for any escalated yellow alert    Weight Scale Triage  Was your weight obtained upon rising/waking today? Yes   Was your weight obtained after voiding and/or use of the bathroom today? yes   Did you weigh yourself in the same amount of clothing today, compared to how you typically do? yes   Was the scale bumped or moved prior to today's weight? no   Is your scale on a flat/hard surface? yes   Did you obtain your weight with shoes on? no   If yes, is this something you normally do during your daily weights? no   Were you standing up straight on the scale today? Unknown   Were you leaning on anything while obtaining your weight today? yes      Clinical Interventions: Reviewed and followed up on alerts and treatments-Patient has weight gain of 6# over night.  Called and spoke with , See about weight gain. He denies CP, SOB, Uses oxygen 2/L  continuously, No swelling, No increased NA intake.   says patient leans on him and the window sill when she gets weighed to keep her balanced.   said weight yesterday was 79.8# not 76#.  Patient has taken Morning medications including Torsemide 10 mg daily. Will not escalate as patient is asymptomatic and scale was wrong yesterday.

## 2024-03-30 ENCOUNTER — HOSPITAL ENCOUNTER (EMERGENCY)
Age: 89
Discharge: HOME | End: 2024-03-30
Attending: STUDENT IN AN ORGANIZED HEALTH CARE EDUCATION/TRAINING PROGRAM
Payer: MEDICARE

## 2024-03-30 ENCOUNTER — APPOINTMENT (OUTPATIENT)
Dept: GENERAL RADIOLOGY | Age: 89
End: 2024-03-30
Payer: MEDICARE

## 2024-03-30 ENCOUNTER — APPOINTMENT (OUTPATIENT)
Dept: CT IMAGING | Age: 89
End: 2024-03-30
Payer: MEDICARE

## 2024-03-30 VITALS
RESPIRATION RATE: 16 BRPM | HEART RATE: 71 BPM | TEMPERATURE: 97.7 F | OXYGEN SATURATION: 98 % | SYSTOLIC BLOOD PRESSURE: 111 MMHG | DIASTOLIC BLOOD PRESSURE: 54 MMHG
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
## 2024-03-30 DIAGNOSIS — S09.90XA INJURY OF HEAD, INITIAL ENCOUNTER: Primary | ICD-10-CM

## 2024-03-30 PROCEDURE — 6370000000 HC RX 637 (ALT 250 FOR IP): Performed by: STUDENT IN AN ORGANIZED HEALTH CARE EDUCATION/TRAINING PROGRAM

## 2024-03-30 PROCEDURE — 99284 EMERGENCY DEPT VISIT MOD MDM: CPT

## 2024-03-30 PROCEDURE — 73080 X-RAY EXAM OF ELBOW: CPT

## 2024-03-30 PROCEDURE — 72125 CT NECK SPINE W/O DYE: CPT

## 2024-03-30 PROCEDURE — 70450 CT HEAD/BRAIN W/O DYE: CPT

## 2024-03-30 RX ORDER — ACETAMINOPHEN 500 MG
1000 TABLET ORAL ONCE
Status: COMPLETED | OUTPATIENT
Start: 2024-03-30 | End: 2024-03-30

## 2024-03-30 RX ADMIN — ACETAMINOPHEN 1000 MG: 500 TABLET ORAL at 16:57

## 2024-03-30 ASSESSMENT — PAIN DESCRIPTION - FREQUENCY: FREQUENCY: CONTINUOUS

## 2024-03-30 ASSESSMENT — ENCOUNTER SYMPTOMS
BACK PAIN: 0
ABDOMINAL PAIN: 0
COUGH: 0
COLOR CHANGE: 0
NAUSEA: 0
DIARRHEA: 0
VOMITING: 0
SHORTNESS OF BREATH: 0

## 2024-03-30 ASSESSMENT — PAIN DESCRIPTION - LOCATION
LOCATION: HEAD
LOCATION: HEAD

## 2024-03-30 ASSESSMENT — PAIN DESCRIPTION - PAIN TYPE: TYPE: ACUTE PAIN

## 2024-03-30 ASSESSMENT — LIFESTYLE VARIABLES
HOW OFTEN DO YOU HAVE A DRINK CONTAINING ALCOHOL: NEVER
HOW MANY STANDARD DRINKS CONTAINING ALCOHOL DO YOU HAVE ON A TYPICAL DAY: PATIENT DOES NOT DRINK

## 2024-03-30 ASSESSMENT — PAIN DESCRIPTION - DESCRIPTORS
DESCRIPTORS: ACHING;SORE;TENDER
DESCRIPTORS: ACHING

## 2024-03-30 ASSESSMENT — PAIN - FUNCTIONAL ASSESSMENT
PAIN_FUNCTIONAL_ASSESSMENT: 0-10
PAIN_FUNCTIONAL_ASSESSMENT: PREVENTS OR INTERFERES WITH MANY ACTIVE NOT PASSIVE ACTIVITIES
PAIN_FUNCTIONAL_ASSESSMENT: 0-10

## 2024-03-30 ASSESSMENT — PAIN SCALES - GENERAL
PAINLEVEL_OUTOF10: 4
PAINLEVEL_OUTOF10: 10

## 2024-03-30 ASSESSMENT — PAIN DESCRIPTION - ONSET: ONSET: ON-GOING

## 2024-03-30 ASSESSMENT — PAIN DESCRIPTION - ORIENTATION
ORIENTATION: RIGHT
ORIENTATION: ANTERIOR

## 2024-03-30 NOTE — ED PROVIDER NOTES
HPI   88-year-old female patient presented to emergency department status post fall.  She had a mechanical trip and fall earlier today.  She is on Xarelto.  She did not hit her head on the ground.  There was no loss of consciousness.  She was able to get up with assistance from her .  She is complaining of mild headache.  No neck or back pain.  She does have some right elbow soreness.  Review of Systems   Constitutional:  Negative for chills and fever.   HENT:  Negative for congestion.    Respiratory:  Negative for cough and shortness of breath.    Cardiovascular:  Negative for chest pain.   Gastrointestinal:  Negative for abdominal pain, diarrhea, nausea and vomiting.   Genitourinary:  Negative for difficulty urinating, dysuria and hematuria.   Musculoskeletal:  Negative for back pain.   Skin:  Negative for color change.   All other systems reviewed and are negative.       Physical Exam  Vitals and nursing note reviewed.   Constitutional:       Appearance: Normal appearance.      Comments: Patient is hard of hearing however awake alert and oriented.  Moving all extremities.   HENT:      Head:      Comments: Right frontal forehead, mild area of swelling and ecchymosis.     Nose: Nose normal. No congestion.      Mouth/Throat:      Mouth: Mucous membranes are moist.      Pharynx: Oropharynx is clear.   Eyes:      Conjunctiva/sclera: Conjunctivae normal.      Pupils: Pupils are equal, round, and reactive to light.   Cardiovascular:      Rate and Rhythm: Normal rate and regular rhythm.      Pulses: Normal pulses.      Heart sounds: Normal heart sounds.   Pulmonary:      Effort: Pulmonary effort is normal. No respiratory distress.      Breath sounds: Normal breath sounds.   Abdominal:      General: There is no distension.      Tenderness: There is no abdominal tenderness.   Musculoskeletal:         General: Normal range of motion.      Cervical back: Normal range of motion and neck supple.      Comments: Right  includes Hysterectomy; Appendectomy; Tonsillectomy; Breast surgery (1962); Cosmetic surgery; Breast Capsulectomy (03/07/2012); Diagnostic Cardiac Cath Lab Procedure (10/30/2009); cardiovascular stress test (03/04/2010); transthoracic echocardiogram (3/30/11ize and function, ); Cardiac surgery (2009); Cardiac pacemaker placement (05/10/2010); Cardiac defibrillator placement; Colonoscopy (05/26/2021); other surgical history (Right, 12/17/2015); Cardiac defibrillator placement (05/2010); other surgical history (Right, 04/10/2016); Cardiac defibrillator placement (Left, 02/13/2018); esophageal motility study (N/A, 01/02/2020); Upper gastrointestinal endoscopy (N/A, 02/13/2020); IR US THYROID BIOPSY; Upper gastrointestinal endoscopy (N/A, 03/25/2021); Upper gastrointestinal endoscopy (10/16/2019); Upper gastrointestinal endoscopy (N/A, 5/18/2023); Upper gastrointestinal endoscopy (N/A, 8/17/2023); and Upper gastrointestinal endoscopy (N/A, 3/14/2024).    Social History:  reports that she has never smoked. She has never been exposed to tobacco smoke. She has never used smokeless tobacco. She reports current alcohol use. She reports that she does not use drugs.    Family History: family history includes Bipolar Disorder in her son and son; Heart Disease in her brother.     The patient’s home medications have been reviewed.    Allergies: Patient has no known allergies.    -------------------------------------------------- RESULTS -------------------------------------------------  Labs:  No results found for this visit on 03/30/24.    Radiology:  CT HEAD WO CONTRAST   Final Result   No acute intracranial abnormality.         CT CERVICAL SPINE WO CONTRAST   Final Result   No acute abnormality of the cervical spine.         XR ELBOW RIGHT (MIN 3 VIEWS)   Final Result   No acute fracture is identified.             ------------------------- NURSING NOTES AND VITALS REVIEWED ---------------------------  Date / Time Roomed:

## 2024-03-30 NOTE — ED NOTES
Patient ambulated in room taking a couple steps without issue. She uses a walker at home and held onto my hand during ambulation. No complaints

## 2024-04-01 RX ORDER — DIAPER,BRIEF,ADULT, DISPOSABLE
200 EACH MISCELLANEOUS NIGHTLY
Qty: 90 CAPSULE | Refills: 0 | Status: SHIPPED
Start: 2024-04-01 | End: 2024-04-05

## 2024-04-01 NOTE — TELEPHONE ENCOUNTER
Patients requesting a refill of her     coenzyme Q10 ([employer] COQ-10) 200 MG CAPS capsule.   Please send to [facility] on [address]. Thank you.

## 2024-04-04 ENCOUNTER — HOSPITAL ENCOUNTER (OUTPATIENT)
Dept: OTHER | Age: 89
Setting detail: THERAPIES SERIES
Discharge: HOME | End: 2024-04-04
Payer: MEDICARE

## 2024-04-04 ENCOUNTER — CARE COORDINATION (OUTPATIENT)
Dept: CARE COORDINATION | Age: 89
End: 2024-04-04

## 2024-04-04 VITALS
DIASTOLIC BLOOD PRESSURE: 59 MMHG | HEART RATE: 55 BPM | RESPIRATION RATE: 18 BRPM | BODY MASS INDEX: 17.37 KG/M2 | OXYGEN SATURATION: 100 % | WEIGHT: 86 LBS | SYSTOLIC BLOOD PRESSURE: 133 MMHG

## 2024-04-04 LAB
ANION GAP SERPL CALCULATED.3IONS-SCNC: 7 MMOL/L (ref 7–16)
BNP SERPL-MCNC: 9830 PG/ML (ref 0–450)
BUN SERPL-MCNC: 35 MG/DL (ref 6–23)
CALCIUM SERPL-MCNC: 10 MG/DL (ref 8.6–10.2)
CHLORIDE SERPL-SCNC: 92 MMOL/L (ref 98–107)
CO2 SERPL-SCNC: 38 MMOL/L (ref 22–29)
CREAT SERPL-MCNC: 1.2 MG/DL (ref 0.5–1)
GFR SERPL CREATININE-BSD FRML MDRD: 44 ML/MIN/1.73M2
GLUCOSE SERPL-MCNC: 80 MG/DL (ref 74–99)
POTASSIUM SERPL-SCNC: 4.6 MMOL/L (ref 3.5–5)
SODIUM SERPL-SCNC: 137 MMOL/L (ref 132–146)

## 2024-04-04 PROCEDURE — 83880 ASSAY OF NATRIURETIC PEPTIDE: CPT

## 2024-04-04 PROCEDURE — 2580000003 HC RX 258: Performed by: INTERNAL MEDICINE

## 2024-04-04 PROCEDURE — 6360000002 HC RX W HCPCS: Performed by: INTERNAL MEDICINE

## 2024-04-04 PROCEDURE — 80048 BASIC METABOLIC PNL TOTAL CA: CPT

## 2024-04-04 PROCEDURE — 99214 OFFICE O/P EST MOD 30 MIN: CPT

## 2024-04-04 RX ORDER — FUROSEMIDE 10 MG/ML
40 INJECTION INTRAMUSCULAR; INTRAVENOUS ONCE
Status: COMPLETED | OUTPATIENT
Start: 2024-04-04 | End: 2024-04-04

## 2024-04-04 RX ORDER — SODIUM CHLORIDE 0.9 % (FLUSH) 0.9 %
10 SYRINGE (ML) INJECTION PRN
Status: DISCONTINUED | OUTPATIENT
Start: 2024-04-04 | End: 2024-04-05 | Stop reason: HOSPADM

## 2024-04-04 RX ADMIN — SODIUM CHLORIDE, PRESERVATIVE FREE 10 ML: 5 INJECTION INTRAVENOUS at 10:50

## 2024-04-04 RX ADMIN — FUROSEMIDE 40 MG: 10 INJECTION, SOLUTION INTRAMUSCULAR; INTRAVENOUS at 10:49

## 2024-04-04 NOTE — PROGRESS NOTES
Congestive Heart Failure Clinic   [facility]       Referring Provider: Dr Craft  Primary Care Physician: Julita Dyer MD   Cardiologist: Dr Craft  Nephrologist: N/A      HISTORY OF PRESENT ILLNESS:     Barbara Jack is a 88 y.o. (1935) female with a history of HFrEF, most recent EF:  Lab Results   Component Value Date    LVEF 23 02/07/2023    LVEFMODE Echo 12/26/2016         She presents to the CHF clinic for ongoing evaluation and monitoring of heart failure.    In the CHF clinic today she denies any adverse symptoms except:  [x] Dizziness or lightheadedness (with position change )  [] Syncope or near syncope  [] Recent Fall  [] Shortness of breath at rest   [x] Dyspnea with exertion  [x] Decline in functional capacity (unable to perform activities they had previously been able to do)  [x] Fatigue   [] Orthopnea  [] PND  [] Nocturnal cough  [] Hemoptysis  [] Chest pain, pressure, or discomfort  [] Palpitations  [] Abdominal distention  [] Abdominal bloating  [] Early satiety  [] Blood in stool   [] Diarrhea  [] Constipation ( off and on )  [] Nausea/Vomiting  [] Decreased urinary response to oral diuretic   [] Scrotal swelling   [] Lower extremity edema  [] Used PRN doses of oral diuretic   [x] Weight gain    Wt Readings from Last 3 Encounters:   04/04/24 39 kg (86 lb)   03/27/24 35.4 kg (78 lb)   03/26/24 37.6 kg (82 lb 14.4 oz)           SOCIAL HISTORY:  [x] Denies tobacco, alcohol or illicit drug abuse  [] Tobacco use:  [] ETOH use:  [] Illicit drug use:        MEDICATIONS:    No Known Allergies    Current Outpatient Medications:     coenzyme Q10 ([employer] COQ-10) 200 MG CAPS capsule, Take 1 capsule by mouth nightly, Disp: 90 capsule, Rfl: 0    metoprolol succinate (TOPROL XL) 25 MG extended release tablet, Take 1 tablet by mouth daily, Disp: 90 tablet, Rfl: 0    simvastatin (ZOCOR) 20 MG tablet, Take 1 tablet by mouth nightly, Disp: 90 tablet, Rfl: 0    potassium

## 2024-04-04 NOTE — CARE COORDINATION
patient-reported symptoms        ,   Congestive Heart Failure Assessment    Do you understand a low sodium diet?: Yes  Do you understand how to read food labels?: Yes  Do you salt your food before tasting it?: No         Symptoms:     Symptom course: stable  Weight trend: stable  Salt intake watch compared to last visit: stable     , and   COPD Assessment    Does the patient understand envrionmental exposure?: Yes  Is the patient able to verbalize Rescue vs. Long Acting medications?: Yes  Does the patient have a nebulizer?: Yes  Does the patient use a space with inhaled medications?: No            Symptoms:     Have you had a recent diagnosis of pneumonia either by PCP or at a hospital?: No

## 2024-04-05 ENCOUNTER — CARE COORDINATION (OUTPATIENT)
Dept: CASE MANAGEMENT | Age: 89
End: 2024-04-05

## 2024-04-05 RX ORDER — UBIDECARENONE 75 MG
CAPSULE ORAL
Qty: 90 CAPSULE | Refills: 0 | Status: SHIPPED | OUTPATIENT
Start: 2024-04-05

## 2024-04-05 RX ORDER — OMEPRAZOLE 40 MG/1
CAPSULE, DELAYED RELEASE ORAL DAILY
Qty: 90 CAPSULE | Refills: 1 | Status: SHIPPED | OUTPATIENT
Start: 2024-04-05

## 2024-04-05 RX ORDER — AMIODARONE HYDROCHLORIDE 200 MG/1
200 TABLET ORAL NIGHTLY
Qty: 90 TABLET | Refills: 0 | Status: SHIPPED | OUTPATIENT
Start: 2024-04-05

## 2024-04-05 RX ORDER — METOPROLOL SUCCINATE 25 MG/1
25 TABLET, EXTENDED RELEASE ORAL DAILY
Qty: 90 TABLET | Refills: 0 | Status: SHIPPED | OUTPATIENT
Start: 2024-04-05

## 2024-04-05 RX ORDER — BUMETANIDE 1 MG/1
1 TABLET ORAL 2 TIMES DAILY
Qty: 180 TABLET | Refills: 1 | Status: SHIPPED | OUTPATIENT
Start: 2024-04-05

## 2024-04-05 RX ORDER — AMINO AC/WHEY PROT CONC, ISOL 26G-150/39
POWDER (GRAM) ORAL DAILY
Qty: 90 CAPSULE | Refills: 0 | Status: SHIPPED | OUTPATIENT
Start: 2024-04-05

## 2024-04-05 NOTE — CARE COORDINATION
Remote Alert Monitoring Note  Rpm alert to be reviewed by the provider   red alert   weight (Gained 3# over night)   Additional needs to be addressed by N/A: No                    Date/Time:  2024 10:53 AM  Patient Current Location: [facility] contacted family by telephone. Verified patients name and  as identifiers.  Background: CHF, COPD  Refer to 911 immediately if:  Patient unresponsive or unable to provide history  Change in cognition or sudden confusion  Patient unable to respond in complete sentences  Intense chest pain/tightness  Any concern for any clinical emergency  Red Alert: Provider response time of 1 hr required for any red alert requiring intervention  Yellow Alert: Provider response time of 3hr required for any escalated yellow alert    Weight Scale Triage  Was your weight obtained upon rising/waking today? Yes   Was your weight obtained after voiding and/or use of the bathroom today? yes   Did you weigh yourself in the same amount of clothing today, compared to how you typically do? yes   Was the scale bumped or moved prior to today's weight? no   Is your scale on a flat/hard surface? yes   Did you obtain your weight with shoes on? no   If yes, is this something you normally do during your daily weights? no   Were you standing up straight on the scale today? yes   Were you leaning on anything while obtaining your weight today? no      Clinical Interventions: Reviewed and followed up on alerts and treatments-Patient has 3# weight gain over night.  Called and spoke with  See.  He denies CP, SOB, patient uses oxygen 2/L continuously, No swelling, no dizziness.   says Patient takes Torsemide 10 mg Every Other Day. Did not take today.  states, \"Your scale was wrong yesterday. She weighs on my scale at 86.6 # yesterday.  Patient was at CHF Clinic yesterday and everything checked out\".  Will F/U Monday      Advised Patient to contact PCP  regarding CP, SOB, Dizziness and

## 2024-04-12 ENCOUNTER — TELEPHONE (OUTPATIENT)
Dept: PRIMARY CARE CLINIC | Age: 89
End: 2024-04-12

## 2024-04-12 NOTE — TELEPHONE ENCOUNTER
Receive a call from Yaneth at [facility] wanting to know if there is anything Dr. Dyer can send into [facility] on [address] for sinus congestion for Brittany. Please give her a call back at [phone]. Thank you.

## 2024-04-15 NOTE — TELEPHONE ENCOUNTER
Left detailed message for Yaneth from [facility] that Dr Dyer believed that patient's  buys OTC nasal spray for this. Told Yaneth if she had any other questions or concerns to phone me at the office.

## 2024-04-16 ENCOUNTER — HOSPITAL ENCOUNTER (OUTPATIENT)
Dept: OTHER | Age: 89
Setting detail: THERAPIES SERIES
Discharge: HOME | End: 2024-04-16
Payer: MEDICARE

## 2024-04-16 ENCOUNTER — HOSPITAL ENCOUNTER (INPATIENT)
Age: 89
LOS: 1 days | Discharge: HOME HEALTH | End: 2024-04-18
Attending: EMERGENCY MEDICINE | Admitting: INTERNAL MEDICINE
Payer: MEDICARE

## 2024-04-16 ENCOUNTER — APPOINTMENT (OUTPATIENT)
Dept: GENERAL RADIOLOGY | Age: 89
End: 2024-04-16
Payer: MEDICARE

## 2024-04-16 DIAGNOSIS — I50.9 ACUTE DECOMPENSATED HEART FAILURE (HCC): ICD-10-CM

## 2024-04-16 DIAGNOSIS — I50.9 ACUTE ON CHRONIC CONGESTIVE HEART FAILURE, UNSPECIFIED HEART FAILURE TYPE (HCC): Primary | ICD-10-CM

## 2024-04-16 LAB
ANION GAP SERPL CALCULATED.3IONS-SCNC: 9 MMOL/L (ref 7–16)
BASOPHILS # BLD: 0 K/UL (ref 0–0.2)
BASOPHILS NFR BLD: 0 % (ref 0–2)
BNP SERPL-MCNC: ABNORMAL PG/ML (ref 0–450)
BUN SERPL-MCNC: 25 MG/DL (ref 6–23)
CALCIUM SERPL-MCNC: 9.5 MG/DL (ref 8.6–10.2)
CHLORIDE SERPL-SCNC: 92 MMOL/L (ref 98–107)
CO2 SERPL-SCNC: 32 MMOL/L (ref 22–29)
CREAT SERPL-MCNC: 1 MG/DL (ref 0.5–1)
EOSINOPHIL # BLD: 0 K/UL (ref 0.05–0.5)
EOSINOPHILS RELATIVE PERCENT: 0 % (ref 0–6)
ERYTHROCYTE [DISTWIDTH] IN BLOOD BY AUTOMATED COUNT: 18.6 % (ref 11.5–15)
FLUAV RNA RESP QL NAA+PROBE: NOT DETECTED
FLUBV RNA RESP QL NAA+PROBE: NOT DETECTED
GFR SERPL CREATININE-BSD FRML MDRD: 54 ML/MIN/1.73M2
GLUCOSE BLD-MCNC: 230 MG/DL (ref 74–99)
GLUCOSE SERPL-MCNC: 79 MG/DL (ref 74–99)
HCT VFR BLD AUTO: 30.7 % (ref 34–48)
HGB BLD-MCNC: 9.3 G/DL (ref 11.5–15.5)
LYMPHOCYTES NFR BLD: 0.39 K/UL (ref 1.5–4)
LYMPHOCYTES RELATIVE PERCENT: 5 % (ref 20–42)
MCH RBC QN AUTO: 30 PG (ref 26–35)
MCHC RBC AUTO-ENTMCNC: 30.3 G/DL (ref 32–34.5)
MCV RBC AUTO: 99 FL (ref 80–99.9)
MONOCYTES NFR BLD: 1.04 K/UL (ref 0.1–0.95)
MONOCYTES NFR BLD: 14 % (ref 2–12)
NEUTROPHILS NFR BLD: 81 % (ref 43–80)
NEUTS SEG NFR BLD: 6.07 K/UL (ref 1.8–7.3)
PLATELET # BLD AUTO: 274 K/UL (ref 130–450)
PMV BLD AUTO: 9.7 FL (ref 7–12)
POTASSIUM SERPL-SCNC: 5.3 MMOL/L (ref 3.5–5)
RBC # BLD AUTO: 3.1 M/UL (ref 3.5–5.5)
RBC # BLD: ABNORMAL 10*6/UL
SARS-COV-2 RNA RESP QL NAA+PROBE: NOT DETECTED
SODIUM SERPL-SCNC: 133 MMOL/L (ref 132–146)
SOURCE: NORMAL
SPECIMEN DESCRIPTION: NORMAL
TROPONIN I SERPL HS-MCNC: 34 NG/L (ref 0–9)
TROPONIN I SERPL HS-MCNC: 39 NG/L (ref 0–9)
WBC OTHER # BLD: 7.5 K/UL (ref 4.5–11.5)

## 2024-04-16 PROCEDURE — 82962 GLUCOSE BLOOD TEST: CPT

## 2024-04-16 PROCEDURE — 84484 ASSAY OF TROPONIN QUANT: CPT

## 2024-04-16 PROCEDURE — 6360000002 HC RX W HCPCS

## 2024-04-16 PROCEDURE — 93005 ELECTROCARDIOGRAM TRACING: CPT

## 2024-04-16 PROCEDURE — 83036 HEMOGLOBIN GLYCOSYLATED A1C: CPT

## 2024-04-16 PROCEDURE — 94664 DEMO&/EVAL PT USE INHALER: CPT

## 2024-04-16 PROCEDURE — 99233 SBSQ HOSP IP/OBS HIGH 50: CPT | Performed by: INTERNAL MEDICINE

## 2024-04-16 PROCEDURE — 83880 ASSAY OF NATRIURETIC PEPTIDE: CPT

## 2024-04-16 PROCEDURE — 87636 SARSCOV2 & INF A&B AMP PRB: CPT

## 2024-04-16 PROCEDURE — 71045 X-RAY EXAM CHEST 1 VIEW: CPT

## 2024-04-16 PROCEDURE — 85025 COMPLETE CBC W/AUTO DIFF WBC: CPT

## 2024-04-16 PROCEDURE — 6370000000 HC RX 637 (ALT 250 FOR IP): Performed by: INTERNAL MEDICINE

## 2024-04-16 PROCEDURE — 99285 EMERGENCY DEPT VISIT HI MDM: CPT

## 2024-04-16 PROCEDURE — 80048 BASIC METABOLIC PNL TOTAL CA: CPT

## 2024-04-16 PROCEDURE — 96374 THER/PROPH/DIAG INJ IV PUSH: CPT

## 2024-04-16 RX ORDER — BUMETANIDE 0.25 MG/ML
1 INJECTION INTRAMUSCULAR; INTRAVENOUS ONCE
Status: COMPLETED | OUTPATIENT
Start: 2024-04-16 | End: 2024-04-16

## 2024-04-16 RX ORDER — IPRATROPIUM BROMIDE AND ALBUTEROL SULFATE 2.5; .5 MG/3ML; MG/3ML
1 SOLUTION RESPIRATORY (INHALATION)
Status: DISCONTINUED | OUTPATIENT
Start: 2024-04-16 | End: 2024-04-18 | Stop reason: HOSPADM

## 2024-04-16 RX ORDER — GLUCAGON 1 MG/ML
1 KIT INJECTION PRN
Status: DISCONTINUED | OUTPATIENT
Start: 2024-04-16 | End: 2024-04-18 | Stop reason: HOSPADM

## 2024-04-16 RX ORDER — LEVALBUTEROL INHALATION SOLUTION 0.63 MG/3ML
0.63 SOLUTION RESPIRATORY (INHALATION) EVERY 8 HOURS PRN
Status: DISCONTINUED | OUTPATIENT
Start: 2024-04-16 | End: 2024-04-18 | Stop reason: HOSPADM

## 2024-04-16 RX ORDER — DEXTROSE MONOHYDRATE 100 MG/ML
INJECTION, SOLUTION INTRAVENOUS CONTINUOUS PRN
Status: DISCONTINUED | OUTPATIENT
Start: 2024-04-16 | End: 2024-04-18 | Stop reason: HOSPADM

## 2024-04-16 RX ORDER — INSULIN LISPRO 100 [IU]/ML
0-4 INJECTION, SOLUTION INTRAVENOUS; SUBCUTANEOUS
Status: DISCONTINUED | OUTPATIENT
Start: 2024-04-16 | End: 2024-04-18 | Stop reason: HOSPADM

## 2024-04-16 RX ORDER — INSULIN LISPRO 100 [IU]/ML
0-4 INJECTION, SOLUTION INTRAVENOUS; SUBCUTANEOUS NIGHTLY
Status: DISCONTINUED | OUTPATIENT
Start: 2024-04-16 | End: 2024-04-18 | Stop reason: HOSPADM

## 2024-04-16 RX ADMIN — IPRATROPIUM BROMIDE AND ALBUTEROL SULFATE 1 DOSE: .5; 2.5 SOLUTION RESPIRATORY (INHALATION) at 21:36

## 2024-04-16 RX ADMIN — BUMETANIDE 1 MG: 0.25 INJECTION INTRAMUSCULAR; INTRAVENOUS at 21:14

## 2024-04-16 NOTE — ED PROVIDER NOTES
[facility] EMERGENCY DEPARTMENT  EMERGENCY DEPARTMENT ENCOUNTER        Pt Name: Barbara Jack  MRN: [id number]  Birthdate 1935  Date of evaluation: 4/16/2024  Provider: Daria Stewart MD  PCP: Julita Dyer MD  Note Started: 12:12 PM EDT 4/16/24    CHIEF COMPLAINT       Chief Complaint   Patient presents with    Chest Pain     Pt sent down from CHF clinic with chest pain. Sharp mid sternal pain radiating into back.        HISTORY OF PRESENT ILLNESS: 1 or more Elements        Limitations to history : None    Barbara Jack is a 88 y.o. female with a past medical history of congestive heart failure, atrial fibrillation on Xarelto, s/p ICD, COPD on oxygen  who presents to the emergency department for evaluation of chest pain. Patient was seen in the CHF clinic to be complaining of chest pain of 1 day duration.  Chest pain is midline with no radiation to the shoulder.  Intermittent.  No specific relieving or exacerbating factors.  Patient is denying any shortness of breath but does report that nursing is recently increased her oxygen from 3 L/min to 5 L/min about 1 week ago. Denying any palpitations. CHF clinic recommended that the patient come in to  ED for further evaluation.     Nursing Notes were all reviewed and agreed with or any disagreements were addressed in the HPI.      REVIEW OF EXTERNAL NOTE :             Chart Review/External Note Review    Last Echo reviewed by Me:  Lab Results   Component Value Date    LVEF 23 02/07/2023    LVEFMODE Echo 12/26/2016             Controlled Substance Monitoring:    Acute and Chronic Pain Monitoring:   RX Monitoring Attestation Periodic Controlled Substance Monitoring   1/6/2016   3:29 PM The Prescription Monitoring Report for this patient was reviewed today. No signs of potential drug abuse or diversion identified.           REVIEW OF SYSTEMS :      Positives and Pertinent negatives as per HPI.  anticoagulation, Chronic bronchitis ([facility]), COPD (chronic obstructive pulmonary disease) ([facility]), COVID (08/2022), Depression, GERD (gastroesophageal reflux disease), HFrEF (heart failure with reduced ejection fraction) ([facility]) (12/29/2016), [place] (hard of hearing), Hyperlipidemia, Hypertension, ICD (implantable cardioverter-defibrillator) in place, Left ventricular dysfunction, Low vitamin D level, MI (myocardial infarction) ([facility]) (10/30/2009), Osteopenia, Osteoporosis, and Swallowing difficulty.     EMERGENCY DEPARTMENT COURSE    Vitals:    Vitals:    04/16/24 1059 04/16/24 1106 04/16/24 1115 04/16/24 1120   BP:   126/63    Pulse:    55   Resp:   19    Temp: 97.5 °F (36.4 °C)      TempSrc: Oral      SpO2:    93%   Weight:  41.7 kg (92 lb)     Height:  1.499 m (4' 11\")         Patient was given the following medications:  Medications   bumetanide (BUMEX) injection 1 mg (has no administration in time range)         Is this patient to be included in the SEP-1 Core Measure due to severe sepsis or septic shock?   No   Exclusion criteria - the patient is NOT to be included for SEP-1 Core Measure due to:  Infection is not suspected          Medical Decision Making/Differential Diagnosis:    CC/HPI Summary, Social Determinants of health, Records Reviewed, DDx, testing done/not done, ED Course, Reassessment, disposition considerations/shared decision making with patient, consults, disposition:             Medical Decision Making  The patient is an 88 y.o. female with a past medical history of congestive heart failure, atrial fibrillation on Xarelto, s/p ICD, COPD on oxygen  who presents to the emergency department for evaluation of chest pain.    She presented to the emergency department hemodynamically stable.     Obtaining ECG. CXR, troponinx2, CBC, BMP, BNP.   Reviewed EKG showing no acute abnormalities compared to prior EKGs.  Chest x-ray showing a small right-sided pleural effusion.    Reviewed labs, CBC showing chronic

## 2024-04-16 NOTE — H&P
[facility] Hospitalist Group History and Physical      CHIEF COMPLAINT:  had concerns including Chest Pain (Pt sent down from CHF clinic with chest pain. Sharp mid sternal pain radiating into back. ).     HISTORY OF PRESENT ILLNESS:     88-year-old female with known medical condition of A-fib, on Xarelto, heart failure with reduced ejection fraction 30%, ICD in situ, diabetes mellitus, hypertension, hyperlipidemia, coronary artery disease, COPD sent from CHF clinic with chief concern of chest pain.  Patient is complaining of on and off episodic chest discomfort, nonexertional that goes to her back.  No associated factors noted.  Patient has recently stopped Bumex, losartan and Protonix after last hospitalization.  Initial workup in the ED showed troponin of 39, repeat 34, proBNP 24 K which increased from 9K which is her baseline. Chest x-ray of cardiomegaly with pulmonary vascular congestion and small right-sided pleural effusion.  CMP noted for potassium 5.3.  Patient was admitted to hospital for further care and management.  Patient was seen and examined in the ED in her wheelchair, in no apparent distress. Chest pain comes and goes, no leg edema noted, she is on chronic oxygen. Discussed with her     Discussed with ED physician     Informant for H&P: Patient,  and medical record    REVIEW OF SYSTEMS:  A comprehensive review of systems was negative except for: what is in the HPI    PHYSICAL EXAM:  Vitals:  /63   Pulse 55   Temp 97.5 °F (36.4 °C) (Oral)   Resp 19   Ht 1.499 m (4' 11\")   Wt 41.7 kg (92 lb)   SpO2 93%   BMI 18.58 kg/m²     General Appearance: alert and oriented to person, place and time and in no acute distress  Skin: warm and dry  HEENT: normocephalic and atraumatic, pupils equal, round, and reactive to light, extraocular eye movements intact, conjunctivae normal  Chest: clear to auscultation bilaterally- no wheezes, rales or rhonchi, normal air movement, no  respiratory distress  Cardiovascular: normal rate, normal S1 and S2 and no carotid bruits  Abdomen: soft, non-tender, non-distended, normal bowel sounds, no masses or organomegaly  Extremities: no cyanosis, no clubbing and no edema  Neurologic: no cranial nerve deficit and speech normal      LABS:  Recent Labs     04/16/24  1145      K 5.3*   CL 92*   CO2 32*   BUN 25*   CREATININE 1.0   GLUCOSE 79   CALCIUM 9.5       Recent Labs     04/16/24  1145   WBC 7.5   RBC 3.10*   HGB 9.3*   HCT 30.7*   MCV 99.0   MCH 30.0   MCHC 30.3*   RDW 18.6*      MPV 9.7       No results for input(s): \"POCGLU\" in the last 72 hours.    CBC with Differential:    Lab Results   Component Value Date/Time    WBC 7.5 04/16/2024 11:45 AM    RBC 3.10 04/16/2024 11:45 AM    HGB 9.3 04/16/2024 11:45 AM    HCT 30.7 04/16/2024 11:45 AM     04/16/2024 11:45 AM    MCV 99.0 04/16/2024 11:45 AM    MCH 30.0 04/16/2024 11:45 AM    MCHC 30.3 04/16/2024 11:45 AM    RDW 18.6 04/16/2024 11:45 AM    LYMPHOPCT 5 04/16/2024 11:45 AM    MONOPCT 14 04/16/2024 11:45 AM    BASOPCT 0 04/16/2024 11:45 AM    MONOSABS 1.04 04/16/2024 11:45 AM    LYMPHSABS 0.39 04/16/2024 11:45 AM    EOSABS 0.00 04/16/2024 11:45 AM    BASOSABS 0.00 04/16/2024 11:45 AM     CMP:    Lab Results   Component Value Date/Time     04/16/2024 11:45 AM    K 5.3 04/16/2024 11:45 AM    K 4.2 12/28/2022 07:44 AM    CL 92 04/16/2024 11:45 AM    CO2 32 04/16/2024 11:45 AM    BUN 25 04/16/2024 11:45 AM    CREATININE 1.0 04/16/2024 11:45 AM    GFRAA 57 08/23/2022 04:54 AM    LABGLOM 54 04/16/2024 11:45 AM    GLUCOSE 79 04/16/2024 11:45 AM    PROT 5.8 03/13/2024 06:19 AM    CALCIUM 9.5 04/16/2024 11:45 AM    BILITOT 0.4 03/13/2024 06:19 AM    ALKPHOS 52 03/13/2024 06:19 AM    AST 19 03/13/2024 06:19 AM    ALT 8 03/13/2024 06:19 AM       Radiology:   XR CHEST PORTABLE   Final Result   1. Cardiomegaly with mild pulmonary vascular congestion.   2. Small right pleural effusion

## 2024-04-17 ENCOUNTER — APPOINTMENT (OUTPATIENT)
Dept: GENERAL RADIOLOGY | Age: 89
End: 2024-04-17
Payer: MEDICARE

## 2024-04-17 ENCOUNTER — CARE COORDINATION (OUTPATIENT)
Dept: CASE MANAGEMENT | Age: 89
End: 2024-04-17

## 2024-04-17 ENCOUNTER — CARE COORDINATION (OUTPATIENT)
Dept: CARE COORDINATION | Age: 89
End: 2024-04-17

## 2024-04-17 PROBLEM — R07.2 PRECORDIAL PAIN: Status: ACTIVE | Noted: 2024-04-17

## 2024-04-17 PROBLEM — I50.22 CHRONIC HFREF (HEART FAILURE WITH REDUCED EJECTION FRACTION) (HCC): Status: ACTIVE | Noted: 2024-04-17

## 2024-04-17 LAB
ANION GAP SERPL CALCULATED.3IONS-SCNC: 13 MMOL/L (ref 7–16)
ANION GAP SERPL CALCULATED.3IONS-SCNC: 17 MMOL/L (ref 7–16)
BUN SERPL-MCNC: 27 MG/DL (ref 6–23)
BUN SERPL-MCNC: 28 MG/DL (ref 6–23)
CALCIUM SERPL-MCNC: 9.3 MG/DL (ref 8.6–10.2)
CALCIUM SERPL-MCNC: 9.3 MG/DL (ref 8.6–10.2)
CHLORIDE SERPL-SCNC: 96 MMOL/L (ref 98–107)
CHLORIDE SERPL-SCNC: 96 MMOL/L (ref 98–107)
CHOLEST SERPL-MCNC: 151 MG/DL
CO2 SERPL-SCNC: 24 MMOL/L (ref 22–29)
CO2 SERPL-SCNC: 28 MMOL/L (ref 22–29)
CREAT SERPL-MCNC: 0.9 MG/DL (ref 0.5–1)
CREAT SERPL-MCNC: 1 MG/DL (ref 0.5–1)
EKG ATRIAL RATE: 326 BPM
EKG Q-T INTERVAL: 432 MS
EKG QRS DURATION: 4 MS
EKG QTC CALCULATION (BAZETT): 645 MS
EKG R AXIS: 0 DEGREES
EKG T AXIS: -6 DEGREES
EKG VENTRICULAR RATE: 134 BPM
GFR SERPL CREATININE-BSD FRML MDRD: 53 ML/MIN/1.73M2
GFR SERPL CREATININE-BSD FRML MDRD: 59 ML/MIN/1.73M2
GLUCOSE BLD-MCNC: 108 MG/DL (ref 74–99)
GLUCOSE BLD-MCNC: 148 MG/DL (ref 74–99)
GLUCOSE SERPL-MCNC: 142 MG/DL (ref 74–99)
GLUCOSE SERPL-MCNC: 89 MG/DL (ref 74–99)
HBA1C MFR BLD: 5 % (ref 4–5.6)
HDLC SERPL-MCNC: 55 MG/DL
IRON SATN MFR SERPL: 9 % (ref 15–50)
IRON SERPL-MCNC: 29 UG/DL (ref 37–145)
LDLC SERPL CALC-MCNC: 84 MG/DL
MAGNESIUM SERPL-MCNC: 1.9 MG/DL (ref 1.6–2.6)
MAGNESIUM SERPL-MCNC: 2 MG/DL (ref 1.6–2.6)
PHOSPHATE SERPL-MCNC: 4.3 MG/DL (ref 2.5–4.5)
PHOSPHATE SERPL-MCNC: 4.5 MG/DL (ref 2.5–4.5)
POTASSIUM SERPL-SCNC: 4.3 MMOL/L (ref 3.5–5)
POTASSIUM SERPL-SCNC: 4.8 MMOL/L (ref 3.5–5)
SODIUM SERPL-SCNC: 137 MMOL/L (ref 132–146)
SODIUM SERPL-SCNC: 137 MMOL/L (ref 132–146)
TIBC SERPL-MCNC: 315 UG/DL (ref 250–450)
TRIGL SERPL-MCNC: 61 MG/DL
TROPONIN I SERPL HS-MCNC: 39 NG/L (ref 0–9)
TROPONIN I SERPL HS-MCNC: 42 NG/L (ref 0–9)
VLDLC SERPL CALC-MCNC: 12 MG/DL

## 2024-04-17 PROCEDURE — 99223 1ST HOSP IP/OBS HIGH 75: CPT | Performed by: INTERNAL MEDICINE

## 2024-04-17 PROCEDURE — 93010 ELECTROCARDIOGRAM REPORT: CPT | Performed by: INTERNAL MEDICINE

## 2024-04-17 PROCEDURE — 97165 OT EVAL LOW COMPLEX 30 MIN: CPT

## 2024-04-17 PROCEDURE — 82962 GLUCOSE BLOOD TEST: CPT

## 2024-04-17 PROCEDURE — 97535 SELF CARE MNGMENT TRAINING: CPT

## 2024-04-17 PROCEDURE — 2060000000 HC ICU INTERMEDIATE R&B

## 2024-04-17 PROCEDURE — 84100 ASSAY OF PHOSPHORUS: CPT

## 2024-04-17 PROCEDURE — 71045 X-RAY EXAM CHEST 1 VIEW: CPT

## 2024-04-17 PROCEDURE — 6370000000 HC RX 637 (ALT 250 FOR IP): Performed by: INTERNAL MEDICINE

## 2024-04-17 PROCEDURE — 99221 1ST HOSP IP/OBS SF/LOW 40: CPT | Performed by: NURSE PRACTITIONER

## 2024-04-17 PROCEDURE — 6360000002 HC RX W HCPCS: Performed by: INTERNAL MEDICINE

## 2024-04-17 PROCEDURE — 2580000003 HC RX 258: Performed by: INTERNAL MEDICINE

## 2024-04-17 PROCEDURE — APPSS60 APP SPLIT SHARED TIME 46-60 MINUTES

## 2024-04-17 PROCEDURE — 83540 ASSAY OF IRON: CPT

## 2024-04-17 PROCEDURE — 97161 PT EVAL LOW COMPLEX 20 MIN: CPT

## 2024-04-17 PROCEDURE — 83735 ASSAY OF MAGNESIUM: CPT

## 2024-04-17 PROCEDURE — 80048 BASIC METABOLIC PNL TOTAL CA: CPT

## 2024-04-17 PROCEDURE — 84484 ASSAY OF TROPONIN QUANT: CPT

## 2024-04-17 PROCEDURE — 99232 SBSQ HOSP IP/OBS MODERATE 35: CPT | Performed by: INTERNAL MEDICINE

## 2024-04-17 PROCEDURE — 2700000000 HC OXYGEN THERAPY PER DAY

## 2024-04-17 PROCEDURE — 36415 COLL VENOUS BLD VENIPUNCTURE: CPT

## 2024-04-17 PROCEDURE — 94640 AIRWAY INHALATION TREATMENT: CPT

## 2024-04-17 PROCEDURE — 80061 LIPID PANEL: CPT

## 2024-04-17 PROCEDURE — 83550 IRON BINDING TEST: CPT

## 2024-04-17 RX ORDER — MAGNESIUM SULFATE IN WATER 40 MG/ML
2000 INJECTION, SOLUTION INTRAVENOUS ONCE
Status: COMPLETED | OUTPATIENT
Start: 2024-04-17 | End: 2024-04-17

## 2024-04-17 RX ORDER — METOPROLOL SUCCINATE 25 MG/1
25 TABLET, EXTENDED RELEASE ORAL DAILY
Status: DISCONTINUED | OUTPATIENT
Start: 2024-04-17 | End: 2024-04-18 | Stop reason: HOSPADM

## 2024-04-17 RX ORDER — ATORVASTATIN CALCIUM 10 MG/1
20 TABLET, FILM COATED ORAL DAILY
Status: DISCONTINUED | OUTPATIENT
Start: 2024-04-17 | End: 2024-04-18 | Stop reason: HOSPADM

## 2024-04-17 RX ORDER — SODIUM CHLORIDE 0.9 % (FLUSH) 0.9 %
5-40 SYRINGE (ML) INJECTION EVERY 12 HOURS SCHEDULED
Status: DISCONTINUED | OUTPATIENT
Start: 2024-04-17 | End: 2024-04-18 | Stop reason: HOSPADM

## 2024-04-17 RX ORDER — SODIUM CHLORIDE 0.9 % (FLUSH) 0.9 %
5-40 SYRINGE (ML) INJECTION PRN
Status: DISCONTINUED | OUTPATIENT
Start: 2024-04-17 | End: 2024-04-18 | Stop reason: HOSPADM

## 2024-04-17 RX ORDER — BUMETANIDE 1 MG/1
1 TABLET ORAL 2 TIMES DAILY
Status: DISCONTINUED | OUTPATIENT
Start: 2024-04-17 | End: 2024-04-17

## 2024-04-17 RX ORDER — ACETAMINOPHEN 325 MG/1
650 TABLET ORAL EVERY 6 HOURS PRN
Status: DISCONTINUED | OUTPATIENT
Start: 2024-04-17 | End: 2024-04-18 | Stop reason: HOSPADM

## 2024-04-17 RX ORDER — TORSEMIDE 10 MG/1
10 TABLET ORAL DAILY
Status: DISCONTINUED | OUTPATIENT
Start: 2024-04-17 | End: 2024-04-18 | Stop reason: HOSPADM

## 2024-04-17 RX ORDER — SODIUM CHLORIDE 9 MG/ML
INJECTION, SOLUTION INTRAVENOUS PRN
Status: DISCONTINUED | OUTPATIENT
Start: 2024-04-17 | End: 2024-04-18 | Stop reason: HOSPADM

## 2024-04-17 RX ORDER — METOCLOPRAMIDE 5 MG/1
5 TABLET ORAL 4 TIMES DAILY
Status: DISCONTINUED | OUTPATIENT
Start: 2024-04-17 | End: 2024-04-18 | Stop reason: HOSPADM

## 2024-04-17 RX ORDER — ACETAMINOPHEN 650 MG/1
650 SUPPOSITORY RECTAL EVERY 6 HOURS PRN
Status: DISCONTINUED | OUTPATIENT
Start: 2024-04-17 | End: 2024-04-18 | Stop reason: HOSPADM

## 2024-04-17 RX ORDER — ONDANSETRON 2 MG/ML
4 INJECTION INTRAMUSCULAR; INTRAVENOUS EVERY 6 HOURS PRN
Status: DISCONTINUED | OUTPATIENT
Start: 2024-04-17 | End: 2024-04-18 | Stop reason: HOSPADM

## 2024-04-17 RX ORDER — POLYETHYLENE GLYCOL 3350 17 G/17G
17 POWDER, FOR SOLUTION ORAL DAILY PRN
Status: DISCONTINUED | OUTPATIENT
Start: 2024-04-17 | End: 2024-04-18 | Stop reason: HOSPADM

## 2024-04-17 RX ORDER — AMIODARONE HYDROCHLORIDE 200 MG/1
200 TABLET ORAL NIGHTLY
Status: DISCONTINUED | OUTPATIENT
Start: 2024-04-17 | End: 2024-04-18 | Stop reason: HOSPADM

## 2024-04-17 RX ORDER — AMIODARONE HYDROCHLORIDE 200 MG/1
100 TABLET ORAL DAILY
Status: DISCONTINUED | OUTPATIENT
Start: 2024-04-17 | End: 2024-04-18 | Stop reason: HOSPADM

## 2024-04-17 RX ORDER — ONDANSETRON 4 MG/1
4 TABLET, ORALLY DISINTEGRATING ORAL EVERY 8 HOURS PRN
Status: DISCONTINUED | OUTPATIENT
Start: 2024-04-17 | End: 2024-04-18 | Stop reason: HOSPADM

## 2024-04-17 RX ORDER — FERROUS SULFATE 325(65) MG
325 TABLET ORAL
Status: DISCONTINUED | OUTPATIENT
Start: 2024-04-17 | End: 2024-04-18 | Stop reason: HOSPADM

## 2024-04-17 RX ORDER — ASPIRIN 81 MG/1
81 TABLET ORAL DAILY
Status: DISCONTINUED | OUTPATIENT
Start: 2024-04-17 | End: 2024-04-18 | Stop reason: HOSPADM

## 2024-04-17 RX ORDER — PANTOPRAZOLE SODIUM 40 MG/1
40 TABLET, DELAYED RELEASE ORAL
Status: DISCONTINUED | OUTPATIENT
Start: 2024-04-17 | End: 2024-04-18 | Stop reason: HOSPADM

## 2024-04-17 RX ADMIN — FERROUS SULFATE TAB 325 MG (65 MG ELEMENTAL FE) 325 MG: 325 (65 FE) TAB at 09:57

## 2024-04-17 RX ADMIN — IPRATROPIUM BROMIDE AND ALBUTEROL SULFATE 1 DOSE: .5; 2.5 SOLUTION RESPIRATORY (INHALATION) at 15:54

## 2024-04-17 RX ADMIN — ATORVASTATIN CALCIUM 20 MG: 20 TABLET, FILM COATED ORAL at 08:15

## 2024-04-17 RX ADMIN — BUMETANIDE 1 MG: 1 TABLET ORAL at 08:15

## 2024-04-17 RX ADMIN — MAGNESIUM SULFATE HEPTAHYDRATE 2000 MG: 40 INJECTION, SOLUTION INTRAVENOUS at 10:00

## 2024-04-17 RX ADMIN — ASPIRIN 81 MG: 81 TABLET, COATED ORAL at 08:15

## 2024-04-17 RX ADMIN — METOCLOPRAMIDE 5 MG: 5 TABLET ORAL at 11:08

## 2024-04-17 RX ADMIN — METOCLOPRAMIDE 5 MG: 5 TABLET ORAL at 17:19

## 2024-04-17 RX ADMIN — METOCLOPRAMIDE 5 MG: 5 TABLET ORAL at 08:15

## 2024-04-17 RX ADMIN — SODIUM CHLORIDE, PRESERVATIVE FREE 10 ML: 5 INJECTION INTRAVENOUS at 20:49

## 2024-04-17 RX ADMIN — IPRATROPIUM BROMIDE AND ALBUTEROL SULFATE 1 DOSE: .5; 2.5 SOLUTION RESPIRATORY (INHALATION) at 10:30

## 2024-04-17 RX ADMIN — METOCLOPRAMIDE 5 MG: 5 TABLET ORAL at 20:48

## 2024-04-17 RX ADMIN — RIVAROXABAN 15 MG: 15 TABLET, FILM COATED ORAL at 11:08

## 2024-04-17 RX ADMIN — IPRATROPIUM BROMIDE AND ALBUTEROL SULFATE 1 DOSE: .5; 2.5 SOLUTION RESPIRATORY (INHALATION) at 19:59

## 2024-04-17 RX ADMIN — SODIUM CHLORIDE, PRESERVATIVE FREE 10 ML: 5 INJECTION INTRAVENOUS at 08:16

## 2024-04-17 RX ADMIN — PANTOPRAZOLE SODIUM 40 MG: 40 TABLET, DELAYED RELEASE ORAL at 08:15

## 2024-04-17 ASSESSMENT — PAIN SCALES - GENERAL: PAINLEVEL_OUTOF10: 0

## 2024-04-17 NOTE — PROGRESS NOTES
Physical Therapy  Initial Assessment     Name: Barbara Jack  : 1935  MRN: [id number]      Date of Service: 2024    Evaluating PT: Efren Jaramillo PT, DPT [id number]      Room #:  8517/8517-B  Diagnosis:  Acute decompensated heart failure (HCC) [I50.9]  Acute on chronic congestive heart failure, unspecified heart failure type (HCC) [I50.9]  PMHx/PSHx:   has a past medical history of Arthritis, Atrial fibrillation (HCC), CAD (coronary artery disease), Cardiomyopathy (HCC), CHF (congestive heart failure) (HCC), Chronic anticoagulation, Chronic bronchitis (HCC), COPD (chronic obstructive pulmonary disease) (HCC), COVID, Depression, GERD (gastroesophageal reflux disease), HFrEF (heart failure with reduced ejection fraction) (HCC), [place] (hard of hearing), Hyperlipidemia, Hypertension, ICD (implantable cardioverter-defibrillator) in place, Left ventricular dysfunction, Low vitamin D level, MI (myocardial infarction) ([facility]), Osteopenia, Osteoporosis, and Swallowing difficulty.  Precautions:  Fall risk, O2, [place]    SUBJECTIVE:    Pt lives with  in a single story house with 3 stair(s) and 1 rail(s) to enter. Pt ambulated with cane, WW, or rollator prior to admission. Pt is on 3 L O2/min at baseline.    OBJECTIVE:   Initial Evaluation  Date: 24 Treatment Date: Short Term/ Long Term   Goals   AM-PAC 6 Clicks      Was pt agreeable to Eval/treatment? Yes     Does pt have pain? No complaints of pain     Bed Mobility  Rolling: NT  Supine to sit: SBA  Sit to supine: SBA  Scooting: SBA to EOB  Rolling: Independent   Supine to sit: Independent   Sit to supine: Independent   Scooting: Independent    Transfers Sit to stand: SBA  Stand to sit: SBA  Stand pivot: SBA with WW  Sit to stand: Independent   Stand to sit: Independent   Stand pivot: Mod Independent with WW   Ambulation   50 feet with WW with CGA  >200 feet with WW Mod Independent    Stair negotiation: ascended and descended NT  3 step(s) with 1 rail(s)  Manual therapy 19212 0 minutes  [] Therapeutic activities 03096 0 minutes  [] Therapeutic exercises 12197 0 minutes  [] Neuromuscular reeducation 06020 0 minutes     Efren Jaramillo, PT, DPT  [id number]

## 2024-04-17 NOTE — CARE COORDINATION
Remote Patient Monitoring Note      Date/Time:  4/17/2024 2:07 PM  Patient Current Location: [place]       Background: CHF, COPD  Clinical Interventions: Reviewed and followed up on alerts and treatments-         Patient is admitted to the Hospital - Tablet Paused  ACM notified    Plan/Follow Up: Will continue to review, monitor and address alerts with follow up based on severity of symptoms and risk factors.

## 2024-04-17 NOTE — PLAN OF CARE
Problem: Chronic Conditions and Co-morbidities  Goal: Patient's chronic conditions and co-morbidity symptoms are monitored and maintained or improved  Outcome: Progressing  Flowsheets (Taken 4/17/2024 9863)  Care Plan - Patient's Chronic Conditions and Co-Morbidity Symptoms are Monitored and Maintained or Improved: Monitor and assess patient's chronic conditions and comorbid symptoms for stability, deterioration, or improvement

## 2024-04-17 NOTE — DISCHARGE INSTRUCTIONS
HEART FAILURE  / CONGESTIVE HEART FAILURE  DISCHARGE INSTRUCTIONS:  GUIDELINES TO FOLLOW AT HOME    Self- Managed Care:     MEDICATIONS:  Take your medication as directed. If you are experiencing any side effects, inform your doctor, Do not stop taking any of your medications without letting your doctor know.   Check with your doctor before taking any over-the-counter medications / herbal / or dietary supplements. They may interfere with your other medications.  Do not take ibuprofen (Advil or Motrin) and naproxen (Aleve) without talking to your doctor first. They could make your heart failure worse.         WEIGHT MONITORING:   Weigh yourself everyday (with the same scale) around the same time of the day and write it down. (you can chart them on a calendar or keep track of them on paper.   Notify your doctor of a weight gain of 3 pounds or more in 1 day   OR a total of 5 pounds or more in 1 week    Take your weight record to your doctor visits  Also, the same goes if you loose more than 3# in one day, let your heart doctor know.         DIET:   Cardiac heart healthy diet- Low saturated / low trans fat, no added salt, caffeine restricted, Low sodium diet-   No more than 2,000mg (2 grams) of salt / sodium per day (which equals to a little less than  a teaspoon of salt)  If your doctor wants you on a fluid restriction...it is usually recommended a fluid limit of 2,000cc -  Fluid restriction- 2,000 ml (milliliters) = 64 ounces = you can have 8 glasses of fluid per day (each glass 8 ounces)    Follow a low salt diet - avoid using salt at the table, avoid / limit use of canned soups, processed / packaged foods, salted snacks, olives and pickles.  Do not use a salt substitute without checking with your doctor, they may contain a high amount of potassioum. (Mrs. Dash is safe to use).    Limit the use of alcohol       CALL YOUR DOCTOR THE FIRST DAY YOU NOTICE ANY OF THESE   SYMPTOMS:  You have a weight  Call 911  if you have symptoms of sudden heart failure such as:    You have severe trouble breathing.     You cough up pink, foamy mucus.     You have a new irregular or rapid heartbeat.   Call your doctor now or seek immediate medical care if:    You have new or increased shortness of breath.     You are dizzy or lightheaded, or you feel like you may faint.     You have sudden weight gain, such as more than 2 to 3 pounds in a day or 5 pounds in a week. (Your doctor may suggest a different range of weight gain.)     You have increased swelling in your legs, ankles, or feet.     You are suddenly so tired or weak that you cannot do your usual activities.   Watch closely for changes in your health, and be sure to contact your doctor if you develop new symptoms.  Where can you learn more?  Go to https://www.[employer].net/patientEd and enter E597 to learn more about \"Heart Failure: Care Instructions.\"  Current as of: June 24, 2023               Content Version: 14.0  © 2006-2024 [employer]   Care instructions adapted under license by [employer]. If you have questions about a medical condition or this instruction, always ask your healthcare professional. [employer] disclaims any warranty or liability for your use of this information.         Oxygen Therapy: Care Instructions  Overview     Oxygen therapy helps you get more oxygen into your lungs and bloodstream. You may use it if you have a disease that makes it hard to breathe, such as COPD, pulmonary fibrosis (scarring of the lungs), or heart failure. Oxygen therapy can make it easier for you to breathe and can reduce your heart's workload.  Some people need extra oxygen all the time. Others need it from time to time throughout the day or overnight. A doctor will prescribe how much oxygen you need and how often to use it.  To breathe the oxygen, most people use a nasal cannula (say \"LORENA-yuh-perlita\"). This is a thin tube with two prongs that

## 2024-04-17 NOTE — PLAN OF CARE
Problem: Chronic Conditions and Co-morbidities  Goal: Patient's chronic conditions and co-morbidity symptoms are monitored and maintained or improved  Recent Flowsheet Documentation  Taken 4/17/2024 0841 by Kylah Aguilar, RN  Care Plan - Patient's Chronic Conditions and Co-Morbidity Symptoms are Monitored and Maintained or Improved: Monitor and assess patient's chronic conditions and comorbid symptoms for stability, deterioration, or improvement

## 2024-04-17 NOTE — CARE COORDINATION
Chart reviewed and case reviewed in IDR.  Patient admitted for CHF exacerbation.  Palliative care consult, CHF nurse to see, and cardiology consult noted.  Bumex stopped.  Met with the patient at the bedside to discuss transition of care planning.  Patient stated she lives with her  See in a one story home with three steps to enter and one railing.  Patient stated she ha s walker, cane, BSC, shower bench and home O2 at 3L from [facility] at home.  Patient has been to [facility] for rehab previously and is active with [facility] at this time.  Patient's PCP is Dr Dyer and she uses [facility] on [address] for her medications.  Spoke with suzy, liaison with [facility] and confirmed the patient is active with them.  They will need SILVIA orders for discharge.  Will continue to follow for further transition of care planning needs.     Jaki Camp RN.  P:  [phone]      Case Management Assessment  Initial Evaluation    Date/Time of Evaluation: 4/17/2024 2:09 PM  Assessment Completed by: Jaki Camp RN    If patient is discharged prior to next notation, then this note serves as note for discharge by case management.    Patient Name: Barbara Jack                   YOB: 1935  Diagnosis: Acute decompensated heart failure (HCC) [I50.9]  Acute on chronic congestive heart failure, unspecified heart failure type (HCC) [I50.9]                   Date / Time: 4/16/2024  8:35 PM    Patient Admission Status: Inpatient   Readmission Risk (Low < 19, Mod (19-27), High > 27): Readmission Risk Score: 22.9    Current PCP: Jluita Dyer MD  PCP verified by ? Yes (Julita Dyer MD)    Chart Reviewed: Yes      History Provided by: Patient  Patient Orientation: Alert and Oriented    Patient Cognition: Alert    Hospitalization in the last 30 days (Readmission):  Yes    If yes, Readmission Assessment in  Navigator will be completed.    Advance Directives:      Code Status: DNR-CCA  [address]  Phone: [phone] Fax: [phone]      Notes:    Factors facilitating achievement of predicted outcomes: Family support, Motivated, Cooperative, Pleasant, Good insight into deficits, and Has needed Durable Medical Equipment at home    Barriers to discharge: Lower extremity weakness    Additional Case Management Notes: See Above     The Plan for Transition of Care is related to the following treatment goals of Acute decompensated heart failure (HCC) [I50.9]  Acute on chronic congestive heart failure, unspecified heart failure type (HCC) [I50.9]    IF APPLICABLE: The Patient and/or patient representative aBrbara and her family were provided with a choice of provider and agrees with the discharge plan. Freedom of choice list with basic dialogue that supports the patient's individualized plan of care/goals and shares the quality data associated with the providers was provided to:     Patient Representative Name:       The Patient and/or Patient Representative Agree with the Discharge Plan?      Jaki Camp RN  Case Management Department  Ph: [phone]   Fax: [phone]

## 2024-04-17 NOTE — ED NOTES
Walked pt to restroom, helped her get into hospital gown, pt uses cane at home, call light with reach, bed low locked rail up, hearing aids in  at bedside phone on  at bedside, shoes shirt pants sweater in belongings bag at bedside

## 2024-04-17 NOTE — PROGRESS NOTES
Patient admitted to room 8517b from ed for chest pain.  Patient oriented to room, call light, bed rails, phone, lights and bathroom.  Pt bed alarm in place, patient aware of placement and reason.  Telemetry box in place, patient aware of placement and reason.  Bed locked, in lowest position, side rails up 2/4, call light within reach.  Belongings 5 rings, 1 apple watch, 2 hearing aides, hearing aide , phone, cell phone , pants, shirt socks and shoes at bedside.

## 2024-04-17 NOTE — ACP (ADVANCE CARE PLANNING)
Advance Care Planning   Healthcare Decision Maker:    Primary Decision Maker: See Jack - Spouse - [phone]    Secondary Decision Maker: [place] - Child - [phone]    Click here to complete Healthcare Decision Makers including selection of the Healthcare Decision Maker Relationship (ie \"Primary\").

## 2024-04-17 NOTE — PROGRESS NOTES
Nutrition Education    Educated on CHF dietary guidelines   Learners: Patient and Family  Readiness: Eager  Method: Explanation and Handout  Response: Verbalizes Understanding  Contact name and number provided.    Zaire Scruggs RD  Contact Number: ext [phone]

## 2024-04-17 NOTE — CONSULTS
Inpatient Cardiology Consultation      Reason for Consult:  Acute decompensated heart failure     Consulting Physician: Dr. Craft     Requesting Physician:  Dr. Vasquez     Date of Consultation: 4/17/2024    History of Present Illness:   Judith Osler is an 88-year-old female known to [facility] cardiology through Dr. Craft.  Her last office visit was 3/8/2024.  She also follows with Dr. Keller. Patient was recently hospitalized 3/12/2024 initially with complaints of dizziness, fatigue and hypoxia.  While in the ER she was found to have a positive Hemoccult, elevated BNP at 10,544 and H&H of 7.0 and 23.5.  She was transfused with PRBCs and her aspirin and Xarelto were held.  She underwent EGD with Botox injection with no active source of bleeding identified.  She required additional transfusion of 1 unit PRBC on March 15 and was discharged home on Xarelto and an iron supplement on March 18.  She followed up with [facility] on 3/26/2024.     Patient presented to the emergency department on 4/16/2024 from a CHF clinic with complaints of sharp midsternal chest pain radiating into her back.  On arrival blood pressure was 126/63, heart rate 55, 93% on 3 L O2 and afebrile.  Chest x-ray revealed cardiomegaly with pulmonary vascular congestion and small right pleural effusion versus pleural thickening.  She received Bumex 1 mg PO while in the emergency department.      At the time of examination patient is lying in bed and appears to be comfortable no acute distress.  Patient reports that yesterday she went to follow-up with the CHF clinic and was having some intermittent sharp pain in her chest that only lasted a few seconds.  She was then taken to the emergency department for the intermittent chest pain.  At this time she denies any chest pain.  She denies any palpitations, weight gain, extremity swelling.  She does report feeling short of breath but short of breath at baseline and denies any

## 2024-04-17 NOTE — CARE COORDINATION
Readmission Assessment  Number of Days since last admission?: 8-30 days  Previous Disposition: Home with Home Health ([facility])  Who is being Interviewed: Patient  What was the patient's/caregiver's perception as to why they think they needed to return back to the hospital?: Other (Comment) (Increasing SOB, CHF clinic sent in)  Did you visit your Primary Care Physician after you left the hospital, before you returned this time?: Yes  Did you see a specialist, such as Cardiac, Pulmonary, Orthopedic Physician, etc. after you left the hospital?: Yes  Who advised the patient to return to the hospital?: Other Nurse (Navigator, CTN) (CHF Clinic)  Does the patient report anything that got in the way of taking their medications?: No  In our efforts to provide the best possible care to you and others like you, can you think of anything that we could have done to help you after you left the hospital the first time, so that you might not have needed to return so soon?: Improved written discharge instructions, Teaching during hospitalization regarding your illness, Discharge instructions that are concise, clear, and non contradictory      Jaki Camp RN.

## 2024-04-17 NOTE — PROGRESS NOTES
[facility] Hospitalist Progress Note     Admitting Date and Time: 4/16/2024  8:35 PM  had concerns including Chest Pain (Pt sent down from CHF clinic with chest pain. Sharp mid sternal pain radiating into back. ).    Admit Dx: Acute decompensated heart failure (HCC) [I50.9]    Synopsis: 88-year-old female with known medical condition of A-fib, on Xarelto, heart failure with reduced ejection fraction 30%, ICD in situ, diabetes mellitus, hypertension, hyperlipidemia, coronary artery disease, COPD sent from CHF clinic with chief concern of chest pain.  Patient is complaining of on and off episodic chest discomfort, nonexertional that goes to her back.  No associated factors noted.  Patient has recently stopped Bumex, losartan and Protonix after last hospitalization.  Initial workup in the ED showed troponin of 39, repeat 34, proBNP 24 K which increased from 9K which is her baseline. Chest x-ray of cardiomegaly with pulmonary vascular congestion and small right-sided pleural effusion.  CMP noted for potassium 5.3.  Patient was admitted to hospital for further care and management.  Patient was seen and examined in the ED in her wheelchair, in no apparent distress. Chest pain comes and goes, no leg edema noted, she is on chronic oxygen.    Subjective:  Patient is being followed for Acute decompensated heart failure (HCC) [I50.9]     Patient was seen and examined this morning at bedside  She is no acute distress   Resting comfortably     ROS: denies fever, chills, cp, sob, n/v, HA unless stated above.     aspirin  81 mg Oral Daily    bumetanide  1 mg Oral BID    metoclopramide  5 mg Oral 4x Daily    metoprolol succinate  25 mg Oral Daily    atorvastatin  20 mg Oral Daily    rivaroxaban  15 mg Oral Daily with breakfast    pantoprazole  40 mg Oral QAM AC    sodium chloride flush  5-40 mL IntraVENous  2 times per day    [Held by provider] amiodarone  200 mg Oral Nightly    [Held by provider] amiodarone  100 mg Oral Daily    [Held by provider] torsemide  10 mg Oral Daily    insulin lispro  0-4 Units SubCUTAneous TID WC    insulin lispro  0-4 Units SubCUTAneous Nightly    ipratropium 0.5 mg-albuterol 2.5 mg  1 Dose Inhalation Q4H WA RT     sodium chloride flush, 5-40 mL, PRN  sodium chloride, , PRN  ondansetron, 4 mg, Q8H PRN   Or  ondansetron, 4 mg, Q6H PRN  polyethylene glycol, 17 g, Daily PRN  acetaminophen, 650 mg, Q6H PRN   Or  acetaminophen, 650 mg, Q6H PRN  perflutren lipid microspheres, 1.5 mL, ONCE PRN  glucose, 4 tablet, PRN  dextrose bolus, 125 mL, PRN   Or  dextrose bolus, 250 mL, PRN  glucagon (rDNA), 1 mg, PRN  dextrose, , Continuous PRN  levalbuterol, 0.63 mg, Q8H PRN         Objective:    /72   Pulse 57   Temp 97.5 °F (36.4 °C) (Oral)   Resp 17   Ht 1.499 m (4' 11\")   Wt 41.7 kg (92 lb)   SpO2 99%   BMI 18.58 kg/m²     General Appearance: alert and oriented to person, place and time and in no acute distress  Skin: warm and dry  Head: normocephalic and atraumatic  Eyes: pupils equal, round, and reactive to light, extraocular eye movements intact, conjunctivae normal  Neck: neck supple and non tender without mass   Pulmonary/Chest: clear to auscultation bilaterally- no wheezes, rales or rhonchi, normal air movement, no respiratory distress  Cardiovascular: normal rate, normal S1 and S2 and no carotid bruits  Abdomen: soft, non-tender, non-distended, normal bowel sounds, no masses or organomegaly  Extremities: no cyanosis, no clubbing and no edema  Neurologic: no cranial nerve deficit and speech normal        Recent Labs     04/16/24  1145 04/17/24  0251    137   K 5.3* 4.3   CL 92* 96*   CO2 32* 28   BUN 25* 28*   CREATININE 1.0 0.9   GLUCOSE 79 142*   CALCIUM 9.5 9.3       Recent Labs     04/16/24  1145   WBC 7.5   RBC 3.10*   HGB 9.3*   HCT 30.7*   MCV 99.0   MCH 30.0   MCHC 30.3*

## 2024-04-17 NOTE — CARE COORDINATION
Pt admitted in room 8517-B. Will continue to follow once discharged to home for CC and RPM. RPM paused at this time.    FOLLOW-UP PLAN:    Continue Care Coordination and Assess Plan Of Care  Review RPM Metrics and educate as appropriate  -assess needs post hospital  -Pulse Ox  -Falls/Near Falls?    Next Anticipated Outreach by [facility] Care Team:  1 Week

## 2024-04-17 NOTE — CONSULTS
[facility]   Inpatient CHF Nurse Navigator Consult      Cardiologist: Dr. Venancio MERCADO Guero is a 88 y.o. (1935) female with a history of HFrEF, most recent EF: 20-25%  Lab Results   Component Value Date    LVEF 23 02/07/2023    LVEFMODE Echo 12/26/2016       Patient was awake and alert, sitting up on the side of her bed during the consultation and is agreeable to heart failure education. She was engaged and asked appropriate questions throughout the education session. She already follows in the CHF clinic and is aware of her upcomming appointments.     Barriers identified during consult contributing to HF Hospitalization:  [] Limited medication adherence   [] Poor health literacy, education regarding HF medications provided   [] Pill box provided to patient  [] Difficulty affording medications  [] Difficulty obtaining/ managing medications  [] Prescription assistance information given     [] Not weighing themselves daily  [x] Weight log provided for easy monitoring  [] Scale provided     [] Not following low sodium diet  [] Food insecurity   [x] 2 gram sodium diet education provided   [] Low sodium recipes provided  [] Sodium free seasoning provided   [] Low sodium meal delivery options given to patient  [] Dietician consulted     [] Lack of transportation to appointments     [] Depression, given chronic illness  [] Primary team notified     [] Goals of care need addressed  [] Palliative care consulted     [] CHF CHW consulted, to assist with         Chart Reviewed:  Diet: ADULT DIET; Regular; No Added Salt (3-4 gm)   Daily Weights: Patient Vitals for the past 96 hrs (Last 3 readings):   Weight   04/16/24 1106 41.7 kg (92 lb)     I/O: No intake or output data in the 24 hours ending 04/17/24 1351    [] Nursing staff/manager notified of inaccurate vieira weights or I/O        Discharge Plan:  Above identified barriers reviewed and needs addressed    Patient/family

## 2024-04-17 NOTE — CONSULTS
Palliative Care Department  [phone]  Palliative Care Initial Consult  Provider DUKE Davila CNP      PATIENT: Barbara Jack  : 1935  MRN: [id number]  ADMISSION DATE: 2024  8:35 PM  Referring Provider: Jamaal Vasquez MD     Palliative Medicine was consulted on hospital day 1 for assistance with Goals of care, Symptom management     HPI:     Clinical Summary:Barbara Jack is a 88 y.o. y/o female with a history of A-fib, on Xarelto, heart failure with reduced ejection fraction 30%, ICD in situ, diabetes mellitus, hypertension, hyperlipidemia, coronary artery disease, COPD  who presented to [facility] on 2024 with chest pain. Chest x-ray of cardiomegaly with pulmonary vascular congestion and small right-sided pleural effusion.  BNP was elevated at 24,774.  She was admitted to telemetry for further medical management.    ASSESSMENT/PLAN:     Pertinent Hospital Diagnoses     Acute on chronic decompensated heart failure  Acute hypoxic respiratory failure  Valvular heart disease    Palliative Care Encounter / Counseling Regarding Goals of Care  Please see detailed goals of care discussion as below  At this time, Barbara Jack, Does have capacity for medical decision-making.  Capacity is time limited and situation/question specific  Outcome of goals of care meeting:  Goal is to return home and continue with current medical management  Continue DNR CCA  Code status DNR-CCA  Advanced Directives: no POA or living will in epic  Surrogate/Legal NOK:  See Jack () [phone]   Ganga Massey (son) [phone]     Spiritual assessment: no spiritual distress identified  Bereavement and grief: to be determined  Referrals to: none today    Thank you for the opportunity to participate in the care of Barbara Jack.     DUKE Davila CNP   Palliative Medicine     SUBJECTIVE:     Details of Conversation: Chart reviewed and met with Brittany at the bedside.  She was alert and oriented and

## 2024-04-17 NOTE — PROGRESS NOTES
OCCUPATIONAL THERAPY INITIAL EVALUATION      [facility] [address] [place], [place]       Date:2024                                                  Patient Name: Barbara Jack  MRN: [id number]  : 1935  Room: 85/8517-    Evaluating OT: Neetujalen Patel, OTR/L #[id number]    Referring Provider::  Jamaal Vasquez MD     Specific Provider Orders/Date: OT evaluation and treatment 24    Diagnosis:  Acute decompensated heart failure (HCC) [I50.9]  Acute on chronic congestive heart failure, unspecified heart failure type (HCC) [I50.9]      Pertinent Medical History:  has a past medical history of Arthritis, Atrial fibrillation (HCC), CAD (coronary artery disease), Cardiomyopathy (HCC), CHF (congestive heart failure) (HCC), Chronic anticoagulation, Chronic bronchitis (HCC), COPD (chronic obstructive pulmonary disease) (HCC), COVID, Depression, GERD (gastroesophageal reflux disease), HFrEF (heart failure with reduced ejection fraction) ([facility]), [place] (hard of hearing), Hyperlipidemia, Hypertension, ICD (implantable cardioverter-defibrillator) in place, Left ventricular dysfunction, Low vitamin D level, MI (myocardial infarction) ([facility]), Osteopenia, Osteoporosis, and Swallowing difficulty.       Precautions:  Fall Risk, c/o dizziness with change of head position,     Assessment of current deficits   [x] Functional mobility   [x]ADLs  [x] Strength               [x]Cognition   [x] Functional transfers   [] IADLs         [x] Safety Awareness   [x]Endurance   [] Fine Coordination              [x] Balance      [] Vision/perception   []Sensation    []Gross Motor Coordination  [] ROM  [] Delirium                   [] Motor Control     OT PLAN OF CARE   OT POC based on physician orders, patient diagnosis and results of clinical assessment    Frequency/Duration 1-3 days/wk for 2 weeks PRN   Specific OT Treatment to include:   * Instruction/training on adapted ADL  frame: 10-14 days   Feeding Stand by Assist   Independent    Grooming Stand by Assist   Independent    UB Dressing Minimal Assist   Independent    LB Dressing Minimal Assist   Independent    Bathing Moderate Assist  For safety and balance,   Supervision    Toileting Minimal Assist   Cues for safety with O2 line  Modified Bailey    Bed Mobility  Supine to sit: Stand by Assist   Sit to supine: Stand by Assist   Supine to sit: Independent   Sit to supine: Independent    Functional Transfers Sit to stand: Stand by Assist   Stand to sit: Stand by Assist   Stand pivot: Contact Guard Assist   Modified Bailey    Functional Mobility Contact Guard Assist  with ww  Cues for safety   Pt reported feeling dizzy with turns .   Modified Bailey  with AAD  Functional distance   Balance Sitting: Supervision      Standing: Contact Guard Assist   Sitting: Independent       Standing: Modified Bailey    Activity Tolerance fair  good   Visual/  Perceptual Glasses: yes  Perception: NT          BUE  ROM/Strength/  Fine motor Coordination Hand dominance: R    RUE: ROM WFL     Strength: grossly 4/5      Strength:  WFL     Coordination:   WFL    LUE: ROM  WFL     Strength: grossly 4/5      Strength:  WFL     Coordination: WFL       Hearing: [place], has hearing aids   Sensation:  NT,No c/o numbness or tingling   Tone:  WFL   Edema:  None noted    Comments:   RN cleared patient for OT.   Upon arrival, patient was in bed.  present in room. .  Therapist facilitated and instructed pt on adapted  techniques & compensatory strategies to improve safety and independence with basic ADLs, bed mobility, , functional transfers & functional mobility  to allow pt to achieve highest level of independence and safely.  Patient presents with dizziness   decreased independence and safety with  ADLs,  bed mobility,  functional transfers,  & functional mobility.   At end of session, patient in bed,  with call light and phone

## 2024-04-18 ENCOUNTER — TELEPHONE (OUTPATIENT)
Dept: ADMINISTRATIVE | Age: 89
End: 2024-04-18

## 2024-04-18 VITALS
HEART RATE: 56 BPM | OXYGEN SATURATION: 100 % | HEIGHT: 59 IN | DIASTOLIC BLOOD PRESSURE: 57 MMHG | RESPIRATION RATE: 14 BRPM | BODY MASS INDEX: 18.55 KG/M2 | SYSTOLIC BLOOD PRESSURE: 105 MMHG | WEIGHT: 92 LBS | TEMPERATURE: 97 F

## 2024-04-18 LAB
ANION GAP SERPL CALCULATED.3IONS-SCNC: 13 MMOL/L (ref 7–16)
BUN SERPL-MCNC: 23 MG/DL (ref 6–23)
CALCIUM SERPL-MCNC: 9.1 MG/DL (ref 8.6–10.2)
CHLORIDE SERPL-SCNC: 95 MMOL/L (ref 98–107)
CO2 SERPL-SCNC: 30 MMOL/L (ref 22–29)
CREAT SERPL-MCNC: 1 MG/DL (ref 0.5–1)
GFR SERPL CREATININE-BSD FRML MDRD: 53 ML/MIN/1.73M2
GLUCOSE BLD-MCNC: 106 MG/DL (ref 74–99)
GLUCOSE SERPL-MCNC: 95 MG/DL (ref 74–99)
MAGNESIUM SERPL-MCNC: 2.5 MG/DL (ref 1.6–2.6)
PHOSPHATE SERPL-MCNC: 4.7 MG/DL (ref 2.5–4.5)
POTASSIUM SERPL-SCNC: 4.6 MMOL/L (ref 3.5–5)
SODIUM SERPL-SCNC: 138 MMOL/L (ref 132–146)

## 2024-04-18 PROCEDURE — 94640 AIRWAY INHALATION TREATMENT: CPT

## 2024-04-18 PROCEDURE — 36415 COLL VENOUS BLD VENIPUNCTURE: CPT

## 2024-04-18 PROCEDURE — 2700000000 HC OXYGEN THERAPY PER DAY

## 2024-04-18 PROCEDURE — 99239 HOSP IP/OBS DSCHRG MGMT >30: CPT | Performed by: INTERNAL MEDICINE

## 2024-04-18 PROCEDURE — 80048 BASIC METABOLIC PNL TOTAL CA: CPT

## 2024-04-18 PROCEDURE — 84100 ASSAY OF PHOSPHORUS: CPT

## 2024-04-18 PROCEDURE — 83735 ASSAY OF MAGNESIUM: CPT

## 2024-04-18 PROCEDURE — 82962 GLUCOSE BLOOD TEST: CPT

## 2024-04-18 PROCEDURE — 6370000000 HC RX 637 (ALT 250 FOR IP): Performed by: INTERNAL MEDICINE

## 2024-04-18 RX ADMIN — FERROUS SULFATE TAB 325 MG (65 MG ELEMENTAL FE) 325 MG: 325 (65 FE) TAB at 08:45

## 2024-04-18 RX ADMIN — IPRATROPIUM BROMIDE AND ALBUTEROL SULFATE 1 DOSE: .5; 2.5 SOLUTION RESPIRATORY (INHALATION) at 12:04

## 2024-04-18 RX ADMIN — ASPIRIN 81 MG: 81 TABLET, COATED ORAL at 08:45

## 2024-04-18 RX ADMIN — RIVAROXABAN 15 MG: 15 TABLET, FILM COATED ORAL at 08:45

## 2024-04-18 RX ADMIN — IPRATROPIUM BROMIDE AND ALBUTEROL SULFATE 1 DOSE: .5; 2.5 SOLUTION RESPIRATORY (INHALATION) at 08:39

## 2024-04-18 RX ADMIN — PANTOPRAZOLE SODIUM 40 MG: 40 TABLET, DELAYED RELEASE ORAL at 06:25

## 2024-04-18 RX ADMIN — METOPROLOL SUCCINATE 25 MG: 25 TABLET, EXTENDED RELEASE ORAL at 08:44

## 2024-04-18 RX ADMIN — ATORVASTATIN CALCIUM 20 MG: 20 TABLET, FILM COATED ORAL at 08:45

## 2024-04-18 RX ADMIN — METOCLOPRAMIDE 5 MG: 5 TABLET ORAL at 08:45

## 2024-04-18 RX ADMIN — TORSEMIDE 10 MG: 10 TABLET ORAL at 08:45

## 2024-04-18 NOTE — DISCHARGE SUMMARY
[facility] Hospitalist Physician Discharge Summary       [email or web address]  [address] [address]  [phone]        Julita Dyer MD  [address] [id number] [phone]    Schedule an appointment as soon as possible for a visit in 1 week(s)      David Craft MD  [address] [facility] [address]  [phone]    Call today  As needed    Marlen Keller MD  [address] [facility], [address]  [place] [id number]  [phone]    Schedule an appointment as soon as possible for a visit in 2 week(s)  Hospital follow-up in the office re: pacer interogation      Activity level: As tolerated     Dispo: Home with [facility]      Condition on discharge: Stable     Patient ID:  Barbara Jack  [id number]  88 y.o.  1935    Admit date: 4/16/2024    Discharge date and time:  4/18/2024  1:30 PM    Admission Diagnoses: Principal Problem:    Acute decompensated heart failure (HCC)  Active Problems:    Acute on chronic congestive heart failure (HCC)    Chronic renal disease, stage III (HCC) [463748]    Type 2 diabetes mellitus with chronic kidney disease    Nonrheumatic aortic valve insufficiency    Ischemic cardiomyopathy    COPD (chronic obstructive pulmonary disease) (HCC)    S/P ICD (internal cardiac defibrillator) procedure    Chronic anticoagulation    Precordial pain    Chronic HFrEF (heart failure with reduced ejection fraction) ([facility])  Resolved Problems:    * No resolved hospital problems. *      Discharge Diagnoses: Principal Problem:    Acute decompensated heart failure (HCC)  Active Problems:    Acute on chronic congestive heart failure (HCC)    Chronic renal disease, stage III (HCC) [490852]    Type 2 diabetes mellitus with chronic kidney disease    Nonrheumatic aortic valve insufficiency    Ischemic cardiomyopathy    COPD (chronic obstructive pulmonary disease) (HCC)    S/P ICD (internal cardiac defibrillator) procedure    Chronic anticoagulation

## 2024-04-18 NOTE — TELEPHONE ENCOUNTER
Pt's  calling for HFU apt/timing with Dr. Craft dx: Acute decompensated heart failure.  Calling to see if they need seen sooner then her previously scheduled 6 mth on: 5/28/24 per the hospital.

## 2024-04-18 NOTE — PROGRESS NOTES
Discharge paperwork reviewed with patient and  at the bedside. All questions answered at this time

## 2024-04-18 NOTE — CARE COORDINATION
Chart reviewed and case reviewed in IDR.  Patient a-paced, on oral medications and baseline 3L O2 per NC.  Met with the patient at the bedside.  She is sitting in the chair, feeling good today.  Patient stated she is hoping to discharge to home today.  Cardiology noted reviewed, no further testing planned at this time, patient to follow up with EP.   CHF nurse and dietary saw the patient and education provided.  OK to discharge from cardiology POV.  Spoke with Dr Vasquez, patient to be discharged to home.  Call placed to the patient's  and he will pick the patient up for transition of care and he will bring a tank for transport with him.  Bedside nurse Myra notified.  Iris with [facility]  notified that the patient will discharge to home today.  Will continue to follow for further transition of care planning needs.    Jaki Camp RN.  P:  [phone]

## 2024-04-18 NOTE — PLAN OF CARE
Problem: Chronic Conditions and Co-morbidities  Goal: Patient's chronic conditions and co-morbidity symptoms are monitored and maintained or improved  4/18/2024 0435 by Doris Metzger RN  Outcome: Progressing  4/17/2024 1806 by Kylah Aguilar, RN  Outcome: Progressing  Flowsheets (Taken 4/17/2024 0841)  Care Plan - Patient's Chronic Conditions and Co-Morbidity Symptoms are Monitored and Maintained or Improved: Monitor and assess patient's chronic conditions and comorbid symptoms for stability, deterioration, or improvement     Problem: Discharge Planning  Goal: Discharge to home or other facility with appropriate resources  Outcome: Progressing     Problem: Safety - Adult  Goal: Free from fall injury  Outcome: Progressing     Problem: ABCDS Injury Assessment  Goal: Absence of physical injury  Outcome: Progressing

## 2024-04-21 ENCOUNTER — APPOINTMENT (OUTPATIENT)
Dept: GENERAL RADIOLOGY | Age: 89
End: 2024-04-21
Payer: MEDICARE

## 2024-04-21 ENCOUNTER — APPOINTMENT (OUTPATIENT)
Dept: CT IMAGING | Age: 89
End: 2024-04-21
Payer: MEDICARE

## 2024-04-21 ENCOUNTER — HOSPITAL ENCOUNTER (EMERGENCY)
Age: 89
Discharge: HOME | End: 2024-04-22
Attending: EMERGENCY MEDICINE
Payer: MEDICARE

## 2024-04-21 VITALS
TEMPERATURE: 98.1 F | HEART RATE: 54 BPM | RESPIRATION RATE: 18 BRPM | SYSTOLIC BLOOD PRESSURE: 116 MMHG | DIASTOLIC BLOOD PRESSURE: 57 MMHG | OXYGEN SATURATION: 97 %

## 2024-04-21 DIAGNOSIS — S09.90XA INJURY OF HEAD, INITIAL ENCOUNTER: ICD-10-CM

## 2024-04-21 DIAGNOSIS — R42 DIZZINESS: ICD-10-CM

## 2024-04-21 DIAGNOSIS — T07.XXXA ABRASIONS OF MULTIPLE SITES: ICD-10-CM

## 2024-04-21 DIAGNOSIS — D64.9 CHRONIC ANEMIA: Primary | ICD-10-CM

## 2024-04-21 LAB
ALBUMIN SERPL-MCNC: 3.5 G/DL (ref 3.5–5.2)
ALP SERPL-CCNC: 62 U/L (ref 35–104)
ALT SERPL-CCNC: 16 U/L (ref 0–32)
ANION GAP SERPL CALCULATED.3IONS-SCNC: 13 MMOL/L (ref 7–16)
AST SERPL-CCNC: 49 U/L (ref 0–31)
BASOPHILS # BLD: 0.02 K/UL (ref 0–0.2)
BASOPHILS NFR BLD: 0 % (ref 0–2)
BILIRUB SERPL-MCNC: 0.4 MG/DL (ref 0–1.2)
BNP SERPL-MCNC: ABNORMAL PG/ML (ref 0–450)
BUN SERPL-MCNC: 28 MG/DL (ref 6–23)
CALCIUM SERPL-MCNC: 8.9 MG/DL (ref 8.6–10.2)
CHLORIDE SERPL-SCNC: 88 MMOL/L (ref 98–107)
CO2 SERPL-SCNC: 29 MMOL/L (ref 22–29)
CREAT SERPL-MCNC: 1.3 MG/DL (ref 0.5–1)
EOSINOPHIL # BLD: 0.08 K/UL (ref 0.05–0.5)
EOSINOPHILS RELATIVE PERCENT: 1 % (ref 0–6)
ERYTHROCYTE [DISTWIDTH] IN BLOOD BY AUTOMATED COUNT: 18.5 % (ref 11.5–15)
GFR SERPL CREATININE-BSD FRML MDRD: 41 ML/MIN/1.73M2
GLUCOSE SERPL-MCNC: 107 MG/DL (ref 74–99)
HCT VFR BLD AUTO: 26.2 % (ref 34–48)
HGB BLD-MCNC: 8.1 G/DL (ref 11.5–15.5)
IMM GRANULOCYTES # BLD AUTO: 0.03 K/UL (ref 0–0.58)
IMM GRANULOCYTES NFR BLD: 0 % (ref 0–5)
INR PPP: 2.7
LYMPHOCYTES NFR BLD: 0.71 K/UL (ref 1.5–4)
LYMPHOCYTES RELATIVE PERCENT: 10 % (ref 20–42)
MCH RBC QN AUTO: 29.5 PG (ref 26–35)
MCHC RBC AUTO-ENTMCNC: 30.9 G/DL (ref 32–34.5)
MCV RBC AUTO: 95.3 FL (ref 80–99.9)
MONOCYTES NFR BLD: 0.74 K/UL (ref 0.1–0.95)
MONOCYTES NFR BLD: 11 % (ref 2–12)
NEUTROPHILS NFR BLD: 78 % (ref 43–80)
NEUTS SEG NFR BLD: 5.47 K/UL (ref 1.8–7.3)
PARTIAL THROMBOPLASTIN TIME: 33.3 SEC (ref 24.5–35.1)
PLATELET # BLD AUTO: 332 K/UL (ref 130–450)
PMV BLD AUTO: 9 FL (ref 7–12)
POTASSIUM SERPL-SCNC: 6.2 MMOL/L (ref 3.5–5)
PROT SERPL-MCNC: 6.3 G/DL (ref 6.4–8.3)
PROTHROMBIN TIME: 29.6 SEC (ref 9.3–12.4)
RBC # BLD AUTO: 2.75 M/UL (ref 3.5–5.5)
SODIUM SERPL-SCNC: 130 MMOL/L (ref 132–146)
TROPONIN I SERPL HS-MCNC: 29 NG/L (ref 0–9)
TROPONIN I SERPL HS-MCNC: 33 NG/L (ref 0–9)
WBC OTHER # BLD: 7.1 K/UL (ref 4.5–11.5)

## 2024-04-21 PROCEDURE — 73610 X-RAY EXAM OF ANKLE: CPT

## 2024-04-21 PROCEDURE — 73521 X-RAY EXAM HIPS BI 2 VIEWS: CPT

## 2024-04-21 PROCEDURE — 71045 X-RAY EXAM CHEST 1 VIEW: CPT

## 2024-04-21 PROCEDURE — 84132 ASSAY OF SERUM POTASSIUM: CPT

## 2024-04-21 PROCEDURE — 85610 PROTHROMBIN TIME: CPT

## 2024-04-21 PROCEDURE — 73630 X-RAY EXAM OF FOOT: CPT

## 2024-04-21 PROCEDURE — 73110 X-RAY EXAM OF WRIST: CPT

## 2024-04-21 PROCEDURE — 99285 EMERGENCY DEPT VISIT HI MDM: CPT

## 2024-04-21 PROCEDURE — 73130 X-RAY EXAM OF HAND: CPT

## 2024-04-21 PROCEDURE — 80053 COMPREHEN METABOLIC PANEL: CPT

## 2024-04-21 PROCEDURE — 85730 THROMBOPLASTIN TIME PARTIAL: CPT

## 2024-04-21 PROCEDURE — 85025 COMPLETE CBC W/AUTO DIFF WBC: CPT

## 2024-04-21 PROCEDURE — 83880 ASSAY OF NATRIURETIC PEPTIDE: CPT

## 2024-04-21 PROCEDURE — 84484 ASSAY OF TROPONIN QUANT: CPT

## 2024-04-21 PROCEDURE — 70450 CT HEAD/BRAIN W/O DYE: CPT

## 2024-04-21 PROCEDURE — 72125 CT NECK SPINE W/O DYE: CPT

## 2024-04-21 PROCEDURE — 93005 ELECTROCARDIOGRAM TRACING: CPT | Performed by: EMERGENCY MEDICINE

## 2024-04-21 ASSESSMENT — PAIN - FUNCTIONAL ASSESSMENT: PAIN_FUNCTIONAL_ASSESSMENT: NONE - DENIES PAIN

## 2024-04-21 ASSESSMENT — LIFESTYLE VARIABLES
HOW MANY STANDARD DRINKS CONTAINING ALCOHOL DO YOU HAVE ON A TYPICAL DAY: PATIENT DOES NOT DRINK
HOW OFTEN DO YOU HAVE A DRINK CONTAINING ALCOHOL: NEVER

## 2024-04-22 PROBLEM — S09.90XA HEAD INJURY: Status: ACTIVE | Noted: 2024-04-22

## 2024-04-22 PROBLEM — T07.XXXA ABRASIONS OF MULTIPLE SITES: Status: ACTIVE | Noted: 2024-04-22

## 2024-04-22 PROBLEM — R42 DIZZINESS: Status: ACTIVE | Noted: 2024-04-22

## 2024-04-22 LAB
EKG ATRIAL RATE: 288 BPM
EKG Q-T INTERVAL: 258 MS
EKG QRS DURATION: 134 MS
EKG QTC CALCULATION (BAZETT): 325 MS
EKG R AXIS: -52 DEGREES
EKG T AXIS: 104 DEGREES
EKG VENTRICULAR RATE: 96 BPM
POTASSIUM SERPL-SCNC: 4.5 MMOL/L (ref 3.5–5)

## 2024-04-22 PROCEDURE — 93010 ELECTROCARDIOGRAM REPORT: CPT | Performed by: INTERNAL MEDICINE

## 2024-04-22 NOTE — ED PROVIDER NOTES
cardioverter-defibrillator) in place, Left ventricular dysfunction, Low vitamin D level, MI (myocardial infarction) ([facility]) (10/30/2009), Osteopenia, Osteoporosis, and Swallowing difficulty.     EMERGENCY DEPARTMENT COURSE and DIFFERENTIAL DIAGNOSIS/MDM:   Vitals:    Vitals:    04/21/24 2045 04/21/24 2252 04/21/24 2300   BP: (!) 142/75 (!) 116/57    Pulse: 63 54 54   Resp: 20 18    Temp:  98.1 °F (36.7 °C)    TempSrc:  Oral    SpO2: 96% 97%        Patient was given the following medications:  Medications - No data to display              CONSULTS: (Who and What was discussed)  None    Discussion with Other Profesionals : None    Social Determinants Significantly Affecting Health : None    Records Reviewed External : None    CC/HPI Summary, DDx, ED Course, and Reassessment: 88-year-old femalewho presents to the emergency department for fall.  Patient was brought into the emergency department by her  for a fall which happened approximately 1 hour prior to arrival to the ED.  Patient's  states the patient became dizzy when she went to turn around when she was putting her hearing aids and the  in the spare bedroom where her treadmill is she states that she fell over the treadmill.  She states that she did hit her head she did not lose consciousness she is on Eliquis.  Patient's  states that there is bruising to her left ankle he states that she was just discharged from the hospital for a recent fall and for an acute exacerbation of CHF.  Patient denies any chest pain denies any shortness of breath.  Patient is any blurred vision denies any abdominal pain nausea vomiting diarrhea loss of bowel or bladder function.  Patient states that he has not tried take anything for her symptoms.  Differential diagnosis includes syncope and collapse, dizziness, concussion, intracranial hemorrhage, TIA, CVA, worsening CHF, to name a few.  Endorsement of Care      The remaining care of Barbara Jack was

## 2024-04-24 ENCOUNTER — CARE COORDINATION (OUTPATIENT)
Dept: CASE MANAGEMENT | Age: 89
End: 2024-04-24

## 2024-04-24 NOTE — CARE COORDINATION
-Remote Alert Monitoring Note  Date/Time:  2024 11:49 AM  Patient Current Location: [place]  Verified patients name and  as identifiers.    Rpm alert to be reviewed by the provider   red alert  pulse ox reading (91%)  Additional needs to be addressed by provider: No                 Patient's  rechecked SpO2 level at 96%.  All reported metrics are WNL of RPM Alert Parameters.    Amanda Caballero LPN    [phone]  [facility] Coordinator / RPM

## 2024-04-24 NOTE — CARE COORDINATION
-Remote Alert Monitoring Note  Date/Time:  2024 10:37 AM  Patient Current Location: [place]  Verified patients name and  as identifiers.    Rpm alert to be reviewed by the provider   red alert  pulse ox reading (91%)  Additional needs to be addressed by provider: No                   LPN contacted patient's  by telephone regarding red alert received for Pulse Ox: 91%    Background: CHF, COPD  Refer to 911 immediately if:  Patient unresponsive or unable to provide history  Change in cognition or sudden confusion  Patient unable to respond in complete sentences  Intense chest pain/tightness  Any concern for any clinical emergency  Red Alert: Provider response time of 1 hr required for any red alert requiring intervention  Yellow Alert: Provider response time of 3hr required for any escalated yellow alert    Patient Chief Complaint:  O2 Triage  Are you having any Chest Pain? no   Are you having any Shortness of Breath? no   Swelling in your hands or feet? no     Are you having any other health concerns or issues? no     Clinical Interventions: Reviewed and followed up on alerts and treatments-Patient has low SpO2 level of 91%.  Called and spoke with , See. He states, \"She is doing very well and in no distress\". Denies CP, SOB, uses oxygen 2/L continuously, No swelling, no wheezing.   Patient has used Pulmicort nebulizer TX this morning.  Patient has cold hands most of the time.  will recheck SpO2 level after patient eats her breakfast.         Advised Patient's  to contact PCP  regarding CP, SOB with or without oxygen and any health concerns for early outpatient intervention in an effort to avoid hospitalization.  Report any worsening symptoms to PCP and/or Call 911 and/or GO TO  EMERGENCY ROOM if symptoms are severe or worsening.   Expresses understanding.     Plan/Follow Up: Will continue to review, monitor and address alerts with follow up based on severity of symptoms and

## 2024-04-25 RX ORDER — LOSARTAN POTASSIUM 25 MG/1
25 TABLET ORAL DAILY
Qty: 90 TABLET | Refills: 0 | Status: CANCELLED | OUTPATIENT
Start: 2024-04-25

## 2024-04-26 ENCOUNTER — CARE COORDINATION (OUTPATIENT)
Dept: CARE COORDINATION | Age: 89
End: 2024-04-26

## 2024-04-26 NOTE — CARE COORDINATION
Left HIPAA compliant message for patient to return call to [phone].  Re: Follow up: ACM attempted to reach out to patient to assess needs. She was recently hospitalized. Will continue to reach out to pt , Review POC     FOLLOW-UP PLAN:    Continue Care Coordination and Assess Plan Of Care  Review RPM Metrics and educate as appropriate  -Falls/Near Falls?  -OV AVS Reinforcement  -Appointment Reminders    Next Anticipated Outreach by PHP Care Team:  1 Week

## 2024-04-30 ENCOUNTER — APPOINTMENT (OUTPATIENT)
Dept: GENERAL RADIOLOGY | Age: 89
End: 2024-04-30
Payer: MEDICARE

## 2024-04-30 ENCOUNTER — HOSPITAL ENCOUNTER (INPATIENT)
Age: 89
LOS: 3 days | Discharge: NURSING, REHAB OR LONG-TERM CARE | End: 2024-05-03
Attending: EMERGENCY MEDICINE | Admitting: STUDENT IN AN ORGANIZED HEALTH CARE EDUCATION/TRAINING PROGRAM
Payer: MEDICARE

## 2024-04-30 DIAGNOSIS — E87.5 HYPERKALEMIA: Primary | ICD-10-CM

## 2024-04-30 DIAGNOSIS — N28.9 WORSENING RENAL FUNCTION: ICD-10-CM

## 2024-04-30 DIAGNOSIS — E87.8 HYPOCHLOREMIA: ICD-10-CM

## 2024-04-30 DIAGNOSIS — M53.3 COCCYGEAL PAIN, ACUTE: ICD-10-CM

## 2024-04-30 DIAGNOSIS — J81.0 ACUTE PULMONARY EDEMA (HCC): ICD-10-CM

## 2024-04-30 DIAGNOSIS — N17.9 AKI (ACUTE KIDNEY INJURY) (HCC): ICD-10-CM

## 2024-04-30 DIAGNOSIS — I50.43 CHF (CONGESTIVE HEART FAILURE), NYHA CLASS I, ACUTE ON CHRONIC, COMBINED (HCC): ICD-10-CM

## 2024-04-30 DIAGNOSIS — E87.1 HYPONATREMIA: ICD-10-CM

## 2024-04-30 LAB
ALBUMIN SERPL-MCNC: 3.9 G/DL (ref 3.5–5.2)
ALP SERPL-CCNC: 78 U/L (ref 35–104)
ALT SERPL-CCNC: 18 U/L (ref 0–32)
ANION GAP SERPL CALCULATED.3IONS-SCNC: 16 MMOL/L (ref 7–16)
ANION GAP SERPL CALCULATED.3IONS-SCNC: 17 MMOL/L (ref 7–16)
AST SERPL-CCNC: 33 U/L (ref 0–31)
BACTERIA URNS QL MICRO: ABNORMAL
BASOPHILS # BLD: 0 K/UL (ref 0–0.2)
BASOPHILS NFR BLD: 0 % (ref 0–2)
BILIRUB SERPL-MCNC: 0.8 MG/DL (ref 0–1.2)
BILIRUB UR QL STRIP: NEGATIVE
BNP SERPL-MCNC: ABNORMAL PG/ML (ref 0–450)
BUN SERPL-MCNC: 46 MG/DL (ref 6–23)
BUN SERPL-MCNC: 48 MG/DL (ref 6–23)
CALCIUM SERPL-MCNC: 9.2 MG/DL (ref 8.6–10.2)
CALCIUM SERPL-MCNC: 9.8 MG/DL (ref 8.6–10.2)
CHLORIDE SERPL-SCNC: 83 MMOL/L (ref 98–107)
CHLORIDE SERPL-SCNC: 87 MMOL/L (ref 98–107)
CLARITY UR: CLEAR
CO2 SERPL-SCNC: 24 MMOL/L (ref 22–29)
CO2 SERPL-SCNC: 24 MMOL/L (ref 22–29)
COLOR UR: YELLOW
CORTIS SERPL-MCNC: 33.9 UG/DL (ref 2.7–18.4)
CORTISOL COLLECTION INFO: ABNORMAL
CREAT SERPL-MCNC: 1.3 MG/DL (ref 0.5–1)
CREAT SERPL-MCNC: 1.4 MG/DL (ref 0.5–1)
CREAT UR-MCNC: 66.6 MG/DL (ref 29–226)
CREAT UR-MCNC: 69.3 MG/DL (ref 29–226)
EOSINOPHIL # BLD: 0 K/UL (ref 0.05–0.5)
EOSINOPHILS RELATIVE PERCENT: 0 % (ref 0–6)
EPI CELLS #/AREA URNS HPF: ABNORMAL /HPF
ERYTHROCYTE [DISTWIDTH] IN BLOOD BY AUTOMATED COUNT: 18.6 % (ref 11.5–15)
FLUAV RNA RESP QL NAA+PROBE: NOT DETECTED
FLUBV RNA RESP QL NAA+PROBE: NOT DETECTED
GFR SERPL CREATININE-BSD FRML MDRD: 38 ML/MIN/1.73M2
GFR SERPL CREATININE-BSD FRML MDRD: 41 ML/MIN/1.73M2
GLUCOSE SERPL-MCNC: 104 MG/DL (ref 74–99)
GLUCOSE SERPL-MCNC: 78 MG/DL (ref 74–99)
GLUCOSE UR STRIP-MCNC: NEGATIVE MG/DL
HCT VFR BLD AUTO: 30.9 % (ref 34–48)
HGB BLD-MCNC: 9.4 G/DL (ref 11.5–15.5)
HGB UR QL STRIP.AUTO: NEGATIVE
KETONES UR STRIP-MCNC: NEGATIVE MG/DL
LEUKOCYTE ESTERASE UR QL STRIP: ABNORMAL
LYMPHOCYTES NFR BLD: 0.08 K/UL (ref 1.5–4)
LYMPHOCYTES RELATIVE PERCENT: 1 % (ref 20–42)
MCH RBC QN AUTO: 30.5 PG (ref 26–35)
MCHC RBC AUTO-ENTMCNC: 30.4 G/DL (ref 32–34.5)
MCV RBC AUTO: 100.3 FL (ref 80–99.9)
MONOCYTES NFR BLD: 1.15 K/UL (ref 0.1–0.95)
MONOCYTES NFR BLD: 13 % (ref 2–12)
NEUTROPHILS NFR BLD: 87 % (ref 43–80)
NEUTS SEG NFR BLD: 7.97 K/UL (ref 1.8–7.3)
NITRITE UR QL STRIP: NEGATIVE
NUCLEATED RED BLOOD CELLS: 2 PER 100 WBC
OSMOLALITY SERPL: 284 MOSM/KG (ref 285–310)
OSMOLALITY UR: 383 MOSM/KG (ref 300–900)
PH UR STRIP: 6 [PH] (ref 5–9)
PLATELET # BLD AUTO: 398 K/UL (ref 130–450)
PMV BLD AUTO: 9.4 FL (ref 7–12)
POTASSIUM SERPL-SCNC: 5.4 MMOL/L (ref 3.5–5)
POTASSIUM SERPL-SCNC: 5.9 MMOL/L (ref 3.5–5)
POTASSIUM, UR: 62.4 MMOL/L
PROT SERPL-MCNC: 6.4 G/DL (ref 6.4–8.3)
PROT UR STRIP-MCNC: NEGATIVE MG/DL
RBC # BLD AUTO: 3.08 M/UL (ref 3.5–5.5)
RBC # BLD: ABNORMAL 10*6/UL
RBC #/AREA URNS HPF: ABNORMAL /HPF
SARS-COV-2 RNA RESP QL NAA+PROBE: NOT DETECTED
SODIUM SERPL-SCNC: 124 MMOL/L (ref 132–146)
SODIUM SERPL-SCNC: 127 MMOL/L (ref 132–146)
SODIUM UR-SCNC: <20 MMOL/L
SOURCE: NORMAL
SP GR UR STRIP: 1.01 (ref 1–1.03)
SPECIMEN DESCRIPTION: NORMAL
TOTAL PROTEIN, URINE: 12 MG/DL (ref 0–12)
TROPONIN I SERPL HS-MCNC: 42 NG/L (ref 0–9)
TROPONIN I SERPL HS-MCNC: 43 NG/L (ref 0–9)
URATE SERPL-MCNC: 7.9 MG/DL (ref 2.4–5.7)
URINE TOTAL PROTEIN CREATININE RATIO: 0.17 (ref 0–0.2)
UROBILINOGEN UR STRIP-ACNC: 1 EU/DL (ref 0–1)
UUN UR-MCNC: 516 MG/DL (ref 800–1666)
WBC #/AREA URNS HPF: ABNORMAL /HPF
WBC OTHER # BLD: 9.2 K/UL (ref 4.5–11.5)

## 2024-04-30 PROCEDURE — 81001 URINALYSIS AUTO W/SCOPE: CPT

## 2024-04-30 PROCEDURE — 2500000003 HC RX 250 WO HCPCS

## 2024-04-30 PROCEDURE — 83930 ASSAY OF BLOOD OSMOLALITY: CPT

## 2024-04-30 PROCEDURE — 83935 ASSAY OF URINE OSMOLALITY: CPT

## 2024-04-30 PROCEDURE — 84156 ASSAY OF PROTEIN URINE: CPT

## 2024-04-30 PROCEDURE — 1200000000 HC SEMI PRIVATE

## 2024-04-30 PROCEDURE — 84540 ASSAY OF URINE/UREA-N: CPT

## 2024-04-30 PROCEDURE — 80053 COMPREHEN METABOLIC PANEL: CPT

## 2024-04-30 PROCEDURE — 6370000000 HC RX 637 (ALT 250 FOR IP)

## 2024-04-30 PROCEDURE — 84550 ASSAY OF BLOOD/URIC ACID: CPT

## 2024-04-30 PROCEDURE — 80048 BASIC METABOLIC PNL TOTAL CA: CPT

## 2024-04-30 PROCEDURE — 82533 TOTAL CORTISOL: CPT

## 2024-04-30 PROCEDURE — 83880 ASSAY OF NATRIURETIC PEPTIDE: CPT

## 2024-04-30 PROCEDURE — 99223 1ST HOSP IP/OBS HIGH 75: CPT | Performed by: STUDENT IN AN ORGANIZED HEALTH CARE EDUCATION/TRAINING PROGRAM

## 2024-04-30 PROCEDURE — 96374 THER/PROPH/DIAG INJ IV PUSH: CPT

## 2024-04-30 PROCEDURE — 6360000002 HC RX W HCPCS: Performed by: STUDENT IN AN ORGANIZED HEALTH CARE EDUCATION/TRAINING PROGRAM

## 2024-04-30 PROCEDURE — 93005 ELECTROCARDIOGRAM TRACING: CPT

## 2024-04-30 PROCEDURE — 84484 ASSAY OF TROPONIN QUANT: CPT

## 2024-04-30 PROCEDURE — 99285 EMERGENCY DEPT VISIT HI MDM: CPT

## 2024-04-30 PROCEDURE — 84133 ASSAY OF URINE POTASSIUM: CPT

## 2024-04-30 PROCEDURE — 82570 ASSAY OF URINE CREATININE: CPT

## 2024-04-30 PROCEDURE — 84300 ASSAY OF URINE SODIUM: CPT

## 2024-04-30 PROCEDURE — 72170 X-RAY EXAM OF PELVIS: CPT

## 2024-04-30 PROCEDURE — 6370000000 HC RX 637 (ALT 250 FOR IP): Performed by: STUDENT IN AN ORGANIZED HEALTH CARE EDUCATION/TRAINING PROGRAM

## 2024-04-30 PROCEDURE — 2500000003 HC RX 250 WO HCPCS: Performed by: STUDENT IN AN ORGANIZED HEALTH CARE EDUCATION/TRAINING PROGRAM

## 2024-04-30 PROCEDURE — 71045 X-RAY EXAM CHEST 1 VIEW: CPT

## 2024-04-30 PROCEDURE — 94640 AIRWAY INHALATION TREATMENT: CPT

## 2024-04-30 PROCEDURE — 85025 COMPLETE CBC W/AUTO DIFF WBC: CPT

## 2024-04-30 PROCEDURE — 2580000003 HC RX 258: Performed by: STUDENT IN AN ORGANIZED HEALTH CARE EDUCATION/TRAINING PROGRAM

## 2024-04-30 PROCEDURE — 87636 SARSCOV2 & INF A&B AMP PRB: CPT

## 2024-04-30 RX ORDER — SODIUM CHLORIDE 0.9 % (FLUSH) 0.9 %
5-40 SYRINGE (ML) INJECTION EVERY 12 HOURS SCHEDULED
Status: DISCONTINUED | OUTPATIENT
Start: 2024-04-30 | End: 2024-05-03 | Stop reason: HOSPADM

## 2024-04-30 RX ORDER — PROCHLORPERAZINE MALEATE 10 MG
10 TABLET ORAL EVERY 6 HOURS PRN
Status: DISCONTINUED | OUTPATIENT
Start: 2024-04-30 | End: 2024-05-03 | Stop reason: HOSPADM

## 2024-04-30 RX ORDER — ACETAMINOPHEN 650 MG/1
650 SUPPOSITORY RECTAL EVERY 6 HOURS PRN
Status: DISCONTINUED | OUTPATIENT
Start: 2024-04-30 | End: 2024-05-03 | Stop reason: HOSPADM

## 2024-04-30 RX ORDER — CALCIUM GLUCONATE 10 MG/ML
1000 INJECTION, SOLUTION INTRAVENOUS ONCE
Status: COMPLETED | OUTPATIENT
Start: 2024-04-30 | End: 2024-04-30

## 2024-04-30 RX ORDER — ONDANSETRON 2 MG/ML
4 INJECTION INTRAMUSCULAR; INTRAVENOUS EVERY 6 HOURS PRN
Status: DISCONTINUED | OUTPATIENT
Start: 2024-04-30 | End: 2024-04-30

## 2024-04-30 RX ORDER — ATORVASTATIN CALCIUM 10 MG/1
10 TABLET, FILM COATED ORAL DAILY
Status: DISCONTINUED | OUTPATIENT
Start: 2024-04-30 | End: 2024-05-03 | Stop reason: HOSPADM

## 2024-04-30 RX ORDER — ONDANSETRON 4 MG/1
4 TABLET, ORALLY DISINTEGRATING ORAL EVERY 8 HOURS PRN
Status: DISCONTINUED | OUTPATIENT
Start: 2024-04-30 | End: 2024-04-30

## 2024-04-30 RX ORDER — SODIUM CHLORIDE 0.9 % (FLUSH) 0.9 %
5-40 SYRINGE (ML) INJECTION PRN
Status: DISCONTINUED | OUTPATIENT
Start: 2024-04-30 | End: 2024-05-03 | Stop reason: HOSPADM

## 2024-04-30 RX ORDER — ASPIRIN 81 MG/1
81 TABLET ORAL DAILY
Status: DISCONTINUED | OUTPATIENT
Start: 2024-04-30 | End: 2024-05-03 | Stop reason: HOSPADM

## 2024-04-30 RX ORDER — SODIUM CHLORIDE 9 MG/ML
INJECTION, SOLUTION INTRAVENOUS PRN
Status: DISCONTINUED | OUTPATIENT
Start: 2024-04-30 | End: 2024-05-03 | Stop reason: HOSPADM

## 2024-04-30 RX ORDER — 0.9 % SODIUM CHLORIDE 0.9 %
500 INTRAVENOUS SOLUTION INTRAVENOUS ONCE
Status: DISCONTINUED | OUTPATIENT
Start: 2024-04-30 | End: 2024-04-30

## 2024-04-30 RX ORDER — TORSEMIDE 20 MG/1
10 TABLET ORAL DAILY
Status: DISCONTINUED | OUTPATIENT
Start: 2024-04-30 | End: 2024-04-30

## 2024-04-30 RX ORDER — ACETAMINOPHEN 325 MG/1
650 TABLET ORAL EVERY 6 HOURS PRN
Status: DISCONTINUED | OUTPATIENT
Start: 2024-04-30 | End: 2024-05-03 | Stop reason: HOSPADM

## 2024-04-30 RX ORDER — FERROUS SULFATE 325(65) MG
325 TABLET ORAL
Status: DISCONTINUED | OUTPATIENT
Start: 2024-05-01 | End: 2024-05-03 | Stop reason: HOSPADM

## 2024-04-30 RX ORDER — BUMETANIDE 0.25 MG/ML
2 INJECTION INTRAMUSCULAR; INTRAVENOUS 2 TIMES DAILY
Status: DISCONTINUED | OUTPATIENT
Start: 2024-04-30 | End: 2024-05-03 | Stop reason: HOSPADM

## 2024-04-30 RX ORDER — PANTOPRAZOLE SODIUM 40 MG/1
40 TABLET, DELAYED RELEASE ORAL
Status: DISCONTINUED | OUTPATIENT
Start: 2024-05-01 | End: 2024-05-03 | Stop reason: HOSPADM

## 2024-04-30 RX ORDER — METOPROLOL SUCCINATE 25 MG/1
25 TABLET, EXTENDED RELEASE ORAL DAILY
Status: DISCONTINUED | OUTPATIENT
Start: 2024-04-30 | End: 2024-05-03 | Stop reason: HOSPADM

## 2024-04-30 RX ORDER — PROCHLORPERAZINE EDISYLATE 5 MG/ML
10 INJECTION INTRAMUSCULAR; INTRAVENOUS EVERY 8 HOURS PRN
Status: DISCONTINUED | OUTPATIENT
Start: 2024-04-30 | End: 2024-05-03 | Stop reason: HOSPADM

## 2024-04-30 RX ORDER — IPRATROPIUM BROMIDE AND ALBUTEROL SULFATE 2.5; .5 MG/3ML; MG/3ML
1 SOLUTION RESPIRATORY (INHALATION) ONCE
Status: COMPLETED | OUTPATIENT
Start: 2024-04-30 | End: 2024-04-30

## 2024-04-30 RX ORDER — POLYETHYLENE GLYCOL 3350 17 G/17G
17 POWDER, FOR SOLUTION ORAL DAILY PRN
Status: DISCONTINUED | OUTPATIENT
Start: 2024-04-30 | End: 2024-05-03 | Stop reason: HOSPADM

## 2024-04-30 RX ORDER — AMIODARONE HYDROCHLORIDE 200 MG/1
100 TABLET ORAL DAILY
Status: DISCONTINUED | OUTPATIENT
Start: 2024-04-30 | End: 2024-05-03 | Stop reason: HOSPADM

## 2024-04-30 RX ADMIN — SODIUM CHLORIDE, PRESERVATIVE FREE 10 ML: 5 INJECTION INTRAVENOUS at 21:51

## 2024-04-30 RX ADMIN — ACETAMINOPHEN 650 MG: 325 TABLET ORAL at 21:47

## 2024-04-30 RX ADMIN — CALCIUM GLUCONATE 1000 MG: 10 INJECTION, SOLUTION INTRAVENOUS at 16:37

## 2024-04-30 RX ADMIN — SODIUM BICARBONATE 50 MEQ: 84 INJECTION INTRAVENOUS at 14:43

## 2024-04-30 RX ADMIN — SODIUM ZIRCONIUM CYCLOSILICATE 10 G: 10 POWDER, FOR SUSPENSION ORAL at 16:38

## 2024-04-30 RX ADMIN — Medication 15 G: at 21:45

## 2024-04-30 RX ADMIN — BUMETANIDE 2 MG: 0.25 INJECTION INTRAMUSCULAR; INTRAVENOUS at 18:11

## 2024-04-30 RX ADMIN — IPRATROPIUM BROMIDE AND ALBUTEROL SULFATE 1 DOSE: 2.5; .5 SOLUTION RESPIRATORY (INHALATION) at 11:42

## 2024-04-30 RX ADMIN — ATORVASTATIN CALCIUM 10 MG: 10 TABLET, FILM COATED ORAL at 18:11

## 2024-04-30 RX ADMIN — METOPROLOL SUCCINATE 25 MG: 25 TABLET, EXTENDED RELEASE ORAL at 18:11

## 2024-04-30 ASSESSMENT — PAIN - FUNCTIONAL ASSESSMENT: PAIN_FUNCTIONAL_ASSESSMENT: 0-10

## 2024-04-30 ASSESSMENT — PAIN SCALES - GENERAL
PAINLEVEL_OUTOF10: 6
PAINLEVEL_OUTOF10: 0

## 2024-04-30 ASSESSMENT — LIFESTYLE VARIABLES: HOW OFTEN DO YOU HAVE A DRINK CONTAINING ALCOHOL: NEVER

## 2024-04-30 ASSESSMENT — PAIN DESCRIPTION - DESCRIPTORS: DESCRIPTORS: TENDER

## 2024-04-30 ASSESSMENT — PAIN DESCRIPTION - LOCATION: LOCATION: OTHER (COMMENT)

## 2024-04-30 NOTE — ED PROVIDER NOTES
Barbara Jack is a 89 y.o. female    HPI  Barbara Jack is a 89 y.o. female presenting to the ED for OTHER (Home Care nurse sent patient in for low HR.  Patient's only complaint is tailbone pain from fall 2 days ago.)    History comes primarily from the patient and EMS.  Patient presents to the emergency department for abnormal vital signs.  EMS was called because the patient's at home nurse stated that she did not like the vital signs.  The patient reportedly had a heart rate of 55.  This is why EMS was called.  The patient denies any lightheadedness or dizziness, chest pain or shortness of breath, abdominal pain, nausea or vomiting, fever, cough, urinary symptoms or otherwise.  The patient is on Toprol-XL 25 once daily.  She is also on amiodarone, Xarelto and has ICD/pacemaker atrial fibrillation and heart failure.    The patient's primary complaint is pain in her tailbone because she said she had fell 2 days ago. She also complains of some sinus drainage, but no significant increase in shortness of breath. She wears 4L of O2 at baseline for COPD. The patient does state that she normally gets breathing treatments       ROS  Full review of systems completed.  Pertinent positives and negatives per the HPI, unless otherwise stated ROS is negative.    Physical Exam  Vitals and nursing note reviewed.   Constitutional:       General: She is not in acute distress.     Appearance: Normal appearance. She is not ill-appearing.   HENT:      Head: Normocephalic and atraumatic.      Right Ear: External ear normal.      Left Ear: External ear normal.      Nose: Nose normal. No rhinorrhea.      Mouth/Throat:      Mouth: Mucous membranes are moist.      Pharynx: Oropharynx is clear.   Eyes:      Extraocular Movements: Extraocular movements intact.      Conjunctiva/sclera: Conjunctivae normal.      Pupils: Pupils are equal, round, and reactive to light.   Cardiovascular:      Rate and Rhythm: Normal rate and regular rhythm.

## 2024-04-30 NOTE — CONSULTS
[facility]  Nephrology Consult Note    Patient's Name: Barbara Jack    Reason for Consult:  hyperkalemia, hyponatremia, worsening renal function    Chief Complaint: Bradycardia  History Obtained From:  patient, past medical records, and EMR    History of Present Illness:    Barbara Jack is a 89 y.o. female with a past medical history of CAD, CHF, COPD, hypertension, and hyperlipidemia.  She presented to the ED on 4/30 reportedly for abnormal vital signs, reportedly a low heart rate.  Vital signs on 4/30 includes temperature 97.4, respirations 16, pulse 61, /67, and she was 94% SpO2.  Lab data on 4/30 includes sodium 124, potassium 5.9, BUN 48, creatinine 1.4, anion gap 17, proBNP 39,875, and hemoglobin 9.4.  She had a chest x-ray on 4/30 which showed bronchial wall thickening and interstitial coarsening, left greater than right pleural and parenchymal opacities in the lung bases.  Nephrology has been consulted to see the patient for hyperkalemia, hyponatremia, and worsening renal function.  Patient is known to our service.  She has a history of CKD with a baseline creatinine of 1-1.4.   At present, patient was seen and examined.  She explained that she has had a cough for approximately 2 days.  She notes intermittent shortness of breath and intermittent dizziness.  She denies any sore throat.  She denies any chest pain.  She denies any nausea or vomiting.  She denies any fevers or headache.  She denies any dysuria or hematuria.  She explained that her appetite was poor.  She notes soft stools.    PMH:    Past Medical History:   Diagnosis Date    Arthritis     Atrial fibrillation (HCC)     follows with Dr Craft  on xarelto    CAD (coronary artery disease)     Cardiomyopathy (HCC)     CHF (congestive heart failure) ([facility]) 2010    history of follows with Dr Craft 4/27/23    Chronic anticoagulation     Chronic bronchitis ([facility])     none recent    COPD (chronic obstructive pulmonary disease) ([facility])      I&O, daily weights  On Bumex 2 mg IV twice daily  Monitor labs    2.  Hyperkalemia  Likely in the setting of CKD  K+ 5.9 today  For Lokelma today  Low potassium diet  Repeat BMP this evening-notify renal of results  Monitor labs    3.  Hyponatremia  Suspect hypervolemia (history of HFrEF)  possibly exacerbated by poor oral intake  Sodium 124 today   Cortisol 33.9, uric acid 7.9  Await serum osmolality and urine electrolytes, urine osmolality  Strict I&O  Ure-na 15 g oral twice daily  Monitor labs    4. HFrEF  Echo 10/2023 EF 20-25%, grade 2 diastolic dysfunction  proBNP 39,875  Strict I&O, daily weights  Bumex 2 mg IV twice daily  Monitor volume status    5.  Anemia  Likely in part with CKD  Hemoglobin target 10-12  Hemoglobin 9.4-just below target  Check iron studies  Transfuse for hemoglobin<7  Monitor H&H    Lois Munoz, APRN - CNP    Patient seen and examined all key components of the physical performed independently , case discussed with NP, all pertinent labs and radiologic tests personally reviewed agree with above.      Maikel Roy MD

## 2024-04-30 NOTE — H&P
cooperative.  HEENT: Normal cephalic, atraumatic without obvious deformity. Pupils equal, round, and reactive to light.  Extra ocular muscles intact. Conjunctivae/corneas clear.  Neck: Supple, with full range of motion. No jugular venous distention. Trachea midline.  Respiratory:  lungs clear to auscultation; without wheezes, rales or rhonchi; on Nasal cannula  Cardiovascular:  regular rate and rhythm; normal S1, S2; no murmurs, rubs, clicks or gallops; peripheral edema PRESENT/ABSENT: Absent   Abdomen:  soft, non-tender, non-distended  Musculoskeletal: No clubbing, cyanosis, edema of bilateral lower extremities. Brisk capillary refill.   Skin: Normal skin color.  No rashes or lesions.  Neurologic: Oriented to person, place, and time, normal speech, no obvious focal deficits  Psychiatric: Thought content appropriate, normal insight      CBC:   Recent Labs     04/30/24  1145   WBC 9.2   RBC 3.08*   HGB 9.4*   HCT 30.9*   .3*   RDW 18.6*        BMP:   Recent Labs     04/30/24  1229   *   K 5.9*   CL 83*   CO2 24   BUN 48*   CREATININE 1.4*     LFT:  Recent Labs     04/30/24  1229   PROT 6.4   ALKPHOS 78   ALT 18   AST 33*   BILITOT 0.8     CE:  No results for input(s): \"CKTOTAL\", \"TROPONINI\" in the last 72 hours.  PT/INR: No results for input(s): \"INR\", \"APTT\" in the last 72 hours.  BNP: No results for input(s): \"BNP\" in the last 72 hours.  ESR:   Lab Results   Component Value Date    SEDRATE 91 (H) 12/17/2023     CRP:   Lab Results   Component Value Date    .0 (H) 12/17/2023     D Dimer:   Lab Results   Component Value Date    DDIMER <200 08/18/2022      Folate and B12:   Lab Results   Component Value Date    AZLTKFVO31 >2000 (H) 03/12/2024   ,   Lab Results   Component Value Date    FOLATE >20.0 03/12/2024     Lactic Acid:   Lab Results   Component Value Date    LACTA 1.8 03/12/2024     Thyroid Studies:   Lab Results   Component Value Date    TSH 0.825 06/10/2020       Oupatient labs:  Lab  PORTABLE    Result Date: 4/21/2024  EXAMINATION: ONE XRAY VIEW OF THE CHEST 4/21/2024 9:12 pm COMPARISON: 04/17/2024 HISTORY: ORDERING SYSTEM PROVIDED HISTORY: chest pain TECHNOLOGIST PROVIDED HISTORY: Reason for exam:->chest pain FINDINGS: The cardiac generator and leads are stable in position.  There has been a slight interval increase in small bilateral pleural effusions.  There are bibasilar parenchymal densities within the lungs felt represent areas of pneumonitis and/or atelectasis.  The overall appearance of this process is stable.  The heart size is prominent but unchanged when compared the previous study.  The pulmonary vasculature appears within normal range.     1. Slight interval increase in bilateral pleural effusions. 2. Cardiomegaly, stable. 3. Bibasilar parenchymal densities concerning for pneumonia and/or atelectasis, stable.     CT HEAD WO CONTRAST    Result Date: 4/21/2024  EXAMINATION: CT OF THE HEAD WITHOUT CONTRAST  4/21/2024 8:02 pm TECHNIQUE: CT of the head was performed without the administration of intravenous contrast. Automated exposure control, iterative reconstruction, and/or weight based adjustment of the mA/kV was utilized to reduce the radiation dose to as low as reasonably achievable. COMPARISON: CT of the head from 03/30/2024 HISTORY: ORDERING SYSTEM PROVIDED HISTORY: Evaluate intracranial abnormality TECHNOLOGIST PROVIDED HISTORY: Has a \"code stroke\" or \"stroke alert\" been called?->No Reason for exam:->Evaluate intracranial abnormality Reason for exam:->trauma Decision Support Exception - unselect if not a suspected or confirmed emergency medical condition->Emergency Medical Condition (MA) FINDINGS: BRAIN/VENTRICLES: There is stable appearance of the slightly high density 1.3 cm mass in the dorsal left medulla. There is no acute intracranial hemorrhage, mass effect or midline shift.  No abnormal extra-axial fluid collection.  The gray-white differentiation is maintained without

## 2024-05-01 ENCOUNTER — APPOINTMENT (OUTPATIENT)
Age: 89
End: 2024-05-01
Attending: STUDENT IN AN ORGANIZED HEALTH CARE EDUCATION/TRAINING PROGRAM
Payer: MEDICARE

## 2024-05-01 ENCOUNTER — CARE COORDINATION (OUTPATIENT)
Dept: CASE MANAGEMENT | Age: 89
End: 2024-05-01

## 2024-05-01 LAB
ANION GAP SERPL CALCULATED.3IONS-SCNC: 15 MMOL/L (ref 7–16)
BASOPHILS # BLD: 0.01 K/UL (ref 0–0.2)
BASOPHILS NFR BLD: 0 % (ref 0–2)
BNP SERPL-MCNC: ABNORMAL PG/ML (ref 0–450)
BUN SERPL-MCNC: 73 MG/DL (ref 6–23)
CALCIUM SERPL-MCNC: 9.7 MG/DL (ref 8.6–10.2)
CHLORIDE SERPL-SCNC: 86 MMOL/L (ref 98–107)
CO2 SERPL-SCNC: 30 MMOL/L (ref 22–29)
CREAT SERPL-MCNC: 1.4 MG/DL (ref 0.5–1)
ECHO AO ASC DIAM: 3.3 CM
ECHO AO ASCENDING AORTA INDEX: 2.46 CM/M2
ECHO AR MAX VEL PISA: 4 M/S
ECHO AV AREA PEAK VELOCITY: 0.8 CM2
ECHO AV AREA VTI: 0.7 CM2
ECHO AV AREA/BSA PEAK VELOCITY: 0.6 CM2/M2
ECHO AV AREA/BSA VTI: 0.5 CM2/M2
ECHO AV CUSP MM: 1.6 CM
ECHO AV MEAN GRADIENT: 15 MMHG
ECHO AV MEAN VELOCITY: 1.9 M/S
ECHO AV PEAK GRADIENT: 26 MMHG
ECHO AV PEAK VELOCITY: 2.6 M/S
ECHO AV REGURGITANT PHT: 556.8 MILLISECOND
ECHO AV VELOCITY RATIO: 0.23
ECHO AV VTI: 51.5 CM
ECHO BSA: 1.34 M2
ECHO EST RA PRESSURE: 15 MMHG
ECHO LA DIAMETER INDEX: 2.99 CM/M2
ECHO LA DIAMETER: 4 CM
ECHO LA VOL A-L A2C: 85 ML (ref 22–52)
ECHO LA VOL A-L A4C: 154 ML (ref 22–52)
ECHO LA VOL MOD A2C: 84 ML (ref 22–52)
ECHO LA VOL MOD A4C: 148 ML (ref 22–52)
ECHO LA VOLUME AREA LENGTH: 139 ML
ECHO LA VOLUME INDEX A-L A2C: 63 ML/M2 (ref 16–34)
ECHO LA VOLUME INDEX A-L A4C: 115 ML/M2 (ref 16–34)
ECHO LA VOLUME INDEX AREA LENGTH: 104 ML/M2 (ref 16–34)
ECHO LA VOLUME INDEX MOD A2C: 63 ML/M2 (ref 16–34)
ECHO LA VOLUME INDEX MOD A4C: 110 ML/M2 (ref 16–34)
ECHO LV EJECTION FRACTION A2C: 37 %
ECHO LV EJECTION FRACTION A4C: 34 %
ECHO LV FRACTIONAL SHORTENING: 13 % (ref 28–44)
ECHO LV INTERNAL DIMENSION DIASTOLE INDEX: 3.58 CM/M2
ECHO LV INTERNAL DIMENSION DIASTOLIC: 4.8 CM (ref 3.9–5.3)
ECHO LV INTERNAL DIMENSION SYSTOLIC INDEX: 3.13 CM/M2
ECHO LV INTERNAL DIMENSION SYSTOLIC: 4.2 CM
ECHO LV IVSD: 1.4 CM (ref 0.6–0.9)
ECHO LV IVSS: 1.7 CM
ECHO LV MASS 2D: 273.8 G (ref 67–162)
ECHO LV MASS INDEX 2D: 204.3 G/M2 (ref 43–95)
ECHO LV POSTERIOR WALL DIASTOLIC: 1.4 CM (ref 0.6–0.9)
ECHO LV POSTERIOR WALL SYSTOLIC: 1.1 CM
ECHO LV RELATIVE WALL THICKNESS RATIO: 0.58
ECHO LVOT AREA: 3.1 CM2
ECHO LVOT AV VTI INDEX: 0.21
ECHO LVOT DIAM: 2 CM
ECHO LVOT MEAN GRADIENT: 1 MMHG
ECHO LVOT PEAK GRADIENT: 2 MMHG
ECHO LVOT PEAK VELOCITY: 0.6 M/S
ECHO LVOT STROKE VOLUME INDEX: 25.8 ML/M2
ECHO LVOT SV: 34.5 ML
ECHO LVOT VTI: 11 CM
ECHO MV A VELOCITY: 0.44 M/S
ECHO MV E DECELERATION TIME (DT): 166.3 MS
ECHO MV E VELOCITY: 1.06 M/S
ECHO MV E/A RATIO: 2.41
ECHO MV EROA PISA: 0.7 CM2
ECHO MV REGURGITANT ALIASING (NYQUIST) VELOCITY: 81 CM/S
ECHO MV REGURGITANT RADIUS PISA: 0.75 CM
ECHO MV REGURGITANT VELOCITY PISA: 4.2 M/S
ECHO MV REGURGITANT VOLUME PISA: 110.3 ML
ECHO MV REGURGITANT VTIA: 161.9 CM
ECHO PULMONARY ARTERY END DIASTOLIC PRESSURE: 4 MMHG
ECHO PV REGURGITANT MAX VELOCITY: 1.1 M/S
ECHO RIGHT VENTRICULAR SYSTOLIC PRESSURE (RVSP): 73 MMHG
ECHO RV INTERNAL DIMENSION: 3.5 CM
ECHO TV REGURGITANT MAX VELOCITY: 3.8 M/S
ECHO TV REGURGITANT PEAK GRADIENT: 58 MMHG
EOSINOPHIL # BLD: 0 K/UL (ref 0.05–0.5)
EOSINOPHILS RELATIVE PERCENT: 0 % (ref 0–6)
ERYTHROCYTE [DISTWIDTH] IN BLOOD BY AUTOMATED COUNT: 18.7 % (ref 11.5–15)
FERRITIN SERPL-MCNC: 146 NG/ML
FOLATE SERPL-MCNC: >20 NG/ML (ref 4.8–24.2)
GFR, ESTIMATED: 35 ML/MIN/1.73M2
GLUCOSE SERPL-MCNC: 83 MG/DL (ref 74–99)
HCT VFR BLD AUTO: 24.9 % (ref 34–48)
HGB BLD-MCNC: 8 G/DL (ref 11.5–15.5)
IMM GRANULOCYTES # BLD AUTO: 0.07 K/UL (ref 0–0.58)
IMM GRANULOCYTES NFR BLD: 1 % (ref 0–5)
IRON SATN MFR SERPL: 5 % (ref 15–50)
IRON SERPL-MCNC: 18 UG/DL (ref 37–145)
LYMPHOCYTES NFR BLD: 0.4 K/UL (ref 1.5–4)
LYMPHOCYTES RELATIVE PERCENT: 5 % (ref 20–42)
MAGNESIUM SERPL-MCNC: 2.3 MG/DL (ref 1.6–2.6)
MCH RBC QN AUTO: 30.3 PG (ref 26–35)
MCHC RBC AUTO-ENTMCNC: 32.1 G/DL (ref 32–34.5)
MCV RBC AUTO: 94.3 FL (ref 80–99.9)
MONOCYTES NFR BLD: 0.89 K/UL (ref 0.1–0.95)
MONOCYTES NFR BLD: 12 % (ref 2–12)
NEUTROPHILS NFR BLD: 82 % (ref 43–80)
NEUTS SEG NFR BLD: 6.14 K/UL (ref 1.8–7.3)
PHOSPHATE SERPL-MCNC: 5.3 MG/DL (ref 2.5–4.5)
PLATELET # BLD AUTO: 408 K/UL (ref 130–450)
PMV BLD AUTO: 9.5 FL (ref 7–12)
POTASSIUM SERPL-SCNC: 4.8 MMOL/L (ref 3.5–5)
RBC # BLD AUTO: 2.64 M/UL (ref 3.5–5.5)
RBC # BLD: ABNORMAL 10*6/UL
SODIUM SERPL-SCNC: 131 MMOL/L (ref 132–146)
TIBC SERPL-MCNC: 386 UG/DL (ref 250–450)
VIT B12 SERPL-MCNC: >2000 PG/ML (ref 211–946)
WBC OTHER # BLD: 7.5 K/UL (ref 4.5–11.5)

## 2024-05-01 PROCEDURE — 6370000000 HC RX 637 (ALT 250 FOR IP)

## 2024-05-01 PROCEDURE — 83735 ASSAY OF MAGNESIUM: CPT

## 2024-05-01 PROCEDURE — 84100 ASSAY OF PHOSPHORUS: CPT

## 2024-05-01 PROCEDURE — 82746 ASSAY OF FOLIC ACID SERUM: CPT

## 2024-05-01 PROCEDURE — 83540 ASSAY OF IRON: CPT

## 2024-05-01 PROCEDURE — 93306 TTE W/DOPPLER COMPLETE: CPT

## 2024-05-01 PROCEDURE — 6370000000 HC RX 637 (ALT 250 FOR IP): Performed by: STUDENT IN AN ORGANIZED HEALTH CARE EDUCATION/TRAINING PROGRAM

## 2024-05-01 PROCEDURE — 2580000003 HC RX 258: Performed by: STUDENT IN AN ORGANIZED HEALTH CARE EDUCATION/TRAINING PROGRAM

## 2024-05-01 PROCEDURE — 82607 VITAMIN B-12: CPT

## 2024-05-01 PROCEDURE — 80048 BASIC METABOLIC PNL TOTAL CA: CPT

## 2024-05-01 PROCEDURE — 1200000000 HC SEMI PRIVATE

## 2024-05-01 PROCEDURE — 36415 COLL VENOUS BLD VENIPUNCTURE: CPT

## 2024-05-01 PROCEDURE — 82728 ASSAY OF FERRITIN: CPT

## 2024-05-01 PROCEDURE — 6360000002 HC RX W HCPCS: Performed by: STUDENT IN AN ORGANIZED HEALTH CARE EDUCATION/TRAINING PROGRAM

## 2024-05-01 PROCEDURE — 94640 AIRWAY INHALATION TREATMENT: CPT

## 2024-05-01 PROCEDURE — 2700000000 HC OXYGEN THERAPY PER DAY

## 2024-05-01 PROCEDURE — 93306 TTE W/DOPPLER COMPLETE: CPT | Performed by: INTERNAL MEDICINE

## 2024-05-01 PROCEDURE — 83880 ASSAY OF NATRIURETIC PEPTIDE: CPT

## 2024-05-01 PROCEDURE — 85025 COMPLETE CBC W/AUTO DIFF WBC: CPT

## 2024-05-01 PROCEDURE — 83550 IRON BINDING TEST: CPT

## 2024-05-01 PROCEDURE — 99232 SBSQ HOSP IP/OBS MODERATE 35: CPT | Performed by: STUDENT IN AN ORGANIZED HEALTH CARE EDUCATION/TRAINING PROGRAM

## 2024-05-01 RX ORDER — IPRATROPIUM BROMIDE AND ALBUTEROL SULFATE 2.5; .5 MG/3ML; MG/3ML
1 SOLUTION RESPIRATORY (INHALATION) EVERY 4 HOURS PRN
Status: DISCONTINUED | OUTPATIENT
Start: 2024-05-01 | End: 2024-05-03 | Stop reason: HOSPADM

## 2024-05-01 RX ADMIN — ASPIRIN 81 MG: 81 TABLET, COATED ORAL at 09:12

## 2024-05-01 RX ADMIN — RIVAROXABAN 15 MG: 15 TABLET, FILM COATED ORAL at 09:13

## 2024-05-01 RX ADMIN — AMIODARONE HYDROCHLORIDE 100 MG: 200 TABLET ORAL at 09:13

## 2024-05-01 RX ADMIN — IPRATROPIUM BROMIDE AND ALBUTEROL SULFATE 1 DOSE: 2.5; .5 SOLUTION RESPIRATORY (INHALATION) at 06:37

## 2024-05-01 RX ADMIN — BUMETANIDE 2 MG: 0.25 INJECTION INTRAMUSCULAR; INTRAVENOUS at 17:49

## 2024-05-01 RX ADMIN — SODIUM CHLORIDE, PRESERVATIVE FREE 10 ML: 5 INJECTION INTRAVENOUS at 09:13

## 2024-05-01 RX ADMIN — ATORVASTATIN CALCIUM 10 MG: 10 TABLET, FILM COATED ORAL at 09:13

## 2024-05-01 RX ADMIN — BUMETANIDE 2 MG: 0.25 INJECTION INTRAMUSCULAR; INTRAVENOUS at 09:13

## 2024-05-01 RX ADMIN — Medication 15 G: at 09:13

## 2024-05-01 RX ADMIN — IPRATROPIUM BROMIDE AND ALBUTEROL SULFATE 1 DOSE: 2.5; .5 SOLUTION RESPIRATORY (INHALATION) at 16:19

## 2024-05-01 RX ADMIN — FERROUS SULFATE TAB 325 MG (65 MG ELEMENTAL FE) 325 MG: 325 (65 FE) TAB at 09:12

## 2024-05-01 RX ADMIN — ACETAMINOPHEN 650 MG: 325 TABLET ORAL at 20:59

## 2024-05-01 RX ADMIN — SODIUM CHLORIDE, PRESERVATIVE FREE 10 ML: 5 INJECTION INTRAVENOUS at 20:59

## 2024-05-01 ASSESSMENT — PAIN SCALES - GENERAL
PAINLEVEL_OUTOF10: 0
PAINLEVEL_OUTOF10: 0

## 2024-05-01 NOTE — PROGRESS NOTES
HOSPITALIST PROGRESS NOTE    Patient's name: Barbara Jack  : 1935  Admission date: 2024  Date of service: 2024   Primary care physician: Julita Dyer MD    Assessment   Patient admitted on 2024     Barbara Jack is a 89 y.o. female patient of Julita Dyer MD with history of atrial fibrillation on Xarelto, HFrEF s/p ICD most recent EF 20% on 10/21/2023, diabetes mellitus, hypertension hyperlipidemia, CAD, COPD presented to [facility] on 2024 with concern of low heart rate after she was evaluated by home health nurse.   While in the ER patient denies any complaints other than some tailbone pain after a fall 2 days ago.  Patient vitals were stable, lab workup concerning for MARIVEL, hyperkalemia, hyponatremia significantly elevated BNP, chronic elevated troponin.  Patient is admitted for further evaluation management.     Of note patient was recently in the hospital for chest pain and CHF exacerbation was treated with IV diuretics, was evaluated by palliative care and CODE STATUS changed to DNR CCA.  Patient was ordered by cardiology and family had opted for medical management and no further invasive procedures.     Acute CHF exacerbation  Patient's BNP significantly elevated, chest x-ray concerning for pulmonary congestion  Continue IV Bumex, patient looks more euvolemic, may be able to transition to oral Bumex once okay with nephrology  Nephrology has been consulted for management of fluid balance  Patient was recently evaluated by cardiology on 2024.  Will hold off on cardiology consult for now  ECHO        MARIVEL on CKD stage III  Hyperkalemia 2/2 above: hyperkalemia improved  Hyponatremia 2/2 fluid overload: Hyponatremia improving  Continue IV Bumex per nephrology  Monitor sodium and potassium levels  Monitor renal function     Chronic hypoxic respiratory failure:  On 4 lit oxygen, on 3-5 at baseline     Atrial fibrillation  Continue home amiodarone,  Results   Component Value Date    WBC 7.5 05/01/2024    HGB 8.0 (L) 05/01/2024    HCT 24.9 (L) 05/01/2024     05/01/2024     (L) 05/01/2024    K 4.8 05/01/2024    CL 86 (L) 05/01/2024    CREATININE 1.4 (H) 05/01/2024    BUN 73 (H) 05/01/2024    CO2 30 (H) 05/01/2024    GLUCOSE 83 05/01/2024    ALT 18 04/30/2024    AST 33 (H) 04/30/2024    INR 2.7 04/21/2024    APTT 33.3 04/21/2024    TSH 0.825 06/10/2020    LABA1C 5.0 04/16/2024       XR CHEST 1 VIEW   Final Result   1. Bronchial wall thickening and interstitial coarsening.  Some of this is   probably chronic or related to subsegmental atelectasis, though there could   be a superimposed component of edema, bronchitis or atypical infection.   2. Left greater than right pleural and parenchymal opacities in the lung   bases.  This is probably related to pleural effusions and atelectasis.   3. Enlarged cardiac silhouette.         XR PELVIS (1-2 VIEWS)   Final Result   No conspicuous acute fractures of the pelvic bones and hip joints.      Limited study to evaluate the sacral spine.  For evaluation sacral spine   consider correlation CT scan the pelvis.             Hospital Medications  Current Facility-Administered Medications   Medication Dose Route Frequency Provider Last Rate Last Admin    ipratropium 0.5 mg-albuterol 2.5 mg (DUONEB) nebulizer solution 1 Dose  1 Dose Inhalation Q4H PRN Adalberto Grossman, APRN - CNP   1 Dose at 05/01/24 0637    [START ON 5/2/2024] urea (URE-NA) packet 15 g  15 g Oral Daily Lois Munoz APRN - CNP        amiodarone (CORDARONE) tablet 100 mg  100 mg Oral Daily Zack Scott MD   100 mg at 05/01/24 0913    aspirin EC tablet 81 mg  81 mg Oral Daily Zack Scott MD   81 mg at 05/01/24 0912    ferrous sulfate (IRON 325) tablet 325 mg  325 mg Oral Daily with breakfast Zack Scott MD   325 mg at 05/01/24 0912    metoprolol succinate (TOPROL XL) extended release tablet 25 mg  25 mg Oral Daily Zack Scott MD

## 2024-05-01 NOTE — ACP (ADVANCE CARE PLANNING)
Advance Care Planning   The patient has the following advanced directives on file:  Advance Directives       Power of  Living Will ACP-Advance Directive ACP-Power of     Not on File Filed on 01/02/24 Filed Not on File            The patient has appointed the following active healthcare agents:    Primary Decision Maker: See Jack - Spouse - [phone]    Secondary Decision Maker: Ganga Massey - Child - [phone]      RALPH Allen  5/1/2024

## 2024-05-01 NOTE — PROGRESS NOTES
[facility]  Nephrology Progress Note    Patient's Name: Barbara Jack    History of Present Illness from 4/30 consult note:    \"Barbara Jack is a 89 y.o. female with a past medical history of CAD, CHF, COPD, hypertension, and hyperlipidemia.  She presented to the ED on 4/30 reportedly for abnormal vital signs, reportedly a low heart rate.  Vital signs on 4/30 includes temperature 97.4, respirations 16, pulse 61, /67, and she was 94% SpO2.  Lab data on 4/30 includes sodium 124, potassium 5.9, BUN 48, creatinine 1.4, anion gap 17, proBNP 39,875, and hemoglobin 9.4.  She had a chest x-ray on 4/30 which showed bronchial wall thickening and interstitial coarsening, left greater than right pleural and parenchymal opacities in the lung bases.  Nephrology has been consulted to see the patient for hyperkalemia, hyponatremia, and worsening renal function.  Patient is known to our service.  She has a history of CKD with a baseline creatinine of 1-1.4.   At present, patient was seen and examined.  She explained that she has had a cough for approximately 2 days.  She notes intermittent shortness of breath and intermittent dizziness.  She denies any sore throat.  She denies any chest pain.  She denies any nausea or vomiting.  She denies any fevers or headache.  She denies any dysuria or hematuria.  She explained that her appetite was poor.  She notes soft stools.\"    Subjective:    5/1: Patient was seen and examined.  She reports that she feels congested.  She denies any nausea or vomiting.    PMH:    Past Medical History:   Diagnosis Date    Arthritis     Atrial fibrillation (HCC)     follows with Dr Craft  on xarelto    CAD (coronary artery disease)     Cardiomyopathy (HCC)     CHF (congestive heart failure) ([facility]) 2010    history of follows with Dr Craft 4/27/23    Chronic anticoagulation     Chronic bronchitis ([facility])     none recent    COPD (chronic obstructive pulmonary disease) ([facility])     COVID 08/2022    in  trochanteric bursitis of left hip    Lumbar spondylosis    Pain in joint, pelvic region and thigh    Cachexia (HCC)    Sepsis (HCC)    Acute anemia    Abnormal head CT    Acute decompensated heart failure (HCC)    Nonrheumatic mitral valve regurgitation    Nonrheumatic tricuspid valve regurgitation    Lactic acidosis    Bacteremia    Urinary tract infection without hematuria    Shock liver    Chronic anemia    GI bleed    Acute kidney injury superimposed on CKD (HCC)    Anticoagulant adverse reaction    Acute blood loss anemia    Chronic anticoagulation    Dysphagia    Precordial pain    Chronic HFrEF (heart failure with reduced ejection fraction) ([facility])    Dizziness    Head injury    Abrasions of multiple sites    MARIVEL (acute kidney injury) ([facility])       Diet:    ADULT DIET; Regular; Low Potassium (Less than 3000 mg/day); 1500 ml    Meds:     [START ON 5/2/2024] urea  15 g Oral Daily    amiodarone  100 mg Oral Daily    aspirin  81 mg Oral Daily    ferrous sulfate  325 mg Oral Daily with breakfast    metoprolol succinate  25 mg Oral Daily    rivaroxaban  15 mg Oral Daily with breakfast    atorvastatin  10 mg Oral Daily    sodium chloride flush  5-40 mL IntraVENous 2 times per day    bumetanide  2 mg IntraVENous BID    pantoprazole  40 mg Oral QAM AC        sodium chloride         Meds prn:         Meds prior to admission:     No current facility-administered medications on file prior to encounter.     Current Outpatient Medications on File Prior to Encounter   Medication Sig Dispense Refill    metoprolol succinate (TOPROL XL) 25 MG extended release tablet TAKE 1 TABLET BY MOUTH EVERY DAY 90 tablet 0    Coenzyme Q10 (CO Q-10) 200 MG CAPS TAKE 1 CAPSULE BY MOUTH EVERY DAY AT NIGHT 90 capsule 0    omeprazole (PRILOSEC) 40 MG delayed release capsule TAKE 1 CAPSULE BY MOUTH EVERY DAY 90 capsule 1    simvastatin (ZOCOR) 20 MG tablet Take 1 tablet by mouth nightly 90 tablet 0    torsemide (DEMADEX) 10 MG tablet Take 1 tablet

## 2024-05-01 NOTE — PROGRESS NOTES
4 Eyes Skin Assessment     NAME:  Barbara Jack  YOB: 1935  MEDICAL RECORD NUMBER:  [id number]    The patient is being assessed for  Admission    I agree that at least one RN has performed a thorough Head to Toe Skin Assessment on the patient. ALL assessment sites listed below have been assessed.      Bruising BLE, BUE, R hip  Black hard nole Left upper deltoid  Soft immobile bump to L upper back  Abrasion R side of head  Vascular discoloration BLE, BUE      Areas assessed by both nurses:    Head, Face, Ears, Shoulders, Back, Chest, Arms, Elbows, Hands, Sacrum. Buttock, Coccyx, Ischium, Legs. Feet and Heels, Under Medical Devices , and Other            Does the Patient have a Wound? No noted wound(s)       Rubin Prevention initiated by RN: Yes  Wound Care Orders initiated by RN: No    Pressure Injury (Stage 3,4, Unstageable, DTI, NWPT, and Complex wounds) if present, place Wound referral order by RN under : No    New Ostomies, if present place, Ostomy referral order under : No     Nurse 1 eSignature: Electronically signed by Kellee Muñoz RN on 5/1/24 at 1:47 AM EDT    **SHARE this note so that the co-signing nurse can place an eSignature**    Nurse 2 eSignature: Electronically signed by Lori Villarreal RN on 5/1/24 at 1:56 AM EDT

## 2024-05-01 NOTE — CARE COORDINATION
Remote Patient Monitoring Note  Date/Time:  5/1/2024 1:50 PM  Patient Current Location: [facility] noted pt admit to  [facility] on 4/30/24  Background: enrolled in RPM for COPD AND CHF  Clinical Interventions: Reviewed and followed up on alerts and treatments-RPM paused until discharge. Update to ACM Yarelis Buenrostor RN    Plan/Follow Up: Will continue to review, monitor and address alerts with follow up based on severity of symptoms and risk factors.

## 2024-05-01 NOTE — CARE COORDINATION
Social Work/Case Management Transition of Care Planning (Anny ROSAS [phone]):  Patient presented to the hospital due concerns of tailbone pain from a fall 2 days ago as well as low heart rate per home care nurse.  Patient was found to have MARIVEL.  Nephrololgy was consulted for worsening kidney function.  Heart failure nurse was also consulted.  Patient is on IV Bumex BID. Patient is currently on 4L NC at 97%.  Baseline is 3L.  Wean oxygen as tolerated.  Echo is pending.  Met with patient at bedside.  She resides in a 1 story home with 3 JUSTIN.  She lives with her , Misbah, who is her primary caregiver and assists with all aspects of care.  She has a rollator, shower chair, BSC, and grab bars.  She is on 3L NC at baseline.  [employer] [employer] is her oxygen supplier.  She also has a nebulizer at home.  PCP is Dr. Dyer.  Pharmacy is [facility] on [address]  [facility] history with [facility] and she would use them again if needed.  [facility] history at [facility].  Discharge plan is to home with no needs at this time.   will transport and will bring portable tank.  CM/SW will follow for needs.  RALPH Allen  5/1/2024    Case Management Assessment  Initial Evaluation    Date/Time of Evaluation: 5/1/2024 10:49 AM  Assessment Completed by: RALPH Allen    If patient is discharged prior to next notation, then this note serves as note for discharge by case management.    Patient Name: Barbara Jack                   YOB: 1935  Diagnosis: Hyperkalemia [E87.5]  Hypochloremia [E87.8]  Hyponatremia [E87.1]  Acute pulmonary edema (HCC) [J81.0]  MARIVEL (acute kidney injury) (HCC) [N17.9]  Coccygeal pain, acute [M53.3]  CHF (congestive heart failure), NYHA class I, acute on chronic, combined (HCC) [I50.43]  Worsening renal function [N28.9]                   Date / Time: 4/30/2024 11:22 AM    Patient Admission Status: Inpatient   Readmission Risk (Low < 19, Mod (19-27), High > 27): Readmission Risk Score:  Purchasing Medications:    Meds-to-Beds request:        [facility]/pharmacy #[id number] - [place], [place] - [address] - P [phone] - F [phone]  [address] [place] [address]  Phone: [phone] Fax: [phone]    [facility] - [place], [place] - [address] - P [phone] - f [phone]  [address] [facility] [address]  Phone: [phone] Fax: [phone]    [id number] - [place], [place] - [address] - P [phone] - f [phone]  [address]  [facility] [address]  Phone: [phone] Fax: [phone]      Notes:    Factors facilitating achievement of predicted outcomes: Family support, Motivated, Cooperative, and Pleasant    Barriers to discharge: Limited safety awareness, Decreased endurance, Upper extremity weakness, Lower extremity weakness, and Long standing deficits    Additional Case Management Notes:     The Plan for Transition of Care is related to the following treatment goals of Hyperkalemia [E87.5]  Hypochloremia [E87.8]  Hyponatremia [E87.1]  Acute pulmonary edema (HCC) [J81.0]  MARIVEL (acute kidney injury) (HCC) [N17.9]  Coccygeal pain, acute [M53.3]  CHF (congestive heart failure), NYHA class I, acute on chronic, combined (HCC) [I50.43]  Worsening renal function [N28.9]    IF APPLICABLE: The Patient and/or patient representative Barbara and her family were provided with a choice of provider and agrees with the discharge plan. Freedom of choice list with basic dialogue that supports the patient's individualized plan of care/goals and shares the quality data associated with the providers was provided to:     Patient Representative Name:       The Patient and/or Patient Representative Agree with the Discharge Plan     RALPH Allen  Case Management Department  Ph: [phone]

## 2024-05-01 NOTE — PLAN OF CARE
Problem: Pain  Goal: Verbalizes/displays adequate comfort level or baseline comfort level  5/1/2024 1033 by Rocio Stone RN  Outcome: Progressing  5/1/2024 0326 by Kellee Muñoz RN  Outcome: Progressing     Problem: Skin/Tissue Integrity  Goal: Absence of new skin breakdown  Description: 1.  Monitor for areas of redness and/or skin breakdown  2.  Assess vascular access sites hourly  3.  Every 4-6 hours minimum:  Change oxygen saturation probe site  4.  Every 4-6 hours:  If on nasal continuous positive airway pressure, respiratory therapy assess nares and determine need for appliance change or resting period.  5/1/2024 1033 by Rocio Stone RN  Outcome: Progressing  5/1/2024 0326 by Kellee Muñoz RN  Outcome: Progressing     Problem: Safety - Adult  Goal: Free from fall injury  5/1/2024 1033 by Rocio Sotne RN  Outcome: Progressing  5/1/2024 0326 by Kellee Muñoz RN  Outcome: Progressing     Problem: ABCDS Injury Assessment  Goal: Absence of physical injury  5/1/2024 1033 by Rocio Stone RN  Outcome: Progressing  5/1/2024 0326 by Kellee Muñoz RN  Outcome: Progressing     Problem: Chronic Conditions and Co-morbidities  Goal: Patient's chronic conditions and co-morbidity symptoms are monitored and maintained or improved  Outcome: Progressing

## 2024-05-01 NOTE — PLAN OF CARE
Patient's chart updated to reflect:      .    - HF care plan, HF education points and HF discharge instructions.  -Orders: daily weights, I/O.  -PCP and cardiology follow up appointments to be scheduled within 7 days of hospital discharge.      Jaki Mcduffie-RN  Heart Failure Navigator

## 2024-05-02 ENCOUNTER — CARE COORDINATION (OUTPATIENT)
Dept: CARE COORDINATION | Age: 89
End: 2024-05-02

## 2024-05-02 LAB
ANION GAP SERPL CALCULATED.3IONS-SCNC: 11 MMOL/L (ref 7–16)
BASOPHILS # BLD: 0 K/UL (ref 0–0.2)
BASOPHILS NFR BLD: 0 % (ref 0–2)
BUN SERPL-MCNC: 78 MG/DL (ref 6–23)
CALCIUM SERPL-MCNC: 9 MG/DL (ref 8.6–10.2)
CHLORIDE SERPL-SCNC: 90 MMOL/L (ref 98–107)
CO2 SERPL-SCNC: 34 MMOL/L (ref 22–29)
CREAT SERPL-MCNC: 1.4 MG/DL (ref 0.5–1)
EKG ATRIAL RATE: 267 BPM
EKG P-R INTERVAL: 366 MS
EKG Q-T INTERVAL: 744 MS
EKG QRS DURATION: 160 MS
EKG QTC CALCULATION (BAZETT): 711 MS
EKG R AXIS: -64 DEGREES
EKG T AXIS: 126 DEGREES
EKG VENTRICULAR RATE: 55 BPM
EOSINOPHIL # BLD: 0.01 K/UL (ref 0.05–0.5)
EOSINOPHILS RELATIVE PERCENT: 0 % (ref 0–6)
ERYTHROCYTE [DISTWIDTH] IN BLOOD BY AUTOMATED COUNT: 18.4 % (ref 11.5–15)
GFR, ESTIMATED: 38 ML/MIN/1.73M2
GLUCOSE SERPL-MCNC: 86 MG/DL (ref 74–99)
HCT VFR BLD AUTO: 24.7 % (ref 34–48)
HGB BLD-MCNC: 7.8 G/DL (ref 11.5–15.5)
IMM GRANULOCYTES # BLD AUTO: 0.05 K/UL (ref 0–0.58)
IMM GRANULOCYTES NFR BLD: 1 % (ref 0–5)
LYMPHOCYTES NFR BLD: 0.42 K/UL (ref 1.5–4)
LYMPHOCYTES RELATIVE PERCENT: 5 % (ref 20–42)
MAGNESIUM SERPL-MCNC: 2.3 MG/DL (ref 1.6–2.6)
MCH RBC QN AUTO: 30.1 PG (ref 26–35)
MCHC RBC AUTO-ENTMCNC: 31.6 G/DL (ref 32–34.5)
MCV RBC AUTO: 95.4 FL (ref 80–99.9)
MONOCYTES NFR BLD: 1.12 K/UL (ref 0.1–0.95)
MONOCYTES NFR BLD: 14 % (ref 2–12)
NEUTROPHILS NFR BLD: 80 % (ref 43–80)
NEUTS SEG NFR BLD: 6.35 K/UL (ref 1.8–7.3)
PHOSPHATE SERPL-MCNC: 4.8 MG/DL (ref 2.5–4.5)
PLATELET # BLD AUTO: 366 K/UL (ref 130–450)
PMV BLD AUTO: 9.2 FL (ref 7–12)
POTASSIUM SERPL-SCNC: 3.9 MMOL/L (ref 3.5–5)
RBC # BLD AUTO: 2.59 M/UL (ref 3.5–5.5)
RBC # BLD: ABNORMAL 10*6/UL
SODIUM SERPL-SCNC: 135 MMOL/L (ref 132–146)
WBC OTHER # BLD: 8 K/UL (ref 4.5–11.5)

## 2024-05-02 PROCEDURE — 2580000003 HC RX 258: Performed by: STUDENT IN AN ORGANIZED HEALTH CARE EDUCATION/TRAINING PROGRAM

## 2024-05-02 PROCEDURE — 6370000000 HC RX 637 (ALT 250 FOR IP)

## 2024-05-02 PROCEDURE — 2700000000 HC OXYGEN THERAPY PER DAY

## 2024-05-02 PROCEDURE — 85025 COMPLETE CBC W/AUTO DIFF WBC: CPT

## 2024-05-02 PROCEDURE — 97161 PT EVAL LOW COMPLEX 20 MIN: CPT

## 2024-05-02 PROCEDURE — 80048 BASIC METABOLIC PNL TOTAL CA: CPT

## 2024-05-02 PROCEDURE — 6360000002 HC RX W HCPCS: Performed by: STUDENT IN AN ORGANIZED HEALTH CARE EDUCATION/TRAINING PROGRAM

## 2024-05-02 PROCEDURE — 97535 SELF CARE MNGMENT TRAINING: CPT

## 2024-05-02 PROCEDURE — 84100 ASSAY OF PHOSPHORUS: CPT

## 2024-05-02 PROCEDURE — 97165 OT EVAL LOW COMPLEX 30 MIN: CPT

## 2024-05-02 PROCEDURE — 36415 COLL VENOUS BLD VENIPUNCTURE: CPT

## 2024-05-02 PROCEDURE — 97530 THERAPEUTIC ACTIVITIES: CPT

## 2024-05-02 PROCEDURE — 1200000000 HC SEMI PRIVATE

## 2024-05-02 PROCEDURE — 93010 ELECTROCARDIOGRAM REPORT: CPT | Performed by: INTERNAL MEDICINE

## 2024-05-02 PROCEDURE — 99232 SBSQ HOSP IP/OBS MODERATE 35: CPT | Performed by: STUDENT IN AN ORGANIZED HEALTH CARE EDUCATION/TRAINING PROGRAM

## 2024-05-02 PROCEDURE — 83735 ASSAY OF MAGNESIUM: CPT

## 2024-05-02 PROCEDURE — 6370000000 HC RX 637 (ALT 250 FOR IP): Performed by: STUDENT IN AN ORGANIZED HEALTH CARE EDUCATION/TRAINING PROGRAM

## 2024-05-02 RX ADMIN — BUMETANIDE 2 MG: 0.25 INJECTION INTRAMUSCULAR; INTRAVENOUS at 18:30

## 2024-05-02 RX ADMIN — RIVAROXABAN 15 MG: 15 TABLET, FILM COATED ORAL at 10:59

## 2024-05-02 RX ADMIN — FERROUS SULFATE TAB 325 MG (65 MG ELEMENTAL FE) 325 MG: 325 (65 FE) TAB at 10:59

## 2024-05-02 RX ADMIN — Medication 15 G: at 10:58

## 2024-05-02 RX ADMIN — AMIODARONE HYDROCHLORIDE 100 MG: 200 TABLET ORAL at 10:52

## 2024-05-02 RX ADMIN — METOPROLOL SUCCINATE 25 MG: 25 TABLET, EXTENDED RELEASE ORAL at 10:52

## 2024-05-02 RX ADMIN — ACETAMINOPHEN 650 MG: 325 TABLET ORAL at 18:35

## 2024-05-02 RX ADMIN — ATORVASTATIN CALCIUM 10 MG: 10 TABLET, FILM COATED ORAL at 10:52

## 2024-05-02 RX ADMIN — SODIUM CHLORIDE, PRESERVATIVE FREE 10 ML: 5 INJECTION INTRAVENOUS at 10:53

## 2024-05-02 RX ADMIN — ASPIRIN 81 MG: 81 TABLET, COATED ORAL at 10:52

## 2024-05-02 RX ADMIN — SODIUM CHLORIDE, PRESERVATIVE FREE 10 ML: 5 INJECTION INTRAVENOUS at 22:29

## 2024-05-02 RX ADMIN — BUMETANIDE 2 MG: 0.25 INJECTION INTRAMUSCULAR; INTRAVENOUS at 11:00

## 2024-05-02 ASSESSMENT — PAIN SCALES - GENERAL: PAINLEVEL_OUTOF10: 3

## 2024-05-02 NOTE — PLAN OF CARE
Problem: Pain  Goal: Verbalizes/displays adequate comfort level or baseline comfort level  Outcome: Progressing     Problem: Skin/Tissue Integrity  Goal: Absence of new skin breakdown  Description: 1.  Monitor for areas of redness and/or skin breakdown  2.  Assess vascular access sites hourly  3.  Every 4-6 hours minimum:  Change oxygen saturation probe site  4.  Every 4-6 hours:  If on nasal continuous positive airway pressure, respiratory therapy assess nares and determine need for appliance change or resting period.  Outcome: Progressing     Problem: Safety - Adult  Goal: Free from fall injury  Outcome: Progressing     Problem: Metabolic/Fluid and Electrolytes - Adult  Goal: Electrolytes maintained within normal limits  Outcome: Progressing

## 2024-05-02 NOTE — CARE COORDINATION
Pt remains in the hospital, room 8421-B. Will assess needs once she returns home.RPM is on hold    FOLLOW-UP PLAN:    -Assess new needs post hospital.    Next Anticipated Outreach by [facility] Care Team:  1 Week

## 2024-05-02 NOTE — PROGRESS NOTES
[facility]  Nephrology Progress Note    Patient's Name: Barbara Jack    History of Present Illness from 4/30 consult note:    \"Barbara Jack is a 89 y.o. female with a past medical history of CAD, CHF, COPD, hypertension, and hyperlipidemia.  She presented to the ED on 4/30 reportedly for abnormal vital signs, reportedly a low heart rate.  Vital signs on 4/30 includes temperature 97.4, respirations 16, pulse 61, /67, and she was 94% SpO2.  Lab data on 4/30 includes sodium 124, potassium 5.9, BUN 48, creatinine 1.4, anion gap 17, proBNP 39,875, and hemoglobin 9.4.  She had a chest x-ray on 4/30 which showed bronchial wall thickening and interstitial coarsening, left greater than right pleural and parenchymal opacities in the lung bases.  Nephrology has been consulted to see the patient for hyperkalemia, hyponatremia, and worsening renal function.  Patient is known to our service.  She has a history of CKD with a baseline creatinine of 1-1.4.   At present, patient was seen and examined.  She explained that she has had a cough for approximately 2 days.  She notes intermittent shortness of breath and intermittent dizziness.  She denies any sore throat.  She denies any chest pain.  She denies any nausea or vomiting.  She denies any fevers or headache.  She denies any dysuria or hematuria.  She explained that her appetite was poor.  She notes soft stools.\"    Subjective:    5/2: Patient was seen and examined.  She reports that she feels tired.  She denies any chest pain or abdominal pain.    PMH:    Past Medical History:   Diagnosis Date    Arthritis     Atrial fibrillation (HCC)     follows with Dr Craft  on xarelto    CAD (coronary artery disease)     Cardiomyopathy (HCC)     CHF (congestive heart failure) (Former[facility]) 2010    history of follows with Dr Craft 4/27/23    Chronic anticoagulation     Chronic bronchitis (HCC)     none recent    COPD (chronic obstructive pulmonary disease) (Former[facility])     COVID 08/2022  Range Status   05/01/2024 >20.0 4.8 - 24.2 ng/mL Final       Lab Results   Component Value Date/Time    COLORU Yellow 04/30/2024 02:09 PM    NITRU NEGATIVE 04/30/2024 02:09 PM    GLUCOSEU NEGATIVE 04/30/2024 02:09 PM    KETUA NEGATIVE 04/30/2024 02:09 PM    UROBILINOGEN 1.0 04/30/2024 02:09 PM    BILIRUBINUR NEGATIVE 04/30/2024 02:09 PM       Lab Results   Component Value Date/Time    PROTEIN 6.4 04/30/2024 12:29 PM    OSMOU 383 04/30/2024 02:09 PM       No components found for: \"URIC\"    No results found for: \"LIPIDPAN\"    Assessment and Plans:    CKD 3B  History of CHF, hypertension, hyperlipidemia  Baseline creatinine 1-1.4  Creatinine 1.4 today-at baseline  No aggressive workup as creatinine at baseline  Avoid nephrotoxins/NSAIDs  Strict I&O, daily weights  Bumex 2 mg IV twice daily  Monitor labs    2.  Hyperkalemia  Likely in the setting of CKD  K+ 3.9 today  Low potassium diet  Monitor labs    3.  Hyponatremia  Suspect hypervolemia (history of HFrEF)  possibly exacerbated by poor oral intake  Sodium 124 on 4/30--> 135 today  Cortisol 33.9, uric acid 7.9, serum osmolality 284  Urine sodium<20, urine osmolality 383, urine creatinine 66  Strict I&O  On Ure-na 15 g oral daily  Monitor labs    4. HFrEF  Echo 10/2023 EF 20-25%, grade 2 diastolic dysfunction  proBNP 39,875  Strict I&O, daily weights  Bumex 2 mg IV twice daily  Monitor volume status    5.  Anemia  Likely in part with CKD  Hemoglobin target 10-12  Hemoglobin 7.8-below target  Iron studies 5/1: Ferritin 146, iron 18, TIBC 386, folate> 20, vit B12> 2000, iron sat 5%  On oral iron  Transfuse for hemoglobin<7  Monitor H&H    DUKE Mishra - CNP      Patient seen and examined all key components of the physical performed independently , case discussed with NP, all pertinent labs and radiologic tests personally reviewed agree with above.      Maikel Roy MD

## 2024-05-02 NOTE — PROGRESS NOTES
Physical Therapy    Physical Therapy Initial Assessment     Name: Barbara Jack  : 1935  MRN: [id number]      Date of Service: 2024    Evaluating PT:  Alvaro Estrada PT, DPT  [id number]     Room #:  8421/8421-B  Diagnosis:  Hyperkalemia [E87.5]  Hypochloremia [E87.8]  Hyponatremia [E87.1]  Acute pulmonary edema (HCC) [J81.0]  MARIVEL (acute kidney injury) (Formerly [facility]) [N17.9]  Coccygeal pain, acute [M53.3]  CHF (congestive heart failure), NYHA class I, acute on chronic, combined ([facility]) [I50.43]  Worsening renal function [N28.9]  PMHx/PSHx:   has a past medical history of Arthritis, Atrial fibrillation ([facility]), CAD (coronary artery disease), Cardiomyopathy ([facility]), CHF (congestive heart failure) ([facility]), Chronic anticoagulation, Chronic bronchitis ([facility]), COPD (chronic obstructive pulmonary disease) ([facility]), COVID, Depression, GERD (gastroesophageal reflux disease), HFrEF (heart failure with reduced ejection fraction) ([facility]), [place] (hard of hearing), Hyperlipidemia, Hypertension, ICD (implantable cardioverter-defibrillator) in place, Left ventricular dysfunction, Low vitamin D level, MI (myocardial infarction) ([facility]), Osteopenia, Osteoporosis, and Swallowing difficulty.   Procedure/Surgery:  none   Precautions:  Falls, [place], O2, Stress incontinence, TSM  Equipment Needs:  TBD    SUBJECTIVE:    Pt lives with her  in a 1 story home with 3 stairs and 1 rail to enter.  Bedroom and bathroom are on the 1st level.  Pt ambulated with FWW vs. Rollator PTA.  4-5 L O2 at baseline. Requires assistance at baseline.  Admits to falls at PTA.     OBJECTIVE:   Initial Evaluation  Date: 24  Treatment Short Term/ Long Term   Goals   AM-PAC 6 Clicks      Was pt agreeable to Eval/treatment? Yes      Does pt have pain? No reported pain      Bed Mobility  Rolling: NT  Supine to sit: Min A  Sit to supine: NT  Scooting: Min A to EOB   Rolling: SBA  Supine to sit: SBA  Sit to supine: SBA  Scooting: SBA   Transfers Sit to stand: Mod A  Stand  for safe discharge home and/or into the community   [x] Positioning to prevent skin breakdown and contractures  [x] Safety and Education Training   [x] Patient/Caregiver Education   [x] HEP  [] Other     PT long term treatment goals are located in above grid    Frequency of treatments: 2-5x/week x 1-2 weeks.    Time in  1350  Time out  1430    Total Treatment Time  25 minutes     Evaluation Time includes thorough review of current medical information, gathering information on past medical history/social history and prior level of function, completion of standardized testing/informal observation of tasks, assessment of data and education on plan of care and goals.    CPT codes:  [x] Low Complexity PT evaluation 27128  [] Moderate Complexity PT evaluation 39525  [] High Complexity PT evaluation 50006  [] PT Re-evaluation 80676  [] Gait training 55503 -- minutes  [] Manual therapy 92105 -- minutes  [x] Therapeutic activities 17892 25 minutes  [] Therapeutic exercises 70019 - minutes  [] Neuromuscular reeducation 78270 -- minutes     Alvaro Estrada, PT, DPT  [id number]

## 2024-05-02 NOTE — PROGRESS NOTES
OCCUPATIONAL THERAPY INITIAL EVALUATION    [facility]  [address] [place], [place]          Date:2024                                                   Patient Name: Barbara Jack     MRN: [id number]     : 1935     Room: [address] [place] [address]       Evaluating OT: Jeannette Flores OTD, OTR/L, [id number]      Referring Provider: Zack Scott MD     Specific Provider Orders/Date: OT eval and treat (24)    Diagnosis: Hyperkalemia [E87.5]  Hypochloremia [E87.8]  Hyponatremia [E87.1]  Acute pulmonary edema (HCC) [J81.0]  MARIVEL (acute kidney injury) (HCC) [N17.9]  Coccygeal pain, acute [M53.3]  CHF (congestive heart failure), NYHA class I, acute on chronic, combined (HCC) [I50.43]  Worsening renal function [N28.9]    Surgeries/Procedures: None this admission         Pertinent Medical History:       has a past medical history of Arthritis, Atrial fibrillation (HCC), CAD (coronary artery disease), Cardiomyopathy (HCC), CHF (congestive heart failure) (HCC), Chronic anticoagulation, Chronic bronchitis (HCC), COPD (chronic obstructive pulmonary disease) (HCC), COVID, Depression, GERD (gastroesophageal reflux disease), HFrEF (heart failure with reduced ejection fraction) ([facility]), [place] (hard of hearing), Hyperlipidemia, Hypertension, ICD (implantable cardioverter-defibrillator) in place, Left ventricular dysfunction, Low vitamin D level, MI (myocardial infarction) ([facility]), Osteopenia, Osteoporosis, and Swallowing difficulty.         Precautions:  Fall Risk, O2, TSM, [place] (B hearing aids), incontinence, alarms+     Assessment of current deficits    [x] Functional mobility  [x]ADLs  [x] Strength               [x]Cognition    [x] Functional transfers   [x] IADLs         [x] Safety Awareness   [x]Endurance    [x] Fine Coordination              [x] Balance      [] Vision/perception   [x]Sensation     [x]Gross Motor Coordination  [] ROM  [] Delirium                   [] Motor  volitional active controled movement  * Positioning to improve skin integrity, interaction with environment and functional independence  * Delirium prevention/treatment  * Manual techniques for edema management      Recommended Adaptive Equipment/DME: TBD     Home Living: Pt lives with her  in a 1 story home with 3 stairs and 1 rail to enter.  Bedroom and bathroom are on the 1st level.  Pt ambulated with FWW vs. Rollator PTA.  4-5 L O2 at baseline. Requires assistance at baseline.  Admits to falls at PTA.   Bathroom setup: walk-in shower, elevated toilet seat   Equipment owned: Rollator, FWW    Prior Level of Function: Ind with ADLs , Assist with IADLs; Used FWW vs Rollator for functional mobility.  Driving: yes  Occupation: none stated    Pain Level: 0/10 pain reported  Cognition: A&O: 4/4; Follows 1-2 step directions   Memory: F+   Sequencing: F+   Problem solving: F   Judgement/safety: F    Vitals Assessed:  SpO2: 99% after brief rest break- pt with noted SOB, recovery in ~3-4 min      BP: 117/67     Functional Assessment:  AM-PAC Daily Activity Raw Score: 13/24   Initial Eval Status  Date: 5/2/24 Treatment Status  Date: STGs = LTGs  Time frame: 10-14 days   Feeding SBA     Modified [place]    Grooming Moderate Assist overall    Stand by Assist    UB Dressing Moderate Assist     Stand by Assist    LB Dressing Moderate Assist   To don brief seated at EOB- limited by balance  Stand by Assist    Bathing Moderate Assist    Stand by Assist    Toileting Maximal Assist   Limited by balance    Stand by Assist    Bed Mobility  Supine to sit: Moderate Assist   Sit to supine: NT     Supine to sit: Stand by Assist   Sit to supine: Stand by Assist    Functional Transfers Sit to stand:Moderate Assist  Stand to sit: Moderate Assist  Stand pivot: Moderate Assist with FWW     Stand by Assist    Functional Mobility Moderate Assist with FWW  For few steps from chair    Stand by Assist with appropriate AD   Balance

## 2024-05-02 NOTE — PROGRESS NOTES
HOSPITALIST PROGRESS NOTE    Patient's name: Barbara Jack  : 1935  Admission date: 2024  Date of service: 2024   Primary care physician: Julita Dyer MD    Assessment   Patient admitted on 2024     Barbara Jack is a 89 y.o. female patient of Julita Dyer MD with history of atrial fibrillation on Xarelto, HFrEF s/p ICD most recent EF 20% on 10/21/2023, diabetes mellitus, hypertension hyperlipidemia, CAD, COPD presented to [facility] on 2024 with concern of low heart rate after she was evaluated by home health nurse.   While in the ER patient denies any complaints other than some tailbone pain after a fall 2 days ago.  Patient vitals were stable, lab workup concerning for MARIVEL, hyperkalemia, hyponatremia significantly elevated BNP, chronic elevated troponin.  Patient is admitted for further evaluation management.     Of note patient was recently in the hospital for chest pain and CHF exacerbation was treated with IV diuretics, was evaluated by palliative care and CODE STATUS changed to DNR CCA.  Patient was ordered by cardiology and family had opted for medical management and no further invasive procedures.     Acute CHF exacerbation  Patient's BNP significantly elevated, chest x-ray concerning for pulmonary congestion  Continue IV Bumex, patient looks more euvolemic, may be able to transition to oral Bumex once okay with nephrology  Nephrology has been consulted for management of fluid balance  Patient was recently evaluated by cardiology on 2024.  Will hold off on cardiology consult for now  ECHO reviewed showing EF 25% with moderately increased wall thickness and akinesis of mid anteroseptal, mid inferoseptal and entire apex.     CKD stage III  Hyperkalemia 2/2 above: hyperkalemia improved  Hyponatremia 2/2 fluid overload: Hyponatremia improving  Baseline creatinine 1.1-1.4 per nephrology  MARIVEL ruled out.    Continue IV Bumex per

## 2024-05-02 NOTE — CARE COORDINATION
Social Work/Case Management Transition of Care Planning (Anny Wilkerson -[phone]):  Per report and chart review, echo was completed with EF of 20-25%.  Pro-BNP noted to be 37,139.  Patient is on IV Bumex BID.  She is currently on 5L NC at 100%.  Baseline is 3L NC.  Oxygen to be weaned as tolerated.  Nephrology is following for MARIVEL.  Discharge plan is to home with no needs at this time.  will transport and will bring portable tank. CM/SW will follow for needs.   RALPH Allen  5/2/2024    Update:  Met with patient, her , her daughter - Mary, and son - Griffin at bedside.  Family feels patient will need short term rehab.  Patient in agreement.  PT/OT to eval.  Facility choices 1. [facility] 2. [facility].  Will wait for therapy evals.  CM/SW will follow.  Anny Wilkerson, RALPH  5/2/2024

## 2024-05-03 ENCOUNTER — CARE COORDINATION (OUTPATIENT)
Dept: CASE MANAGEMENT | Age: 89
End: 2024-05-03

## 2024-05-03 ENCOUNTER — HOSPITAL ENCOUNTER (OUTPATIENT)
Dept: OTHER | Age: 89
Setting detail: THERAPIES SERIES
Discharge: HOME | End: 2024-05-03

## 2024-05-03 VITALS
TEMPERATURE: 97.2 F | HEART RATE: 55 BPM | WEIGHT: 84.3 LBS | BODY MASS INDEX: 17 KG/M2 | SYSTOLIC BLOOD PRESSURE: 124 MMHG | HEIGHT: 59 IN | RESPIRATION RATE: 14 BRPM | OXYGEN SATURATION: 98 % | DIASTOLIC BLOOD PRESSURE: 62 MMHG

## 2024-05-03 LAB
ANION GAP SERPL CALCULATED.3IONS-SCNC: 12 MMOL/L (ref 7–16)
BASOPHILS # BLD: 0.01 K/UL (ref 0–0.2)
BASOPHILS NFR BLD: 0 % (ref 0–2)
BUN SERPL-MCNC: 69 MG/DL (ref 6–23)
CALCIUM SERPL-MCNC: 9.1 MG/DL (ref 8.6–10.2)
CHLORIDE SERPL-SCNC: 93 MMOL/L (ref 98–107)
CO2 SERPL-SCNC: 35 MMOL/L (ref 22–29)
CREAT SERPL-MCNC: 1.1 MG/DL (ref 0.5–1)
EOSINOPHIL # BLD: 0.04 K/UL (ref 0.05–0.5)
EOSINOPHILS RELATIVE PERCENT: 1 % (ref 0–6)
ERYTHROCYTE [DISTWIDTH] IN BLOOD BY AUTOMATED COUNT: 18.9 % (ref 11.5–15)
GFR, ESTIMATED: 46 ML/MIN/1.73M2
GLUCOSE SERPL-MCNC: 89 MG/DL (ref 74–99)
HCT VFR BLD AUTO: 28.2 % (ref 34–48)
HGB BLD-MCNC: 8.7 G/DL (ref 11.5–15.5)
IMM GRANULOCYTES # BLD AUTO: 0.04 K/UL (ref 0–0.58)
IMM GRANULOCYTES NFR BLD: 1 % (ref 0–5)
LYMPHOCYTES NFR BLD: 0.35 K/UL (ref 1.5–4)
LYMPHOCYTES RELATIVE PERCENT: 4 % (ref 20–42)
MAGNESIUM SERPL-MCNC: 2.4 MG/DL (ref 1.6–2.6)
MCH RBC QN AUTO: 30.5 PG (ref 26–35)
MCHC RBC AUTO-ENTMCNC: 30.9 G/DL (ref 32–34.5)
MCV RBC AUTO: 98.9 FL (ref 80–99.9)
MONOCYTES NFR BLD: 1.1 K/UL (ref 0.1–0.95)
MONOCYTES NFR BLD: 14 % (ref 2–12)
NEUTROPHILS NFR BLD: 81 % (ref 43–80)
NEUTS SEG NFR BLD: 6.46 K/UL (ref 1.8–7.3)
PHOSPHATE SERPL-MCNC: 3.7 MG/DL (ref 2.5–4.5)
PLATELET # BLD AUTO: 370 K/UL (ref 130–450)
PMV BLD AUTO: 9.1 FL (ref 7–12)
POTASSIUM SERPL-SCNC: 3.7 MMOL/L (ref 3.5–5)
RBC # BLD AUTO: 2.85 M/UL (ref 3.5–5.5)
RBC # BLD: ABNORMAL 10*6/UL
SODIUM SERPL-SCNC: 140 MMOL/L (ref 132–146)
WBC # BLD: ABNORMAL 10*3/UL
WBC OTHER # BLD: 8 K/UL (ref 4.5–11.5)

## 2024-05-03 PROCEDURE — 83735 ASSAY OF MAGNESIUM: CPT

## 2024-05-03 PROCEDURE — 97530 THERAPEUTIC ACTIVITIES: CPT

## 2024-05-03 PROCEDURE — 80048 BASIC METABOLIC PNL TOTAL CA: CPT

## 2024-05-03 PROCEDURE — 2700000000 HC OXYGEN THERAPY PER DAY

## 2024-05-03 PROCEDURE — 2580000003 HC RX 258: Performed by: STUDENT IN AN ORGANIZED HEALTH CARE EDUCATION/TRAINING PROGRAM

## 2024-05-03 PROCEDURE — 36415 COLL VENOUS BLD VENIPUNCTURE: CPT

## 2024-05-03 PROCEDURE — 85025 COMPLETE CBC W/AUTO DIFF WBC: CPT

## 2024-05-03 PROCEDURE — 99232 SBSQ HOSP IP/OBS MODERATE 35: CPT | Performed by: STUDENT IN AN ORGANIZED HEALTH CARE EDUCATION/TRAINING PROGRAM

## 2024-05-03 PROCEDURE — 6370000000 HC RX 637 (ALT 250 FOR IP): Performed by: STUDENT IN AN ORGANIZED HEALTH CARE EDUCATION/TRAINING PROGRAM

## 2024-05-03 PROCEDURE — 6360000002 HC RX W HCPCS: Performed by: STUDENT IN AN ORGANIZED HEALTH CARE EDUCATION/TRAINING PROGRAM

## 2024-05-03 PROCEDURE — 84100 ASSAY OF PHOSPHORUS: CPT

## 2024-05-03 PROCEDURE — 97535 SELF CARE MNGMENT TRAINING: CPT

## 2024-05-03 RX ORDER — TORSEMIDE 10 MG/1
10 TABLET ORAL 2 TIMES DAILY
Qty: 30 TABLET | Refills: 3
Start: 2024-05-03

## 2024-05-03 RX ORDER — TORSEMIDE 10 MG/1
20 TABLET ORAL DAILY
Qty: 30 TABLET | Refills: 3
Start: 2024-05-03 | End: 2024-05-03

## 2024-05-03 RX ADMIN — FERROUS SULFATE TAB 325 MG (65 MG ELEMENTAL FE) 325 MG: 325 (65 FE) TAB at 08:52

## 2024-05-03 RX ADMIN — ATORVASTATIN CALCIUM 10 MG: 10 TABLET, FILM COATED ORAL at 08:52

## 2024-05-03 RX ADMIN — BUMETANIDE 2 MG: 0.25 INJECTION INTRAMUSCULAR; INTRAVENOUS at 08:52

## 2024-05-03 RX ADMIN — AMIODARONE HYDROCHLORIDE 100 MG: 200 TABLET ORAL at 08:52

## 2024-05-03 RX ADMIN — RIVAROXABAN 15 MG: 15 TABLET, FILM COATED ORAL at 08:53

## 2024-05-03 RX ADMIN — METOPROLOL SUCCINATE 25 MG: 25 TABLET, EXTENDED RELEASE ORAL at 08:53

## 2024-05-03 RX ADMIN — PANTOPRAZOLE SODIUM 40 MG: 40 TABLET, DELAYED RELEASE ORAL at 06:17

## 2024-05-03 RX ADMIN — SODIUM CHLORIDE, PRESERVATIVE FREE 10 ML: 5 INJECTION INTRAVENOUS at 08:53

## 2024-05-03 RX ADMIN — ASPIRIN 81 MG: 81 TABLET, COATED ORAL at 08:52

## 2024-05-03 NOTE — PROGRESS NOTES
Physical Therapy    Physical Therapy Daily Treatment Note      Name: Barbara Jack  : 1935  MRN: [id number]      Date of Service: 5/3/2024    Evaluating PT:  Alvaro Estrada, PT, DPT  [id number]     Room #:  8421/8421-B  Diagnosis:  Hyperkalemia [E87.5]  Hypochloremia [E87.8]  Hyponatremia [E87.1]  Acute pulmonary edema (HCC) [J81.0]  MARIVEL (acute kidney injury) ([facility]) [N17.9]  Coccygeal pain, acute [M53.3]  CHF (congestive heart failure), NYHA class I, acute on chronic, combined ([facility]) [I50.43]  Worsening renal function [N28.9]  PMHx/PSHx:   has a past medical history of Arthritis, Atrial fibrillation ([facility]), CAD (coronary artery disease), Cardiomyopathy ([facility]), CHF (congestive heart failure) ([facility]), Chronic anticoagulation, Chronic bronchitis ([facility]), COPD (chronic obstructive pulmonary disease) ([facility]), COVID, Depression, GERD (gastroesophageal reflux disease), HFrEF (heart failure with reduced ejection fraction) ([facility]), [place] (hard of hearing), Hyperlipidemia, Hypertension, ICD (implantable cardioverter-defibrillator) in place, Left ventricular dysfunction, Low vitamin D level, MI (myocardial infarction) ([facility]), Osteopenia, Osteoporosis, and Swallowing difficulty.   Procedure/Surgery:  none   Precautions:  Falls, [place], O2, Stress incontinence, TSM  Equipment Needs:  TBD    SUBJECTIVE:    Pt lives with her  in a 1 story home with 3 stairs and 1 rail to enter.  Bedroom and bathroom are on the 1st level.  Pt ambulated with FWW vs. Rollator PTA.  4-5 L O2 at baseline. Requires assistance at baseline.  Admits to falls at PTA.     OBJECTIVE:   Initial Evaluation  Date: 24  Treatment  5/3/24  Short Term/ Long Term   Goals   AM-PAC 6 Clicks      Was pt agreeable to Eval/treatment? Yes  Yes     Does pt have pain? No reported pain  No reported pain     Bed Mobility  Rolling: NT  Supine to sit: Min A  Sit to supine: NT  Scooting: Min A to EOB  Rolling: NT  Supine to sit: Min A  Sit to supine: NT  Scooting: Min A

## 2024-05-03 NOTE — DISCHARGE INSTR - COC
Continuity of Care Form    Patient Name: Barbara Jack   :  1935  MRN:  [id number]    Admit date:  2024  Discharge date:  5/3/24    Code Status Order: Limited   Advance Directives:     Admitting Physician:  Zack Scott MD  PCP: Julita Dyer MD    Discharging Nurse: LALI Zambrano   Discharging Hospital Unit/Room#: 8421/8421-B  Discharging Unit Phone Number: [phone]    Emergency Contact:   Extended Emergency Contact Information  Primary Emergency Contact: See Jack  Address: [address] [place] [address]  Home Phone: [phone]  Relation: Spouse  Secondary Emergency Contact: Ganga Massey   [facility]  Home Phone: [phone]  Relation: Child    Past Surgical History:  Past Surgical History:   Procedure Laterality Date    APPENDECTOMY      BREAST CAPSULECTOMY  2012    BILATERAL BREAST CAPSULECTOMIES    BREAST SURGERY       cosmetic implants    CARDIAC DEFIBRILLATOR PLACEMENT      [facility]    CARDIAC DEFIBRILLATOR PLACEMENT  2010    CARDIAC DEFIBRILLATOR PLACEMENT Left 2018    [employer] ICD change out,     CARDIAC PACEMAKER PLACEMENT  05/10/2010    Dual chamber pacer ICD.    CARDIAC SURGERY  2009    stent    CARDIOVASCULAR STRESS TEST  2010    COLONOSCOPY  2021    [facility], repeat as needed    COSMETIC SURGERY      eyelids breast    DIAGNOSTIC CARDIAC CATH LAB PROCEDURE  10/30/2009    ESOPHAGEAL MOTILITY STUDY N/A 2020    ESOPHAGEAL MOTILITY/MANOMETRY STUDY performed by Lawrence Hernandez DO at [facility] ENDOSCOPY    HYSTERECTOMY (CERVIX STATUS UNKNOWN)      IR US THYROID BIOPSY      OTHER SURGICAL HISTORY Right 2015    ORIF RIGHT DISTAL RADIUS    OTHER SURGICAL HISTORY Right 04/10/2016    IP joint release of thumb    TONSILLECTOMY      TRANSTHORACIC ECHOCARDIOGRAM  3/30/11ize and function,     UPPER GASTROINTESTINAL ENDOSCOPY N/A 2020    EGD SUBMUCOSAL/BOTOX INJECTION

## 2024-05-03 NOTE — PROGRESS NOTES
stand:Moderate Assist  Stand to sit: Moderate Assist  Stand pivot: Moderate Assist with FWW    Sit to stand: Minimal Assist  Stand to sit: Minimal Assist  Stand pivot: Moderate Assist with FWW Stand by Assist    Functional Mobility Moderate Assist with FWW  For few steps from chair   Moderate Assist with FWW  For few steps from chair Stand by Assist with appropriate AD   Balance Sitting:     Static: SBA    Dynamic:Min A  Standing: Mod A    during functional activity Sitting:     Static: SBA    Dynamic:Min A  Standing: Mod A    during functional activity Sitting:     Static: Ind    Dynamic:SBA  Standing: SBA   Activity Tolerance Fair tolerance with light activity Fair tolerance with light activity. WFL  For full ADL   Visual/  Perceptual Glasses: yes        Safety F  G-     Hand Dominance: R   AROM (PROM) Strength Additional Info:    RUE  WFL 3+/5 WFL  and wfl FMC/dexterity noted during ADL tasks       LUE WFL 3+/5 WFL  and wfl FMC/dexterity noted during ADL tasks       Hearing: [place] B hearing aids  Sensation:  No c/o numbness or tingling   Tone: WFL  Edema: Unremarkable    Comments: Patient cleared by nursing. Upon arrival patient semi supine in bed, educated on the role of OT, and agreeable to OT session. No family present for session today. OT facilitated ADLs, bed mobility, functional transfers with focus on safety, body mechanics, and precautions. At end of session, patient semi supine in bed, properly positioned, oriented to call light, with call light and phone within reach, all lines and tubes intact. Nursing notified.  Overall patient demonstrated fair reception to education decreased independence and safety during completion of ADL/functional transfer/mobility tasks.  Pt would benefit from continued skilled OT to increase safety and independence with completion of ADL/IADL tasks for functional independence and quality of life.    Treatment: OT treatment provided this date includes:

## 2024-05-03 NOTE — DISCHARGE SUMMARY
[facility] Hospitalist Discharge Summary         Barbara Jack  MRN: [id number]  Account Number: [id number]  Admitted: 4/30/2024  Discharge Date: 05/03/24    PCP Handoff    Recommended Outpatient Testing  BMP    Results Pending At Discharge  None        Clinical Summary    Patient admitted on 4/30/2024      Barbara Jack is a 89 y.o. female patient of Julita Dyer MD with history of atrial fibrillation on Xarelto, HFrEF s/p ICD most recent EF 20% on 10/21/2023, diabetes mellitus, hypertension hyperlipidemia, CAD, COPD presented to [facility] on 4/30/2024 with concern of low heart rate after she was evaluated by home health nurse.   While in the ER patient denies any complaints other than some tailbone pain after a fall 2 days ago.  Patient vitals were stable, lab workup concerning for MARIVEL, hyperkalemia, hyponatremia significantly elevated BNP, chronic elevated troponin.  Patient is admitted for further evaluation management.     Of note patient was recently in the hospital for chest pain and CHF exacerbation was treated with IV diuretics, was evaluated by palliative care and CODE STATUS changed to DNR CCA.  Patient was ordered by cardiology and family had opted for medical management and no further invasive procedures.     Acute CHF exacerbation  Patient's BNP significantly elevated, chest x-ray concerning for pulmonary congestion  Continue IV Bumex, patient looks more euvolemic, Transition to demadex BID on discharge  Nephrology has been consulted for management of fluid balance  Patient was recently evaluated by cardiology on 4/17/2024.  Will hold off on cardiology consult for now  ECHO reviewed showing EF 25% with moderately increased wall thickness and akinesis of mid anteroseptal, mid inferoseptal and entire apex.     CKD stage III  Hyperkalemia 2/2 above: hyperkalemia improved  Hyponatremia 2/2 fluid overload: Hyponatremia improving  Baseline creatinine 1.1-1.4 per

## 2024-05-03 NOTE — CARE COORDINATION
Social Work/Case Management Transition of Care Planning (Anny Wilkerson [facility] [phone]):  Per report and chart review, patient has been weaned to her baseline of 3L NC at 100%.  She is on IV Bumex BID.  PT/OT scores noted to be 13/13.  She ambulated 5' with FWW mod assist.  Nephrology continues to follow for management of CKD. Met with patient and her  at bedside.  Discharge plan is to [facility].  Referral made to Maritza at [facility].  Await response. Second choice is [facility]. CM/SW will follow.   RALPH Allen  5/3/2024    Update:  Per Maritza of [facility], they can accept the patient.  Notified attending placement is secured and no pre-cert is needed.  Discharge will be pending nephrology clearance.  CHANDRAKANT/destination completed.  7000 completed.  Discharge envelope with ambulette form is in the soft chart.  CM/SW will follow.  RALPH Allen  5/3/2024    Update:  Discharge order noted.  Transport via [employer] ambulette with a 3:00 pm  time.  Notified patient, her , floor nurse, and Shawna of [facility].  Discharge envelope with ambulette form is in the soft chart.    RALPH Allen  5/3/2024

## 2024-05-03 NOTE — PROGRESS NOTES
[facility]  Nephrology Progress Note    Patient's Name: Barbara Jack    History of Present Illness from 4/30 consult note:    \"Barbara Jack is a 89 y.o. female with a past medical history of CAD, CHF, COPD, hypertension, and hyperlipidemia.  She presented to the ED on 4/30 reportedly for abnormal vital signs, reportedly a low heart rate.  Vital signs on 4/30 includes temperature 97.4, respirations 16, pulse 61, /67, and she was 94% SpO2.  Lab data on 4/30 includes sodium 124, potassium 5.9, BUN 48, creatinine 1.4, anion gap 17, proBNP 39,875, and hemoglobin 9.4.  She had a chest x-ray on 4/30 which showed bronchial wall thickening and interstitial coarsening, left greater than right pleural and parenchymal opacities in the lung bases.  Nephrology has been consulted to see the patient for hyperkalemia, hyponatremia, and worsening renal function.  Patient is known to our service.  She has a history of CKD with a baseline creatinine of 1-1.4.   At present, patient was seen and examined.  She explained that she has had a cough for approximately 2 days.  She notes intermittent shortness of breath and intermittent dizziness.  She denies any sore throat.  She denies any chest pain.  She denies any nausea or vomiting.  She denies any fevers or headache.  She denies any dysuria or hematuria.  She explained that her appetite was poor.  She notes soft stools.\"    Subjective:    5/3: Patient was seen and examined.  She is sitting in a chair and reports that she feels better.  She denies any chest pain or shortness of breath.  She has visitors at the bedside.  She is for possible discharge today.    PMH:    Past Medical History:   Diagnosis Date    Arthritis     Atrial fibrillation (HCC)     follows with Dr Craft  on xarelto    CAD (coronary artery disease)     Cardiomyopathy (HCC)     CHF (congestive heart failure) ([facility]) 2010    history of follows with Dr Craft 4/27/23    Chronic anticoagulation     Chronic

## 2024-05-03 NOTE — PROGRESS NOTES
HOSPITALIST PROGRESS NOTE    Patient's name: Barbara Jack  : 1935  Admission date: 2024  Date of service: 5/3/2024   Primary care physician: Julita Dyer MD    Assessment   Patient admitted on 2024     Barbara Jack is a 89 y.o. female patient of Julita Dyer MD with history of atrial fibrillation on Xarelto, HFrEF s/p ICD most recent EF 20% on 10/21/2023, diabetes mellitus, hypertension hyperlipidemia, CAD, COPD presented to [facility] on 2024 with concern of low heart rate after she was evaluated by home health nurse.   While in the ER patient denies any complaints other than some tailbone pain after a fall 2 days ago.  Patient vitals were stable, lab workup concerning for MARIVEL, hyperkalemia, hyponatremia significantly elevated BNP, chronic elevated troponin.  Patient is admitted for further evaluation management.     Of note patient was recently in the hospital for chest pain and CHF exacerbation was treated with IV diuretics, was evaluated by palliative care and CODE STATUS changed to DNR CCA.  Patient was ordered by cardiology and family had opted for medical management and no further invasive procedures.     Acute CHF exacerbation  Patient's BNP significantly elevated, chest x-ray concerning for pulmonary congestion  Continue IV Bumex, patient looks more euvolemic, may be able to transition to oral Bumex once okay with nephrology  Nephrology has been consulted for management of fluid balance  Patient was recently evaluated by cardiology on 2024.  Will hold off on cardiology consult for now  ECHO reviewed showing EF 25% with moderately increased wall thickness and akinesis of mid anteroseptal, mid inferoseptal and entire apex.     CKD stage III  Hyperkalemia 2/2 above: hyperkalemia improved  Hyponatremia 2/2 fluid overload: Hyponatremia improving  Baseline creatinine 1.1-1.4 per nephrology  MARIVEL ruled out.    Continue IV Bumex per  nephrology  Monitor sodium and potassium levels  Monitor renal function     Chronic hypoxic respiratory failure:  On 4 lit oxygen, on 3-5 at baseline     Atrial fibrillation  Continue home amiodarone, metoprolol, Xarelto     Hyperlipidemia  Continue statin        Follow labs   DVT prophylaxis Xarelto  Please see orders for further management and care.  Discharge plan: Continue IV Bumex, pending clearance from nephrology.  Will be discharged to nursing home/rehab.   is aware    Rajendra Jimenez was seen and examined at bedside.  Patient is alert and oriented, at baseline oxygen.  Renal function has remained stable, remains on IV Bumex.  Patient is hard of hearing, family present at bedside.  No acute overnight event.      Review of Systems  All systems reviewed and negative except mentioned above.       Objective  Most Recent Recorded Vitals  /62   Pulse 55   Temp 97.2 °F (36.2 °C) (Temporal)   Resp 14   Ht 1.499 m (4' 11\")   Wt 38.2 kg (84 lb 4.8 oz)   SpO2 98%   BMI 17.03 kg/m²     General appearance: awake, alert No apparent distress, appears stated age and cooperative.  HEENT: Normal cephalic, atraumatic without obvious deformity. Pupils equal, round, and reactive to light.  Extra ocular muscles intact. Conjunctivae/corneas clear.  Neck: Supple, with full range of motion. No jugular venous distention. Trachea midline.  Respiratory:  lungs clear to auscultation; without wheezes, rales or rhonchi; on Nasal cannula  Cardiovascular:  regular rate and rhythm; normal S1, S2; no murmurs, rubs, clicks or gallops; peripheral edema PRESENT/ABSENT: Absent   Abdomen:  soft, non-tender, non-distended  Musculoskeletal: No clubbing, cyanosis, edema of bilateral lower extremities. Brisk capillary refill.   Skin: Normal skin color.  No rashes or lesions.  Neurologic: Oriented to person, place, and time, normal speech, no obvious focal deficits  Psychiatric: Thought content appropriate, normal

## 2024-05-03 NOTE — CARE COORDINATION
Remote Patient Monitoring Note      Date/Time:  5/3/2024 4:05 PM  [facility]  Background: enrolled in [facility] for COPD AND CHF  Clinical Interventions: Reviewed and followed up on alerts and treatments-pt discharged to [facility] rehab   Update to [facility] [place]   Plan/Follow Up: Will continue to review, monitor and address alerts with follow up based on severity of symptoms and risk factors.

## 2024-05-03 NOTE — PLAN OF CARE
Caller: Belia Park    Relationship to patient: Self    Best call back number:     Date of exposure:     Date of positive COVID19 test: 04/26/21    Date if possible COVID19 exposure:     COVID19 symptoms: FEVER, SORE THROAT, HEADACHE    Date of initial quarantine: 04/26/21    Additional information or concerns: PATIENT WAS JUST DIAGNOSED WITH COVID AND WANTS TO KNOW WHAT HER DR SUGGEST SHE TAKE AND WHAT SHE NEEDS TO DO FROM HERE OUT.      What is the patients preferred pharmacy: [facility]  [address]  [phone]             Problem: Pain  Goal: Verbalizes/displays adequate comfort level or baseline comfort level  5/3/2024 0119 by Leo Londono RN  Outcome: Progressing  5/2/2024 1536 by Kenya Vega RN  Outcome: Progressing     Problem: Safety - Adult  Goal: Free from fall injury  5/3/2024 0119 by Leo Londono RN  Outcome: Progressing  5/2/2024 1536 by Kenya Vega RN  Outcome: Progressing     Problem: Chronic Conditions and Co-morbidities  Goal: Patient's chronic conditions and co-morbidity symptoms are monitored and maintained or improved  Outcome: Progressing

## 2024-05-04 LAB
ALBUMIN SERPL-MCNC: 3.6 G/DL (ref 3.5–5.2)
ALP SERPL-CCNC: 79 U/L (ref 35–104)
ALT SERPL-CCNC: 30 U/L (ref 0–32)
ANION GAP SERPL CALCULATED.3IONS-SCNC: 11 MMOL/L (ref 7–16)
AST SERPL-CCNC: 41 U/L (ref 0–31)
BILIRUB SERPL-MCNC: 0.6 MG/DL (ref 0–1.2)
BUN SERPL-MCNC: 39 MG/DL (ref 6–23)
CALCIUM SERPL-MCNC: 9.2 MG/DL (ref 8.6–10.2)
CHLORIDE SERPL-SCNC: 93 MMOL/L (ref 98–107)
CHOLEST SERPL-MCNC: 141 MG/DL
CO2 SERPL-SCNC: 40 MMOL/L (ref 22–29)
CREAT SERPL-MCNC: 1 MG/DL (ref 0.5–1)
ERYTHROCYTE [DISTWIDTH] IN BLOOD BY AUTOMATED COUNT: 19.5 % (ref 11.5–15)
GFR, ESTIMATED: 56 ML/MIN/1.73M2
GLUCOSE SERPL-MCNC: 104 MG/DL (ref 74–99)
HCT VFR BLD AUTO: 27.6 % (ref 34–48)
HDLC SERPL-MCNC: 50 MG/DL
HGB BLD-MCNC: 8.1 G/DL (ref 11.5–15.5)
LDLC SERPL CALC-MCNC: 80 MG/DL
MCH RBC QN AUTO: 30.3 PG (ref 26–35)
MCHC RBC AUTO-ENTMCNC: 29.3 G/DL (ref 32–34.5)
MCV RBC AUTO: 103.4 FL (ref 80–99.9)
PLATELET # BLD AUTO: 425 K/UL (ref 130–450)
PMV BLD AUTO: 9 FL (ref 7–12)
POTASSIUM SERPL-SCNC: 3.3 MMOL/L (ref 3.5–5)
PROT SERPL-MCNC: 5.8 G/DL (ref 6.4–8.3)
RBC # BLD AUTO: 2.67 M/UL (ref 3.5–5.5)
SODIUM SERPL-SCNC: 144 MMOL/L (ref 132–146)
TRIGL SERPL-MCNC: 55 MG/DL
VLDLC SERPL CALC-MCNC: 11 MG/DL
WBC OTHER # BLD: 6.9 K/UL (ref 4.5–11.5)

## 2024-05-06 ENCOUNTER — OUTSIDE SERVICES (OUTPATIENT)
Dept: PRIMARY CARE CLINIC | Age: 89
End: 2024-05-06
Payer: MEDICARE

## 2024-05-06 DIAGNOSIS — Z91.81 AT MAXIMUM RISK FOR FALL: ICD-10-CM

## 2024-05-06 DIAGNOSIS — I50.31 ACUTE DIASTOLIC CONGESTIVE HEART FAILURE (HCC): Primary | ICD-10-CM

## 2024-05-06 DIAGNOSIS — I13.0 CARDIORENAL SYNDROME WITH RENAL FAILURE, STAGE 1-4 OR UNSPECIFIED CHRONIC KIDNEY DISEASE, WITH HEART FAILURE (HCC): ICD-10-CM

## 2024-05-06 DIAGNOSIS — E87.1 HYPONATREMIA: ICD-10-CM

## 2024-05-06 DIAGNOSIS — E87.5 HYPERKALEMIA: ICD-10-CM

## 2024-05-06 DIAGNOSIS — I48.91 ATRIAL FIBRILLATION, UNSPECIFIED TYPE (HCC): ICD-10-CM

## 2024-05-06 DIAGNOSIS — R53.1 GENERALIZED WEAKNESS: ICD-10-CM

## 2024-05-06 DIAGNOSIS — R53.81 PHYSICAL DECONDITIONING: ICD-10-CM

## 2024-05-06 DIAGNOSIS — N18.32 TYPE 2 DIABETES MELLITUS WITH STAGE 3B CHRONIC KIDNEY DISEASE, WITHOUT LONG-TERM CURRENT USE OF INSULIN (HCC): ICD-10-CM

## 2024-05-06 DIAGNOSIS — E11.22 TYPE 2 DIABETES MELLITUS WITH STAGE 3B CHRONIC KIDNEY DISEASE, WITHOUT LONG-TERM CURRENT USE OF INSULIN (HCC): ICD-10-CM

## 2024-05-06 DIAGNOSIS — J44.9 CHRONIC OBSTRUCTIVE PULMONARY DISEASE, UNSPECIFIED COPD TYPE (HCC): ICD-10-CM

## 2024-05-06 DIAGNOSIS — J96.11 CHRONIC RESPIRATORY FAILURE WITH HYPOXIA (HCC): ICD-10-CM

## 2024-05-06 LAB
ANION GAP SERPL CALCULATED.3IONS-SCNC: 13 MMOL/L (ref 7–16)
BUN SERPL-MCNC: 37 MG/DL (ref 6–23)
CALCIUM SERPL-MCNC: 9.7 MG/DL (ref 8.6–10.2)
CHLORIDE SERPL-SCNC: 94 MMOL/L (ref 98–107)
CO2 SERPL-SCNC: 36 MMOL/L (ref 22–29)
CREAT SERPL-MCNC: 1.3 MG/DL (ref 0.5–1)
ERYTHROCYTE [DISTWIDTH] IN BLOOD BY AUTOMATED COUNT: 19.6 % (ref 11.5–15)
GFR, ESTIMATED: 40 ML/MIN/1.73M2
GLUCOSE SERPL-MCNC: 89 MG/DL (ref 74–99)
HCT VFR BLD AUTO: 26.9 % (ref 34–48)
HGB BLD-MCNC: 7.9 G/DL (ref 11.5–15.5)
MCH RBC QN AUTO: 30.6 PG (ref 26–35)
MCHC RBC AUTO-ENTMCNC: 29.4 G/DL (ref 32–34.5)
MCV RBC AUTO: 104.3 FL (ref 80–99.9)
PLATELET # BLD AUTO: 358 K/UL (ref 130–450)
PMV BLD AUTO: 9.1 FL (ref 7–12)
POTASSIUM SERPL-SCNC: 4.8 MMOL/L (ref 3.5–5)
RBC # BLD AUTO: 2.58 M/UL (ref 3.5–5.5)
SODIUM SERPL-SCNC: 143 MMOL/L (ref 132–146)
WBC OTHER # BLD: 6.4 K/UL (ref 4.5–11.5)

## 2024-05-06 PROCEDURE — 99305 1ST NF CARE MODERATE MDM 35: CPT | Performed by: STUDENT IN AN ORGANIZED HEALTH CARE EDUCATION/TRAINING PROGRAM

## 2024-05-06 NOTE — PROGRESS NOTES
Barbara Jack (:  1935) is a 89 y.o. female.    Subjective   SUBJECTIVE/OBJECTIVE:  Past Medical History:   Diagnosis Date    Arthritis     Atrial fibrillation ([facility])     follows with Dr Craft  on xarelto    CAD (coronary artery disease)     Cardiomyopathy ([facility])     CHF (congestive heart failure) ([facility])     history of follows with Dr Craft 23    Chronic anticoagulation     Chronic bronchitis ([facility])     none recent    COPD (chronic obstructive pulmonary disease) ([facility])     COVID 2022    in hospital x5 days    Depression     GERD (gastroesophageal reflux disease)     HFrEF (heart failure with reduced ejection fraction) ([facility]) 2016- LVEF 20-25%, large apical aneurysm, LA moderate-severely dilated, mildly enlarged RA, mild MR, mild TR, mild AR    [place] (hard of hearing)     wears bilat hearing aids    Hyperlipidemia     Hypertension     ICD (implantable cardioverter-defibrillator) in place     [facility] follows with Dr Keller. last interrogated remotely  23    Left ventricular dysfunction     Low vitamin D level     MI (myocardial infarction) ([facility]) 10/30/2009    Osteopenia     Osteoporosis     Swallowing difficulty       Past Surgical History:   Procedure Laterality Date    APPENDECTOMY      BREAST CAPSULECTOMY  2012    BILATERAL BREAST CAPSULECTOMIES    BREAST SURGERY       cosmetic implants    CARDIAC DEFIBRILLATOR PLACEMENT      [facility]    CARDIAC DEFIBRILLATOR PLACEMENT  2010    CARDIAC DEFIBRILLATOR PLACEMENT Left 2018    [employer] ICD change out,     CARDIAC PACEMAKER PLACEMENT  05/10/2010    Dual chamber pacer ICD.    CARDIAC SURGERY      stent    CARDIOVASCULAR STRESS TEST  2010    COLONOSCOPY  2021    [facility], repeat as needed    COSMETIC SURGERY      eyelids breast    DIAGNOSTIC CARDIAC CATH LAB PROCEDURE  10/30/2009    ESOPHAGEAL MOTILITY STUDY N/A 2020    ESOPHAGEAL MOTILITY/MANOMETRY STUDY performed by

## 2024-05-07 ENCOUNTER — COMMUNITY CARE MANAGEMENT (OUTPATIENT)
Facility: CLINIC | Age: 89
End: 2024-05-07

## 2024-05-08 ENCOUNTER — OUTSIDE SERVICES (OUTPATIENT)
Dept: PRIMARY CARE CLINIC | Age: 89
End: 2024-05-08
Payer: MEDICARE

## 2024-05-08 DIAGNOSIS — J44.9 CHRONIC OBSTRUCTIVE PULMONARY DISEASE, UNSPECIFIED COPD TYPE (HCC): ICD-10-CM

## 2024-05-08 DIAGNOSIS — I25.10 CORONARY ARTERY DISEASE INVOLVING NATIVE CORONARY ARTERY OF NATIVE HEART WITHOUT ANGINA PECTORIS: ICD-10-CM

## 2024-05-08 DIAGNOSIS — I10 ESSENTIAL HYPERTENSION: ICD-10-CM

## 2024-05-08 DIAGNOSIS — I48.91 ATRIAL FIBRILLATION, UNSPECIFIED TYPE (HCC): ICD-10-CM

## 2024-05-08 DIAGNOSIS — I42.0 DILATED CARDIOMYOPATHY (HCC): ICD-10-CM

## 2024-05-08 DIAGNOSIS — N18.32 TYPE 2 DIABETES MELLITUS WITH STAGE 3B CHRONIC KIDNEY DISEASE, WITHOUT LONG-TERM CURRENT USE OF INSULIN (HCC): ICD-10-CM

## 2024-05-08 DIAGNOSIS — R53.1 GENERALIZED WEAKNESS: ICD-10-CM

## 2024-05-08 DIAGNOSIS — E11.22 TYPE 2 DIABETES MELLITUS WITH STAGE 3B CHRONIC KIDNEY DISEASE, WITHOUT LONG-TERM CURRENT USE OF INSULIN (HCC): ICD-10-CM

## 2024-05-08 DIAGNOSIS — E87.5 HYPERKALEMIA: ICD-10-CM

## 2024-05-08 DIAGNOSIS — J96.11 CHRONIC RESPIRATORY FAILURE WITH HYPOXIA (HCC): ICD-10-CM

## 2024-05-08 DIAGNOSIS — E87.1 HYPONATREMIA: ICD-10-CM

## 2024-05-08 DIAGNOSIS — I50.31 ACUTE DIASTOLIC CONGESTIVE HEART FAILURE (HCC): Primary | ICD-10-CM

## 2024-05-08 DIAGNOSIS — Z95.810 IMPLANTABLE CARDIOVERTER-DEFIBRILLATOR (ICD) IN SITU: ICD-10-CM

## 2024-05-08 LAB
ANION GAP SERPL CALCULATED.3IONS-SCNC: 18 MMOL/L (ref 7–16)
BUN SERPL-MCNC: 17 MG/DL (ref 6–23)
CALCIUM SERPL-MCNC: 9.4 MG/DL (ref 8.6–10.2)
CHLORIDE SERPL-SCNC: 102 MMOL/L (ref 98–107)
CO2 SERPL-SCNC: 21 MMOL/L (ref 22–29)
CREAT SERPL-MCNC: 0.9 MG/DL (ref 0.5–1)
GFR, ESTIMATED: 58 ML/MIN/1.73M2
GLUCOSE SERPL-MCNC: 120 MG/DL (ref 74–99)
POTASSIUM SERPL-SCNC: 4.1 MMOL/L (ref 3.5–5)
SODIUM SERPL-SCNC: 141 MMOL/L (ref 132–146)

## 2024-05-08 PROCEDURE — 99309 SBSQ NF CARE MODERATE MDM 30: CPT | Performed by: NURSE PRACTITIONER

## 2024-05-09 ENCOUNTER — CARE COORDINATION (OUTPATIENT)
Dept: CARE COORDINATION | Age: 89
End: 2024-05-09

## 2024-05-09 NOTE — CARE COORDINATION
Left HIPAA compliant message for patient to return call to [phone].  Re: Follow up: ACM attempted to leave a message in regards to RPM equipment and to confirm pt is now in rehab facility.. Awaiting return call from darryn Cui. Will continue tor reach out.Review POC     FOLLOW-UP PLAN:    -Confirm pt is in rehab. Send for RPM equipment, assess further needs    Next Anticipated Outreach by [facility] Care Team:  1 Week

## 2024-05-10 ENCOUNTER — OUTSIDE SERVICES (OUTPATIENT)
Dept: PRIMARY CARE CLINIC | Age: 89
End: 2024-05-10
Payer: MEDICARE

## 2024-05-10 DIAGNOSIS — E11.22 TYPE 2 DIABETES MELLITUS WITH STAGE 3B CHRONIC KIDNEY DISEASE, WITHOUT LONG-TERM CURRENT USE OF INSULIN (HCC): ICD-10-CM

## 2024-05-10 DIAGNOSIS — N18.32 TYPE 2 DIABETES MELLITUS WITH STAGE 3B CHRONIC KIDNEY DISEASE, WITHOUT LONG-TERM CURRENT USE OF INSULIN (HCC): ICD-10-CM

## 2024-05-10 DIAGNOSIS — J44.9 CHRONIC OBSTRUCTIVE PULMONARY DISEASE, UNSPECIFIED COPD TYPE (HCC): ICD-10-CM

## 2024-05-10 DIAGNOSIS — I25.10 CORONARY ARTERY DISEASE INVOLVING NATIVE CORONARY ARTERY OF NATIVE HEART WITHOUT ANGINA PECTORIS: ICD-10-CM

## 2024-05-10 DIAGNOSIS — E87.5 HYPERKALEMIA: ICD-10-CM

## 2024-05-10 DIAGNOSIS — J96.11 CHRONIC RESPIRATORY FAILURE WITH HYPOXIA (HCC): ICD-10-CM

## 2024-05-10 DIAGNOSIS — Z95.810 IMPLANTABLE CARDIOVERTER-DEFIBRILLATOR (ICD) IN SITU: ICD-10-CM

## 2024-05-10 DIAGNOSIS — E87.1 HYPONATREMIA: ICD-10-CM

## 2024-05-10 DIAGNOSIS — R53.1 GENERALIZED WEAKNESS: ICD-10-CM

## 2024-05-10 DIAGNOSIS — I50.31 ACUTE DIASTOLIC CONGESTIVE HEART FAILURE (HCC): Primary | ICD-10-CM

## 2024-05-10 DIAGNOSIS — I48.91 ATRIAL FIBRILLATION, UNSPECIFIED TYPE (HCC): ICD-10-CM

## 2024-05-10 DIAGNOSIS — I42.0 DILATED CARDIOMYOPATHY (HCC): ICD-10-CM

## 2024-05-10 DIAGNOSIS — I10 ESSENTIAL HYPERTENSION: ICD-10-CM

## 2024-05-10 LAB
ALBUMIN SERPL-MCNC: 3.6 G/DL (ref 3.5–5.2)
ALP SERPL-CCNC: 58 U/L (ref 35–104)
ALT SERPL-CCNC: 12 U/L (ref 0–32)
ANION GAP SERPL CALCULATED.3IONS-SCNC: 10 MMOL/L (ref 7–16)
AST SERPL-CCNC: 19 U/L (ref 0–31)
BILIRUB SERPL-MCNC: 0.4 MG/DL (ref 0–1.2)
BUN SERPL-MCNC: 31 MG/DL (ref 6–23)
CALCIUM SERPL-MCNC: 9.5 MG/DL (ref 8.6–10.2)
CHLORIDE SERPL-SCNC: 95 MMOL/L (ref 98–107)
CO2 SERPL-SCNC: 35 MMOL/L (ref 22–29)
CREAT SERPL-MCNC: 1.2 MG/DL (ref 0.5–1)
ERYTHROCYTE [DISTWIDTH] IN BLOOD BY AUTOMATED COUNT: 18.9 % (ref 11.5–15)
GFR, ESTIMATED: 42 ML/MIN/1.73M2
GLUCOSE SERPL-MCNC: 77 MG/DL (ref 74–99)
HCT VFR BLD AUTO: 28.3 % (ref 34–48)
HGB BLD-MCNC: 9 G/DL (ref 11.5–15.5)
MCH RBC QN AUTO: 32.4 PG (ref 26–35)
MCHC RBC AUTO-ENTMCNC: 31.8 G/DL (ref 32–34.5)
MCV RBC AUTO: 101.8 FL (ref 80–99.9)
PLATELET # BLD AUTO: 341 K/UL (ref 130–450)
PMV BLD AUTO: 9.1 FL (ref 7–12)
POTASSIUM SERPL-SCNC: 4.7 MMOL/L (ref 3.5–5)
PROT SERPL-MCNC: 5.4 G/DL (ref 6.4–8.3)
RBC # BLD AUTO: 2.78 M/UL (ref 3.5–5.5)
SODIUM SERPL-SCNC: 140 MMOL/L (ref 132–146)
WBC OTHER # BLD: 5.9 K/UL (ref 4.5–11.5)

## 2024-05-10 PROCEDURE — 99309 SBSQ NF CARE MODERATE MDM 30: CPT | Performed by: NURSE PRACTITIONER

## 2024-05-13 ENCOUNTER — OUTSIDE SERVICES (OUTPATIENT)
Dept: PRIMARY CARE CLINIC | Age: 89
End: 2024-05-13

## 2024-05-13 DIAGNOSIS — I48.91 ATRIAL FIBRILLATION, UNSPECIFIED TYPE (HCC): ICD-10-CM

## 2024-05-13 DIAGNOSIS — J96.11 CHRONIC RESPIRATORY FAILURE WITH HYPOXIA (HCC): ICD-10-CM

## 2024-05-13 DIAGNOSIS — R53.1 GENERALIZED WEAKNESS: ICD-10-CM

## 2024-05-13 DIAGNOSIS — N18.32 TYPE 2 DIABETES MELLITUS WITH STAGE 3B CHRONIC KIDNEY DISEASE, WITHOUT LONG-TERM CURRENT USE OF INSULIN (HCC): ICD-10-CM

## 2024-05-13 DIAGNOSIS — I10 ESSENTIAL HYPERTENSION: ICD-10-CM

## 2024-05-13 DIAGNOSIS — E11.22 TYPE 2 DIABETES MELLITUS WITH STAGE 3B CHRONIC KIDNEY DISEASE, WITHOUT LONG-TERM CURRENT USE OF INSULIN (HCC): ICD-10-CM

## 2024-05-13 DIAGNOSIS — J81.0 ACUTE PULMONARY EDEMA (HCC): Primary | ICD-10-CM

## 2024-05-13 DIAGNOSIS — J44.9 CHRONIC OBSTRUCTIVE PULMONARY DISEASE, UNSPECIFIED COPD TYPE (HCC): ICD-10-CM

## 2024-05-13 DIAGNOSIS — I25.10 CORONARY ARTERY DISEASE INVOLVING NATIVE CORONARY ARTERY OF NATIVE HEART WITHOUT ANGINA PECTORIS: ICD-10-CM

## 2024-05-13 DIAGNOSIS — I42.0 DILATED CARDIOMYOPATHY (HCC): ICD-10-CM

## 2024-05-13 DIAGNOSIS — Z95.810 IMPLANTABLE CARDIOVERTER-DEFIBRILLATOR (ICD) IN SITU: ICD-10-CM

## 2024-05-13 DIAGNOSIS — N18.30 STAGE 3 CHRONIC KIDNEY DISEASE, UNSPECIFIED WHETHER STAGE 3A OR 3B CKD (HCC): ICD-10-CM

## 2024-05-13 DIAGNOSIS — I50.31 ACUTE DIASTOLIC CONGESTIVE HEART FAILURE (HCC): ICD-10-CM

## 2024-05-13 ASSESSMENT — ENCOUNTER SYMPTOMS
SHORTNESS OF BREATH: 0
DIARRHEA: 0
COUGH: 0
WHEEZING: 0
EYE REDNESS: 0
WHEEZING: 0
SORE THROAT: 0
RHINORRHEA: 0
CHEST TIGHTNESS: 0
VOMITING: 0
EYE DISCHARGE: 0
COUGH: 0
EYE DISCHARGE: 0
EYE ITCHING: 0
DIARRHEA: 0
ABDOMINAL DISTENTION: 0
SORE THROAT: 0
EYE PAIN: 0
ABDOMINAL PAIN: 0
CONSTIPATION: 0
EYE ITCHING: 0
VOMITING: 0
SHORTNESS OF BREATH: 0
CHEST TIGHTNESS: 0
CONSTIPATION: 0
RHINORRHEA: 0
ABDOMINAL DISTENTION: 0
EYE REDNESS: 0
NAUSEA: 0
NAUSEA: 0
SINUS PRESSURE: 0

## 2024-05-13 NOTE — PROGRESS NOTES
Barbara Jack (:  1935) is a 89 y.o. female that is seen today for skilled assessment    Subjective   SUBJECTIVE/OBJECTIVE:  Barbara is awake alert and in no obvious distress.  She is sitting up in recliner chair in room with  at her side.  She remains on O2 per nasal cannula.  She has no complaints of pain or discomfort at this time.  She continues to work in therapies as tolerated.   is concerned about depression and requested a referral to the psych nurse practitioner, referral sent.  She is to follow-up with CHF clinic, cardiology and psych NP.  Nursing has no concerns and will let Dr. LOVE or PA know of any changes    Location: [facility]  All nursing and progress notes reviewed.    Past Medical History:   Diagnosis Date    Arthritis     Atrial fibrillation ([facility])     follows with Dr Craft  on xarelto    CAD (coronary artery disease)     Cardiomyopathy ([facility])     CHF (congestive heart failure) ([facility])     history of follows with Dr Craft 23    Chronic anticoagulation     Chronic bronchitis ([facility])     none recent    COPD (chronic obstructive pulmonary disease) ([facility])     COVID 2022    in hospital x5 days    Depression     GERD (gastroesophageal reflux disease)     HFrEF (heart failure with reduced ejection fraction) ([facility]) 2016- LVEF 20-25%, large apical aneurysm, LA moderate-severely dilated, mildly enlarged RA, mild MR, mild TR, mild AR    [place] (hard of hearing)     wears bilat hearing aids    Hyperlipidemia     Hypertension     ICD (implantable cardioverter-defibrillator) in place     [facility] follows with Dr Keller. last interrogated remotely  23    Left ventricular dysfunction     Low vitamin D level     MI (myocardial infarction) ([facility]) 10/30/2009    Osteopenia     Osteoporosis     Swallowing difficulty        No Known Allergies   Current Outpatient Medications   Medication Sig Dispense Refill    torsemide (DEMADEX) 10 MG tablet

## 2024-05-13 NOTE — PROGRESS NOTES
TAKE 1 TABLET BY MOUTH EVERY DAY 90 tablet 0    Coenzyme Q10 (CO Q-10) 200 MG CAPS TAKE 1 CAPSULE BY MOUTH EVERY DAY AT NIGHT 90 capsule 0    omeprazole (PRILOSEC) 40 MG delayed release capsule TAKE 1 CAPSULE BY MOUTH EVERY DAY 90 capsule 1    simvastatin (ZOCOR) 20 MG tablet Take 1 tablet by mouth nightly 90 tablet 0    ferrous sulfate (IRON 325) 325 (65 Fe) MG tablet Take 1 tablet by mouth daily (with breakfast) 30 tablet 3    ipratropium (ATROVENT) 0.02 % nebulizer solution Take 2.5 mLs by nebulization 2 times daily Never filled from pharmacy active order available      metoclopramide (REGLAN) 5 MG tablet Take 1 tablet by mouth 4 times daily      Mometasone Furoate (NASONEX NA) by Nasal route      budesonide (PULMICORT) 0.25 MG/2ML nebulizer suspension Take 2 mLs by nebulization in the morning and 2 mLs in the evening. 60 each 2    acetaminophen (TYLENOL) 500 MG tablet Take 1 tablet by mouth every 6 hours as needed for Pain      ASPIRIN LOW DOSE 81 MG EC tablet Take 1 tablet by mouth daily (Patient taking differently: Take 1 tablet by mouth daily STOPPED on 3-26-24 per ALEXX Beard) 90 tablet 0    amiodarone (PACERONE) 100 MG tablet Take 1 tablet by mouth daily      Respiratory Therapy Supplies (FULL KIT NEBULIZER SET) MISC Use as directed with nebulized medication. 1 each 0    rivaroxaban (XARELTO) 15 MG TABS tablet Take 1 tablet by mouth daily (with breakfast) 90 tablet 2     No current facility-administered medications for this visit.        Review of Systems   Constitutional:  Negative for appetite change, chills, diaphoresis, fatigue and fever.   HENT:  Negative for congestion, ear pain, rhinorrhea, sinus pressure, sneezing and sore throat.    Eyes:  Negative for pain, discharge, redness and itching.   Respiratory:  Negative for cough, chest tightness, shortness of breath and wheezing.    Cardiovascular:  Negative for chest pain and palpitations.   Gastrointestinal:  Negative for abdominal distention,

## 2024-05-15 ENCOUNTER — OUTSIDE SERVICES (OUTPATIENT)
Dept: PRIMARY CARE CLINIC | Age: 89
End: 2024-05-15

## 2024-05-15 DIAGNOSIS — I48.91 ATRIAL FIBRILLATION, UNSPECIFIED TYPE (HCC): ICD-10-CM

## 2024-05-15 DIAGNOSIS — I42.0 DILATED CARDIOMYOPATHY (HCC): ICD-10-CM

## 2024-05-15 DIAGNOSIS — I25.10 CORONARY ARTERY DISEASE INVOLVING NATIVE CORONARY ARTERY OF NATIVE HEART WITHOUT ANGINA PECTORIS: ICD-10-CM

## 2024-05-15 DIAGNOSIS — N18.30 STAGE 3 CHRONIC KIDNEY DISEASE, UNSPECIFIED WHETHER STAGE 3A OR 3B CKD (HCC): ICD-10-CM

## 2024-05-15 DIAGNOSIS — I10 ESSENTIAL HYPERTENSION: ICD-10-CM

## 2024-05-15 DIAGNOSIS — Z95.810 IMPLANTABLE CARDIOVERTER-DEFIBRILLATOR (ICD) IN SITU: ICD-10-CM

## 2024-05-15 DIAGNOSIS — R53.1 GENERALIZED WEAKNESS: ICD-10-CM

## 2024-05-15 DIAGNOSIS — J81.0 ACUTE PULMONARY EDEMA (HCC): Primary | ICD-10-CM

## 2024-05-15 DIAGNOSIS — I50.31 ACUTE DIASTOLIC CONGESTIVE HEART FAILURE (HCC): ICD-10-CM

## 2024-05-15 DIAGNOSIS — J96.11 CHRONIC RESPIRATORY FAILURE WITH HYPOXIA (HCC): ICD-10-CM

## 2024-05-15 DIAGNOSIS — E11.22 TYPE 2 DIABETES MELLITUS WITH STAGE 3B CHRONIC KIDNEY DISEASE, WITHOUT LONG-TERM CURRENT USE OF INSULIN (HCC): ICD-10-CM

## 2024-05-15 DIAGNOSIS — J44.9 CHRONIC OBSTRUCTIVE PULMONARY DISEASE, UNSPECIFIED COPD TYPE (HCC): ICD-10-CM

## 2024-05-15 DIAGNOSIS — N18.32 TYPE 2 DIABETES MELLITUS WITH STAGE 3B CHRONIC KIDNEY DISEASE, WITHOUT LONG-TERM CURRENT USE OF INSULIN (HCC): ICD-10-CM

## 2024-05-17 LAB
ALBUMIN SERPL-MCNC: 3.8 G/DL (ref 3.5–5.2)
ALP SERPL-CCNC: 53 U/L (ref 35–104)
ALT SERPL-CCNC: 11 U/L (ref 0–32)
ANION GAP SERPL CALCULATED.3IONS-SCNC: 12 MMOL/L (ref 7–16)
AST SERPL-CCNC: 20 U/L (ref 0–31)
BILIRUB SERPL-MCNC: 0.3 MG/DL (ref 0–1.2)
BUN SERPL-MCNC: 28 MG/DL (ref 6–23)
CALCIUM SERPL-MCNC: 9.3 MG/DL (ref 8.6–10.2)
CHLORIDE SERPL-SCNC: 98 MMOL/L (ref 98–107)
CO2 SERPL-SCNC: 30 MMOL/L (ref 22–29)
CREAT SERPL-MCNC: 1.3 MG/DL (ref 0.5–1)
ERYTHROCYTE [DISTWIDTH] IN BLOOD BY AUTOMATED COUNT: 19.1 % (ref 11.5–15)
FERRITIN SERPL-MCNC: 77 NG/ML
GFR, ESTIMATED: 40 ML/MIN/1.73M2
GLUCOSE SERPL-MCNC: 82 MG/DL (ref 74–99)
HCT VFR BLD AUTO: 31.2 % (ref 34–48)
HGB BLD-MCNC: 9.3 G/DL (ref 11.5–15.5)
IRON SATN MFR SERPL: 11 % (ref 15–50)
IRON SERPL-MCNC: 37 UG/DL (ref 37–145)
MCH RBC QN AUTO: 31.1 PG (ref 26–35)
MCHC RBC AUTO-ENTMCNC: 29.8 G/DL (ref 32–34.5)
MCV RBC AUTO: 104.3 FL (ref 80–99.9)
PLATELET # BLD AUTO: 312 K/UL (ref 130–450)
PMV BLD AUTO: 9.7 FL (ref 7–12)
POTASSIUM SERPL-SCNC: 5 MMOL/L (ref 3.5–5)
PROT SERPL-MCNC: 6 G/DL (ref 6.4–8.3)
RBC # BLD AUTO: 2.99 M/UL (ref 3.5–5.5)
SODIUM SERPL-SCNC: 140 MMOL/L (ref 132–146)
TIBC SERPL-MCNC: 344 UG/DL (ref 250–450)
TRANSFERRIN SERPL-MCNC: 284 MG/DL (ref 200–360)
WBC OTHER # BLD: 5.1 K/UL (ref 4.5–11.5)

## 2024-05-21 ENCOUNTER — HOSPITAL ENCOUNTER (OUTPATIENT)
Dept: OTHER | Age: 89
Setting detail: THERAPIES SERIES
Discharge: HOME | End: 2024-05-21
Payer: MEDICARE

## 2024-05-21 VITALS
SYSTOLIC BLOOD PRESSURE: 97 MMHG | BODY MASS INDEX: 17.57 KG/M2 | HEART RATE: 55 BPM | WEIGHT: 87 LBS | OXYGEN SATURATION: 100 % | RESPIRATION RATE: 18 BRPM | DIASTOLIC BLOOD PRESSURE: 49 MMHG

## 2024-05-21 LAB
ANION GAP SERPL CALCULATED.3IONS-SCNC: 9 MMOL/L (ref 7–16)
BNP SERPL-MCNC: ABNORMAL PG/ML (ref 0–450)
BUN SERPL-MCNC: 27 MG/DL (ref 6–23)
CALCIUM SERPL-MCNC: 8.8 MG/DL (ref 8.6–10.2)
CHLORIDE SERPL-SCNC: 98 MMOL/L (ref 98–107)
CO2 SERPL-SCNC: 34 MMOL/L (ref 22–29)
CREAT SERPL-MCNC: 1.2 MG/DL (ref 0.5–1)
GFR, ESTIMATED: 44 ML/MIN/1.73M2
GLUCOSE SERPL-MCNC: 143 MG/DL (ref 74–99)
POTASSIUM SERPL-SCNC: 3.9 MMOL/L (ref 3.5–5)
SODIUM SERPL-SCNC: 141 MMOL/L (ref 132–146)

## 2024-05-21 PROCEDURE — 83880 ASSAY OF NATRIURETIC PEPTIDE: CPT

## 2024-05-21 PROCEDURE — 99214 OFFICE O/P EST MOD 30 MIN: CPT

## 2024-05-21 PROCEDURE — 80048 BASIC METABOLIC PNL TOTAL CA: CPT

## 2024-05-21 NOTE — PLAN OF CARE
Problem: Chronic Conditions and Co-morbidities  Goal: Patient's chronic conditions and co-morbidity symptoms are monitored and maintained or improved  Flowsheets (Taken 5/21/2024 1407)  Care Plan - Patient's Chronic Conditions and Co-Morbidity Symptoms are Monitored and Maintained or Improved: Monitor and assess patient's chronic conditions and comorbid symptoms for stability, deterioration, or improvement

## 2024-05-21 NOTE — PROGRESS NOTES
Studies:  Ferritin (ng/mL)   Date Value   05/17/2024 77     Iron (ug/dL)   Date Value   05/17/2024 37     TIBC (ug/dL)   Date Value   05/17/2024 344     Hepatic:  AST (U/L)   Date Value   05/17/2024 20     ALT (U/L)   Date Value   05/17/2024 11     Total Bilirubin (mg/dL)   Date Value   05/17/2024 0.3     Alkaline Phosphatase (U/L)   Date Value   05/17/2024 53     INR:  INR (no units)   Date Value   04/21/2024 2.7         Wt Readings from Last 3 Encounters:   05/21/24 39.5 kg (87 lb)   05/03/24 38.2 kg (84 lb 4.8 oz)   04/16/24 41.7 kg (92 lb)           ASSESSMENT/PLAN:    [x] Euvolemic           [] Hypervolemic, with increased from baseline:  [] Shortness of breath/LYON  [] JVD  [] HJR  [] Abnormal lung assessment:   [] Orthopnea  [] PND  [] Decreased urinary response to oral diuretic   [] Scrotal swelling   [] Lower extremity edema  [] Compression stockings provided  [] Decline in functional capacity (unable to perform activities they had previously been able to do)  [] Weight gain   [] IV diuretics given No  [] Provider notified of recurrent IV diuretic use    [x]Lab work obtained    [x] Patient/Family Educated On:  [x] HF zones (Green, Yellow, Red) and aware of when to take action   [x] Daily weights  [] Scale provided   [x] Low sodium diet (2000 mg)  Barriers to compliance  [] Refuses to monitor diet  [] Socioeconomic difficulties  [] Unable to cook for self (use of frozen meals, can goods, etc)  [] CHF CHW consulted  [] Low sodium meal delivery options given to patient  [] Dietician consulted   [] Low sodium recipes provided  [] Sodium free seasoning provided   [x] Fluid intake (64 oz)  [x] Reviewed currently prescribed medications with patient, educated on importance of compliance and answered any questions regarding their medication  [] Pill box provided to patient  [] Patient using pill packing pharmacy   [] CPAP/BiPAP use  [] Low impact exercise / cardiac rehab   [] LifeVest use  [x] Patient aware of signs

## 2024-05-21 NOTE — RESULT ENCOUNTER NOTE
CHF clinic visit and labs reviewed   VS: 97/49, pulse 55    BNP down to 12,906  BUN 31>>28>>27  Cr 1.2>>1.3>>1.2    Current GDMT:  Toprol XL 25 mg daily   Losartan previously stopped due to hypotension     Continue same medications

## 2024-05-22 ENCOUNTER — OFFICE VISIT (OUTPATIENT)
Dept: PRIMARY CARE CLINIC | Age: 89
End: 2024-05-22

## 2024-05-22 VITALS
WEIGHT: 85 LBS | SYSTOLIC BLOOD PRESSURE: 108 MMHG | TEMPERATURE: 97.7 F | HEIGHT: 59 IN | DIASTOLIC BLOOD PRESSURE: 62 MMHG | BODY MASS INDEX: 17.14 KG/M2

## 2024-05-22 DIAGNOSIS — J44.9 CHRONIC OBSTRUCTIVE PULMONARY DISEASE, UNSPECIFIED COPD TYPE (HCC): ICD-10-CM

## 2024-05-22 DIAGNOSIS — R63.6 LOW WEIGHT: ICD-10-CM
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
## 2024-05-22 DIAGNOSIS — I10 PRIMARY HYPERTENSION: Primary | ICD-10-CM

## 2024-05-22 DIAGNOSIS — R73.9 HYPERGLYCEMIA: ICD-10-CM

## 2024-05-22 DIAGNOSIS — F32.9 REACTIVE DEPRESSION: ICD-10-CM

## 2024-05-22 DIAGNOSIS — I25.5 ISCHEMIC CARDIOMYOPATHY: ICD-10-CM

## 2024-05-22 DIAGNOSIS — I50.22 CHRONIC HFREF (HEART FAILURE WITH REDUCED EJECTION FRACTION) (HCC): ICD-10-CM

## 2024-05-22 LAB
ALBUMIN: 3.8 G/DL (ref 3.5–5.2)
ALP BLD-CCNC: 51 U/L (ref 35–104)
ALT SERPL-CCNC: 9 U/L (ref 0–32)
ANION GAP SERPL CALCULATED.3IONS-SCNC: 10 MMOL/L (ref 7–16)
AST SERPL-CCNC: 19 U/L (ref 0–31)
BASOPHILS ABSOLUTE: 0.03 K/UL (ref 0–0.2)
BASOPHILS RELATIVE PERCENT: 1 % (ref 0–2)
BILIRUB SERPL-MCNC: 0.2 MG/DL (ref 0–1.2)
BUN BLDV-MCNC: 26 MG/DL (ref 6–23)
CALCIUM SERPL-MCNC: 9 MG/DL (ref 8.6–10.2)
CHLORIDE BLD-SCNC: 97 MMOL/L (ref 98–107)
CO2: 34 MMOL/L (ref 22–29)
CREAT SERPL-MCNC: 1 MG/DL (ref 0.5–1)
EOSINOPHILS ABSOLUTE: 0.11 K/UL (ref 0.05–0.5)
EOSINOPHILS RELATIVE PERCENT: 2 % (ref 0–6)
GFR, ESTIMATED: 54 ML/MIN/1.73M2
GLUCOSE BLD-MCNC: 74 MG/DL (ref 74–99)
HBA1C MFR BLD: 4.9 % (ref 4–5.6)
HCT VFR BLD CALC: 32 % (ref 34–48)
HEMOGLOBIN: 9.5 G/DL (ref 11.5–15.5)
IMMATURE GRANULOCYTES %: 0 % (ref 0–5)
IMMATURE GRANULOCYTES ABSOLUTE: <0.03 K/UL (ref 0–0.58)
LYMPHOCYTES ABSOLUTE: 0.61 K/UL (ref 1.5–4)
LYMPHOCYTES RELATIVE PERCENT: 12 % (ref 20–42)
MCH RBC QN AUTO: 31.5 PG (ref 26–35)
MCHC RBC AUTO-ENTMCNC: 29.7 G/DL (ref 32–34.5)
MCV RBC AUTO: 106 FL (ref 80–99.9)
MONOCYTES ABSOLUTE: 0.55 K/UL (ref 0.1–0.95)
MONOCYTES RELATIVE PERCENT: 11 % (ref 2–12)
NEUTROPHILS ABSOLUTE: 3.77 K/UL (ref 1.8–7.3)
NEUTROPHILS RELATIVE PERCENT: 74 % (ref 43–80)
PDW BLD-RTO: 18.2 % (ref 11.5–15)
PLATELET # BLD: 405 K/UL (ref 130–450)
PMV BLD AUTO: 10 FL (ref 7–12)
POTASSIUM SERPL-SCNC: 4.6 MMOL/L (ref 3.5–5)
RBC # BLD: 3.02 M/UL (ref 3.5–5.5)
SODIUM BLD-SCNC: 141 MMOL/L (ref 132–146)
TOTAL PROTEIN: 6.2 G/DL (ref 6.4–8.3)
WBC # BLD: 5.1 K/UL (ref 4.5–11.5)

## 2024-05-22 RX ORDER — SERTRALINE HYDROCHLORIDE 25 MG/1
25 TABLET, FILM COATED ORAL DAILY
Qty: 90 TABLET | Refills: 0 | Status: SHIPPED | OUTPATIENT
Start: 2024-05-22

## 2024-05-22 NOTE — PROGRESS NOTES
her chronic illness is making her depressed, \"needs some medicine\" for this, thinks that is why she keeps getting sick.        Review of Systems   Constitutional:  Positive for fatigue.   Respiratory:  Positive for shortness of breath.    Musculoskeletal:         Ambulates with walker   Psychiatric/Behavioral:          Depressed          Objective:  /62 (Site: Left Upper Arm, Position: Sitting, Cuff Size: Medium Adult)   Temp 97.7 °F (36.5 °C)   Ht 1.499 m (4' 11\")   Wt 38.6 kg (85 lb)   BMI 17.17 kg/m²      Physical Exam  Vitals reviewed.   Constitutional:       Appearance: She is ill-appearing.      Comments: Low wt, using portable O2, ambulates with walker slowly   HENT:      Head: Normocephalic.      Right Ear: External ear normal. There is no impacted cerumen.      Left Ear: External ear normal. There is no impacted cerumen.      Nose: Nose normal.      Mouth/Throat:      Pharynx: Oropharynx is clear. No oropharyngeal exudate.   Eyes:      Extraocular Movements: Extraocular movements intact.      Conjunctiva/sclera: Conjunctivae normal.      Pupils: Pupils are equal, round, and reactive to light.   Cardiovascular:      Rate and Rhythm: Normal rate and regular rhythm.      Heart sounds: Murmur heard.      No friction rub. No gallop.   Pulmonary:      Effort: Pulmonary effort is normal.      Breath sounds: Wheezing (bilaterally) present.   Abdominal:      General: Bowel sounds are normal. There is no distension.      Palpations: Abdomen is soft. There is no mass.      Tenderness: There is no abdominal tenderness. There is no guarding or rebound.   Musculoskeletal:         General: No swelling, tenderness or deformity.      Cervical back: Neck supple. No tenderness.   Lymphadenopathy:      Cervical: No cervical adenopathy.   Skin:     General: Skin is warm.      Coloration: Skin is not pale.      Findings: No rash.   Neurological:      Mental Status: She is alert.   Psychiatric:         Thought Content:

## 2024-05-23 PROBLEM — R63.6 LOW WEIGHT: Status: ACTIVE | Noted: 2024-05-23

## 2024-05-23 PROBLEM — F32.9 REACTIVE DEPRESSION: Status: ACTIVE | Noted: 2024-05-23

## 2024-05-23 ASSESSMENT — ENCOUNTER SYMPTOMS: SHORTNESS OF BREATH: 1

## 2024-05-25 ASSESSMENT — ENCOUNTER SYMPTOMS
SORE THROAT: 0
VOMITING: 0
WHEEZING: 0
NAUSEA: 0
EYE DISCHARGE: 0
VOMITING: 0
DIARRHEA: 0
ABDOMINAL PAIN: 0
SHORTNESS OF BREATH: 0
SINUS PRESSURE: 0
ABDOMINAL DISTENTION: 0
EYE PAIN: 0
COUGH: 0
RHINORRHEA: 0
EYE DISCHARGE: 0
EYE ITCHING: 0
COUGH: 0
SHORTNESS OF BREATH: 0
NAUSEA: 0
WHEEZING: 0
RHINORRHEA: 0
CONSTIPATION: 0
CHEST TIGHTNESS: 0
ABDOMINAL PAIN: 0
CONSTIPATION: 0
ABDOMINAL DISTENTION: 0
EYE ITCHING: 0
SINUS PRESSURE: 0
CHEST TIGHTNESS: 0
DIARRHEA: 0
EYE REDNESS: 0
SORE THROAT: 0
EYE REDNESS: 0
EYE PAIN: 0

## 2024-05-28 ENCOUNTER — OFFICE VISIT (OUTPATIENT)
Dept: CARDIOLOGY CLINIC | Age: 89
End: 2024-05-28
Payer: MEDICARE

## 2024-05-28 VITALS
HEIGHT: 59 IN | HEART RATE: 112 BPM | RESPIRATION RATE: 18 BRPM | WEIGHT: 86 LBS | BODY MASS INDEX: 17.34 KG/M2 | DIASTOLIC BLOOD PRESSURE: 56 MMHG | SYSTOLIC BLOOD PRESSURE: 108 MMHG

## 2024-05-28 DIAGNOSIS — I48.0 PAF (PAROXYSMAL ATRIAL FIBRILLATION) (HCC): ICD-10-CM

## 2024-05-28 DIAGNOSIS — I50.22 CHRONIC HFREF (HEART FAILURE WITH REDUCED EJECTION FRACTION) (HCC): Primary | ICD-10-CM

## 2024-05-28 DIAGNOSIS — I38 VHD (VALVULAR HEART DISEASE): ICD-10-CM

## 2024-05-28 DIAGNOSIS — I10 ESSENTIAL HYPERTENSION: ICD-10-CM

## 2024-05-28 DIAGNOSIS — I42.0 DILATED CARDIOMYOPATHY (HCC): ICD-10-CM

## 2024-05-28 PROCEDURE — 1111F DSCHRG MED/CURRENT MED MERGE: CPT | Performed by: INTERNAL MEDICINE

## 2024-05-28 PROCEDURE — 1123F ACP DISCUSS/DSCN MKR DOCD: CPT | Performed by: INTERNAL MEDICINE

## 2024-05-28 PROCEDURE — G8419 CALC BMI OUT NRM PARAM NOF/U: HCPCS | Performed by: INTERNAL MEDICINE

## 2024-05-28 PROCEDURE — 1090F PRES/ABSN URINE INCON ASSESS: CPT | Performed by: INTERNAL MEDICINE

## 2024-05-28 PROCEDURE — G8427 DOCREV CUR MEDS BY ELIG CLIN: HCPCS | Performed by: INTERNAL MEDICINE

## 2024-05-28 PROCEDURE — 99214 OFFICE O/P EST MOD 30 MIN: CPT | Performed by: INTERNAL MEDICINE

## 2024-05-28 PROCEDURE — 1036F TOBACCO NON-USER: CPT | Performed by: INTERNAL MEDICINE

## 2024-05-28 PROCEDURE — 93000 ELECTROCARDIOGRAM COMPLETE: CPT | Performed by: INTERNAL MEDICINE

## 2024-05-28 RX ORDER — ASPIRIN 81 MG/1
81 TABLET, COATED ORAL DAILY
Qty: 90 TABLET | Refills: 0 | Status: SHIPPED | OUTPATIENT
Start: 2024-05-28

## 2024-05-28 NOTE — PROGRESS NOTES
rivaroxaban (XARELTO) 15 MG TABS tablet, Take 1 tablet by mouth daily (with breakfast), Disp: 90 tablet, Rfl: 2      S: REASON FOR VISIT:    Previously followed by Dr. Rod -- she established with me in 4/2016. She denies recent chest pain, palpitations, PND, orthopnea, or syncope (she recently stopped bowling and going to water aerobics). +worsening LYON and fatigue over the past 1.5 years, particular following COVID-19 infection in 8/2022. She follows regularly with Dr. Keller for ICD interrogations and PAF (s/p generator change in 2/2018). +cough/sinus problems since 11/2018 --> she follows with ENT (Dr. Albarran) --> clinically improved on nasonex. +intermittent dizziness. +ongoing SOB. She is currently with no active cardiac complaints at rest.    History of achalasia -- follows with GI; s/p lower esophageal sphincter botox injection on 5/18/23 and s/p lower esophageal sphincter botox injection and dilation on 8/17/23.    Her  (age 89 yo) recently underwent surgery and currently undergoing chemotherapy for colorectal cancer.     REVIEW OF SYSTEMS:    Cardiac: As per HPI  General: No fever, chills  Pulmonary: As per HPI  HEENT: No visual disturbances, difficult swallowing  GI: No nausea, vomiting  : No dysuria, hematuria  Endocrine: No thyroid disease or DM  Musculoskeletal: MACKEY x 4, no focal motor deficits  Skin: Intact, no rashes  Neuro: No headache, seizures  Psych: Currently with no depression, anxiety     CARDIOVASCULAR HISTORY:    1. Coronary artery disease/ischemic cardiomyopathy/congestive heart failure.   a. 10/30/2009: Acute ST elevation anterolateral wall myocardial infarction.   b. 10/30/2009: Cardiac catheterization/primary angioplasty: Left main short vessel with no significant disease. LAD occluded proximally. LCX: 90% stenosis of the 3rd obtuse marginal branch. RCA, a small nondominant vessel, which was non-selectively visualized. Successful balloon angioplasty and stenting to the proximal

## 2024-05-29 PROCEDURE — 93296 REM INTERROG EVL PM/IDS: CPT | Performed by: INTERNAL MEDICINE

## 2024-05-29 PROCEDURE — 93297 REM INTERROG DEV EVAL ICPMS: CPT | Performed by: INTERNAL MEDICINE

## 2024-05-29 PROCEDURE — 93295 DEV INTERROG REMOTE 1/2/MLT: CPT | Performed by: INTERNAL MEDICINE

## 2024-06-10 ENCOUNTER — HOSPITAL ENCOUNTER (OUTPATIENT)
Dept: OTHER | Age: 89
Setting detail: THERAPIES SERIES
Discharge: HOME | End: 2024-06-10
Payer: MEDICARE

## 2024-06-10 ENCOUNTER — TELEPHONE (OUTPATIENT)
Dept: OTHER | Age: 89
End: 2024-06-10

## 2024-06-10 VITALS
DIASTOLIC BLOOD PRESSURE: 53 MMHG | BODY MASS INDEX: 17.57 KG/M2 | SYSTOLIC BLOOD PRESSURE: 108 MMHG | RESPIRATION RATE: 18 BRPM | HEART RATE: 85 BPM | WEIGHT: 87 LBS

## 2024-06-10 LAB
ANION GAP SERPL CALCULATED.3IONS-SCNC: 10 MMOL/L (ref 7–16)
BNP SERPL-MCNC: 9838 PG/ML (ref 0–450)
BUN SERPL-MCNC: 23 MG/DL (ref 6–23)
CALCIUM SERPL-MCNC: 9.4 MG/DL (ref 8.6–10.2)
CHLORIDE SERPL-SCNC: 91 MMOL/L (ref 98–107)
CO2 SERPL-SCNC: 36 MMOL/L (ref 22–29)
CREAT SERPL-MCNC: 1.2 MG/DL (ref 0.5–1)
GLUCOSE SERPL-MCNC: 153 MG/DL (ref 74–99)
POTASSIUM SERPL-SCNC: 4.2 MMOL/L (ref 3.5–5)
SODIUM SERPL-SCNC: 137 MMOL/L (ref 132–146)

## 2024-06-10 PROCEDURE — 80048 BASIC METABOLIC PNL TOTAL CA: CPT

## 2024-06-10 PROCEDURE — 83880 ASSAY OF NATRIURETIC PEPTIDE: CPT

## 2024-06-10 PROCEDURE — 99214 OFFICE O/P EST MOD 30 MIN: CPT

## 2024-06-10 ASSESSMENT — PATIENT HEALTH QUESTIONNAIRE - PHQ9
SUM OF ALL RESPONSES TO PHQ QUESTIONS 1-9: 0
SUM OF ALL RESPONSES TO PHQ QUESTIONS 1-9: 0
1. LITTLE INTEREST OR PLEASURE IN DOING THINGS: NOT AT ALL
2. FEELING DOWN, DEPRESSED OR HOPELESS: NOT AT ALL
SUM OF ALL RESPONSES TO PHQ9 QUESTIONS 1 & 2: 0
SUM OF ALL RESPONSES TO PHQ QUESTIONS 1-9: 0
SUM OF ALL RESPONSES TO PHQ QUESTIONS 1-9: 0

## 2024-06-10 NOTE — PLAN OF CARE
Problem: Chronic Conditions and Co-morbidities  Goal: Patient's chronic conditions and co-morbidity symptoms are monitored and maintained or improved  Flowsheets (Taken 6/10/2024 9318)  Care Plan - Patient's Chronic Conditions and Co-Morbidity Symptoms are Monitored and Maintained or Improved: Monitor and assess patient's chronic conditions and comorbid symptoms for stability, deterioration, or improvement

## 2024-06-10 NOTE — TELEPHONE ENCOUNTER
Called patient to discuss medication changes.    Mireya Beard, APRN - CNS  Latisha Benito, RN  CHF clinic visit and labs reviewed  VS: 108/53, 85  Euvolemic on RN exam    BNP down to 9838  CO2 36  Chlorie 91  Cr 1.2>>1.0>>1.2    GDMT has been limited by hypotension  Toprol XL 25 mg daily    Decrease torsemide to 10 mg every other day (may take an additional dose on off days as needed)    Follow up as scheduled    Patient expressed verbal understanding and repeated orders back correctly. Encouraged to call the clinic back with any questions.

## 2024-06-10 NOTE — PROGRESS NOTES
Value   05/22/2024 32.0 (L)     Platelets (k/uL)   Date Value   05/22/2024 405     Iron Studies:  Ferritin (ng/mL)   Date Value   05/17/2024 77     Iron (ug/dL)   Date Value   05/17/2024 37     TIBC (ug/dL)   Date Value   05/17/2024 344     Hepatic:  AST (U/L)   Date Value   05/22/2024 19     ALT (U/L)   Date Value   05/22/2024 9     Total Bilirubin (mg/dL)   Date Value   05/22/2024 0.2     Alkaline Phosphatase (U/L)   Date Value   05/22/2024 51     INR:  INR (no units)   Date Value   04/21/2024 2.7         Wt Readings from Last 3 Encounters:   06/10/24 39.5 kg (87 lb)   05/28/24 39 kg (86 lb)   05/22/24 38.6 kg (85 lb)           ASSESSMENT/PLAN:    [x] Euvolemic           [] Hypervolemic, with increased from baseline:  [] Shortness of breath/LYON  [] JVD  [] HJR  [] Abnormal lung assessment:   [] Orthopnea  [] PND  [] Decreased urinary response to oral diuretic   [] Scrotal swelling   [] Lower extremity edema  [] Compression stockings provided  [] Decline in functional capacity (unable to perform activities they had previously been able to do)  [] Weight gain   [] IV diuretics given No  [] Provider notified of recurrent IV diuretic use    [x]Lab work obtained    [x] Patient/Family Educated On:  [x] HF zones (Green, Yellow, Red) and aware of when to take action   [x] Daily weights  [] Scale provided   [x] Low sodium diet (2000 mg)  Barriers to compliance  [] Refuses to monitor diet  [] Socioeconomic difficulties  [] Unable to cook for self (use of frozen meals, can goods, etc)  [] CHF CHW consulted  [] Low sodium meal delivery options given to patient  [] Dietician consulted   [] Low sodium recipes provided  [] Sodium free seasoning provided   [x] Fluid intake (64 oz)  [x] Reviewed currently prescribed medications with patient, educated on importance of compliance and answered any questions regarding their medication  [] Pill box provided to patient  [] Patient using pill packing pharmacy   [] CPAP/BiPAP use  [] Low

## 2024-06-10 NOTE — RESULT ENCOUNTER NOTE
CHF clinic visit and labs reviewed   VS: 108/53, 85  Euvolemic on RN exam     BNP down to 9838  CO2 36  Chlorie 91  Cr 1.2>>1.0>>1.2    GDMT has been limited by hypotension   Toprol XL 25 mg daily     Decrease torsemide to 10 mg every other day (may take an additional dose on off days as needed)     Follow up as scheduled

## 2024-06-11 ENCOUNTER — CARE COORDINATION (OUTPATIENT)
Dept: CASE MANAGEMENT | Age: 89
End: 2024-06-11

## 2024-06-11 NOTE — CARE COORDINATION
Remote Patient Monitoring Note      Date/Time:  6/11/2024 3:45 PM  Patient Current Location: [place]  LPN attempted to contacted patient by telephone regarding yellow alert received for   No VS for 2 Days.   Background: CHF, COPD  Clinical Interventions: Reviewed and followed up on alerts and treatments-       Attempted to reach patient for RPM yellow Alert.  Unable to reach patient.  Left HIPAA Compliant message on Voice Mail to REMIND patient to put metrics in.  Phone number left on Voice Mail to call back.    Will continue to follow.     Amanda Caballero LPN    [phone]  [facility] Coordinator    Plan/Follow Up: Will continue to review, monitor and address alerts with follow up based on severity of symptoms and risk factors.

## 2024-06-14 ENCOUNTER — CARE COORDINATION (OUTPATIENT)
Dept: CASE MANAGEMENT | Age: 89
End: 2024-06-14

## 2024-06-14 NOTE — CARE COORDINATION
-Remote Alert Monitoring Note  Date/Time:  2024 3:55 PM  Patient Current Location: [place]  Verified patients name and  as identifiers.    Rpm alert to be reviewed by the provider   red alert  pulse ox reading (88%)  Vitals Recheck pulse ox reading ()  Additional needs to be addressed by provider: No       Attempted to reach patient's  to recheck SpO2 level.   Left HIPAA Compliant message on Voice Mail to call.  Phone number left on Voice Mail to call back.    Will continue to follow.     Amanda Caballero LPN    [phone]  [facility] Coordinator

## 2024-06-14 NOTE — CARE COORDINATION
-Remote Alert Monitoring Note  Date/Time:  2024 3:14 PM  Patient Current Location: [place]  Verified patients name and  as identifiers.    Rpm alert to be reviewed by the provider   red alert  pulse ox reading (88%)  Vitals Recheck pulse ox reading ()  Additional needs to be addressed by provider: No         Called and asked patient's  if you would recheck the patient's SpO2 level.  states, \"I just got home from my doctors appointment and I didn't have time for that. Call me back in about an hour and I will try to get it for you\".     Amanda Caballero, BRE    [phone]  [facility] Coordinator / RPM

## 2024-06-14 NOTE — CARE COORDINATION
-Remote Alert Monitoring Note  Date/Time:  2024 9:06 AM  Patient Current Location: [place]  Verified patients name and  as identifiers.    Rpm alert to be reviewed by the provider   red alert  pulse ox reading (88%)  Vitals Recheck pulse ox reading ( )  Additional needs to be addressed by provider: No         LPN contacted patient by telephone regarding red alert received   Background: CHF, COPD  Refer to 911 immediately if:  Patient unresponsive or unable to provide history  Change in cognition or sudden confusion  Patient unable to respond in complete sentences  Intense chest pain/tightness  Any concern for any clinical emergency  Red Alert: Provider response time of 1 hr required for any red alert requiring intervention  Yellow Alert: Provider response time of 3hr required for any escalated yellow alert  Patient Chief Complaint:  O2 Triage  Are you having any Chest Pain? no   Are you having any Shortness of Breath? no   Swelling in your hands or feet? no     Are you having any other health concerns or issues? no     Clinical Interventions: Reviewed and followed up on alerts and treatments-Patient has low SpO2 level of 88%.  Called and spoke with , See. He says SpO2 level ws taken right after the patient had oxygen off for shower, getting dressed.  Oxygen on now at 2/L continuously. Patient has used Nebulizer has directed.  He will recheck shortly.       Have warm hands, hold hands and fingers very still while testing SpO2 level. Take a few deep breaths before testing.      Advised Patient to contact PCP  regarding CP, SOB with or without oxygen, and any health concerns for early outpatient intervention in an effort to avoid hospitalization.  Report any worsening symptoms to PCP and/or Call 911 and/or GO TO  EMERGENCY ROOM if symptoms are severe or worsening.   Expresses understanding.     Plan/Follow Up: Will continue to review, monitor and address alerts with follow up based on severity of

## 2024-06-18 ENCOUNTER — HOSPITAL ENCOUNTER (INPATIENT)
Age: 89
LOS: 2 days | Discharge: HOSPICE | DRG: 291 | End: 2024-06-20
Attending: EMERGENCY MEDICINE | Admitting: STUDENT IN AN ORGANIZED HEALTH CARE EDUCATION/TRAINING PROGRAM
Payer: MEDICARE

## 2024-06-18 ENCOUNTER — TELEPHONE (OUTPATIENT)
Dept: PRIMARY CARE CLINIC | Age: 89
End: 2024-06-18

## 2024-06-18 ENCOUNTER — OFFICE VISIT (OUTPATIENT)
Dept: PRIMARY CARE CLINIC | Age: 89
End: 2024-06-18

## 2024-06-18 ENCOUNTER — APPOINTMENT (OUTPATIENT)
Dept: GENERAL RADIOLOGY | Age: 89
DRG: 291 | End: 2024-06-18
Payer: MEDICARE

## 2024-06-18 VITALS
TEMPERATURE: 97.8 F | OXYGEN SATURATION: 91 % | HEIGHT: 59 IN | WEIGHT: 87.4 LBS | DIASTOLIC BLOOD PRESSURE: 50 MMHG | HEART RATE: 54 BPM | SYSTOLIC BLOOD PRESSURE: 108 MMHG | BODY MASS INDEX: 17.62 KG/M2

## 2024-06-18 DIAGNOSIS — I10 ESSENTIAL HYPERTENSION: ICD-10-CM

## 2024-06-18 DIAGNOSIS — I50.9 ACUTE DECOMPENSATED HEART FAILURE (HCC): ICD-10-CM

## 2024-06-18 DIAGNOSIS — E87.1 HYPONATREMIA: ICD-10-CM

## 2024-06-18 DIAGNOSIS — J96.21 ACUTE ON CHRONIC RESPIRATORY FAILURE WITH HYPOXIA (HCC): ICD-10-CM

## 2024-06-18 DIAGNOSIS — E87.8 HYPOCHLOREMIA: ICD-10-CM

## 2024-06-18 DIAGNOSIS — I50.43 ACUTE ON CHRONIC COMBINED SYSTOLIC AND DIASTOLIC CHF (CONGESTIVE HEART FAILURE) (HCC): Primary | ICD-10-CM

## 2024-06-18 DIAGNOSIS — R06.89 ACUTE RESPIRATORY INSUFFICIENCY: Primary | ICD-10-CM

## 2024-06-18 PROBLEM — I50.21 ACUTE SYSTOLIC (CONGESTIVE) HEART FAILURE (HCC): Status: ACTIVE | Noted: 2024-06-18

## 2024-06-18 LAB
ALBUMIN SERPL-MCNC: 4 G/DL (ref 3.5–5.2)
ALP SERPL-CCNC: 68 U/L (ref 35–104)
ALT SERPL-CCNC: 19 U/L (ref 0–32)
ANION GAP SERPL CALCULATED.3IONS-SCNC: 14 MMOL/L (ref 7–16)
AST SERPL-CCNC: 46 U/L (ref 0–31)
B PARAP IS1001 DNA NPH QL NAA+NON-PROBE: NOT DETECTED
B PERT DNA SPEC QL NAA+PROBE: NOT DETECTED
BACTERIA URNS QL MICRO: ABNORMAL
BASOPHILS # BLD: 0.02 K/UL (ref 0–0.2)
BASOPHILS NFR BLD: 0 % (ref 0–2)
BILIRUB SERPL-MCNC: 0.6 MG/DL (ref 0–1.2)
BILIRUB UR QL STRIP: NEGATIVE
BNP SERPL-MCNC: ABNORMAL PG/ML (ref 0–450)
BUN SERPL-MCNC: 25 MG/DL (ref 6–23)
C PNEUM DNA NPH QL NAA+NON-PROBE: NOT DETECTED
CALCIUM SERPL-MCNC: 9.8 MG/DL (ref 8.6–10.2)
CHLORIDE SERPL-SCNC: 85 MMOL/L (ref 98–107)
CLARITY UR: ABNORMAL
CO2 SERPL-SCNC: 28 MMOL/L (ref 22–29)
COLOR UR: YELLOW
CREAT SERPL-MCNC: 0.8 MG/DL (ref 0.5–1)
EOSINOPHIL # BLD: 0.04 K/UL (ref 0.05–0.5)
EOSINOPHILS RELATIVE PERCENT: 1 % (ref 0–6)
ERYTHROCYTE [DISTWIDTH] IN BLOOD BY AUTOMATED COUNT: 15.7 % (ref 11.5–15)
FLUAV RNA NPH QL NAA+NON-PROBE: NOT DETECTED
FLUBV RNA NPH QL NAA+NON-PROBE: NOT DETECTED
GFR, ESTIMATED: 67 ML/MIN/1.73M2
GLUCOSE SERPL-MCNC: 103 MG/DL (ref 74–99)
GLUCOSE UR STRIP-MCNC: NEGATIVE MG/DL
HADV DNA NPH QL NAA+NON-PROBE: NOT DETECTED
HCOV 229E RNA NPH QL NAA+NON-PROBE: NOT DETECTED
HCOV HKU1 RNA NPH QL NAA+NON-PROBE: NOT DETECTED
HCOV NL63 RNA NPH QL NAA+NON-PROBE: NOT DETECTED
HCOV OC43 RNA NPH QL NAA+NON-PROBE: NOT DETECTED
HCT VFR BLD AUTO: 30.1 % (ref 34–48)
HGB BLD-MCNC: 9.6 G/DL (ref 11.5–15.5)
HGB UR QL STRIP.AUTO: ABNORMAL
HMPV RNA NPH QL NAA+NON-PROBE: NOT DETECTED
HPIV1 RNA NPH QL NAA+NON-PROBE: NOT DETECTED
HPIV2 RNA NPH QL NAA+NON-PROBE: NOT DETECTED
HPIV3 RNA NPH QL NAA+NON-PROBE: NOT DETECTED
HPIV4 RNA NPH QL NAA+NON-PROBE: NOT DETECTED
IMM GRANULOCYTES # BLD AUTO: 0.09 K/UL (ref 0–0.58)
IMM GRANULOCYTES NFR BLD: 1 % (ref 0–5)
KETONES UR STRIP-MCNC: NEGATIVE MG/DL
LACTATE BLDV-SCNC: 1.7 MMOL/L (ref 0.5–1.9)
LACTATE BLDV-SCNC: 2.6 MMOL/L (ref 0.5–1.9)
LEUKOCYTE ESTERASE UR QL STRIP: ABNORMAL
LYMPHOCYTES NFR BLD: 0.3 K/UL (ref 1.5–4)
LYMPHOCYTES RELATIVE PERCENT: 4 % (ref 20–42)
M PNEUMO DNA NPH QL NAA+NON-PROBE: NOT DETECTED
MAGNESIUM SERPL-MCNC: 2.2 MG/DL (ref 1.6–2.6)
MCH RBC QN AUTO: 32 PG (ref 26–35)
MCHC RBC AUTO-ENTMCNC: 31.9 G/DL (ref 32–34.5)
MCV RBC AUTO: 100.3 FL (ref 80–99.9)
MONOCYTES NFR BLD: 0.71 K/UL (ref 0.1–0.95)
MONOCYTES NFR BLD: 9 % (ref 2–12)
NEUTROPHILS NFR BLD: 85 % (ref 43–80)
NEUTS SEG NFR BLD: 6.43 K/UL (ref 1.8–7.3)
NITRITE UR QL STRIP: NEGATIVE
PH UR STRIP: 6 [PH] (ref 5–9)
PLATELET # BLD AUTO: 378 K/UL (ref 130–450)
PMV BLD AUTO: 9.9 FL (ref 7–12)
POTASSIUM SERPL-SCNC: 4.4 MMOL/L (ref 3.5–5)
POTASSIUM SERPL-SCNC: 6 MMOL/L (ref 3.5–5)
PROCALCITONIN SERPL-MCNC: 0.06 NG/ML (ref 0–0.08)
PROT SERPL-MCNC: 7.2 G/DL (ref 6.4–8.3)
PROT UR STRIP-MCNC: NEGATIVE MG/DL
RBC # BLD AUTO: 3 M/UL (ref 3.5–5.5)
RBC # BLD: ABNORMAL 10*6/UL
RBC #/AREA URNS HPF: ABNORMAL /HPF
RSV RNA NPH QL NAA+NON-PROBE: NOT DETECTED
RV+EV RNA NPH QL NAA+NON-PROBE: NOT DETECTED
SARS-COV-2 RNA NPH QL NAA+NON-PROBE: NOT DETECTED
SODIUM SERPL-SCNC: 127 MMOL/L (ref 132–146)
SODIUM UR-SCNC: 37 MMOL/L
SP GR UR STRIP: 1.01 (ref 1–1.03)
SPECIMEN DESCRIPTION: NORMAL
UROBILINOGEN UR STRIP-ACNC: 0.2 EU/DL (ref 0–1)
WBC #/AREA URNS HPF: ABNORMAL /HPF
WBC OTHER # BLD: 7.6 K/UL (ref 4.5–11.5)

## 2024-06-18 PROCEDURE — 96365 THER/PROPH/DIAG IV INF INIT: CPT

## 2024-06-18 PROCEDURE — 83935 ASSAY OF URINE OSMOLALITY: CPT

## 2024-06-18 PROCEDURE — 94640 AIRWAY INHALATION TREATMENT: CPT

## 2024-06-18 PROCEDURE — 83735 ASSAY OF MAGNESIUM: CPT

## 2024-06-18 PROCEDURE — 83880 ASSAY OF NATRIURETIC PEPTIDE: CPT

## 2024-06-18 PROCEDURE — 80048 BASIC METABOLIC PNL TOTAL CA: CPT

## 2024-06-18 PROCEDURE — 83605 ASSAY OF LACTIC ACID: CPT

## 2024-06-18 PROCEDURE — 99285 EMERGENCY DEPT VISIT HI MDM: CPT

## 2024-06-18 PROCEDURE — 2700000000 HC OXYGEN THERAPY PER DAY

## 2024-06-18 PROCEDURE — 81001 URINALYSIS AUTO W/SCOPE: CPT

## 2024-06-18 PROCEDURE — 93005 ELECTROCARDIOGRAM TRACING: CPT

## 2024-06-18 PROCEDURE — 84145 PROCALCITONIN (PCT): CPT

## 2024-06-18 PROCEDURE — 80053 COMPREHEN METABOLIC PANEL: CPT

## 2024-06-18 PROCEDURE — 6360000002 HC RX W HCPCS: Performed by: EMERGENCY MEDICINE

## 2024-06-18 PROCEDURE — 2500000003 HC RX 250 WO HCPCS: Performed by: EMERGENCY MEDICINE

## 2024-06-18 PROCEDURE — 96375 TX/PRO/DX INJ NEW DRUG ADDON: CPT

## 2024-06-18 PROCEDURE — 71045 X-RAY EXAM CHEST 1 VIEW: CPT

## 2024-06-18 PROCEDURE — 84132 ASSAY OF SERUM POTASSIUM: CPT

## 2024-06-18 PROCEDURE — 87040 BLOOD CULTURE FOR BACTERIA: CPT

## 2024-06-18 PROCEDURE — 6360000002 HC RX W HCPCS: Performed by: STUDENT IN AN ORGANIZED HEALTH CARE EDUCATION/TRAINING PROGRAM

## 2024-06-18 PROCEDURE — 6370000000 HC RX 637 (ALT 250 FOR IP): Performed by: STUDENT IN AN ORGANIZED HEALTH CARE EDUCATION/TRAINING PROGRAM

## 2024-06-18 PROCEDURE — 99222 1ST HOSP IP/OBS MODERATE 55: CPT | Performed by: STUDENT IN AN ORGANIZED HEALTH CARE EDUCATION/TRAINING PROGRAM

## 2024-06-18 PROCEDURE — 0202U NFCT DS 22 TRGT SARS-COV-2: CPT

## 2024-06-18 PROCEDURE — 2140000000 HC CCU INTERMEDIATE R&B

## 2024-06-18 PROCEDURE — 84300 ASSAY OF URINE SODIUM: CPT

## 2024-06-18 PROCEDURE — 85025 COMPLETE CBC W/AUTO DIFF WBC: CPT

## 2024-06-18 PROCEDURE — 94664 DEMO&/EVAL PT USE INHALER: CPT

## 2024-06-18 RX ORDER — POLYETHYLENE GLYCOL 3350 17 G/17G
17 POWDER, FOR SOLUTION ORAL DAILY PRN
Status: DISCONTINUED | OUTPATIENT
Start: 2024-06-18 | End: 2024-06-20 | Stop reason: HOSPADM

## 2024-06-18 RX ORDER — SODIUM CHLORIDE 0.9 % (FLUSH) 0.9 %
5-40 SYRINGE (ML) INJECTION EVERY 12 HOURS SCHEDULED
Status: DISCONTINUED | OUTPATIENT
Start: 2024-06-18 | End: 2024-06-20 | Stop reason: HOSPADM

## 2024-06-18 RX ORDER — FUROSEMIDE 10 MG/ML
40 INJECTION INTRAMUSCULAR; INTRAVENOUS ONCE
Status: COMPLETED | OUTPATIENT
Start: 2024-06-18 | End: 2024-06-18

## 2024-06-18 RX ORDER — METOPROLOL SUCCINATE 25 MG/1
25 TABLET, EXTENDED RELEASE ORAL DAILY
Status: DISCONTINUED | OUTPATIENT
Start: 2024-06-19 | End: 2024-06-20 | Stop reason: HOSPADM

## 2024-06-18 RX ORDER — MAGNESIUM SULFATE IN WATER 40 MG/ML
2000 INJECTION, SOLUTION INTRAVENOUS PRN
Status: DISCONTINUED | OUTPATIENT
Start: 2024-06-18 | End: 2024-06-20 | Stop reason: HOSPADM

## 2024-06-18 RX ORDER — BUDESONIDE 0.25 MG/2ML
250 INHALANT ORAL
Status: DISCONTINUED | OUTPATIENT
Start: 2024-06-18 | End: 2024-06-20 | Stop reason: HOSPADM

## 2024-06-18 RX ORDER — SODIUM CHLORIDE 0.9 % (FLUSH) 0.9 %
5-40 SYRINGE (ML) INJECTION PRN
Status: DISCONTINUED | OUTPATIENT
Start: 2024-06-18 | End: 2024-06-20 | Stop reason: HOSPADM

## 2024-06-18 RX ORDER — AMIODARONE HYDROCHLORIDE 200 MG/1
100 TABLET ORAL DAILY
Status: DISCONTINUED | OUTPATIENT
Start: 2024-06-19 | End: 2024-06-20 | Stop reason: HOSPADM

## 2024-06-18 RX ORDER — POTASSIUM CHLORIDE 20 MEQ/1
40 TABLET, EXTENDED RELEASE ORAL PRN
Status: DISCONTINUED | OUTPATIENT
Start: 2024-06-18 | End: 2024-06-20 | Stop reason: HOSPADM

## 2024-06-18 RX ORDER — IPRATROPIUM BROMIDE AND ALBUTEROL SULFATE 2.5; .5 MG/3ML; MG/3ML
1 SOLUTION RESPIRATORY (INHALATION)
Status: DISCONTINUED | OUTPATIENT
Start: 2024-06-19 | End: 2024-06-20 | Stop reason: HOSPADM

## 2024-06-18 RX ORDER — ENOXAPARIN SODIUM 100 MG/ML
30 INJECTION SUBCUTANEOUS DAILY
Status: DISCONTINUED | OUTPATIENT
Start: 2024-06-19 | End: 2024-06-18

## 2024-06-18 RX ORDER — ONDANSETRON 2 MG/ML
4 INJECTION INTRAMUSCULAR; INTRAVENOUS EVERY 6 HOURS PRN
Status: DISCONTINUED | OUTPATIENT
Start: 2024-06-18 | End: 2024-06-20 | Stop reason: HOSPADM

## 2024-06-18 RX ORDER — ACETAMINOPHEN 325 MG/1
650 TABLET ORAL EVERY 6 HOURS PRN
Status: DISCONTINUED | OUTPATIENT
Start: 2024-06-18 | End: 2024-06-20 | Stop reason: HOSPADM

## 2024-06-18 RX ORDER — ONDANSETRON 4 MG/1
4 TABLET, ORALLY DISINTEGRATING ORAL EVERY 8 HOURS PRN
Status: DISCONTINUED | OUTPATIENT
Start: 2024-06-18 | End: 2024-06-20 | Stop reason: HOSPADM

## 2024-06-18 RX ORDER — POTASSIUM CHLORIDE 7.45 MG/ML
10 INJECTION INTRAVENOUS PRN
Status: DISCONTINUED | OUTPATIENT
Start: 2024-06-18 | End: 2024-06-20 | Stop reason: HOSPADM

## 2024-06-18 RX ORDER — PANTOPRAZOLE SODIUM 40 MG/1
40 TABLET, DELAYED RELEASE ORAL
Status: DISCONTINUED | OUTPATIENT
Start: 2024-06-19 | End: 2024-06-20 | Stop reason: HOSPADM

## 2024-06-18 RX ORDER — ATORVASTATIN CALCIUM 10 MG/1
10 TABLET, FILM COATED ORAL DAILY
Status: DISCONTINUED | OUTPATIENT
Start: 2024-06-19 | End: 2024-06-20 | Stop reason: HOSPADM

## 2024-06-18 RX ORDER — ACETAMINOPHEN 650 MG/1
650 SUPPOSITORY RECTAL EVERY 6 HOURS PRN
Status: DISCONTINUED | OUTPATIENT
Start: 2024-06-18 | End: 2024-06-20 | Stop reason: HOSPADM

## 2024-06-18 RX ORDER — ASPIRIN 81 MG/1
81 TABLET ORAL DAILY
Status: DISCONTINUED | OUTPATIENT
Start: 2024-06-19 | End: 2024-06-20 | Stop reason: HOSPADM

## 2024-06-18 RX ORDER — CALCIUM GLUCONATE 10 MG/ML
1000 INJECTION, SOLUTION INTRAVENOUS ONCE
Status: COMPLETED | OUTPATIENT
Start: 2024-06-18 | End: 2024-06-18

## 2024-06-18 RX ORDER — FUROSEMIDE 10 MG/ML
20 INJECTION INTRAMUSCULAR; INTRAVENOUS 2 TIMES DAILY
Status: DISCONTINUED | OUTPATIENT
Start: 2024-06-19 | End: 2024-06-20 | Stop reason: HOSPADM

## 2024-06-18 RX ORDER — SODIUM CHLORIDE 9 MG/ML
INJECTION, SOLUTION INTRAVENOUS PRN
Status: DISCONTINUED | OUTPATIENT
Start: 2024-06-18 | End: 2024-06-20 | Stop reason: HOSPADM

## 2024-06-18 RX ORDER — FERROUS SULFATE 325(65) MG
325 TABLET ORAL
Status: DISCONTINUED | OUTPATIENT
Start: 2024-06-19 | End: 2024-06-20 | Stop reason: HOSPADM

## 2024-06-18 RX ADMIN — FUROSEMIDE 40 MG: 10 INJECTION, SOLUTION INTRAMUSCULAR; INTRAVENOUS at 19:07

## 2024-06-18 RX ADMIN — BUDESONIDE 250 MCG: 0.25 SUSPENSION RESPIRATORY (INHALATION) at 22:50

## 2024-06-18 RX ADMIN — CALCIUM GLUCONATE 1000 MG: 10 INJECTION, SOLUTION INTRAVENOUS at 19:12

## 2024-06-18 RX ADMIN — IPRATROPIUM BROMIDE AND ALBUTEROL SULFATE 1 DOSE: 2.5; .5 SOLUTION RESPIRATORY (INHALATION) at 22:50

## 2024-06-18 ASSESSMENT — ENCOUNTER SYMPTOMS
SHORTNESS OF BREATH: 1
CHOKING: 1

## 2024-06-18 NOTE — ED PROVIDER NOTES
[facility] EMERGENCY DEPARTMENT  EMERGENCY DEPARTMENT ENCOUNTER      Pt Name: Barbara Jack  MRN: [id number]  Birthdate 1935  Date of evaluation: 6/18/2024  Provider: Félix Khan MD  PCP: Julita Dyer MD  Note Started: 4:15 PM EDT 6/18/24    CHIEF COMPLAINT       Chief Complaint   Patient presents with    Shortness of Breath     Pt reports sob for 2-3 day. Hx of CHF.  EMS had patient on cpap for 10min pta with relief. Pt normally wears 3L NC       HISTORY OF PRESENT ILLNESS: 1 or more Elements   History From: Patient  Limitations to history : None    Barbara Jack is a 89 y.o. female who presents BIBEMS with reports of increasing shortness of breath ongoing for the past few days.  Patient on 3 L nasal cannula supplemental O2 at baseline.  Was placed on CPAP by EMS, arrives on greater than 3 L nasal cannula now.  She reports that for the past week or so she has had increased difficulty breathing associated with clear sputum.  Denies hemoptysis, significant calf cramping/swelling.  History of paroxysmal A-fib on Xarelto.  Reports compliance.  She currently denies emesis, objective fevers, chest pain or pressure, dizziness, dysuria.    She was evaluated earlier today at her PCP office reported difficulty swallowing pills, labored breathing.    Nursing Notes were all reviewed and agreed with or any disagreements were addressed in the HPI.    REVIEW OF SYSTEMS :    Positives and Pertinent negatives as per HPI.     PAST MEDICAL HISTORY/Chronic Conditions Affecting Care    has a past medical history of Arthritis, Atrial fibrillation ([facility]), CAD (coronary artery disease), Cardiomyopathy ([facility]), CHF (congestive heart failure) ([facility]) (2010), Chronic anticoagulation, Chronic bronchitis ([facility]), COPD (chronic obstructive pulmonary disease) ([facility]), COVID (08/2022), Depression, GERD (gastroesophageal reflux disease), HFrEF (heart failure with reduced ejection fraction) ([facility])

## 2024-06-18 NOTE — PROGRESS NOTES
Barbara Josette Arashar presents today due to increased weakness, sob    Current Outpatient Medications   Medication Sig Dispense Refill    cyanocobalamin 1000 MCG tablet Take 1 tablet by mouth      ASPIRIN LOW DOSE 81 MG EC tablet TAKE 1 TABLET BY MOUTH EVERY DAY 90 tablet 0    torsemide (DEMADEX) 10 MG tablet Take 1 tablet by mouth in the morning and at bedtime (Patient taking differently: Take 1 tablet by mouth daily) 30 tablet 3    metoprolol succinate (TOPROL XL) 25 MG extended release tablet TAKE 1 TABLET BY MOUTH EVERY DAY 90 tablet 0    Coenzyme Q10 (CO Q-10) 200 MG CAPS TAKE 1 CAPSULE BY MOUTH EVERY DAY AT NIGHT 90 capsule 0    omeprazole (PRILOSEC) 40 MG delayed release capsule TAKE 1 CAPSULE BY MOUTH EVERY DAY 90 capsule 1    simvastatin (ZOCOR) 20 MG tablet Take 1 tablet by mouth nightly 90 tablet 0    ferrous sulfate (IRON 325) 325 (65 Fe) MG tablet Take 1 tablet by mouth daily (with breakfast) 30 tablet 3    metoclopramide (REGLAN) 5 MG tablet Take 1 tablet by mouth 3 times daily      Mometasone Furoate (NASONEX NA) by Nasal route      budesonide (PULMICORT) 0.25 MG/2ML nebulizer suspension Take 2 mLs by nebulization in the morning and 2 mLs in the evening. 60 each 2    acetaminophen (TYLENOL) 500 MG tablet Take 1 tablet by mouth every 6 hours as needed for Pain      amiodarone (PACERONE) 100 MG tablet Take 1 tablet by mouth daily      rivaroxaban (XARELTO) 15 MG TABS tablet Take 1 tablet by mouth daily (with breakfast) 90 tablet 2    sertraline (ZOLOFT) 25 MG tablet Take 1 tablet by mouth daily (Patient not taking: Reported on 6/10/2024) 90 tablet 0    ipratropium (ATROVENT) 0.02 % nebulizer solution Take 2.5 mLs by nebulization 2 times daily Never filled from pharmacy active order available (Patient not taking: Reported on 6/18/2024)      Respiratory Therapy Supplies (FULL KIT NEBULIZER SET) MISC Use as directed with nebulized medication. (Patient not taking: Reported on 6/18/2024) 1 each 0     No current

## 2024-06-18 NOTE — TELEPHONE ENCOUNTER
Yaneth from [facility] stated she was at pt house today and she was having trouble getting her medication down. Pt is having a really hard time swallowing. She has contacted hospice and palliative care to see if they can do anything and advised them to thicken her liquids. She is aware that pt has an appointment today with Dr. Dyer but she wants doctor to be aware that the pt is having a really hard time with swallowing.

## 2024-06-18 NOTE — ED PROVIDER NOTES
HPI:  6/18/24, Time: 6:34 PM EDT         Barbara Jack is a 89 y.o. female presenting to the ED for shortness of breath which has been going on for about 1 week.  Patient is chronically on 3 L of oxygen, and she has been increasing her oxygen at home.  She also reports a cough with clear sputum.  No fevers or chest pain.  No leg swelling.  Patient has also been having issues with swallowing and dysphagia.    I reviewed the patient's chart.  Patient seen by Dr. Craft with cardiology as an outpatient on 5/28/2024.  Note indicates worsening dyspnea on exertion and fatigue over the last 1.5 years especially following COVID-19 infection in 2022.  She follows with Dr. Keller for ICD interrogations and paroxysmal A-fib on Xarelto.  Patient has a history of achalasia and follows with GI.    --------------------------------------------- PAST HISTORY ---------------------------------------------  Past Medical History:  has a past medical history of Arthritis, Atrial fibrillation ([facility]), CAD (coronary artery disease), Cardiomyopathy ([facility]), CHF (congestive heart failure) ([facility]), Chronic anticoagulation, Chronic bronchitis ([facility]), COPD (chronic obstructive pulmonary disease) ([facility]), COVID, Depression, GERD (gastroesophageal reflux disease), HFrEF (heart failure with reduced ejection fraction) ([facility]), [place] (hard of hearing), Hyperlipidemia, Hypertension, ICD (implantable cardioverter-defibrillator) in place, Left ventricular dysfunction, Low vitamin D level, MI (myocardial infarction) ([facility]), Osteopenia, Osteoporosis, and Swallowing difficulty.    Past Surgical History:  has a past surgical history that includes Hysterectomy; Appendectomy; Tonsillectomy; Breast surgery (1962); Cosmetic surgery; Breast Capsulectomy (03/07/2012); Diagnostic Cardiac Cath Lab Procedure (10/30/2009); cardiovascular stress test (03/04/2010); transthoracic echocardiogram (3/30/11ize and function, ); Cardiac surgery (2009); Cardiac pacemaker placement

## 2024-06-19 ENCOUNTER — CARE COORDINATION (OUTPATIENT)
Dept: CARE COORDINATION | Age: 89
End: 2024-06-19

## 2024-06-19 LAB
ALBUMIN SERPL-MCNC: 3.7 G/DL (ref 3.5–5.2)
ALP SERPL-CCNC: 67 U/L (ref 35–104)
ALT SERPL-CCNC: 14 U/L (ref 0–32)
ANION GAP SERPL CALCULATED.3IONS-SCNC: 13 MMOL/L (ref 7–16)
ANION GAP SERPL CALCULATED.3IONS-SCNC: 15 MMOL/L (ref 7–16)
AST SERPL-CCNC: 26 U/L (ref 0–31)
BASOPHILS # BLD: 0.02 K/UL (ref 0–0.2)
BASOPHILS NFR BLD: 0 % (ref 0–2)
BILIRUB SERPL-MCNC: 0.7 MG/DL (ref 0–1.2)
BUN SERPL-MCNC: 24 MG/DL (ref 6–23)
BUN SERPL-MCNC: 24 MG/DL (ref 6–23)
CALCIUM SERPL-MCNC: 10.3 MG/DL (ref 8.6–10.2)
CALCIUM SERPL-MCNC: 9.7 MG/DL (ref 8.6–10.2)
CHLORIDE SERPL-SCNC: 85 MMOL/L (ref 98–107)
CHLORIDE SERPL-SCNC: 86 MMOL/L (ref 98–107)
CO2 SERPL-SCNC: 28 MMOL/L (ref 22–29)
CO2 SERPL-SCNC: 31 MMOL/L (ref 22–29)
CREAT SERPL-MCNC: 0.9 MG/DL (ref 0.5–1)
CREAT SERPL-MCNC: 0.9 MG/DL (ref 0.5–1)
EKG ATRIAL RATE: 58 BPM
EKG P AXIS: 57 DEGREES
EKG P-R INTERVAL: 260 MS
EKG Q-T INTERVAL: 464 MS
EKG QRS DURATION: 136 MS
EKG QTC CALCULATION (BAZETT): 455 MS
EKG R AXIS: -29 DEGREES
EKG T AXIS: 129 DEGREES
EKG VENTRICULAR RATE: 58 BPM
EOSINOPHIL # BLD: 0 K/UL (ref 0.05–0.5)
EOSINOPHILS RELATIVE PERCENT: 0 % (ref 0–6)
ERYTHROCYTE [DISTWIDTH] IN BLOOD BY AUTOMATED COUNT: 15 % (ref 11.5–15)
GFR, ESTIMATED: 66 ML/MIN/1.73M2
GFR, ESTIMATED: 66 ML/MIN/1.73M2
GLUCOSE SERPL-MCNC: 102 MG/DL (ref 74–99)
GLUCOSE SERPL-MCNC: 97 MG/DL (ref 74–99)
HCT VFR BLD AUTO: 28.4 % (ref 34–48)
HGB BLD-MCNC: 9.4 G/DL (ref 11.5–15.5)
IMM GRANULOCYTES # BLD AUTO: 0.09 K/UL (ref 0–0.58)
IMM GRANULOCYTES NFR BLD: 1 % (ref 0–5)
LYMPHOCYTES NFR BLD: 0.16 K/UL (ref 1.5–4)
LYMPHOCYTES RELATIVE PERCENT: 2 % (ref 20–42)
MCH RBC QN AUTO: 33.2 PG (ref 26–35)
MCHC RBC AUTO-ENTMCNC: 33.1 G/DL (ref 32–34.5)
MCV RBC AUTO: 100.4 FL (ref 80–99.9)
MONOCYTES NFR BLD: 0.96 K/UL (ref 0.1–0.95)
MONOCYTES NFR BLD: 9 % (ref 2–12)
NEUTROPHILS NFR BLD: 89 % (ref 43–80)
NEUTS SEG NFR BLD: 9.64 K/UL (ref 1.8–7.3)
OSMOLALITY UR: 319 MOSM/KG (ref 300–900)
PHOSPHATE SERPL-MCNC: 5 MG/DL (ref 2.5–4.5)
PLATELET # BLD AUTO: 331 K/UL (ref 130–450)
PMV BLD AUTO: 9.9 FL (ref 7–12)
POTASSIUM SERPL-SCNC: 4.3 MMOL/L (ref 3.5–5)
POTASSIUM SERPL-SCNC: 4.6 MMOL/L (ref 3.5–5)
PROT SERPL-MCNC: 6.5 G/DL (ref 6.4–8.3)
RBC # BLD AUTO: 2.83 M/UL (ref 3.5–5.5)
RBC # BLD: ABNORMAL 10*6/UL
SODIUM SERPL-SCNC: 128 MMOL/L (ref 132–146)
SODIUM SERPL-SCNC: 130 MMOL/L (ref 132–146)
WBC OTHER # BLD: 10.9 K/UL (ref 4.5–11.5)

## 2024-06-19 PROCEDURE — 84100 ASSAY OF PHOSPHORUS: CPT

## 2024-06-19 PROCEDURE — 94640 AIRWAY INHALATION TREATMENT: CPT

## 2024-06-19 PROCEDURE — 2140000000 HC CCU INTERMEDIATE R&B

## 2024-06-19 PROCEDURE — 6360000002 HC RX W HCPCS: Performed by: STUDENT IN AN ORGANIZED HEALTH CARE EDUCATION/TRAINING PROGRAM

## 2024-06-19 PROCEDURE — 36415 COLL VENOUS BLD VENIPUNCTURE: CPT

## 2024-06-19 PROCEDURE — 85025 COMPLETE CBC W/AUTO DIFF WBC: CPT

## 2024-06-19 PROCEDURE — 6370000000 HC RX 637 (ALT 250 FOR IP): Performed by: STUDENT IN AN ORGANIZED HEALTH CARE EDUCATION/TRAINING PROGRAM

## 2024-06-19 PROCEDURE — 80053 COMPREHEN METABOLIC PANEL: CPT

## 2024-06-19 PROCEDURE — 2580000003 HC RX 258: Performed by: STUDENT IN AN ORGANIZED HEALTH CARE EDUCATION/TRAINING PROGRAM

## 2024-06-19 PROCEDURE — 92610 EVALUATE SWALLOWING FUNCTION: CPT

## 2024-06-19 PROCEDURE — 99232 SBSQ HOSP IP/OBS MODERATE 35: CPT | Performed by: STUDENT IN AN ORGANIZED HEALTH CARE EDUCATION/TRAINING PROGRAM

## 2024-06-19 RX ORDER — GUAIFENESIN 400 MG/1
400 TABLET ORAL 3 TIMES DAILY
Status: DISCONTINUED | OUTPATIENT
Start: 2024-06-19 | End: 2024-06-20 | Stop reason: HOSPADM

## 2024-06-19 RX ADMIN — IPRATROPIUM BROMIDE AND ALBUTEROL SULFATE 1 DOSE: 2.5; .5 SOLUTION RESPIRATORY (INHALATION) at 08:45

## 2024-06-19 RX ADMIN — FUROSEMIDE 20 MG: 10 INJECTION, SOLUTION INTRAMUSCULAR; INTRAVENOUS at 17:59

## 2024-06-19 RX ADMIN — IPRATROPIUM BROMIDE AND ALBUTEROL SULFATE 1 DOSE: 2.5; .5 SOLUTION RESPIRATORY (INHALATION) at 19:47

## 2024-06-19 RX ADMIN — IPRATROPIUM BROMIDE AND ALBUTEROL SULFATE 1 DOSE: 2.5; .5 SOLUTION RESPIRATORY (INHALATION) at 03:47

## 2024-06-19 RX ADMIN — BUDESONIDE 250 MCG: 0.25 SUSPENSION RESPIRATORY (INHALATION) at 08:45

## 2024-06-19 RX ADMIN — FUROSEMIDE 20 MG: 10 INJECTION, SOLUTION INTRAMUSCULAR; INTRAVENOUS at 10:50

## 2024-06-19 RX ADMIN — BUDESONIDE 250 MCG: 0.25 SUSPENSION RESPIRATORY (INHALATION) at 19:47

## 2024-06-19 RX ADMIN — SODIUM CHLORIDE, PRESERVATIVE FREE 10 ML: 5 INJECTION INTRAVENOUS at 20:51

## 2024-06-19 RX ADMIN — IPRATROPIUM BROMIDE AND ALBUTEROL SULFATE 1 DOSE: 2.5; .5 SOLUTION RESPIRATORY (INHALATION) at 11:19

## 2024-06-19 RX ADMIN — IPRATROPIUM BROMIDE AND ALBUTEROL SULFATE 1 DOSE: 2.5; .5 SOLUTION RESPIRATORY (INHALATION) at 15:56

## 2024-06-19 NOTE — CARE COORDINATION
Social Work /Transition of Care:    Pt presents to the ED secondary to shortness of breath from home.    Pt is admitted inpatient with acute systolic congestive heart failure.  Pt has consults for the heart failure nurse and palliative care.  JADE met with pt who was alert and oriented x3, sitting up in bed, on 5L oxygen.  Pt is [place].  Pt reports she has home oxygen (3L) through [employer].  Pt reports she and her  live in a one floor home.  Pt also has a rollator, shower chair, bsc, and nebulizer at home.  Pt has hx at [facility].    JADE briefly discussed discharge plans with pt who states she may need some rehab because she is so weak and unable to walk right now.  Pt reports she would return to [facility] as she has been there in the past.  JADE made referral to Maritza with [facility].

## 2024-06-19 NOTE — H&P
(congestive) heart failure (HCC)  Resolved Problems:    * No resolved hospital problems. *  Hyponatremia  Hyperkalemia resolved  Lactic acidosis resolved  Acute on chronic hypoxic respiratory failure on 3 L at  Atrial fibrillation  CKD stage III  Hyperlipidemia  Dysphagia    PLAN:    -Admit to inpatient telemetry bed  -Case discussed with ED physician  -CXR suggestive of Volume overload, Received 40 Mg IV Lasix, will continue diuresis with 20 Mg IV twice daily Lasix, Monitor Vitals   -Daily weights, strict input and output monitoring, heart failure coordinator consulted  -Hyponatremia likely secondary to volume overload, serum and urine   osmolality urine sodium sent  -Hyperkalemia on initial BMP likely hemolyzed sample, repeat was 4.4  -Monitor BMP stat and in a.m. labs  -Lactic acidosis likely secondary to hypoxia and hypoperfusion improved  -Continue metoprolol, amiodarone and Xarelto for atrial fibrillation  -Continue statin  -Wean oxygen as tolerated  -UA was positive for leukocyte esterase, bacteria however patient denies symptoms, afebrile, no leukocytosis noted, please evaluate for symptoms in a.m.  - Bedside Swallow evaluation to be done prior to starting diet, Speech consult for dysphagia    Reviewed note from PCP from today afternoon family considering hospice evaluation   No family present at bedside, tried calling  and son unable to reach.   Will order palliative consult.    Diet: ADULT DIET; Regular; Low Fat/Low Chol/High Fiber/TIFFANY  Code Status: DNR-CCA  Surrogate decision maker confirmed with patient:   Extended Emergency Contact Information  Primary Emergency Contact: See Jack  Address: [address] [place] [id number] [facility]  Home Phone: [phone]  Relation: Spouse  Secondary Emergency Contact: Ganga Massey   [facility]  Home Phone: [phone]  Relation: Child    DVT Prophylaxis: []Lovenox []Heparin []PCD [] Warfarin/NOAC

## 2024-06-19 NOTE — ED NOTES
Report given to Jorge ESCALERA from Asha RN  Report method by phone   The following was reviewed with receiving RN:   Current vital signs:  /83   Pulse 81   Temp 98.1 °F (36.7 °C) (Oral)   Resp 17   SpO2 95%                      Any medication or safety alerts were reviewed. Any pending diagnostics and notifications were also reviewed, as well as any safety concerns or issues, abnormal labs, abnormal imaging, and abnormal assessment findings. Questions were answered.

## 2024-06-20 VITALS
WEIGHT: 85.98 LBS | OXYGEN SATURATION: 96 % | DIASTOLIC BLOOD PRESSURE: 78 MMHG | HEIGHT: 59 IN | SYSTOLIC BLOOD PRESSURE: 122 MMHG | BODY MASS INDEX: 17.33 KG/M2 | HEART RATE: 75 BPM | RESPIRATION RATE: 18 BRPM | TEMPERATURE: 98.6 F

## 2024-06-20 PROBLEM — E43 SEVERE PROTEIN-CALORIE MALNUTRITION (HCC): Chronic | Status: ACTIVE | Noted: 2024-06-20

## 2024-06-20 PROCEDURE — APPSS60 APP SPLIT SHARED TIME 46-60 MINUTES

## 2024-06-20 PROCEDURE — 94667 MNPJ CHEST WALL 1ST: CPT

## 2024-06-20 PROCEDURE — 94669 MECHANICAL CHEST WALL OSCILL: CPT

## 2024-06-20 PROCEDURE — 2700000000 HC OXYGEN THERAPY PER DAY

## 2024-06-20 PROCEDURE — 6370000000 HC RX 637 (ALT 250 FOR IP): Performed by: STUDENT IN AN ORGANIZED HEALTH CARE EDUCATION/TRAINING PROGRAM

## 2024-06-20 PROCEDURE — 97161 PT EVAL LOW COMPLEX 20 MIN: CPT

## 2024-06-20 PROCEDURE — 99223 1ST HOSP IP/OBS HIGH 75: CPT | Performed by: INTERNAL MEDICINE

## 2024-06-20 PROCEDURE — 99239 HOSP IP/OBS DSCHRG MGMT >30: CPT | Performed by: STUDENT IN AN ORGANIZED HEALTH CARE EDUCATION/TRAINING PROGRAM

## 2024-06-20 PROCEDURE — 97165 OT EVAL LOW COMPLEX 30 MIN: CPT

## 2024-06-20 PROCEDURE — 94640 AIRWAY INHALATION TREATMENT: CPT

## 2024-06-20 PROCEDURE — 6360000002 HC RX W HCPCS: Performed by: NURSE PRACTITIONER

## 2024-06-20 PROCEDURE — 97530 THERAPEUTIC ACTIVITIES: CPT

## 2024-06-20 PROCEDURE — 6360000002 HC RX W HCPCS: Performed by: STUDENT IN AN ORGANIZED HEALTH CARE EDUCATION/TRAINING PROGRAM

## 2024-06-20 PROCEDURE — 2580000003 HC RX 258: Performed by: STUDENT IN AN ORGANIZED HEALTH CARE EDUCATION/TRAINING PROGRAM

## 2024-06-20 RX ORDER — MORPHINE SULFATE 2 MG/ML
2 INJECTION, SOLUTION INTRAMUSCULAR; INTRAVENOUS
Status: DISCONTINUED | OUTPATIENT
Start: 2024-06-20 | End: 2024-06-20 | Stop reason: HOSPADM

## 2024-06-20 RX ORDER — GLYCOPYRROLATE 0.2 MG/ML
0.2 INJECTION INTRAMUSCULAR; INTRAVENOUS EVERY 4 HOURS PRN
Status: DISCONTINUED | OUTPATIENT
Start: 2024-06-20 | End: 2024-06-20 | Stop reason: HOSPADM

## 2024-06-20 RX ORDER — MORPHINE SULFATE 2 MG/ML
4 INJECTION, SOLUTION INTRAMUSCULAR; INTRAVENOUS
Status: DISCONTINUED | OUTPATIENT
Start: 2024-06-20 | End: 2024-06-20 | Stop reason: HOSPADM

## 2024-06-20 RX ORDER — LORAZEPAM 2 MG/ML
1 INJECTION INTRAMUSCULAR EVERY 4 HOURS PRN
Status: DISCONTINUED | OUTPATIENT
Start: 2024-06-20 | End: 2024-06-20 | Stop reason: HOSPADM

## 2024-06-20 RX ORDER — GLYCOPYRROLATE 0.2 MG/ML
0.1 INJECTION INTRAMUSCULAR; INTRAVENOUS EVERY 4 HOURS PRN
Status: DISCONTINUED | OUTPATIENT
Start: 2024-06-20 | End: 2024-06-20

## 2024-06-20 RX ADMIN — FUROSEMIDE 20 MG: 10 INJECTION, SOLUTION INTRAMUSCULAR; INTRAVENOUS at 09:33

## 2024-06-20 RX ADMIN — BUDESONIDE 250 MCG: 0.25 SUSPENSION RESPIRATORY (INHALATION) at 07:58

## 2024-06-20 RX ADMIN — IPRATROPIUM BROMIDE AND ALBUTEROL SULFATE 1 DOSE: 2.5; .5 SOLUTION RESPIRATORY (INHALATION) at 07:58

## 2024-06-20 RX ADMIN — SODIUM CHLORIDE, PRESERVATIVE FREE 10 ML: 5 INJECTION INTRAVENOUS at 09:33

## 2024-06-20 RX ADMIN — GLYCOPYRROLATE 0.2 MG: 0.2 INJECTION INTRAMUSCULAR; INTRAVENOUS at 13:16

## 2024-06-20 RX ADMIN — IPRATROPIUM BROMIDE AND ALBUTEROL SULFATE 1 DOSE: 2.5; .5 SOLUTION RESPIRATORY (INHALATION) at 11:35

## 2024-06-20 RX ADMIN — IPRATROPIUM BROMIDE AND ALBUTEROL SULFATE 1 DOSE: 2.5; .5 SOLUTION RESPIRATORY (INHALATION) at 01:34

## 2024-06-20 NOTE — PLAN OF CARE
Problem: Chronic Conditions and Co-morbidities  Goal: Patient's chronic conditions and co-morbidity symptoms are monitored and maintained or improved  Outcome: Progressing  Flowsheets (Taken 6/19/2024 2000)  Care Plan - Patient's Chronic Conditions and Co-Morbidity Symptoms are Monitored and Maintained or Improved: Monitor and assess patient's chronic conditions and comorbid symptoms for stability, deterioration, or improvement     Problem: Discharge Planning  Goal: Discharge to home or other facility with appropriate resources  Outcome: Progressing  Flowsheets (Taken 6/19/2024 2000)  Discharge to home or other facility with appropriate resources: Identify barriers to discharge with patient and caregiver     Problem: Skin/Tissue Integrity  Goal: Absence of new skin breakdown  Description: 1.  Monitor for areas of redness and/or skin breakdown  2.  Assess vascular access sites hourly  3.  Every 4-6 hours minimum:  Change oxygen saturation probe site  4.  Every 4-6 hours:  If on nasal continuous positive airway pressure, respiratory therapy assess nares and determine need for appliance change or resting period.  Outcome: Progressing     Problem: Safety - Adult  Goal: Free from fall injury  Outcome: Progressing  Flowsheets (Taken 6/19/2024 2204)  Free From Fall Injury: Instruct family/caregiver on patient safety     Problem: ABCDS Injury Assessment  Goal: Absence of physical injury  Outcome: Progressing  Flowsheets (Taken 6/19/2024 2204)  Absence of Physical Injury: Implement safety measures based on patient assessment

## 2024-06-20 NOTE — PROGRESS NOTES
OCCUPATIONAL THERAPY INITIAL EVALUATION    [facility]  [address], [place]        Date:2024                                                  Patient Name: Barbara Jack    MRN: [id number]    : 1935    Room: [address] [place] [address]      Evaluating OT: Lew Hidalgo OTR/L; [id number]       Referring Provider: Luis Alfredo Arshad MD     Specific Provider Orders/Date: OT Eval and Treat 24       Diagnosis: Acute on chronic combined systolic and diastolic CHF (congestive heart failure)      Surgery: None this admission     Pertinent Medical History:  has a past medical history of Arthritis, Atrial fibrillation ([facility]), CAD (coronary artery disease), Cardiomyopathy ([facility]), CHF (congestive heart failure) ([facility]), Chronic anticoagulation, Chronic bronchitis ([facility]), COPD (chronic obstructive pulmonary disease) ([facility]), COVID, Depression, GERD (gastroesophageal reflux disease), HFrEF (heart failure with reduced ejection fraction) ([facility]), [place] (hard of hearing), Hyperlipidemia, Hypertension, ICD (implantable cardioverter-defibrillator) in place, Left ventricular dysfunction, Low vitamin D level, MI (myocardial infarction) ([facility]), Osteopenia, Osteoporosis, and Swallowing difficulty.     Recommended Adaptive Equipment: TBD     Precautions:  Fall Risk, [place], continuous pulse ox, O2, purewick, incontinence, +alarms     Assessment of current deficits    [x] Functional mobility  [x]ADLs  [x] Strength               []Cognition    [x] Functional transfers   [x] IADLs         [x] Safety Awareness   [x]Endurance    [x] Fine Coordination              [x] Balance      [] Vision/perception   []Sensation     []Gross Motor Coordination  [] ROM  [] Delirium                   [] Motor Control     OT PLAN OF CARE   OT POC based on physician orders, patient diagnosis and results of clinical assessment    Frequency/Duration 1-3 days/wk for 2 weeks PRN   Specific OT Treatment 
  SPEECH/LANGUAGE PATHOLOGY  CLINICAL ASSESSMENT OF SWALLOWING FUNCTION   and PLAN OF CARE    PATIENT NAME:  Barbara Jack  (female)     MRN:  [id number]    :  1935  (89 y.o.)  STATUS:  Emergency visit:  Room 14    TODAY'S DATE:  2024  REFERRING PROVIDER:     SPECIFIC PROVIDER ORDER: SLP eval and treat Date of order:  24  REASON FOR REFERRAL: dysphagia   EVALUATING THERAPIST: ISRAEL Valderrama                 RESULTS:    DYSPHAGIA DIAGNOSIS:   Inconsistent signs of aspiration noted       DIET RECOMMENDATIONS:  Minced and moist consistency solids (IDDSI level 5) with  honey consistency (moderately thick - IDDSI level 3)  liquids until MBS is completed     FEEDING RECOMMENDATIONS:     Assistance level:  Set-up is required for all oral intake      Compensatory strategies recommended: No strategies are recommended at this time      Discussed recommendations with:   floor nurse (patient nurse and charge nurse unavailable)    SPEECH THERAPY  PLAN OF CARE   The dysphagia POC is established based on physician order, dysphagia diagnosis and results of clinical assessment     Will make further recommendations/changes to POC once MBSS is completed.    Conditions Requiring Skilled Therapeutic Intervention for dysphagia:    Not applicable    Specific dysphagia interventions to include:     Not applicable no therapy warranted     Specific instructions for next treatment:  MBSS to be completed  Patient Treatment Goals:    Short Term Goals:  Pt will participate in MBSS to fully assess oropharyngeal swallow function and to assist in determining the least restrictive PO diet to maintain adequate nutrition/hydration     Long Term Goals:   A Video Swallow Study (MBSS) is recommended and requires a physician order      Patient/family Goal:    Did not state.  Will further assess during treatment.    Plan of care discussed with Patient   The Patient did not demonstrate complete understanding of the diagnosis, 
89-year-old patient known to me for ischemic cardiomyopathy and ICD in situ  Patient hospitalized with acute dyspnea.  On review of her cardiology notes it appears that the patient has been clinically worsening for the past week with increasing dyspnea and low urinary output.  Family wishes to be discharged with hospice /palliative care and therefore ICD therapies to be turned off.  I spoke to her  at bedside who understands the consequences of ICD reprogramming to turn therapies off    Plan  ICD checked and VT and VF therapies were turned off..    
Comprehensive Nutrition Assessment    Type and Reason for Visit:  Initial, Consult    Nutrition Recommendations/Plan:   Advance diet when medically appropriate.    When diet advances, will recommend and start Magic cup supplement BID and Gelatein high protein supplement BID to help meet increased nutritional needs and d/t decreased po intake of meals.    Please consult clinical nutrition for any further recommendations if needed.         Malnutrition Assessment:  Malnutrition Status:  Severe malnutrition (06/20/24 1047)    Context:  Chronic Illness     Findings of the 6 clinical characteristics of malnutrition:  Energy Intake:  75% or less estimated energy requirements for 1 month or longer  Weight Loss:  Unable to assess (d/t lack of long term actual weight history)     Body Fat Loss:  Severe body fat loss Orbital, Triceps   Muscle Mass Loss:  Severe muscle mass loss Temples (temporalis), Clavicles (pectoralis & deltoids)  Fluid Accumulation:  No significant fluid accumulation     Strength:  Not Performed    Nutrition Assessment:    Patient is currently NPO ; noted inconsistent signs of aspiration noted per speech who recommended minced and moist consistency solids with honey consistency moderately thick liquids until MBS is completed ; Hospice consulted ;  adm w/ SOB ; noted acute on chronic CHF and acute on chronic respiratory failure with hypoxia ; noted hyponatremia and hypochloremia upon admission ;  hx of DM and COPD ; hx of CAD/CHF/cardiomyopathy ; pt also meets criteria for severe malnutrition ; adm w/ possible dysphagia (patients PCP office reported difficulty swallowing pills and labored breathing PTA) ; will provide recommendations    Nutrition Related Findings:    I&Os WNL, no edema, missing teeth, A&O x 4, redness to coccyx, muscle/fat wasting ; Wound Type: None       Current Nutrition Intake & Therapies:    Average Meal Intake: NPO     Diet NPO    Anthropometric Measures:  Height: 149.9 cm (4' 
Consult received and chart reviewed. Visit made to the bedside. Met with , daughter and son in the family waiting room. Hospice philosophy and services discussed. All questions answered. Pt has received comfort medications but remains symptomatic with audible crackles and SOB. Pt has [employer] Dev dual chamber pacer/defibrillator. Call placed to [employer] for deactivation of defibrillator. Charge nurse to obtain order deactivate. Call placed to FRANCISCO Holder, she has accepted the pt for GIP level of care at the hospice house. Please leave all IV sites intact. N2N provided by HOT liaison. PAS to transport pt at 5 pm. Transport pack in soft chart. Updates provided to bedside nurse, charge nurse, pall med and CM.    Electronically signed by Donna Manley RN on 6/20/2024 at 1:24 PM  [phone]   
[facility] consult placed   
[place] notified of cardiology consult via [employer]  
LOVENOX PROPHYLAXIS EVALUATION  (Populations not addressed in this protocol: trauma, obstetrics, or COVID-19)    Wt Readings from Last 3 Encounters:   06/18/24 39.6 kg (87 lb 6.4 oz)   06/10/24 39.5 kg (87 lb)   05/28/24 39 kg (86 lb)       Estimated Creatinine Clearance: 30 mL/min (based on SCr of 0.8 mg/dL).  Recent Labs     06/18/24  1723   BUN 25*   CREATININE 0.8      HGB 9.6*   HCT 30.1*       Weight Range: 50.9 kg and below    CRCL = 30 or greater    50.9 kg   and below     .9  kg   101-150.9 kg   151-174.9  kg   175 kg  or greater     30mg subq  daily     40mg subq daily    (or 30mg subq BID orthopedic)     30mg subq  BID   40mg subq  BID   60mg subq BID       Per P/T protocol for appropriate subq anticoagulation by weight and CRCL change to:    Enoxparin 30mg subq daily      Zaire Guzman RPH  9:30 PM  06/18/24        
Pas updated that they have been delayed. They are now anticipating 60-90 mins putting  time at 6 pm. Family aware, hospice house aware, charge updated.     Electronically signed by Donna Manley RN on 6/20/2024 at 4:34 PM  [phone]  
Patient placed on 7L high flow due to desaturation.  
Patient's family requesting information on hospice. Notified Dr. Arshad and palliative care.      JAMILA HE RN    
Peggy may notified via [employer] of palliative consult  
Pulm consult completed   
commands     ASSESSMENT and PLAN:    Assessment    Acute on chronic hypoxic respiratory failure  Acute on chronic HFrEF  Atrial fibrillation  ICD in place  Coronary artery disease  Dysphagia  COPD  Depression  GERD  Hyperlipidemia  Hypertension  Hard of hearing  Hyponatremia likely secondary to volume overload    Plan  -Continue with IV diuresis  -Monitor input/output, daily weight  -Aspiration precautions, SLP eval  -Chest physiotherapy, chest vest, incentive spirometry, flutter valve, encourage deep breathing and coughing  -Breathing treatment as needed  -As needed cough drops  -Monitor on telemetry  -Resume home medications as appropriate  -PT OT eval  - following for safe discharge planning/placement  -Monitor and optimize electrolyte as able  -Consider nephrology consultation if hyponatremia is not correcting with IV diuresis        DVT Prophylaxis [x] Xarelto, []  Heparin, [] SCDs, [] Ambulation   GI Prophylaxis [x] PPI,  [] H2 Blocker,  [] Carafate,  [] Diet/Tube Feeds   Disposition Patient requires continued admission due to pending clinical improvement, cardiology evaluation, placement   MDM [] Low, [x] Moderate,[]  High       Medications:  REVIEWED DAILY    Infusion Medications    sodium chloride       Scheduled Medications    sodium chloride flush  5-40 mL IntraVENous 2 times per day    furosemide  20 mg IntraVENous BID    amiodarone  100 mg Oral Daily    aspirin  81 mg Oral Daily    budesonide  250 mcg Nebulization BID RT    ferrous sulfate  325 mg Oral Daily with breakfast    atorvastatin  10 mg Oral Daily    rivaroxaban  15 mg Oral Daily with breakfast    pantoprazole  40 mg Oral QAM AC    ipratropium 0.5 mg-albuterol 2.5 mg  1 Dose Inhalation Q4H RT    metoprolol succinate  25 mg Oral Daily     PRN Meds: sodium chloride flush, sodium chloride, potassium chloride **OR** potassium alternative oral replacement **OR** potassium chloride, magnesium sulfate, ondansetron **OR** ondansetron, 
diagnosis     Referring provider/PT Order:    Start   Ordering Provider    06/19/24 1030  PT eval and treat  Start:  06/19/24 1030,   End:  06/19/24 1030,   ONE TIME,   Standing Count:  1 Occurrences,   R         Luis Alfredo Arshad MD      Diagnosis:  Hypochloremia [E87.8]  Hyponatremia [E87.1]  Acute on chronic combined systolic and diastolic CHF (congestive heart failure) ([facility]) [I50.43]  Acute on chronic respiratory failure with hypoxia (HCC) [J96.21]  Acute systolic (congestive) heart failure (HCC) [I50.21]  Specific instructions for next treatment:  Progress activity    Current Treatment Recommendations:     [x] Strengthening to improve independence with functional mobility   [] ROM to improve independence with functional mobility   [x] Balance Training to improve static/dynamic balance and to reduce fall risk  [x] Endurance Training to improve activity tolerance during functional mobility   [x] Transfer Training to improve safety and independence with all functional transfers   [x] Gait Training to improve gait mechanics, endurance and assess need for appropriate assistive device  [x] Stair Training in preparation for safe discharge home and/or into the community   [] Positioning to prevent skin breakdown and contractures  [x] Safety and Education Training   [x] Patient/Caregiver Education   [] HEP  [] Other     PT long term treatment goals are located in above grid    Frequency of treatments: 2-5x/week x 1-2 weeks.    Time in  1026  Time out  1049    Total Treatment Time  10 minutes     Evaluation Time includes thorough review of current medical information, gathering information on past medical history/social history and prior level of function, completion of standardized testing/informal observation of tasks, assessment of data and education on plan of care and goals.    CPT codes:  [x] Low Complexity PT evaluation 63813  [] Moderate Complexity PT evaluation 15685  [] High Complexity PT evaluation 53518  [] PT

## 2024-06-20 NOTE — CARE COORDINATION
6/20:  Update CM Note:  Pt is being dc to Hospice House today at 5pm with [facility].  CHANDRAKANT,Ambulance form completed & placed in the soft chart.  Sw/PASCUAL will continue to follow.  Electronically signed by Pretty Santos RN on 6/20/2024 at 1:33 PM

## 2024-06-20 NOTE — CARE COORDINATION
6/20:  Update CM Note:  Per GEOVANY Thompson she has a meeting with the  & daughter bedside today at 1130am.  Electronically signed by Pretty Santos RN on 6/20/2024 at 11:24 AM

## 2024-06-20 NOTE — CONSULTS
Pulmonary/Critical Care Medicine  Resident Consult Note    Reason for Consult:  acute on chronic hypoxic respiratory failure     Requesting Physician: No ref. provider found    CHIEF COMPLAINT:   Chief Complaint   Patient presents with    Shortness of Breath     Pt reports sob for 2-3 day. Hx of CHF.  EMS had patient on cpap for 10min pta with relief. Pt normally wears 3L NC       HISTORY OF PRESENT ILLNESS: 89 y.o. old extremely pleasant female who presneted to [facility] ED after having difficulty swallowing at home and physical deconditioning per patient's son. Per patient, she was sent to the ED by her primary care doctor for shortness of breath.     Patient's GDMT had been held as outpatient 2/2 hypotension. She gradually developed pulmonary edema and increasing oxygen needs beyond her 3L NC O2 at baseline. Previously followed with [facility], but has not scheduled an office visit for the past two years. Lives at home with her . Per patient's son at the bedside, patient has struggled with dysphagia for years but it has acutely worsened over the past week or so. He states she has become significantly more deconditioned and is no longer able to walk safely at home.     Patient has had multiple hospitalizations since January of this year, and has a complex medical history with severe ischemic cardiomyopathy and valvular disease.     History Obtained From:  patient, patient's son     Past Medical History:        Diagnosis Date    Arthritis     Atrial fibrillation ([facility])     follows with Dr Craft  on xarelto    CAD (coronary artery disease)     Cardiomyopathy ([facility])     CHF (congestive heart failure) ([facility]) 2010    history of follows with Dr Craft 4/27/23    Chronic anticoagulation     Chronic bronchitis ([facility])     none recent    COPD (chronic obstructive pulmonary disease) ([facility])     COVID 08/2022    in hospital x5 days    Depression     GERD (gastroesophageal reflux disease)     HFrEF (heart failure with 
options  Discussed hospice philosophy-not interested yet  Reviewed advanced directives  Code status Limited DNI  Advanced Directives: POA or living will in epic  Surrogate/Legal NOK:  See Guero (spouse) [phone]  Ganga Massey (child) [phone]    Spiritual assessment: no spiritual distress identified  Bereavement and grief: to be determined  Referrals to: none today  SUBJECTIVE:     Current medical issues leading to Palliative Medicine involvement include   Active Hospital Problems    Diagnosis Date Noted    Acute systolic (congestive) heart failure (HCC) [I50.21] 06/18/2024       Details of Conversation:    Chart reviewed.  Patient seen at the bedside, alert and oriented x 3, very hard of hearing and having difficulty participating in meaningful conversation. The patient is having a difficult time swallowing secretions and audibly moist secretions noted. No family present at the bedside.  Call placed to patient's , See.  Introduced self and the role of palliative medicine.  The patient has advanced directives listed within our records listing See as primary decision maker.  Discussed patient's current condition and comorbidities.  Discussed goals of care and CODE STATUS options.  See states that in the event of cardiopulmonary arrest, the patient would not want heroic measures.  He is in agreement with changing CODE STATUS to limited no to all options.  He does state that the patient has had difficulty with dysphagia for many years and has had esophageal dilation many times in the past by Dr. Raymond .  He states that she has worked with [place] speech therapy in the past but currently is not on a modified diet.  He states that the patient does not desire thickened liquids but he understands if it is required then it is something that she will have to do.  We discussed the risk of aspiration and the need for speech therapy evaluation.  We discussed hospice philosophy and comfort 
III  GERD.  Achalasia s/p lower esophageal sphincter botox injection on 5/18/23 and s/p lower esophageal sphincter botox injection and dilation on 8/17/23.  Depression.  Osteoporosis.  Overactive bladder.  Status post right and left capsulectomy and removal of extravasated implant material on the right on 03/07/12, status post bilateral breast implants in the remote past.  Bilateral hearing loss: Wears bilateral hearing aids.  S/P Hysterectomy, tonsillectomy, appendectomy, right elbow fracture repair  CODE STATUS Limited: No intubation, no defibrillation, no CPR, no resuscitative medications    Plan:   Apparently patient and family exploring palliative services and hospice treatment options  Given degree of hyponatremia and hypochloremia will recommend nephrology consultation to guide volume management  Consider holding Lasix until patient is seen by nephrology  Avoid hypotension given degree of aortic stenosis  Please follow palliative and hospice service recommendation  Continue on amiodarone, aspirin, Lipitor, and metoprolol as well as Xarelto   Further recommendation to follow after above is complete.        Rick Munoz MD  [facility]

## 2024-06-20 NOTE — PLAN OF CARE
Problem: Chronic Conditions and Co-morbidities  Goal: Patient's chronic conditions and co-morbidity symptoms are monitored and maintained or improved  6/20/2024 0950 by Trinity Morales RN  Outcome: Progressing  6/19/2024 2205 by Clair Oswald RN  Outcome: Progressing  Flowsheets (Taken 6/19/2024 2000)  Care Plan - Patient's Chronic Conditions and Co-Morbidity Symptoms are Monitored and Maintained or Improved: Monitor and assess patient's chronic conditions and comorbid symptoms for stability, deterioration, or improvement     Problem: Discharge Planning  Goal: Discharge to home or other facility with appropriate resources  6/20/2024 0950 by Trinity Morales RN  Outcome: Progressing  6/19/2024 2205 by Clair Oswald RN  Outcome: Progressing  Flowsheets (Taken 6/19/2024 2000)  Discharge to home or other facility with appropriate resources: Identify barriers to discharge with patient and caregiver     Problem: Skin/Tissue Integrity  Goal: Absence of new skin breakdown  Description: 1.  Monitor for areas of redness and/or skin breakdown  2.  Assess vascular access sites hourly  3.  Every 4-6 hours minimum:  Change oxygen saturation probe site  4.  Every 4-6 hours:  If on nasal continuous positive airway pressure, respiratory therapy assess nares and determine need for appliance change or resting period.  6/20/2024 0950 by Trinity Morales RN  Outcome: Progressing  6/19/2024 2205 by Clair Oswald RN  Outcome: Progressing

## 2024-06-20 NOTE — DISCHARGE SUMMARY
Hospitalist Discharge Summary    Patient ID: Barbara Jack   Patient : 1935  Patient's PCP: Julita Dyer MD    Admit Date: 2024   Admitting Physician: Amy Ren MD    Discharge Date:  2024   Discharge Physician: Luis Alfredo Arshad MD   Discharge Condition: Stable  Discharge Disposition: Hospice Facility      Hospital course in brief:  (Please refer to daily progress notes for a comprehensive review of the hospitalization by requesting medical records)    This is an 89-year-old lady who has a past medical history of arthritis, atrial fibrillation, coronary artery disease, cardiomyopathy, HFrEF, chronic bronchitis, COPD, COVID, depression, GERD, hard of hearing, hyperlipidemia, hypertension, ICD in place, vitamin D deficiency, osteoporosis, dysphagia who presented to [facility] ED on 2024 with complaints of shortness of breath which started about a week ago.  Patient uses 2 L of oxygen at baseline and has been using 4 L recently.  Her shortness of breath associated with cough with associated mucus production which is clear.  She also reports orthopnea.  Workup in the ED was suggestive of volume overload secondary to CHF exacerbation.  Chest x-ray with cardiomegaly and pulmonary vascular congestion, improving bibasilar hazy opacities.  Respiratory pathogen panel negative.  Urinalysis showed cloudy urine, large leukocyte esterase, bacteria.  Patient does not have any urinary symptoms.  Patient was given 40 mg Lasix IV, 1 g calcium gluconate in ED.  She was initially placed on CPAP with improvement in her breathing and was on 4 L oxygen.  Most recent echocardiogram with LVEF 20 to 25%.  She is currently requiring 5 L oxygen via nasal cannula.  She does have moist cough and seem to have difficulty clearing up her secretions.  PT OT, speech has been consulted, she would likely benefit from placement.  Palliative care following. CODE STATUS changed to DNR CCA.

## 2024-06-21 ENCOUNTER — CARE COORDINATION (OUTPATIENT)
Dept: PRIMARY CARE CLINIC | Age: 89
End: 2024-06-21

## 2024-06-21 ENCOUNTER — CARE COORDINATION (OUTPATIENT)
Dept: CARE COORDINATION | Age: 89
End: 2024-06-21

## 2024-06-21 DIAGNOSIS — I50.9 CHF (CONGESTIVE HEART FAILURE), NYHA CLASS I, UNSPECIFIED FAILURE CHRONICITY, UNSPECIFIED TYPE (HCC): Primary | ICD-10-CM

## 2024-06-21 DIAGNOSIS — J44.9 CHRONIC OBSTRUCTIVE PULMONARY DISEASE, UNSPECIFIED COPD TYPE (HCC): ICD-10-CM

## 2024-06-21 NOTE — CARE COORDINATION
Pt removed from care coordination and RPM services due to change in condition. Brittany is now under Hospice care. Will remain available if there is a change.    Remote Patient Monitoring Disenrollment      Date/Time:  6/21/2024 8:22 AM  Patient Current Location: Home: [address] [place] [id number]  Patient has been dis-enrolled from Remote Patient Monitoring program on 6/21/2024 due to change in level of care. Patient has been provided instruction on process to return RPM equipment and RPM has been deactivated.     Patient has [facility]'s contact information for any further questions, concerns, or needs.     Yarelis Buenrostro RN  Ambulatory Care Manager  Cell: [phone]

## 2024-06-21 NOTE — PROGRESS NOTES
Remote Patient Order Discontinued    Received request from Holly Buenrostro RN   to discontinue order for remote patient monitoring of CHF and COPD and order completed.

## 2024-06-21 NOTE — CARE COORDINATION
Patient Barbara Jack  06/21/24     Care Coordination  placed call to patient to arrange RPM kit  through [employer]. Left HIPAA Compliant Message     provided return and how to pack equipment in original packing via the patients voicemail if available and provided call back number should patient have questions.    Patient made aware [employer] will  equipment in 2-4 days.

## 2024-06-23 LAB
MICROORGANISM SPEC CULT: NORMAL
MICROORGANISM SPEC CULT: NORMAL
SERVICE CMNT-IMP: NORMAL
SERVICE CMNT-IMP: NORMAL
SPECIMEN DESCRIPTION: NORMAL
SPECIMEN DESCRIPTION: NORMAL

## 2024-06-24 ENCOUNTER — TELEPHONE (OUTPATIENT)
Dept: ADMINISTRATIVE | Age: 89
End: 2024-06-24

## 2024-06-24 ENCOUNTER — CARE COORDINATION (OUTPATIENT)
Dept: CASE MANAGEMENT | Age: 89
End: 2024-06-24

## 2024-06-24 ENCOUNTER — CARE COORDINATION (OUTPATIENT)
Dept: CARE COORDINATION | Age: 89
End: 2024-06-24

## 2024-06-24 RX ORDER — FERROUS SULFATE 325(65) MG
1 TABLET ORAL DAILY
Qty: 30 TABLET | Refills: 3 | Status: CANCELLED | OUTPATIENT
Start: 2024-06-24

## 2024-06-24 RX ORDER — TORSEMIDE 10 MG/1
10 TABLET ORAL 2 TIMES DAILY
Qty: 60 TABLET | Status: CANCELLED | OUTPATIENT
Start: 2024-06-24

## 2024-06-24 RX ORDER — POTASSIUM CHLORIDE 20 MEQ/1
40 TABLET, EXTENDED RELEASE ORAL DAILY
Qty: 60 TABLET | Status: CANCELLED | OUTPATIENT
Start: 2024-06-24

## 2024-06-24 NOTE — CARE COORDINATION
Remote Patient Monitoring Note      Date/Time:  2024 9:46 AM  Patient Current Location: [place]     Background:   Clinical Interventions: Reviewed and followed up on alerts and treatments-      just called and informed me that Patient has . He also wants information on how to send the RPM equipment back. ACM & CCSS notified.     Plan/Follow Up: Will continue to review, monitor and address alerts with follow up based on severity of symptoms and risk factors.

## 2024-06-24 NOTE — CARE COORDINATION
Left an additional VM with See on how to pack equipment up to return. Called [facility] as well and nurses would inform him. RPM team is also aware to send for equipment.

## 2024-06-25 ENCOUNTER — COMMUNITY CARE MANAGEMENT (OUTPATIENT)
Facility: CLINIC | Age: 89
End: 2024-06-25

## 2024-06-28 ENCOUNTER — CARE COORDINATION (OUTPATIENT)
Dept: CASE MANAGEMENT | Age: 89
End: 2024-06-28

## 2024-06-28 ENCOUNTER — CARE COORDINATION (OUTPATIENT)
Dept: PRIMARY CARE CLINIC | Age: 89
End: 2024-06-28

## 2024-06-28 NOTE — CARE COORDINATION
Remote Patient Monitoring Note      Date/Time:  6/28/2024 11:14 AM  Patient Current Location: [place]    See Jack called and said that [employer] hasn't picked up his RPM equipment yet. (He has not been home at the same time they came. He wants to know what he should do and if there is a [employer] Place where he could drop the equipment off?     Please call See Jack at [phone].    Amanda Caballero LPN    [phone]  [facility] Coordinator / RPM

## 2024-06-28 NOTE — CARE COORDINATION
Additional RPM Return    Received notification from LPN that patient spouse called in reporting he missed [employer] for RPM . Unsure how to proceed.    Additional RPM return submitted today.    SS call to patient spouse, See to inform of additional return. See WIGGINS. If [employer] is missed x3 this time, we will look into having return label sent to spouse so he can drop off at [employer] at his convenience.     Will route to LPN for awareness.

## 2024-11-01 NOTE — TELEPHONE ENCOUNTER
Orthopaedics  Post-operative follow-up    Procedure Performed:  1. Right shoulder arthroscopic rotator cuff repair  2. Right shoulder arthroscopic biceps tenodesis  3. Right shoulder subacromial decompression     Date of Surgery: 8/5/24    Subjective: Alyx Weir is now approximately 3 months out from her shoulder surgery.  She has been compliant with post-operative restrictions and has been progressing with PT at [facility].  Her pain and function continue to improve.     Exam:  Incision sites healed, C/D/I  No swelling or bruising noted  Fluid ROM with no crepitus  Upright Active ROM: , ABD 90, ER 40  Supine Active ROM: , , ER 50  Able to reach the top and back of her head  Cuff strength intact on limited testing   Axillary nerve sensation and motor intact  Motor and sensory intact distally  Strong radial pulse, fingers warm and well perfused    Imaging:  No new imaging.     Impression:  3 months s/p right shoulder arthroscopic rotator cuff repair, biceps tenodesis, and subacromial decompression      Plan:  She still has some difficulty with ROM against gravity at this time but when supine has good ROM. Discussed that I expect her ROM to continue to improve as she progresses her strength which we will begin focusing on in physical therapy at this time.   Continue with physical therapy at least 2 days/week for the next 6 weeks.   Discussed she may return to work at this time if restrictions are able to be met.   Advance PT per protocol  Symptomatic treatment for pain / swelling  May advance activities as reviewed today: Continue to progress strength and endurance of shoulder and periscapular musculature.   Instructed patient to call clinic if questions or concerns    Follow-up with me in 6 weeks.     Work status:   1) Continue physical therapy  2) No lifting/pushing/pulling greater than 5 pounds  3) No overhead work  4) No repetitive motions        Carl Ruiz MD    I, [place]  Spoke to patient's spouse. Went over instructions per ROBERT Roberson. Had him read back the directions to me.   Marcus, acted as a scribe for Carl Ruiz MD for the duration of this office visit.

## (undated) DEVICE — GRADUATE TRIANG MEASURE 1000ML BLK PRNT

## (undated) DEVICE — NEEDLE SCLERO L200CM OD0.51MM ID0.24MM SHTH DIA1.8MM LOK

## (undated) DEVICE — Z INACTIVE USE 2855131 SPONGE GZ W4XL4IN RAYON POLY FILL CVR W/ NONWOVEN FAB

## (undated) DEVICE — SPONGE GZ W4XL4IN RAYON POLY CVR W/NONWOVEN FAB STRL 2/PK

## (undated) DEVICE — ESOPHAGEAL BALLOON DILATATION CATHETER: Brand: CRE FIXED WIRE

## (undated) DEVICE — BLOCK BITE 60FR RUBBER ADLT DENTAL

## (undated) DEVICE — FORCEPS BX OVL CUP FEN DISPOSABLE CAP L 160CM RAD JAW 4

## (undated) DEVICE — SPONGE GZ W4XL4IN RAYON POLY FILL CVR W/ NONWOVEN FAB